# Patient Record
Sex: FEMALE | Race: WHITE | NOT HISPANIC OR LATINO | Employment: OTHER | URBAN - NONMETROPOLITAN AREA
[De-identification: names, ages, dates, MRNs, and addresses within clinical notes are randomized per-mention and may not be internally consistent; named-entity substitution may affect disease eponyms.]

---

## 2017-01-03 ENCOUNTER — HOSPITAL ENCOUNTER (OUTPATIENT)
Dept: PHYSICAL THERAPY | Facility: HOSPITAL | Age: 67
Setting detail: THERAPIES SERIES
End: 2017-01-03
Attending: FAMILY MEDICINE
Payer: COMMERCIAL

## 2017-01-03 PROCEDURE — 97110 THERAPEUTIC EXERCISES: CPT | Mod: GP

## 2017-01-03 PROCEDURE — 40000718 ZZHC STATISTIC PT DEPARTMENT ORTHO VISIT

## 2017-01-05 ENCOUNTER — HOSPITAL ENCOUNTER (OUTPATIENT)
Dept: PHYSICAL THERAPY | Facility: HOSPITAL | Age: 67
Setting detail: THERAPIES SERIES
End: 2017-01-05
Attending: FAMILY MEDICINE
Payer: COMMERCIAL

## 2017-01-05 PROCEDURE — 97110 THERAPEUTIC EXERCISES: CPT | Mod: GP

## 2017-01-05 PROCEDURE — 40000718 ZZHC STATISTIC PT DEPARTMENT ORTHO VISIT

## 2017-01-10 ENCOUNTER — HOSPITAL ENCOUNTER (OUTPATIENT)
Dept: PHYSICAL THERAPY | Facility: HOSPITAL | Age: 67
Setting detail: THERAPIES SERIES
End: 2017-01-10
Attending: FAMILY MEDICINE
Payer: COMMERCIAL

## 2017-01-10 PROCEDURE — 40000718 ZZHC STATISTIC PT DEPARTMENT ORTHO VISIT

## 2017-01-10 PROCEDURE — 97110 THERAPEUTIC EXERCISES: CPT | Mod: GP

## 2017-01-12 ENCOUNTER — HOSPITAL ENCOUNTER (OUTPATIENT)
Dept: PHYSICAL THERAPY | Facility: HOSPITAL | Age: 67
Setting detail: THERAPIES SERIES
End: 2017-01-12
Attending: FAMILY MEDICINE
Payer: COMMERCIAL

## 2017-01-12 PROCEDURE — 40000718 ZZHC STATISTIC PT DEPARTMENT ORTHO VISIT

## 2017-01-12 PROCEDURE — 97110 THERAPEUTIC EXERCISES: CPT | Mod: GP

## 2017-01-17 ENCOUNTER — HOSPITAL ENCOUNTER (OUTPATIENT)
Dept: PHYSICAL THERAPY | Facility: HOSPITAL | Age: 67
Setting detail: THERAPIES SERIES
End: 2017-01-17
Attending: FAMILY MEDICINE
Payer: COMMERCIAL

## 2017-01-17 PROCEDURE — 97110 THERAPEUTIC EXERCISES: CPT | Mod: GP

## 2017-01-17 PROCEDURE — 40000718 ZZHC STATISTIC PT DEPARTMENT ORTHO VISIT

## 2017-01-26 ENCOUNTER — HOSPITAL ENCOUNTER (OUTPATIENT)
Dept: PHYSICAL THERAPY | Facility: HOSPITAL | Age: 67
Setting detail: THERAPIES SERIES
End: 2017-01-26
Attending: FAMILY MEDICINE
Payer: COMMERCIAL

## 2017-01-26 PROCEDURE — 97110 THERAPEUTIC EXERCISES: CPT | Mod: GP

## 2017-01-26 PROCEDURE — 40000718 ZZHC STATISTIC PT DEPARTMENT ORTHO VISIT

## 2017-01-31 ENCOUNTER — HOSPITAL ENCOUNTER (OUTPATIENT)
Dept: PHYSICAL THERAPY | Facility: HOSPITAL | Age: 67
Setting detail: THERAPIES SERIES
End: 2017-01-31
Attending: FAMILY MEDICINE
Payer: COMMERCIAL

## 2017-01-31 PROCEDURE — 40000718 ZZHC STATISTIC PT DEPARTMENT ORTHO VISIT

## 2017-01-31 PROCEDURE — 97110 THERAPEUTIC EXERCISES: CPT | Mod: GP

## 2017-02-02 ENCOUNTER — HOSPITAL ENCOUNTER (OUTPATIENT)
Dept: PHYSICAL THERAPY | Facility: HOSPITAL | Age: 67
Setting detail: THERAPIES SERIES
End: 2017-02-02
Attending: FAMILY MEDICINE
Payer: COMMERCIAL

## 2017-02-02 PROCEDURE — 97110 THERAPEUTIC EXERCISES: CPT | Mod: GP

## 2017-02-02 PROCEDURE — 40000718 ZZHC STATISTIC PT DEPARTMENT ORTHO VISIT

## 2017-02-28 ENCOUNTER — HOSPITAL ENCOUNTER (OUTPATIENT)
Dept: PHYSICAL THERAPY | Facility: HOSPITAL | Age: 67
Setting detail: THERAPIES SERIES
End: 2017-02-28
Attending: FAMILY MEDICINE
Payer: COMMERCIAL

## 2017-02-28 PROCEDURE — 97110 THERAPEUTIC EXERCISES: CPT | Mod: GP

## 2017-02-28 PROCEDURE — 40000718 ZZHC STATISTIC PT DEPARTMENT ORTHO VISIT

## 2017-03-02 ENCOUNTER — HOSPITAL ENCOUNTER (OUTPATIENT)
Dept: PHYSICAL THERAPY | Facility: HOSPITAL | Age: 67
Setting detail: THERAPIES SERIES
End: 2017-03-02
Attending: FAMILY MEDICINE
Payer: COMMERCIAL

## 2017-03-02 PROCEDURE — 40000718 ZZHC STATISTIC PT DEPARTMENT ORTHO VISIT

## 2017-03-02 PROCEDURE — 97110 THERAPEUTIC EXERCISES: CPT | Mod: GP

## 2017-03-07 ENCOUNTER — HOSPITAL ENCOUNTER (OUTPATIENT)
Dept: PHYSICAL THERAPY | Facility: HOSPITAL | Age: 67
Setting detail: THERAPIES SERIES
End: 2017-03-07
Attending: FAMILY MEDICINE
Payer: COMMERCIAL

## 2017-03-07 PROCEDURE — 40000718 ZZHC STATISTIC PT DEPARTMENT ORTHO VISIT

## 2017-03-07 PROCEDURE — 97110 THERAPEUTIC EXERCISES: CPT | Mod: GP

## 2017-03-09 ENCOUNTER — HOSPITAL ENCOUNTER (OUTPATIENT)
Dept: PHYSICAL THERAPY | Facility: HOSPITAL | Age: 67
Setting detail: THERAPIES SERIES
End: 2017-03-09
Attending: FAMILY MEDICINE
Payer: COMMERCIAL

## 2017-03-09 PROCEDURE — 40000718 ZZHC STATISTIC PT DEPARTMENT ORTHO VISIT

## 2017-03-09 PROCEDURE — 97110 THERAPEUTIC EXERCISES: CPT | Mod: GP

## 2017-03-09 NOTE — PROGRESS NOTES
03/07/17 1000   Signing Clinician's Name / Credentials   Signing clinician's name / credentials Madai Beth, PAIGE   Session Number   Session Number 33   Subjective Report   Subjective Report Pt has been feeling really good overall. She is using the shoulder pretty much normally. She notices that it gets tired still when she overdoes it.    Objective Measures   Objective Measures Objective Measure 1   Objective Measure 1   Objective Measure Shoulder ROM   Details Full and equal. Shoulder flexion R 4/5, abduction 4/5, ER 4/5, IR 5/5   Therapeutic Procedure/exercise   Minutes 30    Skilled Intervention Ther ex   Patient Response Good   Treatment Detail Pulleys 3 minutes, UBE for 4 minutes. Standing shoulder abduction 1lb 20x2, scaption 2lb 15x2. Added TB exercises to HEP including pull downs and rows 15x2 each c green TB   Plan   Homework HEP   Plan for next session Pt scheduled to see her surgeon this week. Plan to d/c this week or next   Total Session Time   Total Session Time 30

## 2017-03-14 ENCOUNTER — HOSPITAL ENCOUNTER (OUTPATIENT)
Dept: PHYSICAL THERAPY | Facility: HOSPITAL | Age: 67
Setting detail: THERAPIES SERIES
End: 2017-03-14
Attending: FAMILY MEDICINE
Payer: COMMERCIAL

## 2017-03-14 PROCEDURE — 40000718 ZZHC STATISTIC PT DEPARTMENT ORTHO VISIT

## 2017-03-14 PROCEDURE — 97110 THERAPEUTIC EXERCISES: CPT | Mod: GP

## 2017-05-26 ENCOUNTER — HEALTH MAINTENANCE LETTER (OUTPATIENT)
Age: 67
End: 2017-05-26

## 2017-07-05 NOTE — PROGRESS NOTES
Outpatient Physical Therapy Discharge Note     Patient: Rj Rodríguez  : 1950    Beginning/End Dates of Reporting Period:  10/11/16 to 3/14/17    Referring Provider: David Whitehead PA-C    Therapy Diagnosis: R shoulder arthroscopy, debridement, sad, dce, RTC rupraspinatur , infraspinatus, and open supec biceps tenodesis     Client Self Report: Pt reports the doctor was happy with how her shld was doing.     Goals:  Goal Identifier STG 1   Goal Description Pt will demonstrate knowledge/understanding of HEP and report daily compliance   Target Date 10/25/16   Date Met      Progress:     Goal Identifier LTG 1   Goal Description Pt will demonstrate full ROM R shoulder to return to PLOF   Target Date 01/10/17   Date Met      Progress:     Goal Identifier LTG 2   Goal Description Pt will demonstrate normal 5/5 strengthe R shoulder musculature   Target Date 01/10/17   Date Met      Progress:     Goal Identifier     Goal Description     Target Date     Date Met      Progress:     Goal Identifier     Goal Description     Target Date     Date Met      Progress:     Goal Identifier     Goal Description     Target Date     Date Met      Progress:     Goal Identifier     Goal Description     Target Date     Date Met      Progress:     Goal Identifier     Goal Description     Target Date     Date Met      Progress:       Plan:  Discharge from therapy.    Discharge:    Reason for Discharge: Patient chooses to discontinue therapy.    Equipment Issued: NA    Discharge Plan: Patient to continue home program.

## 2018-08-02 ENCOUNTER — TRANSFERRED RECORDS (OUTPATIENT)
Dept: HEALTH INFORMATION MANAGEMENT | Facility: HOSPITAL | Age: 68
End: 2018-08-02

## 2018-08-02 LAB
CREAT SERPL-MCNC: 0.7 MG/DL (ref 0.4–1)
GFR SERPL CREATININE-BSD FRML MDRD: >60 ML/MIN/1.73M2
GLUCOSE SERPL-MCNC: 101 MG/DL (ref 70–99)
POTASSIUM SERPL-SCNC: 4 MMOL/L (ref 3.4–5.1)
TSH SERPL-ACNC: 1.73 UIU/ML (ref 0.4–3.99)

## 2019-05-09 ENCOUNTER — HOSPITAL ENCOUNTER (EMERGENCY)
Facility: HOSPITAL | Age: 69
Discharge: HOME | End: 2019-05-09
Attending: FAMILY MEDICINE | Admitting: FAMILY MEDICINE
Payer: MEDICARE

## 2019-05-09 ENCOUNTER — APPOINTMENT (OUTPATIENT)
Dept: CT IMAGING | Facility: HOSPITAL | Age: 69
End: 2019-05-09
Attending: FAMILY MEDICINE
Payer: MEDICARE

## 2019-05-09 VITALS
SYSTOLIC BLOOD PRESSURE: 152 MMHG | DIASTOLIC BLOOD PRESSURE: 90 MMHG | BODY MASS INDEX: 40.81 KG/M2 | RESPIRATION RATE: 16 BRPM | WEIGHT: 216 LBS | TEMPERATURE: 97.6 F | OXYGEN SATURATION: 95 %

## 2019-05-09 DIAGNOSIS — S06.0X1A CONCUSSION WITH LOSS OF CONSCIOUSNESS OF 30 MINUTES OR LESS, INITIAL ENCOUNTER: ICD-10-CM

## 2019-05-09 DIAGNOSIS — R55 VASOVAGAL SYNCOPE: ICD-10-CM

## 2019-05-09 LAB
ALBUMIN UR-MCNC: NEGATIVE MG/DL
ANION GAP SERPL CALCULATED.3IONS-SCNC: 5 MMOL/L (ref 3–14)
APPEARANCE UR: CLEAR
BASOPHILS # BLD AUTO: 0.1 10E9/L (ref 0–0.2)
BASOPHILS NFR BLD AUTO: 0.5 %
BILIRUB UR QL STRIP: NEGATIVE
BUN SERPL-MCNC: 10 MG/DL (ref 7–30)
CALCIUM SERPL-MCNC: 8.7 MG/DL (ref 8.5–10.1)
CHLORIDE SERPL-SCNC: 106 MMOL/L (ref 94–109)
CO2 SERPL-SCNC: 28 MMOL/L (ref 20–32)
COLOR UR AUTO: ABNORMAL
CREAT SERPL-MCNC: 0.7 MG/DL (ref 0.52–1.04)
DIFFERENTIAL METHOD BLD: ABNORMAL
EOSINOPHIL # BLD AUTO: 0.1 10E9/L (ref 0–0.7)
EOSINOPHIL NFR BLD AUTO: 0.4 %
ERYTHROCYTE [DISTWIDTH] IN BLOOD BY AUTOMATED COUNT: 13.1 % (ref 10–15)
GFR SERPL CREATININE-BSD FRML MDRD: >90 ML/MIN/{1.73_M2}
GLUCOSE SERPL-MCNC: 165 MG/DL (ref 70–99)
GLUCOSE UR STRIP-MCNC: 70 MG/DL
HCT VFR BLD AUTO: 41.2 % (ref 35–47)
HGB BLD-MCNC: 13.7 G/DL (ref 11.7–15.7)
HGB UR QL STRIP: NEGATIVE
IMM GRANULOCYTES # BLD: 0.1 10E9/L (ref 0–0.4)
IMM GRANULOCYTES NFR BLD: 0.7 %
KETONES UR STRIP-MCNC: NEGATIVE MG/DL
LEUKOCYTE ESTERASE UR QL STRIP: NEGATIVE
LYMPHOCYTES # BLD AUTO: 0.8 10E9/L (ref 0.8–5.3)
LYMPHOCYTES NFR BLD AUTO: 6.6 %
MCH RBC QN AUTO: 29.3 PG (ref 26.5–33)
MCHC RBC AUTO-ENTMCNC: 33.3 G/DL (ref 31.5–36.5)
MCV RBC AUTO: 88 FL (ref 78–100)
MONOCYTES # BLD AUTO: 0.7 10E9/L (ref 0–1.3)
MONOCYTES NFR BLD AUTO: 6.2 %
NEUTROPHILS # BLD AUTO: 10 10E9/L (ref 1.6–8.3)
NEUTROPHILS NFR BLD AUTO: 85.6 %
NITRATE UR QL: NEGATIVE
NRBC # BLD AUTO: 0 10*3/UL
NRBC BLD AUTO-RTO: 0 /100
PH UR STRIP: 6 PH (ref 4.7–8)
PLATELET # BLD AUTO: 278 10E9/L (ref 150–450)
POTASSIUM SERPL-SCNC: 3.6 MMOL/L (ref 3.4–5.3)
RBC # BLD AUTO: 4.67 10E12/L (ref 3.8–5.2)
SODIUM SERPL-SCNC: 139 MMOL/L (ref 133–144)
SOURCE: ABNORMAL
SP GR UR STRIP: 1.01 (ref 1–1.03)
UROBILINOGEN UR STRIP-MCNC: NORMAL MG/DL (ref 0–2)
WBC # BLD AUTO: 11.7 10E9/L (ref 4–11)

## 2019-05-09 PROCEDURE — 85025 COMPLETE CBC W/AUTO DIFF WBC: CPT | Performed by: FAMILY MEDICINE

## 2019-05-09 PROCEDURE — 99284 EMERGENCY DEPT VISIT MOD MDM: CPT | Mod: Z6 | Performed by: FAMILY MEDICINE

## 2019-05-09 PROCEDURE — 81003 URINALYSIS AUTO W/O SCOPE: CPT | Performed by: FAMILY MEDICINE

## 2019-05-09 PROCEDURE — A9270 NON-COVERED ITEM OR SERVICE: HCPCS | Mod: GY | Performed by: FAMILY MEDICINE

## 2019-05-09 PROCEDURE — 80048 BASIC METABOLIC PNL TOTAL CA: CPT | Performed by: FAMILY MEDICINE

## 2019-05-09 PROCEDURE — 36415 COLL VENOUS BLD VENIPUNCTURE: CPT | Performed by: FAMILY MEDICINE

## 2019-05-09 PROCEDURE — 99284 EMERGENCY DEPT VISIT MOD MDM: CPT | Mod: 25

## 2019-05-09 PROCEDURE — 25000132 ZZH RX MED GY IP 250 OP 250 PS 637: Mod: GY | Performed by: FAMILY MEDICINE

## 2019-05-09 PROCEDURE — 70450 CT HEAD/BRAIN W/O DYE: CPT | Mod: TC

## 2019-05-09 RX ORDER — LORAZEPAM 0.5 MG/1
0.5 TABLET ORAL ONCE
Status: COMPLETED | OUTPATIENT
Start: 2019-05-09 | End: 2019-05-09

## 2019-05-09 RX ORDER — AMLODIPINE BESYLATE 2.5 MG/1
2.5 TABLET ORAL DAILY
COMMUNITY
End: 2019-11-11

## 2019-05-09 RX ADMIN — LORAZEPAM 0.5 MG: 0.5 TABLET ORAL at 12:06

## 2019-05-09 NOTE — ED NOTES
Arrived via [facility], transferred self to cot, call light in reach.  Requested that door and window shades be left open.  Patient is anxious, encouraged to take deep breaths, re-assurance provided.  Patient stated was going into kitchen right elbow. hit light switch, extremely painful, and the next thing she knew she was on her back on the kitchen floor. Doesn't remember getting from kitchen door to the floor.  Tried to get up, everything was spinning, nausea, vomited after  assisted her to bathroom.  Doesn't think hit head.  Alert and oriented, speech clear. Denies pain at this time.  Nausea.  EMS gave Klonopin 1 mg at 0810.

## 2019-05-09 NOTE — ED PROVIDER NOTES
History     Chief Complaint   Patient presents with     Fall     HPI  Rj Rodríguez is a 68 year old female who presents the emergency room with extreme anxiety, quivering and compulsively rubbing a small stuffed animal.  She was given Klonopin 2 mg at the scene by her  just prior to transport, apparently she has extreme claustrophobia.  She also has anxiety that is freeform and on arrival here she was worried about having found herself on the floor after hitting her elbow on the door.  She said that that the pain from the elbow was so severe she could not tolerate it in the next thing she knew she was laying on her back on the floor.  She denies any injury excepting the fact that she has pain in the back of her head, apparently did have a loss of consciousness and she has a memory gap.  Prior to coming to the emergency room she had tried to get up, everything was spinning and she had nausea, vomited after her  assisted her to going to the bathroom.  They are concerned that she must have a concussion, she is continuing to be very anxious although apparently she come down significantly with the Klonopin.  She states that Xanax gives her hives, however she tolerates Ativan and Klonopin.    Allergies:  Allergies   Allergen Reactions     Fruit [Peach] Anaphylaxis and Hives     fresh peaches and any fruit that has a pit.  Kiwi's and apples also.  Can eat any fruit cooked.     Nuts Anaphylaxis and Hives     All Raw Nuts, can eat nuts when roasted.     Ace Inhibitors      Upper respiratory infection     Alkylamines Hives     Antihistamines     Alprazolam Hives     Xanax     Erythromycin      BASE; pt. Not sure if this is a true allergy.     Guaifenesin      Guaifed     Hydrochlorothiazide      Olmesartan Medoxomil Cough     Benicar     Phenylephrine Hcl      Guaifed     Pseudoephedrine Hcl      Guaifed     Seasonal Allergies      hayfever     Tramadol Hcl      Ultram, insomnia, nightmares, headache      Zoloft [Sertraline]      paranoia     Latex Other (See Comments), Swelling, Difficulty breathing and Rash     Throat Closes         Problem List:    Patient Active Problem List    Diagnosis Date Noted     Acute gastroenteritis 01/30/2014     Priority: Medium     Vertigo 03/18/2013     Priority: Medium     Tinnitus 03/18/2013     Priority: Medium     Chronic rhinitis 03/18/2013     Priority: Medium     Myalgia 03/18/2013     Priority: Medium        Past Medical History:    Past Medical History:   Diagnosis Date     Arthritis      Chronic back pain      Hypertension      Irregular heart beat      Sleep apnea        Past Surgical History:    Past Surgical History:   Procedure Laterality Date     APPENDECTOMY       BACK SURGERY       CHOLECYSTECTOMY       COLONOSCOPY  2012    Repeat in 10 years     GYN SURGERY       HYSTERECTOMY      Ovaries     RELEASE CARPAL TUNNEL      LT     RELEASE CARPAL TUNNEL      RT x2     SURGICAL RADIOLOGY PROCEDURE N/A 2/12/2016    Procedure: SURGICAL RADIOLOGY PROCEDURE;  Surgeon: Provider, Generic Perianesthesia Nursing;  Location: [facility]     SURGICAL RADIOLOGY PROCEDURE N/A 8/15/2016    Procedure: SURGICAL RADIOLOGY PROCEDURE;  Surgeon: Provider, Generic Perianesthesia Nursing;  Location: [facility]       Family History:    Family History   Problem Relation Age of Onset     Colon Cancer Maternal Aunt      Colon Cancer Maternal Uncle      Diabetes Father         Type 2     Hypertension Father        Social History:  Marital Status:   [2]  Social History     Tobacco Use     Smoking status: Never Smoker     Smokeless tobacco: Never Used   Substance Use Topics     Alcohol use: Yes     Comment: occ glass of wine     Drug use: No        Medications:      Acetaminophen (TYLENOL PO)   amLODIPine (NORVASC) 2.5 MG tablet   atenolol (TENORMIN) 100 MG tablet   cetirizine (ZYRTEC) 10 MG tablet   clonazePAM (KLONOPIN) 0.5 MG tablet   EPINEPHrine (EPIPEN) 0.3 MG/0.3ML injection   FLUoxetine (PROZAC) 20  "MG capsule   Furosemide (LASIX) 20 MG tablet   Multiple Vitamins-Minerals (MULTIVITAMIN GUMMIES ADULT PO)   potassium chloride (K-DUR) 10 MEQ tablet   ALBUTEROL IN         Review of Systems   Constitutional: Positive for activity change. Negative for diaphoresis, fatigue and fever.   HENT:        Mild tenderness in the occipital area at midline.   Respiratory: Negative for shortness of breath.    Cardiovascular: Negative for chest pain and palpitations.   Gastrointestinal: Positive for nausea and vomiting. Negative for abdominal pain, constipation and diarrhea.   Genitourinary: Negative.    Musculoskeletal: Negative for arthralgias.   Neurological: Negative for headaches.   Psychiatric/Behavioral: Positive for behavioral problems. The patient is nervous/anxious.         So anxious she is not able to sit still, has difficulty answering questions or identifying what is going on.       Physical Exam   BP: (!) 185/123  Heart Rate: 76  Temp: 98.1  F (36.7  C)  Resp: 24  Height: (5' 1 1/4\")  Weight: 98 kg (216 lb)  SpO2: 98 %      Physical Exam   Constitutional: She is oriented to person, place, and time. She appears well-developed and well-nourished. She appears distressed.   Mentally distressed, physically not so much.   HENT:   Head: Normocephalic. Head is without raccoon's eyes and without Abdi's sign.   Right Ear: No hemotympanum.   Left Ear: No hemotympanum.   Nose: No nasal septal hematoma.   Mouth/Throat: Oropharynx is clear and moist.   Eyes: Pupils are equal, round, and reactive to light. EOM are normal.   Neck: Normal range of motion. Neck supple.   Cardiovascular: Normal rate, regular rhythm, normal heart sounds and intact distal pulses.   No murmur heard.  Pulmonary/Chest: Effort normal and breath sounds normal. No respiratory distress.   Abdominal: Soft. Bowel sounds are normal. She exhibits no distension. There is no tenderness.   Musculoskeletal: Normal range of motion. She exhibits no edema or " tenderness.   Neurological: She is alert and oriented to person, place, and time.   Skin: Skin is warm and dry. Capillary refill takes less than 2 seconds. There is pallor.   Psychiatric: Her mood appears anxious. Her affect is labile and inappropriate. Her speech is rapid and/or pressured. Cognition and memory are normal. She expresses impulsivity and inappropriate judgment.   Nursing note and vitals reviewed.      ED Course        Procedures    Results for orders placed or performed during the hospital encounter of 05/09/19 (from the past 24 hour(s))   CBC with platelets differential   Result Value Ref Range    WBC 11.7 (H) 4.0 - 11.0 10e9/L    RBC Count 4.67 3.8 - 5.2 10e12/L    Hemoglobin 13.7 11.7 - 15.7 g/dL    Hematocrit 41.2 35.0 - 47.0 %    MCV 88 78 - 100 fl    MCH 29.3 26.5 - 33.0 pg    MCHC 33.3 31.5 - 36.5 g/dL    RDW 13.1 10.0 - 15.0 %    Platelet Count 278 150 - 450 10e9/L    Diff Method Automated Method     % Neutrophils 85.6 %    % Lymphocytes 6.6 %    % Monocytes 6.2 %    % Eosinophils 0.4 %    % Basophils 0.5 %    % Immature Granulocytes 0.7 %    Nucleated RBCs 0 0 /100    Absolute Neutrophil 10.0 (H) 1.6 - 8.3 10e9/L    Absolute Lymphocytes 0.8 0.8 - 5.3 10e9/L    Absolute Monocytes 0.7 0.0 - 1.3 10e9/L    Absolute Eosinophils 0.1 0.0 - 0.7 10e9/L    Absolute Basophils 0.1 0.0 - 0.2 10e9/L    Abs Immature Granulocytes 0.1 0 - 0.4 10e9/L    Absolute Nucleated RBC 0.0    Basic metabolic panel   Result Value Ref Range    Sodium 139 133 - 144 mmol/L    Potassium 3.6 3.4 - 5.3 mmol/L    Chloride 106 94 - 109 mmol/L    Carbon Dioxide 28 20 - 32 mmol/L    Anion Gap 5 3 - 14 mmol/L    Glucose 165 (H) 70 - 99 mg/dL    Urea Nitrogen 10 7 - 30 mg/dL    Creatinine 0.70 0.52 - 1.04 mg/dL    GFR Estimate >90 >60 mL/min/[1.73_m2]    GFR Estimate If Black >90 >60 mL/min/[1.73_m2]    Calcium 8.7 8.5 - 10.1 mg/dL   CT Head w/o Contrast    Narrative    PROCEDURE: CT HEAD W/O CONTRAST     HISTORY: loss of  consciousness.    COMPARISON: 2016    TECHNIQUE:  Helical images of the head from the foramen magnum to the  vertex were obtained without contrast.    FINDINGS: The ventricles and sulci are normal in volume. No acute  intracranial hemorrhage, mass effect, midline shift, hydrocephalus or  basilar cystern effacement are present.    The grey-white matter interface is preserved.    The calvarium is intact. The mastoid air cells are clear.  The  visualized paranasal sinuses are clear.      Impression    IMPRESSION: Normal brain      NOHEMI WADDELL MD   UA reflex to Microscopic and Culture   Result Value Ref Range    Color Urine Light Yellow     Appearance Urine Clear     Glucose Urine 70 (A) NEG^Negative mg/dL    Bilirubin Urine Negative NEG^Negative    Ketones Urine Negative NEG^Negative mg/dL    Specific Gravity Urine 1.014 1.003 - 1.035    Blood Urine Negative NEG^Negative    pH Urine 6.0 4.7 - 8.0 pH    Protein Albumin Urine Negative NEG^Negative mg/dL    Urobilinogen mg/dL Normal 0.0 - 2.0 mg/dL    Nitrite Urine Negative NEG^Negative    Leukocyte Esterase Urine Negative NEG^Negative    Source Midstream Urine        Medications - No data to display    Assessments & Plan (with Medical Decision Making)   CT the head is normal.  Patient was fairly calm through the middle portion of the visit to the ER then began having increased anxiety again.  She is given Ativan 1 mg p.o. prior to going home.  This likely represents a concussion, this coupled with her usual anxiety is problematic.  Advised patient and her  to monitor for signs of worsening concussion, instructions given in written form.  Patient follow-up with Dr. Carrillo as needed next week.    I have reviewed the nursing notes.    I have reviewed the findings, diagnosis, plan and need for follow up with the patient.     Medication List      There are no discharge medications for this visit.         Final diagnoses:   Concussion with loss of consciousness of  30 minutes or less, initial encounter   Vasovagal syncope       5/9/2019   [facility]     Elana Caraballo MD  05/10/19 7581

## 2019-05-09 NOTE — ED AVS SNAPSHOT
[facility]  [address]  [place] [address]  Phone:  [phone]                                    Rj Rodríguez   MRN: [id number]    Department:  [facility]   Date of Visit:  5/9/2019           After Visit Summary Signature Page    I have received my discharge instructions, and my questions have been answered. I have discussed any challenges I see with this plan with the nurse or doctor.    ..........................................................................................................................................  Patient/Patient Representative Signature      ..........................................................................................................................................  Patient Representative Print Name and Relationship to Patient    ..................................................               ................................................  Date                                   Time    ..........................................................................................................................................  Reviewed by Signature/Title    ...................................................              ..............................................  Date                                               Time          22EPIC Rev 08/18

## 2019-05-10 ASSESSMENT — ENCOUNTER SYMPTOMS
FEVER: 0
PALPITATIONS: 0
CONSTIPATION: 0
ABDOMINAL PAIN: 0
ARTHRALGIAS: 0
HEADACHES: 0
SHORTNESS OF BREATH: 0
NAUSEA: 1
DIARRHEA: 0
VOMITING: 1
DIAPHORESIS: 0
FATIGUE: 0
NERVOUS/ANXIOUS: 1
ACTIVITY CHANGE: 1

## 2019-06-19 ENCOUNTER — TRANSFERRED RECORDS (OUTPATIENT)
Dept: HEALTH INFORMATION MANAGEMENT | Facility: CLINIC | Age: 69
End: 2019-06-19

## 2019-08-12 ENCOUNTER — APPOINTMENT (OUTPATIENT)
Dept: GENERAL RADIOLOGY | Facility: HOSPITAL | Age: 69
End: 2019-08-12
Attending: INTERNAL MEDICINE
Payer: MEDICARE

## 2019-08-12 ENCOUNTER — HOSPITAL ENCOUNTER (EMERGENCY)
Facility: HOSPITAL | Age: 69
Discharge: HOME | End: 2019-08-12
Attending: INTERNAL MEDICINE | Admitting: INTERNAL MEDICINE
Payer: MEDICARE

## 2019-08-12 VITALS
DIASTOLIC BLOOD PRESSURE: 87 MMHG | OXYGEN SATURATION: 97 % | TEMPERATURE: 99.8 F | RESPIRATION RATE: 16 BRPM | SYSTOLIC BLOOD PRESSURE: 166 MMHG

## 2019-08-12 DIAGNOSIS — J20.9 ACUTE BRONCHITIS, UNSPECIFIED ORGANISM: ICD-10-CM

## 2019-08-12 LAB
ALBUMIN SERPL-MCNC: 3.2 G/DL (ref 3.4–5)
ALP SERPL-CCNC: 98 U/L (ref 40–150)
ALT SERPL W P-5'-P-CCNC: 47 U/L (ref 0–50)
ANION GAP SERPL CALCULATED.3IONS-SCNC: 8 MMOL/L (ref 3–14)
AST SERPL W P-5'-P-CCNC: 39 U/L (ref 0–45)
BASOPHILS # BLD AUTO: 0.1 10E9/L (ref 0–0.2)
BASOPHILS NFR BLD AUTO: 0.7 %
BILIRUB SERPL-MCNC: 0.7 MG/DL (ref 0.2–1.3)
BUN SERPL-MCNC: 7 MG/DL (ref 7–30)
CALCIUM SERPL-MCNC: 8.7 MG/DL (ref 8.5–10.1)
CHLORIDE SERPL-SCNC: 109 MMOL/L (ref 94–109)
CO2 SERPL-SCNC: 24 MMOL/L (ref 20–32)
CREAT SERPL-MCNC: 0.65 MG/DL (ref 0.52–1.04)
CRP SERPL-MCNC: 53.9 MG/L (ref 0–8)
DIFFERENTIAL METHOD BLD: ABNORMAL
EOSINOPHIL # BLD AUTO: 0.3 10E9/L (ref 0–0.7)
EOSINOPHIL NFR BLD AUTO: 2.4 %
ERYTHROCYTE [DISTWIDTH] IN BLOOD BY AUTOMATED COUNT: 13.8 % (ref 10–15)
GFR SERPL CREATININE-BSD FRML MDRD: >90 ML/MIN/{1.73_M2}
GLUCOSE SERPL-MCNC: 126 MG/DL (ref 70–99)
HCT VFR BLD AUTO: 39.2 % (ref 35–47)
HGB BLD-MCNC: 13.3 G/DL (ref 11.7–15.7)
IMM GRANULOCYTES # BLD: 0.1 10E9/L (ref 0–0.4)
IMM GRANULOCYTES NFR BLD: 0.4 %
LACTATE BLD-SCNC: 1.5 MMOL/L (ref 0.7–2)
LYMPHOCYTES # BLD AUTO: 2.1 10E9/L (ref 0.8–5.3)
LYMPHOCYTES NFR BLD AUTO: 17.1 %
MCH RBC QN AUTO: 30 PG (ref 26.5–33)
MCHC RBC AUTO-ENTMCNC: 33.9 G/DL (ref 31.5–36.5)
MCV RBC AUTO: 89 FL (ref 78–100)
MONOCYTES # BLD AUTO: 1 10E9/L (ref 0–1.3)
MONOCYTES NFR BLD AUTO: 8.4 %
NEUTROPHILS # BLD AUTO: 8.6 10E9/L (ref 1.6–8.3)
NEUTROPHILS NFR BLD AUTO: 71 %
NRBC # BLD AUTO: 0 10*3/UL
NRBC BLD AUTO-RTO: 0 /100
PLATELET # BLD AUTO: 279 10E9/L (ref 150–450)
POTASSIUM SERPL-SCNC: 3.5 MMOL/L (ref 3.4–5.3)
PROT SERPL-MCNC: 7.5 G/DL (ref 6.8–8.8)
RBC # BLD AUTO: 4.43 10E12/L (ref 3.8–5.2)
SODIUM SERPL-SCNC: 141 MMOL/L (ref 133–144)
TROPONIN I SERPL-MCNC: <0.015 UG/L (ref 0–0.04)
WBC # BLD AUTO: 12.2 10E9/L (ref 4–11)

## 2019-08-12 PROCEDURE — 40000275 ZZH STATISTIC RCP TIME EA 10 MIN

## 2019-08-12 PROCEDURE — 83605 ASSAY OF LACTIC ACID: CPT | Performed by: INTERNAL MEDICINE

## 2019-08-12 PROCEDURE — 25000132 ZZH RX MED GY IP 250 OP 250 PS 637: Performed by: INTERNAL MEDICINE

## 2019-08-12 PROCEDURE — 99285 EMERGENCY DEPT VISIT HI MDM: CPT | Mod: 25

## 2019-08-12 PROCEDURE — 94640 AIRWAY INHALATION TREATMENT: CPT

## 2019-08-12 PROCEDURE — 71046 X-RAY EXAM CHEST 2 VIEWS: CPT | Mod: TC

## 2019-08-12 PROCEDURE — 99285 EMERGENCY DEPT VISIT HI MDM: CPT | Mod: Z6 | Performed by: INTERNAL MEDICINE

## 2019-08-12 PROCEDURE — 85025 COMPLETE CBC W/AUTO DIFF WBC: CPT | Performed by: INTERNAL MEDICINE

## 2019-08-12 PROCEDURE — 84484 ASSAY OF TROPONIN QUANT: CPT | Performed by: INTERNAL MEDICINE

## 2019-08-12 PROCEDURE — 93010 ELECTROCARDIOGRAM REPORT: CPT | Performed by: INTERNAL MEDICINE

## 2019-08-12 PROCEDURE — 93005 ELECTROCARDIOGRAM TRACING: CPT

## 2019-08-12 PROCEDURE — 86140 C-REACTIVE PROTEIN: CPT | Performed by: INTERNAL MEDICINE

## 2019-08-12 PROCEDURE — 36415 COLL VENOUS BLD VENIPUNCTURE: CPT | Performed by: INTERNAL MEDICINE

## 2019-08-12 PROCEDURE — 25000131 ZZH RX MED GY IP 250 OP 636 PS 637

## 2019-08-12 PROCEDURE — 80053 COMPREHEN METABOLIC PANEL: CPT | Performed by: INTERNAL MEDICINE

## 2019-08-12 PROCEDURE — 25000125 ZZHC RX 250: Performed by: INTERNAL MEDICINE

## 2019-08-12 RX ORDER — PREDNISONE 10 MG/1
20 TABLET ORAL ONCE
Status: DISCONTINUED | OUTPATIENT
Start: 2019-08-12 | End: 2019-08-12

## 2019-08-12 RX ORDER — PREDNISONE 20 MG/1
TABLET ORAL
Qty: 5 TABLET | Refills: 0 | Status: SHIPPED | OUTPATIENT
Start: 2019-08-13 | End: 2019-10-11

## 2019-08-12 RX ORDER — METHYLPREDNISOLONE SODIUM SUCCINATE 125 MG/2ML
125 INJECTION, POWDER, LYOPHILIZED, FOR SOLUTION INTRAMUSCULAR; INTRAVENOUS ONCE
Status: DISCONTINUED | OUTPATIENT
Start: 2019-08-12 | End: 2019-08-12

## 2019-08-12 RX ORDER — LEVOFLOXACIN 500 MG/1
500 TABLET, FILM COATED ORAL ONCE
Status: COMPLETED | OUTPATIENT
Start: 2019-08-12 | End: 2019-08-12

## 2019-08-12 RX ORDER — IPRATROPIUM BROMIDE AND ALBUTEROL SULFATE 2.5; .5 MG/3ML; MG/3ML
3 SOLUTION RESPIRATORY (INHALATION) ONCE
Status: COMPLETED | OUTPATIENT
Start: 2019-08-12 | End: 2019-08-12

## 2019-08-12 RX ORDER — DIPHENHYDRAMINE HCL 12.5MG/5ML
50 LIQUID (ML) ORAL ONCE
Status: COMPLETED | OUTPATIENT
Start: 2019-08-12 | End: 2019-08-12

## 2019-08-12 RX ORDER — BENZONATATE 100 MG/1
100 CAPSULE ORAL ONCE
Status: COMPLETED | OUTPATIENT
Start: 2019-08-12 | End: 2019-08-12

## 2019-08-12 RX ORDER — PREDNISONE 10 MG/1
40 TABLET ORAL ONCE
Status: COMPLETED | OUTPATIENT
Start: 2019-08-12 | End: 2019-08-12

## 2019-08-12 RX ORDER — LORAZEPAM 1 MG/1
1 TABLET ORAL ONCE
Status: COMPLETED | OUTPATIENT
Start: 2019-08-12 | End: 2019-08-12

## 2019-08-12 RX ORDER — LEVOFLOXACIN 500 MG/1
500 TABLET, FILM COATED ORAL DAILY
Qty: 5 TABLET | Refills: 0 | Status: SHIPPED | OUTPATIENT
Start: 2019-08-13 | End: 2019-10-11

## 2019-08-12 RX ORDER — PREDNISONE 20 MG/1
TABLET ORAL
Status: COMPLETED
Start: 2019-08-12 | End: 2019-08-12

## 2019-08-12 RX ADMIN — LORAZEPAM 1 MG: 1 TABLET ORAL at 03:24

## 2019-08-12 RX ADMIN — PREDNISONE 40 MG: 20 TABLET ORAL at 05:06

## 2019-08-12 RX ADMIN — BENZONATATE 100 MG: 100 CAPSULE ORAL at 03:24

## 2019-08-12 RX ADMIN — DIPHENHYDRAMINE HYDROCHLORIDE 50 MG: 25 SOLUTION ORAL at 04:59

## 2019-08-12 RX ADMIN — LEVOFLOXACIN 500 MG: 500 TABLET, FILM COATED ORAL at 05:04

## 2019-08-12 RX ADMIN — IPRATROPIUM BROMIDE AND ALBUTEROL SULFATE 3 ML: .5; 3 SOLUTION RESPIRATORY (INHALATION) at 02:52

## 2019-08-12 RX ADMIN — PREDNISONE 40 MG: 10 TABLET ORAL at 05:06

## 2019-08-12 NOTE — ED NOTES
Started having some allergy symptoms after being at her son's house last weekend. Red eyes with tearing, persistent cough, SOB with exertion.

## 2019-08-12 NOTE — ED AVS SNAPSHOT
[facility]  [address]  [place] [address]  Phone:  [phone]                                    Rj Rodríguez   MRN: [id number]    Department:  [facility]   Date of Visit:  8/12/2019           After Visit Summary Signature Page    I have received my discharge instructions, and my questions have been answered. I have discussed any challenges I see with this plan with the nurse or doctor.    ..........................................................................................................................................  Patient/Patient Representative Signature      ..........................................................................................................................................  Patient Representative Print Name and Relationship to Patient    ..................................................               ................................................  Date                                   Time    ..........................................................................................................................................  Reviewed by Signature/Title    ...................................................              ..............................................  Date                                               Time          22EPIC Rev 08/18

## 2019-08-17 ASSESSMENT — ENCOUNTER SYMPTOMS
COUGH: 1
ARTHRALGIAS: 0
NECK STIFFNESS: 0
ABDOMINAL DISTENTION: 0
ABDOMINAL PAIN: 0
FEVER: 1
WHEEZING: 0
MYALGIAS: 0
DIZZINESS: 0
LIGHT-HEADEDNESS: 0
NUMBNESS: 0
PALPITATIONS: 0
CHILLS: 1
NECK PAIN: 0
BACK PAIN: 0
VOICE CHANGE: 0
ANAL BLEEDING: 0
DIAPHORESIS: 0
FLANK PAIN: 0
HEMATURIA: 0
CHEST TIGHTNESS: 1
HEADACHES: 0
WOUND: 0
VOMITING: 0
SHORTNESS OF BREATH: 0
BLOOD IN STOOL: 0
DYSURIA: 0
COLOR CHANGE: 0
NAUSEA: 0
CONFUSION: 0

## 2019-08-17 NOTE — ED PROVIDER NOTES
History     Chief Complaint   Patient presents with     Shortness of Breath     Cough     Fever     102 At home at 2130 last evening     The history is provided by the patient.   Cough   Cough characteristics:  Productive  Sputum characteristics:  Brown  Onset quality:  Gradual  Duration:  2 days  Timing:  Constant  Progression:  Worsening  Chronicity:  New  Associated symptoms: chills and fever    Associated symptoms: no chest pain, no diaphoresis, no headaches, no myalgias, no rash, no shortness of breath and no wheezing      Rj Rodríguez is a 68 year old female who     Allergies:  Allergies   Allergen Reactions     Fruit [Peach] Anaphylaxis and Hives     fresh peaches and any fruit that has a pit.  Kiwi's and apples also.  Can eat any fruit cooked.     Nuts Anaphylaxis and Hives     All Raw Nuts, can eat nuts when roasted.     Ace Inhibitors      Upper respiratory infection     Alkylamines Hives     Antihistamines     Alprazolam Hives     Xanax     Erythromycin      BASE; pt. Not sure if this is a true allergy.     Guaifenesin      Guaifed     Hydrochlorothiazide      Olmesartan Medoxomil Cough     Benicar     Phenylephrine Hcl      Guaifed     Pseudoephedrine Hcl      Guaifed     Seasonal Allergies      hayfever     Tramadol Hcl      Ultram, insomnia, nightmares, headache     Zoloft [Sertraline]      paranoia     Latex Other (See Comments), Swelling, Difficulty breathing and Rash     Throat Closes         Problem List:    Patient Active Problem List    Diagnosis Date Noted     Acute gastroenteritis 01/30/2014     Priority: Medium     Vertigo 03/18/2013     Priority: Medium     Tinnitus 03/18/2013     Priority: Medium     Chronic rhinitis 03/18/2013     Priority: Medium     Myalgia 03/18/2013     Priority: Medium        Past Medical History:    Past Medical History:   Diagnosis Date     Arthritis      Chronic back pain      Hypertension      Irregular heart beat      Sleep apnea        Past Surgical History:     Past Surgical History:   Procedure Laterality Date     APPENDECTOMY       BACK SURGERY       CHOLECYSTECTOMY       COLONOSCOPY  2012    Repeat in 10 years     GYN SURGERY       HYSTERECTOMY      Ovaries     RELEASE CARPAL TUNNEL      LT     RELEASE CARPAL TUNNEL      RT x2     SURGICAL RADIOLOGY PROCEDURE N/A 2/12/2016    Procedure: SURGICAL RADIOLOGY PROCEDURE;  Surgeon: Provider, Generic Perianesthesia Nursing;  Location: [facility]     SURGICAL RADIOLOGY PROCEDURE N/A 8/15/2016    Procedure: SURGICAL RADIOLOGY PROCEDURE;  Surgeon: Provider, Generic Perianesthesia Nursing;  Location: [facility]       Family History:    Family History   Problem Relation Age of Onset     Colon Cancer Maternal Aunt      Colon Cancer Maternal Uncle      Diabetes Father         Type 2     Hypertension Father        Social History:  Marital Status:   [2]  Social History     Tobacco Use     Smoking status: Never Smoker     Smokeless tobacco: Never Used   Substance Use Topics     Alcohol use: Yes     Comment: occ glass of wine     Drug use: No        Medications:      Acetaminophen (TYLENOL PO)   ALBUTEROL IN   amLODIPine (NORVASC) 2.5 MG tablet   atenolol (TENORMIN) 100 MG tablet   cetirizine (ZYRTEC) 10 MG tablet   EPINEPHrine (EPIPEN) 0.3 MG/0.3ML injection   Furosemide (LASIX) 20 MG tablet   levofloxacin (LEVAQUIN) 500 MG tablet   Multiple Vitamins-Minerals (MULTIVITAMIN GUMMIES ADULT PO)   potassium chloride (K-DUR) 10 MEQ tablet   predniSONE (DELTASONE) 20 MG tablet   Pseudoephedrine-DM-GG (ROBITUSSIN CF PO)   atenolol (TENORMIN) 100 MG tablet   clonazePAM (KLONOPIN) 0.5 MG tablet   Furosemide (LASIX) 20 MG tablet         Review of Systems   Constitutional: Positive for chills and fever. Negative for diaphoresis.   HENT: Negative for voice change.    Eyes: Negative for visual disturbance.   Respiratory: Positive for cough and chest tightness. Negative for shortness of breath and wheezing.    Cardiovascular: Negative for chest pain,  palpitations and leg swelling.   Gastrointestinal: Negative for abdominal distention, abdominal pain, anal bleeding, blood in stool, nausea and vomiting.   Genitourinary: Negative for decreased urine volume, dysuria, flank pain and hematuria.   Musculoskeletal: Negative for arthralgias, back pain, gait problem, myalgias, neck pain and neck stiffness.   Skin: Negative for color change, pallor, rash and wound.   Neurological: Negative for dizziness, syncope, light-headedness, numbness and headaches.   Psychiatric/Behavioral: Negative for confusion and suicidal ideas.       Physical Exam   BP: 151/85  Heart Rate: 74  Temp: 99.8  F (37.7  C)  Resp: 22  SpO2: 98 %      Physical Exam   Constitutional: She is oriented to person, place, and time. She appears well-developed and well-nourished.   HENT:   Head: Normocephalic and atraumatic.   Mouth/Throat: No oropharyngeal exudate.   Eyes: Pupils are equal, round, and reactive to light. Conjunctivae are normal.   Neck: Normal range of motion. Neck supple. No JVD present. No tracheal deviation present. No thyromegaly present.   Cardiovascular: Normal rate, regular rhythm, normal heart sounds and intact distal pulses. Exam reveals no gallop and no friction rub.   No murmur heard.  Pulmonary/Chest: Effort normal. No stridor. No respiratory distress. She has wheezes. She has no rales. She exhibits no tenderness.   Abdominal: Soft. Bowel sounds are normal. She exhibits no distension and no mass. There is no tenderness. There is no rebound and no guarding.   Musculoskeletal: Normal range of motion. She exhibits no edema or tenderness.   Lymphadenopathy:     She has no cervical adenopathy.   Neurological: She is alert and oriented to person, place, and time.   Skin: Skin is warm and dry. No rash noted. No erythema. No pallor.   Psychiatric: Her behavior is normal.   Nursing note and vitals reviewed.      ED Course        Procedures                   No results found for this or any  previous visit (from the past 24 hour(s)).    Medications   ipratropium - albuterol 0.5 mg/2.5 mg/3 mL (DUONEB) neb solution 3 mL (3 mLs Nebulization Given 8/12/19 4742)   benzonatate (TESSALON) capsule 100 mg (100 mg Oral Given 8/12/19 6084)   LORazepam (ATIVAN) tablet 1 mg (1 mg Oral Given 8/12/19 9934)   diphenhydrAMINE (BENADRYL) solution 50 mg (50 mg Oral Given 8/12/19 4339)   predniSONE (DELTASONE) tablet 40 mg (40 mg Oral Given 8/12/19 7376)   levofloxacin (LEVAQUIN) tablet 500 mg (500 mg Oral Given 8/12/19 2124)       Assessments & Plan (with Medical Decision Making)   Fever chills,cough + wheezing  EKG: NSR  CXR clear  Acute bronchitis vs early pneumonia  Pt allergic to zithro, levaquin + prednisone started  Follow-up with PCP  I have reviewed the nursing notes.    I have reviewed the findings, diagnosis, plan and need for follow up with the patient.      Discharge Medication List as of 8/12/2019  5:23 AM      START taking these medications    Details   levofloxacin (LEVAQUIN) 500 MG tablet Take 1 tablet (500 mg) by mouth daily for 5 days, Disp-5 tablet, R-0, Local Print      predniSONE (DELTASONE) 20 MG tablet 1 tab daily for 3 days, then 1/2 tab daily for 4 days, Disp-5 tablet, R-0, Local Print             Final diagnoses:   Acute bronchitis, unspecified organism       8/12/2019   [facility]     Danny Huerta MD  08/17/19 3157

## 2019-10-03 ENCOUNTER — TRANSFERRED RECORDS (OUTPATIENT)
Dept: HEALTH INFORMATION MANAGEMENT | Facility: CLINIC | Age: 69
End: 2019-10-03

## 2019-10-04 ENCOUNTER — TELEPHONE (OUTPATIENT)
Dept: FAMILY MEDICINE | Facility: OTHER | Age: 69
End: 2019-10-04

## 2019-10-04 NOTE — TELEPHONE ENCOUNTER
Patient calls today, set to establish care with Sheyla Sanches on 10/29/2019.  Long history with Dr. Carrillo, he is leaving area 10/14.    Patient has been battling high blood pressure and seasonal allergies with congestion and respiratory distress all summer.  Patient has a history of Edema to lower ankles.    Down to [place] for allergy testing and patient blood pressure was self reported as 150/87.  Patient also noted increased swelling of ankles.  Patient reports that she contacted Dr. Carrillo over my health and he decreased her Norvasc to 5 mg.  Patient also on Atenolol 100 mg and Lasix 20 mg.  On a Zyrtec 10 mg daily.  Patient was advised by Dr. Carrillo to get into a new provider ASAP as patient needs to stay on top of this.   Patient denies chest pain, reports SOB which she manages with inhaler.  Given Care advise for elevated blood pressure and scheduled with Sheyla Sanches 10/11/2019.  Patient to seek care in ER if she develops increased elevation of b/p, chest pain, difficulty breathing ( not related to allergies) increased swelling to extremities, sacral, face.  Uncontrolled headaches, numbness, confusion or other stroke symptoms.

## 2019-10-07 PROBLEM — E66.01 OBESITY, MORBID, BMI 40.0-49.9 (H): Status: ACTIVE | Noted: 2017-03-01

## 2019-10-07 NOTE — PROGRESS NOTES
Subjective     Rj Rodríguez is a 68 year old female who presents to clinic today for the following health issues:    HPI   NEW PATIENT:  At this time, past medical history, current medications, allergies and drug sensitivities, immunizations, habits and life style, family history, and social history are reviewed and updated. Due for a mammogram. Willing to get. Due for the Shingles vaccine. Would like to first check insurance coverage. Due for hep C screening. Declines. Due for a Dexa-scan. Patient declines.     Hypertension Follow-up      Do you check your blood pressure regularly outside of the clinic? Yes     Are you following a low salt diet? Yes    Are your blood pressures ever more than 140 on the top number (systolic) OR more   than 90 on the bottom number (diastolic), for example 140/90? Yes   -She currently is taking 5 mg of amlodipine, 100 mg of atenolol, and 20 mg of furosemide with potassium. She has tried higher doses of amlodipine, but this caused her legs to swell. She also needs to come off the atenolol as she plans to get allergy shots in the future.   -She does not smoke.  -She denies chest pain, shortness of breath, dizziness, or syncope. No lower leg edema with lower doses of amlodipine.     Anxiety and Depression Follow-Up    How are you doing with your depression since your last visit? Improved     How are you doing with your anxiety since your last visit?  Improved, except in confined or tight areas     Are you having other symptoms that might be associated with depression or anxiety? No    Have you had a significant life event? No     Do you have any concerns with your use of alcohol or other drugs? No     She has been on fluoxetine in the past, but she stopped. She feels it may have been affecting her sleep. Does not feel she needs this at this time. Also taking Klonopin 0.25 mg-she typically gets 20 pills at once and she notes that this lasts for months.      No thoughts of suicide.      Meeting with a counselor-Babs at [facility] and feels this is working well.     Allergic Rhinitis: recently had allergy testing, she notes that she was allergic to almost everything including animals and dust, she notes that it causes a lot of shortness of breath and a cough, would like to proceed with allergy injections, but have them done at [facility]. No fevers. Some chronic nasal congestion and sinus pressure.     Social History     Tobacco Use     Smoking status: Never Smoker     Smokeless tobacco: Never Used   Substance Use Topics     Alcohol use: Yes     Comment: occ glass of wine     Drug use: No     PHQ 10/11/2019   PHQ-9 Total Score 2   Q9: Thoughts of better off dead/self-harm past 2 weeks Not at all     ZEFERINO-7 SCORE 10/11/2019   Total Score 1           Suicide Assessment Five-step Evaluation and Treatment (SAFE-T)      Patient Active Problem List   Diagnosis     Vertigo     Tinnitus     Chronic rhinitis     Bilateral arm weakness     Cervicalgia     Essential hypertension     Generalized anxiety disorder     Lumbago     Migraine with aura and without status migrainosus, not intractable     Mild episode of recurrent major depressive disorder (H)     Obesity, morbid, BMI 40.0-49.9 (H)     Presbyopia     Primary open-angle glaucoma     Right arm pain     S/P cervical spinal fusion     S/P rotator cuff repair     Past Surgical History:   Procedure Laterality Date     APPENDECTOMY       BACK SURGERY       CHOLECYSTECTOMY       COLONOSCOPY  2012    Repeat in 10 years     GYN SURGERY       HYSTERECTOMY      Ovaries     RELEASE CARPAL TUNNEL      LT     RELEASE CARPAL TUNNEL      RT x2     SURGICAL RADIOLOGY PROCEDURE N/A 2/12/2016    Procedure: SURGICAL RADIOLOGY PROCEDURE;  Surgeon: Provider, Generic Perianesthesia Nursing;  Location: [facility]     SURGICAL RADIOLOGY PROCEDURE N/A 8/15/2016    Procedure: SURGICAL RADIOLOGY PROCEDURE;  Surgeon: Provider, Generic Perianesthesia Nursing;   Location: [facility] OR       Social History     Tobacco Use     Smoking status: Never Smoker     Smokeless tobacco: Never Used   Substance Use Topics     Alcohol use: Yes     Comment: occ glass of wine     Family History   Problem Relation Age of Onset     Colon Cancer Maternal Aunt      Colon Cancer Maternal Uncle      Diabetes Father         Type 2     Hypertension Father      Alcoholism Father      Alcoholism Mother          Current Outpatient Medications   Medication Sig Dispense Refill     ALBUTEROL IN        amLODIPine (NORVASC) 2.5 MG tablet Take 2.5 mg by mouth daily       atenolol (TENORMIN) 100 MG tablet Take 1 tablet (100 mg) by mouth daily Take 1 tablet (100mg) by oral route every day 30 tablet      atenolol (TENORMIN) 50 MG tablet Titrating off, take 75 mg times one week and then decrease to 50 mg times one week. 45 tablet 0     cetirizine (ZYRTEC) 10 MG tablet Take 10 mg by mouth daily as needed for allergies       clonazePAM (KLONOPIN) 0.5 MG tablet Take 1 tablet (0.5 mg) by mouth 2 times daily as needed for anxiety (Patient taking differently: Take 0.5 mg by mouth 2 times daily as needed for anxiety I usually take 0.25 mg as needed, use rarely.) 6 tablet 0     EPINEPHrine (EPIPEN) 0.3 MG/0.3ML injection Inject 0.3 mg into the muscle once as needed for anaphylaxis       Furosemide (LASIX) 20 MG tablet Take 1 tablet (20 mg) by mouth daily Take 1 tablet (20mg) by oral route every day. 30 tablet      losartan (COZAAR) 25 MG tablet Take 25 mg times one week and then increase to 50 mg until follow up. 30 tablet 0     potassium chloride (K-DUR) 10 MEQ tablet Take 1 tablet (10 mEq) by mouth daily Take 1 tablet (10meq) by oral route every day with food. 90 tablet      Allergies   Allergen Reactions     Fruit [Peach] Anaphylaxis and Hives     fresh peaches and any fruit that has a pit.  Kiwi's and apples also.  Can eat any fruit cooked.     Nuts Anaphylaxis and Hives     All Raw Nuts, can eat nuts when roasted.      "Ace Inhibitors      Upper respiratory infection     Alkylamines Hives     Antihistamines     Alprazolam Hives     Xanax     Diphenhydramine Hives and Other (See Comments)     Keeps her awake       Erythromycin      BASE; pt. Not sure if this is a true allergy.     Guaifed      Pt unsure of reaction     Guaifenesin      Guaifed     Hydrochlorothiazide      No Clinical Screening - See Comments      Allergic:  Fresh peaches and any fruit that has a pit.  Kiwi's and apples also.  All raw nuts.  Can eat nuts when roasted or any fruit cooked.  Some seasonal allergies :  Hayfever     Olmesartan Medoxomil Cough     Benicar     Phenylephrine Hcl      Guaifed     Pseudoephedrine Hcl      Guaifed     Seasonal Allergies      hayfever     Tramadol      Sleep disturbance. Can not sleep, and when able to fall asleep will have terrible nightmares     Tramadol Hcl      Ultram, insomnia, nightmares, headache     Venlafaxine Other (See Comments)     Heartburn, reflux     Zoloft [Sertraline]      paranoia     Latex Other (See Comments), Swelling, Difficulty breathing and Rash     Throat Closes         Reviewed and updated as needed this visit by Provider  Med Hx  Surg Hx  Fam Hx         Review of Systems   As noted in the HPI.       Objective    BP (!) 144/82 (BP Location: Left arm, Patient Position: Left side, Cuff Size: Adult Large)   Pulse 64   Temp 98.3  F (36.8  C) (Tympanic)   Ht 1.575 m (5' 2\")   Wt 97.3 kg (214 lb 6.4 oz)   SpO2 96%   BMI 39.21 kg/m    Body mass index is 39.21 kg/m .  Physical Exam   Exam:  Constitutional: alert, no distress and over weight  Head: Normocephalic. No masses, lesions, tenderness or abnormalities  Neck: Neck supple. No adenopathy. Thyroid symmetric, normal size,, Carotids without bruits.  ENT: ENT exam normal, no neck nodes or sinus tenderness  Cardiovascular: negative, PMI normal. No lifts, heaves, or thrills. RRR. No murmurs, clicks gallops or rub  Respiratory: negative, Good " diaphragmatic excursion. Lungs clear  Psychiatric: mentation appears normal and affect normal/bright  LOWER LEGS-no edema    Diagnostic Test Results:  none         Assessment & Plan   (Z76.89) Encounter to establish care  (primary encounter diagnosis)  Plan: Patient is in need of a new provider. she has been explained the role of a CNP and the fact that I do not follow patients in the hospital. she was told that should he get admitted, he would then be followed by a hospitalist. she verbalizes understanding and would like to establish a relationship now. Will update her pneumococcal vaccine.     (I10) Essential hypertension  Plan: BP high today and she also needs to wean off her atenolol. Will decrease her atenolol by 25 mg per week and begin losartan 25 mg times one week and then increase to 50 mg daily. Will see her back on 10/29 for reassessment and BMP check. She will also closely monitor her BP at home and let us know if she has any highs or lows. A low sodium diet was also encouraged. F/u 10/29/19.     (F41.1) Generalized anxiety disorder  (F33.0) Mild episode of recurrent major depressive disorder (H)  Comment: stable  Plan: Continue Klonopin rarely and following with Babs at [facility].     (J30.81) Allergic rhinitis due to animals  (J30.89) Allergic rhinitis due to dust mite  Comment: would like to proceed with allergy injections  Plan: OTOLARYNGOLOGY REFERRAL        Will refer to allergy here and start weaning her atenolol.     (Z12.31) Encounter for screening mammogram for breast cancer  Plan: MA Screen Bilateral w/Carlitos        Will notify patient of the results when available and intervene accordingly.     (Z13.220) Screening for lipoid disorders  Plan: Lipid Profile        Will notify patient of the results when available and intervene accordingly.            Sheyla Sanches NP  [facility] [place]

## 2019-10-11 ENCOUNTER — OFFICE VISIT (OUTPATIENT)
Dept: FAMILY MEDICINE | Facility: OTHER | Age: 69
End: 2019-10-11
Attending: NURSE PRACTITIONER
Payer: COMMERCIAL

## 2019-10-11 VITALS
OXYGEN SATURATION: 96 % | HEIGHT: 62 IN | HEART RATE: 64 BPM | WEIGHT: 214.4 LBS | BODY MASS INDEX: 39.45 KG/M2 | SYSTOLIC BLOOD PRESSURE: 144 MMHG | TEMPERATURE: 98.3 F | DIASTOLIC BLOOD PRESSURE: 82 MMHG

## 2019-10-11 DIAGNOSIS — I10 ESSENTIAL HYPERTENSION: ICD-10-CM

## 2019-10-11 DIAGNOSIS — F41.1 GENERALIZED ANXIETY DISORDER: ICD-10-CM

## 2019-10-11 DIAGNOSIS — F33.0 MILD EPISODE OF RECURRENT MAJOR DEPRESSIVE DISORDER (H): ICD-10-CM

## 2019-10-11 DIAGNOSIS — J30.81 ALLERGIC RHINITIS DUE TO ANIMALS: ICD-10-CM

## 2019-10-11 DIAGNOSIS — Z12.31 ENCOUNTER FOR SCREENING MAMMOGRAM FOR BREAST CANCER: ICD-10-CM

## 2019-10-11 DIAGNOSIS — Z23 NEED FOR PNEUMOCOCCAL VACCINATION: ICD-10-CM

## 2019-10-11 DIAGNOSIS — Z13.220 SCREENING FOR LIPOID DISORDERS: ICD-10-CM

## 2019-10-11 DIAGNOSIS — Z76.89 ENCOUNTER TO ESTABLISH CARE: Primary | ICD-10-CM

## 2019-10-11 DIAGNOSIS — J30.89 ALLERGIC RHINITIS DUE TO DUST MITE: ICD-10-CM

## 2019-10-11 PROCEDURE — 99214 OFFICE O/P EST MOD 30 MIN: CPT | Performed by: NURSE PRACTITIONER

## 2019-10-11 PROCEDURE — 90732 PPSV23 VACC 2 YRS+ SUBQ/IM: CPT

## 2019-10-11 PROCEDURE — 90732 PPSV23 VACC 2 YRS+ SUBQ/IM: CPT | Performed by: NURSE PRACTITIONER

## 2019-10-11 PROCEDURE — G0009 ADMIN PNEUMOCOCCAL VACCINE: HCPCS

## 2019-10-11 PROCEDURE — G0463 HOSPITAL OUTPT CLINIC VISIT: HCPCS | Mod: 25

## 2019-10-11 RX ORDER — LOSARTAN POTASSIUM 25 MG/1
TABLET ORAL
Qty: 30 TABLET | Refills: 0 | Status: SHIPPED | OUTPATIENT
Start: 2019-10-11 | End: 2019-11-11

## 2019-10-11 RX ORDER — ATENOLOL 50 MG/1
TABLET ORAL
Qty: 45 TABLET | Refills: 0 | Status: SHIPPED | OUTPATIENT
Start: 2019-10-11 | End: 2019-11-11

## 2019-10-11 ASSESSMENT — ANXIETY QUESTIONNAIRES
6. BECOMING EASILY ANNOYED OR IRRITABLE: NOT AT ALL
IF YOU CHECKED OFF ANY PROBLEMS ON THIS QUESTIONNAIRE, HOW DIFFICULT HAVE THESE PROBLEMS MADE IT FOR YOU TO DO YOUR WORK, TAKE CARE OF THINGS AT HOME, OR GET ALONG WITH OTHER PEOPLE: SOMEWHAT DIFFICULT
1. FEELING NERVOUS, ANXIOUS, OR ON EDGE: SEVERAL DAYS
5. BEING SO RESTLESS THAT IT IS HARD TO SIT STILL: NOT AT ALL
2. NOT BEING ABLE TO STOP OR CONTROL WORRYING: NOT AT ALL
3. WORRYING TOO MUCH ABOUT DIFFERENT THINGS: NOT AT ALL
7. FEELING AFRAID AS IF SOMETHING AWFUL MIGHT HAPPEN: NOT AT ALL
GAD7 TOTAL SCORE: 1

## 2019-10-11 ASSESSMENT — PATIENT HEALTH QUESTIONNAIRE - PHQ9
SUM OF ALL RESPONSES TO PHQ QUESTIONS 1-9: 2
5. POOR APPETITE OR OVEREATING: NOT AT ALL

## 2019-10-11 ASSESSMENT — MIFFLIN-ST. JEOR: SCORE: 1465.76

## 2019-10-11 ASSESSMENT — PAIN SCALES - GENERAL: PAINLEVEL: SEVERE PAIN (6)

## 2019-10-11 NOTE — NURSING NOTE
"Chief Complaint   Patient presents with     Hypertension       Initial BP (!) 144/82 (BP Location: Left arm, Patient Position: Left side, Cuff Size: Adult Large)   Pulse 64   Temp 98.3  F (36.8  C) (Tympanic)   Ht 1.575 m (5' 2\")   Wt 97.3 kg (214 lb 6.4 oz)   SpO2 96%   BMI 39.21 kg/m   Estimated body mass index is 39.21 kg/m  as calculated from the following:    Height as of this encounter: 1.575 m (5' 2\").    Weight as of this encounter: 97.3 kg (214 lb 6.4 oz).  Medication Reconciliation: complete  Janet Woods LPN  "

## 2019-10-11 NOTE — LETTER
My Depression Action Plan  Name: Rj Rodríguez   Date of Birth 1950  Date: 10/7/2019    My doctor: Valentín Carrillo   My clinic: [facility] [place]  [address]  [place] [address]  [phone]          GREEN    ZONE   Good Control    What it looks like:     Things are going generally well. You have normal up s and down s. You may even feel depressed from time to time, but bad moods usually last less than a day.   What you need to do:  1. Continue to care for yourself (see self care plan)  2. Check your depression survival kit and update it as needed  3. Follow your physician s recommendations including any medication.  4. Do not stop taking medication unless you consult with your physician first.           YELLOW         ZONE Getting Worse    What it looks like:     Depression is starting to interfere with your life.     It may be hard to get out of bed; you may be starting to isolate yourself from others.    Symptoms of depression are starting to last most all day and this has happened for several days.     You may have suicidal thoughts but they are not constant.   What you need to do:     1. Call your care team, your response to treatment will improve if you keep your care team informed of your progress. Yellow periods are signs an adjustment may need to be made.     2. Continue your self-care, even if you have to fake it!    3. Talk to someone in your support network    4. Open up your depression survival kit           RED    ZONE Medical Alert - Get Help    What it looks like:     Depression is seriously interfering with your life.     You may experience these or other symptoms: You can t get out of bed most days, can t work or engage in other necessary activities, you have trouble taking care of basic hygiene, or basic responsibilities, thoughts of suicide or death that will not go away, self-injurious behavior.     What you need to do:  1. Call your care team and  request a same-day appointment. If they are not available (weekends or after hours) call your local crisis line, emergency room or 911.            Depression Self Care Plan / Survival Kit    Self-Care for Depression  Here s the deal. Your body and mind are really not as separate as most people think.  What you do and think affects how you feel and how you feel influences what you do and think. This means if you do things that people who feel good do, it will help you feel better.  Sometimes this is all it takes.  There is also a place for medication and therapy depending on how severe your depression is, so be sure to consult with your medical provider and/ or Behavioral Health Consultant if your symptoms are worsening or not improving.     In order to better manage my stress, I will:    Exercise  Get some form of exercise, every day. This will help reduce pain and release endorphins, the  feel good  chemicals in your brain. This is almost as good as taking antidepressants!  This is not the same as joining a gym and then never going! (they count on that by the way ) It can be as simple as just going for a walk or doing some gardening, anything that will get you moving.      Hygiene   Maintain good hygiene (Get out of bed in the morning, Make your bed, Brush your teeth, Take a shower, and Get dressed like you were going to work, even if you are unemployed).  If your clothes don't fit try to get ones that do.    Diet  I will strive to eat foods that are good for me, drink plenty of water, and avoid excessive sugar, caffeine, alcohol, and other mood-altering substances.  Some foods that are helpful in depression are: complex carbohydrates, B vitamins, flaxseed, fish or fish oil, fresh fruits and vegetables.    Psychotherapy  I agree to participate in Individual Therapy (if recommended).    Medication  If prescribed medications, I agree to take them.  Missing doses can result in serious side effects.  I understand that  drinking alcohol, or other illicit drug use, may cause potential side effects.  I will not stop my medication abruptly without first discussing it with my provider.    Staying Connected With Others  I will stay in touch with my friends, family members, and my primary care provider/team.    Use your imagination  Be creative.  We all have a creative side; it doesn t matter if it s oil painting, sand castles, or mud pies! This will also kick up the endorphins.    Witness Beauty  (AKA stop and smell the roses) Take a look outside, even in mid-winter. Notice colors, textures. Watch the squirrels and birds.     Service to others  Be of service to others.  There is always someone else in need.  By helping others we can  get out of ourselves  and remember the really important things.  This also provides opportunities for practicing all the other parts of the program.    Humor  Laugh and be silly!  Adjust your TV habits for less news and crime-drama and more comedy.    Control your stress  Try breathing deep, massage therapy, biofeedback, and meditation. Find time to relax each day.     My support system    Clinic Contact:  Phone number:    Contact 1:  Phone number:    Contact 2:  Phone number:    [facility]/:  Phone number:    Therapist:  Phone number:    Local crisis center:    Phone number:    Other community support:  Phone number:

## 2019-10-12 ASSESSMENT — ANXIETY QUESTIONNAIRES: GAD7 TOTAL SCORE: 1

## 2019-10-25 NOTE — PROGRESS NOTES
"Subjective     Rj Rodríguez is a 68 year old female who presents to clinic today for the following health issues:    HPI   Hypertension Follow-up    Patient was last seen on 10/11/19. At this time, she was taking atenolol, but was told that she needed to wean off of it as she will be getting allergy injections. Losartan was started and she has been decreasing her atenolol by 25 mg per week.       Do you check your blood pressure regularly outside of the clinic? Yes states machine has been \" acting up, \" unsure actual readings    Are you following a low salt diet? Yes    Are your blood pressures ever more than 140 on the top number (systolic) OR more   than 90 on the bottom number (diastolic), for example 140/90? Yes      How many servings of fruits and vegetables do you eat daily?  0-1    On average, how many sweetened beverages do you drink each day (soda, juice, sweet tea, etc)?   1    How many days per week do you miss taking your medication? 0    -She does not smoke.  -She denies chest pain, palpitations, dizziness, or syncope. Some shortness of breath with allergies, but this is why she needs allergy injections. No lower leg edema with lower doses of amlodipine. She currently takes 5 mg as 10 mg caused lower leg edema. She has taken hydrochlorothiazide in the past and developed a rash. She also takes furosemide with potassium for her blood pressure.   -She notes that she also is having a bad day today. Working with her counselor regarding her PTSD and this has been difficult for her. She also had a panic attack in lab just prior to her appointment.     Patient Active Problem List   Diagnosis     Vertigo     Tinnitus     Chronic rhinitis     Bilateral arm weakness     Cervicalgia     Essential hypertension     Generalized anxiety disorder     Lumbago     Migraine with aura and without status migrainosus, not intractable     Mild episode of recurrent major depressive disorder (H)     Obesity, morbid, BMI " 40.0-49.9 (H)     Presbyopia     Primary open-angle glaucoma     Right arm pain     S/P cervical spinal fusion     S/P rotator cuff repair     Past Surgical History:   Procedure Laterality Date     APPENDECTOMY       BACK SURGERY       CHOLECYSTECTOMY       COLONOSCOPY  2012    Repeat in 10 years     GYN SURGERY       HYSTERECTOMY      Ovaries     RELEASE CARPAL TUNNEL      LT     RELEASE CARPAL TUNNEL      RT x2     SURGICAL RADIOLOGY PROCEDURE N/A 2/12/2016    Procedure: SURGICAL RADIOLOGY PROCEDURE;  Surgeon: Provider, Generic Perianesthesia Nursing;  Location: [facility]     SURGICAL RADIOLOGY PROCEDURE N/A 8/15/2016    Procedure: SURGICAL RADIOLOGY PROCEDURE;  Surgeon: Provider, Generic Perianesthesia Nursing;  Location: [facility]       Social History     Tobacco Use     Smoking status: Never Smoker     Smokeless tobacco: Never Used   Substance Use Topics     Alcohol use: Yes     Alcohol/week: 1.0 standard drinks     Types: 1 Glasses of wine per week     Frequency: Monthly or less     Drinks per session: 1 or 2     Binge frequency: Never     Comment: occ glass of wine     Family History   Problem Relation Age of Onset     Colon Cancer Maternal Aunt      Colon Cancer Maternal Uncle      Diabetes Father         Type 2     Hypertension Father      Alcoholism Father      Alcoholism Mother          Current Outpatient Medications   Medication Sig Dispense Refill     ALBUTEROL IN        amLODIPine (NORVASC) 2.5 MG tablet Take 2.5 mg by mouth daily       atenolol (TENORMIN) 50 MG tablet Titrating off, take 75 mg times one week and then decrease to 50 mg times one week. 45 tablet 0     cetirizine (ZYRTEC) 10 MG tablet Take 10 mg by mouth daily as needed for allergies       clonazePAM (KLONOPIN) 0.5 MG tablet Take 1 tablet (0.5 mg) by mouth 2 times daily as needed for anxiety (Patient taking differently: Take 0.5 mg by mouth 2 times daily as needed for anxiety I usually take 0.25 mg as needed, use rarely.) 6 tablet 0      EPINEPHrine (EPIPEN) 0.3 MG/0.3ML injection Inject 0.3 mg into the muscle once as needed for anaphylaxis       Furosemide (LASIX) 20 MG tablet Take 1 tablet (20 mg) by mouth daily Take 1 tablet (20mg) by oral route every day. 30 tablet      losartan (COZAAR) 25 MG tablet Take 25 mg times one week and then increase to 50 mg until follow up. 30 tablet 0     losartan (COZAAR) 50 MG tablet Take 2 tablets (100 mg) by mouth daily Take 1 and 1/2 pills times one week and then increase to 2 pills daily until you see me. 60 tablet 0     potassium chloride (K-DUR) 10 MEQ tablet Take 1 tablet (10 mEq) by mouth daily Take 1 tablet (10meq) by oral route every day with food. 90 tablet      Allergies   Allergen Reactions     Fruit [Peach] Anaphylaxis and Hives     fresh peaches and any fruit that has a pit.  Kiwi's and apples also.  Can eat any fruit cooked.     Nuts Anaphylaxis and Hives     All Raw Nuts, can eat nuts when roasted.     Ace Inhibitors      Upper respiratory infection     Alkylamines Hives     Antihistamines     Alprazolam Hives     Xanax     Diphenhydramine Hives and Other (See Comments)     Keeps her awake       Erythromycin      BASE; pt. Not sure if this is a true allergy.     Guaifed      Pt unsure of reaction     Guaifenesin      Guaifed     Hydrochlorothiazide      No Clinical Screening - See Comments      Allergic:  Fresh peaches and any fruit that has a pit.  Kiwi's and apples also.  All raw nuts.  Can eat nuts when roasted or any fruit cooked.  Some seasonal allergies :  Hayfever     Olmesartan Medoxomil Cough     Benicar     Phenylephrine Hcl      Guaifed     Pseudoephedrine Hcl      Guaifed     Seasonal Allergies      hayfever     Tramadol      Sleep disturbance. Can not sleep, and when able to fall asleep will have terrible nightmares     Tramadol Hcl      Ultram, insomnia, nightmares, headache     Venlafaxine Other (See Comments)     Heartburn, reflux     Zoloft [Sertraline]      paranoia     Latex  "Other (See Comments), Swelling, Difficulty breathing and Rash     Throat Closes         Reviewed and updated as needed this visit by Provider         Review of Systems   As noted in the HPI.       Objective    BP (!) 158/88 (BP Location: Left arm, Patient Position: Chair, Cuff Size: Adult Large)   Pulse 71   Temp 97.7  F (36.5  C) (Tympanic)   Ht 1.575 m (5' 2\")   Wt 96.2 kg (212 lb)   SpO2 96%   BMI 38.78 kg/m    Body mass index is 38.78 kg/m .  Physical Exam   GENERAL: alert, and obese  NECK: no carotid bruits  RESP: lungs clear to auscultation - no rales, rhonchi or wheezes  CV: regular rate and rhythm, normal S1 S2, no S3 or S4, no murmur, click or rub, no peripheral edema and peripheral pulses strong  PSYCH: anxious    Diagnostic Test Results:  Labs reviewed in Epic  Results for orders placed or performed in visit on 10/29/19 (from the past 24 hour(s))   Lipid Profile   Result Value Ref Range    Cholesterol 148 <200 mg/dL    Triglycerides 174 (H) <150 mg/dL    HDL Cholesterol 50 >49 mg/dL    LDL Cholesterol Calculated 63 <100 mg/dL    Non HDL Cholesterol 98 <130 mg/dL   Basic metabolic panel   Result Value Ref Range    Sodium 139 133 - 144 mmol/L    Potassium 3.6 3.4 - 5.3 mmol/L    Chloride 109 94 - 109 mmol/L    Carbon Dioxide 25 20 - 32 mmol/L    Anion Gap 5 3 - 14 mmol/L    Glucose 126 (H) 70 - 99 mg/dL    Urea Nitrogen 11 7 - 30 mg/dL    Creatinine 0.73 0.52 - 1.04 mg/dL    GFR Estimate 85 >60 mL/min/[1.73_m2]    GFR Estimate If Black >90 >60 mL/min/[1.73_m2]    Calcium 8.8 8.5 - 10.1 mg/dL           Assessment & Plan   (I10) Essential hypertension  (primary encounter diagnosis)  (J30.81) Allergic rhinitis due to animals  (J30.89) Allergic rhinitis due to dust mite  Comment: BP high today, but we are currently weaning her off of her betablocker for potential allergy injections and titrating her losartan up, she currently is taking 50 mg of atenolol and 50 mg of losartan  Plan: Over the next week, " "will decrease her atenolol to 25 mg daily and increase her losartan to 75 mg daily. After one week, will stop her atenolol and increase her losartan to 100 mg daily. Will then see her back in 2 weeks. Will repeat BMP at that time. If BP high, will consider additional medication.     (R73.9) Hyperglycemia  Comment: glucose 126 today  Plan: Hemoglobin A1c        Will order A1C with next lab draw. Will notify patient of the results when available and intervene accordingly.     (Z91.89) [place] cardiac risk 11% in next 10 years  Plan: Based on today's lipid panel, risk is 11 percent. Her BP, however, is high today and we are working on better BP control. Will reassess risk once we get better control. If greater than 7.5 percent, may then consider statin.          BMI:   Estimated body mass index is 39.21 kg/m  as calculated from the following:    Height as of 10/11/19: 1.575 m (5' 2\").    Weight as of 10/11/19: 97.3 kg (214 lb 6.4 oz).   Weight management plan: Discussed healthy diet and exercise guidelines    Sheyla Sanches NP  [facility] - [place]      "

## 2019-10-29 ENCOUNTER — OFFICE VISIT (OUTPATIENT)
Dept: FAMILY MEDICINE | Facility: OTHER | Age: 69
End: 2019-10-29
Attending: NURSE PRACTITIONER
Payer: MEDICARE

## 2019-10-29 VITALS
TEMPERATURE: 97.7 F | OXYGEN SATURATION: 96 % | DIASTOLIC BLOOD PRESSURE: 88 MMHG | SYSTOLIC BLOOD PRESSURE: 158 MMHG | BODY MASS INDEX: 39.01 KG/M2 | HEART RATE: 71 BPM | WEIGHT: 212 LBS | HEIGHT: 62 IN

## 2019-10-29 DIAGNOSIS — J30.89 ALLERGIC RHINITIS DUE TO DUST MITE: ICD-10-CM

## 2019-10-29 DIAGNOSIS — R73.9 HYPERGLYCEMIA: ICD-10-CM

## 2019-10-29 DIAGNOSIS — J30.81 ALLERGIC RHINITIS DUE TO ANIMALS: ICD-10-CM

## 2019-10-29 DIAGNOSIS — Z13.220 SCREENING FOR LIPOID DISORDERS: ICD-10-CM

## 2019-10-29 DIAGNOSIS — Z91.89 FRAMINGHAM CARDIAC RISK <10% IN NEXT 10 YEARS: ICD-10-CM

## 2019-10-29 DIAGNOSIS — I10 ESSENTIAL HYPERTENSION: Primary | ICD-10-CM

## 2019-10-29 DIAGNOSIS — I10 ESSENTIAL HYPERTENSION: ICD-10-CM

## 2019-10-29 LAB
ANION GAP SERPL CALCULATED.3IONS-SCNC: 5 MMOL/L (ref 3–14)
BUN SERPL-MCNC: 11 MG/DL (ref 7–30)
CALCIUM SERPL-MCNC: 8.8 MG/DL (ref 8.5–10.1)
CHLORIDE SERPL-SCNC: 109 MMOL/L (ref 94–109)
CHOLEST SERPL-MCNC: 148 MG/DL
CO2 SERPL-SCNC: 25 MMOL/L (ref 20–32)
CREAT SERPL-MCNC: 0.73 MG/DL (ref 0.52–1.04)
GFR SERPL CREATININE-BSD FRML MDRD: 85 ML/MIN/{1.73_M2}
GLUCOSE SERPL-MCNC: 126 MG/DL (ref 70–99)
HDLC SERPL-MCNC: 50 MG/DL
LDLC SERPL CALC-MCNC: 63 MG/DL
NONHDLC SERPL-MCNC: 98 MG/DL
POTASSIUM SERPL-SCNC: 3.6 MMOL/L (ref 3.4–5.3)
SODIUM SERPL-SCNC: 139 MMOL/L (ref 133–144)
TRIGL SERPL-MCNC: 174 MG/DL

## 2019-10-29 PROCEDURE — 36415 COLL VENOUS BLD VENIPUNCTURE: CPT | Mod: ZL | Performed by: NURSE PRACTITIONER

## 2019-10-29 PROCEDURE — G0463 HOSPITAL OUTPT CLINIC VISIT: HCPCS

## 2019-10-29 PROCEDURE — 80048 BASIC METABOLIC PNL TOTAL CA: CPT | Mod: ZL | Performed by: NURSE PRACTITIONER

## 2019-10-29 PROCEDURE — 99213 OFFICE O/P EST LOW 20 MIN: CPT | Performed by: NURSE PRACTITIONER

## 2019-10-29 PROCEDURE — 80061 LIPID PANEL: CPT | Mod: ZL | Performed by: NURSE PRACTITIONER

## 2019-10-29 RX ORDER — LOSARTAN POTASSIUM 50 MG/1
100 TABLET ORAL DAILY
Qty: 60 TABLET | Refills: 0 | Status: SHIPPED | OUTPATIENT
Start: 2019-10-29 | End: 2019-11-11

## 2019-10-29 SDOH — HEALTH STABILITY: MENTAL HEALTH: HOW OFTEN DO YOU HAVE 6 OR MORE DRINKS ON ONE OCCASION?: NEVER

## 2019-10-29 SDOH — HEALTH STABILITY: MENTAL HEALTH: HOW MANY STANDARD DRINKS CONTAINING ALCOHOL DO YOU HAVE ON A TYPICAL DAY?: 1 OR 2

## 2019-10-29 SDOH — HEALTH STABILITY: MENTAL HEALTH: HOW OFTEN DO YOU HAVE A DRINK CONTAINING ALCOHOL?: MONTHLY OR LESS

## 2019-10-29 ASSESSMENT — PAIN SCALES - GENERAL: PAINLEVEL: NO PAIN (0)

## 2019-10-29 ASSESSMENT — MIFFLIN-ST. JEOR: SCORE: 1454.88

## 2019-10-29 NOTE — NURSING NOTE
"Chief Complaint   Patient presents with     Establish Care       Initial BP (!) 158/88 (BP Location: Left arm, Patient Position: Chair, Cuff Size: Adult Large)   Pulse 71   Temp 97.7  F (36.5  C) (Tympanic)   Ht 1.575 m (5' 2\")   Wt 96.2 kg (212 lb)   SpO2 96%   BMI 38.78 kg/m   Estimated body mass index is 38.78 kg/m  as calculated from the following:    Height as of this encounter: 1.575 m (5' 2\").    Weight as of this encounter: 96.2 kg (212 lb).  Medication Reconciliation: complete  Janet Woods LPN  "

## 2019-11-05 NOTE — PROGRESS NOTES
Subjective     Rj Rodríguez is a 68 year old female who presents to clinic today for the following health issues:    HPI   Hypertension Follow-up    She was last seen on 10/29/19. At this time, her BP was elevated at 158/88. Her losartan was increased to 75 mg daily and she follows up today. Do note, we are also weaning her off of her atenolol as she needs allergy injections.     Do you check your blood pressure regularly outside of the clinic? Yes , typically around 130/80    Are you following a low salt diet? Yes    Are your blood pressures ever more than 140 on the top number (systolic) OR more   than 90 on the bottom number (diastolic), for example 140/90? No   -She does not smoke.  -She denies chest pain, palpitations, dizziness, or syncope. Some shortness of breath with allergies, but this is why she needs allergy injections. No lower leg edema with lower doses of amlodipine. She currently takes 5 mg as 10 mg caused lower leg edema. She has taken hydrochlorothiazide in the past and developed a rash. She also takes furosemide with potassium for her blood pressure.       How many servings of fruits and vegetables do you eat daily?  2-3    On average, how many sweetened beverages do you drink each day (soda, juice, sweet tea, etc)?   3    How many days per week do you miss taking your medication? 0    Patient Active Problem List   Diagnosis     Vertigo     Tinnitus     Chronic rhinitis     Bilateral arm weakness     Cervicalgia     Essential hypertension     Generalized anxiety disorder     Lumbago     Migraine with aura and without status migrainosus, not intractable     Mild episode of recurrent major depressive disorder (H)     Obesity, morbid, BMI 40.0-49.9 (H)     Presbyopia     Primary open-angle glaucoma     Right arm pain     S/P cervical spinal fusion     S/P rotator cuff repair     [place] cardiac risk 11% in next 10 years     Past Surgical History:   Procedure Laterality Date     APPENDECTOMY        BACK SURGERY       CHOLECYSTECTOMY       COLONOSCOPY  2012    Repeat in 10 years     GYN SURGERY       HYSTERECTOMY      Ovaries     RELEASE CARPAL TUNNEL      LT     RELEASE CARPAL TUNNEL      RT x2     SURGICAL RADIOLOGY PROCEDURE N/A 2/12/2016    Procedure: SURGICAL RADIOLOGY PROCEDURE;  Surgeon: Provider, Generic Perianesthesia Nursing;  Location: [facility]     SURGICAL RADIOLOGY PROCEDURE N/A 8/15/2016    Procedure: SURGICAL RADIOLOGY PROCEDURE;  Surgeon: Provider, Generic Perianesthesia Nursing;  Location: [facility]       Social History     Tobacco Use     Smoking status: Never Smoker     Smokeless tobacco: Never Used   Substance Use Topics     Alcohol use: Yes     Alcohol/week: 1.0 standard drinks     Types: 1 Glasses of wine per week     Frequency: Monthly or less     Drinks per session: 1 or 2     Binge frequency: Never     Comment: occ glass of wine     Family History   Problem Relation Age of Onset     Colon Cancer Maternal Aunt      Colon Cancer Maternal Uncle      Diabetes Father         Type 2     Hypertension Father      Alcoholism Father      Alcoholism Mother          Current Outpatient Medications   Medication Sig Dispense Refill     ALBUTEROL IN        amLODIPine (NORVASC) 5 MG tablet Take 5 mg by mouth daily  3     cetirizine (ZYRTEC) 10 MG tablet Take 10 mg by mouth daily as needed for allergies       clonazePAM (KLONOPIN) 0.5 MG tablet Take 1 tablet (0.5 mg) by mouth 2 times daily as needed for anxiety (Patient taking differently: Take 0.5 mg by mouth 2 times daily as needed for anxiety I usually take 0.25 mg as needed, use rarely.) 6 tablet 0     EPINEPHrine (EPIPEN) 0.3 MG/0.3ML injection Inject 0.3 mg into the muscle once as needed for anaphylaxis       Furosemide (LASIX) 20 MG tablet Take 1 tablet (20 mg) by mouth daily Take 1 tablet (20mg) by oral route every day. 30 tablet      losartan (COZAAR) 100 MG tablet Take 1 tablet (100 mg) by mouth daily 90 tablet 3     losartan (COZAAR) 50 MG tablet  Take 2 tablets (100 mg) by mouth daily Take 1 and 1/2 pills times one week and then increase to 2 pills daily until you see me. (Patient taking differently: Take 100 mg by mouth daily ) 60 tablet 0     metFORMIN (GLUCOPHAGE-XR) 500 MG 24 hr tablet Take 1 tablet (500 mg) by mouth daily (with dinner) 30 tablet 3     potassium chloride (K-DUR) 10 MEQ tablet Take 1 tablet (10 mEq) by mouth daily Take 1 tablet (10meq) by oral route every day with food. 90 tablet      Allergies   Allergen Reactions     Fruit [Peach] Anaphylaxis and Hives     fresh peaches and any fruit that has a pit.  Kiwi's and apples also.  Can eat any fruit cooked.     Nuts Anaphylaxis and Hives     All Raw Nuts, can eat nuts when roasted.     Ace Inhibitors      Upper respiratory infection     Alkylamines Hives     Antihistamines     Alprazolam Hives     Xanax     Diphenhydramine Hives and Other (See Comments)     Keeps her awake       Erythromycin      BASE; pt. Not sure if this is a true allergy.     Guaifed      Pt unsure of reaction     Guaifenesin      Guaifed     Hydrochlorothiazide      No Clinical Screening - See Comments      Allergic:  Fresh peaches and any fruit that has a pit.  Kiwi's and apples also.  All raw nuts.  Can eat nuts when roasted or any fruit cooked.  Some seasonal allergies :  Hayfever     Olmesartan Medoxomil Cough     Benicar     Phenylephrine Hcl      Guaifed     Pseudoephedrine Hcl      Guaifed     Seasonal Allergies      hayfever     Tramadol      Sleep disturbance. Can not sleep, and when able to fall asleep will have terrible nightmares     Tramadol Hcl      Ultram, insomnia, nightmares, headache     Venlafaxine Other (See Comments)     Heartburn, reflux     Zoloft [Sertraline]      paranoia     Latex Other (See Comments), Swelling, Difficulty breathing and Rash     Throat Closes         Reviewed and updated as needed this visit by Provider  Tobacco         Review of Systems   As noted in the HPI.       Objective   "  /82 (BP Location: Left arm, Patient Position: Chair, Cuff Size: Adult Large)   Pulse 76   Temp 97.6  F (36.4  C) (Tympanic)   Ht 1.575 m (5' 2\")   Wt 95.3 kg (210 lb)   SpO2 97%   BMI 38.41 kg/m    Body mass index is 38.41 kg/m .  Physical Exam   GENERAL: alert, no distress and obese  NECK: no adenopathy, no asymmetry, masses, or scars and thyroid normal to palpation  RESP: lungs clear to auscultation - no rales, rhonchi or wheezes  CV: regular rate and rhythm, no murmur  PSYCH: mentation appears normal, affect normal/bright    Diagnostic Test Results:  Labs reviewed in Epic  Results for orders placed or performed in visit on 11/11/19   Basic metabolic panel     Status: Abnormal   Result Value Ref Range    Sodium 138 133 - 144 mmol/L    Potassium 3.8 3.4 - 5.3 mmol/L    Chloride 108 94 - 109 mmol/L    Carbon Dioxide 25 20 - 32 mmol/L    Anion Gap 5 3 - 14 mmol/L    Glucose 128 (H) 70 - 99 mg/dL    Urea Nitrogen 12 7 - 30 mg/dL    Creatinine 0.70 0.52 - 1.04 mg/dL    GFR Estimate 90 >60 mL/min/[1.73_m2]    GFR Estimate If Black >90 >60 mL/min/[1.73_m2]    Calcium 9.1 8.5 - 10.1 mg/dL   Hemoglobin A1c     Status: Abnormal   Result Value Ref Range    Hemoglobin A1C 6.5 (H) 0 - 5.6 %   Lipid Profile     Status: Abnormal   Result Value Ref Range    Cholesterol 137 <200 mg/dL    Triglycerides 144 <150 mg/dL    HDL Cholesterol 47 (L) >49 mg/dL    LDL Cholesterol Calculated 61 <100 mg/dL    Non HDL Cholesterol 90 <130 mg/dL   Estimated Average Glucose     Status: None   Result Value Ref Range    Estimated Average Glucose 140 mg/dL             Assessment & Plan   (I10) Essential hypertension  (primary encounter diagnosis)  Plan: Well controlled. Continue current medications. Encouraged daily exercise and a low sodium diet. Recommended checking BP's 2x/wk, call the clinic if consistantly s>140 or d>90. Follow up in 6 months.     (E11.9) Newly diagnosed diabetes (H)  Comment: A1C 6.5  Plan: Will refer to the DM " "Center and begin 500 mg of Metformin ER. She has developed nausea and an upset stomach from this in the past so will just begin low dose and order the ER. BP at goal. She is not on a statin, but will readdress at f/u in 4 weeks. I was hesitant to begin a statin and Metformin at the same time incase she were to have side effects. Will also begin asa at 4 week follow up. F/u 4 weeks, sooner with new or worsening symptoms.          BMI:   Estimated body mass index is 38.78 kg/m  as calculated from the following:    Height as of 10/29/19: 1.575 m (5' 2\").    Weight as of 10/29/19: 96.2 kg (212 lb).   Weight management plan: Discussed healthy diet and exercise guidelines      Sheyla Sanches NP  [facility] - [place]    "

## 2019-11-11 ENCOUNTER — OFFICE VISIT (OUTPATIENT)
Dept: FAMILY MEDICINE | Facility: OTHER | Age: 69
End: 2019-11-11
Attending: NURSE PRACTITIONER
Payer: MEDICARE

## 2019-11-11 VITALS
HEIGHT: 62 IN | OXYGEN SATURATION: 97 % | TEMPERATURE: 97.6 F | HEART RATE: 76 BPM | BODY MASS INDEX: 38.64 KG/M2 | SYSTOLIC BLOOD PRESSURE: 128 MMHG | DIASTOLIC BLOOD PRESSURE: 82 MMHG | WEIGHT: 210 LBS

## 2019-11-11 DIAGNOSIS — R73.9 HYPERGLYCEMIA: ICD-10-CM

## 2019-11-11 DIAGNOSIS — I10 ESSENTIAL HYPERTENSION: ICD-10-CM

## 2019-11-11 DIAGNOSIS — E11.9 NEWLY DIAGNOSED DIABETES (H): Primary | ICD-10-CM

## 2019-11-11 DIAGNOSIS — E11.9 NEWLY DIAGNOSED DIABETES (H): ICD-10-CM

## 2019-11-11 DIAGNOSIS — I10 ESSENTIAL HYPERTENSION: Primary | ICD-10-CM

## 2019-11-11 LAB
ANION GAP SERPL CALCULATED.3IONS-SCNC: 5 MMOL/L (ref 3–14)
BUN SERPL-MCNC: 12 MG/DL (ref 7–30)
CALCIUM SERPL-MCNC: 9.1 MG/DL (ref 8.5–10.1)
CHLORIDE SERPL-SCNC: 108 MMOL/L (ref 94–109)
CHOLEST SERPL-MCNC: 137 MG/DL
CO2 SERPL-SCNC: 25 MMOL/L (ref 20–32)
CREAT SERPL-MCNC: 0.7 MG/DL (ref 0.52–1.04)
EST. AVERAGE GLUCOSE BLD GHB EST-MCNC: 140 MG/DL
GFR SERPL CREATININE-BSD FRML MDRD: 90 ML/MIN/{1.73_M2}
GLUCOSE SERPL-MCNC: 128 MG/DL (ref 70–99)
HBA1C MFR BLD: 6.5 % (ref 0–5.6)
HDLC SERPL-MCNC: 47 MG/DL
LDLC SERPL CALC-MCNC: 61 MG/DL
NONHDLC SERPL-MCNC: 90 MG/DL
POTASSIUM SERPL-SCNC: 3.8 MMOL/L (ref 3.4–5.3)
SODIUM SERPL-SCNC: 138 MMOL/L (ref 133–144)
TRIGL SERPL-MCNC: 144 MG/DL

## 2019-11-11 PROCEDURE — 36415 COLL VENOUS BLD VENIPUNCTURE: CPT | Mod: ZL | Performed by: NURSE PRACTITIONER

## 2019-11-11 PROCEDURE — 99213 OFFICE O/P EST LOW 20 MIN: CPT | Performed by: NURSE PRACTITIONER

## 2019-11-11 PROCEDURE — 83036 HEMOGLOBIN GLYCOSYLATED A1C: CPT | Mod: ZL | Performed by: NURSE PRACTITIONER

## 2019-11-11 PROCEDURE — 80048 BASIC METABOLIC PNL TOTAL CA: CPT | Mod: ZL | Performed by: NURSE PRACTITIONER

## 2019-11-11 PROCEDURE — 40000788 ZZHCL STATISTIC ESTIMATED AVERAGE GLUCOSE: Mod: ZL | Performed by: NURSE PRACTITIONER

## 2019-11-11 PROCEDURE — 80061 LIPID PANEL: CPT | Mod: ZL | Performed by: NURSE PRACTITIONER

## 2019-11-11 PROCEDURE — G0463 HOSPITAL OUTPT CLINIC VISIT: HCPCS

## 2019-11-11 RX ORDER — LOSARTAN POTASSIUM 100 MG/1
100 TABLET ORAL DAILY
Qty: 90 TABLET | Refills: 3 | Status: SHIPPED | OUTPATIENT
Start: 2019-11-11 | End: 2020-02-19 | Stop reason: DRUGHIGH

## 2019-11-11 RX ORDER — AMLODIPINE BESYLATE 5 MG/1
5 TABLET ORAL DAILY
Refills: 3 | COMMUNITY
Start: 2019-10-29 | End: 2020-03-04

## 2019-11-11 RX ORDER — METFORMIN HCL 500 MG
500 TABLET, EXTENDED RELEASE 24 HR ORAL
Qty: 30 TABLET | Refills: 3 | Status: SHIPPED | OUTPATIENT
Start: 2019-11-11 | End: 2020-03-23

## 2019-11-11 ASSESSMENT — MIFFLIN-ST. JEOR: SCORE: 1445.8

## 2019-11-11 ASSESSMENT — PAIN SCALES - GENERAL: PAINLEVEL: NO PAIN (0)

## 2019-11-11 NOTE — NURSING NOTE
"Chief Complaint   Patient presents with     Hypertension     Lipids       Initial /76 (BP Location: Left arm, Patient Position: Chair, Cuff Size: Adult Large)   Pulse 90   Temp 97.6  F (36.4  C) (Tympanic)   Ht 1.575 m (5' 2\")   Wt 95.3 kg (210 lb)   SpO2 97%   BMI 38.41 kg/m   Estimated body mass index is 38.41 kg/m  as calculated from the following:    Height as of this encounter: 1.575 m (5' 2\").    Weight as of this encounter: 95.3 kg (210 lb).  Medication Reconciliation: complete  Janet Woods LPN  "

## 2019-11-22 ENCOUNTER — OFFICE VISIT (OUTPATIENT)
Dept: OTOLARYNGOLOGY | Facility: OTHER | Age: 69
End: 2019-11-22
Attending: NURSE PRACTITIONER
Payer: COMMERCIAL

## 2019-11-22 VITALS
HEIGHT: 62 IN | DIASTOLIC BLOOD PRESSURE: 76 MMHG | WEIGHT: 210 LBS | TEMPERATURE: 98 F | HEART RATE: 80 BPM | SYSTOLIC BLOOD PRESSURE: 122 MMHG | BODY MASS INDEX: 38.64 KG/M2 | OXYGEN SATURATION: 98 %

## 2019-11-22 DIAGNOSIS — G47.33 OSA (OBSTRUCTIVE SLEEP APNEA): ICD-10-CM

## 2019-11-22 DIAGNOSIS — K13.79 HYPERTROPHIC SOFT PALATE: ICD-10-CM

## 2019-11-22 DIAGNOSIS — J34.3 NASAL TURBINATE HYPERTROPHY: ICD-10-CM

## 2019-11-22 DIAGNOSIS — Z78.9 INTOLERANCE OF CONTINUOUS POSITIVE AIRWAY PRESSURE (CPAP) VENTILATION: ICD-10-CM

## 2019-11-22 DIAGNOSIS — F40.240 CLAUSTROPHOBIA: ICD-10-CM

## 2019-11-22 DIAGNOSIS — J30.89 PERENNIAL ALLERGIC RHINITIS: Primary | ICD-10-CM

## 2019-11-22 PROCEDURE — 31231 NASAL ENDOSCOPY DX: CPT | Performed by: OTOLARYNGOLOGY

## 2019-11-22 PROCEDURE — G0463 HOSPITAL OUTPT CLINIC VISIT: HCPCS

## 2019-11-22 PROCEDURE — 99204 OFFICE O/P NEW MOD 45 MIN: CPT | Mod: 25 | Performed by: OTOLARYNGOLOGY

## 2019-11-22 RX ORDER — EPINEPHRINE 0.3 MG/.3ML
0.3 INJECTION SUBCUTANEOUS
Qty: 2 EACH | Refills: 12 | Status: SHIPPED | OUTPATIENT
Start: 2019-11-22 | End: 2021-04-05

## 2019-11-22 RX ORDER — CETIRIZINE HYDROCHLORIDE 10 MG/1
10 TABLET ORAL DAILY PRN
Qty: 60 TABLET | Refills: 12 | Status: ON HOLD | OUTPATIENT
Start: 2019-11-22 | End: 2024-01-18

## 2019-11-22 RX ORDER — TRIAMCINOLONE ACETONIDE 55 UG/1
2 SPRAY, METERED NASAL DAILY
Qty: 2 BOTTLE | Refills: 12 | Status: ON HOLD | OUTPATIENT
Start: 2019-11-22 | End: 2024-01-18 | Stop reason: DRUGHIGH

## 2019-11-22 ASSESSMENT — MIFFLIN-ST. JEOR: SCORE: 1440.8

## 2019-11-22 ASSESSMENT — PAIN SCALES - GENERAL: PAINLEVEL: NO PAIN (0)

## 2019-11-22 NOTE — PATIENT INSTRUCTIONS
Thank you for allowing Dr. Maradiaga and our ENT team to participate in your care.  If your medications are too expensive, please give the nurse a call.  We can possibly change this medication.  If you have a scheduling or an appointment question please contact our Health Unit Coordinator at their direct line [phone].   ALL nursing questions or concerns can be directed to your ENT nurse at: [phone] - [place]    Complete Allergy Skin Testing  Use Nasacort as prescribed  Use Nasal Saline Twice Daily  Take Claritin as prescribed daily  If allergies worsen, can add Zyrtec at night   your Epi Pen  Start Allergy Shots once testing is completed  Follow up with Hilary Lord after Allergy Test    Consider the Turbinate Reduction if congestion worsens      Indications for allergy testing include:   1) Confirm suspicion of allergic rhinitis due to inhalant allergies  2) Identify the offending allergen to determine specific mode of treatment  3) In the case of chronic rhinosinusitis: when symptoms are not controlled by avoidance and pharmacotherapy  4) In the Asthma patient when exacerbations may be due to perennial allergen exposure  5) Suspect food allergy  6) Otitis Media, chronic rhinitis, atopic dermatitis, Meniere disease, headache, pharyngitis or eye symptoms    Modified quantitative testing (MQT) will be performed.  Signed consent was obtained, and the risks of immunotherapy were discussed, including the potential for anaphylaxis.    If immunotherapy (IT) is recommended, there is continued risk of anaphylaxis.   Anaphylaxis can cause death. The patient will need to be monitored for 30 minutes post injection.  They must present their epinephrine pen prior to injection.  Subcutaneous as well as sublingual immunotherapy (SLIT) were discussed as potential treatment options.  The patient was told SLIT is not approved by the FDA and is cash pay.  The general time frame of immunotherapy was discussed (generally  3-5 years, sometimes longer), and the basic immunology behind IT was discussed.

## 2019-11-22 NOTE — LETTER
2019         RE: Rj Rodríguez  [address]  [address]  [facility] [address]        Dear Colleague,    Thank you for referring your patient, Rj Rodríguez, to the [facility]. Please see a copy of my visit note below.    Otolaryngology Consultation    Patient: Rj Rodríguez  : 1950    Patient presents with:  Consult: Allergic Rhinitis; Referred by Sheyla Sanches      HPI:  Rj Rodríguez is a 69 year old female seen today for allergic rhinitis.  She noted a severe flare in underlying chronic post nasal drainage last spring with bronchitis, treated with multiple z-packs and albuterol    She is still having occasional chest tightness and cough with exposure to animal dander, uses albuterol rarely prn.    Symptoms include chronic post nasal drainage, occasional cough, congestion, rhinorrhea, sneezing, ocular pruritis    she is currently taking Zyrtec 10 mg daily year round.  Formerly only taking in the summer  Using netti pot and nasocort      Never smoker  No diagnosis of asthma    No pets  She is an extreme  so her house is allergen free      He has seen Dr. Talyor in the past and panallergic    She has had tongue swelling and a sensation of throat swelling with nut or fresh fruit intake.  She has learned to avoid these foods.  She has an epi pen, has never had to use it for an allergic reaction.    Was formerly on a beta blocker so didn't start immunotherapy which was recommended by Dr. Taylor    Rj also has a history of obstructive sleep apnea and has been intolerant of CPAP due to claustrophobia.  She has worked with several masks including a nasal CPAP but she was able to wear for a while but over the past 6 months she has not been able to tolerate this.  She is working on losing weight but has slowly gained weight over the past several years.    She describes a history of a TIA after her shoulder surgery    CT head 2019 negative  Shows  clear maxillary ethmoid and sphenoid sinuses or frontal sinuses are clear turbinates are edematous visualized portion of the septum is grossly midline mastoids are clear ossicles symmetric and intact    She prefers a door is open on exam      Current Outpatient Rx   Medication Sig Dispense Refill     ALBUTEROL IN        amLODIPine (NORVASC) 5 MG tablet Take 5 mg by mouth daily  3     cetirizine (ZYRTEC) 10 MG tablet Take 10 mg by mouth daily as needed for allergies       clonazePAM (KLONOPIN) 0.5 MG tablet Take 1 tablet (0.5 mg) by mouth 2 times daily as needed for anxiety (Patient taking differently: Take 0.5 mg by mouth 2 times daily as needed for anxiety I usually take 0.25 mg as needed, use rarely.) 6 tablet 0     Furosemide (LASIX) 20 MG tablet Take 1 tablet (20 mg) by mouth daily Take 1 tablet (20mg) by oral route every day. 30 tablet      losartan (COZAAR) 100 MG tablet Take 1 tablet (100 mg) by mouth daily 90 tablet 3     metFORMIN (GLUCOPHAGE-XR) 500 MG 24 hr tablet Take 1 tablet (500 mg) by mouth daily (with dinner) 30 tablet 3     potassium chloride (K-DUR) 10 MEQ tablet Take 1 tablet (10 mEq) by mouth daily Take 1 tablet (10meq) by oral route every day with food. 90 tablet      EPINEPHrine (EPIPEN) 0.3 MG/0.3ML injection Inject 0.3 mg into the muscle once as needed for anaphylaxis         Allergies: Fruit [peach]; Nuts; Ace inhibitors; Alkylamines; Alprazolam; Diphenhydramine; Erythromycin; Guaifed; Guaifenesin; Hydrochlorothiazide; No clinical screening - see comments; Olmesartan medoxomil; Phenylephrine hcl; Pseudoephedrine hcl; Seasonal allergies; Tramadol; Tramadol hcl; Venlafaxine; Zoloft [sertraline]; and Latex     Past Medical History:   Diagnosis Date     Arthritis      Chronic back pain      Hypertension      Irregular heart beat      Sleep apnea        Past Surgical History:   Procedure Laterality Date     APPENDECTOMY       BACK SURGERY       CHOLECYSTECTOMY       COLONOSCOPY  2012    Repeat  "in 10 years     GYN SURGERY       HYSTERECTOMY      Ovaries     RELEASE CARPAL TUNNEL      LT     RELEASE CARPAL TUNNEL      RT x2     SURGICAL RADIOLOGY PROCEDURE N/A 2/12/2016    Procedure: SURGICAL RADIOLOGY PROCEDURE;  Surgeon: Provider, Generic Perianesthesia Nursing;  Location: [facility]     SURGICAL RADIOLOGY PROCEDURE N/A 8/15/2016    Procedure: SURGICAL RADIOLOGY PROCEDURE;  Surgeon: Provider, Generic Perianesthesia Nursing;  Location: [facility]       ENT family history reviewed    Social History     Tobacco Use     Smoking status: Never Smoker     Smokeless tobacco: Never Used   Substance Use Topics     Alcohol use: Yes     Alcohol/week: 1.0 standard drinks     Types: 1 Glasses of wine per week     Frequency: Monthly or less     Drinks per session: 1 or 2     Binge frequency: Never     Comment: occ glass of wine     Drug use: No       Review of Systems  ROS: 10 point ROS neg other than the symptoms noted above in the HPI and eye floaters eye irritation posttraumatic stress disorder    Physical Exam  /76   Pulse 80   Temp 98  F (36.7  C) (Tympanic)   Ht 1.575 m (5' 2\")   Wt 95.3 kg (210 lb)   SpO2 98%   BMI 38.41 kg/m     General - The patient is well nourished and well developed, and appears to have good nutritional status.  Alert and oriented to person and place, answers questions and cooperates with examination appropriately.  obese  Head and Face - Normocephalic and atraumatic, with no gross asymmetry noted.  The facial nerve is intact, with strong symmetric movements.  Voice and Breathing - The patient was breathing comfortably without the use of accessory muscles. There was no wheezing, stridor, or stertor.  The patients voice was clear and strong, and had appropriate pitch and quality.  No cyn peripheral digital clubbing or cyanosis   Ears -The external auditory canals are patent, the tympanic membranes are intact without effusion, retraction or mass.  Bony landmarks are intact.  Eyes - " Extraocular movements intact, and the pupils were reactive to light.  Sclera were not icteric or injected, conjunctiva were pink and moist.  Mouth - Examination of the oral cavity showed pink, healthy oral mucosa. No lesions or ulcerations noted.  The tongue was mobile and midline, and the dentition were in good condition.    Throat - The walls of the oropharynx were smooth, pink, moist, symmetric, and had no lesions or ulcerations.  The tonsillar pillars and soft palate were symmetric.  The uvula was midline on elevation.  Lund Tongue Position IV, low palate  Neck - No palpable enlarged fixed cervical lymph nodes.  No neck cysts or unusual tenderness to palpation.   No palpable fixed thyroid nodules or concerning goiter.  The trachea is grossly midline.   Nose - External contour is symmetric, no gross deflection or scars.  Nasal mucosa is pink and moist with no abnormal mucus.  The septum and turbinates were evaluated:  inferior turbinate hypertrophy bialterally.  No polyps, masses, or purulence noted on examination.      To evaluate the nose and sinuses, I performed rigid nasal endoscopy. The LPN had previously sprayed both nares with lidocaine and neosynephrine.    I began with the LEFT side using a 0 degree rigid nasal endoscope, and then similarly examined the RIGHT side    Findings:  Inferior turbinates:  Enlarged, mildly boggy  Middle turbinate and middle meatus:  No purulence, no polyposis  Mucosa is healthy throughout,  no polyps nor polypoid degeneration  Nasopharynx clear, ET patent, no edema  The patient tolerated the procedure well      imaging  CT head 5/9/2019 negative  Shows clear maxillary ethmoid and sphenoid sinuses or frontal sinuses are clear turbinates are edematous visualized portion of the septum is grossly midline mastoids are clear ossicles symmetric and intact      Impression and Plan- Rj Rodríguez is a 69 year old female with:    ICD-10-CM    1. Perennial allergic rhinitis J30.89  EPINEPHrine (EPIPEN/ADRENACLICK/OR ANY BX GENERIC EQUIV) 0.3 MG/0.3ML injection 2-pack     cetirizine (ZYRTEC) 10 MG tablet     triamcinolone (NASACORT) 55 MCG/ACT nasal aerosol   2. Nasal turbinate hypertrophy J34.3    3. REBECCA (obstructive sleep apnea) G47.33    4. Intolerance of continuous positive airway pressure (CPAP) ventilation Z78.9    5. 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 Claustrophobia F40.240            Complete Allergy Skin Testing  Use Nasacort as prescribed  Use Nasal Saline Twice Daily  Take Claritin as prescribed daily  If allergies worsen, can add Zyrtec at night   your Epi Pen  Start Allergy Shots once testing is completed  Follow up with Hilary Lord after Allergy Test  Consider office versus surgical turbinate reduction to help her tolerate her nasal CPAP      Release records Dr. Taylor    Consider the Turbinate Reduction if congestion worsens    Risks of untreated sleep apnea are well documented, including but not limited to, the inability to reach restorative sleep leading to daytime somnolence, fatigue, irritability and poor work performance, risk of motor vehicle accidents, depression, memory issues, waking headaches, impotence, and increased nocturia.  More serious risks include concerns with medication/narcotic use and surgery related to the use of anesthesia, coronary artery disease, heart failure, heart attack, stroke and sudden death.    Achieving a healthy weight, adhering to a healthy diet, and exercise are very important in the treatment of sleep apnea and were discussed and encouraged.    Clinical evidence shows CPAP to be the treatment of choice for obstructive sleep apnea. However, if CPAP is not tolerated after reasonable attempts at treatment, surgical intervention may be necessary.         Indications for allergy testing include:   1) Confirm suspicion of allergic rhinitis due to inhalant allergies  2) Identify the offending allergen to determine specific mode of  treatment  3) In the case of chronic rhinosinusitis: when symptoms are not controlled by avoidance and pharmacotherapy  4) In the Asthma patient when exacerbations may be due to perennial allergen exposure  5) Suspect food allergy  6) Otitis Media, chronic rhinitis, atopic dermatitis, Meniere disease, headache, pharyngitis or eye symptoms    Modified quantitative testing (MQT) will be performed.  Signed consent was obtained, and the risks of immunotherapy were discussed, including the potential for anaphylaxis.    If immunotherapy (IT) is recommended, there is continued risk of anaphylaxis.   Anaphylaxis can cause death. The patient will need to be monitored for 30 minutes post injection.  They must present their epinephrine pen prior to injection.  Subcutaneous as well as sublingual immunotherapy (SLIT) were discussed as potential treatment options.  The patient was told SLIT is not approved by the FDA and is cash pay.  The general time frame of immunotherapy was discussed (generally 3-5 years, sometimes longer), and the basic immunology behind IT was discussed.        Caitlyn Maradiaga D.O.  Otolaryngology/Head and Neck Surgery  Allergy      Again, thank you for allowing me to participate in the care of your patient.        Sincerely,        Caitlyn Maradiaga MD

## 2019-11-22 NOTE — NURSING NOTE
"Chief Complaint   Patient presents with     Consult     Allergic Rhinitis; Referred by Sheyla Sanches       Initial /76   Pulse 80   Temp 98  F (36.7  C) (Tympanic)   Ht 1.575 m (5' 2\")   Wt 95.3 kg (210 lb)   SpO2 98%   BMI 38.41 kg/m   Estimated body mass index is 38.41 kg/m  as calculated from the following:    Height as of this encounter: 1.575 m (5' 2\").    Weight as of this encounter: 95.3 kg (210 lb).  Medication Reconciliation: complete  Susie Hernandez LPN  "

## 2019-11-22 NOTE — PROGRESS NOTES
Otolaryngology Consultation    Patient: Rj Rodríguez  : 1950    Patient presents with:  Consult: Allergic Rhinitis; Referred by Sheyla Sanches      HPI:  Rj Rodríguez is a 69 year old female seen today for allergic rhinitis.  She noted a severe flare in underlying chronic post nasal drainage last spring with bronchitis, treated with multiple z-packs and albuterol    She is still having occasional chest tightness and cough with exposure to animal dander, uses albuterol rarely prn.    Symptoms include chronic post nasal drainage, occasional cough, congestion, rhinorrhea, sneezing, ocular pruritis    she is currently taking Zyrtec 10 mg daily year round.  Formerly only taking in the summer  Using netti pot and nasocort      Never smoker  No diagnosis of asthma    No pets  She is an extreme  so her house is allergen free      He has seen Dr. Taylor in the past and panallergic    She has had tongue swelling and a sensation of throat swelling with nut or fresh fruit intake.  She has learned to avoid these foods.  She has an epi pen, has never had to use it for an allergic reaction.    Was formerly on a beta blocker so didn't start immunotherapy which was recommended by Dr. Taylor    Rj also has a history of obstructive sleep apnea and has been intolerant of CPAP due to claustrophobia.  She has worked with several masks including a nasal CPAP but she was able to wear for a while but over the past 6 months she has not been able to tolerate this.  She is working on losing weight but has slowly gained weight over the past several years.    She describes a history of a TIA after her shoulder surgery    CT head 2019 negative  Shows clear maxillary ethmoid and sphenoid sinuses or frontal sinuses are clear turbinates are edematous visualized portion of the septum is grossly midline mastoids are clear ossicles symmetric and intact    She prefers a door is open on exam      Current Outpatient  Rx   Medication Sig Dispense Refill     ALBUTEROL IN        amLODIPine (NORVASC) 5 MG tablet Take 5 mg by mouth daily  3     cetirizine (ZYRTEC) 10 MG tablet Take 10 mg by mouth daily as needed for allergies       clonazePAM (KLONOPIN) 0.5 MG tablet Take 1 tablet (0.5 mg) by mouth 2 times daily as needed for anxiety (Patient taking differently: Take 0.5 mg by mouth 2 times daily as needed for anxiety I usually take 0.25 mg as needed, use rarely.) 6 tablet 0     Furosemide (LASIX) 20 MG tablet Take 1 tablet (20 mg) by mouth daily Take 1 tablet (20mg) by oral route every day. 30 tablet      losartan (COZAAR) 100 MG tablet Take 1 tablet (100 mg) by mouth daily 90 tablet 3     metFORMIN (GLUCOPHAGE-XR) 500 MG 24 hr tablet Take 1 tablet (500 mg) by mouth daily (with dinner) 30 tablet 3     potassium chloride (K-DUR) 10 MEQ tablet Take 1 tablet (10 mEq) by mouth daily Take 1 tablet (10meq) by oral route every day with food. 90 tablet      EPINEPHrine (EPIPEN) 0.3 MG/0.3ML injection Inject 0.3 mg into the muscle once as needed for anaphylaxis         Allergies: Fruit [peach]; Nuts; Ace inhibitors; Alkylamines; Alprazolam; Diphenhydramine; Erythromycin; Guaifed; Guaifenesin; Hydrochlorothiazide; No clinical screening - see comments; Olmesartan medoxomil; Phenylephrine hcl; Pseudoephedrine hcl; Seasonal allergies; Tramadol; Tramadol hcl; Venlafaxine; Zoloft [sertraline]; and Latex     Past Medical History:   Diagnosis Date     Arthritis      Chronic back pain      Hypertension      Irregular heart beat      Sleep apnea        Past Surgical History:   Procedure Laterality Date     APPENDECTOMY       BACK SURGERY       CHOLECYSTECTOMY       COLONOSCOPY  2012    Repeat in 10 years     GYN SURGERY       HYSTERECTOMY      Ovaries     RELEASE CARPAL TUNNEL      LT     RELEASE CARPAL TUNNEL      RT x2     SURGICAL RADIOLOGY PROCEDURE N/A 2/12/2016    Procedure: SURGICAL RADIOLOGY PROCEDURE;  Surgeon: Provider, Generic  "Perianesthesia Nursing;  Location: [facility]     SURGICAL RADIOLOGY PROCEDURE N/A 8/15/2016    Procedure: SURGICAL RADIOLOGY PROCEDURE;  Surgeon: Provider, Generic Perianesthesia Nursing;  Location: [facility]       ENT family history reviewed    Social History     Tobacco Use     Smoking status: Never Smoker     Smokeless tobacco: Never Used   Substance Use Topics     Alcohol use: Yes     Alcohol/week: 1.0 standard drinks     Types: 1 Glasses of wine per week     Frequency: Monthly or less     Drinks per session: 1 or 2     Binge frequency: Never     Comment: occ glass of wine     Drug use: No       Review of Systems  ROS: 10 point ROS neg other than the symptoms noted above in the HPI and eye floaters eye irritation posttraumatic stress disorder    Physical Exam  /76   Pulse 80   Temp 98  F (36.7  C) (Tympanic)   Ht 1.575 m (5' 2\")   Wt 95.3 kg (210 lb)   SpO2 98%   BMI 38.41 kg/m    General - The patient is well nourished and well developed, and appears to have good nutritional status.  Alert and oriented to person and place, answers questions and cooperates with examination appropriately.  obese  Head and Face - Normocephalic and atraumatic, with no gross asymmetry noted.  The facial nerve is intact, with strong symmetric movements.  Voice and Breathing - The patient was breathing comfortably without the use of accessory muscles. There was no wheezing, stridor, or stertor.  The patients voice was clear and strong, and had appropriate pitch and quality.  No cyn peripheral digital clubbing or cyanosis   Ears -The external auditory canals are patent, the tympanic membranes are intact without effusion, retraction or mass.  Bony landmarks are intact.  Eyes - Extraocular movements intact, and the pupils were reactive to light.  Sclera were not icteric or injected, conjunctiva were pink and moist.  Mouth - Examination of the oral cavity showed pink, healthy oral mucosa. No lesions or ulcerations noted.  The tongue " was mobile and midline, and the dentition were in good condition.    Throat - The walls of the oropharynx were smooth, pink, moist, symmetric, and had no lesions or ulcerations.  The tonsillar pillars and soft palate were symmetric.  The uvula was midline on elevation.  Lund Tongue Position IV, low palate  Neck - No palpable enlarged fixed cervical lymph nodes.  No neck cysts or unusual tenderness to palpation.   No palpable fixed thyroid nodules or concerning goiter.  The trachea is grossly midline.   Nose - External contour is symmetric, no gross deflection or scars.  Nasal mucosa is pink and moist with no abnormal mucus.  The septum and turbinates were evaluated:  inferior turbinate hypertrophy bialterally.  No polyps, masses, or purulence noted on examination.      To evaluate the nose and sinuses, I performed rigid nasal endoscopy. The LPN had previously sprayed both nares with lidocaine and neosynephrine.    I began with the LEFT side using a 0 degree rigid nasal endoscope, and then similarly examined the RIGHT side    Findings:  Inferior turbinates:  Enlarged, mildly boggy  Middle turbinate and middle meatus:  No purulence, no polyposis  Mucosa is healthy throughout,  no polyps nor polypoid degeneration  Nasopharynx clear, ET patent, no edema  The patient tolerated the procedure well      imaging  CT head 5/9/2019 negative  Shows clear maxillary ethmoid and sphenoid sinuses or frontal sinuses are clear turbinates are edematous visualized portion of the septum is grossly midline mastoids are clear ossicles symmetric and intact      Impression and Plan- Rj Rodríguez is a 69 year old female with:    ICD-10-CM    1. Perennial allergic rhinitis J30.89 EPINEPHrine (EPIPEN/ADRENACLICK/OR ANY BX GENERIC EQUIV) 0.3 MG/0.3ML injection 2-pack     cetirizine (ZYRTEC) 10 MG tablet     triamcinolone (NASACORT) 55 MCG/ACT nasal aerosol   2. Nasal turbinate hypertrophy J34.3    3. REBECCA (obstructive sleep apnea) G47.33     4. Intolerance of continuous positive airway pressure (CPAP) ventilation Z78.9    5. 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 Claustrophobia F40.240            Complete Allergy Skin Testing  Use Nasacort as prescribed  Use Nasal Saline Twice Daily  Take Claritin as prescribed daily  If allergies worsen, can add Zyrtec at night   your Epi Pen  Start Allergy Shots once testing is completed  Follow up with Hilary Lord after Allergy Test  Consider office versus surgical turbinate reduction to help her tolerate her nasal CPAP      Release records Dr. Taylor    Consider the Turbinate Reduction if congestion worsens    Risks of untreated sleep apnea are well documented, including but not limited to, the inability to reach restorative sleep leading to daytime somnolence, fatigue, irritability and poor work performance, risk of motor vehicle accidents, depression, memory issues, waking headaches, impotence, and increased nocturia.  More serious risks include concerns with medication/narcotic use and surgery related to the use of anesthesia, coronary artery disease, heart failure, heart attack, stroke and sudden death.    Achieving a healthy weight, adhering to a healthy diet, and exercise are very important in the treatment of sleep apnea and were discussed and encouraged.    Clinical evidence shows CPAP to be the treatment of choice for obstructive sleep apnea. However, if CPAP is not tolerated after reasonable attempts at treatment, surgical intervention may be necessary.         Indications for allergy testing include:   1) Confirm suspicion of allergic rhinitis due to inhalant allergies  2) Identify the offending allergen to determine specific mode of treatment  3) In the case of chronic rhinosinusitis: when symptoms are not controlled by avoidance and pharmacotherapy  4) In the Asthma patient when exacerbations may be due to perennial allergen exposure  5) Suspect food allergy  6) Otitis Media, chronic rhinitis,  atopic dermatitis, Meniere disease, headache, pharyngitis or eye symptoms    Modified quantitative testing (MQT) will be performed.  Signed consent was obtained, and the risks of immunotherapy were discussed, including the potential for anaphylaxis.    If immunotherapy (IT) is recommended, there is continued risk of anaphylaxis.   Anaphylaxis can cause death. The patient will need to be monitored for 30 minutes post injection.  They must present their epinephrine pen prior to injection.  Subcutaneous as well as sublingual immunotherapy (SLIT) were discussed as potential treatment options.  The patient was told SLIT is not approved by the FDA and is cash pay.  The general time frame of immunotherapy was discussed (generally 3-5 years, sometimes longer), and the basic immunology behind IT was discussed.        Caitlyn Maradiaga D.O.  Otolaryngology/Head and Neck Surgery  Allergy

## 2019-11-27 ENCOUNTER — TELEPHONE (OUTPATIENT)
Dept: OTOLARYNGOLOGY | Facility: OTHER | Age: 69
End: 2019-11-27

## 2019-11-27 NOTE — TELEPHONE ENCOUNTER
I tried to reach Rj to review instructions for MQT allergy skin testing, and arrange a date for the appointment.  NA/LM to call.  Luh Drake RN

## 2019-12-03 NOTE — TELEPHONE ENCOUNTER
I spoke with the patient today regarding allergy skin testing.    All general instructions are reviewed with the patient.      The patient is made aware of all medications that need to be held prior to testing, and will stop medications as directed per instructions.  The patient verbalized understanding and agree with plan.      Should the patient be given any additional medications prior to the day of testing, they are told to let the provider know that they are going to be allergy tested, so medications that need to be help prior to MQT are not prescribed.      An appointment will be arranged by the ENT [facility] for testing date and time.     This message is forwarded to the [facility] to arrange for an appointment. Written instructions are mailed to the patient as well,  by the ENT [facility].  Luh Drake RN

## 2019-12-06 PROBLEM — E11.9 DIABETES MELLITUS, TYPE 2 (H): Status: ACTIVE | Noted: 2019-12-06

## 2019-12-09 ENCOUNTER — TELEPHONE (OUTPATIENT)
Dept: FAMILY MEDICINE | Facility: OTHER | Age: 69
End: 2019-12-09

## 2019-12-09 NOTE — TELEPHONE ENCOUNTER
7:56 AM    Reason for Call: Phone Call    Description: Pt called and states that she would like to see if she could get a call back regarding some questions for the nurse did not state anything else    Was an appointment offered for this call? No  If yes : Appointment type              Date    Preferred method for responding to this message: Telephone Call  What is your phone number ?[phone]    If we cannot reach you directly, may we leave a detailed response at the number you provided? Yes    Can this message wait until your PCP/provider returns, if available today? Not applicable, pcp is in     [facility]

## 2019-12-11 DIAGNOSIS — F41.1 GENERALIZED ANXIETY DISORDER: Primary | ICD-10-CM

## 2019-12-11 RX ORDER — CLONAZEPAM 0.5 MG/1
0.5 TABLET ORAL 2 TIMES DAILY PRN
Qty: 6 TABLET | Refills: 0 | Status: SHIPPED | OUTPATIENT
Start: 2019-12-11 | End: 2020-01-14

## 2019-12-11 NOTE — TELEPHONE ENCOUNTER
Klonopin   Last Office Visit: 11/11/2019  Last Refill Date:6/17/2015  # 6          Refills  0      Thank you!

## 2019-12-16 ENCOUNTER — HOSPITAL ENCOUNTER (OUTPATIENT)
Dept: EDUCATION SERVICES | Facility: HOSPITAL | Age: 69
Discharge: HOME | End: 2019-12-16
Attending: NURSE PRACTITIONER | Admitting: NURSE PRACTITIONER
Payer: MEDICARE

## 2019-12-16 DIAGNOSIS — E11.9 TYPE 2 DIABETES MELLITUS WITHOUT COMPLICATION, WITHOUT LONG-TERM CURRENT USE OF INSULIN (H): Primary | ICD-10-CM

## 2019-12-16 PROCEDURE — G0108 DIAB MANAGE TRN  PER INDIV: HCPCS

## 2019-12-16 RX ORDER — LANCETS
EACH MISCELLANEOUS
Qty: 102 EACH | Refills: 11 | Status: SHIPPED | OUTPATIENT
Start: 2019-12-16

## 2019-12-16 ASSESSMENT — MIFFLIN-ST. JEOR: SCORE: 1381.84

## 2019-12-16 ASSESSMENT — PAIN SCALES - GENERAL: PAINLEVEL: NO PAIN (0)

## 2019-12-16 NOTE — PATIENT INSTRUCTIONS
Keep food logs    Test BG:  On odd days: AM before coffee of food  On even days: 2 hours after evening meal  When you feel BG might be low (when eating would make you feel better)    Return to Diabetes Ed in 2 weeks    Call with questions/concerns: [phone]

## 2019-12-16 NOTE — LETTER
12/16/2019        RE: Rj Rodríguez  [address]  [address]  [facility] [address]        Diabetes Self-Management Education & Support    Diabetes Education Self Management & Training    SUBJECTIVE/OBJECTIVE:  Presents for: Initial Assessment for new diagnosis  Accompanied by: Self  Diabetes education in the past 24mo: No  Focus of Visit: Monitoring, Healthy Eating  Diabetes type: Type 2  Disease course: Stable  Diabetes management related comments/concerns: Has anxety  Transportation concerns: No  Other concerns:: Cognitive impairment(anxiety)  Cultural Influences/Ethnic Background:  American      Diabetes Symptoms & Complications  Blurred vision: No  Fatigue: No  Neuropathy: No  Foot ulcerations: No  Polydipsia: No  Polyphagia: No  Polyuria: No  Visual change: No  Weakness: No  Weight loss: No  Slow healing wounds: No  Recent Infection(s): No  Symptom course: Stable  Weight trend: Decreasing steadily  Autonomic neuropathy: No  CVA: No  Heart disease: No  Nephropathy: No  Peripheral neuropathy: No  Peripheral Vascular Disease: No  Retinopathy: No    Patient Problem List and Family Medical History reviewed for relevant medical history, current medical status, and diabetes risk factors.    Vitals:  /74   Pulse 90   Resp 16   Ht 1.524 m (5')   Wt 92.5 kg (204 lb)   SpO2 98%   BMI 39.84 kg/m     Estimated body mass index is 39.84 kg/m  as calculated from the following:    Height as of this encounter: 1.524 m (5').    Weight as of this encounter: 92.5 kg (204 lb).   Last 3 BP:   BP Readings from Last 3 Encounters:   12/16/19 134/74   11/22/19 122/76   11/11/19 128/82       History   Smoking Status     Never Smoker   Smokeless Tobacco     Never Used       Labs:  Lab Results   Component Value Date    A1C 6.5 11/11/2019     Lab Results   Component Value Date     11/11/2019     Lab Results   Component Value Date    LDL 61 11/11/2019     HDL Cholesterol   Date Value Ref Range Status   11/11/2019  47 (L) >49 mg/dL Final   ]  GFR Estimate   Date Value Ref Range Status   11/11/2019 90 >60 mL/min/[1.73_m2] Final     Comment:     Non  GFR Calc  Starting 12/18/2018, serum creatinine based estimated GFR (eGFR) will be   calculated using the Chronic Kidney Disease Epidemiology Collaboration   (CKD-EPI) equation.       GFR Estimate If Black   Date Value Ref Range Status   11/11/2019 >90 >60 mL/min/[1.73_m2] Final     Comment:      GFR Calc  Starting 12/18/2018, serum creatinine based estimated GFR (eGFR) will be   calculated using the Chronic Kidney Disease Epidemiology Collaboration   (CKD-EPI) equation.       Lab Results   Component Value Date    CR 0.70 11/11/2019     No results found for: MICROALBUMIN    Healthy Eating  Healthy Eating Assessed Today: Yes  Cultural/Synagogue diet restrictions?: No  Patient on a regular basis: Eats 3 meals a day  Meal planning: Smaller portions  Meals include: Breakfast, Lunch, Dinner  Breakfast: coffee with creamer, 2 eggs, toast, cranberry juiice  Lunch: baked chicken breast, jello, rice or cottage cheese with crackers and pineapple  Dinner: tuna stuffed tomato and hardboiled egg or steak, sweet potatoes, salad, milk or turkey burger, spring rolls, milk and jello  Snacks: soft serve cone, orange juice, v-8 juice  Beverages: Coffee, Water, Milk, Juice  Has patient met with a dietitian in the past?: No    Being Active  Being Active Assessed Today: Yes  Exercise:: Currently not exercising(Getting treadmill for [place])  Barrier to exercise: Access    Monitoring  Monitoring Assessed Today: No(not testing yet)        Taking Medications  Diabetes Medication(s)     Biguanides       metFORMIN (GLUCOPHAGE-XR) 500 MG 24 hr tablet    Take 1 tablet (500 mg) by mouth daily (with dinner)          Current Treatments: Oral Agent (monotherapy)  Problems taking diabetes medications regularly?: No  Diabetes medication side effects?: No  Treatment Compliance: All of  the time    Problem Solving  Problem Solving Assessed Today: Yes  Hypoglycemia Frequency: Never  Patient carries a carbohydrate source: No  Medical alert: No  Severe weather/disaster plan for diabetes management?: No  DKA prevention plan?: No              Reducing Risks  Reducing Risks Assessed Today: No  Has dilated eye exam at least once a year?: Yes    Healthy Coping  Healthy Coping Assessed Today: Yes  Emotional response to diabetes: Ready to learn, Fear/Anxiety, Concern for health and well-being  Informal Support system:: Family  Stage of change: PREPARATION (Decided to change - considering how)  Difficulty affording diabetes management supplies?: No  Support resources: Websites  Patient Activation Measure Survey Score:  JASSI Score (Last Two) 10/11/2019   JASSI Raw Score 31   Activation Score 59.3   JASSI Level 3       ASSESSMENT:  Rj was very anxious at the beginning of our appointment. She requested to be in a room with a window and requested that the door be open.    She is anger about diabetes diagnosis as she already has enough going on.        Patient's most recent   Lab Results   Component Value Date    A1C 6.5 11/11/2019    is meeting goal of <7.0    INTERVENTION:   Diabetes knowledge and skills assessment:     Patient is knowledgeable in diabetes management concepts related to: Healthy Eating and Being Active    Patient needs further education on the following diabetes management concepts: Healthy Eating, Being Active, Monitoring and Taking Medication    Based on learning assessment above, most appropriate setting for further diabetes education would be: Individual setting.    Education provided today on:  AADE Self-Care Behaviors:  Diabetes Pathophysiology  Healthy Eating: carbohydrate counting, consistency in amount, composition, and timing of food intake, weight reduction, portion control, plate planning method and label reading  Being Active: relationship to blood glucose, describe appropriate  activity program and precautions to take  Monitoring: purpose, proper technique, log and interpret results, individual blood glucose targets, frequency of monitoring and use of glucose control solution  Taking Medication: action of prescribed medication, side effects of prescribed medications and when to take medications    Opportunities for ongoing education and support in diabetes-self management were discussed.    Pt verbalized understanding of concepts discussed and recommendations provided today.       Education Materials Provided:  Carbohydrate Counting and Accu-Chek Guide meter kit, Basics Type 2 book, My Food Plan, food logs    PLAN:  See Patient Instructions for co-developed, patient-stated behavior change goals.  Keep food logs    Test BG:  On odd days: AM before coffee of food  On even days: 2 hours after evening meal  When you feel BG might be low (when eating would make you feel better)    Return to Diabetes Ed in 2 weeks    Call with questions/concerns: [phone]  AVS printed and provided to patient today. See Follow-Up section for recommended follow-up.      Time Spent: 75 minutes  Encounter Type: Individual    Any diabetes medication dose changes were made via the CDE Protocol and Collaborative Practice Agreement with the patient's primary care provider. A copy of this encounter was shared with the provider.          Sincerely,        Karishma Garcia RN

## 2019-12-16 NOTE — PROGRESS NOTES
Diabetes Self-Management Education & Support    Diabetes Education Self Management & Training    SUBJECTIVE/OBJECTIVE:  Presents for: Initial Assessment for new diagnosis  Accompanied by: Self  Diabetes education in the past 24mo: No  Focus of Visit: Monitoring, Healthy Eating  Diabetes type: Type 2  Disease course: Stable  Diabetes management related comments/concerns: Has anxety  Transportation concerns: No  Other concerns:: Cognitive impairment(anxiety)  Cultural Influences/Ethnic Background:  American      Diabetes Symptoms & Complications  Blurred vision: No  Fatigue: No  Neuropathy: No  Foot ulcerations: No  Polydipsia: No  Polyphagia: No  Polyuria: No  Visual change: No  Weakness: No  Weight loss: No  Slow healing wounds: No  Recent Infection(s): No  Symptom course: Stable  Weight trend: Decreasing steadily  Autonomic neuropathy: No  CVA: No  Heart disease: No  Nephropathy: No  Peripheral neuropathy: No  Peripheral Vascular Disease: No  Retinopathy: No    Patient Problem List and Family Medical History reviewed for relevant medical history, current medical status, and diabetes risk factors.    Vitals:  /74   Pulse 90   Resp 16   Ht 1.524 m (5')   Wt 92.5 kg (204 lb)   SpO2 98%   BMI 39.84 kg/m    Estimated body mass index is 39.84 kg/m  as calculated from the following:    Height as of this encounter: 1.524 m (5').    Weight as of this encounter: 92.5 kg (204 lb).   Last 3 BP:   BP Readings from Last 3 Encounters:   12/16/19 134/74   11/22/19 122/76   11/11/19 128/82       History   Smoking Status     Never Smoker   Smokeless Tobacco     Never Used       Labs:  Lab Results   Component Value Date    A1C 6.5 11/11/2019     Lab Results   Component Value Date     11/11/2019     Lab Results   Component Value Date    LDL 61 11/11/2019     HDL Cholesterol   Date Value Ref Range Status   11/11/2019 47 (L) >49 mg/dL Final   ]  GFR Estimate   Date Value Ref Range Status   11/11/2019 90 >60  mL/min/[1.73_m2] Final     Comment:     Non  GFR Calc  Starting 12/18/2018, serum creatinine based estimated GFR (eGFR) will be   calculated using the Chronic Kidney Disease Epidemiology Collaboration   (CKD-EPI) equation.       GFR Estimate If Black   Date Value Ref Range Status   11/11/2019 >90 >60 mL/min/[1.73_m2] Final     Comment:      GFR Calc  Starting 12/18/2018, serum creatinine based estimated GFR (eGFR) will be   calculated using the Chronic Kidney Disease Epidemiology Collaboration   (CKD-EPI) equation.       Lab Results   Component Value Date    CR 0.70 11/11/2019     No results found for: MICROALBUMIN    Healthy Eating  Healthy Eating Assessed Today: Yes  Cultural/Confucianist diet restrictions?: No  Patient on a regular basis: Eats 3 meals a day  Meal planning: Smaller portions  Meals include: Breakfast, Lunch, Dinner  Breakfast: coffee with creamer, 2 eggs, toast, cranberry juiice  Lunch: baked chicken breast, jello, rice or cottage cheese with crackers and pineapple  Dinner: tuna stuffed tomato and hardboiled egg or steak, sweet potatoes, salad, milk or turkey burger, spring rolls, milk and jello  Snacks: soft serve cone, orange juice, v-8 juice  Beverages: Coffee, Water, Milk, Juice  Has patient met with a dietitian in the past?: No    Being Active  Being Active Assessed Today: Yes  Exercise:: Currently not exercising(Getting treadmill for [place])  Barrier to exercise: Access    Monitoring  Monitoring Assessed Today: No(not testing yet)        Taking Medications  Diabetes Medication(s)     Biguanides       metFORMIN (GLUCOPHAGE-XR) 500 MG 24 hr tablet    Take 1 tablet (500 mg) by mouth daily (with dinner)          Current Treatments: Oral Agent (monotherapy)  Problems taking diabetes medications regularly?: No  Diabetes medication side effects?: No  Treatment Compliance: All of the time    Problem Solving  Problem Solving Assessed Today: Yes  Hypoglycemia Frequency:  Never  Patient carries a carbohydrate source: No  Medical alert: No  Severe weather/disaster plan for diabetes management?: No  DKA prevention plan?: No              Reducing Risks  Reducing Risks Assessed Today: No  Has dilated eye exam at least once a year?: Yes    Healthy Coping  Healthy Coping Assessed Today: Yes  Emotional response to diabetes: Ready to learn, Fear/Anxiety, Concern for health and well-being  Informal Support system:: Family  Stage of change: PREPARATION (Decided to change - considering how)  Difficulty affording diabetes management supplies?: No  Support resources: Websites  Patient Activation Measure Survey Score:  JASSI Score (Last Two) 10/11/2019   JASSI Raw Score 31   Activation Score 59.3   JASSI Level 3       ASSESSMENT:  Rj was very anxious at the beginning of our appointment. She requested to be in a room with a window and requested that the door be open.    She is anger about diabetes diagnosis as she already has enough going on.        Patient's most recent   Lab Results   Component Value Date    A1C 6.5 11/11/2019    is meeting goal of <7.0    INTERVENTION:   Diabetes knowledge and skills assessment:     Patient is knowledgeable in diabetes management concepts related to: Healthy Eating and Being Active    Patient needs further education on the following diabetes management concepts: Healthy Eating, Being Active, Monitoring and Taking Medication    Based on learning assessment above, most appropriate setting for further diabetes education would be: Individual setting.    Education provided today on:  AADE Self-Care Behaviors:  Diabetes Pathophysiology  Healthy Eating: carbohydrate counting, consistency in amount, composition, and timing of food intake, weight reduction, portion control, plate planning method and label reading  Being Active: relationship to blood glucose, describe appropriate activity program and precautions to take  Monitoring: purpose, proper technique, log and  interpret results, individual blood glucose targets, frequency of monitoring and use of glucose control solution  Taking Medication: action of prescribed medication, side effects of prescribed medications and when to take medications    Opportunities for ongoing education and support in diabetes-self management were discussed.    Pt verbalized understanding of concepts discussed and recommendations provided today.       Education Materials Provided:  Carbohydrate Counting and Accu-Chek Guide meter kit, Basics Type 2 book, My Food Plan, food logs    PLAN:  See Patient Instructions for co-developed, patient-stated behavior change goals.  Keep food logs    Test BG:  On odd days: AM before coffee of food  On even days: 2 hours after evening meal  When you feel BG might be low (when eating would make you feel better)    Return to Diabetes Ed in 2 weeks    Call with questions/concerns: [phone]  AVS printed and provided to patient today. See Follow-Up section for recommended follow-up.      Time Spent: 75 minutes  Encounter Type: Individual    Any diabetes medication dose changes were made via the CDE Protocol and Collaborative Practice Agreement with the patient's primary care provider. A copy of this encounter was shared with the provider.

## 2019-12-17 VITALS
BODY MASS INDEX: 40.05 KG/M2 | HEART RATE: 90 BPM | RESPIRATION RATE: 16 BRPM | WEIGHT: 204 LBS | DIASTOLIC BLOOD PRESSURE: 74 MMHG | OXYGEN SATURATION: 98 % | HEIGHT: 60 IN | SYSTOLIC BLOOD PRESSURE: 134 MMHG

## 2019-12-20 ENCOUNTER — OFFICE VISIT (OUTPATIENT)
Dept: OTOLARYNGOLOGY | Facility: OTHER | Age: 69
End: 2019-12-20
Attending: NURSE PRACTITIONER
Payer: MEDICARE

## 2019-12-20 ENCOUNTER — OFFICE VISIT (OUTPATIENT)
Dept: ALLERGY | Facility: OTHER | Age: 69
End: 2019-12-20
Attending: NURSE PRACTITIONER
Payer: COMMERCIAL

## 2019-12-20 VITALS
TEMPERATURE: 97.7 F | BODY MASS INDEX: 40.05 KG/M2 | OXYGEN SATURATION: 97 % | WEIGHT: 204 LBS | SYSTOLIC BLOOD PRESSURE: 139 MMHG | DIASTOLIC BLOOD PRESSURE: 78 MMHG | HEIGHT: 60 IN | HEART RATE: 85 BPM

## 2019-12-20 DIAGNOSIS — J30.89 PERENNIAL ALLERGIC RHINITIS: Primary | ICD-10-CM

## 2019-12-20 DIAGNOSIS — G47.33 OSA (OBSTRUCTIVE SLEEP APNEA): ICD-10-CM

## 2019-12-20 DIAGNOSIS — J34.3 NASAL TURBINATE HYPERTROPHY: ICD-10-CM

## 2019-12-20 PROCEDURE — G0463 HOSPITAL OUTPT CLINIC VISIT: HCPCS | Mod: 25

## 2019-12-20 PROCEDURE — 95004 PERQ TESTS W/ALRGNC XTRCS: CPT

## 2019-12-20 PROCEDURE — 95004 PERQ TESTS W/ALRGNC XTRCS: CPT | Mod: TC

## 2019-12-20 PROCEDURE — 99213 OFFICE O/P EST LOW 20 MIN: CPT | Mod: 25 | Performed by: NURSE PRACTITIONER

## 2019-12-20 PROCEDURE — 95024 IQ TESTS W/ALLERGENIC XTRCS: CPT

## 2019-12-20 PROCEDURE — 95024 IQ TESTS W/ALLERGENIC XTRCS: CPT | Mod: TC

## 2019-12-20 ASSESSMENT — MIFFLIN-ST. JEOR: SCORE: 1381.84

## 2019-12-20 ASSESSMENT — PAIN SCALES - GENERAL: PAINLEVEL: NO PAIN (0)

## 2019-12-20 NOTE — LETTER
12/20/2019         RE: Rj Rodríguez  [address]  [address]  [facility] [address]        Dear Colleague,    Thank you for referring your patient, Rj Rodríguez, to the [facility]. Please see a copy of my visit note below.    Otolaryngology Note         Chief Complaint:     Patient presents with:  Allergies: MQT Testing           History of Present Illness:     Rj Rodríguez is a 69 year old female seen today for follow up post MQT.  She did well, feels a little itchy, otherwise no complaints.      MQT 12/20/19:  Dilution 6: Ragweed, grass, birch, oak, cat, dust  Dilution 5: Pigweed, thistle, maple, Frank, pine, cottonwood, walnut  Dilution 2: elm, alternaria, aspergillus, Epicoccum, fusarium    She reports nasal drainage and congestion continue to be somewhat bothersome.  She has not been wearing her CPAP.  Was able to tolerate it for about 1.5 years, but then found herself taking it off at night, and not tolerating the nasal pillows.  Will try again after the holidays.  Did discuss that Dr Maradiaga diagnosed her with NTH and discussed possible turbinate reduction.  She will consider.           Medications:     Current Outpatient Rx   Medication Sig Dispense Refill     ALBUTEROL IN        amLODIPine (NORVASC) 5 MG tablet Take 5 mg by mouth daily  3     blood glucose (ACCU-CHEK GUIDE) test strip Use to test blood sugar 1 time daily or as directed. 50 each 11     blood glucose monitoring (ACCU-CHEK FASTCLIX) lancets Use to test blood sugar 1 time daily or as directed. 102 each 11     cetirizine (ZYRTEC) 10 MG tablet Take 1 tablet (10 mg) by mouth daily as needed for allergies 60 tablet 12     clonazePAM (KLONOPIN) 0.5 MG tablet Take 1 tablet (0.5 mg) by mouth 2 times daily as needed for anxiety 6 tablet 0     Furosemide (LASIX) 20 MG tablet Take 1 tablet (20 mg) by mouth daily Take 1 tablet (20mg) by oral route every day. 30 tablet      losartan (COZAAR) 100 MG tablet Take 1  tablet (100 mg) by mouth daily 90 tablet 3     metFORMIN (GLUCOPHAGE-XR) 500 MG 24 hr tablet Take 1 tablet (500 mg) by mouth daily (with dinner) 30 tablet 3     potassium chloride (K-DUR) 10 MEQ tablet Take 1 tablet (10 mEq) by mouth daily Take 1 tablet (10meq) by oral route every day with food. 90 tablet      triamcinolone (NASACORT) 55 MCG/ACT nasal aerosol Spray 2 sprays into both nostrils daily 2 Bottle 12     EPINEPHrine (EPIPEN/ADRENACLICK/OR ANY BX GENERIC EQUIV) 0.3 MG/0.3ML injection 2-pack Inject 0.3 mLs (0.3 mg) into the muscle once as needed for anaphylaxis 2 each 12            Allergies:     Allergies: Fruit [peach]; Nuts; Ace inhibitors; Alkylamines; Alprazolam; Diphenhydramine; Erythromycin; Guaifed; Guaifenesin; Hydrochlorothiazide; No clinical screening - see comments; Olmesartan medoxomil; Phenylephrine hcl; Pseudoephedrine hcl; Seasonal allergies; Tramadol; Tramadol hcl; Venlafaxine; Zoloft [sertraline]; and Latex          Past Medical History:     Past Medical History:   Diagnosis Date     Arthritis      Chronic back pain      Hypertension      Irregular heart beat      Sleep apnea             Past Surgical History:     Past Surgical History:   Procedure Laterality Date     APPENDECTOMY       BACK SURGERY       CHOLECYSTECTOMY       COLONOSCOPY  2012    Repeat in 10 years     GYN SURGERY       HYSTERECTOMY      Ovaries     RELEASE CARPAL TUNNEL      LT     RELEASE CARPAL TUNNEL      RT x2     SURGICAL RADIOLOGY PROCEDURE N/A 2/12/2016    Procedure: SURGICAL RADIOLOGY PROCEDURE;  Surgeon: Provider, Generic Perianesthesia Nursing;  Location: [facility]     SURGICAL RADIOLOGY PROCEDURE N/A 8/15/2016    Procedure: SURGICAL RADIOLOGY PROCEDURE;  Surgeon: Provider, Generic Perianesthesia Nursing;  Location: [facility]       ENT family history reviewed         Social History:     Social History     Tobacco Use     Smoking status: Never Smoker     Smokeless tobacco: Never Used   Substance Use Topics     Alcohol  use: Yes     Alcohol/week: 1.0 standard drinks     Types: 1 Glasses of wine per week     Frequency: Monthly or less     Drinks per session: 1 or 2     Binge frequency: Never     Comment: occ glass of wine     Drug use: No            Review of Systems:     ROS: See HPI         Physical Exam:     /78 (BP Location: Right arm, Patient Position: Chair, Cuff Size: Adult Large)   Pulse 85   Temp 97.7  F (36.5  C) (Oral)   Ht 1.524 m (5')   Wt 92.5 kg (204 lb)   SpO2 97%   BMI 39.84 kg/m        General - The patient is well nourished and well developed, and appears to have good nutritional status.  Alert and oriented to person and place, answers questions and cooperates with examination appropriately.   Head and Face - Normocephalic and atraumatic, with no gross asymmetry noted.  The facial nerve is intact, with strong symmetric movements.  Voice and Breathing - The patient was breathing comfortably without the use of accessory muscles. There was no wheezing, stridor. The patients voice was clear and strong, and had appropriate pitch and quality.  Ears - External ear normal. Canals are patent. Right tympanic membrane is intact without effusion, retraction or mass. Left tympanic membrane is intact without effusion, retraction or mass.  Eyes - Extraocular movements intact, sclera were not icteric or injected, conjunctiva were pink and moist.  Mouth - Examination of the oral cavity showed pink, healthy oral mucosa. Dentition in good condition. No lesions or ulcerations noted. The tongue was mobile and midline.  FTP 4  Throat - The walls of the oropharynx were smooth, pink, moist, symmetric, and had no lesions or ulcerations.  The tonsillar pillars and soft palate were symmetric. The uvula was midline on elevation.    Neck -   Full range of motion on passive movement.  Palpation of the occipital, submental, submandibular, internal jugular chain, and supraclavicular nodes did not demonstrate any abnormal lymph nodes  or masses.  Palpation of the thyroid was soft and smooth, with no nodules or goiter appreciated.  The trachea was mobile and midline.  Nose - External contour is symmetric, no gross deflection or scars.  Nasal mucosa is pink and moist with no abnormal mucus.  The septum and turbinates were evaluated with nasal speculum.  No polyps, masses, or purulence noted on examination.         Assessment and Plan:       ICD-10-CM    1. Perennial allergic rhinitis J30.89 ORDER FOR ALLERGEN IMMUNOTHERAPY   2. REBECCA (obstructive sleep apnea) G47.33    3. Nasal turbinate hypertrophy J34.3        Epi pens are at pharmacy waiting for her, she will pick them up after Jan 1, when insurance will cover better.     Will start Allergy shots after January 1st   Epi Pen prior to starting shots  Restart Zyrte today  Encouraged CPAP use, discussed relation to BP and DM control    Risks of untreated sleep apnea are well documented, including but not limited to, the inability to reach restorative sleep leading to daytime somnolence, fatigue, irritability and poor work performance, risk of motor vehicle accidents, depression, memory issues, waking headaches, impotence, and increased nocturia.  More serious risks include concerns with medication/narcotic use and surgery related to the use of anesthesia, coronary artery disease, heart failure, heart attack, stroke and sudden death.    Achieving a healthy weight, adhering to a healthy diet, and exercise are very important in the treatment of sleep apnea and were discussed and encouraged.    Clinical evidence shows CPAP to be the treatment of choice for obstructive sleep apnea. However, if CPAP is not tolerated after reasonable attempts at treatment, surgical intervention may be necessary.     Follow up in 3 months or sooner if needed    Felisha MAZA  [facility]  4:16 PM  December 20, 2019      Again, thank you for allowing me to participate in the care of your  patient.        Sincerely,        Felisha Tyler, NP

## 2019-12-20 NOTE — NURSING NOTE
Chief Complaint   Patient presents with     Allergies     MQT Testing       Initial /78 (BP Location: Right arm, Patient Position: Chair, Cuff Size: Adult Large)   Pulse 85   Temp 97.7  F (36.5  C) (Oral)   Ht 1.524 m (5')   Wt 92.5 kg (204 lb)   SpO2 97%   BMI 39.84 kg/m   Estimated body mass index is 39.84 kg/m  as calculated from the following:    Height as of this encounter: 1.524 m (5').    Weight as of this encounter: 92.5 kg (204 lb).  Medication Reconciliation: complete  Halina Nogueira RN    This patient presents today for allergy skin testing.      Symptoms have included chronic PND, congestion, cough, runny nose, sneezing, itchy eyes, and hives.  Patient reports that when she is in a home with pets present, she starts to cough and her chest feels tight.  Patient has had anaphylaxis to fresh fruits and nuts.  She also notes a lot of sensitivities to foods and medications.  She does not have a history of sinus infections.  She also denies skin issues such as eczema.  Symptoms are worsening over the years, but the fall is especially bad.     This patient lives in a single family home, with a basement.  This patient does report occasional water leaks in the basement.  It is in one area, and they will be doing work outside to ensure drainage goes away from the home.  They have done work in the basement to clean out any possible mold issues.  There is carpet in the living room and bedrooms of the home.  Home has forced air, natural gas heat and does have portable air conditioning units.        This patient has no pets.  She has had issues with pets in other people's homes so she does not have any pets of her own.    This patient has had allergy testing in the past.  She was tested in [place] by Dr. Taylor.  She was positive to just about everything in the panel.  She did not start immunotherapy at that time because she was on a beta blocker.    This patient's medications have been reviewed  prior to testing and all appropriate medications have been stopped.    Consent is signed by patient and signature is verified.     MQT/ID test is performed per protocol.  The patient tolerated testing well.  Despite having a great deal of anxiety, patient was able to relax and make it through the testing process.  Benadryl gel was applied to testing sites on patient's skin, and patient received Claritin 10 mg, both per standing order.  All findings are recorded on the paper flow sheet. Results are reviewed with this patient.  They are given written information regarding allergy.       The patient will follow-up with Felisha Tyler NP, for treatment plan.      Halina Nogueira RN

## 2019-12-20 NOTE — PROGRESS NOTES
Otolaryngology Note         Chief Complaint:     Patient presents with:  Allergies: MQT Testing           History of Present Illness:     Rj Rodríguez is a 69 year old female seen today for follow up post MQT.  She did well, feels a little itchy, otherwise no complaints.      MQT 12/20/19:  Dilution 6: Ragweed, grass, birch, oak, cat, dust  Dilution 5: Pigweed, thistle, maple, Frank, pine, cottonwood, walnut  Dilution 2: elm, alternaria, aspergillus, Epicoccum, fusarium    She reports nasal drainage and congestion continue to be somewhat bothersome.  She has not been wearing her CPAP.  Was able to tolerate it for about 1.5 years, but then found herself taking it off at night, and not tolerating the nasal pillows.  Will try again after the holidays.  Did discuss that Dr Maradiaga diagnosed her with NTH and discussed possible turbinate reduction.  She will consider.           Medications:     Current Outpatient Rx   Medication Sig Dispense Refill     ALBUTEROL IN        amLODIPine (NORVASC) 5 MG tablet Take 5 mg by mouth daily  3     blood glucose (ACCU-CHEK GUIDE) test strip Use to test blood sugar 1 time daily or as directed. 50 each 11     blood glucose monitoring (ACCU-CHEK FASTCLIX) lancets Use to test blood sugar 1 time daily or as directed. 102 each 11     cetirizine (ZYRTEC) 10 MG tablet Take 1 tablet (10 mg) by mouth daily as needed for allergies 60 tablet 12     clonazePAM (KLONOPIN) 0.5 MG tablet Take 1 tablet (0.5 mg) by mouth 2 times daily as needed for anxiety 6 tablet 0     Furosemide (LASIX) 20 MG tablet Take 1 tablet (20 mg) by mouth daily Take 1 tablet (20mg) by oral route every day. 30 tablet      losartan (COZAAR) 100 MG tablet Take 1 tablet (100 mg) by mouth daily 90 tablet 3     metFORMIN (GLUCOPHAGE-XR) 500 MG 24 hr tablet Take 1 tablet (500 mg) by mouth daily (with dinner) 30 tablet 3     potassium chloride (K-DUR) 10 MEQ tablet Take 1 tablet (10 mEq) by mouth daily Take 1 tablet (10meq) by  oral route every day with food. 90 tablet      triamcinolone (NASACORT) 55 MCG/ACT nasal aerosol Spray 2 sprays into both nostrils daily 2 Bottle 12     EPINEPHrine (EPIPEN/ADRENACLICK/OR ANY BX GENERIC EQUIV) 0.3 MG/0.3ML injection 2-pack Inject 0.3 mLs (0.3 mg) into the muscle once as needed for anaphylaxis 2 each 12            Allergies:     Allergies: Fruit [peach]; Nuts; Ace inhibitors; Alkylamines; Alprazolam; Diphenhydramine; Erythromycin; Guaifed; Guaifenesin; Hydrochlorothiazide; No clinical screening - see comments; Olmesartan medoxomil; Phenylephrine hcl; Pseudoephedrine hcl; Seasonal allergies; Tramadol; Tramadol hcl; Venlafaxine; Zoloft [sertraline]; and Latex          Past Medical History:     Past Medical History:   Diagnosis Date     Arthritis      Chronic back pain      Hypertension      Irregular heart beat      Sleep apnea             Past Surgical History:     Past Surgical History:   Procedure Laterality Date     APPENDECTOMY       BACK SURGERY       CHOLECYSTECTOMY       COLONOSCOPY  2012    Repeat in 10 years     GYN SURGERY       HYSTERECTOMY      Ovaries     RELEASE CARPAL TUNNEL      LT     RELEASE CARPAL TUNNEL      RT x2     SURGICAL RADIOLOGY PROCEDURE N/A 2/12/2016    Procedure: SURGICAL RADIOLOGY PROCEDURE;  Surgeon: Provider, Generic Perianesthesia Nursing;  Location: [facility]     SURGICAL RADIOLOGY PROCEDURE N/A 8/15/2016    Procedure: SURGICAL RADIOLOGY PROCEDURE;  Surgeon: Provider, Generic Perianesthesia Nursing;  Location: [facility]       ENT family history reviewed         Social History:     Social History     Tobacco Use     Smoking status: Never Smoker     Smokeless tobacco: Never Used   Substance Use Topics     Alcohol use: Yes     Alcohol/week: 1.0 standard drinks     Types: 1 Glasses of wine per week     Frequency: Monthly or less     Drinks per session: 1 or 2     Binge frequency: Never     Comment: occ glass of wine     Drug use: No            Review of Systems:     ROS: See  HPI         Physical Exam:     /78 (BP Location: Right arm, Patient Position: Chair, Cuff Size: Adult Large)   Pulse 85   Temp 97.7  F (36.5  C) (Oral)   Ht 1.524 m (5')   Wt 92.5 kg (204 lb)   SpO2 97%   BMI 39.84 kg/m       General - The patient is well nourished and well developed, and appears to have good nutritional status.  Alert and oriented to person and place, answers questions and cooperates with examination appropriately.   Head and Face - Normocephalic and atraumatic, with no gross asymmetry noted.  The facial nerve is intact, with strong symmetric movements.  Voice and Breathing - The patient was breathing comfortably without the use of accessory muscles. There was no wheezing, stridor. The patients voice was clear and strong, and had appropriate pitch and quality.  Ears - External ear normal. Canals are patent. Right tympanic membrane is intact without effusion, retraction or mass. Left tympanic membrane is intact without effusion, retraction or mass.  Eyes - Extraocular movements intact, sclera were not icteric or injected, conjunctiva were pink and moist.  Mouth - Examination of the oral cavity showed pink, healthy oral mucosa. Dentition in good condition. No lesions or ulcerations noted. The tongue was mobile and midline.  FTP 4  Throat - The walls of the oropharynx were smooth, pink, moist, symmetric, and had no lesions or ulcerations.  The tonsillar pillars and soft palate were symmetric. The uvula was midline on elevation.    Neck -   Full range of motion on passive movement.  Palpation of the occipital, submental, submandibular, internal jugular chain, and supraclavicular nodes did not demonstrate any abnormal lymph nodes or masses.  Palpation of the thyroid was soft and smooth, with no nodules or goiter appreciated.  The trachea was mobile and midline.  Nose - External contour is symmetric, no gross deflection or scars.  Nasal mucosa is pink and moist with no abnormal mucus.  The  septum and turbinates were evaluated with nasal speculum.  No polyps, masses, or purulence noted on examination.         Assessment and Plan:       ICD-10-CM    1. Perennial allergic rhinitis J30.89 ORDER FOR ALLERGEN IMMUNOTHERAPY   2. REBECCA (obstructive sleep apnea) G47.33    3. Nasal turbinate hypertrophy J34.3        Epi pens are at pharmacy waiting for her, she will pick them up after Jan 1, when insurance will cover better.     Will start Allergy shots after January 1st   Epi Pen prior to starting shots  Restart Zyrte today  Encouraged CPAP use, discussed relation to BP and DM control    Risks of untreated sleep apnea are well documented, including but not limited to, the inability to reach restorative sleep leading to daytime somnolence, fatigue, irritability and poor work performance, risk of motor vehicle accidents, depression, memory issues, waking headaches, impotence, and increased nocturia.  More serious risks include concerns with medication/narcotic use and surgery related to the use of anesthesia, coronary artery disease, heart failure, heart attack, stroke and sudden death.    Achieving a healthy weight, adhering to a healthy diet, and exercise are very important in the treatment of sleep apnea and were discussed and encouraged.    Clinical evidence shows CPAP to be the treatment of choice for obstructive sleep apnea. However, if CPAP is not tolerated after reasonable attempts at treatment, surgical intervention may be necessary.     Follow up in 3 months or sooner if needed    Felisha MAZA  [facility]  4:16 PM  December 20, 2019

## 2019-12-20 NOTE — PROGRESS NOTES
Prior to testing, verified patient's identity using patient's name and date of birth.    Rj was seen for allergy skin testing. Patient was seen by this nurse in conjunction with ENT provider. All encounter details are documented in ENT Provider's appointment from this same date. Please see referenced encounter for this visits documentation.     Halina Nogueira RN

## 2020-01-07 ENCOUNTER — HOSPITAL ENCOUNTER (OUTPATIENT)
Dept: EDUCATION SERVICES | Facility: HOSPITAL | Age: 70
Discharge: HOME | End: 2020-01-07
Attending: NURSE PRACTITIONER | Admitting: NURSE PRACTITIONER
Payer: MEDICARE

## 2020-01-07 VITALS
SYSTOLIC BLOOD PRESSURE: 148 MMHG | OXYGEN SATURATION: 98 % | DIASTOLIC BLOOD PRESSURE: 78 MMHG | HEART RATE: 98 BPM | HEIGHT: 60 IN | BODY MASS INDEX: 38.97 KG/M2 | WEIGHT: 198.5 LBS | RESPIRATION RATE: 16 BRPM

## 2020-01-07 DIAGNOSIS — E11.9 TYPE 2 DIABETES MELLITUS WITHOUT COMPLICATION, WITHOUT LONG-TERM CURRENT USE OF INSULIN (H): Primary | ICD-10-CM

## 2020-01-07 PROCEDURE — G0108 DIAB MANAGE TRN  PER INDIV: HCPCS

## 2020-01-07 ASSESSMENT — MIFFLIN-ST. JEOR: SCORE: 1356.89

## 2020-01-07 ASSESSMENT — PAIN SCALES - GENERAL: PAINLEVEL: NO PAIN (0)

## 2020-01-08 ENCOUNTER — ALLIED HEALTH/NURSE VISIT (OUTPATIENT)
Dept: ALLERGY | Facility: OTHER | Age: 70
End: 2020-01-08
Attending: NURSE PRACTITIONER
Payer: MEDICARE

## 2020-01-08 ENCOUNTER — TRANSFERRED RECORDS (OUTPATIENT)
Dept: HEALTH INFORMATION MANAGEMENT | Facility: CLINIC | Age: 70
End: 2020-01-08

## 2020-01-08 DIAGNOSIS — J30.89 PERENNIAL ALLERGIC RHINITIS: Primary | ICD-10-CM

## 2020-01-08 LAB — RETINOPATHY: NEGATIVE

## 2020-01-08 PROCEDURE — 95165 ANTIGEN THERAPY SERVICES: CPT

## 2020-01-08 PROCEDURE — 95165 ANTIGEN THERAPY SERVICES: CPT | Performed by: NURSE PRACTITIONER

## 2020-01-08 NOTE — PROGRESS NOTES
Allergy serum is mixed today at [facility] schedule 1 of 4, into two (5 ml) multi dose vial/vials.    Allergens included were:    Ragweed  0.2 ml of dilution # 4  Pigweed  0.2 ml of dilution # 2  Mugwort 0.2 ml of dilution # 0  Kochia  0.2 ml of dilution # 0  Russian Thistle 0.2 ml of dilution # 2  Jimenez Grass 0.2 ml of dilution # 4  Birch mix 0.2 ml of dilution # 4  Maple Mix 0.2 of dilution # 3  Elm Mix 0.2 ml of dilution # 2  Oak Mix 0.2 ml of dilution # 4  Frank Mix 0.2 ml of dilution # 4  Pine Mix 0.2 ml of dilution # 2  Eastern [place] 0.2 ml of dilution # 3  Black [place]le 0.2 ml of dilution # 3  Aspen 0.2 ml of dilution # 0  Red [place] 0.2 ml of dilution # 0    Alternaria 0.2 ml of dilution # 2  Aspergillus 0.2 ml of dilution # 2  Hormodendrum 0.2 ml of dilution # 3  Helminthosporium 0.2 ml of dilution # 3  Penicillium 0.2 ml of dilution # 2  Epicoccum 0.2 ml of dilution # 2  Fusarium 0.2 ml of dilution # 2  Mucor 0.2 ml of dilution # 0  Grain Smut 0.2 ml of dilution # 0  Grass Smut 0.2 ml of dilution # 0  Cat 0.2 ml of dilution # 4  Dog 0.2 ml of dilution # 2  Feather Mix 0.2 ml of dilution # 0  Dust Mite Mix 0.2 ml of dilution # 4  Horse 0.2 ml of dilution # 0    Halina Nogueira RN

## 2020-01-13 ENCOUNTER — OFFICE VISIT (OUTPATIENT)
Dept: ALLERGY | Facility: OTHER | Age: 70
End: 2020-01-13
Attending: NURSE PRACTITIONER
Payer: MEDICARE

## 2020-01-13 ENCOUNTER — TELEPHONE (OUTPATIENT)
Dept: EDUCATION SERVICES | Facility: HOSPITAL | Age: 70
End: 2020-01-13

## 2020-01-13 DIAGNOSIS — J30.89 PERENNIAL ALLERGIC RHINITIS: Primary | ICD-10-CM

## 2020-01-13 PROCEDURE — 95117 IMMUNOTHERAPY INJECTIONS: CPT

## 2020-01-13 NOTE — TELEPHONE ENCOUNTER
Pt calls she had her allergy appt and was told to call Karishma after to schedule next Follow-up appt.

## 2020-01-13 NOTE — TELEPHONE ENCOUNTER
Please would you call Rj and ask her if she would like to schedule an appointment with me on 2/5/20 at 3 pm for Session 3) after her allergy shot. Thanks!

## 2020-01-13 NOTE — PROGRESS NOTES
Prior to education and SVT, verified patient's identity using patient's name and date of birth.    Patient presents to allergy clinic for education and training on subcutaneous immunotherapy.  Patient educated on epi pen and an indications for use.  Patient verbalizes understanding.  New patient information sheet given and discussed anaphylaxis, local reactions and answered all questions to patients satisfaction.     Safety vial test was done in the left arm, for new Silver vial # 1.   Administered  0.01ml (ID) for SVT.  SVT = 10 mm.   Passed.    Safety vial test was done in the right arm, for new Silver vial # 2.   Administered  0.01ml (ID) for SVT.  SVT = 7 mm.   Passed.    Allergy injection/s given and charted on paper allergy flow sheet.  Patient experienced no reaction.    Halina Nogueira RN

## 2020-01-22 ENCOUNTER — ALLIED HEALTH/NURSE VISIT (OUTPATIENT)
Dept: ALLERGY | Facility: OTHER | Age: 70
End: 2020-01-22
Attending: NURSE PRACTITIONER
Payer: MEDICARE

## 2020-01-22 DIAGNOSIS — J30.9 ALLERGIC RHINITIS: Primary | ICD-10-CM

## 2020-01-22 PROCEDURE — 95117 IMMUNOTHERAPY INJECTIONS: CPT

## 2020-01-22 NOTE — PROGRESS NOTES
Verified pt by name and date of birth. Allergy injections given. Pt waiting for 30 minutes. No reaction after 30 minutes.  See paper chart.      EDDA WINTER LPN

## 2020-01-29 ENCOUNTER — ALLIED HEALTH/NURSE VISIT (OUTPATIENT)
Dept: ALLERGY | Facility: OTHER | Age: 70
End: 2020-01-29
Attending: NURSE PRACTITIONER
Payer: MEDICARE

## 2020-01-29 DIAGNOSIS — J30.9 ALLERGIC RHINITIS: Primary | ICD-10-CM

## 2020-01-29 PROCEDURE — 95117 IMMUNOTHERAPY INJECTIONS: CPT

## 2020-01-29 NOTE — PROGRESS NOTES
Prior to injection, verified patient identity by using patient's name and date of birth.  Allergy injection given and charted on paper allergy flow sheet.     Patient experienced no reaction.    Vira Tuttle LPN

## 2020-01-30 NOTE — PATIENT INSTRUCTIONS
Continue current plan    I will send you some sample meal plans    Return 2/5/20 at 3 pm for Session 3

## 2020-01-30 NOTE — PROGRESS NOTES
Diabetes Self-Management Education & Support    Diabetes Education Self Management & Training    SUBJECTIVE/OBJECTIVE:  Presents for: Follow-up  Accompanied by: Self  Diabetes education in the past 24mo: Yes  Focus of Visit: Monitoring, Healthy Eating  Diabetes type: Type 2  Disease course: Stable  Diabetes management related comments/concerns: Has anxety  Transportation concerns: No  Other concerns:: Cognitive impairment(anxiety)  Cultural Influences/Ethnic Background:  American      Diabetes Symptoms & Complications  Blurred vision: No  Fatigue: No  Neuropathy: No  Foot ulcerations: No  Polydipsia: No  Polyphagia: No  Polyuria: No  Visual change: No  Weakness: No  Weight loss: No  Slow healing wounds: No  Recent Infection(s): No  Symptom course: Stable  Weight trend: Decreasing steadily  Autonomic neuropathy: No  CVA: No  Heart disease: No  Nephropathy: No  Peripheral neuropathy: No  Peripheral Vascular Disease: No  Retinopathy: No    Patient Problem List and Family Medical History reviewed for relevant medical history, current medical status, and diabetes risk factors.    Vitals:  /78   Pulse 98   Resp 16   Ht 1.524 m (5')   Wt 90 kg (198 lb 8 oz)   SpO2 98%   BMI 38.77 kg/m    Estimated body mass index is 38.77 kg/m  as calculated from the following:    Height as of this encounter: 1.524 m (5').    Weight as of this encounter: 90 kg (198 lb 8 oz).   Last 3 BP:   BP Readings from Last 3 Encounters:   01/07/20 148/78   12/20/19 139/78   12/16/19 134/74       History   Smoking Status     Never Smoker   Smokeless Tobacco     Never Used       Labs:  Lab Results   Component Value Date    A1C 6.5 11/11/2019     Lab Results   Component Value Date     11/11/2019     Lab Results   Component Value Date    LDL 61 11/11/2019     HDL Cholesterol   Date Value Ref Range Status   11/11/2019 47 (L) >49 mg/dL Final   ]  GFR Estimate   Date Value Ref Range Status   11/11/2019 90 >60 mL/min/[1.73_m2] Final      Comment:     Non  GFR Calc  Starting 2018, serum creatinine based estimated GFR (eGFR) will be   calculated using the Chronic Kidney Disease Epidemiology Collaboration   (CKD-EPI) equation.       GFR Estimate If Black   Date Value Ref Range Status   2019 >90 >60 mL/min/[1.73_m2] Final     Comment:      GFR Calc  Starting 2018, serum creatinine based estimated GFR (eGFR) will be   calculated using the Chronic Kidney Disease Epidemiology Collaboration   (CKD-EPI) equation.       Lab Results   Component Value Date    CR 0.70 2019     No results found for: MICROALBUMIN    Healthy Eating  Healthy Eating Assessed Today: Yes  Cultural/Yazidism diet restrictions?: No  Patient on a regular basis: Eats 3 meals a day, Counts carbohydrates  Meal planning: Smaller portions, Carbohydrate counting  Meals include: Breakfast, Lunch, Dinner  Breakfast: coffee with creamer, 2 eggs, toast, cranberry juiice  Lunch: baked chicken breast, jello, rice or cottage cheese with crackers and pineapple  Dinner: tuna stuffed tomato and hardboiled egg or steak, sweet potatoes, salad, milk or turkey burger, spring rolls, milk and jello  Snacks: soft serve cone, orange juice, v-8 juice  Beverages: Coffee, Water, Milk, Juice  Has patient met with a dietitian in the past?: No    Being Active  Being Active Assessed Today: Yes  Exercise:: Currently not exercising(Getting treadmill for [place])  Barrier to exercise: Access    Monitoring  Monitoring Assessed Today: Yes  Did patient bring glucose meter to appointment? : Yes  Home Glucose (Sugar) Monitorin-2 times per day  Blood glucose trend: Fluctuating minimally  Low Glucose Range (mg/dL):   High Glucose Range (mg/dL): 140-180        Taking Medications  Diabetes Medication(s)     Biguanides       metFORMIN (GLUCOPHAGE-XR) 500 MG 24 hr tablet    Take 1 tablet (500 mg) by mouth daily (with dinner)          Current Treatments: Oral Agent  (monotherapy)  Problems taking diabetes medications regularly?: No  Diabetes medication side effects?: No  Treatment Compliance: All of the time    Problem Solving  Problem Solving Assessed Today: Yes  Hypoglycemia Frequency: Never  Patient carries a carbohydrate source: No  Medical alert: No  Severe weather/disaster plan for diabetes management?: No  DKA prevention plan?: No              Reducing Risks  Reducing Risks Assessed Today: No  Has dilated eye exam at least once a year?: Yes    Healthy Coping  Healthy Coping Assessed Today: Yes  Emotional response to diabetes: Ready to learn, Fear/Anxiety, Concern for health and well-being  Informal Support system:: Family  Stage of change: ACTION (Actively working towards change)  Difficulty affording diabetes management supplies?: No  Support resources: Websites  Patient Activation Measure Survey Score:  JASSI Score (Last Two) 10/11/2019   JASSI Raw Score 31   Activation Score 59.3   JASSI Level 3       ASSESSMENT:  Readings range as follows: fastin-105 and post-supper:     Has been working on portions and food choices. Did well over the holidays. Still planning to get a treadmill.    Very anxious at the beginning of our appointment but dose calm down with our conversation. Requests a room with a window and for door to remain open during visit.        Patient's most recent   Lab Results   Component Value Date    A1C 6.5 2019    is meeting goal of <7.0    INTERVENTION:   Diabetes knowledge and skills assessment:     Patient is knowledgeable in diabetes management concepts related to: Healthy Eating, Being Active, Monitoring and Taking Medication    Patient needs further education on the following diabetes management concepts: Healthy Eating and Problem Solving    Based on learning assessment above, most appropriate setting for further diabetes education would be: Individual setting.    Education provided today on:  AADE Self-Care Behaviors:  Diabetes  Pathophysiology  Healthy Eating: carbohydrate counting, consistency in amount, composition, and timing of food intake and portion control  Problem Solving: high blood glucose - causes, signs/symptoms, treatment and prevention, low blood glucose - causes, signs/symptoms, treatment and prevention, carrying a carbohydrate source at all times, when to call health care provider, medical identification and sick day arrangements    Opportunities for ongoing education and support in diabetes-self management were discussed.    Pt verbalized understanding of concepts discussed and recommendations provided today.       Education Materials Provided:  No new materials provided today    PLAN:  See Patient Instructions for co-developed, patient-stated behavior change goals.  Continue current plan    I will send you some sample meal plans    Return 2/5/20 at 3 pm for Session 3  AVS printed and provided to patient today. See Follow-Up section for recommended follow-up.      Time Spent: 30 minutes  Encounter Type: Individual    Any diabetes medication dose changes were made via the CDE Protocol and Collaborative Practice Agreement with the patient's primary care provider. A copy of this encounter was shared with the provider.

## 2020-02-05 ENCOUNTER — HOSPITAL ENCOUNTER (OUTPATIENT)
Dept: EDUCATION SERVICES | Facility: HOSPITAL | Age: 70
Discharge: HOME | End: 2020-02-05
Attending: NURSE PRACTITIONER | Admitting: NURSE PRACTITIONER
Payer: MEDICARE

## 2020-02-05 ENCOUNTER — ALLIED HEALTH/NURSE VISIT (OUTPATIENT)
Dept: ALLERGY | Facility: OTHER | Age: 70
End: 2020-02-05
Attending: NURSE PRACTITIONER
Payer: MEDICARE

## 2020-02-05 VITALS
HEART RATE: 98 BPM | BODY MASS INDEX: 38.77 KG/M2 | SYSTOLIC BLOOD PRESSURE: 112 MMHG | OXYGEN SATURATION: 98 % | DIASTOLIC BLOOD PRESSURE: 82 MMHG | HEIGHT: 60 IN | RESPIRATION RATE: 20 BRPM

## 2020-02-05 DIAGNOSIS — E11.9 TYPE 2 DIABETES MELLITUS WITHOUT COMPLICATION, WITHOUT LONG-TERM CURRENT USE OF INSULIN (H): Primary | ICD-10-CM

## 2020-02-05 DIAGNOSIS — J30.9 ALLERGIC RHINITIS: Primary | ICD-10-CM

## 2020-02-05 PROCEDURE — 95117 IMMUNOTHERAPY INJECTIONS: CPT

## 2020-02-05 PROCEDURE — G0108 DIAB MANAGE TRN  PER INDIV: HCPCS

## 2020-02-05 ASSESSMENT — PAIN SCALES - GENERAL: PAINLEVEL: NO PAIN (0)

## 2020-02-12 ENCOUNTER — ALLIED HEALTH/NURSE VISIT (OUTPATIENT)
Dept: ALLERGY | Facility: OTHER | Age: 70
End: 2020-02-12
Attending: NURSE PRACTITIONER
Payer: MEDICARE

## 2020-02-12 DIAGNOSIS — J30.9 ALLERGIC RHINITIS: Primary | ICD-10-CM

## 2020-02-12 PROCEDURE — 95117 IMMUNOTHERAPY INJECTIONS: CPT

## 2020-02-12 NOTE — PROGRESS NOTES
Allergy injection/s given and charted on paper allergy flow sheet.  Patient experienced no reaction.    Rosio Bledsoe LPN

## 2020-02-16 NOTE — PROGRESS NOTES
Diabetes Self-Management Education & Support    Presents for: Follow-up(Session 3)    SUBJECTIVE/OBJECTIVE:  Presents for: Follow-up(Session 3)  Accompanied by: Self  Diabetes education in the past 24mo: Yes  Focus of Visit: Monitoring, Healthy Eating  Diabetes type: Type 2  Date of diagnosis: 11/11/19  Disease course: Stable  Diabetes management related comments/concerns: Has anxety  Transportation concerns: No  Difficulty affording diabetes medication?: No  Difficulty affording diabetes testing supplies?: No  Other concerns:: Cognitive impairment(anxiety)  Cultural Influences/Ethnic Background:  American      Diabetes Symptoms & Complications:  Fatigue: No  Neuropathy: No  Polydipsia: No  Polyphagia: No  Polyuria: No  Visual change: No  Slow healing wounds: No  Symptom course: Stable  Weight trend: Decreasing(Down 12# since 11/2019)  Complications assessed today?: Yes  Autonomic neuropathy: No  CVA: No  Heart disease: No  Nephropathy: No  Peripheral neuropathy: No  Foot ulcerations: No  Peripheral Vascular Disease: No  Retinopathy: No    Patient Problem List and Family Medical History reviewed for relevant medical history, current medical status, and diabetes risk factors.    Vitals:  /82   Pulse 98   Resp 20   Ht 1.524 m (5')   SpO2 98%   BMI 38.77 kg/m    Estimated body mass index is 38.77 kg/m  as calculated from the following:    Height as of this encounter: 1.524 m (5').    Weight as of 1/7/20: 90 kg (198 lb 8 oz).   Last 3 BP:   BP Readings from Last 3 Encounters:   02/05/20 112/82   01/07/20 148/78   12/20/19 139/78       History   Smoking Status     Never Smoker   Smokeless Tobacco     Never Used       Labs:  Lab Results   Component Value Date    A1C 6.5 11/11/2019     Lab Results   Component Value Date     11/11/2019     Lab Results   Component Value Date    LDL 61 11/11/2019     HDL Cholesterol   Date Value Ref Range Status   11/11/2019 47 (L) >49 mg/dL Final   ]  GFR Estimate   Date  Value Ref Range Status   11/11/2019 90 >60 mL/min/[1.73_m2] Final     Comment:     Non  GFR Calc  Starting 12/18/2018, serum creatinine based estimated GFR (eGFR) will be   calculated using the Chronic Kidney Disease Epidemiology Collaboration   (CKD-EPI) equation.       GFR Estimate If Black   Date Value Ref Range Status   11/11/2019 >90 >60 mL/min/[1.73_m2] Final     Comment:      GFR Calc  Starting 12/18/2018, serum creatinine based estimated GFR (eGFR) will be   calculated using the Chronic Kidney Disease Epidemiology Collaboration   (CKD-EPI) equation.       Lab Results   Component Value Date    CR 0.70 11/11/2019     No results found for: MICROALBUMIN    Healthy Eating:  Healthy Eating Assessed Today: Yes  Cultural/Scientologist diet restrictions?: No  Meal planning/habits: Carb counting  How many times a week on average do you eat food made away from home (restaurant/take-out)?: 1  Meals include: Breakfast, Lunch, Dinner  Breakfast: coffee with creamer, 2 eggs, toast, cranberry juiice  Lunch: baked chicken breast, jello, rice or cottage cheese with crackers and pineapple  Dinner: tuna stuffed tomato and hardboiled egg or steak, sweet potatoes, salad, milk or turkey burger, spring rolls, milk and jello  Beverages: Coffee, Water, Milk, Juice  Has patient met with a dietitian in the past?: No    Being Active:  Being Active Assessed Today: Yes  Exercise:: Currently not exercising(Looking at going to gym)  Barrier to exercise: Access    Monitoring:  Monitoring Assessed Today: Yes  Did patient bring glucose meter to appointment? : Yes  Blood Glucose Meter: Accu-chek  Times checking blood sugar at home (number): 1  Times checking blood sugar at home (per): Day  Blood glucose trend: Decreasing        Taking Medications:  Diabetes Medication(s)     Biguanides       metFORMIN (GLUCOPHAGE-XR) 500 MG 24 hr tablet    Take 1 tablet (500 mg) by mouth daily (with dinner)          Taking Medication  Assessed Today: Yes  Current Treatments: Diet and Metformin  Problems taking diabetes medications regularly?: No  Diabetes medication side effects?: No    Problem Solving:  Problem Solving Assessed Today: Yes  Is the patient at risk for hypoglycemia?: No  Hypoglycemia Frequency: Never  Patient carries a carbohydrate source: No  Medical ID: No  Is the patient at risk for DKA?: No  Does patient have DKA prevention plan?: No  Does patient have severe weather/disaster plan for diabetes management?: No              Reducing Risks:  Reducing Risks Assessed Today: No  Has dilated eye exam at least once a year?: Yes  Feet checked by healthcare provider in the last year?: No    Healthy Coping:  Healthy Coping Assessed Today: Yes  Emotional response to diabetes: Ready to learn, Fear/Anxiety, Concern for health and well-being  Informal Support system:: Family  Stage of change: ACTION (Actively working towards change)  Support resources: Websites  Patient Activation Measure Survey Score:  JASSI Score (Last Two) 10/11/2019   JASSI Raw Score 31   Activation Score 59.3   JASSI Level 3       Diabetes knowledge and skills assessment:   Patient is knowledgeable in diabetes management concepts related to: Healthy Eating, Being Active, Monitoring and Taking Medication    Patient needs further education on the following diabetes management concepts: Reducing Risks    Based on learning assessment above, most appropriate setting for further diabetes education would be: Individual setting.      INTERVENTIONS:    Education provided today on:  AADE Self-Care Behaviors:  Healthy Eating: consistency in amount, composition, and timing of food intake  Monitoring: log and interpret results, individual blood glucose targets and frequency of monitoring  Taking Medication: action of prescribed medication, side effects of prescribed medications and when to take medications  Reducing Risks: major complications of diabetes, prevention, early diagnostic  measures and treatment of complications, A1C - goals, relating to blood glucose levels, how often to check, lipids levels and goals, blood pressure and goals and heart health    Opportunities for ongoing education and support in diabetes-self management were discussed.    Pt verbalized understanding of concepts discussed and recommendations provided today.       Education Materials Provided:  No new materials provided today      ASSESSMENT:  Readings range as follows: fastin-101, post-breakfast: 126 and post-supper: 115-150    Patient's most recent   Lab Results   Component Value Date    A1C 6.5 2019    is meeting goal of <7.0    PLAN  See Patient Instructions for co-developed, patient-stated behavior change goals.  Continue current plan    Return to Diabetes Ed:  3/11/2020 at 2:30 pm  AVS printed and provided to patient today. See Follow-Up section for recommended follow-up.      Time Spent: 45 minutes  Encounter Type: Individual    Any diabetes medication dose changes were made via the CDE Protocol and Collaborative Practice Agreement with the patient's primary care provider. A copy of this encounter was shared with the provider.

## 2020-02-17 NOTE — PROGRESS NOTES
Subjective     Rj Rodríguez is a 69 year old female who presents to clinic today for the following health issues:    HPI   Diabetes Follow-up    How often are you checking your blood sugar? One time daily, average glucose around 117  What time of day are you checking your blood sugars (select all that apply)?  Before and after meals  Have you had any blood sugars above 200?  No  Have you had any blood sugars below 70?  No    What symptoms do you notice when your blood sugar is low?  None    What concerns do you have today about your diabetes? None     Do you have any of these symptoms? (Select all that apply)  No numbness or tingling in feet.  No redness, sores or blisters on feet.  No complaints of excessive thirst.  No reports of blurry vision.  No significant changes to weight.    Have you had a diabetic eye exam in the last 12 months? Yes-  Location: 1/2/20      New Diagnosis-A1C was 6.5 on 11/11/19. [facility] and started on Metformin. She had developed nausea from this in the past so a low dose was started. Today she notes that this is going well. No diarrhea or nausea with her Metformin.     She denies chest pain or shortness of breath, but notes that she has had some dizziness and palpitations over the past month. No syncope. No slurred speech. No weakness.     She is working on weight loss. Weight down 23 pounds since 10/2019.    Hyperlipidemia Follow-Up      Are you regularly taking any medication or supplement to lower your cholesterol?   No    Are you having muscle aches or other side effects that you think could be caused by your cholesterol lowering medication?  No     She was recently diagnosed with DM. Started Metformin. Did not want to begin two new medications at once.    Did discuss starting a statin today. Cardiac risk was calculated and it was 11 percent. Patient, however, declined. Will think about it and reassess when I see her in 2 weeks.     As noted above,  she denies chest pain or shortness of breath. No calf pain. Some dizziness over the past month.     BP Readings from Last 2 Encounters:   02/19/20 105/78   02/05/20 112/82     Hemoglobin A1C (%)   Date Value   02/19/2020 5.9 (H)   11/11/2019 6.5 (H)     LDL Cholesterol Calculated (mg/dL)   Date Value   11/11/2019 61   10/29/2019 63               Hypertension Follow-up    She was last seen on 11/11/19 for her blood pressure. At this time it was controlled with 75 mg of losartan.       Do you check your blood pressure regularly outside of the clinic? Yes, running around 101/69    Are you following a low salt diet? Yes    Are your blood pressures ever more than 140 on the top number (systolic) OR more   than 90 on the bottom number (diastolic), for example 140/90? No   -She does not smoke.  -She denies chest pain, shortness of breath, or syncope. No lower leg edema with lower doses of amlodipine. She currently takes 5 mg as 10 mg caused lower leg edema. She has taken hydrochlorothiazide in the past and developed a rash. She also takes furosemide with potassium for her blood pressure.   -Some palpitations over the past month. Feels like her heart is fluttering. No chest pain or shortness of breath. She does not drink a lot of caffeine. Occur with activity as well as rest. She does have a lot of anxiety, but does not feel this is related to her anxiety.       How many servings of fruits and vegetables do you eat daily?  4 or more    On average, how many sweetened beverages do you drink each day (Examples: soda, juice, sweet tea, etc.  Do NOT count diet or artificially sweetened beverages)?   0    How many days per week do you exercise enough to make your heart beat faster? 4    How many minutes a day do you exercise enough to make your heart beat faster? 30 - 60    How many days per week do you miss taking your medication? 0    Patient Active Problem List   Diagnosis     Vertigo     Tinnitus     Chronic rhinitis      Bilateral arm weakness     Cervicalgia     Essential hypertension     Generalized anxiety disorder     Lumbago     Migraine with aura and without status migrainosus, not intractable     Mild episode of recurrent major depressive disorder (H)     Obesity, morbid, BMI 40.0-49.9 (H)     Presbyopia     Primary open-angle glaucoma     Right arm pain     S/P cervical spinal fusion     S/P rotator cuff repair     [place] cardiac risk 11% in next 10 years     Diabetes mellitus, type 2 (H)     Past Surgical History:   Procedure Laterality Date     APPENDECTOMY       BACK SURGERY       CHOLECYSTECTOMY       COLONOSCOPY  2012    Repeat in 10 years     GYN SURGERY       HYSTERECTOMY      Ovaries     RELEASE CARPAL TUNNEL      LT     RELEASE CARPAL TUNNEL      RT x2     SURGICAL RADIOLOGY PROCEDURE N/A 2/12/2016    Procedure: SURGICAL RADIOLOGY PROCEDURE;  Surgeon: Provider, Generic Perianesthesia Nursing;  Location: [facility]     SURGICAL RADIOLOGY PROCEDURE N/A 8/15/2016    Procedure: SURGICAL RADIOLOGY PROCEDURE;  Surgeon: Provider, Generic Perianesthesia Nursing;  Location: [facility]       Social History     Tobacco Use     Smoking status: Never Smoker     Smokeless tobacco: Never Used   Substance Use Topics     Alcohol use: Yes     Alcohol/week: 1.0 standard drinks     Types: 1 Glasses of wine per week     Frequency: Monthly or less     Drinks per session: 1 or 2     Binge frequency: Never     Comment: occ glass of wine     Family History   Problem Relation Age of Onset     Colon Cancer Maternal Aunt      Colon Cancer Maternal Uncle      Diabetes Father         Type 2     Hypertension Father      Alcoholism Father      Alcoholism Mother          Current Outpatient Medications   Medication Sig Dispense Refill     ALBUTEROL IN        amLODIPine (NORVASC) 5 MG tablet Take 5 mg by mouth daily  3     blood glucose (ACCU-CHEK GUIDE) test strip Use to test blood sugar 1 time daily or as directed. 50 each 11     blood glucose monitoring  (ACCU-CHEK FASTCLIX) lancets Use to test blood sugar 1 time daily or as directed. 102 each 11     cetirizine (ZYRTEC) 10 MG tablet Take 1 tablet (10 mg) by mouth daily as needed for allergies 60 tablet 12     clonazePAM (KLONOPIN) 0.5 MG tablet TAKE 1 TABLET BY MOUTH TWICE DAILY AS NEEDED FOR ANXIETY 30 tablet 0     EPINEPHrine (EPIPEN/ADRENACLICK/OR ANY BX GENERIC EQUIV) 0.3 MG/0.3ML injection 2-pack Inject 0.3 mLs (0.3 mg) into the muscle once as needed for anaphylaxis 2 each 12     Furosemide (LASIX) 20 MG tablet Take 1 tablet (20 mg) by mouth daily Take 1 tablet (20mg) by oral route every day. 30 tablet      losartan 50 MG PO tablet Take 1 tablet (50 mg) by mouth daily 90 tablet 1     metFORMIN (GLUCOPHAGE-XR) 500 MG 24 hr tablet Take 1 tablet (500 mg) by mouth daily (with dinner) 30 tablet 3     ORDER FOR ALLERGEN IMMUNOTHERAPY Start allergy shots (SCIT).  Follow standard build-up protocols. 5 mL PRN     potassium chloride (K-DUR) 10 MEQ tablet Take 1 tablet (10 mEq) by mouth daily Take 1 tablet (10meq) by oral route every day with food. 90 tablet      triamcinolone (NASACORT) 55 MCG/ACT nasal aerosol Spray 2 sprays into both nostrils daily 2 Bottle 12     Allergies   Allergen Reactions     Fruit [Peach] Anaphylaxis and Hives     fresh peaches and any fruit that has a pit.  Kiwi's and apples also.  Can eat any fruit cooked.     Nuts Anaphylaxis and Hives     All Raw Nuts, can eat nuts when roasted.     Ace Inhibitors      Upper respiratory infection     Alkylamines Hives     Antihistamines     Alprazolam Hives     Xanax     Diphenhydramine Hives and Other (See Comments)     Keeps her awake       Erythromycin      BASE; pt. Not sure if this is a true allergy.     Guaifed      Pt unsure of reaction     Guaifenesin      Guaifed     Hydrochlorothiazide      No Clinical Screening - See Comments      Allergic:  Fresh peaches and any fruit that has a pit.  Kiwi's and apples also.  All raw nuts.  Can eat nuts when  "roasted or any fruit cooked.  Some seasonal allergies :  Hayfever     Olmesartan Medoxomil Cough     Benicar     Phenylephrine Hcl      Guaifed     Pseudoephedrine Hcl      Guaifed     Seasonal Allergies      hayfever     Tramadol      Sleep disturbance. Can not sleep, and when able to fall asleep will have terrible nightmares     Tramadol Hcl      Ultram, insomnia, nightmares, headache     Venlafaxine Other (See Comments)     Heartburn, reflux     Zoloft [Sertraline]      paranoia     Latex Other (See Comments), Swelling, Difficulty breathing and Rash     Throat Closes         Reviewed and updated as needed this visit by Provider         Review of Systems   As noted in the HPI.       Objective    /78   Pulse 84   Temp 97.6  F (36.4  C)   Ht 1.549 m (5' 1\")   Wt 86.7 kg (191 lb 3.2 oz)   SpO2 99%   BMI 36.13 kg/m    Body mass index is 36.13 kg/m .  Physical Exam   GENERAL: alert, obese and very anxious, unable to close the exam room door due to anxiety  EYES: Eyes grossly normal to inspection, PERRL and conjunctivae and sclerae normal  HENT: ear canals and TM's normal, nose and mouth without ulcers or lesions  NECK: no adenopathy and no carotid bruits  RESP: lungs clear to auscultation - no rales, rhonchi or wheezes  CV: regular rate and rhythm, normal S1 S2, no S3 or S4, no murmur, click or rub, no peripheral edema and peripheral pulses strong  NEURO: Normal strength and tone, sensory exam grossly normal, mentation intact, oriented times 3, cranial nerves 2-12 intact, DTR's normal and symmetric 2+ and gait normal including heel/toe/tandem walking  PSYCH: mentation appears normal, very anxious, difficult time being in lab or EKG-need to draw her in the exam room, also needed to keep the exam room door open  Diabetic foot exam: normal DP and PT pulses, no trophic changes or ulcerative lesions and normal sensory exam    Diagnostic Test Results:  Labs reviewed in Epic  Results for orders placed or performed " in visit on 02/19/20 (from the past 24 hour(s))   Hemoglobin A1c   Result Value Ref Range    Hemoglobin A1C 5.9 (H) 0 - 5.6 %   Basic metabolic panel   Result Value Ref Range    Sodium 140 133 - 144 mmol/L    Potassium 3.5 3.4 - 5.3 mmol/L    Chloride 105 94 - 109 mmol/L    Carbon Dioxide 27 20 - 32 mmol/L    Anion Gap 8 3 - 14 mmol/L    Glucose 109 (H) 70 - 99 mg/dL    Urea Nitrogen 11 7 - 30 mg/dL    Creatinine 0.70 0.52 - 1.04 mg/dL    GFR Estimate 90 >60 mL/min/[1.73_m2]    GFR Estimate If Black >90 >60 mL/min/[1.73_m2]    Calcium 8.9 8.5 - 10.1 mg/dL   TSH with free T4 reflex   Result Value Ref Range    TSH 1.94 0.40 - 4.00 mU/L   Estimated Average Glucose   Result Value Ref Range    Estimated Average Glucose 123 mg/dL   Albumin Random Urine Quantitative with Creat Ratio   Result Value Ref Range    Creatinine Urine 47 mg/dL    Albumin Urine mg/L <5 mg/L    Albumin Urine mg/g Cr Unable to calculate due to low value 0 - 25 mg/g Cr       EKG: NSR, VR 80    Assessment & Plan   (E11.9) Type 2 diabetes mellitus without complication, without long-term current use of insulin (H)  (primary encounter diagnosis)  Plan: Very well controlled. A1C 5.9. Will continue 500 mg of Metformin once daily. She will continue to work on weight loss and may be able to stop. BP at goal. Eye exam UTD. Declines statin. Follow up 3 months.     (I10) Essential hypertension  (R00.2) Palpitations  Plan: BP is actually low at 105/78. Most likely has come down with her 23 pound weight loss. I think this is making her dizzy. While she noted that her heart was racing and she was having palpitations while in the room, I did listen to her and she was in a regular beat with a HR of around 80. EKG showed NSR. BMP and TSH normal. CBC pending. Will notify patient of the results when available and intervene accordingly. Will cut her losartan in half and recheck her BP and symptoms in 2 weeks. She will take 50 mg of losartan daily. Will also place her on  a Holter Monitor for 7 days. Will notify patient of the results when available and intervene accordingly. She will return sooner with any new or worsening symptoms.     (E78.5) Hyperlipidemia, unspecified hyperlipidemia type  (Z91.89) [place] cardiac risk 11% in next 10 years  Plan: Cardiac risk is high at 11 percent with her DM. She declines to start a statin. Will reassess at her 2 week follow up.              Sheyla Sanches NP  [facility] [place] [facility]

## 2020-02-19 ENCOUNTER — OFFICE VISIT (OUTPATIENT)
Dept: FAMILY MEDICINE | Facility: OTHER | Age: 70
End: 2020-02-19
Attending: NURSE PRACTITIONER
Payer: MEDICARE

## 2020-02-19 ENCOUNTER — ALLIED HEALTH/NURSE VISIT (OUTPATIENT)
Dept: ALLERGY | Facility: OTHER | Age: 70
End: 2020-02-19
Attending: NURSE PRACTITIONER
Payer: MEDICARE

## 2020-02-19 VITALS
HEIGHT: 61 IN | OXYGEN SATURATION: 99 % | HEART RATE: 84 BPM | DIASTOLIC BLOOD PRESSURE: 78 MMHG | SYSTOLIC BLOOD PRESSURE: 105 MMHG | BODY MASS INDEX: 36.1 KG/M2 | TEMPERATURE: 97.6 F | WEIGHT: 191.2 LBS

## 2020-02-19 DIAGNOSIS — E78.5 HYPERLIPIDEMIA, UNSPECIFIED HYPERLIPIDEMIA TYPE: ICD-10-CM

## 2020-02-19 DIAGNOSIS — R00.2 PALPITATIONS: ICD-10-CM

## 2020-02-19 DIAGNOSIS — J30.9 ALLERGIC RHINITIS: Primary | ICD-10-CM

## 2020-02-19 DIAGNOSIS — Z91.89 FRAMINGHAM CARDIAC RISK <20% IN NEXT 10 YEARS: ICD-10-CM

## 2020-02-19 DIAGNOSIS — I10 ESSENTIAL HYPERTENSION: ICD-10-CM

## 2020-02-19 DIAGNOSIS — E11.9 TYPE 2 DIABETES MELLITUS WITHOUT COMPLICATION, WITHOUT LONG-TERM CURRENT USE OF INSULIN (H): Primary | ICD-10-CM

## 2020-02-19 LAB
ANION GAP SERPL CALCULATED.3IONS-SCNC: 8 MMOL/L (ref 3–14)
BASOPHILS # BLD AUTO: 0.1 10E9/L (ref 0–0.2)
BASOPHILS NFR BLD AUTO: 0.9 %
BUN SERPL-MCNC: 11 MG/DL (ref 7–30)
CALCIUM SERPL-MCNC: 8.9 MG/DL (ref 8.5–10.1)
CHLORIDE SERPL-SCNC: 105 MMOL/L (ref 94–109)
CO2 SERPL-SCNC: 27 MMOL/L (ref 20–32)
CREAT SERPL-MCNC: 0.7 MG/DL (ref 0.52–1.04)
CREAT UR-MCNC: 47 MG/DL
DIFFERENTIAL METHOD BLD: NORMAL
EOSINOPHIL # BLD AUTO: 0.3 10E9/L (ref 0–0.7)
EOSINOPHIL NFR BLD AUTO: 3.3 %
ERYTHROCYTE [DISTWIDTH] IN BLOOD BY AUTOMATED COUNT: 13.2 % (ref 10–15)
EST. AVERAGE GLUCOSE BLD GHB EST-MCNC: 123 MG/DL
GFR SERPL CREATININE-BSD FRML MDRD: 90 ML/MIN/{1.73_M2}
GLUCOSE SERPL-MCNC: 109 MG/DL (ref 70–99)
HBA1C MFR BLD: 5.9 % (ref 0–5.6)
HCT VFR BLD AUTO: 43.9 % (ref 35–47)
HGB BLD-MCNC: 14.2 G/DL (ref 11.7–15.7)
IMM GRANULOCYTES # BLD: 0.1 10E9/L (ref 0–0.4)
IMM GRANULOCYTES NFR BLD: 0.5 %
LYMPHOCYTES # BLD AUTO: 1.8 10E9/L (ref 0.8–5.3)
LYMPHOCYTES NFR BLD AUTO: 19.8 %
MCH RBC QN AUTO: 29.1 PG (ref 26.5–33)
MCHC RBC AUTO-ENTMCNC: 32.3 G/DL (ref 31.5–36.5)
MCV RBC AUTO: 90 FL (ref 78–100)
MICROALBUMIN UR-MCNC: <5 MG/L
MICROALBUMIN/CREAT UR: NORMAL MG/G CR (ref 0–25)
MONOCYTES # BLD AUTO: 0.9 10E9/L (ref 0–1.3)
MONOCYTES NFR BLD AUTO: 9.6 %
NEUTROPHILS # BLD AUTO: 6.1 10E9/L (ref 1.6–8.3)
NEUTROPHILS NFR BLD AUTO: 65.9 %
NRBC # BLD AUTO: 0 10*3/UL
NRBC BLD AUTO-RTO: 0 /100
PLATELET # BLD AUTO: 367 10E9/L (ref 150–450)
POTASSIUM SERPL-SCNC: 3.5 MMOL/L (ref 3.4–5.3)
RBC # BLD AUTO: 4.88 10E12/L (ref 3.8–5.2)
SODIUM SERPL-SCNC: 140 MMOL/L (ref 133–144)
TSH SERPL DL<=0.005 MIU/L-ACNC: 1.94 MU/L (ref 0.4–4)
WBC # BLD AUTO: 9.3 10E9/L (ref 4–11)

## 2020-02-19 PROCEDURE — 82043 UR ALBUMIN QUANTITATIVE: CPT | Mod: ZL | Performed by: NURSE PRACTITIONER

## 2020-02-19 PROCEDURE — 40000788 ZZHCL STATISTIC ESTIMATED AVERAGE GLUCOSE: Mod: ZL | Performed by: NURSE PRACTITIONER

## 2020-02-19 PROCEDURE — 99214 OFFICE O/P EST MOD 30 MIN: CPT | Mod: 25 | Performed by: NURSE PRACTITIONER

## 2020-02-19 PROCEDURE — 36415 COLL VENOUS BLD VENIPUNCTURE: CPT | Mod: ZL | Performed by: NURSE PRACTITIONER

## 2020-02-19 PROCEDURE — 95117 IMMUNOTHERAPY INJECTIONS: CPT

## 2020-02-19 PROCEDURE — G0463 HOSPITAL OUTPT CLINIC VISIT: HCPCS | Mod: 25

## 2020-02-19 PROCEDURE — 93010 ELECTROCARDIOGRAM REPORT: CPT | Performed by: INTERNAL MEDICINE

## 2020-02-19 PROCEDURE — 93005 ELECTROCARDIOGRAM TRACING: CPT

## 2020-02-19 PROCEDURE — 83036 HEMOGLOBIN GLYCOSYLATED A1C: CPT | Mod: ZL | Performed by: NURSE PRACTITIONER

## 2020-02-19 PROCEDURE — 85025 COMPLETE CBC W/AUTO DIFF WBC: CPT | Mod: ZL | Performed by: NURSE PRACTITIONER

## 2020-02-19 PROCEDURE — 84443 ASSAY THYROID STIM HORMONE: CPT | Mod: ZL | Performed by: NURSE PRACTITIONER

## 2020-02-19 PROCEDURE — G0463 HOSPITAL OUTPT CLINIC VISIT: HCPCS

## 2020-02-19 PROCEDURE — 80048 BASIC METABOLIC PNL TOTAL CA: CPT | Mod: ZL | Performed by: NURSE PRACTITIONER

## 2020-02-19 RX ORDER — LOSARTAN POTASSIUM 50 MG/1
50 TABLET ORAL DAILY
Qty: 90 TABLET | Refills: 1 | Status: SHIPPED | OUTPATIENT
Start: 2020-02-19 | End: 2020-03-04

## 2020-02-19 ASSESSMENT — PAIN SCALES - GENERAL: PAINLEVEL: NO PAIN (0)

## 2020-02-19 ASSESSMENT — MIFFLIN-ST. JEOR: SCORE: 1339.66

## 2020-02-19 NOTE — PROGRESS NOTES
Verified pt by name and date of birth. Allergy injections given. Pt  staying for observation. Held 30 minutes with no reaction.      EDDA WINTER LPN

## 2020-02-20 ENCOUNTER — HOSPITAL ENCOUNTER (OUTPATIENT)
Dept: CARDIOLOGY | Facility: HOSPITAL | Age: 70
Discharge: HOME | End: 2020-02-20
Attending: NURSE PRACTITIONER | Admitting: NURSE PRACTITIONER
Payer: MEDICARE

## 2020-02-20 DIAGNOSIS — R00.2 PALPITATIONS: ICD-10-CM

## 2020-02-20 PROCEDURE — 0296T ZIO PATCH HOLTER ADULT PEDIATRIC GREATER THAN 48 HRS: CPT | Mod: TC

## 2020-02-20 PROCEDURE — 0298T ZIO PATCH HOLTER ADULT PEDIATRIC GREATER THAN 48 HRS: CPT | Performed by: INTERNAL MEDICINE

## 2020-02-26 ENCOUNTER — ALLIED HEALTH/NURSE VISIT (OUTPATIENT)
Dept: ALLERGY | Facility: OTHER | Age: 70
End: 2020-02-26
Attending: NURSE PRACTITIONER
Payer: MEDICARE

## 2020-02-26 DIAGNOSIS — J30.9 ALLERGIC RHINITIS: Primary | ICD-10-CM

## 2020-02-26 PROCEDURE — 95117 IMMUNOTHERAPY INJECTIONS: CPT

## 2020-02-26 NOTE — PROGRESS NOTES
Verified pt by name and date of birth. Allergy injection given. Pt waited for 30 minutes for observation. No reaction.      EDDA WINTER LPN

## 2020-03-03 NOTE — PROGRESS NOTES
Subjective     Rj Rodríguez is a 69 year old female who presents to clinic today for the following health issues:    HPI   Hypertension Follow-up    Patient was last seen on 2/19/20. At this time, her blood pressure was low at 105/78. She was having some episodes of dizziness along with palpitations. Lab work was unremarkable. EKG showed NSR. A Holter monitor was ordered and her losartan was cut from 100 mg daily to 50 mg daily. She follows up today noting that she feels much better. No longer feels dizzy. Not having palpitations. Returned her holter monitor a few days ago. Results pending.       Do you check your blood pressure regularly outside of the clinic? Yes     Are you following a low salt diet? Yes    Are your blood pressures ever more than 140 on the top number (systolic) OR more   than 90 on the bottom number (diastolic), for example 140/90? No   --She does not smoke.  -She denies chest pain, shortness of breath, or syncope. No lower leg edema. She has taken amlodipine in the past and it did cause lower leg edema. She has also taken hydrochlorothiazide in the past and developed a rash. She also takes furosemide with potassium for her blood pressure.       How many servings of fruits and vegetables do you eat daily?  4 or more    On average, how many sweetened beverages do you drink each day (Examples: soda, juice, sweet tea, etc.  Do NOT count diet or artificially sweetened beverages)?   0    How many days per week do you exercise enough to make your heart beat faster? 3 or less    How many minutes a day do you exercise enough to make your heart beat faster? 30 - 60    How many days per week do you miss taking your medication? 0      Patient Active Problem List   Diagnosis     Vertigo     Tinnitus     Chronic rhinitis     Bilateral arm weakness     Cervicalgia     Essential hypertension     Generalized anxiety disorder     Lumbago     Migraine with aura and without status migrainosus, not intractable      Mild episode of recurrent major depressive disorder (H)     Obesity, morbid, BMI 40.0-49.9 (H)     Presbyopia     Primary open-angle glaucoma     Right arm pain     S/P cervical spinal fusion     S/P rotator cuff repair     Warner cardiac risk 11% in next 10 years     Diabetes mellitus, type 2 (H)     Past Surgical History:   Procedure Laterality Date     APPENDECTOMY       BACK SURGERY       CHOLECYSTECTOMY       COLONOSCOPY  2012    Repeat in 10 years     GYN SURGERY       HYSTERECTOMY      Ovaries     RELEASE CARPAL TUNNEL      LT     RELEASE CARPAL TUNNEL      RT x2     SURGICAL RADIOLOGY PROCEDURE N/A 2/12/2016    Procedure: SURGICAL RADIOLOGY PROCEDURE;  Surgeon: Provider, Generic Perianesthesia Nursing;  Location: [facility]     SURGICAL RADIOLOGY PROCEDURE N/A 8/15/2016    Procedure: SURGICAL RADIOLOGY PROCEDURE;  Surgeon: Provider, Generic Perianesthesia Nursing;  Location: [facility]       Social History     Tobacco Use     Smoking status: Never Smoker     Smokeless tobacco: Never Used   Substance Use Topics     Alcohol use: Yes     Alcohol/week: 1.0 standard drinks     Types: 1 Glasses of wine per week     Frequency: Monthly or less     Drinks per session: 1 or 2     Binge frequency: Never     Comment: occ glass of wine     Family History   Problem Relation Age of Onset     Colon Cancer Maternal Aunt      Colon Cancer Maternal Uncle      Diabetes Father         Type 2     Hypertension Father      Alcoholism Father      Alcoholism Mother          Current Outpatient Medications   Medication Sig Dispense Refill     ALBUTEROL IN        blood glucose (ACCU-CHEK GUIDE) test strip Use to test blood sugar 1 time daily or as directed. 50 each 11     blood glucose monitoring (ACCU-CHEK FASTCLIX) lancets Use to test blood sugar 1 time daily or as directed. 102 each 11     cetirizine (ZYRTEC) 10 MG tablet Take 1 tablet (10 mg) by mouth daily as needed for allergies 60 tablet 12     clonazePAM (KLONOPIN) 0.5 MG tablet  TAKE 1 TABLET BY MOUTH TWICE DAILY AS NEEDED FOR ANXIETY 30 tablet 0     EPINEPHrine (EPIPEN/ADRENACLICK/OR ANY BX GENERIC EQUIV) 0.3 MG/0.3ML injection 2-pack Inject 0.3 mLs (0.3 mg) into the muscle once as needed for anaphylaxis 2 each 12     Furosemide (LASIX) 20 MG tablet Take 1 tablet (20 mg) by mouth daily Take 1 tablet (20mg) by oral route every day. 30 tablet      losartan (COZAAR) 25 MG tablet Take 1 tablet (25 mg) by mouth daily 90 tablet 1     metFORMIN (GLUCOPHAGE-XR) 500 MG 24 hr tablet Take 1 tablet (500 mg) by mouth daily (with dinner) 30 tablet 3     ORDER FOR ALLERGEN IMMUNOTHERAPY Start allergy shots (SCIT).  Follow standard build-up protocols. 5 mL PRN     potassium chloride (K-DUR) 10 MEQ tablet Take 1 tablet (10 mEq) by mouth daily Take 1 tablet (10meq) by oral route every day with food. 90 tablet      triamcinolone (NASACORT) 55 MCG/ACT nasal aerosol Spray 2 sprays into both nostrils daily 2 Bottle 12     Allergies   Allergen Reactions     Fruit [Peach] Anaphylaxis and Hives     fresh peaches and any fruit that has a pit.  Kiwi's and apples also.  Can eat any fruit cooked.     Nuts Anaphylaxis and Hives     All Raw Nuts, can eat nuts when roasted.     Ace Inhibitors      Upper respiratory infection     Alkylamines Hives     Antihistamines     Alprazolam Hives     Xanax     Diphenhydramine Hives and Other (See Comments)     Keeps her awake       Erythromycin      BASE; pt. Not sure if this is a true allergy.     Guaifed      Pt unsure of reaction     Guaifenesin      Guaifed     Hydrochlorothiazide      No Clinical Screening - See Comments      Allergic:  Fresh peaches and any fruit that has a pit.  Kiwi's and apples also.  All raw nuts.  Can eat nuts when roasted or any fruit cooked.  Some seasonal allergies :  Hayfever     Olmesartan Medoxomil Cough     Benicar     Phenylephrine Hcl      Guaifed     Pseudoephedrine Hcl      Guaifed     Seasonal Allergies      hayfever     Tramadol      Sleep  "disturbance. Can not sleep, and when able to fall asleep will have terrible nightmares     Tramadol Hcl      Ultram, insomnia, nightmares, headache     Venlafaxine Other (See Comments)     Heartburn, reflux     Zoloft [Sertraline]      paranoia     Latex Other (See Comments), Swelling, Difficulty breathing and Rash     Throat Closes         Reviewed and updated as needed this visit by Provider         Review of Systems   As noted in the HPI.       Objective    /72 (BP Location: Left arm, Patient Position: Sitting, Cuff Size: Adult Regular)   Pulse 81   Temp 98  F (36.7  C) (Tympanic)   Ht 1.549 m (5' 1\")   Wt 85.3 kg (188 lb)   SpO2 97%   BMI 35.52 kg/m    Body mass index is 35.52 kg/m .  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 Did again cut her losartan in half. She will take 25 mg instead of 50 mg. She will then return in 2 weeks for a nurse only BP check. Will notify patient of the results when available and intervene accordingly. If still low, will stop her losartan. Palpitations and dizziness resolved by lowering her losartan. Did not repeat labs today as she has a severe needle phobia and almost had a panic attack at our last appointment. Holter monitor pending. Will notify patient of the results when available and intervene accordingly. I suspect it will be normal as she no longer has palpitations with the lower doses of losartan. Follow up new or worsening symptoms.              Sheyla Sanches NP  [facility] - [place]    "

## 2020-03-04 ENCOUNTER — OFFICE VISIT (OUTPATIENT)
Dept: FAMILY MEDICINE | Facility: OTHER | Age: 70
End: 2020-03-04
Attending: NURSE PRACTITIONER
Payer: MEDICARE

## 2020-03-04 ENCOUNTER — ALLIED HEALTH/NURSE VISIT (OUTPATIENT)
Dept: ALLERGY | Facility: OTHER | Age: 70
End: 2020-03-04
Attending: NURSE PRACTITIONER
Payer: MEDICARE

## 2020-03-04 VITALS
OXYGEN SATURATION: 97 % | TEMPERATURE: 98 F | HEIGHT: 61 IN | DIASTOLIC BLOOD PRESSURE: 72 MMHG | HEART RATE: 81 BPM | BODY MASS INDEX: 35.5 KG/M2 | WEIGHT: 188 LBS | SYSTOLIC BLOOD PRESSURE: 104 MMHG

## 2020-03-04 DIAGNOSIS — J30.9 ALLERGIC RHINITIS: Primary | ICD-10-CM

## 2020-03-04 DIAGNOSIS — I10 ESSENTIAL HYPERTENSION: Primary | ICD-10-CM

## 2020-03-04 DIAGNOSIS — F40.231 SEVERE NEEDLE PHOBIA: ICD-10-CM

## 2020-03-04 PROCEDURE — G0463 HOSPITAL OUTPT CLINIC VISIT: HCPCS | Mod: 25

## 2020-03-04 PROCEDURE — 99213 OFFICE O/P EST LOW 20 MIN: CPT | Performed by: NURSE PRACTITIONER

## 2020-03-04 PROCEDURE — 95117 IMMUNOTHERAPY INJECTIONS: CPT

## 2020-03-04 PROCEDURE — G0463 HOSPITAL OUTPT CLINIC VISIT: HCPCS

## 2020-03-04 RX ORDER — LOSARTAN POTASSIUM 25 MG/1
25 TABLET ORAL DAILY
Qty: 90 TABLET | Refills: 1 | Status: SHIPPED | OUTPATIENT
Start: 2020-03-04 | End: 2020-08-31

## 2020-03-04 ASSESSMENT — MIFFLIN-ST. JEOR: SCORE: 1325.14

## 2020-03-04 ASSESSMENT — PAIN SCALES - GENERAL: PAINLEVEL: MODERATE PAIN (4)

## 2020-03-04 NOTE — NURSING NOTE
"Chief Complaint   Patient presents with     Hypertension       Initial /72 (BP Location: Left arm, Patient Position: Sitting, Cuff Size: Adult Regular)   Pulse 81   Temp 98  F (36.7  C) (Tympanic)   Ht 1.549 m (5' 1\")   Wt 85.3 kg (188 lb)   SpO2 97%   BMI 35.52 kg/m   Estimated body mass index is 35.52 kg/m  as calculated from the following:    Height as of this encounter: 1.549 m (5' 1\").    Weight as of this encounter: 85.3 kg (188 lb).  Medication Reconciliation: complete  Laverne Feng LPN  "

## 2020-03-11 ENCOUNTER — HOSPITAL ENCOUNTER (OUTPATIENT)
Dept: EDUCATION SERVICES | Facility: HOSPITAL | Age: 70
End: 2020-03-11
Attending: NURSE PRACTITIONER
Payer: MEDICARE

## 2020-03-11 ENCOUNTER — ALLIED HEALTH/NURSE VISIT (OUTPATIENT)
Dept: ALLERGY | Facility: OTHER | Age: 70
End: 2020-03-11
Attending: NURSE PRACTITIONER
Payer: MEDICARE

## 2020-03-11 DIAGNOSIS — E11.9 TYPE 2 DIABETES MELLITUS WITHOUT COMPLICATION, WITHOUT LONG-TERM CURRENT USE OF INSULIN (H): Primary | ICD-10-CM

## 2020-03-11 DIAGNOSIS — J30.9 ALLERGIC RHINITIS: Primary | ICD-10-CM

## 2020-03-11 PROCEDURE — 95117 IMMUNOTHERAPY INJECTIONS: CPT

## 2020-03-11 PROCEDURE — G0108 DIAB MANAGE TRN  PER INDIV: HCPCS

## 2020-03-11 ASSESSMENT — PAIN SCALES - GENERAL: PAINLEVEL: NO PAIN (0)

## 2020-03-11 ASSESSMENT — MIFFLIN-ST. JEOR: SCORE: 1340.56

## 2020-03-11 NOTE — PROGRESS NOTES
"Diabetes Self-Management Education & Support    Presents for: Follow-up(Session 3)    SUBJECTIVE/OBJECTIVE:  Presents for: Follow-up(Session 3)  Accompanied by: Self  Diabetes education in the past 24mo: Yes  Focus of Visit: Monitoring, Healthy Eating  Diabetes type: Type 2  Date of diagnosis: 11/11/19  Disease course: Stable  Diabetes management related comments/concerns: Has anxety  Transportation concerns: No  Difficulty affording diabetes medication?: No  Difficulty affording diabetes testing supplies?: No  Other concerns:: Cognitive impairment(anxiety)  Cultural Influences/Ethnic Background:  American      Diabetes Symptoms & Complications:  Fatigue: No  Neuropathy: No  Polydipsia: No  Polyphagia: No  Polyuria: No  Visual change: No  Slow healing wounds: No  Symptom course: Stable  Weight trend: Decreasing(Down 12# since 11/2019)  Complications assessed today?: Yes  Autonomic neuropathy: No  CVA: No  Heart disease: No  Nephropathy: No  Peripheral neuropathy: No  Foot ulcerations: No  Peripheral Vascular Disease: No  Retinopathy: No    Patient Problem List and Family Medical History reviewed for relevant medical history, current medical status, and diabetes risk factors.    Vitals:  /90   Pulse 97   Resp 16   Ht 1.549 m (5' 1\")   Wt 86.8 kg (191 lb 6.4 oz)   SpO2 97%   BMI 36.16 kg/m    Estimated body mass index is 36.16 kg/m  as calculated from the following:    Height as of this encounter: 1.549 m (5' 1\").    Weight as of this encounter: 86.8 kg (191 lb 6.4 oz).   Last 3 BP:   BP Readings from Last 3 Encounters:   03/11/20 150/90   03/04/20 104/72   02/19/20 105/78       History   Smoking Status     Never Smoker   Smokeless Tobacco     Never Used       Labs:  Lab Results   Component Value Date    A1C 5.9 02/19/2020     Lab Results   Component Value Date     02/19/2020     Lab Results   Component Value Date    LDL 61 11/11/2019     HDL Cholesterol   Date Value Ref Range Status   11/11/2019 47 " (L) >49 mg/dL Final   ]  GFR Estimate   Date Value Ref Range Status   02/19/2020 90 >60 mL/min/[1.73_m2] Final     Comment:     Non  GFR Calc  Starting 12/18/2018, serum creatinine based estimated GFR (eGFR) will be   calculated using the Chronic Kidney Disease Epidemiology Collaboration   (CKD-EPI) equation.       GFR Estimate If Black   Date Value Ref Range Status   02/19/2020 >90 >60 mL/min/[1.73_m2] Final     Comment:      GFR Calc  Starting 12/18/2018, serum creatinine based estimated GFR (eGFR) will be   calculated using the Chronic Kidney Disease Epidemiology Collaboration   (CKD-EPI) equation.       Lab Results   Component Value Date    CR 0.70 02/19/2020     No results found for: MICROALBUMIN    Healthy Eating:  Healthy Eating Assessed Today: Yes  Cultural/Yarsani diet restrictions?: No  Meal planning/habits: Carb counting  How many times a week on average do you eat food made away from home (restaurant/take-out)?: 1  Meals include: Breakfast, Lunch, Dinner  Breakfast: coffee with creamer, 2 eggs, toast, cranberry juiice  Lunch: baked chicken breast, jello, rice or cottage cheese with crackers and pineapple  Dinner: tuna stuffed tomato and hardboiled egg or steak, sweet potatoes, salad, milk or turkey burger, spring rolls, milk and jello  Beverages: Coffee, Water, Milk, Juice  Has patient met with a dietitian in the past?: No    Being Active:  Being Active Assessed Today: Yes  Exercise:: Currently not exercising(Looking at going to gym)  Barrier to exercise: Access    Monitoring:  Monitoring Assessed Today: Yes  Did patient bring glucose meter to appointment? : Yes  Blood Glucose Meter: Accu-chek  Times checking blood sugar at home (number): 1  Times checking blood sugar at home (per): Day  Blood glucose trend: Decreasing        Taking Medications:  Diabetes Medication(s)     Biguanides       metFORMIN (GLUCOPHAGE-XR) 500 MG 24 hr tablet    Take 1 tablet (500 mg) by mouth  daily (with dinner)          Taking Medication Assessed Today: Yes  Current Treatments: Diet  Problems taking diabetes medications regularly?: No  Diabetes medication side effects?: No    Problem Solving:  Problem Solving Assessed Today: Yes  Is the patient at risk for hypoglycemia?: No  Hypoglycemia Frequency: Never  Patient carries a carbohydrate source: No  Medical ID: No  Is the patient at risk for DKA?: No  Does patient have DKA prevention plan?: No  Does patient have severe weather/disaster plan for diabetes management?: No              Reducing Risks:  Reducing Risks Assessed Today: No  Has dilated eye exam at least once a year?: Yes  Feet checked by healthcare provider in the last year?: No    Healthy Coping:  Healthy Coping Assessed Today: Yes  Emotional response to diabetes: Ready to learn, Fear/Anxiety, Concern for health and well-being  Informal Support system:: Family  Stage of change: ACTION (Actively working towards change)  Support resources: Websites  Patient Activation Measure Survey Score:  JASSI Score (Last Two) 10/11/2019   JASSI Raw Score 31   Activation Score 59.3   JASSI Level 3       Diabetes knowledge and skills assessment:   Patient is knowledgeable in diabetes management concepts related to: Healthy Eating, Being Active, Monitoring, Taking Medication,     Patient needs further education on the following diabetes management concepts: Reducing Risks and Healthy Coping    Based on learning assessment above, most appropriate setting for further diabetes education would be: Individual setting.      INTERVENTIONS:    Education provided today on:  AADE Self-Care Behaviors:  Reducing Risks: major complications of diabetes, prevention, early diagnostic measures and treatment of complications, foot care, appropriate dental care, annual eye exam, aspirin therapy, A1C - goals, relating to blood glucose levels, how often to check, lipids levels and goals and blood pressure and goals  Healthy Coping:  recognize feelings about diagnosis, benefits of making appropriate lifestyle changes, utilize support systems, methods for coping with stress and when to seek professional counseling    Opportunities for ongoing education and support in diabetes-self management were discussed.    Pt verbalized understanding of concepts discussed and recommendations provided today.       Education Materials Provided:  Goals for Your Diabetes Care      ASSESSMENT:  Readings range as follows: fastin-104 and post-supper:     Rj is so very happy with weight loss, decreased BG readings and decreased A1C.    BP is elevated today. Discussed this with Sheyla Sanches NP   Goals Addressed as of 3/11/2020                 3/11/20      Healthy Eating (pt-stated)   On track    Added 20 by Karishma Garcia RN     My Goal: I will follow my food plan    What I need to meet my goal: support from family    I plan to meet my goal by this date: next visit              Patient's most recent   Lab Results   Component Value Date    A1C 5.9 2020    is meeting goal of <7.0    PLAN  See Patient Instructions for co-developed, patient-stated behavior change goals.  Continue current plan    Follow up with nurse only BP check    Return to Diabetes Ed annually and prn    AVS printed and provided to patient today. See Follow-Up section for recommended follow-up.      Time Spent: 45 minutes  Encounter Type: Individual    Any diabetes medication dose changes were made via the CDE Protocol and Collaborative Practice Agreement with the patient's primary care provider. A copy of this encounter was shared with the provider.

## 2020-03-11 NOTE — PROGRESS NOTES
Verified pt by name and date of birth. Allergy injection given. Pt signed out AMA without staying for observation. See paper chart.      EDDA WINTER LPN

## 2020-03-15 ENCOUNTER — HEALTH MAINTENANCE LETTER (OUTPATIENT)
Age: 70
End: 2020-03-15

## 2020-03-18 ENCOUNTER — ALLIED HEALTH/NURSE VISIT (OUTPATIENT)
Dept: ALLERGY | Facility: OTHER | Age: 70
End: 2020-03-18
Attending: NURSE PRACTITIONER
Payer: MEDICARE

## 2020-03-18 DIAGNOSIS — J30.9 ALLERGIC RHINITIS: Primary | ICD-10-CM

## 2020-03-18 PROCEDURE — 95117 IMMUNOTHERAPY INJECTIONS: CPT

## 2020-03-20 VITALS
HEIGHT: 61 IN | WEIGHT: 191.4 LBS | BODY MASS INDEX: 36.14 KG/M2 | SYSTOLIC BLOOD PRESSURE: 150 MMHG | HEART RATE: 97 BPM | OXYGEN SATURATION: 97 % | DIASTOLIC BLOOD PRESSURE: 90 MMHG | RESPIRATION RATE: 16 BRPM

## 2020-03-20 NOTE — PATIENT INSTRUCTIONS
Continue current plan    Follow up with nurse only BP check    Return to Diabetes Ed annually and prn

## 2020-03-22 DIAGNOSIS — E11.9 TYPE 2 DIABETES MELLITUS WITHOUT COMPLICATION, WITHOUT LONG-TERM CURRENT USE OF INSULIN (H): Primary | ICD-10-CM

## 2020-03-23 RX ORDER — METFORMIN HCL 500 MG
TABLET, EXTENDED RELEASE 24 HR ORAL
Qty: 30 TABLET | Refills: 2 | Status: SHIPPED | OUTPATIENT
Start: 2020-03-23 | End: 2020-06-08

## 2020-03-23 NOTE — TELEPHONE ENCOUNTER
Metformin  Last office visit: 3/11/20  Last refill: 11/11/19 #30, 3 R  BP Readings from Last 3 Encounters:   03/11/20 150/90   03/04/20 104/72   02/19/20 105/78     Thank you.

## 2020-06-08 DIAGNOSIS — E11.9 TYPE 2 DIABETES MELLITUS WITHOUT COMPLICATION, WITHOUT LONG-TERM CURRENT USE OF INSULIN (H): ICD-10-CM

## 2020-06-08 DIAGNOSIS — E87.6 HYPOKALEMIA: ICD-10-CM

## 2020-06-08 RX ORDER — POTASSIUM CHLORIDE 750 MG/1
10 TABLET, EXTENDED RELEASE ORAL DAILY
Qty: 90 TABLET | Refills: 1 | Status: SHIPPED | OUTPATIENT
Start: 2020-06-08 | End: 2020-10-28

## 2020-06-08 RX ORDER — METFORMIN HCL 500 MG
TABLET, EXTENDED RELEASE 24 HR ORAL
Qty: 30 TABLET | Refills: 2 | Status: SHIPPED | OUTPATIENT
Start: 2020-06-08 | End: 2021-03-10

## 2020-06-08 NOTE — TELEPHONE ENCOUNTER
Metformin 500mg     Last Written Prescription Date: 3/23/2020   Last Fill Quantity: 30 tablets,   # refills: 2  Last Office Visit: 03/04/2020  Future Office visit:       Routing refill request to provider for review/approval because:  Drug not on the [facility], [facility] or [facility] refill protocol or controlled substance

## 2020-06-08 NOTE — TELEPHONE ENCOUNTER
Potassium 10meq       Last Written Prescription Date:  1/31/2014  Last Fill Quantity: 90,   # refills:   Last Office Visit: 3/4/2020  Future Office visit:       Routing refill request to provider for review/approval because:  Medication is reported/historical

## 2020-06-10 ENCOUNTER — TELEPHONE (OUTPATIENT)
Dept: OTOLARYNGOLOGY | Facility: OTHER | Age: 70
End: 2020-06-10

## 2020-06-10 ENCOUNTER — ALLIED HEALTH/NURSE VISIT (OUTPATIENT)
Dept: ALLERGY | Facility: OTHER | Age: 70
End: 2020-06-10
Attending: NURSE PRACTITIONER
Payer: MEDICARE

## 2020-06-10 DIAGNOSIS — J30.89 PERENNIAL ALLERGIC RHINITIS: Primary | ICD-10-CM

## 2020-06-10 PROCEDURE — 95165 ANTIGEN THERAPY SERVICES: CPT

## 2020-06-10 PROCEDURE — 95165 ANTIGEN THERAPY SERVICES: CPT | Performed by: NURSE PRACTITIONER

## 2020-06-10 NOTE — TELEPHONE ENCOUNTER
6/10 LM to schedule SVT in shot room M/W/F (45 min) and follow up w/ ENT (Fri phone visit with STEFANI christian)

## 2020-06-10 NOTE — PROGRESS NOTES
Allergy serum is mixed today at schedule silver 1 of 2,  into  2  (5 ml)  multi dose vial/vials.    Allergens included were:    Ragweed  0.2 ml of dilution # 4  Pigweed  0.2 ml of dilution # 2  Mugwort 0.2 ml of dilution # 0  Kochia  0.2 ml of dilution # 0  Russian Thistle 0.2 ml of dilution # 2  Jimenez Grass 0.2 ml of dilution # 4  Birch mix 0.2 ml of dilution # 4  Maple Mix 0.2 of dilution # 3  Elm Mix 0.2 ml of dilution # 2  Oak Mix 0.2 ml of dilution # 4  Frank Mix 0.2 ml of dilution # 4  Pine Mix 0.2 ml of dilution # 2  E[place] Owingeh 0.2 ml of dilution # 3  Black Gibson 0.2 ml of dilution # 3  Aspen 0.2 ml of dilution # 0  Red Meagher 0.2 ml of dilution # 0    Alternaria 0.2 ml of dilution # 2  Aspergillus 0.2 ml of dilution # 2  Hormodendrum 0.2 ml of dilution # 3  Helminthosporium 0.2 ml of dilution # 3  Penicillium 0.2 ml of dilution # 2  Epicoccum 0.2 ml of dilution # 2  Fusarium 0.2 ml of dilution # 2  Mucor 0.2 ml of dilution # 0  Grain Smut 0.2 ml of dilution # 0  Grass Smut 0.2 ml of dilution # 0  Cat 0.2 ml of dilution # 4  Dog 0.2 ml of dilution # 2  Feather Mix 0.2 ml of dilution # 0  Dust Mite Mix 0.2 ml of dilution # 4  Horse 0.2 ml of dilution # 0    Luh Drake, RN, RN

## 2020-06-15 ENCOUNTER — ALLIED HEALTH/NURSE VISIT (OUTPATIENT)
Dept: ALLERGY | Facility: OTHER | Age: 70
End: 2020-06-15
Attending: NURSE PRACTITIONER
Payer: MEDICARE

## 2020-06-15 DIAGNOSIS — J30.9 ALLERGIC RHINITIS: Primary | ICD-10-CM

## 2020-06-15 PROCEDURE — 95117 IMMUNOTHERAPY INJECTIONS: CPT

## 2020-06-16 ENCOUNTER — OFFICE VISIT (OUTPATIENT)
Dept: CHIROPRACTIC MEDICINE | Facility: OTHER | Age: 70
End: 2020-06-16
Attending: CHIROPRACTOR
Payer: COMMERCIAL

## 2020-06-16 DIAGNOSIS — M54.50 ACUTE BILATERAL LOW BACK PAIN WITHOUT SCIATICA: ICD-10-CM

## 2020-06-16 DIAGNOSIS — M99.03 SEGMENTAL AND SOMATIC DYSFUNCTION OF LUMBAR REGION: Primary | ICD-10-CM

## 2020-06-16 DIAGNOSIS — M99.01 SEGMENTAL AND SOMATIC DYSFUNCTION OF CERVICAL REGION: ICD-10-CM

## 2020-06-16 DIAGNOSIS — M99.02 SEGMENTAL AND SOMATIC DYSFUNCTION OF THORACIC REGION: ICD-10-CM

## 2020-06-16 PROCEDURE — 98941 CHIROPRACT MANJ 3-4 REGIONS: CPT | Mod: AT | Performed by: CHIROPRACTOR

## 2020-06-16 NOTE — PROGRESS NOTES
Subjective Finding:    Chief compalint: Patient presents with:  Back Pain: right sided neck and back pain  Neck Pain  , Pain Scale: 7/10, Intensity: sharp, Duration: 2 weeks, Radiating: bilateral buttock.    Date of injury:     Activities that the pain restricts:   Home/household/hobbies/social activities: yes.  Work duties: no.  Sleep: no.  Makes symptoms better: rest.  Makes symptoms worse: activity and lumbar flexion.  Have you seen anyone else for the symptoms? Yes: MD.  Work related: no.  Automobile related injury: no.    Objective and Assessment:    Posture Analysis:   High shoulder: .  Head tilt: .  High iliac crest: .  Head carriage: neutral.  Thoracic Kyphosis: forward.  Lumbar Lordosis: forward.    Lumbar Range of Motion: extension decreased.  Cervical Range of Motion: .  Thoracic Range of Motion: .  Extremity Range of Motion: .    Palpation:   Quad lumb: bilateral, referred pain: no    Segmental dysfunction pre-treatment and treatment area: T5, L3, L4 and L5.  C56    Assessment post-treatment:  Cervical: .  Thoracic: ROM increased.  Lumbar: ROM increased and pain and tenderness decreased.    Comments: .      Complicating Factors: .    Plan / Procedure:    Treatment plan: 2 times per week for 2 weeks.  Instructed patient: stretch as instructed at visit.  Short term goals: increase ROM.  Long term goals: restore normal function.  Prognosis: very good.

## 2020-06-18 NOTE — PROGRESS NOTES
"  Rj Rodríguez is a 69 year old female who is being evaluated via a billable telephone visit.      The patient has been notified of following:     \"This telephone visit will be conducted via a call between you and your physician/provider. We have found that certain health care needs can be provided without the need for a physical exam.  This service lets us provide the care you need with a short phone conversation.  If a prescription is necessary we can send it directly to your pharmacy.  If lab work is needed we can place an order for that and you can then stop by our lab to have the test done at a later time.    Telephone visits are billed at different rates depending on your insurance coverage. During this emergency period, for some insurers they may be billed the same as an in-person visit.  Please reach out to your insurance provider with any questions.    If during the course of the call the physician/provider feels a telephone visit is not appropriate, you will not be charged for this service.\"    Patient has given verbal consent for Telephone visit?  Yes    What phone number would you like to be contacted at? [phone]    How would you like to obtain your AVS? MyChart       Patient started 01/13/2020  Current vial - silver  Last injection 0.35 ml on 06/15/2020  Standard build-up protocol weekly    Rj Rodríguez presents for a 6 month follow up for SCIT   Has been tolerating injections well  Has not noticed much improvement   Has been using zyrtec daily and flonase for symptomatic relief and has noticed relief since starting medication.      Epi pen is up to date    Does see relief with kassandra med sinus rinses.    No cyn sinusitis  No recent illnesses or sinus infections    Allergies   Allergen Reactions     Fruit [Peach] Anaphylaxis and Hives     fresh peaches and any fruit that has a pit.  Kiwi's and apples also.  Can eat any fruit cooked.     Nuts Anaphylaxis and Hives     All Raw Nuts, can eat " nuts when roasted.     Ace Inhibitors      Upper respiratory infection     Alkylamines Hives     Antihistamines     Alprazolam Hives     Xanax     Diphenhydramine Hives and Other (See Comments)     Keeps her awake       Erythromycin      BASE; pt. Not sure if this is a true allergy.     Guaifed      Pt unsure of reaction     Guaifenesin      Guaifed     Hydrochlorothiazide      No Clinical Screening - See Comments      Allergic:  Fresh peaches and any fruit that has a pit.  Kiwi's and apples also.  All raw nuts.  Can eat nuts when roasted or any fruit cooked.  Some seasonal allergies :  Hayfever     Olmesartan Medoxomil Cough     Benicar     Phenylephrine Hcl      Guaifed     Pseudoephedrine Hcl      Guaifed     Seasonal Allergies      hayfever     Tramadol      Sleep disturbance. Can not sleep, and when able to fall asleep will have terrible nightmares     Tramadol Hcl      Ultram, insomnia, nightmares, headache     Venlafaxine Other (See Comments)     Heartburn, reflux     Zoloft [Sertraline]      paranoia     Latex Other (See Comments), Swelling, Difficulty breathing and Rash     Throat Closes       Current Outpatient Medications   Medication     ALBUTEROL IN     blood glucose (ACCU-CHEK GUIDE) test strip     blood glucose monitoring (ACCU-CHEK FASTCLIX) lancets     cetirizine (ZYRTEC) 10 MG tablet     clonazePAM (KLONOPIN) 0.5 MG tablet     EPINEPHrine (EPIPEN/ADRENACLICK/OR ANY BX GENERIC EQUIV) 0.3 MG/0.3ML injection 2-pack     Furosemide (LASIX) 20 MG tablet     losartan (COZAAR) 25 MG tablet     metFORMIN (GLUCOPHAGE-XR) 500 MG 24 hr tablet     ORDER FOR ALLERGEN IMMUNOTHERAPY     potassium chloride ER (K-TAB/KLOR-CON) 10 MEQ CR tablet     triamcinolone (NASACORT) 55 MCG/ACT nasal aerosol     No current facility-administered medications for this visit.      Past Medical History:   Diagnosis Date     Arthritis      Chronic back pain      Hypertension      Irregular heart beat      Sleep apnea      Past  Surgical History:   Procedure Laterality Date     APPENDECTOMY       BACK SURGERY       CHOLECYSTECTOMY       COLONOSCOPY  2012    Repeat in 10 years     GYN SURGERY       HYSTERECTOMY      Ovaries     RELEASE CARPAL TUNNEL      LT     RELEASE CARPAL TUNNEL      RT x2     SURGICAL RADIOLOGY PROCEDURE N/A 2/12/2016    Procedure: SURGICAL RADIOLOGY PROCEDURE;  Surgeon: Provider, [facility] Perianest[facility] Nursing;  Location: [facility]     SURGICAL RADIOLOGY PROCEDURE N/A 8/15/2016    Procedure: SURGICAL RADIOLOGY PROCEDURE;  Surgeon: Provider, Generic Perianesthesia Nursing;  Location: [facility]       GENERAL: alert, cooperates with telephone exam, and no apparent distress  Voice is strong in pitch and quality.  She is talking in full sentences.  There is no     ASSESSMENT:      ICD-10-CM    1. Perennial allergic rhinitis with seasonal variation  J30.89     J30.2      Continue weekly injections to maintenance dosing, then weekly for 1 year therafter  Continue Zyrtec 10 mg daily, may double dose as needed for increased symptoms  Continue flonase 2 sprays daily    Follow up in 12 months for routine recheck, sooner if symptoms worsen or concerns develop      Felisha MAZA  8:49 AM  June 19, 2020    Phone call duration: 19 minutes

## 2020-06-19 ENCOUNTER — OFFICE VISIT (OUTPATIENT)
Dept: CHIROPRACTIC MEDICINE | Facility: OTHER | Age: 70
End: 2020-06-19
Attending: CHIROPRACTOR
Payer: COMMERCIAL

## 2020-06-19 ENCOUNTER — VIRTUAL VISIT (OUTPATIENT)
Dept: OTOLARYNGOLOGY | Facility: OTHER | Age: 70
End: 2020-06-19
Attending: NURSE PRACTITIONER
Payer: COMMERCIAL

## 2020-06-19 DIAGNOSIS — M99.01 SEGMENTAL AND SOMATIC DYSFUNCTION OF CERVICAL REGION: ICD-10-CM

## 2020-06-19 DIAGNOSIS — J30.2 PERENNIAL ALLERGIC RHINITIS WITH SEASONAL VARIATION: Primary | ICD-10-CM

## 2020-06-19 DIAGNOSIS — J30.89 PERENNIAL ALLERGIC RHINITIS WITH SEASONAL VARIATION: Primary | ICD-10-CM

## 2020-06-19 DIAGNOSIS — M99.03 SEGMENTAL AND SOMATIC DYSFUNCTION OF LUMBAR REGION: ICD-10-CM

## 2020-06-19 DIAGNOSIS — M99.02 SEGMENTAL AND SOMATIC DYSFUNCTION OF THORACIC REGION: Primary | ICD-10-CM

## 2020-06-19 DIAGNOSIS — M54.2 CERVICALGIA: ICD-10-CM

## 2020-06-19 PROCEDURE — 98941 CHIROPRACT MANJ 3-4 REGIONS: CPT | Mod: AT | Performed by: CHIROPRACTOR

## 2020-06-19 PROCEDURE — 99213 OFFICE O/P EST LOW 20 MIN: CPT | Mod: TEL | Performed by: NURSE PRACTITIONER

## 2020-06-19 NOTE — LETTER
"    6/19/2020         RE: Rj Rodríguez  [address]  [place] [address]        Dear Colleague,    Thank you for referring your patient, Rj Rodríguez, to the [facility]. Please see a copy of my visit note below.      Rj Rodríguez is a 69 year old female who is being evaluated via a billable telephone visit.      The patient has been notified of following:     \"This telephone visit will be conducted via a call between you and your physician/provider. We have found that certain health care needs can be provided without the need for a physical exam.  This service lets us provide the care you need with a short phone conversation.  If a prescription is necessary we can send it directly to your pharmacy.  If lab work is needed we can place an order for that and you can then stop by our lab to have the test done at a later time.    Telephone visits are billed at different rates depending on your insurance coverage. During this emergency period, for some insurers they may be billed the same as an in-person visit.  Please reach out to your insurance provider with any questions.    If during the course of the call the physician/provider feels a telephone visit is not appropriate, you will not be charged for this service.\"    Patient has given verbal consent for Telephone visit?  Yes    What phone number would you like to be contacted at? [phone]    How would you like to obtain your AVS? MyChart       Patient started 01/13/2020  Current vial - silver  Last injection 0.35 ml on 06/15/2020  Standard build-up protocol weekly    Rj Rodríguez presents for a 6 month follow up for SCIT   Has been tolerating injections well  Has not noticed much improvement   Has been using zyrtec daily and flonase for symptomatic relief and has noticed relief since starting medication.      Epi pen is up to date    Does see relief with kassandra med sinus rinses.    No cyn sinusitis  No recent illnesses or " sinus infections    Allergies   Allergen Reactions     Fruit [Peach] Anaphylaxis and Hives     fresh peaches and any fruit that has a pit.  Kiwi's and apples also.  Can eat any fruit cooked.     Nuts Anaphylaxis and Hives     All Raw Nuts, can eat nuts when roasted.     Ace Inhibitors      Upper respiratory infection     Alkylamines Hives     Antihistamines     Alprazolam Hives     Xanax     Diphenhydramine Hives and Other (See Comments)     Keeps her awake       Erythromycin      BASE; pt. Not sure if this is a true allergy.     Guaifed      Pt unsure of reaction     Guaifenesin      Guaifed     Hydrochlorothiazide      No Clinical Screening - See Comments      Allergic:  Fresh peaches and any fruit that has a pit.  Kiwi's and apples also.  All raw nuts.  Can eat nuts when roasted or any fruit cooked.  Some seasonal allergies :  Hayfever     Olmesartan Medoxomil Cough     Benicar     Phenylephrine Hcl      Guaifed     Pseudoephedrine Hcl      Guaifed     Seasonal Allergies      hayfever     Tramadol      Sleep disturbance. Can not sleep, and when able to fall asleep will have terrible nightmares     Tramadol Hcl      Ultram, insomnia, nightmares, headache     Venlafaxine Other (See Comments)     Heartburn, reflux     Zoloft [Sertraline]      paranoia     Latex Other (See Comments), Swelling, Difficulty breathing and Rash     Throat Closes       Current Outpatient Medications   Medication     ALBUTEROL IN     blood glucose (ACCU-CHEK GUIDE) test strip     blood glucose monitoring (ACCU-CHEK FASTCLIX) lancets     cetirizine (ZYRTEC) 10 MG tablet     clonazePAM (KLONOPIN) 0.5 MG tablet     EPINEPHrine (EPIPEN/ADRENACLICK/OR ANY BX GENERIC EQUIV) 0.3 MG/0.3ML injection 2-pack     Furosemide (LASIX) 20 MG tablet     losartan (COZAAR) 25 MG tablet     metFORMIN (GLUCOPHAGE-XR) 500 MG 24 hr tablet     ORDER FOR ALLERGEN IMMUNOTHERAPY     potassium chloride ER (K-TAB/KLOR-CON) 10 MEQ CR tablet     triamcinolone (NASACORT)  55 MCG/ACT nasal aerosol     No current facility-administered medications for this visit.      Past Medical History:   Diagnosis Date     Arthritis      Chronic back pain      Hypertension      Irregular heart beat      Sleep apnea      Past Surgical History:   Procedure Laterality Date     APPENDECTOMY       BACK SURGERY       CHOLECYSTECTOMY       COLONOSCOPY  2012    Repeat in 10 years     GYN SURGERY       HYSTERECTOMY      Ovaries     RELEASE CARPAL TUNNEL      LT     RELEASE CARPAL TUNNEL      RT x2     SURGICAL RADIOLOGY PROCEDURE N/A 2/12/2016    Procedure: SURGICAL RADIOLOGY PROCEDURE;  Surgeon: Provider, Generic Perianesthesia Nursing;  Location: [facility]     SURGICAL RADIOLOGY PROCEDURE N/A 8/15/2016    Procedure: SURGICAL RADIOLOGY PROCEDURE;  Surgeon: Provider, Generic Perianesthesia Nursing;  Location: [facility]       GENERAL: alert, cooperates with telephone exam, and no apparent distress  Voice is strong in pitch and quality.  She is talking in full sentences.  There is no     ASSESSMENT:      ICD-10-CM    1. Perennial allergic rhinitis with seasonal variation  J30.89     J30.2      Continue weekly injections to maintenance dosing, then weekly for 1 year therafter  Continue Zyrtec 10 mg daily, may double dose as needed for increased symptoms  Continue flonase 2 sprays daily    Follow up in 12 months for routine recheck, sooner if symptoms worsen or concerns develop      Felisha Tyler NP-C  8:49 AM  June 19, 2020    Phone call duration: 19 minutes        Again, thank you for allowing me to participate in the care of your patient.        Sincerely,        Felisha Tyler NP

## 2020-06-19 NOTE — PATIENT INSTRUCTIONS
Continue weekly injections to maintenance dosing, then weekly for 1 year therafter  Continue Zyrtec 10 mg daily, may double dose as needed for increased symptoms  Continue flonase 2 sprays daily    Follow up in 12 months for routine recheck, sooner if symptoms worsen or concerns develop    Thank you for allowing Felisha MAZA and our ENT team to participate in your care.  If your medications are too expensive, please call my nurse at the number listed below.  We can possibly change this medication.    If you have a scheduling or an appointment question please contact our Health Unit Coordinator at their direct line [phone].   ALL nursing questions or concerns can be directed to your ENT nurses at: [phone] or [phone].

## 2020-06-22 NOTE — PROGRESS NOTES
Subjective Finding:    Chief compalint: Patient presents with:  Back Pain  , Pain Scale: 7/10, Intensity: sharp, Duration: 2 weeks, Radiating: bilateral buttock.    Date of injury:     Activities that the pain restricts:   Home/household/hobbies/social activities: yes.  Work duties: no.  Sleep: no.  Makes symptoms better: rest.  Makes symptoms worse: activity and lumbar flexion.  Have you seen anyone else for the symptoms? Yes: MD.  Work related: no.  Automobile related injury: no.    Objective and Assessment:    Posture Analysis:   High shoulder: .  Head tilt: .  High iliac crest: .  Head carriage: neutral.  Thoracic Kyphosis: forward.  Lumbar Lordosis: forward.    Lumbar Range of Motion: extension decreased.  Cervical Range of Motion: .  Thoracic Range of Motion: .  Extremity Range of Motion: .    Palpation:   Quad lumb: bilateral, referred pain: no    Segmental dysfunction pre-treatment and treatment area: T5, L3, L4 and L5.  C56    Assessment post-treatment:  Cervical: .  Thoracic: ROM increased.  Lumbar: ROM increased and pain and tenderness decreased.    Comments: .      Complicating Factors: .    Plan / Procedure:    Treatment plan: 2 times per week for 2 weeks.  Instructed patient: stretch as instructed at visit.  Short term goals: increase ROM.  Long term goals: restore normal function.  Prognosis: very good.

## 2020-06-24 ENCOUNTER — OFFICE VISIT (OUTPATIENT)
Dept: CHIROPRACTIC MEDICINE | Facility: OTHER | Age: 70
End: 2020-06-24
Attending: CHIROPRACTOR
Payer: MEDICARE

## 2020-06-24 ENCOUNTER — ALLIED HEALTH/NURSE VISIT (OUTPATIENT)
Dept: ALLERGY | Facility: OTHER | Age: 70
End: 2020-06-24
Attending: NURSE PRACTITIONER
Payer: COMMERCIAL

## 2020-06-24 ENCOUNTER — TELEPHONE (OUTPATIENT)
Dept: FAMILY MEDICINE | Facility: OTHER | Age: 70
End: 2020-06-24

## 2020-06-24 DIAGNOSIS — M99.01 SEGMENTAL AND SOMATIC DYSFUNCTION OF CERVICAL REGION: Primary | ICD-10-CM

## 2020-06-24 DIAGNOSIS — M99.03 SEGMENTAL AND SOMATIC DYSFUNCTION OF LUMBAR REGION: ICD-10-CM

## 2020-06-24 DIAGNOSIS — M54.2 CERVICALGIA: ICD-10-CM

## 2020-06-24 DIAGNOSIS — J30.9 ALLERGIC RHINITIS: Primary | ICD-10-CM

## 2020-06-24 DIAGNOSIS — M99.02 SEGMENTAL AND SOMATIC DYSFUNCTION OF THORACIC REGION: ICD-10-CM

## 2020-06-24 PROCEDURE — 95117 IMMUNOTHERAPY INJECTIONS: CPT

## 2020-06-24 PROCEDURE — 98941 CHIROPRACT MANJ 3-4 REGIONS: CPT | Mod: AT | Performed by: CHIROPRACTOR

## 2020-06-24 NOTE — TELEPHONE ENCOUNTER
Left message for patient to return call . She is due for her mammogram please help her get scheduled.

## 2020-06-24 NOTE — PROGRESS NOTES
Prior to injection, verified patient identity by using patient's name and date of birth.  Allergy injection given and charted on paper allergy flow sheet.     Patient experienced unknown reaction.  Patient signed out AMA.    Vira Tuttle LPN

## 2020-06-25 NOTE — PROGRESS NOTES
Subjective Finding:    Chief compalint: Patient presents with:  Neck Pain  Back Pain  , Pain Scale: 7/10, Intensity: sharp, Duration: 2 weeks, Radiating: bilateral buttock.    Date of injury:     Activities that the pain restricts:   Home/household/hobbies/social activities: yes.  Work duties: no.  Sleep: no.  Makes symptoms better: rest.  Makes symptoms worse: activity and lumbar flexion.  Have you seen anyone else for the symptoms? Yes: MD.  Work related: no.  Automobile related injury: no.    Objective and Assessment:    Posture Analysis:   High shoulder: .  Head tilt: .  High iliac crest: .  Head carriage: neutral.  Thoracic Kyphosis: forward.  Lumbar Lordosis: forward.    Lumbar Range of Motion: extension decreased.  Cervical Range of Motion: .  Thoracic Range of Motion: .  Extremity Range of Motion: .    Palpation:   Quad lumb: bilateral, referred pain: no    Segmental dysfunction pre-treatment and treatment area: T5, L3, L4 and L5.  C56    Assessment post-treatment:  Cervical: .  Thoracic: ROM increased.  Lumbar: ROM increased and pain and tenderness decreased.    Comments: .      Complicating Factors: .    Plan / Procedure:    Treatment plan: 2 times per week for 2 weeks.  Instructed patient: stretch as instructed at visit.  Short term goals: increase ROM.  Long term goals: restore normal function.  Prognosis: very good.

## 2020-06-30 ENCOUNTER — OFFICE VISIT (OUTPATIENT)
Dept: CHIROPRACTIC MEDICINE | Facility: OTHER | Age: 70
End: 2020-06-30
Attending: CHIROPRACTOR
Payer: COMMERCIAL

## 2020-06-30 DIAGNOSIS — M99.02 SEGMENTAL AND SOMATIC DYSFUNCTION OF THORACIC REGION: ICD-10-CM

## 2020-06-30 DIAGNOSIS — M99.03 SEGMENTAL AND SOMATIC DYSFUNCTION OF LUMBAR REGION: ICD-10-CM

## 2020-06-30 DIAGNOSIS — M54.2 CERVICALGIA: ICD-10-CM

## 2020-06-30 DIAGNOSIS — M99.01 SEGMENTAL AND SOMATIC DYSFUNCTION OF CERVICAL REGION: Primary | ICD-10-CM

## 2020-06-30 PROCEDURE — 98941 CHIROPRACT MANJ 3-4 REGIONS: CPT | Mod: AT | Performed by: CHIROPRACTOR

## 2020-07-01 ENCOUNTER — ALLIED HEALTH/NURSE VISIT (OUTPATIENT)
Dept: ALLERGY | Facility: OTHER | Age: 70
End: 2020-07-01
Attending: NURSE PRACTITIONER
Payer: MEDICARE

## 2020-07-01 DIAGNOSIS — J30.9 ALLERGIC RHINITIS: Primary | ICD-10-CM

## 2020-07-01 PROCEDURE — 95117 IMMUNOTHERAPY INJECTIONS: CPT

## 2020-07-07 ENCOUNTER — OFFICE VISIT (OUTPATIENT)
Dept: CHIROPRACTIC MEDICINE | Facility: OTHER | Age: 70
End: 2020-07-07
Attending: CHIROPRACTOR
Payer: COMMERCIAL

## 2020-07-07 DIAGNOSIS — M99.01 SEGMENTAL AND SOMATIC DYSFUNCTION OF CERVICAL REGION: Primary | ICD-10-CM

## 2020-07-07 DIAGNOSIS — M99.02 SEGMENTAL AND SOMATIC DYSFUNCTION OF THORACIC REGION: ICD-10-CM

## 2020-07-07 DIAGNOSIS — M54.2 CERVICALGIA: ICD-10-CM

## 2020-07-07 DIAGNOSIS — M99.03 SEGMENTAL AND SOMATIC DYSFUNCTION OF LUMBAR REGION: ICD-10-CM

## 2020-07-07 PROCEDURE — 98941 CHIROPRACT MANJ 3-4 REGIONS: CPT | Mod: AT | Performed by: CHIROPRACTOR

## 2020-07-07 NOTE — PROGRESS NOTES
Subjective Finding:    Chief compalint: Patient presents with:  Back Pain  Neck Pain  , Pain Scale: 7/10, Intensity: sharp, Duration: 2 weeks, Radiating: bilateral buttock.    Date of injury:     Activities that the pain restricts:   Home/household/hobbies/social activities: yes.  Work duties: no.  Sleep: no.  Makes symptoms better: rest.  Makes symptoms worse: activity and lumbar flexion.  Have you seen anyone else for the symptoms? Yes: MD.  Work related: no.  Automobile related injury: no.    Objective and Assessment:    Posture Analysis:   High shoulder: .  Head tilt: .  High iliac crest: .  Head carriage: neutral.  Thoracic Kyphosis: forward.  Lumbar Lordosis: forward.    Lumbar Range of Motion: extension decreased.  Cervical Range of Motion: .  Thoracic Range of Motion: .  Extremity Range of Motion: .    Palpation:   Quad lumb: bilateral, referred pain: no    Segmental dysfunction pre-treatment and treatment area: T5, L3, L4 and L5.  C56    Assessment post-treatment:  Cervical: .  Thoracic: ROM increased.  Lumbar: ROM increased and pain and tenderness decreased.    Comments: .      Complicating Factors: .    Plan / Procedure:    Treatment plan: 2 times per week for 2 weeks.  Instructed patient: stretch as instructed at visit.  Short term goals: increase ROM.  Long term goals: restore normal function.  Prognosis: very good.

## 2020-07-14 ENCOUNTER — OFFICE VISIT (OUTPATIENT)
Dept: CHIROPRACTIC MEDICINE | Facility: OTHER | Age: 70
End: 2020-07-14
Attending: CHIROPRACTOR
Payer: COMMERCIAL

## 2020-07-14 DIAGNOSIS — M99.01 SEGMENTAL AND SOMATIC DYSFUNCTION OF CERVICAL REGION: ICD-10-CM

## 2020-07-14 DIAGNOSIS — M99.03 SEGMENTAL AND SOMATIC DYSFUNCTION OF LUMBAR REGION: Primary | ICD-10-CM

## 2020-07-14 DIAGNOSIS — M99.02 SEGMENTAL AND SOMATIC DYSFUNCTION OF THORACIC REGION: ICD-10-CM

## 2020-07-14 DIAGNOSIS — M54.50 ACUTE RIGHT-SIDED LOW BACK PAIN WITHOUT SCIATICA: ICD-10-CM

## 2020-07-14 PROCEDURE — 98941 CHIROPRACT MANJ 3-4 REGIONS: CPT | Mod: AT | Performed by: CHIROPRACTOR

## 2020-07-14 NOTE — PROGRESS NOTES
Subjective Finding:    Chief compalint: Patient presents with:  Back Pain: right sided  Neck Pain  , Pain Scale: 7/10, Intensity: sharp, Duration: 2 weeks, Radiating: bilateral buttock.    Date of injury:     Activities that the pain restricts:   Home/household/hobbies/social activities: yes.  Work duties: no.  Sleep: no.  Makes symptoms better: rest.  Makes symptoms worse: activity and lumbar flexion.  Have you seen anyone else for the symptoms? Yes: MD.  Work related: no.  Automobile related injury: no.    Objective and Assessment:    Posture Analysis:   High shoulder: .  Head tilt: .  High iliac crest: .  Head carriage: neutral.  Thoracic Kyphosis: forward.  Lumbar Lordosis: forward.    Lumbar Range of Motion: extension decreased.  Cervical Range of Motion: .  Thoracic Range of Motion: .  Extremity Range of Motion: .    Palpation:   Quad lumb: bilateral, referred pain: no    Segmental dysfunction pre-treatment and treatment area: T5, L3, L4 and L5.  C56    Assessment post-treatment:  Cervical: .  Thoracic: ROM increased.  Lumbar: ROM increased and pain and tenderness decreased.    Comments: .      Complicating Factors: .    Plan / Procedure:    Treatment plan: 2 times per week for 2 weeks.  Instructed patient: stretch as instructed at visit.  Short term goals: increase ROM.  Long term goals: restore normal function.  Prognosis: very good.

## 2020-07-15 ENCOUNTER — ALLIED HEALTH/NURSE VISIT (OUTPATIENT)
Dept: ALLERGY | Facility: OTHER | Age: 70
End: 2020-07-15
Attending: NURSE PRACTITIONER
Payer: MEDICARE

## 2020-07-15 DIAGNOSIS — J30.9 ALLERGIC RHINITIS: Primary | ICD-10-CM

## 2020-07-15 PROCEDURE — 95117 IMMUNOTHERAPY INJECTIONS: CPT

## 2020-07-21 ENCOUNTER — OFFICE VISIT (OUTPATIENT)
Dept: CHIROPRACTIC MEDICINE | Facility: OTHER | Age: 70
End: 2020-07-21
Attending: CHIROPRACTOR
Payer: COMMERCIAL

## 2020-07-21 DIAGNOSIS — M99.03 SEGMENTAL AND SOMATIC DYSFUNCTION OF LUMBAR REGION: ICD-10-CM

## 2020-07-21 DIAGNOSIS — M99.02 SEGMENTAL AND SOMATIC DYSFUNCTION OF THORACIC REGION: ICD-10-CM

## 2020-07-21 DIAGNOSIS — M99.01 SEGMENTAL AND SOMATIC DYSFUNCTION OF CERVICAL REGION: Primary | ICD-10-CM

## 2020-07-21 DIAGNOSIS — M54.2 CERVICALGIA: ICD-10-CM

## 2020-07-21 PROCEDURE — 98941 CHIROPRACT MANJ 3-4 REGIONS: CPT | Mod: AT | Performed by: CHIROPRACTOR

## 2020-07-27 ENCOUNTER — ALLIED HEALTH/NURSE VISIT (OUTPATIENT)
Dept: ALLERGY | Facility: OTHER | Age: 70
End: 2020-07-27
Attending: NURSE PRACTITIONER
Payer: MEDICARE

## 2020-07-27 DIAGNOSIS — J30.9 ALLERGIC RHINITIS: Primary | ICD-10-CM

## 2020-07-27 PROCEDURE — 95117 IMMUNOTHERAPY INJECTIONS: CPT

## 2020-07-27 NOTE — PROGRESS NOTES
Verified pt by name and date of birth. Allergy injection given. Pt signed out AMA without staying for observation.      EDDA WINTER LPN

## 2020-07-29 ENCOUNTER — ALLIED HEALTH/NURSE VISIT (OUTPATIENT)
Dept: ALLERGY | Facility: OTHER | Age: 70
End: 2020-07-29
Attending: NURSE PRACTITIONER
Payer: MEDICARE

## 2020-07-29 ENCOUNTER — TELEPHONE (OUTPATIENT)
Dept: OTOLARYNGOLOGY | Facility: OTHER | Age: 70
End: 2020-07-29

## 2020-07-29 DIAGNOSIS — J30.89 PERENNIAL ALLERGIC RHINITIS: Primary | ICD-10-CM

## 2020-07-29 PROCEDURE — 95165 ANTIGEN THERAPY SERVICES: CPT | Performed by: NURSE PRACTITIONER

## 2020-07-29 PROCEDURE — 95165 ANTIGEN THERAPY SERVICES: CPT

## 2020-07-29 NOTE — TELEPHONE ENCOUNTER
"I spoke with Rj.  She states she had a reaction after her last SCIT injection on 7/27/2020.  It was her last Silver injection at 0.5ml.  She states her left arm got red, swollen and hard about the size of her \"fist\".  It was itchy and hot feeling.  It kept her up most of the night, but was gone within 24 hours.   She used ice and Benadryl cooling gel.    She is on an antihistamine every day.    She had no respiratory, GI or other problems.     I will forward this message to Felisha Tyler CNP to review and advise.      I did discuss with Rj that she has her epi pen and knows when and how to use it, should the situation arise again, and/or worsen.  She verbalized understanding and agrees.  Luh Drake RN     "

## 2020-07-29 NOTE — PROGRESS NOTES
Allergy serum is mixed today at schedule gold 2 of 4,  into  2  (5 ml)  multi dose vial/vials.    Allergens included were:    Ragweed  0.2 ml of dilution # 3  Pigweed  0.2 ml of dilution # 1  Mugwort 0.2 ml of dilution # 0  Kochia  0.2 ml of dilution # 0  Russian Thistle 0.2 ml of dilution # 1  Jimenez Grass 0.2 ml of dilution # 3  Birch mix 0.2 ml of dilution # 3  Maple Mix 0.2 of dilution # 2  Elm Mix 0.2 ml of dilution # 1  Oak Mix 0.2 ml of dilution # 3  Frank Mix 0.2 ml of dilution # 3  Pine Mix 0.2 ml of dilution # 1  [place] 0.2 ml of dilution # 2  Black [place] 0.2 ml of dilution # 2  Aspen 0.2 ml of dilution # 0  Red [place] 0.2 ml of dilution # 0    Alternaria 0.2 ml of dilution # 1  Aspergillus 0.2 ml of dilution # 1  Hormodendrum 0.2 ml of dilution # 2  Helminthosporium 0.2 ml of dilution # 2  Penicillium 0.2 ml of dilution # 1  Epicoccum 0.2 ml of dilution # 1  Fusarium 0.2 ml of dilution # 1  Mucor 0.2 ml of dilution # 0  Grain Smut 0.2 ml of dilution # 0  Grass Smut 0.2 ml of dilution # 0  Cat 0.2 ml of dilution # 3  Dog 0.2 ml of dilution # 1  Feather Mix 0.2 ml of dilution # 0  Dust Mite Mix 0.2 ml of dilution # 3  Horse 0.2 ml of dilution # 0    Luh Drake, RN, RN

## 2020-07-30 NOTE — TELEPHONE ENCOUNTER
So noted.  Patient is notified.  SVT is changed to allergy injection on the schedule and the allergy shadow chart has been flagged.  Luh Drake RN

## 2020-07-30 NOTE — TELEPHONE ENCOUNTER
Felisha Tyler NP Stevens, Paula A, RN    Caller: Unspecified (Yesterday, 12:19 PM)               Repeat silver vial, can start at 0.3 ml.  Have her take double allergy meds on day previous and day of allergy shot.     Thanks Promise

## 2020-08-03 ENCOUNTER — ALLIED HEALTH/NURSE VISIT (OUTPATIENT)
Dept: ALLERGY | Facility: OTHER | Age: 70
End: 2020-08-03
Attending: NURSE PRACTITIONER
Payer: MEDICARE

## 2020-08-03 DIAGNOSIS — J30.9 ALLERGIC RHINITIS: Primary | ICD-10-CM

## 2020-08-03 PROCEDURE — 95117 IMMUNOTHERAPY INJECTIONS: CPT

## 2020-08-03 NOTE — PROGRESS NOTES
Allergy injection/s given and charted on paper allergy flow sheet.  Patient experienced no reaction.    Gladis Gilbert LPN

## 2020-08-10 ENCOUNTER — ALLIED HEALTH/NURSE VISIT (OUTPATIENT)
Dept: ALLERGY | Facility: OTHER | Age: 70
End: 2020-08-10
Attending: NURSE PRACTITIONER
Payer: MEDICARE

## 2020-08-10 DIAGNOSIS — J30.9 ALLERGIC RHINITIS: Primary | ICD-10-CM

## 2020-08-10 PROCEDURE — 95117 IMMUNOTHERAPY INJECTIONS: CPT

## 2020-08-19 ENCOUNTER — ALLIED HEALTH/NURSE VISIT (OUTPATIENT)
Dept: ALLERGY | Facility: OTHER | Age: 70
End: 2020-08-19
Attending: PHYSICIAN ASSISTANT
Payer: MEDICARE

## 2020-08-19 DIAGNOSIS — J30.9 ALLERGIC RHINITIS: Primary | ICD-10-CM

## 2020-08-19 PROCEDURE — 95117 IMMUNOTHERAPY INJECTIONS: CPT

## 2020-08-19 NOTE — PROGRESS NOTES
Allergy injection/s given and charted on paper allergy flow sheet.  Patient left AMA and did not remain for observation. Consent form signed    Emma Bush LPN

## 2020-08-21 NOTE — TELEPHONE ENCOUNTER
Spoke with patient and advised she will not have a SVT on Monday, but rather will be restarting the Silver vial at 0.3 mL, per Felisha Tyler NP.  Patient has been taking her AH twice daily and reports that is working very well.    Halina Nogueira RN

## 2020-08-24 ENCOUNTER — TELEPHONE (OUTPATIENT)
Dept: FAMILY MEDICINE | Facility: OTHER | Age: 70
End: 2020-08-24

## 2020-08-24 NOTE — TELEPHONE ENCOUNTER
Returned call. Patient was scheduled 2 days too early for injection. Rescheduled from today to 8/27/2020.

## 2020-08-27 ENCOUNTER — ALLIED HEALTH/NURSE VISIT (OUTPATIENT)
Dept: ALLERGY | Facility: OTHER | Age: 70
End: 2020-08-27
Attending: NURSE PRACTITIONER
Payer: MEDICARE

## 2020-08-27 DIAGNOSIS — J30.9 ALLERGIC RHINITIS: Primary | ICD-10-CM

## 2020-08-27 PROCEDURE — 95117 IMMUNOTHERAPY INJECTIONS: CPT

## 2020-08-27 NOTE — NURSING NOTE
Allergy injection/s given and charted on paper allergy flow sheet.  Patient experienced unknown reaction.  Patient left without waiting 30 minutes

## 2020-08-31 DIAGNOSIS — I10 ESSENTIAL HYPERTENSION: ICD-10-CM

## 2020-08-31 RX ORDER — LOSARTAN POTASSIUM 25 MG/1
TABLET ORAL
Qty: 90 TABLET | Refills: 0 | Status: SHIPPED | OUTPATIENT
Start: 2020-08-31 | End: 2020-11-24

## 2020-08-31 NOTE — TELEPHONE ENCOUNTER
losartan      Last Written Prescription Date:  3/4/20  Last Fill Quantity: 90,   # refills: 1  Last Office Visit: 3/4/20  Future Office visit:       Routing refill request to provider for review/approval because:  Drug not on the [facility], [facility] or [facility] refill protocol or controlled substance

## 2020-09-09 ENCOUNTER — ALLIED HEALTH/NURSE VISIT (OUTPATIENT)
Dept: ALLERGY | Facility: OTHER | Age: 70
End: 2020-09-09
Attending: NURSE PRACTITIONER
Payer: MEDICARE

## 2020-09-09 DIAGNOSIS — J30.9 ALLERGIC RHINITIS: Primary | ICD-10-CM

## 2020-09-09 PROCEDURE — 95117 IMMUNOTHERAPY INJECTIONS: CPT

## 2020-09-09 NOTE — PROGRESS NOTES
Allergy injection/s given and charted on paper allergy flow sheet.  Patient experienced unknown reaction.  Pt signed out AMA and did not wait the recommended 30 minutes.  NAHEED PAN LPN

## 2020-09-16 ENCOUNTER — ALLIED HEALTH/NURSE VISIT (OUTPATIENT)
Dept: ALLERGY | Facility: OTHER | Age: 70
End: 2020-09-16
Attending: NURSE PRACTITIONER
Payer: MEDICARE

## 2020-09-16 DIAGNOSIS — J30.9 ALLERGIC RHINITIS: Primary | ICD-10-CM

## 2020-09-16 PROCEDURE — 95117 IMMUNOTHERAPY INJECTIONS: CPT

## 2020-09-23 ENCOUNTER — TRANSFERRED RECORDS (OUTPATIENT)
Dept: HEALTH INFORMATION MANAGEMENT | Facility: HOSPITAL | Age: 70
End: 2020-09-23

## 2020-09-23 ENCOUNTER — ALLIED HEALTH/NURSE VISIT (OUTPATIENT)
Dept: ALLERGY | Facility: OTHER | Age: 70
End: 2020-09-23
Attending: NURSE PRACTITIONER
Payer: MEDICARE

## 2020-09-23 DIAGNOSIS — J30.89 PERENNIAL ALLERGIC RHINITIS: Primary | ICD-10-CM

## 2020-09-23 DIAGNOSIS — J30.9 ALLERGIC RHINITIS: Primary | ICD-10-CM

## 2020-09-23 PROCEDURE — 95165 ANTIGEN THERAPY SERVICES: CPT

## 2020-09-23 PROCEDURE — 95165 ANTIGEN THERAPY SERVICES: CPT | Performed by: NURSE PRACTITIONER

## 2020-09-23 PROCEDURE — 95117 IMMUNOTHERAPY INJECTIONS: CPT

## 2020-09-23 NOTE — PROGRESS NOTES
Allergy serum is mixed today at Silver schedule of 1 of 4,  into two (5 ml)  multi dose vial/vials.    Allergens included were:    Ragweed  0.2 ml of dilution # 4  Pigweed  0.2 ml of dilution # 2  Mugwort 0.2 ml of dilution # 0  Kochia  0.2 ml of dilution # 0  Russian Thistle 0.2 ml of dilution # 2  Jimenez Grass 0.2 ml of dilution # 4  Birch mix 0.2 ml of dilution # 4  Maple Mix 0.2 of dilution # 3  Elm Mix 0.2 ml of dilution # 2  Oak Mix 0.2 ml of dilution # 4  Frank Mix 0.2 ml of dilution # 4  Pine Mix 0.2 ml of dilution # 2  Eastern [place] 0.2 ml of dilution # 3  Black [place] 0.2 ml of dilution # 3  Aspen 0.2 ml of dilution # 0  Red [place] 0.2 ml of dilution # 0    Alternaria 0.2 ml of dilution # 2  Aspergillus 0.2 ml of dilution # 2  Hormodendrum 0.2 ml of dilution # 3  Helminthosporium 0.2 ml of dilution # 3  Penicillium 0.2 ml of dilution # 2  Epicoccum 0.2 ml of dilution # 2  Fusarium 0.2 ml of dilution # 2  Mucor 0.2 ml of dilution # 0  Grain Smut 0.2 ml of dilution # 0  Grass Smut 0.2 ml of dilution # 0  Cat 0.2 ml of dilution # 4  Dog 0.2 ml of dilution # 2  Feather Mix 0.2 ml of dilution # 0  Dust Mite Mix 0.2 ml of dilution # 4  Horse 0.2 ml of dilution # 0    Halina Nogueira RN

## 2020-09-23 NOTE — PROGRESS NOTES
Prior to injection patient identity is verified using patient's name and date of birth.    Safety vial test was done in the leftarm, for new silver vial # 1.   Administered  0.01ml (ID) for SVT.  SVT = 11 mm.   passed.    Safety vial test was done in the right arm, for new silver vial # 2.   Administered  0.01ml (ID) for SVT.  SVT  =  9mm.    passed.    2 Allergy injections were given and charted on the paper flow sheet.      Per orders of Felisha Tyler, allergy injections are given by Britney Winter LPN    The patient was instructed to remain in clinic for 30 minutes afterwards, and to report any adverse reaction to me immediately.  Pt signed out AMA without staying for observation.      BRITNEY WINTER LPN

## 2020-09-30 ENCOUNTER — ALLIED HEALTH/NURSE VISIT (OUTPATIENT)
Dept: ALLERGY | Facility: OTHER | Age: 70
End: 2020-09-30
Attending: NURSE PRACTITIONER
Payer: MEDICARE

## 2020-09-30 ENCOUNTER — TELEPHONE (OUTPATIENT)
Dept: OTOLARYNGOLOGY | Facility: OTHER | Age: 70
End: 2020-09-30

## 2020-09-30 DIAGNOSIS — J30.9 ALLERGIC RHINITIS: Primary | ICD-10-CM

## 2020-09-30 PROCEDURE — 95117 IMMUNOTHERAPY INJECTIONS: CPT

## 2020-09-30 NOTE — TELEPHONE ENCOUNTER
I spoke with Felisha Tyler CNP about Rj.  Rj has now completed the second build up of her silver vial.      Felisha states Rj may advance to Gold vial standard build up protocol, if she passes her gold vial SVT and has remained without any reactions through her 2nd silver build up.      She is to continue using BID antihistamines day before and day of her allergy injections.    This note is forwarded to Felisha for review.  Luh Drake RN

## 2020-10-07 ENCOUNTER — ALLIED HEALTH/NURSE VISIT (OUTPATIENT)
Dept: ALLERGY | Facility: OTHER | Age: 70
End: 2020-10-07
Attending: NURSE PRACTITIONER
Payer: MEDICARE

## 2020-10-07 DIAGNOSIS — J30.9 ALLERGIC RHINITIS: Primary | ICD-10-CM

## 2020-10-07 PROCEDURE — 95117 IMMUNOTHERAPY INJECTIONS: CPT

## 2020-10-07 NOTE — PROGRESS NOTES
Prior to injection patient identity is verified using patient's name and date of birth.    Safety vial test was done in the left arm, for new gold vial # 1.   Administered  0.01ml (ID) for SVT.  SVT = 11 mm.   passed.    Safety vial test was done in the rightarm, for new gold vial # 2.   Administered  0.01ml (ID) for SVT.  SVT  =  9mm.    passed.    2 Allergy injections were given and charted on the paper flow sheet.      Per orders of Felisha Tyler, allergy injections are given by Britney Winter LPN    The patient was instructed to remain in clinic for 30 minutes afterwards, and to report any adverse reaction to me immediately.  Pt signed out AMA without staying for observation.      BRITNEY WINTER LPN

## 2020-10-14 ENCOUNTER — ALLIED HEALTH/NURSE VISIT (OUTPATIENT)
Dept: ALLERGY | Facility: OTHER | Age: 70
End: 2020-10-14
Attending: NURSE PRACTITIONER
Payer: MEDICARE

## 2020-10-14 DIAGNOSIS — J30.9 ALLERGIC RHINITIS: Primary | ICD-10-CM

## 2020-10-14 PROCEDURE — 95117 IMMUNOTHERAPY INJECTIONS: CPT

## 2020-10-14 NOTE — PROGRESS NOTES
Allergy injection/s given and charted on paper allergy flow sheet.  Patient signed out AMA.    Rosio Bledsoe LPN       Care was transferred to RN, report was given, questions answered.  The patient's vital signs are stable.  She was transferred to ICU intubated/monitored/oxygenated/sedated.

## 2020-10-21 ENCOUNTER — ALLIED HEALTH/NURSE VISIT (OUTPATIENT)
Dept: ALLERGY | Facility: OTHER | Age: 70
End: 2020-10-21
Attending: NURSE PRACTITIONER
Payer: MEDICARE

## 2020-10-21 DIAGNOSIS — J30.9 ALLERGIC RHINITIS: Primary | ICD-10-CM

## 2020-10-21 PROCEDURE — 95117 IMMUNOTHERAPY INJECTIONS: CPT

## 2020-10-21 NOTE — PROGRESS NOTES
Allergy injection/s given and charted on paper allergy flow sheet.  Patient signed out AMA.    Rosio Bledsoe LPN

## 2020-10-28 DIAGNOSIS — E87.6 HYPOKALEMIA: ICD-10-CM

## 2020-10-28 RX ORDER — POTASSIUM CHLORIDE 750 MG/1
TABLET, EXTENDED RELEASE ORAL
Qty: 90 TABLET | Refills: 0 | Status: SHIPPED | OUTPATIENT
Start: 2020-10-28 | End: 2021-03-04

## 2020-10-28 NOTE — TELEPHONE ENCOUNTER
potassium      Last Written Prescription Date:  6/8/20  Last Fill Quantity: 90,   # refills: 1  Last Office Visit: 3/4/20  Future Office visit:

## 2020-10-30 ENCOUNTER — ALLIED HEALTH/NURSE VISIT (OUTPATIENT)
Dept: ALLERGY | Facility: OTHER | Age: 70
End: 2020-10-30
Attending: NURSE PRACTITIONER
Payer: MEDICARE

## 2020-10-30 DIAGNOSIS — J30.89 PERENNIAL ALLERGIC RHINITIS: Primary | ICD-10-CM

## 2020-10-30 PROCEDURE — 95117 IMMUNOTHERAPY INJECTIONS: CPT

## 2020-10-30 NOTE — PROGRESS NOTES
Prior to injection, verified patient's identity using patient's name and date of birth.    Allergy injection/s given and charted on paper allergy flow sheet.  Patient signed out AMA and did not stay for observation.    Halina Nogueira RN

## 2020-11-11 ENCOUNTER — ALLIED HEALTH/NURSE VISIT (OUTPATIENT)
Dept: ALLERGY | Facility: OTHER | Age: 70
End: 2020-11-11
Attending: NURSE PRACTITIONER
Payer: MEDICARE

## 2020-11-11 DIAGNOSIS — J30.89 PERENNIAL ALLERGIC RHINITIS: Primary | ICD-10-CM

## 2020-11-11 PROCEDURE — 95117 IMMUNOTHERAPY INJECTIONS: CPT

## 2020-11-18 ENCOUNTER — ALLIED HEALTH/NURSE VISIT (OUTPATIENT)
Dept: ALLERGY | Facility: OTHER | Age: 70
End: 2020-11-18
Attending: NURSE PRACTITIONER
Payer: MEDICARE

## 2020-11-18 DIAGNOSIS — J30.9 ALLERGIC RHINITIS: Primary | ICD-10-CM

## 2020-11-18 PROCEDURE — 95117 IMMUNOTHERAPY INJECTIONS: CPT

## 2020-11-18 NOTE — PROGRESS NOTES
Prior to injection, the patient's identity was verified using the patient's name and date of birth.    Per orders of Felisha Tyler CNP, allergy injections are given.    2 allergy injections are given and charted in the allergy flow sheet.      The patient was instructed to remain in the clinic for 30 minutes after injections, to report any adverse reactions.      The patient signed out AMA and did not remain for observation.  Luh Drake RN

## 2020-11-24 DIAGNOSIS — I10 ESSENTIAL HYPERTENSION: ICD-10-CM

## 2020-11-24 RX ORDER — LOSARTAN POTASSIUM 25 MG/1
TABLET ORAL
Qty: 90 TABLET | Refills: 0 | Status: SHIPPED | OUTPATIENT
Start: 2020-11-24 | End: 2021-03-04

## 2020-11-24 NOTE — TELEPHONE ENCOUNTER
Losartan 25 mg      Last Written Prescription Date:  08/31/2020  Last Fill Quantity: 90,   # refills: 0  Last Office Visit: 03/04/2020  Future Office visit:

## 2020-11-25 ENCOUNTER — ALLIED HEALTH/NURSE VISIT (OUTPATIENT)
Dept: ALLERGY | Facility: OTHER | Age: 70
End: 2020-11-25
Attending: NURSE PRACTITIONER
Payer: MEDICARE

## 2020-11-25 DIAGNOSIS — J30.9 ALLERGIC RHINITIS: Primary | ICD-10-CM

## 2020-11-25 PROCEDURE — 95117 IMMUNOTHERAPY INJECTIONS: CPT

## 2020-12-02 ENCOUNTER — TELEPHONE (OUTPATIENT)
Dept: ALLERGY | Facility: OTHER | Age: 70
End: 2020-12-02

## 2020-12-02 ENCOUNTER — ALLIED HEALTH/NURSE VISIT (OUTPATIENT)
Dept: ALLERGY | Facility: OTHER | Age: 70
End: 2020-12-02
Attending: NURSE PRACTITIONER
Payer: MEDICARE

## 2020-12-02 DIAGNOSIS — J30.89 PERENNIAL ALLERGIC RHINITIS: Primary | ICD-10-CM

## 2020-12-02 PROCEDURE — 95117 IMMUNOTHERAPY INJECTIONS: CPT

## 2020-12-02 NOTE — PROGRESS NOTES
Prior to injection, verified patient's identity using patient's name and date of birth.    Allergy injection/s given and charted on paper allergy flow sheet.  Patient held 30 minutes and reported that her ears felt warm.  This has happened a couple of times and goes away generally by the time she gets home.  This does not happen any other time.  No other symptoms noted.  Patient takes her antihistamine daily.  Patient advised this writer will check with an ENT provider regarding her symptom and get back to her if there are any concerns or recommendations.      Halina Nogueira RN

## 2020-12-02 NOTE — TELEPHONE ENCOUNTER
Patient was in today for an allergy shot and reported that on occasion after an allergy shot, she notices her ears get warm.  She has no other symptoms.  Patient stayed for 30 minutes after her injection today, and her ears were warm.  They are not red or hot to the touch.  Patient is wondering if this could be related to her allergy shots since it is the only time she notices this symptom.  Patient is currently building on the Gold vial.  Today's dose was 0.4 mL.  Patient takes Zyrtec daily.  Patient is aware a message will be routed to ENT provider for review and any recommendations.      Halina Nogueira RN

## 2020-12-08 NOTE — TELEPHONE ENCOUNTER
I spoke with Rj again this morning.  She is using her AH BID the day before her shot.  She has noticed her ears being somewhat better since then.  Of note she has had no respiratory, GI or other skin reactions.    She is told to take them BID 2 days before the injection and the day of the injection.  She is also told to rinse her nose every day with saline Reagan Med.  She will do so.      She also believes she has several food allergies and will discuss RAST testing with Felisha Tyler,.    She is told these can affect the enviromental allergen reactions, and foods that are positive on the RAST should be avoided.     She is told should she have any increased symptoms, or concerning symptoms she should proceed to EMS.  She has epi pens, and knows how and when to use them.     She verbalized understanding, and agrees.      This is forwarded to Felisha Tyler to review.  Luh Drake RN

## 2020-12-14 ENCOUNTER — ALLIED HEALTH/NURSE VISIT (OUTPATIENT)
Dept: ALLERGY | Facility: OTHER | Age: 70
End: 2020-12-14
Attending: NURSE PRACTITIONER
Payer: MEDICARE

## 2020-12-14 DIAGNOSIS — J30.9 ALLERGIC RHINITIS: Primary | ICD-10-CM

## 2020-12-14 PROCEDURE — 95117 IMMUNOTHERAPY INJECTIONS: CPT

## 2020-12-30 ENCOUNTER — ALLIED HEALTH/NURSE VISIT (OUTPATIENT)
Dept: ALLERGY | Facility: OTHER | Age: 70
End: 2020-12-30
Attending: NURSE PRACTITIONER
Payer: MEDICARE

## 2020-12-30 DIAGNOSIS — J30.89 PERENNIAL ALLERGIC RHINITIS: Primary | ICD-10-CM

## 2020-12-30 PROCEDURE — 95117 IMMUNOTHERAPY INJECTIONS: CPT

## 2021-01-06 ENCOUNTER — TELEPHONE (OUTPATIENT)
Dept: ALLERGY | Facility: OTHER | Age: 71
End: 2021-01-06

## 2021-01-06 ENCOUNTER — ALLIED HEALTH/NURSE VISIT (OUTPATIENT)
Dept: ALLERGY | Facility: OTHER | Age: 71
End: 2021-01-06
Attending: NURSE PRACTITIONER
Payer: MEDICARE

## 2021-01-06 DIAGNOSIS — J30.89 PERENNIAL ALLERGIC RHINITIS: Primary | ICD-10-CM

## 2021-01-06 PROCEDURE — 95165 ANTIGEN THERAPY SERVICES: CPT

## 2021-01-06 PROCEDURE — 95165 ANTIGEN THERAPY SERVICES: CPT | Performed by: NURSE PRACTITIONER

## 2021-01-06 PROCEDURE — 95117 IMMUNOTHERAPY INJECTIONS: CPT

## 2021-01-06 NOTE — PROGRESS NOTES
Allergy serum is mixed today at Blue schedule 3 of 4, into two (5 ml) multi dose vial/vials.    Allergens included were:    Ragweed  0.2 ml of dilution # 2  Pigweed  0.2 ml of dilution # 1  Mugwort 0.2 ml of dilution # 0  Kochia  0.2 ml of dilution # 0  Russian Thistle 0.2 ml of dilution # 1  Jimenez Grass 0.2 ml of dilution # 2  Birch mix 0.2 ml of dilution # 2  Maple Mix 0.2 of dilution # 1  Elm Mix 0.2 ml of dilution # 1  Oak Mix 0.2 ml of dilution # 2  Frank Mix 0.2 ml of dilution # 2  Pine Mix 0.2 ml of dilution # 1  E[place] 0.2 ml of dilution # 1  Black [place] 0.2 ml of dilution # 1  Aspen 0.2 ml of dilution # 0  Red [place] 0.2 ml of dilution # 0    Alternaria 0.2 ml of dilution # 1  Aspergillus 0.2 ml of dilution # 1  Hormodendrum 0.2 ml of dilution # 1  Helminthosporium 0.2 ml of dilution # 1  Penicillium 0.2 ml of dilution # 1  Epicoccum 0.2 ml of dilution # 1  Fusarium 0.2 ml of dilution # 1  Mucor 0.2 ml of dilution # 0  Grain Smut 0.2 ml of dilution # 0  Grass Smut 0.2 ml of dilution # 0  Cat 0.2 ml of dilution # 2  Dog 0.2 ml of dilution # 1  Feather Mix 0.2 ml of dilution # 0  Dust Mite Mix 0.2 ml of dilution # 2  Horse 0.2 ml of dilution # 0    Halina Nogueira RN

## 2021-01-10 ENCOUNTER — HEALTH MAINTENANCE LETTER (OUTPATIENT)
Age: 71
End: 2021-01-10

## 2021-01-13 ENCOUNTER — TRANSFERRED RECORDS (OUTPATIENT)
Dept: HEALTH INFORMATION MANAGEMENT | Facility: HOSPITAL | Age: 71
End: 2021-01-13

## 2021-01-13 ENCOUNTER — ALLIED HEALTH/NURSE VISIT (OUTPATIENT)
Dept: ALLERGY | Facility: OTHER | Age: 71
End: 2021-01-13
Attending: NURSE PRACTITIONER
Payer: MEDICARE

## 2021-01-13 DIAGNOSIS — J30.89 PERENNIAL ALLERGIC RHINITIS: Primary | ICD-10-CM

## 2021-01-13 LAB — RETINOPATHY: NEGATIVE

## 2021-01-13 PROCEDURE — 95117 IMMUNOTHERAPY INJECTIONS: CPT

## 2021-01-13 NOTE — PROGRESS NOTES
Prior to safety vial test, verified patient's identity using patient's name and date of birth.    Safety vial test was done in the left arm, for new Blue vial # 1.   Administered  0.01ml (ID) for SVT.  SVT = 7 mm.   Passed.    Safety vial test was done in the right arm, for new Blue vial # 2.   Administered  0.01ml (ID) for SVT.  SVT = 8 mm.   Passed.    Allergy injection/s given and charted on paper allergy flow sheet.  Patient signed out AMA and did not stay for observation.    Halina Nogueira RN

## 2021-01-20 ENCOUNTER — ALLIED HEALTH/NURSE VISIT (OUTPATIENT)
Dept: ALLERGY | Facility: OTHER | Age: 71
End: 2021-01-20
Attending: NURSE PRACTITIONER
Payer: MEDICARE

## 2021-01-20 DIAGNOSIS — J30.89 PERENNIAL ALLERGIC RHINITIS: Primary | ICD-10-CM

## 2021-01-20 PROCEDURE — 95117 IMMUNOTHERAPY INJECTIONS: CPT

## 2021-01-20 NOTE — PROGRESS NOTES
Prior to injection verified pt identity using pt name and date of birth.    Allergy injection/s given and charted on paper allergy flow sheet.  Patient left AMA, signing form.    Morgan Buchanan RN

## 2021-01-27 ENCOUNTER — ALLIED HEALTH/NURSE VISIT (OUTPATIENT)
Dept: ALLERGY | Facility: OTHER | Age: 71
End: 2021-01-27
Attending: NURSE PRACTITIONER
Payer: MEDICARE

## 2021-01-27 DIAGNOSIS — J30.89 PERENNIAL ALLERGIC RHINITIS: Primary | ICD-10-CM

## 2021-01-27 PROCEDURE — 95117 IMMUNOTHERAPY INJECTIONS: CPT

## 2021-02-03 ENCOUNTER — ALLIED HEALTH/NURSE VISIT (OUTPATIENT)
Dept: ALLERGY | Facility: OTHER | Age: 71
End: 2021-02-03
Attending: NURSE PRACTITIONER
Payer: MEDICARE

## 2021-02-03 DIAGNOSIS — J30.89 PERENNIAL ALLERGIC RHINITIS: Primary | ICD-10-CM

## 2021-02-03 PROCEDURE — 95117 IMMUNOTHERAPY INJECTIONS: CPT

## 2021-02-10 ENCOUNTER — ALLIED HEALTH/NURSE VISIT (OUTPATIENT)
Dept: ALLERGY | Facility: OTHER | Age: 71
End: 2021-02-10
Attending: NURSE PRACTITIONER
Payer: MEDICARE

## 2021-02-10 DIAGNOSIS — J30.89 PERENNIAL ALLERGIC RHINITIS: Primary | ICD-10-CM

## 2021-02-10 PROCEDURE — 95117 IMMUNOTHERAPY INJECTIONS: CPT

## 2021-02-10 NOTE — PROGRESS NOTES
Prior to injection verified pt identity using pt name and date of birth.    Allergy injection/s given and charted on paper allergy flow sheet.  Patient left AMA, signing form, not staying for the observation period.    Morgan Buchanan RN on 2/10/2021 at 1:04 PM

## 2021-02-17 ENCOUNTER — ALLIED HEALTH/NURSE VISIT (OUTPATIENT)
Dept: ALLERGY | Facility: OTHER | Age: 71
End: 2021-02-17
Attending: NURSE PRACTITIONER
Payer: MEDICARE

## 2021-02-17 DIAGNOSIS — J30.89 PERENNIAL ALLERGIC RHINITIS: Primary | ICD-10-CM

## 2021-02-17 PROCEDURE — 95117 IMMUNOTHERAPY INJECTIONS: CPT

## 2021-02-17 NOTE — PROGRESS NOTES
Prior to injection verified pt identity using pt name and date of birth.    Allergy injection/s given and charted on paper allergy flow sheet.  Patient left AMA, signing form, not staying for the observation period.    Morgan Buchanan RN on 2/17/2021 at 1:07 PM

## 2021-02-24 ENCOUNTER — ALLIED HEALTH/NURSE VISIT (OUTPATIENT)
Dept: ALLERGY | Facility: OTHER | Age: 71
End: 2021-02-24
Attending: NURSE PRACTITIONER
Payer: MEDICARE

## 2021-02-24 DIAGNOSIS — J30.89 PERENNIAL ALLERGIC RHINITIS: Primary | ICD-10-CM

## 2021-02-24 PROCEDURE — 95117 IMMUNOTHERAPY INJECTIONS: CPT

## 2021-03-03 ENCOUNTER — ALLIED HEALTH/NURSE VISIT (OUTPATIENT)
Dept: ALLERGY | Facility: OTHER | Age: 71
End: 2021-03-03
Attending: NURSE PRACTITIONER
Payer: MEDICARE

## 2021-03-03 DIAGNOSIS — J30.89 PERENNIAL ALLERGIC RHINITIS: Primary | ICD-10-CM

## 2021-03-03 PROCEDURE — 95117 IMMUNOTHERAPY INJECTIONS: CPT

## 2021-03-05 ENCOUNTER — TELEPHONE (OUTPATIENT)
Dept: FAMILY MEDICINE | Facility: OTHER | Age: 71
End: 2021-03-05

## 2021-03-05 NOTE — TELEPHONE ENCOUNTER
Left message to return call to clinic to schedule next available in person follow up with Sheyla Sanches for med review.

## 2021-03-08 NOTE — PROGRESS NOTES
"    Assessment & Plan     Essential hypertension  BP elevated today, possibly due to increased anxiety as patient had a counseling session this morning. Continue with current medications. Return to clinic in one week for nurse visit for BP check. If BP still elevated will increase Losartan to 50mg daily.   - Comprehensive metabolic panel (BMP + Alb, Alk Phos, ALT, AST, Total. Bili, TP)    Type 2 diabetes mellitus without complication, without long-term current use of insulin (H)  Well controlled, A1C today 5.8. Continue following with diabetes center. Declines to begin asa. Not on statin and declines. Working on better BP control. Follow up in 6 months.   - Hemoglobin A1c  - Albumin Random Urine Quantitative with Creat Ratio    Severe needle phobia  Labs drawn in room, will minimize lab draws.     Hyperlipidemia, unspecified hyperlipidemia type  Controlled. Should be on statin d/t DM, but she declines. Recheck 6 months.      - Lipid Profile (Chol, Trig, HDL, LDL calc)    Generalized anxiety disorder  Mild episode of recurrent major depressive disorder (H)  Patient is anxious today but states it is due to issues that she addressed in her counseling session this morning with Echo at [facility]. Patient currently does counseling one day per week. No thoughts of suicide. Continue care with [facility]. Follow-up in 6 months.         BMI:   Estimated body mass index is 37.98 kg/m  as calculated from the following:    Height as of this encounter: 1.549 m (5' 1\").    Weight as of this encounter: 91.2 kg (201 lb).   Weight management plan: Discussed healthy diet and exercise guidelines    Davonte Phelps DNP Student     I was present with the nurse practitioner student who participated in the service and in the documentation of the note. I have verified the history and personally performed the physical exam and medical decision making. I agree with the assessment and plan of care as documented in the " note.     Sheyla Sanches NP  [facility] - BAILEY De La Rosa is a 70 year old who presents for the following health issues    HPI       Diabetes Follow-up    How often are you checking your blood sugar? A few times a week  What time of day are you checking your blood sugars (select all that apply)?  Before meals  Have you had any blood sugars above 200?  No  Have you had any blood sugars below 70?  No    What symptoms do you notice when your blood sugar is low?  None    What concerns do you have today about your diabetes? None     Do you have any of these symptoms? (Select all that apply)  No numbness or tingling in feet.  No redness, sores or blisters on feet.  No complaints of excessive thirst.  No reports of blurry vision.  No significant changes to weight.    Have you had a diabetic eye exam in the last 12 months? Yes- Date of last eye exam: 01/2021 ,  Location: Dr. Butler      A1C was 5.9 on 2/2020.     Not currently taking aspirin, declines    Not currently on any medications for diabetes and is following with Karishma in the diabetes center    She denies chest pain, shortness of breath, dizziness, syncope, or palpitations.       BP Readings from Last 2 Encounters:   03/11/20 150/90   03/04/20 104/72     Hemoglobin A1C (%)   Date Value   02/19/2020 5.9 (H)   11/11/2019 6.5 (H)     LDL Cholesterol Calculated (mg/dL)   Date Value   11/11/2019 61   10/29/2019 63       Hypertension Follow-up      Do you check your blood pressure regularly outside of the clinic? Yes, sometimes at home    Are you following a low salt diet? Yes    Are your blood pressures ever more than 140 on the top number (systolic) OR more   than 90 on the bottom number (diastolic), for example 140/90? No, checked it last week and it was 132/78  -Taking losartan 25 mg without side effects. She has taken hydrochlorothiazide in the past and developed a rash. She also takes furosemide with potassium for her blood pressure.  As  noted above, she denies chest pain, shortness of breath, dizziness, syncope, or palpitations.     Anxiety Follow-Up    How are you doing with your anxiety since your last visit? Improved has not needed the Klonopin, meets with Echo every week at [facility]     Are you having other symptoms that might be associated with anxiety? Yes:  still feeling anxious     Have you had a significant life event? No     Are you feeling depressed? No    Do you have any concerns with your use of alcohol or other drugs? No     Follows with Echo at [facility]    Taking Klonopin as needed, last time she took it was a month ago    Due for a Dexa-scan. Wants to wait at this time until her anxiety is more controlled.     Social History     Tobacco Use     Smoking status: Never Smoker     Smokeless tobacco: Never Used   Substance Use Topics     Alcohol use: Yes     Alcohol/week: 1.0 standard drinks     Types: 1 Glasses of wine per week     Frequency: Monthly or less     Drinks per session: 1 or 2     Binge frequency: Never     Comment: occ glass of wine     Drug use: No     ZEFERINO-7 SCORE 10/11/2019   Total Score 1     PHQ 10/11/2019   PHQ-9 Total Score 2   Q9: Thoughts of better off dead/self-harm past 2 weeks Not at all     Last PHQ-9 3/10/2021   1.  Little interest or pleasure in doing things 0   2.  Feeling down, depressed, or hopeless 0   3.  Trouble falling or staying asleep, or sleeping too much 0   4.  Feeling tired or having little energy 0   5.  Poor appetite or overeating 0   6.  Feeling bad about yourself 0   7.  Trouble concentrating 0   8.  Moving slowly or restless 0   Q9: Thoughts of better off dead/self-harm past 2 weeks 0   PHQ-9 Total Score 0   Difficulty at work, home, or with people Not difficult at all     ZEFERINO-7  3/10/2021   1. Feeling nervous, anxious, or on edge 3   2. Not being able to stop or control worrying 3   3. Worrying too much about different things 3   4. Trouble relaxing 0   5. Being  "so restless that it is hard to sit still 0   6. Becoming easily annoyed or irritable 2   7. Feeling afraid, as if something awful might happen 0   ZEFERINO-7 Total Score 11   If you checked any problems, how difficult have they made it for you to do your work, take care of things at home, or get along with other people? Not difficult at all           Review of Systems   CONSTITUTIONAL: NEGATIVE for fever, chills, change in weight  EYES: NEGATIVE for vision changes or irritation  ENT/MOUTH: NEGATIVE for ear, mouth and throat problems  RESP: NEGATIVE for significant cough or SOB  CV: NEGATIVE for chest pain, palpitations or peripheral edema  GI: NEGATIVE for nausea, abdominal pain, heartburn, or change in bowel habits  MUSCULOSKELETAL: NEGATIVE for significant arthralgias or myalgia  NEURO: NEGATIVE for weakness, dizziness or paresthesias  ENDOCRINE: NEGATIVE for temperature intolerance, skin/hair changes  PSYCHIATRIC: NEGATIVE for changes in mood or affect      Objective    BP (!) 152/84 (BP Location: Left arm, Patient Position: Chair, Cuff Size: Adult Large)   Pulse 100   Temp 98.5  F (36.9  C) (Tympanic)   Ht 1.549 m (5' 1\")   Wt 91.2 kg (201 lb)   SpO2 98%   BMI 37.98 kg/m      Physical Exam   GENERAL: healthy, alert and no distress  NECK: no bruits  RESP: lungs clear to auscultation - no rales, rhonchi or wheezes  CV: regular rate and rhythm, normal S1 S2, no S3 or S4, no murmur, click or rub, no peripheral edema and peripheral pulses strong  MS: no gross musculoskeletal defects noted, no edema  PSYCH: mentation appears normal, affect normal/bright and anxious  Diabetic foot exam: normal DP and PT pulses, no trophic changes or ulcerative lesions and normal sensory exam    Results for orders placed or performed in visit on 03/10/21   Hemoglobin A1c     Status: Abnormal   Result Value Ref Range    Hemoglobin A1C 5.8 (H) 0 - 5.6 %   Lipid Profile (Chol, Trig, HDL, LDL calc)     Status: None   Result Value Ref Range "    Cholesterol 162 <200 mg/dL    Triglycerides 110 <150 mg/dL    HDL Cholesterol 53 >49 mg/dL    LDL Cholesterol Calculated 87 <100 mg/dL    Non HDL Cholesterol 109 <130 mg/dL   Comprehensive metabolic panel (BMP + Alb, Alk Phos, ALT, AST, Total. Bili, TP)     Status: Abnormal   Result Value Ref Range    Sodium 139 133 - 144 mmol/L    Potassium 3.6 3.4 - 5.3 mmol/L    Chloride 107 94 - 109 mmol/L    Carbon Dioxide 25 20 - 32 mmol/L    Anion Gap 7 3 - 14 mmol/L    Glucose 102 (H) 70 - 99 mg/dL    Urea Nitrogen 14 7 - 30 mg/dL    Creatinine 0.72 0.52 - 1.04 mg/dL    GFR Estimate 86 >60 mL/min/[1.73_m2]    GFR Estimate If Black >90 >60 mL/min/[1.73_m2]    Calcium 9.2 8.5 - 10.1 mg/dL    Bilirubin Total 0.7 0.2 - 1.3 mg/dL    Albumin 3.4 3.4 - 5.0 g/dL    Protein Total 7.6 6.8 - 8.8 g/dL    Alkaline Phosphatase 90 40 - 150 U/L    ALT 28 0 - 50 U/L    AST 16 0 - 45 U/L   Estimated Average Glucose     Status: None   Result Value Ref Range    Estimated Average Glucose 120 mg/dL

## 2021-03-10 ENCOUNTER — ALLIED HEALTH/NURSE VISIT (OUTPATIENT)
Dept: ALLERGY | Facility: OTHER | Age: 71
End: 2021-03-10
Attending: NURSE PRACTITIONER
Payer: COMMERCIAL

## 2021-03-10 ENCOUNTER — OFFICE VISIT (OUTPATIENT)
Dept: FAMILY MEDICINE | Facility: OTHER | Age: 71
End: 2021-03-10
Attending: NURSE PRACTITIONER
Payer: MEDICARE

## 2021-03-10 VITALS
OXYGEN SATURATION: 98 % | HEIGHT: 61 IN | HEART RATE: 100 BPM | DIASTOLIC BLOOD PRESSURE: 84 MMHG | BODY MASS INDEX: 37.95 KG/M2 | WEIGHT: 201 LBS | SYSTOLIC BLOOD PRESSURE: 152 MMHG | TEMPERATURE: 98.5 F

## 2021-03-10 DIAGNOSIS — F41.1 GENERALIZED ANXIETY DISORDER: ICD-10-CM

## 2021-03-10 DIAGNOSIS — J30.89 PERENNIAL ALLERGIC RHINITIS: Primary | ICD-10-CM

## 2021-03-10 DIAGNOSIS — I10 ESSENTIAL HYPERTENSION: Primary | ICD-10-CM

## 2021-03-10 DIAGNOSIS — F33.0 MILD EPISODE OF RECURRENT MAJOR DEPRESSIVE DISORDER (H): ICD-10-CM

## 2021-03-10 DIAGNOSIS — F40.231 SEVERE NEEDLE PHOBIA: ICD-10-CM

## 2021-03-10 DIAGNOSIS — E78.5 HYPERLIPIDEMIA, UNSPECIFIED HYPERLIPIDEMIA TYPE: ICD-10-CM

## 2021-03-10 DIAGNOSIS — E11.9 TYPE 2 DIABETES MELLITUS WITHOUT COMPLICATION, WITHOUT LONG-TERM CURRENT USE OF INSULIN (H): ICD-10-CM

## 2021-03-10 PROBLEM — E66.01 OBESITY, MORBID, BMI 40.0-49.9 (H): Status: RESOLVED | Noted: 2017-03-01 | Resolved: 2021-03-10

## 2021-03-10 LAB
ALBUMIN SERPL-MCNC: 3.4 G/DL (ref 3.4–5)
ALP SERPL-CCNC: 90 U/L (ref 40–150)
ALT SERPL W P-5'-P-CCNC: 28 U/L (ref 0–50)
ANION GAP SERPL CALCULATED.3IONS-SCNC: 7 MMOL/L (ref 3–14)
AST SERPL W P-5'-P-CCNC: 16 U/L (ref 0–45)
BILIRUB SERPL-MCNC: 0.7 MG/DL (ref 0.2–1.3)
BUN SERPL-MCNC: 14 MG/DL (ref 7–30)
CALCIUM SERPL-MCNC: 9.2 MG/DL (ref 8.5–10.1)
CHLORIDE SERPL-SCNC: 107 MMOL/L (ref 94–109)
CHOLEST SERPL-MCNC: 162 MG/DL
CO2 SERPL-SCNC: 25 MMOL/L (ref 20–32)
CREAT SERPL-MCNC: 0.72 MG/DL (ref 0.52–1.04)
EST. AVERAGE GLUCOSE BLD GHB EST-MCNC: 120 MG/DL
GFR SERPL CREATININE-BSD FRML MDRD: 86 ML/MIN/{1.73_M2}
GLUCOSE SERPL-MCNC: 102 MG/DL (ref 70–99)
HBA1C MFR BLD: 5.8 % (ref 0–5.6)
HDLC SERPL-MCNC: 53 MG/DL
LDLC SERPL CALC-MCNC: 87 MG/DL
NONHDLC SERPL-MCNC: 109 MG/DL
POTASSIUM SERPL-SCNC: 3.6 MMOL/L (ref 3.4–5.3)
PROT SERPL-MCNC: 7.6 G/DL (ref 6.8–8.8)
SODIUM SERPL-SCNC: 139 MMOL/L (ref 133–144)
TRIGL SERPL-MCNC: 110 MG/DL

## 2021-03-10 PROCEDURE — 83036 HEMOGLOBIN GLYCOSYLATED A1C: CPT | Mod: ZL | Performed by: NURSE PRACTITIONER

## 2021-03-10 PROCEDURE — 80061 LIPID PANEL: CPT | Mod: ZL | Performed by: NURSE PRACTITIONER

## 2021-03-10 PROCEDURE — 999N001182 HC STATISTIC ESTIMATED AVERAGE GLUCOSE: Mod: ZL | Performed by: NURSE PRACTITIONER

## 2021-03-10 PROCEDURE — 36415 COLL VENOUS BLD VENIPUNCTURE: CPT | Mod: ZL | Performed by: NURSE PRACTITIONER

## 2021-03-10 PROCEDURE — 80053 COMPREHEN METABOLIC PANEL: CPT | Mod: ZL | Performed by: NURSE PRACTITIONER

## 2021-03-10 PROCEDURE — 95117 IMMUNOTHERAPY INJECTIONS: CPT

## 2021-03-10 PROCEDURE — 99214 OFFICE O/P EST MOD 30 MIN: CPT | Performed by: NURSE PRACTITIONER

## 2021-03-10 PROCEDURE — G0463 HOSPITAL OUTPT CLINIC VISIT: HCPCS | Mod: 25

## 2021-03-10 ASSESSMENT — ANXIETY QUESTIONNAIRES
5. BEING SO RESTLESS THAT IT IS HARD TO SIT STILL: NOT AT ALL
6. BECOMING EASILY ANNOYED OR IRRITABLE: MORE THAN HALF THE DAYS
3. WORRYING TOO MUCH ABOUT DIFFERENT THINGS: NEARLY EVERY DAY
4. TROUBLE RELAXING: NOT AT ALL
IF YOU CHECKED OFF ANY PROBLEMS ON THIS QUESTIONNAIRE, HOW DIFFICULT HAVE THESE PROBLEMS MADE IT FOR YOU TO DO YOUR WORK, TAKE CARE OF THINGS AT HOME, OR GET ALONG WITH OTHER PEOPLE: NOT DIFFICULT AT ALL
GAD7 TOTAL SCORE: 11
2. NOT BEING ABLE TO STOP OR CONTROL WORRYING: NEARLY EVERY DAY
7. FEELING AFRAID AS IF SOMETHING AWFUL MIGHT HAPPEN: NOT AT ALL
1. FEELING NERVOUS, ANXIOUS, OR ON EDGE: NEARLY EVERY DAY

## 2021-03-10 ASSESSMENT — PAIN SCALES - GENERAL: PAINLEVEL: NO PAIN (0)

## 2021-03-10 ASSESSMENT — PATIENT HEALTH QUESTIONNAIRE - PHQ9: SUM OF ALL RESPONSES TO PHQ QUESTIONS 1-9: 0

## 2021-03-10 ASSESSMENT — MIFFLIN-ST. JEOR: SCORE: 1379.11

## 2021-03-10 NOTE — NURSING NOTE
"Chief Complaint   Patient presents with     Diabetes       Initial BP (!) 160/88 (BP Location: Left arm, Patient Position: Chair, Cuff Size: Adult Large)   Pulse 100   Temp 98.5  F (36.9  C) (Tympanic)   Ht 1.549 m (5' 1\")   Wt 91.2 kg (201 lb)   SpO2 98%   BMI 37.98 kg/m   Estimated body mass index is 37.98 kg/m  as calculated from the following:    Height as of this encounter: 1.549 m (5' 1\").    Weight as of this encounter: 91.2 kg (201 lb).  Medication Reconciliation: complete  Janet Woods LPN  "

## 2021-03-11 ASSESSMENT — ANXIETY QUESTIONNAIRES: GAD7 TOTAL SCORE: 11

## 2021-03-16 ENCOUNTER — TELEPHONE (OUTPATIENT)
Dept: FAMILY MEDICINE | Facility: OTHER | Age: 71
End: 2021-03-16

## 2021-03-16 NOTE — TELEPHONE ENCOUNTER
Patient called Allergy regarding the COVID vaccine. We cancelled her allergy shot for this week, with the hope she will be able to get in for her first COVID vaccine next week.  This will allow for the 2 weeks that are needed in between an allergy shot and the vaccine.  Patient will call next week to schedule her COVID vaccine when the schedule opens.  Adjustments will be made to her allergy injections once patient receives the vaccine and her 2nd vaccine is scheduled.  Patient verbalized understanding.      Halina Nogueira RN

## 2021-03-16 NOTE — TELEPHONE ENCOUNTER
9:07 AM    Reason for Call: Phone Call    Description: pt would like to talk to nurse, pt is very upset about scheduling her covid vaccine. I explained to her we can't schedule ahead of time. We don't have a schedule open for the vaccine until the week we receive them.  She needs to cancel her allergy shot 2 wks before the covid vaccine. She stated she can't get it at the pharmacy because of a latex allergy.    Was an appointment offered for this call? No  If yes : Appointment type              Date    Preferred method for responding to this message: Telephone Call  What is your phone number ?    If we cannot reach you directly, may we leave a detailed response at the number you provided? Yes    Can this message wait until your PCP/provider returns, if available today? No    Ne Veliz

## 2021-03-23 NOTE — TELEPHONE ENCOUNTER
Patient is scheduled for her first COVID vaccine this Friday and her 2nd shot 3 weeks later.  Upcoming allergy shot appointments are cancelled.  Patient is scheduled for allergy shots starting 2 weeks after her 2nd shot.    Halina Nogueira RN

## 2021-04-02 DIAGNOSIS — J30.89 PERENNIAL ALLERGIC RHINITIS: ICD-10-CM

## 2021-04-05 DIAGNOSIS — E87.6 HYPOKALEMIA: ICD-10-CM

## 2021-04-05 DIAGNOSIS — I10 ESSENTIAL HYPERTENSION: ICD-10-CM

## 2021-04-05 RX ORDER — POTASSIUM CHLORIDE 750 MG/1
TABLET, EXTENDED RELEASE ORAL
Qty: 30 TABLET | Refills: 0 | Status: SHIPPED | OUTPATIENT
Start: 2021-04-05 | End: 2021-05-05

## 2021-04-05 RX ORDER — FUROSEMIDE 20 MG
TABLET ORAL
Qty: 30 TABLET | Refills: 0 | Status: SHIPPED | OUTPATIENT
Start: 2021-04-05 | End: 2021-05-05

## 2021-04-05 RX ORDER — EPINEPHRINE 0.3 MG/.3ML
INJECTION SUBCUTANEOUS
Qty: 2 EACH | Refills: 0 | Status: SHIPPED | OUTPATIENT
Start: 2021-04-05 | End: 2023-01-11

## 2021-04-05 NOTE — TELEPHONE ENCOUNTER
Potassium chloride ER 10 MEQ      Last Written Prescription Date:  3-4-2021  Last Fill Quantity: 30,   # refills: 0    Lasix 20 mg      Last Written Prescription Date:  3-4-2021  Last Fill Quantity: 30,   # refills: 0  Last Office Visit: 3-

## 2021-04-05 NOTE — TELEPHONE ENCOUNTER
Epinephrine       Last Written Prescription Date:  11/22/19  Last Fill Quantity: 2,   # refills: 12  Last Office Visit: 12/20/19  Future Office visit:       Routing refill request to provider for review/approval because:

## 2021-05-05 DIAGNOSIS — I10 ESSENTIAL HYPERTENSION: ICD-10-CM

## 2021-05-05 DIAGNOSIS — E87.6 HYPOKALEMIA: ICD-10-CM

## 2021-05-05 RX ORDER — POTASSIUM CHLORIDE 750 MG/1
TABLET, EXTENDED RELEASE ORAL
Qty: 30 TABLET | Refills: 0 | Status: SHIPPED | OUTPATIENT
Start: 2021-05-05 | End: 2021-06-02

## 2021-05-05 RX ORDER — LOSARTAN POTASSIUM 25 MG/1
TABLET ORAL
Qty: 30 TABLET | Refills: 0 | Status: SHIPPED | OUTPATIENT
Start: 2021-05-05 | End: 2021-06-28

## 2021-05-05 RX ORDER — FUROSEMIDE 20 MG
TABLET ORAL
Qty: 30 TABLET | Refills: 0 | Status: SHIPPED | OUTPATIENT
Start: 2021-05-05 | End: 2021-07-07

## 2021-05-05 NOTE — TELEPHONE ENCOUNTER
Lasix      Last Written Prescription Date:  4/5/21  Last Fill Quantity: 30,   # refills: 0  Last Office Visit: 3/10/21    Potassium      Last Written Prescription Date:  4/5/21  Last Fill Quantity: 30,   # refills: 0  Last Office Visit: 3/10/21  Future Office visit:

## 2021-05-05 NOTE — TELEPHONE ENCOUNTER
Losartan 25 mg      Last Written Prescription Date:  3-4-2021  Last Fill Quantity: 30,   # refills: 0  Last Office Visit: 3-

## 2021-05-08 ENCOUNTER — HEALTH MAINTENANCE LETTER (OUTPATIENT)
Age: 71
End: 2021-05-08

## 2021-05-19 ENCOUNTER — ALLIED HEALTH/NURSE VISIT (OUTPATIENT)
Dept: ALLERGY | Facility: OTHER | Age: 71
End: 2021-05-19
Attending: NURSE PRACTITIONER
Payer: MEDICARE

## 2021-05-19 DIAGNOSIS — J30.89 PERENNIAL ALLERGIC RHINITIS: Primary | ICD-10-CM

## 2021-05-19 PROCEDURE — 95117 IMMUNOTHERAPY INJECTIONS: CPT

## 2021-05-19 NOTE — PROGRESS NOTES
Prior to injection verified pt identity using pt name and date of birth.    Allergy injection/s given and charted on paper allergy flow sheet.  Patient left AMA, signing form, not staying for the observation period.    Morgan Buchanan RN on 5/19/2021 at 11:16 AM

## 2021-05-20 DIAGNOSIS — I10 BENIGN ESSENTIAL HYPERTENSION: Primary | ICD-10-CM

## 2021-05-20 RX ORDER — LOSARTAN POTASSIUM 50 MG/1
50 TABLET ORAL DAILY
Qty: 90 TABLET | Refills: 2 | Status: SHIPPED | OUTPATIENT
Start: 2021-05-20 | End: 2021-07-07

## 2021-05-26 ENCOUNTER — ALLIED HEALTH/NURSE VISIT (OUTPATIENT)
Dept: ALLERGY | Facility: OTHER | Age: 71
End: 2021-05-26
Attending: NURSE PRACTITIONER
Payer: MEDICARE

## 2021-05-26 DIAGNOSIS — J30.89 PERENNIAL ALLERGIC RHINITIS: Primary | ICD-10-CM

## 2021-05-26 PROCEDURE — 95117 IMMUNOTHERAPY INJECTIONS: CPT

## 2021-05-27 ENCOUNTER — TELEPHONE (OUTPATIENT)
Dept: FAMILY MEDICINE | Facility: OTHER | Age: 71
End: 2021-05-27

## 2021-05-27 NOTE — TELEPHONE ENCOUNTER
Spoke with patient . She has been informed that she will need to come in for a nurse only blood pressure check prior to any additional refills of her Losartan

## 2021-06-02 ENCOUNTER — ALLIED HEALTH/NURSE VISIT (OUTPATIENT)
Dept: ALLERGY | Facility: OTHER | Age: 71
End: 2021-06-02
Attending: NURSE PRACTITIONER
Payer: MEDICARE

## 2021-06-02 DIAGNOSIS — J30.89 PERENNIAL ALLERGIC RHINITIS: Primary | ICD-10-CM

## 2021-06-02 DIAGNOSIS — E87.6 HYPOKALEMIA: ICD-10-CM

## 2021-06-02 PROCEDURE — 95117 IMMUNOTHERAPY INJECTIONS: CPT

## 2021-06-02 RX ORDER — POTASSIUM CHLORIDE 750 MG/1
TABLET, EXTENDED RELEASE ORAL
Qty: 30 TABLET | Refills: 0 | Status: SHIPPED | OUTPATIENT
Start: 2021-06-02 | End: 2021-07-07

## 2021-06-02 NOTE — PROGRESS NOTES
Prior to injection verified pt identity using pt name and date of birth.    Allergy injection/s given and charted on paper allergy flow sheet.  Patient left AMA, signing form, not staying for the observation period.    Morgan Buchanan RN on 6/2/2021 at 11:37 AM

## 2021-06-02 NOTE — TELEPHONE ENCOUNTER
Potassium Chloride ER  Last Written Prescription Date:  05/05/21  Last Fill Quantity: 30,   # refills: 0  Last Office Visit: 03/10/21  Future Office visit:    Next 5 appointments (look out 90 days)    Jun 09, 2021 10:30 AM  (Arrive by 10:15 AM)  Return Visit with Felisha Tyler NP  [facility] ([facility] [place] ) [address]  [place] [address]  [phone]           Routing refill request to provider for review/approval because:  Medication is reported/historical

## 2021-06-09 ENCOUNTER — ALLIED HEALTH/NURSE VISIT (OUTPATIENT)
Dept: ALLERGY | Facility: OTHER | Age: 71
End: 2021-06-09
Attending: NURSE PRACTITIONER
Payer: MEDICARE

## 2021-06-09 ENCOUNTER — OFFICE VISIT (OUTPATIENT)
Dept: OTOLARYNGOLOGY | Facility: OTHER | Age: 71
End: 2021-06-09
Attending: NURSE PRACTITIONER
Payer: MEDICARE

## 2021-06-09 VITALS
OXYGEN SATURATION: 98 % | TEMPERATURE: 97.8 F | DIASTOLIC BLOOD PRESSURE: 76 MMHG | WEIGHT: 200 LBS | SYSTOLIC BLOOD PRESSURE: 144 MMHG | BODY MASS INDEX: 37.76 KG/M2 | HEART RATE: 74 BPM | HEIGHT: 61 IN

## 2021-06-09 DIAGNOSIS — Z86.69 HISTORY OF OBSTRUCTIVE SLEEP APNEA: ICD-10-CM

## 2021-06-09 DIAGNOSIS — J30.89 PERENNIAL ALLERGIC RHINITIS: Primary | ICD-10-CM

## 2021-06-09 DIAGNOSIS — K13.79 HYPERTROPHIC SOFT PALATE: ICD-10-CM

## 2021-06-09 DIAGNOSIS — E66.01 MORBID OBESITY (H): ICD-10-CM

## 2021-06-09 DIAGNOSIS — J45.20 MILD INTERMITTENT REACTIVE AIRWAY DISEASE WITHOUT COMPLICATION: ICD-10-CM

## 2021-06-09 DIAGNOSIS — R09.82 POST-NASAL DRAINAGE: Primary | ICD-10-CM

## 2021-06-09 PROCEDURE — 31575 DIAGNOSTIC LARYNGOSCOPY: CPT | Performed by: NURSE PRACTITIONER

## 2021-06-09 PROCEDURE — 95117 IMMUNOTHERAPY INJECTIONS: CPT

## 2021-06-09 PROCEDURE — 99213 OFFICE O/P EST LOW 20 MIN: CPT | Mod: 25 | Performed by: NURSE PRACTITIONER

## 2021-06-09 PROCEDURE — G0463 HOSPITAL OUTPT CLINIC VISIT: HCPCS | Mod: 25

## 2021-06-09 RX ORDER — MONTELUKAST SODIUM 10 MG/1
10 TABLET ORAL AT BEDTIME
Qty: 90 TABLET | Refills: 1 | Status: SHIPPED | OUTPATIENT
Start: 2021-06-09 | End: 2021-10-08

## 2021-06-09 RX ORDER — AZELASTINE 1 MG/ML
2 SPRAY, METERED NASAL 2 TIMES DAILY
Qty: 30 ML | Refills: 1 | Status: SHIPPED | OUTPATIENT
Start: 2021-06-09 | End: 2021-10-08

## 2021-06-09 RX ORDER — BUDESONIDE 0.5 MG/2ML
INHALANT ORAL
Qty: 60 ML | Refills: 3 | Status: SHIPPED | OUTPATIENT
Start: 2021-06-09 | End: 2021-10-08

## 2021-06-09 ASSESSMENT — MIFFLIN-ST. JEOR: SCORE: 1374.57

## 2021-06-09 ASSESSMENT — PAIN SCALES - GENERAL: PAINLEVEL: NO PAIN (0)

## 2021-06-09 NOTE — LETTER
6/9/2021         RE: Rj Rodríguez  [address]  [address]  [facility] [address]        Dear Colleague,    Thank you for referring your patient, Rj Rodrgíuez, to the [facility]. Please see a copy of my visit note below.    Otolaryngology Note         Chief Complaint:     Patient presents with:  Allergies: Follow up SCIT           History of Present Illness:     Rj Rodríguez is a 70 year old female seen today for a follow up for SCIT.    She continues with post nasal drainage and coughing.  She is coughing up phlegm.  She is using her inhaler with good improvement.  She does notice globus sensation in throat without dysphagia.  No c/o ear pain, sore throat or odynophagia.      She has some reactive airway symptoms with wheezing that improves with albuterol inhaler.  She has never tried Singulair in the past.       She has been having some oral allergy syndrome over the past years.  She avoids foods that exacerbate symptoms.  She has been educated on cross reactivity to pollens/foods.    Has been tolerating injections well  She is currently using Zyrtec 10 mg daily  Epi pen is up to date    Does some see relief with kassandra med sinus rinses.  she is using rinses 2-3 times per week.    No cyn sinusitis  No recent illnesses or sinus infections    She has a history of REBECCA, not currently treated with CPAP, could not tolerate in the past.          Medications:     Current Outpatient Rx   Medication Sig Dispense Refill     ALBUTEROL IN        azelastine (ASTELIN) 0.1 % nasal spray Spray 2 sprays into both nostrils 2 times daily 30 mL 1     blood glucose (ACCU-CHEK GUIDE) test strip Use to test blood sugar 1 time daily or as directed. 50 each 11     blood glucose monitoring (ACCU-CHEK FASTCLIX) lancets Use to test blood sugar 1 time daily or as directed. 102 each 11     budesonide (PULMICORT) 0.5 MG/2ML neb solution Mix 240 ml kassandra med sinus rinse, add 0.5 mg budesonide vial, rinse 1/2  bottle in each nostril twice daily 60 mL 3     cetirizine (ZYRTEC) 10 MG tablet Take 1 tablet (10 mg) by mouth daily as needed for allergies 60 tablet 12     clonazePAM (KLONOPIN) 0.5 MG tablet TAKE 1 TABLET BY MOUTH TWICE DAILY AS NEEDED FOR ANXIETY 30 tablet 0     EPINEPHrine (ANY BX GENERIC EQUIV) 0.3 MG/0.3ML injection 2-pack INJECT CONTENTS OF 1 PEN AS NEEDED FOR ALLERGIC REACTION 2 each 0     furosemide (LASIX) 20 MG tablet Take 1 tablet by mouth once daily 30 tablet 0     losartan (COZAAR) 25 MG tablet Take 1 tablet by mouth once daily 30 tablet 0     losartan (COZAAR) 50 MG tablet Take 1 tablet (50 mg) by mouth daily 90 tablet 2     montelukast (SINGULAIR) 10 MG tablet Take 1 tablet (10 mg) by mouth At Bedtime 90 tablet 1     ORDER FOR ALLERGEN IMMUNOTHERAPY Continue with allergy shots.  Follow standard buildup protocols. 5 mL PRN     potassium chloride ER (KLOR-CON M) 10 MEQ CR tablet TAKE 1  BY MOUTH ONCE DAILY WITH FOOD 30 tablet 0     STATIN NOT PRESCRIBED (INTENTIONAL) Please choose reason not prescribed from choices below.       triamcinolone (NASACORT) 55 MCG/ACT nasal aerosol Spray 2 sprays into both nostrils daily 2 Bottle 12     albuterol (PROAIR HFA/PROVENTIL HFA/VENTOLIN HFA) 108 (90 Base) MCG/ACT inhaler Inhale 1-2 puffs into the lungs every 4 hours as needed for shortness of breath / dyspnea or wheezing 18 g 3            Allergies:     Allergies: Fruit [peach], Nuts, Ace inhibitors, Alkylamines, Alprazolam, Diphenhydramine, Erythromycin, Guaifed, Guaifenesin, Hydrochlorothiazide, No clinical screening - see comments, Olmesartan medoxomil, Phenylephrine hcl, Pseudoephedrine hcl, Seasonal allergies, Tramadol, Tramadol hcl, Venlafaxine, Zoloft [sertraline], and Latex          Past Medical History:     Past Medical History:   Diagnosis Date     Arthritis      Chronic back pain      Hypertension      Irregular heart beat      Sleep apnea             Past Surgical History:     Past Surgical History:  "  Procedure Laterality Date     APPENDECTOMY       BACK SURGERY       CHOLECYSTECTOMY       COLONOSCOPY  2012    Repeat in 10 years     GYN SURGERY       HYSTERECTOMY      Ovaries     RELEASE CARPAL TUNNEL      LT     RELEASE CARPAL TUNNEL      RT x2     SURGICAL RADIOLOGY PROCEDURE N/A 2/12/2016    Procedure: SURGICAL RADIOLOGY PROCEDURE;  Surgeon: Provider, Generic Perianesthesia Nursing;  Location: [facility]     SURGICAL RADIOLOGY PROCEDURE N/A 8/15/2016    Procedure: SURGICAL RADIOLOGY PROCEDURE;  Surgeon: Provider, Generic Perianesthesia Nursing;  Location: [facility]       ENT family history reviewed         Social History:     Social History     Tobacco Use     Smoking status: Never Smoker     Smokeless tobacco: Never Used   Substance Use Topics     Alcohol use: Yes     Alcohol/week: 1.0 standard drinks     Types: 1 Glasses of wine per week     Frequency: Monthly or less     Drinks per session: 1 or 2     Binge frequency: Never     Comment: occ glass of wine     Drug use: No            Review of Systems:     ROS: See HPI         Physical Exam:     BP (!) 144/76 (Cuff Size: Adult Large)   Pulse 74   Temp 97.8  F (36.6  C) (Tympanic)   Ht 1.549 m (5' 1\")   Wt 90.7 kg (200 lb)   SpO2 98%   BMI 37.79 kg/m    General - The patient is well nourished and well developed, and appears to have good nutritional status.  Alert and oriented to person and place, answers questions and cooperates with examination appropriately.   Head and Face - Normocephalic and atraumatic, with no gross asymmetry noted.  The facial nerve is intact, with strong symmetric movements.  Voice and Breathing - The patient was breathing comfortably without the use of accessory muscles. There was no wheezing, stridor. The patients voice was clear and strong, and had appropriate pitch and quality.  Ears - External ear normal. Canals are patent. Right tympanic membrane is intact without effusion, retraction or mass. Left tympanic membrane is intact " without effusion, retraction or mass.  Eyes - Extraocular movements intact, and the pupils were reactive to light. Sclera were not icteric or injected, conjunctiva were pink and moist.  Mouth - Examination of the oral cavity showed pink, healthy oral mucosa. Dentition in good condition. No lesions or ulcerations noted. The tongue was mobile and midline.  FPT IV  Throat - The walls of the oropharynx were smooth, pink, moist, symmetric, and had no lesions or ulcerations.  The tonsillar pillars and soft palate were symmetric. The uvula was midline on elevation.    Neck - Normal midline excursion of the laryngotracheal complex during swallowing.  Full range of motion on passive movement.  Palpation of the occipital, submental, submandibular, internal jugular chain, and supraclavicular nodes did not demonstrate any abnormal lymph nodes or masses.  Palpation of the thyroid was soft and smooth, with no nodules or goiter appreciated.  The trachea was mobile and midline.  Nose - External contour is symmetric, no gross deflection or scars.  Nasal mucosa is pink and moist with no abnormal mucus.  The septum and turbinates were evaluated with nasal speculum, no polyps, masses, or purulence noted on examination of anterior nasal cavity.    Attempts at mirror laryngoscopy were not possible due to gag reflex.  Therefore I proceeded with a fiberoptic examination after informed consent.  First I sprayed both sides of the nose with a mixture of lidocaine and neosynephrine.  I then passed the scope through the nasal cavity.     There is clear drainage noted in the nasal cavity and nasopaharynx    The nasopharynx was mucosally covered and symmetric.  The eustachian tube openings were unobstructed.  Going further down I had a clear view of the base of tongue which had normal appearing lingual tonsillar tissue.  The base of tongue was free of lesions, and the vallecula was open.  The epiglottis was smooth and mucosally covered.  The  supraglottic larynx was then clearly visualized.  The vocal cords moved smoothly and symmetrically and were pearly white.  The pyriform sinuses were open and without cyn mass or pooling of secretions upon valsalva, and the limited view of the postcricoid region did not show any lesions.  The patient tolerated the procedure well.           Assessment and Plan:       ICD-10-CM    1. Post-nasal drainage  R09.82 montelukast (SINGULAIR) 10 MG tablet     budesonide (PULMICORT) 0.5 MG/2ML neb solution     azelastine (ASTELIN) 0.1 % nasal spray   2. Morbid obesity (H)  E66.01    3. Mild intermittent reactive airway disease without complication  J45.20    4. Hypertrophic soft palate  K13.79    5. History of obstructive sleep apnea  Z86.69        Start:  azelastine (ASTELIN) 0.1 % nasal spray  budesonide (PULMICORT) 0.5 MG/2ML neb solution  montelukast (SINGULAIR) 10 MG tablet      Budesonide nasal saline irrigation per instructions:  -Obtain Reagan Med Sinus rinse over the counter.    -Use warm distilled water and 2 packets of the salt solution that comes with the bottle, dissolve in bottle up to the 240 mL natanael.  -Add 1 vial of budesonide.  -Irrigate each side of your nose leaning over the sink, using 1/3 to 1/2 the volume of the bottle in each nostril every irrigation.  Irrigate 2 times daily.  -If additional rinses are needed/recommended, you may use the plan Reagan Med Sinus irrigation without the use of added budesonide.     Follow up in with Felisha Tyler in 4 weeks     Felisha MAZA  [facility] ENT        Again, thank you for allowing me to participate in the care of your patient.        Sincerely,        Felisha Tyler NP

## 2021-06-09 NOTE — PATIENT INSTRUCTIONS
Thank you for allowing Felisha Tyler and our ENT team to participate in your care.  If your medications are too expensive, please give the nurse a call.  We can possibly change this medication.  If you have a scheduling or an appointment question please contact our Health Unit Coordinator at their direct line [phone].   ALL nursing questions or concerns can be directed to your ENT nurse at: [phone] - Anju     Start:  azelastine (ASTELIN) 0.1 % nasal spray  budesonide (PULMICORT) 0.5 MG/2ML neb solution  montelukast (SINGULAIR) 10 MG tablet      Budesonide nasal saline irrigation per instructions:  -Obtain Reagan Med Sinus rinse over the counter.    -Use warm distilled water and 2 packets of the salt solution that comes with the bottle, dissolve in bottle up to the 240 mL natanael.  -Add 1 vial of budesonide.  -Irrigate each side of your nose leaning over the sink, using 1/3 to 1/2 the volume of the bottle in each nostril every irrigation.  Irrigate 2 times daily.  -If additional rinses are needed/recommended, you may use the plan Reagan Med Sinus irrigation without the use of added budesonide.     Follow up in with Felisha Tyler in 4 weeks

## 2021-06-09 NOTE — PROGRESS NOTES
Otolaryngology Note         Chief Complaint:     Patient presents with:  Allergies: Follow up SCIT           History of Present Illness:     Rj Rodríguez is a 70 year old female seen today for a follow up for SCIT.    She continues with post nasal drainage and coughing.  She is coughing up phlegm.  She is using her inhaler with good improvement.  She does notice globus sensation in throat without dysphagia.  No c/o ear pain, sore throat or odynophagia.      She has some reactive airway symptoms with wheezing that improves with albuterol inhaler.  She has never tried Singulair in the past.       She has been having some oral allergy syndrome over the past years.  She avoids foods that exacerbate symptoms.  She has been educated on cross reactivity to pollens/foods.    Has been tolerating injections well  She is currently using Zyrtec 10 mg daily  Epi pen is up to date    Does some see relief with kassandra med sinus rinses.  she is using rinses 2-3 times per week.    No cyn sinusitis  No recent illnesses or sinus infections    She has a history of REBECCA, not currently treated with CPAP, could not tolerate in the past.          Medications:     Current Outpatient Rx   Medication Sig Dispense Refill     ALBUTEROL IN        azelastine (ASTELIN) 0.1 % nasal spray Spray 2 sprays into both nostrils 2 times daily 30 mL 1     blood glucose (ACCU-CHEK GUIDE) test strip Use to test blood sugar 1 time daily or as directed. 50 each 11     blood glucose monitoring (ACCU-CHEK FASTCLIX) lancets Use to test blood sugar 1 time daily or as directed. 102 each 11     budesonide (PULMICORT) 0.5 MG/2ML neb solution Mix 240 ml kassandra med sinus rinse, add 0.5 mg budesonide vial, rinse 1/2 bottle in each nostril twice daily 60 mL 3     cetirizine (ZYRTEC) 10 MG tablet Take 1 tablet (10 mg) by mouth daily as needed for allergies 60 tablet 12     clonazePAM (KLONOPIN) 0.5 MG tablet TAKE 1 TABLET BY MOUTH TWICE DAILY AS NEEDED FOR ANXIETY 30 tablet  0     EPINEPHrine (ANY BX GENERIC EQUIV) 0.3 MG/0.3ML injection 2-pack INJECT CONTENTS OF 1 PEN AS NEEDED FOR ALLERGIC REACTION 2 each 0     furosemide (LASIX) 20 MG tablet Take 1 tablet by mouth once daily 30 tablet 0     losartan (COZAAR) 25 MG tablet Take 1 tablet by mouth once daily 30 tablet 0     losartan (COZAAR) 50 MG tablet Take 1 tablet (50 mg) by mouth daily 90 tablet 2     montelukast (SINGULAIR) 10 MG tablet Take 1 tablet (10 mg) by mouth At Bedtime 90 tablet 1     ORDER FOR ALLERGEN IMMUNOTHERAPY Continue with allergy shots.  Follow standard buildup protocols. 5 mL PRN     potassium chloride ER (KLOR-CON M) 10 MEQ CR tablet TAKE 1  BY MOUTH ONCE DAILY WITH FOOD 30 tablet 0     STATIN NOT PRESCRIBED (INTENTIONAL) Please choose reason not prescribed from choices below.       triamcinolone (NASACORT) 55 MCG/ACT nasal aerosol Spray 2 sprays into both nostrils daily 2 Bottle 12     albuterol (PROAIR HFA/PROVENTIL HFA/VENTOLIN HFA) 108 (90 Base) MCG/ACT inhaler Inhale 1-2 puffs into the lungs every 4 hours as needed for shortness of breath / dyspnea or wheezing 18 g 3            Allergies:     Allergies: Fruit [peach], Nuts, Ace inhibitors, Alkylamines, Alprazolam, Diphenhydramine, Erythromycin, Guaifed, Guaifenesin, Hydrochlorothiazide, No clinical screening - see comments, Olmesartan medoxomil, Phenylephrine hcl, Pseudoephedrine hcl, Seasonal allergies, Tramadol, Tramadol hcl, Venlafaxine, Zoloft [sertraline], and Latex          Past Medical History:     Past Medical History:   Diagnosis Date     Arthritis      Chronic back pain      Hypertension      Irregular heart beat      Sleep apnea             Past Surgical History:     Past Surgical History:   Procedure Laterality Date     APPENDECTOMY       BACK SURGERY       CHOLECYSTECTOMY       COLONOSCOPY  2012    Repeat in 10 years     GYN SURGERY       HYSTERECTOMY      Ovaries     RELEASE CARPAL TUNNEL      LT     RELEASE CARPAL TUNNEL      RT x2      "SURGICAL RADIOLOGY PROCEDURE N/A 2/12/2016    Procedure: SURGICAL RADIOLOGY PROCEDURE;  Surgeon: Provider, Generic Perianesthesia Nursing;  Location: [facility]     SURGICAL RADIOLOGY PROCEDURE N/A 8/15/2016    Procedure: SURGICAL RADIOLOGY PROCEDURE;  Surgeon: Provider, Generic Perianesthesia Nursing;  Location: [facility]       ENT family history reviewed         Social History:     Social History     Tobacco Use     Smoking status: Never Smoker     Smokeless tobacco: Never Used   Substance Use Topics     Alcohol use: Yes     Alcohol/week: 1.0 standard drinks     Types: 1 Glasses of wine per week     Frequency: Monthly or less     Drinks per session: 1 or 2     Binge frequency: Never     Comment: occ glass of wine     Drug use: No            Review of Systems:     ROS: See HPI         Physical Exam:     BP (!) 144/76 (Cuff Size: Adult Large)   Pulse 74   Temp 97.8  F (36.6  C) (Tympanic)   Ht 1.549 m (5' 1\")   Wt 90.7 kg (200 lb)   SpO2 98%   BMI 37.79 kg/m    General - The patient is well nourished and well developed, and appears to have good nutritional status.  Alert and oriented to person and place, answers questions and cooperates with examination appropriately.   Head and Face - Normocephalic and atraumatic, with no gross asymmetry noted.  The facial nerve is intact, with strong symmetric movements.  Voice and Breathing - The patient was breathing comfortably without the use of accessory muscles. There was no wheezing, stridor. The patients voice was clear and strong, and had appropriate pitch and quality.  Ears - External ear normal. Canals are patent. Right tympanic membrane is intact without effusion, retraction or mass. Left tympanic membrane is intact without effusion, retraction or mass.  Eyes - Extraocular movements intact, and the pupils were reactive to light. Sclera were not icteric or injected, conjunctiva were pink and moist.  Mouth - Examination of the oral cavity showed pink, healthy oral mucosa. " Dentition in good condition. No lesions or ulcerations noted. The tongue was mobile and midline.  FPT IV  Throat - The walls of the oropharynx were smooth, pink, moist, symmetric, and had no lesions or ulcerations.  The tonsillar pillars and soft palate were symmetric. The uvula was midline on elevation.    Neck - Normal midline excursion of the laryngotracheal complex during swallowing.  Full range of motion on passive movement.  Palpation of the occipital, submental, submandibular, internal jugular chain, and supraclavicular nodes did not demonstrate any abnormal lymph nodes or masses.  Palpation of the thyroid was soft and smooth, with no nodules or goiter appreciated.  The trachea was mobile and midline.  Nose - External contour is symmetric, no gross deflection or scars.  Nasal mucosa is pink and moist with no abnormal mucus.  The septum and turbinates were evaluated with nasal speculum, no polyps, masses, or purulence noted on examination of anterior nasal cavity.    Attempts at mirror laryngoscopy were not possible due to gag reflex.  Therefore I proceeded with a fiberoptic examination after informed consent.  First I sprayed both sides of the nose with a mixture of lidocaine and neosynephrine.  I then passed the scope through the nasal cavity.     There is clear drainage noted in the nasal cavity and nasopaharynx    The nasopharynx was mucosally covered and symmetric.  The eustachian tube openings were unobstructed.  Going further down I had a clear view of the base of tongue which had normal appearing lingual tonsillar tissue.  The base of tongue was free of lesions, and the vallecula was open.  The epiglottis was smooth and mucosally covered.  The supraglottic larynx was then clearly visualized.  The vocal cords moved smoothly and symmetrically and were pearly white.  The pyriform sinuses were open and without cyn mass or pooling of secretions upon valsalva, and the limited view of the postcricoid region  did not show any lesions.  The patient tolerated the procedure well.           Assessment and Plan:       ICD-10-CM    1. Post-nasal drainage  R09.82 montelukast (SINGULAIR) 10 MG tablet     budesonide (PULMICORT) 0.5 MG/2ML neb solution     azelastine (ASTELIN) 0.1 % nasal spray   2. Morbid obesity (H)  E66.01    3. Mild intermittent reactive airway disease without complication  J45.20    4. Hypertrophic soft palate  K13.79    5. History of obstructive sleep apnea  Z86.69        Start:  azelastine (ASTELIN) 0.1 % nasal spray  budesonide (PULMICORT) 0.5 MG/2ML neb solution  montelukast (SINGULAIR) 10 MG tablet      Budesonide nasal saline irrigation per instructions:  -Obtain Reagan Med Sinus rinse over the counter.    -Use warm distilled water and 2 packets of the salt solution that comes with the bottle, dissolve in bottle up to the 240 mL natanael.  -Add 1 vial of budesonide.  -Irrigate each side of your nose leaning over the sink, using 1/3 to 1/2 the volume of the bottle in each nostril every irrigation.  Irrigate 2 times daily.  -If additional rinses are needed/recommended, you may use the plan Reagan Med Sinus irrigation without the use of added budesonide.     Follow up in with Felisha Tyler in 4 weeks     Felisha Tyler NP-C  [facility] ENT

## 2021-06-09 NOTE — NURSING NOTE
"Chief Complaint   Patient presents with     Allergies     Follow up SCIT       Initial BP (!) 144/76 (Cuff Size: Adult Large)   Pulse 74   Temp 97.8  F (36.6  C) (Tympanic)   Ht 1.549 m (5' 1\")   Wt 90.7 kg (200 lb)   SpO2 98%   BMI 37.79 kg/m   Estimated body mass index is 37.79 kg/m  as calculated from the following:    Height as of this encounter: 1.549 m (5' 1\").    Weight as of this encounter: 90.7 kg (200 lb).  Medication Reconciliation: complete  Emma Bush LPN    "

## 2021-06-10 ENCOUNTER — TELEPHONE (OUTPATIENT)
Dept: FAMILY MEDICINE | Facility: OTHER | Age: 71
End: 2021-06-10

## 2021-06-10 DIAGNOSIS — J30.2 SEASONAL ALLERGIC RHINITIS, UNSPECIFIED TRIGGER: Primary | ICD-10-CM

## 2021-06-10 RX ORDER — ALBUTEROL SULFATE 90 UG/1
1-2 AEROSOL, METERED RESPIRATORY (INHALATION) EVERY 4 HOURS PRN
Qty: 18 G | Refills: 3 | Status: SHIPPED | OUTPATIENT
Start: 2021-06-10 | End: 2021-08-04

## 2021-06-10 NOTE — TELEPHONE ENCOUNTER
To: Nurse/provider covering for: Sheyla Sanches  Patient would like a return phone call to discuss her inhaler and blood pressure.  Please call her back @ [phone].  She would like a return phone call today 06/10/2021.  Thank karo

## 2021-06-10 NOTE — TELEPHONE ENCOUNTER
Spoke with patient . She was seen in the allergy department yesterday her b/p was checked and was at 144/76 . She has also been set up for a nurse only B/P check on June 16 th at 0900 .

## 2021-06-16 ENCOUNTER — ALLIED HEALTH/NURSE VISIT (OUTPATIENT)
Dept: FAMILY MEDICINE | Facility: OTHER | Age: 71
End: 2021-06-16
Attending: NURSE PRACTITIONER
Payer: MEDICARE

## 2021-06-16 ENCOUNTER — ALLIED HEALTH/NURSE VISIT (OUTPATIENT)
Dept: ALLERGY | Facility: OTHER | Age: 71
End: 2021-06-16
Attending: NURSE PRACTITIONER
Payer: MEDICARE

## 2021-06-16 ENCOUNTER — TRANSFERRED RECORDS (OUTPATIENT)
Dept: HEALTH INFORMATION MANAGEMENT | Facility: HOSPITAL | Age: 71
End: 2021-06-16

## 2021-06-16 VITALS — HEART RATE: 80 BPM | SYSTOLIC BLOOD PRESSURE: 174 MMHG | DIASTOLIC BLOOD PRESSURE: 70 MMHG

## 2021-06-16 DIAGNOSIS — J30.89 PERENNIAL ALLERGIC RHINITIS: Primary | ICD-10-CM

## 2021-06-16 DIAGNOSIS — I10 BENIGN ESSENTIAL HYPERTENSION: Primary | ICD-10-CM

## 2021-06-16 LAB — RETINOPATHY: NEGATIVE

## 2021-06-16 PROCEDURE — 95117 IMMUNOTHERAPY INJECTIONS: CPT

## 2021-06-16 PROCEDURE — 99207 PR NO CHARGE NURSE ONLY: CPT

## 2021-06-16 NOTE — PROGRESS NOTES
Patient in clinic for outpatient BP reading.   The patient was in a sitting position with feet on the floor - Yes  Has the patient smoked in the last 15 minutes - No  Has the patient exercised within the last 15 minutes - No  Patient was instructed to not cross legs or talk during the procedure - Yes  Patients arm was palm upward, slightly flexed and with the whole arm supported at heart level Yes  Cuff size was measured and is appropriate size for patient using established guideline - Yes  ?  The BP reading today was 174/70   and pulse was 80 .

## 2021-06-23 ENCOUNTER — ALLIED HEALTH/NURSE VISIT (OUTPATIENT)
Dept: ALLERGY | Facility: OTHER | Age: 71
End: 2021-06-23
Attending: NURSE PRACTITIONER
Payer: MEDICARE

## 2021-06-23 DIAGNOSIS — J30.89 PERENNIAL ALLERGIC RHINITIS: Primary | ICD-10-CM

## 2021-06-23 PROCEDURE — 95165 ANTIGEN THERAPY SERVICES: CPT

## 2021-06-23 PROCEDURE — 95165 ANTIGEN THERAPY SERVICES: CPT | Performed by: NURSE PRACTITIONER

## 2021-06-23 PROCEDURE — 95117 IMMUNOTHERAPY INJECTIONS: CPT

## 2021-06-23 NOTE — PROGRESS NOTES
Allergy serum is mixed today at [facility] schedule 4 of 4, into two (5 ml) multi dose vial/vials.    Allergens included were:    Ragweed  0.2 ml of dilution # 1  Pigweed  0.2 ml of dilution # 1  Mugwort 0.2 ml of dilution # 0  Kochia  0.2 ml of dilution # 0  Russian Thistle 0.2 ml of dilution # 1  Jimenze Grass 0.2 ml of dilution # 1  Birch mix 0.2 ml of dilution # 1  Maple Mix 0.2 of dilution # 1  Elm Mix 0.2 ml of dilution # 1  Oak Mix 0.2 ml of dilution # 1  Frank Mix 0.2 ml of dilution # 1  Pine Mix 0.2 ml of dilution # 1  Eastern [place] 0.2 ml of dilution # 1  Black [place] 0.2 ml of dilution # 1  Aspen 0.2 ml of dilution # 0  Red [place] 0.2 ml of dilution # 0    Alternaria 0.2 ml of dilution # 1  Aspergillus 0.2 ml of dilution # 1  Hormodendrum 0.2 ml of dilution # 1  Helminthosporium 0.2 ml of dilution # 1  Penicillium 0.2 ml of dilution # 1  Epicoccum 0.2 ml of dilution # 1  Fusarium 0.2 ml of dilution # 1  Mucor 0.2 ml of dilution # 0  Grain Smut 0.2 ml of dilution # 0  Grass Smut 0.2 ml of dilution # 0  Cat 0.2 ml of dilution # 1  Dog 0.2 ml of dilution # 1  Feather Mix 0.2 ml of dilution # 0  Dust Mite Mix 0.2 ml of dilution # 1  Horse 0.2 ml of dilution # 0    Halina Nogueira RN

## 2021-06-23 NOTE — PROGRESS NOTES
Prior to safety vial test, verified patient's identity using patient's name and date of birth.    Safety vial test was done in the left arm, for new Green vial # 1.   Administered  0.01ml (ID) for SVT.  SVT = 9 mm.   Passed.    Safety vial test was done in the right arm, for new Green vial # 2.   Administered  0.01ml (ID) for SVT.  SVT = 8 mm.   Passed.    Allergy injection/s given and charted on paper allergy flow sheet.  Patient signed out AMA and did not stay for observation.    Halina Nogueira RN

## 2021-06-25 NOTE — PROGRESS NOTES
Assessment & Plan     Essential hypertension  BP higher than normal at 162/80. Home readings have also been high. Currently taking losartan 50 mg. Will increase to 75 mg today and recheck BP in 2 weeks with BMP. She will also limit her salt intake.     - Basic metabolic panel  - Magnesium  - losartan (COZAAR) 25 MG tablet; Take 1 tablet (25 mg) by mouth daily    Hypokalemia  Potassium normal today at 3.8. Will continue current dose of lasix with her potassium supplement, but will recheck labs in 2 weeks as we are increasing her losartan. I do not think we have to stop her potassium supplement as she has normal kidney function and she is only on a low dose. Recheck 2 weeks.     - Basic metabolic panel    Seasonal allergic rhinitis, unspecified trigger  Chronic rhinitis  Following with ENT. Feels her allergies are improving with her injections. Is having more shortness of breath. I am concerned there is an asthma component and encouraged PFTs, but patient declined. Her anxiety is severe today and she declined all additional testing including a CXR.     Generalized anxiety disorder  Mild episode of recurrent major depressive disorder (H)  Depression stable. Anxiety slightly worse. No thoughts of suicide. Will continue to see therapist and mental health provider.     Shortness of breath  Irregular heart rate  Frequent PVCs  Patient with increasing shortness of breath over the past 2-3 months. HR also sounded irregular on exam. She contributes this to her weight gain and anxiety. Initially declined all blood work and an EKG due to anxiety, but we were finally able to convince her to complete this. EKG had to be done in the exam room with the door open per pt request. Labs also had to be drawn in the exam room. EKG did show flipped t-waves in several leads with frequent PVCs. Encouraged to complete stress test and repeat Holter, but patient declined. Also refused CXR although I asked her several times. Did not draw  d-dimer as patient noted that she would not do a CTA if it was positive. Did agree to complete PFTs after I asked her several times. Wants to first meet with Dr. Mills as she knows him as her  sees him before doing a stress test/Holter. She is aware of the risks of not completing a stress test/holter, but she still wants to wait. She was told that she might have a blockage that could cause death, but she still wanted to wait to see Dr. Mills before additional testing was run. Will see her back in 2 weeks to recheck her BP. Will also assess shortness of breath at this time.     - CARDIOLOGY EVAL ADULT REFERRAL  [id number]}       Return in about 2 weeks (around 7/12/2021).    Sheyla Sanches NP  [facility] - [place] De La Rosa is a 70 year old who presents for the following health issues    HPI     Hypertension Follow-up      Do you check your blood pressure regularly outside of the clinic? Yes, running around 160/80     Are you following a low salt diet? Yes    Are your blood pressures ever more than 140 on the top number (systolic) OR more   than 90 on the bottom number (diastolic), for example 140/90? Yes   -Taking losartan 50 mg and lasix 20 mg. Also on potassium supplements-taking 10 mEq daily.   -Unable to take metoprolol as she currently is getting allergy injections.   -Allergy to hydrochlorothiazide; gets rash  -Admits to gaining weight due to stress.   -Denies chest pain, dizziness, syncope, or palpitations. Some shortness of breath. Feels this has worsened over the past 2 months. Unsure if her allergies are acting up.    -Admits that she is very stressed;  needs angiogram. Also needs cataract surgery.     Due for a dexa-scan. Declines.     Due for a mammogram. Declines.    Asthma Follow-Up    Was ACT completed today?    Yes    ACT Total Scores 6/28/2021   ACT TOTAL SCORE (Goal Greater than or Equal to 20) 16   In the past 12 months, how many times did you visit the  "emergency room for your asthma without being admitted to the hospital? 0   In the past 12 months, how many times were you hospitalized overnight because of your asthma? 0       How many days per week do you miss taking your asthma controller medication?  0    Please describe any recent triggers for your asthma: upper respiratory infections, dust mites, humidity and occupational exposure    Have you had any Emergency Room Visits, Urgent Care Visits, or Hospital Admissions since your last office visit?  No    No recent PFTs. No known athma, but she does have severe allergies. Feels they are currently really bad. Getting allergy injections. Does feel they have helped.Taking Zyrtec BID with Singulair. Also doing Pulmicort. Does feel her breathing has worsened. Using her albuterol once daily.     Anxiety:   She feels her anxiety has worsened.  needs an angiogram. Needs cataract surgery. Working with a therapist, feels this is going very well. Rarely takes her Klonopin. No thoughts of suicide.       How many servings of fruits and vegetables do you eat daily?  4 or more    On average, how many sweetened beverages do you drink each day (Examples: soda, juice, sweet tea, etc.  Do NOT count diet or artificially sweetened beverages)?   1    How many days per week do you exercise enough to make your heart beat faster? 7    How many minutes a day do you exercise enough to make your heart beat faster? 60 or more    How many days per week do you miss taking your medication? 0      Review of Systems   As noted in the HPI.       Objective    BP (!) 162/80 (BP Location: Left arm, Patient Position: Sitting, Cuff Size: Adult Large)   Pulse 56   Temp 98.7  F (37.1  C) (Tympanic)   Ht 1.549 m (5' 1\")   Wt 88 kg (194 lb)   SpO2 93%   BMI 36.66 kg/m    Body mass index is 36.66 kg/m .  Physical Exam   GENERAL: alert, obese and very anxious; almost had panic attack  EYES: Eyes grossly normal to inspection, PERRL and " conjunctivae and sclerae normal  HENT: ear canals and TM's normal, nose and mouth without ulcers or lesions  NECK: no adenopathy, no asymmetry, masses, or scars and thyroid normal to palpation  RESP: lungs clear to auscultation - no rales, rhonchi or wheezes  CV: irregularly rhythm, no murmur, peripheral pulses strong and no peripheral edema  ABDOMEN: soft, nontender, no masses and bowel sounds normal  NEURO: Normal strength and tone, mentation intact and speech normal  PSYCH: mentation appears normal and anxious    Results for orders placed or performed in visit on 06/28/21 (from the past 24 hour(s))   Basic metabolic panel   Result Value Ref Range    Sodium 138 133 - 144 mmol/L    Potassium 3.8 3.4 - 5.3 mmol/L    Chloride 109 94 - 109 mmol/L    Carbon Dioxide 22 20 - 32 mmol/L    Anion Gap 7 3 - 14 mmol/L    Glucose 100 (H) 70 - 99 mg/dL    Urea Nitrogen 10 7 - 30 mg/dL    Creatinine 0.67 0.52 - 1.04 mg/dL    GFR Estimate 90 >60 mL/min/[1.73_m2]    GFR Estimate If Black >90 >60 mL/min/[1.73_m2]    Calcium 8.9 8.5 - 10.1 mg/dL   Magnesium   Result Value Ref Range    Magnesium 2.2 1.6 - 2.3 mg/dL   BNP-N terminal pro   Result Value Ref Range    N-Terminal Pro Bnp 123 0 - 125 pg/mL   CBC with platelets and differential   Result Value Ref Range    WBC 8.3 4.0 - 11.0 10e9/L    RBC Count 4.86 3.8 - 5.2 10e12/L    Hemoglobin 14.4 11.7 - 15.7 g/dL    Hematocrit 43.4 35.0 - 47.0 %    MCV 89 78 - 100 fl    MCH 29.6 26.5 - 33.0 pg    MCHC 33.2 31.5 - 36.5 g/dL    RDW 13.3 10.0 - 15.0 %    Platelet Count 279 150 - 450 10e9/L    Diff Method Automated Method     % Neutrophils 63.6 %    % Lymphocytes 22.5 %    % Monocytes 9.7 %    % Eosinophils 2.8 %    % Basophils 1.0 %    % Immature Granulocytes 0.4 %    Nucleated RBCs 0 0 /100    Absolute Neutrophil 5.3 1.6 - 8.3 10e9/L    Absolute Lymphocytes 1.9 0.8 - 5.3 10e9/L    Absolute Monocytes 0.8 0.0 - 1.3 10e9/L    Absolute Eosinophils 0.2 0.0 - 0.7 10e9/L    Absolute Basophils  0.1 0.0 - 0.2 10e9/L    Abs Immature Granulocytes 0.0 0 - 0.4 10e9/L    Absolute Nucleated RBC 0.0          EKG: Sinus rhythm with frequent PVCs, VR 87, flipped t waves in several leads-this is changed from previous EKG in 2/2020

## 2021-06-28 ENCOUNTER — OFFICE VISIT (OUTPATIENT)
Dept: FAMILY MEDICINE | Facility: OTHER | Age: 71
End: 2021-06-28
Attending: NURSE PRACTITIONER
Payer: MEDICARE

## 2021-06-28 VITALS
WEIGHT: 194 LBS | HEIGHT: 61 IN | TEMPERATURE: 98.7 F | OXYGEN SATURATION: 93 % | BODY MASS INDEX: 36.63 KG/M2 | DIASTOLIC BLOOD PRESSURE: 80 MMHG | HEART RATE: 56 BPM | SYSTOLIC BLOOD PRESSURE: 162 MMHG

## 2021-06-28 DIAGNOSIS — I10 ESSENTIAL HYPERTENSION: Primary | ICD-10-CM

## 2021-06-28 DIAGNOSIS — J31.0 CHRONIC RHINITIS: ICD-10-CM

## 2021-06-28 DIAGNOSIS — I49.3 FREQUENT PVCS: ICD-10-CM

## 2021-06-28 DIAGNOSIS — R06.02 SHORTNESS OF BREATH: ICD-10-CM

## 2021-06-28 DIAGNOSIS — I49.9 IRREGULAR HEART RATE: ICD-10-CM

## 2021-06-28 DIAGNOSIS — E87.6 HYPOKALEMIA: ICD-10-CM

## 2021-06-28 DIAGNOSIS — J30.2 SEASONAL ALLERGIC RHINITIS, UNSPECIFIED TRIGGER: ICD-10-CM

## 2021-06-28 DIAGNOSIS — F41.1 GENERALIZED ANXIETY DISORDER: ICD-10-CM

## 2021-06-28 DIAGNOSIS — F33.0 MILD EPISODE OF RECURRENT MAJOR DEPRESSIVE DISORDER (H): ICD-10-CM

## 2021-06-28 DIAGNOSIS — F40.231 SEVERE NEEDLE PHOBIA: ICD-10-CM

## 2021-06-28 LAB
ANION GAP SERPL CALCULATED.3IONS-SCNC: 7 MMOL/L (ref 3–14)
BASOPHILS # BLD AUTO: 0.1 10E9/L (ref 0–0.2)
BASOPHILS NFR BLD AUTO: 1 %
BUN SERPL-MCNC: 10 MG/DL (ref 7–30)
CALCIUM SERPL-MCNC: 8.9 MG/DL (ref 8.5–10.1)
CHLORIDE SERPL-SCNC: 109 MMOL/L (ref 94–109)
CO2 SERPL-SCNC: 22 MMOL/L (ref 20–32)
CREAT SERPL-MCNC: 0.67 MG/DL (ref 0.52–1.04)
DIFFERENTIAL METHOD BLD: NORMAL
EOSINOPHIL # BLD AUTO: 0.2 10E9/L (ref 0–0.7)
EOSINOPHIL NFR BLD AUTO: 2.8 %
ERYTHROCYTE [DISTWIDTH] IN BLOOD BY AUTOMATED COUNT: 13.3 % (ref 10–15)
GFR SERPL CREATININE-BSD FRML MDRD: 90 ML/MIN/{1.73_M2}
GLUCOSE SERPL-MCNC: 100 MG/DL (ref 70–99)
HCT VFR BLD AUTO: 43.4 % (ref 35–47)
HGB BLD-MCNC: 14.4 G/DL (ref 11.7–15.7)
IMM GRANULOCYTES # BLD: 0 10E9/L (ref 0–0.4)
IMM GRANULOCYTES NFR BLD: 0.4 %
LYMPHOCYTES # BLD AUTO: 1.9 10E9/L (ref 0.8–5.3)
LYMPHOCYTES NFR BLD AUTO: 22.5 %
MAGNESIUM SERPL-MCNC: 2.2 MG/DL (ref 1.6–2.3)
MCH RBC QN AUTO: 29.6 PG (ref 26.5–33)
MCHC RBC AUTO-ENTMCNC: 33.2 G/DL (ref 31.5–36.5)
MCV RBC AUTO: 89 FL (ref 78–100)
MONOCYTES # BLD AUTO: 0.8 10E9/L (ref 0–1.3)
MONOCYTES NFR BLD AUTO: 9.7 %
NEUTROPHILS # BLD AUTO: 5.3 10E9/L (ref 1.6–8.3)
NEUTROPHILS NFR BLD AUTO: 63.6 %
NRBC # BLD AUTO: 0 10*3/UL
NRBC BLD AUTO-RTO: 0 /100
NT-PROBNP SERPL-MCNC: 123 PG/ML (ref 0–125)
PLATELET # BLD AUTO: 279 10E9/L (ref 150–450)
POTASSIUM SERPL-SCNC: 3.8 MMOL/L (ref 3.4–5.3)
RBC # BLD AUTO: 4.86 10E12/L (ref 3.8–5.2)
SODIUM SERPL-SCNC: 138 MMOL/L (ref 133–144)
WBC # BLD AUTO: 8.3 10E9/L (ref 4–11)

## 2021-06-28 PROCEDURE — 80048 BASIC METABOLIC PNL TOTAL CA: CPT | Mod: ZL | Performed by: NURSE PRACTITIONER

## 2021-06-28 PROCEDURE — 85025 COMPLETE CBC W/AUTO DIFF WBC: CPT | Mod: ZL | Performed by: NURSE PRACTITIONER

## 2021-06-28 PROCEDURE — PFT13: Performed by: NURSE PRACTITIONER

## 2021-06-28 PROCEDURE — G0463 HOSPITAL OUTPT CLINIC VISIT: HCPCS | Mod: 25

## 2021-06-28 PROCEDURE — 83735 ASSAY OF MAGNESIUM: CPT | Mod: ZL | Performed by: NURSE PRACTITIONER

## 2021-06-28 PROCEDURE — 99214 OFFICE O/P EST MOD 30 MIN: CPT | Performed by: NURSE PRACTITIONER

## 2021-06-28 PROCEDURE — 83880 ASSAY OF NATRIURETIC PEPTIDE: CPT | Mod: ZL | Performed by: NURSE PRACTITIONER

## 2021-06-28 PROCEDURE — 93010 ELECTROCARDIOGRAM REPORT: CPT | Mod: 77 | Performed by: INTERNAL MEDICINE

## 2021-06-28 PROCEDURE — 93005 ELECTROCARDIOGRAM TRACING: CPT

## 2021-06-28 PROCEDURE — 36415 COLL VENOUS BLD VENIPUNCTURE: CPT | Mod: ZL | Performed by: NURSE PRACTITIONER

## 2021-06-28 RX ORDER — LOSARTAN POTASSIUM 25 MG/1
25 TABLET ORAL DAILY
Qty: 30 TABLET | Refills: 0 | Status: SHIPPED | OUTPATIENT
Start: 2021-06-28 | End: 2021-07-07

## 2021-06-28 ASSESSMENT — ASTHMA QUESTIONNAIRES
QUESTION_3 LAST FOUR WEEKS HOW OFTEN DID YOUR ASTHMA SYMPTOMS (WHEEZING, COUGHING, SHORTNESS OF BREATH, CHEST TIGHTNESS OR PAIN) WAKE YOU UP AT NIGHT OR EARLIER THAN USUAL IN THE MORNING: NOT AT ALL
QUESTION_5 LAST FOUR WEEKS HOW WOULD YOU RATE YOUR ASTHMA CONTROL: POORLY CONTROLLED
ACUTE_EXACERBATION_TODAY: NO
QUESTION_1 LAST FOUR WEEKS HOW MUCH OF THE TIME DID YOUR ASTHMA KEEP YOU FROM GETTING AS MUCH DONE AT WORK, SCHOOL OR AT HOME: NONE OF THE TIME
ACT_TOTALSCORE: 16
QUESTION_2 LAST FOUR WEEKS HOW OFTEN HAVE YOU HAD SHORTNESS OF BREATH: ONCE A DAY
QUESTION_4 LAST FOUR WEEKS HOW OFTEN HAVE YOU USED YOUR RESCUE INHALER OR NEBULIZER MEDICATION (SUCH AS ALBUTEROL): ONE OR TWO TIMES PER DAY

## 2021-06-28 ASSESSMENT — MIFFLIN-ST. JEOR: SCORE: 1347.36

## 2021-06-28 ASSESSMENT — PAIN SCALES - GENERAL: PAINLEVEL: MODERATE PAIN (4)

## 2021-06-28 NOTE — NURSING NOTE
"Chief Complaint   Patient presents with     Hypertension       Initial BP (!) 162/80 (BP Location: Left arm, Patient Position: Sitting, Cuff Size: Adult Large)   Pulse 56   Temp 98.7  F (37.1  C) (Tympanic)   Ht 1.549 m (5' 1\")   Wt 88 kg (194 lb)   SpO2 93%   BMI 36.66 kg/m   Estimated body mass index is 36.66 kg/m  as calculated from the following:    Height as of this encounter: 1.549 m (5' 1\").    Weight as of this encounter: 88 kg (194 lb).  Medication Reconciliation: complete  Lizeth Ruiz LPN  "

## 2021-06-28 NOTE — LETTER
My Asthma Action Plan    Name: Rj Rodríguez   YOB: 1950  Date: 6/28/2021   My doctor: Sheyla Sanches NP   My clinic: [facility] - [place]        My Rescue Medicine:   Albuterol inhaler (Proair/Ventolin/Proventil HFA)  2-4 puffs EVERY 4 HOURS as needed. Use a spacer if recommended by your provider.   My Asthma Severity:   Intermittent / Exercise Induced  Know your asthma triggers: upper respiratory infections and occupational exposure             GREEN ZONE   Good Control    I feel good    No cough or wheeze    Can work, sleep and play without asthma symptoms       Take your asthma control medicine every day.     1. If exercise triggers your asthma, take your rescue medication    15 minutes before exercise or sports, and    During exercise if you have asthma symptoms  2. Spacer to use with inhaler: If you have a spacer, make sure to use it with your inhaler             YELLOW ZONE Getting Worse  I have ANY of these:    I do not feel good    Cough or wheeze    Chest feels tight    Wake up at night   1. Keep taking your Green Zone medications  2. Start taking your rescue medicine:    every 20 minutes for up to 1 hour. Then every 4 hours for 24-48 hours.  3. If you stay in the Yellow Zone for more than 12-24 hours, contact your doctor.  4. If you do not return to the Green Zone in 12-24 hours or you get worse, start taking your oral steroid medicine if prescribed by your provider.           RED ZONE Medical Alert - Get Help  I have ANY of these:    I feel awful    Medicine is not helping    Breathing getting harder    Trouble walking or talking    Nose opens wide to breathe       1. Take your rescue medicine NOW  2. If your provider has prescribed an oral steroid medicine, start taking it NOW  3. Call your doctor NOW  4. If you are still in the Red Zone after 20 minutes and you have not reached your doctor:    Take your rescue medicine again and    Call 911 or go to the emergency room  right away    See your regular doctor within 2 weeks of an Emergency Room or Urgent Care visit for follow-up treatment.          Annual Reminders:  Meet with Asthma Educator,  Flu Shot in the Fall, consider Pneumonia Vaccination for patients with asthma (aged 19 and older).    Pharmacy:    [id number] - [place], [place] - [address]   [id number] - [place], [place] - [address] AT [facility] [address]    Electronically signed by Sheyla Sanches NP   Date: 06/28/21                    Asthma Triggers  How To Control Things That Make Your Asthma Worse    Triggers are things that make your asthma worse.  Look at the list below to help you find your triggers and   what you can do about them. You can help prevent asthma flare-ups by staying away from your triggers.      Trigger                                                          What you can do   Cigarette Smoke  Tobacco smoke can make asthma worse. Do not allow smoking in your home, car or around you.  Be sure no one smokes at a child s day care or school.  If you smoke, ask your health care provider for ways to help you quit.  Ask family members to quit too.  Ask your health care provider for a referral to Quit Plan to help you quit smoking, or call 2-115-278-PLAN.     Colds, Flu, Bronchitis  These are common triggers of asthma. Wash your hands often.  Don t touch your eyes, nose or mouth.  Get a flu shot every year.     Dust Mites  These are tiny bugs that live in cloth or carpet. They are too small to see. Wash sheets and blankets in hot water every week.   Encase pillows and mattress in dust mite proof covers.  Avoid having carpet if you can. If you have carpet, vacuum weekly.   Use a dust mask and HEPA vacuum.   Pollen and Outdoor Mold  Some people are allergic to trees, grass, or weed pollen, or molds. Try to keep your windows closed.  Limit time out doors when pollen count is high.   Ask you health care provider about taking medicine  during allergy season.     Animal Dander  Some people are allergic to skin flakes, urine or saliva from pets with fur or feathers. Keep pets with fur or feathers out of your home.    If you can t keep the pet outdoors, then keep the pet out of your bedroom.  Keep the bedroom door closed.  Keep pets off cloth furniture and away from stuffed toys.     Mice, Rats, and Cockroaches  Some people are allergic to the waste from these pests.   Cover food and garbage.  Clean up spills and food crumbs.  Store grease in the refrigerator.   Keep food out of the bedroom.   Indoor Mold  This can be a trigger if your home has high moisture. Fix leaking faucets, pipes, or other sources of water.   Clean moldy surfaces.  Dehumidify basement if it is damp and smelly.   Smoke, Strong Odors, and Sprays  These can reduce air quality. Stay away from strong odors and sprays, such as perfume, powder, hair spray, paints, smoke incense, paint, cleaning products, candles and new carpet.   Exercise or Sports  Some people with asthma have this trigger. Be active!  Ask your doctor about taking medicine before sports or exercise to prevent symptoms.    Warm up for 5-10 minutes before and after sports or exercise.     Other Triggers of Asthma  Cold air:  Cover your nose and mouth with a scarf.  Sometimes laughing or crying can be a trigger.  Some medicines and food can trigger asthma.

## 2021-06-29 ASSESSMENT — ASTHMA QUESTIONNAIRES: ACT_TOTALSCORE: 16

## 2021-06-30 ENCOUNTER — ALLIED HEALTH/NURSE VISIT (OUTPATIENT)
Dept: ALLERGY | Facility: OTHER | Age: 71
End: 2021-06-30
Attending: NURSE PRACTITIONER
Payer: MEDICARE

## 2021-06-30 DIAGNOSIS — J30.89 PERENNIAL ALLERGIC RHINITIS: Primary | ICD-10-CM

## 2021-06-30 PROCEDURE — 95117 IMMUNOTHERAPY INJECTIONS: CPT

## 2021-07-06 NOTE — PROGRESS NOTES
Assessment & Plan     Essential hypertension  BP ok in the clinic at 120/76, but she notes that it has been high at home. She had taken a Klonopin just prior to this appointment and feels this is the only reason it is normal. This morning it was 150/80 downstairs in ENT. Will therefore increase the losartan to 100 mg from 75 mg and see her back in 2 weeks. Will check a BMP at this time.     - losartan (COZAAR) 100 MG tablet; Take 1 tablet (100 mg) by mouth daily  - furosemide (LASIX) 20 MG tablet; Take 1 tablet (20 mg) by mouth daily  - Basic metabolic panel    Irregular heart rate  Frequent PVCs  Shortness of breath  Patient with increasing shortness of breath over the past 2-3 months. Also having a chest tightness. HR also sounded irregular on exam at the previous visit. EKG done at previous visit and did show did show flipped t-waves in several leads with frequent PVCs. Encouraged to complete stress test and repeat Holter, but patient declined. Also refused CXR although I asked her several times. Did not draw d-dimer as patient noted that she would not do a CTA if it was positive.    I did talk to her again today and stressed the importance of completing a stress test, Holter, and CXR, but she declined. Wants to first meet with Dr. Mills as she knows him as her  sees him before doing a stress test/Holter. She is aware of the risks of not completing a stress test/holter including death, but she still wants to wait. She was told that she might have a blockage that could cause death, but she still wanted to wait to see Dr. Mills before additional testing was run. Agrees to go to the ER with any new or worsening symptoms.      Generalized anxiety disorder  Anxiety severe. Ok to take Klonopin as needed to order to complete necessary tests.     - clonazePAM (KLONOPIN) 0.5 MG tablet; TAKE 1 TABLET BY MOUTH TWICE DAILY AS NEEDED FOR ANXIETY        Return in about 2 weeks (around 7/21/2021).    Sheyla Sanches,  "NP  [facility] - [place]    Joni De La Rosa is a 70 year old who presents for the following health issues    HPI     Hypertension Follow-up    Last seen on 6/28/21. BP was 162/80. Losartan was increased to 75 mg from 50 mg. She also continues to take lasix 20 mg daily. On potassium supplements.       Do you check your blood pressure regularly outside of the clinic? Yes, running around 150/70    Are you following a low salt diet? Yes    Are your blood pressures ever more than 140 on the top number (systolic) OR more   than 90 on the bottom number (diastolic), for example 140/90? Yes on occasion     Patient also noted increasing shortness of breath. HR was irregular. EKG was done and showed frequent PVCs with flipped t-waves. This was different compared to previous EKGs. Denied chest pain. Declined to complete CXR, Holter, or stress. Wanted to meet with Dr. Mills first. Referral placed. She did agreed to PFTs.     Today she tells me that she continues to have shortness of breath. Worse with the hot humid weather. She does not feel the albuterol is helping. Combivent was ordered by allergy, but she is unsure if she can afford this. Will look into it. She continues to deny chest pain, but does complain of chest pressure occasionally. No fevers. No cough or cold like symptoms. No abdominal pain or bloating. No nausea or vomiting.     She admits that she is very stressed and her anxiety is high.  having an angiogram tomorrow. Does occasionally take Klonopin and notes that it helps, but she also notes that she feels guilty when taking this. She does work with a therapist, Stacy, who really helps her.       Review of Systems   As noted in the HPI.       Objective    /76 (BP Location: Left arm, Patient Position: Chair, Cuff Size: Adult Large)   Pulse 81   Ht 1.549 m (5' 1\")   Wt 93 kg (205 lb)   SpO2 97%   BMI 38.73 kg/m    Body mass index is 38.73 kg/m .  Physical Exam   GENERAL: alert, " obese and very anxious; almost had panic attack  EYES: Eyes grossly normal to inspection, PERRL and conjunctivae and sclerae normal  HENT: ear canals and TM's normal, nose and mouth without ulcers or lesions  NECK: no adenopathy, no asymmetry, masses, or scars and thyroid normal to palpation  RESP: lungs clear to auscultation - no rales, rhonchi or wheezes  CV: irregularly rhythm, no murmur, peripheral pulses strong and no peripheral edema  ABDOMEN: soft, nontender, no masses and bowel sounds normal  NEURO: Normal strength and tone, mentation intact and speech normal  PSYCH: mentation appears normal and anxious    Results for orders placed or performed in visit on 07/07/21   Basic metabolic panel     Status: Abnormal   Result Value Ref Range    Sodium 140 133 - 144 mmol/L    Potassium 4.0 3.4 - 5.3 mmol/L    Chloride 110 (H) 94 - 109 mmol/L    Carbon Dioxide 25 20 - 32 mmol/L    Anion Gap 5 3 - 14 mmol/L    Glucose 93 70 - 99 mg/dL    Urea Nitrogen 10 7 - 30 mg/dL    Creatinine 0.83 0.52 - 1.04 mg/dL    GFR Estimate 72 >60 mL/min/[1.73_m2]    GFR Estimate If Black 84 >60 mL/min/[1.73_m2]    Calcium 8.8 8.5 - 10.1 mg/dL

## 2021-07-07 ENCOUNTER — OFFICE VISIT (OUTPATIENT)
Dept: OTOLARYNGOLOGY | Facility: OTHER | Age: 71
End: 2021-07-07
Attending: NURSE PRACTITIONER
Payer: MEDICARE

## 2021-07-07 ENCOUNTER — OFFICE VISIT (OUTPATIENT)
Dept: FAMILY MEDICINE | Facility: OTHER | Age: 71
End: 2021-07-07
Attending: NURSE PRACTITIONER
Payer: MEDICARE

## 2021-07-07 ENCOUNTER — ALLIED HEALTH/NURSE VISIT (OUTPATIENT)
Dept: ALLERGY | Facility: OTHER | Age: 71
End: 2021-07-07
Attending: NURSE PRACTITIONER
Payer: MEDICARE

## 2021-07-07 VITALS
HEIGHT: 61 IN | DIASTOLIC BLOOD PRESSURE: 88 MMHG | TEMPERATURE: 98.7 F | WEIGHT: 196 LBS | SYSTOLIC BLOOD PRESSURE: 154 MMHG | OXYGEN SATURATION: 97 % | HEART RATE: 60 BPM | BODY MASS INDEX: 37 KG/M2

## 2021-07-07 VITALS
WEIGHT: 205 LBS | HEIGHT: 61 IN | DIASTOLIC BLOOD PRESSURE: 76 MMHG | SYSTOLIC BLOOD PRESSURE: 120 MMHG | HEART RATE: 81 BPM | OXYGEN SATURATION: 97 % | BODY MASS INDEX: 38.71 KG/M2

## 2021-07-07 DIAGNOSIS — E87.6 HYPOKALEMIA: ICD-10-CM

## 2021-07-07 DIAGNOSIS — I10 ESSENTIAL HYPERTENSION: Primary | ICD-10-CM

## 2021-07-07 DIAGNOSIS — R07.89 SENSATION OF CHEST TIGHTNESS: ICD-10-CM

## 2021-07-07 DIAGNOSIS — F41.1 GENERALIZED ANXIETY DISORDER: ICD-10-CM

## 2021-07-07 DIAGNOSIS — J30.89 PERENNIAL ALLERGIC RHINITIS: Primary | ICD-10-CM

## 2021-07-07 DIAGNOSIS — I49.3 FREQUENT PVCS: ICD-10-CM

## 2021-07-07 DIAGNOSIS — J45.20 MILD INTERMITTENT REACTIVE AIRWAY DISEASE WITHOUT COMPLICATION: ICD-10-CM

## 2021-07-07 DIAGNOSIS — I49.9 IRREGULAR HEART RATE: ICD-10-CM

## 2021-07-07 DIAGNOSIS — R06.02 SHORTNESS OF BREATH: ICD-10-CM

## 2021-07-07 DIAGNOSIS — R09.82 POST-NASAL DRAINAGE: ICD-10-CM

## 2021-07-07 LAB
ANION GAP SERPL CALCULATED.3IONS-SCNC: 5 MMOL/L (ref 3–14)
BUN SERPL-MCNC: 10 MG/DL (ref 7–30)
CALCIUM SERPL-MCNC: 8.8 MG/DL (ref 8.5–10.1)
CHLORIDE SERPL-SCNC: 110 MMOL/L (ref 94–109)
CO2 SERPL-SCNC: 25 MMOL/L (ref 20–32)
CREAT SERPL-MCNC: 0.83 MG/DL (ref 0.52–1.04)
GFR SERPL CREATININE-BSD FRML MDRD: 72 ML/MIN/{1.73_M2}
GLUCOSE SERPL-MCNC: 93 MG/DL (ref 70–99)
POTASSIUM SERPL-SCNC: 4 MMOL/L (ref 3.4–5.3)
SODIUM SERPL-SCNC: 140 MMOL/L (ref 133–144)

## 2021-07-07 PROCEDURE — 95117 IMMUNOTHERAPY INJECTIONS: CPT

## 2021-07-07 PROCEDURE — 99213 OFFICE O/P EST LOW 20 MIN: CPT | Performed by: NURSE PRACTITIONER

## 2021-07-07 PROCEDURE — G0463 HOSPITAL OUTPT CLINIC VISIT: HCPCS | Mod: 25

## 2021-07-07 PROCEDURE — 80048 BASIC METABOLIC PNL TOTAL CA: CPT | Mod: ZL | Performed by: NURSE PRACTITIONER

## 2021-07-07 PROCEDURE — 36415 COLL VENOUS BLD VENIPUNCTURE: CPT | Mod: ZL | Performed by: NURSE PRACTITIONER

## 2021-07-07 PROCEDURE — G0463 HOSPITAL OUTPT CLINIC VISIT: HCPCS

## 2021-07-07 PROCEDURE — 99214 OFFICE O/P EST MOD 30 MIN: CPT | Performed by: NURSE PRACTITIONER

## 2021-07-07 RX ORDER — FUROSEMIDE 20 MG
20 TABLET ORAL DAILY
Qty: 90 TABLET | Refills: 0 | Status: SHIPPED | OUTPATIENT
Start: 2021-07-07 | End: 2021-11-03

## 2021-07-07 RX ORDER — LOSARTAN POTASSIUM 100 MG/1
100 TABLET ORAL DAILY
Qty: 90 TABLET | Refills: 0 | Status: SHIPPED | OUTPATIENT
Start: 2021-07-07 | End: 2021-10-04

## 2021-07-07 RX ORDER — POTASSIUM CHLORIDE 750 MG/1
TABLET, EXTENDED RELEASE ORAL
Qty: 90 TABLET | Refills: 1 | Status: SHIPPED | OUTPATIENT
Start: 2021-07-07 | End: 2021-09-20 | Stop reason: ALTCHOICE

## 2021-07-07 RX ORDER — CLONAZEPAM 0.5 MG/1
TABLET ORAL
Qty: 30 TABLET | Refills: 0 | Status: SHIPPED | OUTPATIENT
Start: 2021-07-07 | End: 2022-01-10

## 2021-07-07 ASSESSMENT — MIFFLIN-ST. JEOR
SCORE: 1356.43
SCORE: 1397.25

## 2021-07-07 ASSESSMENT — PAIN SCALES - GENERAL
PAINLEVEL: NO PAIN (0)
PAINLEVEL: MODERATE PAIN (4)

## 2021-07-07 NOTE — PROGRESS NOTES
Otolaryngology Note         Chief Complaint:     Patient presents with:  Allergies           History of Present Illness:     Rj Rodríguez is a 70 year old female seen today for post nasal drainage.  She reports she continues with chest tightness that comes and goes.  She saw Sheyla Sanches NP for this, referral was placed for Dr Mills and stress test.  Rj states that she wants to put off this visit for now, states she has too much going on with her family at this time.  We discussed at length that it is important for her to take care of herself, and that chest tightness can be indicative of heart disease.  She states that she is aware of this, but has severe anxiety and PTSD from childhood trauma, has a difficult time undergoing procedures.  She is currently working with therapist and notes that she has made a lot of progress.      She reports that she has some shortness of breath with exertion, at times she has some wheezing.      She has been doing well with the budesonide rinses, feels that the post nasal drip and allergy symptoms are improved with current therapy.  She is tolerating the Singulair well.      Could not tolerate CPAP in the past.      She has PFT scheduled for early August.  She has a follow up with Meseret on 7/12.  I highly encouraged her to schedule the stress test and an appointment with Dr Mills.           Medications:     Current Outpatient Rx   Medication Sig Dispense Refill     albuterol (PROAIR HFA/PROVENTIL HFA/VENTOLIN HFA) 108 (90 Base) MCG/ACT inhaler Inhale 1-2 puffs into the lungs every 4 hours as needed for shortness of breath / dyspnea or wheezing (Patient not taking: Reported on 7/7/2021) 18 g 3     ALBUTEROL IN        azelastine (ASTELIN) 0.1 % nasal spray Spray 2 sprays into both nostrils 2 times daily 30 mL 1     blood glucose (ACCU-CHEK GUIDE) test strip Use to test blood sugar 1 time daily or as directed. 50 each 11     blood glucose monitoring (ACCU-CHEK FASTCLIX) lancets  Use to test blood sugar 1 time daily or as directed. 102 each 11     budesonide (PULMICORT) 0.5 MG/2ML neb solution Mix 240 ml kassandra med sinus rinse, add 0.5 mg budesonide vial, rinse 1/2 bottle in each nostril twice daily 60 mL 3     cetirizine (ZYRTEC) 10 MG tablet Take 1 tablet (10 mg) by mouth daily as needed for allergies 60 tablet 12     EPINEPHrine (ANY BX GENERIC EQUIV) 0.3 MG/0.3ML injection 2-pack INJECT CONTENTS OF 1 PEN AS NEEDED FOR ALLERGIC REACTION (Patient not taking: Reported on 7/7/2021) 2 each 0     ipratropium-albuterol (COMBIVENT RESPIMAT)  MCG/ACT inhaler Inhale 1 puff into the lungs 4 times daily as needed for wheezing or other (chest tightness) (Patient not taking: Reported on 7/7/2021) 4 g 3     montelukast (SINGULAIR) 10 MG tablet Take 1 tablet (10 mg) by mouth At Bedtime 90 tablet 1     ORDER FOR ALLERGEN IMMUNOTHERAPY Continue with allergy shots.  Follow standard buildup protocols. 5 mL PRN     STATIN NOT PRESCRIBED (INTENTIONAL) Please choose reason not prescribed from choices below. (Patient not taking: Reported on 7/7/2021)       triamcinolone (NASACORT) 55 MCG/ACT nasal aerosol Spray 2 sprays into both nostrils daily 2 Bottle 12     clonazePAM (KLONOPIN) 0.5 MG tablet TAKE 1 TABLET BY MOUTH TWICE DAILY AS NEEDED FOR ANXIETY 30 tablet 0     furosemide (LASIX) 20 MG tablet Take 1 tablet (20 mg) by mouth daily 90 tablet 0     losartan (COZAAR) 100 MG tablet Take 1 tablet (100 mg) by mouth daily 90 tablet 0     potassium chloride ER (KLOR-CON M) 10 MEQ CR tablet TAKE 1  BY MOUTH ONCE DAILY WITH FOOD 90 tablet 1            Allergies:     Allergies: Fruit [peach], Nuts, Ace inhibitors, Alkylamines, Alprazolam, Diphenhydramine, Erythromycin, Guaifed, Guaifenesin, Hydrochlorothiazide, No clinical screening - see comments, Olmesartan medoxomil, Phenylephrine hcl, Pseudoephedrine hcl, Seasonal allergies, Tramadol, Tramadol hcl, Venlafaxine, Zoloft [sertraline], and Latex          Past  "Medical History:     Past Medical History:   Diagnosis Date     Arthritis      Chronic back pain      Hypertension      Irregular heart beat      Sleep apnea             Past Surgical History:     Past Surgical History:   Procedure Laterality Date     APPENDECTOMY       BACK SURGERY       CHOLECYSTECTOMY       COLONOSCOPY  2012    Repeat in 10 years     GYN SURGERY       HYSTERECTOMY      Ovaries     RELEASE CARPAL TUNNEL      LT     RELEASE CARPAL TUNNEL      RT x2     SURGICAL RADIOLOGY PROCEDURE N/A 2/12/2016    Procedure: SURGICAL RADIOLOGY PROCEDURE;  Surgeon: Provider, Generic Perianesthesia Nursing;  Location: [facility]     SURGICAL RADIOLOGY PROCEDURE N/A 8/15/2016    Procedure: SURGICAL RADIOLOGY PROCEDURE;  Surgeon: Provider, Generic Perianesthesia Nursing;  Location: [facility]       ENT family history reviewed         Social History:     Social History     Tobacco Use     Smoking status: Never Smoker     Smokeless tobacco: Never Used   Substance Use Topics     Alcohol use: Yes     Alcohol/week: 1.0 standard drinks     Types: 1 Glasses of wine per week     Frequency: Monthly or less     Drinks per session: 1 or 2     Binge frequency: Never     Comment: occ glass of wine     Drug use: No            Review of Systems:     ROS: See HPI         Physical Exam:     BP (!) 154/88 (Cuff Size: Adult Large)   Pulse 60   Temp 98.7  F (37.1  C) (Tympanic)   Ht 1.549 m (5' 1\")   Wt 88.9 kg (196 lb)   SpO2 97%   BMI 37.03 kg/m      General - The patient is well nourished and well developed, and appears to have good nutritional status.  Alert and oriented to person and place, answers questions and cooperates with examination appropriately.   Head and Face - Normocephalic and atraumatic, with no gross asymmetry noted.  The facial nerve is intact, with strong symmetric movements.  Voice and Breathing - The patient was breathing comfortably without the use of accessory muscles. There was no wheezing, stridor. The patients " voice was clear and strong, and had appropriate pitch and quality.  Ears - External ear normal. Canals are patent. Right tympanic membrane is intact without effusion, retraction or mass. Left tympanic membrane is intact without effusion, retraction or mass.  Eyes - Extraocular movements intact, sclera were not icteric or injected.  Mouth - Examination of the oral cavity showed pink, healthy oral mucosa. Dentition in good condition. No lesions or ulcerations noted. The tongue was mobile and midline.   Throat - The walls of the oropharynx were smooth, pink, moist, symmetric, and had no lesions or ulcerations.  The tonsillar pillars and soft palate were symmetric. The uvula was midline on elevation.    Neck - Normal midline excursion of the laryngotracheal complex during swallowing.  Full range of motion on passive movement.  Palpation of the occipital, submental, submandibular, internal jugular chain, and supraclavicular nodes did not demonstrate any abnormal lymph nodes or masses.  Palpation of the thyroid was soft and smooth, with no nodules or goiter appreciated.  The trachea was mobile and midline.  Nose - External contour is symmetric, no gross deflection or scars.  Nasal mucosa is pink and moist with no abnormal mucus.  The septum and turbinates were evaluated with nasal speculum, no polyps, masses, or purulence noted on limited examination of anterior nasal cavity.  Lungs - CTA  Heart S1, S2, with occasional irregular heart beat.           Assessment and Plan:       ICD-10-CM    1. Perennial allergic rhinitis  J30.89    2. Sensation of chest tightness  R07.89 ipratropium-albuterol (COMBIVENT RESPIMAT)  MCG/ACT inhaler   3. Post-nasal drainage  R09.82    4. Mild intermittent reactive airway disease without complication  J45.20      Continue current therapy with budesonide rinses  Start combivent inhaler  Schedule appointment with Dr Mills for chest tightness on exertion, irregular heart beat  Keep  scheduled appointment with Sheyla for follow up   Continue allergy injections  Keep scheduled appointment for PFT    Felisha MAZA  [facility]NT  6:06 PM  July 7, 2021

## 2021-07-07 NOTE — NURSING NOTE
"Chief Complaint   Patient presents with     Hypertension       Initial /76 (BP Location: Left arm, Patient Position: Chair, Cuff Size: Adult Large)   Pulse 81   Ht 1.549 m (5' 1\")   Wt 93 kg (205 lb)   SpO2 97%   BMI 38.73 kg/m   Estimated body mass index is 38.73 kg/m  as calculated from the following:    Height as of this encounter: 1.549 m (5' 1\").    Weight as of this encounter: 93 kg (205 lb).  Medication Reconciliation: complete  Janet Woods LPN  "

## 2021-07-07 NOTE — LETTER
7/7/2021         RE: Rj Rodríguez  [address]  [address]  [place] [address]        Dear Colleague,    Thank you for referring your patient, Rj Rodríguez, to the [facility]. Please see a copy of my visit note below.    Otolaryngology Note         Chief Complaint:     Patient presents with:  Allergies           History of Present Illness:     Rj Rodríguez is a 70 year old female seen today for post nasal drainage.  She reports she continues with chest tightness that comes and goes.  She saw Sheyla Sanches NP for this, referral was placed for Dr Mills and stress test.  Rj states that she wants to put off this visit for now, states she has too much going on with her family at this time.  We discussed at length that it is important for her to take care of herself, and that chest tightness can be indicative of heart disease.  She states that she is aware of this, but has severe anxiety and PTSD from childhood trauma, has a difficult time undergoing procedures.  She is currently working with therapist and notes that she has made a lot of progress.      She reports that she has some shortness of breath with exertion, at times she has some wheezing.      She has been doing well with the budesonide rinses, feels that the post nasal drip and allergy symptoms are improved with current therapy.  She is tolerating the Singulair well.      Could not tolerate CPAP in the past.      She has PFT scheduled for early August.  She has a follow up with Meseret on 7/12.  I highly encouraged her to schedule the stress test and an appointment with Dr Mills.           Medications:     Current Outpatient Rx   Medication Sig Dispense Refill     albuterol (PROAIR HFA/PROVENTIL HFA/VENTOLIN HFA) 108 (90 Base) MCG/ACT inhaler Inhale 1-2 puffs into the lungs every 4 hours as needed for shortness of breath / dyspnea or wheezing (Patient not taking: Reported on 7/7/2021) 18 g 3     ALBUTEROL IN         azelastine (ASTELIN) 0.1 % nasal spray Spray 2 sprays into both nostrils 2 times daily 30 mL 1     blood glucose (ACCU-CHEK GUIDE) test strip Use to test blood sugar 1 time daily or as directed. 50 each 11     blood glucose monitoring (ACCU-CHEK FASTCLIX) lancets Use to test blood sugar 1 time daily or as directed. 102 each 11     budesonide (PULMICORT) 0.5 MG/2ML neb solution Mix 240 ml kassandra med sinus rinse, add 0.5 mg budesonide vial, rinse 1/2 bottle in each nostril twice daily 60 mL 3     cetirizine (ZYRTEC) 10 MG tablet Take 1 tablet (10 mg) by mouth daily as needed for allergies 60 tablet 12     EPINEPHrine (ANY BX GENERIC EQUIV) 0.3 MG/0.3ML injection 2-pack INJECT CONTENTS OF 1 PEN AS NEEDED FOR ALLERGIC REACTION (Patient not taking: Reported on 7/7/2021) 2 each 0     ipratropium-albuterol (COMBIVENT RESPIMAT)  MCG/ACT inhaler Inhale 1 puff into the lungs 4 times daily as needed for wheezing or other (chest tightness) (Patient not taking: Reported on 7/7/2021) 4 g 3     montelukast (SINGULAIR) 10 MG tablet Take 1 tablet (10 mg) by mouth At Bedtime 90 tablet 1     ORDER FOR ALLERGEN IMMUNOTHERAPY Continue with allergy shots.  Follow standard buildup protocols. 5 mL PRN     STATIN NOT PRESCRIBED (INTENTIONAL) Please choose reason not prescribed from choices below. (Patient not taking: Reported on 7/7/2021)       triamcinolone (NASACORT) 55 MCG/ACT nasal aerosol Spray 2 sprays into both nostrils daily 2 Bottle 12     clonazePAM (KLONOPIN) 0.5 MG tablet TAKE 1 TABLET BY MOUTH TWICE DAILY AS NEEDED FOR ANXIETY 30 tablet 0     furosemide (LASIX) 20 MG tablet Take 1 tablet (20 mg) by mouth daily 90 tablet 0     losartan (COZAAR) 100 MG tablet Take 1 tablet (100 mg) by mouth daily 90 tablet 0     potassium chloride ER (KLOR-CON M) 10 MEQ CR tablet TAKE 1  BY MOUTH ONCE DAILY WITH FOOD 90 tablet 1            Allergies:     Allergies: Fruit [peach], Nuts, Ace inhibitors, Alkylamines, Alprazolam, Diphenhydramine,  "Erythromycin, Guaifed, Guaifenesin, Hydrochlorothiazide, No clinical screening - see comments, Olmesartan medoxomil, Phenylephrine hcl, Pseudoephedrine hcl, Seasonal allergies, Tramadol, Tramadol hcl, Venlafaxine, Zoloft [sertraline], and Latex          Past Medical History:     Past Medical History:   Diagnosis Date     Arthritis      Chronic back pain      Hypertension      Irregular heart beat      Sleep apnea             Past Surgical History:     Past Surgical History:   Procedure Laterality Date     APPENDECTOMY       BACK SURGERY       CHOLECYSTECTOMY       COLONOSCOPY  2012    Repeat in 10 years     GYN SURGERY       HYSTERECTOMY      Ovaries     RELEASE CARPAL TUNNEL      LT     RELEASE CARPAL TUNNEL      RT x2     SURGICAL RADIOLOGY PROCEDURE N/A 2/12/2016    Procedure: SURGICAL RADIOLOGY PROCEDURE;  Surgeon: Provider, Generic Perianesthesia Nursing;  Location: [facility]     SURGICAL RADIOLOGY PROCEDURE N/A 8/15/2016    Procedure: SURGICAL RADIOLOGY PROCEDURE;  Surgeon: Provider, Generic Perianesthesia Nursing;  Location: [facility]       ENT family history reviewed         Social History:     Social History     Tobacco Use     Smoking status: Never Smoker     Smokeless tobacco: Never Used   Substance Use Topics     Alcohol use: Yes     Alcohol/week: 1.0 standard drinks     Types: 1 Glasses of wine per week     Frequency: Monthly or less     Drinks per session: 1 or 2     Binge frequency: Never     Comment: occ glass of wine     Drug use: No            Review of Systems:     ROS: See HPI         Physical Exam:     BP (!) 154/88 (Cuff Size: Adult Large)   Pulse 60   Temp 98.7  F (37.1  C) (Tympanic)   Ht 1.549 m (5' 1\")   Wt 88.9 kg (196 lb)   SpO2 97%   BMI 37.03 kg/m      General - The patient is well nourished and well developed, and appears to have good nutritional status.  Alert and oriented to person and place, answers questions and cooperates with examination appropriately.   Head and Face - " Normocephalic and atraumatic, with no gross asymmetry noted.  The facial nerve is intact, with strong symmetric movements.  Voice and Breathing - The patient was breathing comfortably without the use of accessory muscles. There was no wheezing, stridor. The patients voice was clear and strong, and had appropriate pitch and quality.  Ears - External ear normal. Canals are patent. Right tympanic membrane is intact without effusion, retraction or mass. Left tympanic membrane is intact without effusion, retraction or mass.  Eyes - Extraocular movements intact, sclera were not icteric or injected.  Mouth - Examination of the oral cavity showed pink, healthy oral mucosa. Dentition in good condition. No lesions or ulcerations noted. The tongue was mobile and midline.   Throat - The walls of the oropharynx were smooth, pink, moist, symmetric, and had no lesions or ulcerations.  The tonsillar pillars and soft palate were symmetric. The uvula was midline on elevation.    Neck - Normal midline excursion of the laryngotracheal complex during swallowing.  Full range of motion on passive movement.  Palpation of the occipital, submental, submandibular, internal jugular chain, and supraclavicular nodes did not demonstrate any abnormal lymph nodes or masses.  Palpation of the thyroid was soft and smooth, with no nodules or goiter appreciated.  The trachea was mobile and midline.  Nose - External contour is symmetric, no gross deflection or scars.  Nasal mucosa is pink and moist with no abnormal mucus.  The septum and turbinates were evaluated with nasal speculum, no polyps, masses, or purulence noted on limited examination of anterior nasal cavity.  Lungs - CTA  Heart S1, S2, with occasional irregular heart beat.           Assessment and Plan:       ICD-10-CM    1. Perennial allergic rhinitis  J30.89    2. Sensation of chest tightness  R07.89 ipratropium-albuterol (COMBIVENT RESPIMAT)  MCG/ACT inhaler   3. Post-nasal drainage   R09.82    4. Mild intermittent reactive airway disease without complication  J45.20      Continue current therapy with budesonide rinses  Start combivent inhaler  Schedule appointment with Dr Mills for chest tightness on exertion, irregular heart beat  Keep scheduled appointment with Sheyla for follow up   Continue allergy injections  Keep scheduled appointment for PFT    Felisha MAZA  [facility] ENT  6:06 PM  July 7, 2021        Again, thank you for allowing me to participate in the care of your patient.        Sincerely,        Felisha Tyler NP

## 2021-07-07 NOTE — NURSING NOTE
"Chief Complaint   Patient presents with     Allergies       Initial BP (!) 154/88 (Cuff Size: Adult Large)   Pulse 60   Temp 98.7  F (37.1  C) (Tympanic)   Ht 1.549 m (5' 1\")   Wt 88.9 kg (196 lb)   SpO2 97%   BMI 37.03 kg/m   Estimated body mass index is 37.03 kg/m  as calculated from the following:    Height as of this encounter: 1.549 m (5' 1\").    Weight as of this encounter: 88.9 kg (196 lb).  Medication Reconciliation: complete  Emma Bush LPN    "

## 2021-07-13 NOTE — PROGRESS NOTES
Assessment & Plan     Essential hypertension  Pt currently takes 100 mg losartan and 20 mg lasix (10 mEq of potassium) every day for HTN. At home, BP consistently runs 150s/90s. In clinic today, 150/92. Discussed importance of BP control. Unable to take metoprolol as she currently is getting allergy injections. Allergy to hydrochlorothiazide; gets rash.   --Will start norvasc 5 mg every day. F/u in 2 weeks to check BP and BMP.  (CMP just checked in ER on 7/18 and WNL, no need to recheck today).  - amLODIPine (NORVASC) 5 MG tablet; Take 1 tablet (5 mg) by mouth daily    Shortness of breath  Was in ER 7/18/21 for acute dyspnea. R/u acute cardiac event. D dimer and other labs WNL. States is much improved since being seen in ER. Takes combivent PRN; states she hasn't had to take much (maybe 1xweek). Pt states her shortness of breath has been better with not going outside, avoiding heat/humidity, and wearing masks outdoors (due to smoke in the air).     Office visit on 7/7/21: Patient with increasing shortness of breath over the past 2-3 months. Also having a chest tightness. HR also sounded irregular on exam at the previous visit. EKG done at previous visit and did show did show flipped t-waves in several leads with frequent PVCs. Encouraged to complete stress test and repeat Holter, but patient declined.     Today, stressed the importance of completing a stress test and Holter, but she declined. Pt expressed hesitancy to see Dr. Mills and did not think it was necessary. Highly encouraged pt to keep scheduled appointment with him and provided education on need to see cardiology. Pt declines wanting to wear Holter at this time. Agrees to go to the ER with any new or worsening symptoms.    - albuterol (PROAIR HFA/PROVENTIL HFA/VENTOLIN HFA) 108 (90 Base) MCG/ACT inhaler; Inhale 2 puffs into the lungs every 6 hours    She does have PFTs scheduled and will complete.    Generalized anxiety disorder  Pt currently managing ZEFERINO  with klonopin 0.5 mg (pt reports taking 0.25 mg PRN). States she only takes the klonopin 1-2 times per week. Encouraged pt to take if she is feeling anxious. Pt sees counselor every week as well and states this has been very helpful. Feels her anxiety is fairly well managed at this time. Pt expresses stress with her health, her 's health, and her family situation. Encouraged pt to set boundaries, take time for herself, and utilize stress-relieving techniques as taught by her therapist. Coping mechanisms and support systems discussed. Pt will report to clinic with any worsening symptoms.     Return in about 2 weeks (around 8/4/2021).     Sadia Zarate NP student from [employer].        I was present with the nurse practitioner student who participated in the service and in the documentation of the note. I have verified the history and personally performed the physical exam and medical decision making. I agree with the assessment and plan of care as documented in the note.       Sheyla Sanches NP  [facility] - BAILEY De La Rosa is a 70 year old who presents for the following health issues     HPI     Hypertension Follow-up      Do you check your blood pressure regularly outside of the clinic? Yes; 150s/90s when she checks at home    Are you following a low salt diet? Yes    Are your blood pressures ever more than 140 on the top number (systolic) OR more   than 90 on the bottom number (diastolic), for example 140/90? Yes   Denies chest pain, shortness of breath, dizziness, lightheadedness, syncope nausea/vomiting.   Currently takes 20 mg lasix every day (potassium 10 mEq every day), 100 mg losartan every day.  Losartan was increased from 75 mg to 100 mg on 7/7/21.   -Unable to take metoprolol as she currently is getting allergy injections.   -Allergy to hydrochlorothiazide; gets rash    Weight about the same.    Due for a dexa-scan. Declines at this time.    Due for a  "mammogram. Declines at this time.       How many servings of fruits and vegetables do you eat daily?  4 or more    On average, how many sweetened beverages do you drink each day (Examples: soda, juice, sweet tea, etc.  Do NOT count diet or artificially sweetened beverages)?   1    How many days per week do you exercise enough to make your heart beat faster? 7    How many minutes a day do you exercise enough to make your heart beat faster? 60 or more    How many days per week do you miss taking your medication? 0    ER f/u    Pt seen in ER on 7/18 for acute dyspnea. EKG and troponin negative.    Denies shortness of breath. States it has much improved since her ER visit this weekend.      Hasn't been using singular or albuterol (ER doc suspected shortness of breath may be due to albuterol and singular)    Has only used combivent a couple times. (After insurance, $500 out of pocket).    Will see cardiology (Dr. Mills in August). Wishes to wait until she sees him before doing a stress test. PFTs scheduled.    Anxiety follow-up    On 0.5 mg clonopin (pt reports taking 0.25 mg because \"I'm a light weight.\"  Takes maybe a clonopin twice a week.  Sees Stacy, counselor, at [facility]. States Stacy is on vacation this week but will see her next week.  Pt reports increased stress with current health as well as her daughter, Chela (\"I don't know how to help her\"), and her .           Review of Systems   Constitutional, HEENT, cardiovascular, pulmonary, gi and gu systems are negative, except as otherwise noted.      Objective    BP (!) 150/92 (BP Location: Left arm, Patient Position: Chair, Cuff Size: Adult Large)   Pulse 76   Temp 98.8  F (37.1  C) (Tympanic)   Resp 16   Wt 91.6 kg (202 lb)   SpO2 98%   BMI 38.17 kg/m    Body mass index is 38.17 kg/m .     Physical Exam   GENERAL: healthy, alert and no distress  EYES: Eyes grossly normal to inspection, PERRL and conjunctivae and sclerae normal  HENT: ear " canals and TM's normal, nose and mouth without ulcers or lesions  NECK: no adenopathy, no asymmetry, masses, or scars   RESP: lungs clear to auscultation - no rales, rhonchi or wheezes  CV: regular rhythm; occasional extra beat noted (same as previous visits), normal S1 S2, no S3 or S4, no murmur, click or rub, no peripheral edema and peripheral pulses strong  MS: no gross musculoskeletal defects noted, no edema  PSYCH: mentation appears normal, affect normal/bright

## 2021-07-18 ENCOUNTER — APPOINTMENT (OUTPATIENT)
Dept: GENERAL RADIOLOGY | Facility: HOSPITAL | Age: 71
End: 2021-07-18
Attending: EMERGENCY MEDICINE
Payer: MEDICARE

## 2021-07-18 ENCOUNTER — HOSPITAL ENCOUNTER (EMERGENCY)
Facility: HOSPITAL | Age: 71
Discharge: HOME | End: 2021-07-18
Attending: EMERGENCY MEDICINE | Admitting: EMERGENCY MEDICINE
Payer: MEDICARE

## 2021-07-18 VITALS
DIASTOLIC BLOOD PRESSURE: 96 MMHG | OXYGEN SATURATION: 96 % | SYSTOLIC BLOOD PRESSURE: 149 MMHG | TEMPERATURE: 98 F | RESPIRATION RATE: 16 BRPM | HEART RATE: 70 BPM

## 2021-07-18 DIAGNOSIS — R06.00 ACUTE DYSPNEA: ICD-10-CM

## 2021-07-18 LAB
ALBUMIN SERPL-MCNC: 3.6 G/DL (ref 3.4–5)
ALP SERPL-CCNC: 100 U/L (ref 40–150)
ALT SERPL W P-5'-P-CCNC: 33 U/L (ref 0–50)
ANION GAP SERPL CALCULATED.3IONS-SCNC: 7 MMOL/L (ref 3–14)
AST SERPL W P-5'-P-CCNC: 17 U/L (ref 0–45)
BASOPHILS # BLD AUTO: 0.1 10E3/UL (ref 0–0.2)
BASOPHILS NFR BLD AUTO: 1 %
BILIRUB SERPL-MCNC: 0.6 MG/DL (ref 0.2–1.3)
BUN SERPL-MCNC: 13 MG/DL (ref 7–30)
CALCIUM SERPL-MCNC: 9 MG/DL (ref 8.5–10.1)
CHLORIDE BLD-SCNC: 109 MMOL/L (ref 94–109)
CO2 SERPL-SCNC: 23 MMOL/L (ref 20–32)
CREAT SERPL-MCNC: 0.77 MG/DL (ref 0.52–1.04)
D DIMER PPP FEU-MCNC: 0.36 UG/ML FEU (ref 0–0.5)
EOSINOPHIL # BLD AUTO: 0.4 10E3/UL (ref 0–0.7)
EOSINOPHIL NFR BLD AUTO: 4 %
ERYTHROCYTE [DISTWIDTH] IN BLOOD BY AUTOMATED COUNT: 13.2 % (ref 10–15)
GFR SERPL CREATININE-BSD FRML MDRD: 80 ML/MIN/1.73M2
GLUCOSE BLD-MCNC: 139 MG/DL (ref 70–99)
HCT VFR BLD AUTO: 43.1 % (ref 35–47)
HGB BLD-MCNC: 14.7 G/DL (ref 11.7–15.7)
IMM GRANULOCYTES # BLD: 0.1 10E3/UL
IMM GRANULOCYTES NFR BLD: 1 %
INR PPP: 0.94 (ref 0.85–1.15)
LYMPHOCYTES # BLD AUTO: 1.6 10E3/UL (ref 0.8–5.3)
LYMPHOCYTES NFR BLD AUTO: 18 %
MCH RBC QN AUTO: 30.4 PG (ref 26.5–33)
MCHC RBC AUTO-ENTMCNC: 34.1 G/DL (ref 31.5–36.5)
MCV RBC AUTO: 89 FL (ref 78–100)
MONOCYTES # BLD AUTO: 0.9 10E3/UL (ref 0–1.3)
MONOCYTES NFR BLD AUTO: 10 %
NEUTROPHILS # BLD AUTO: 5.9 10E3/UL (ref 1.6–8.3)
NEUTROPHILS NFR BLD AUTO: 66 %
NRBC # BLD AUTO: 0 10E3/UL
NRBC BLD AUTO-RTO: 0 /100
PLATELET # BLD AUTO: 271 10E3/UL (ref 150–450)
POTASSIUM BLD-SCNC: 3.6 MMOL/L (ref 3.4–5.3)
PROT SERPL-MCNC: 7.8 G/DL (ref 6.8–8.8)
RBC # BLD AUTO: 4.83 10E6/UL (ref 3.8–5.2)
SODIUM SERPL-SCNC: 139 MMOL/L (ref 133–144)
TROPONIN I SERPL-MCNC: <0.015 UG/L (ref 0–0.04)
TROPONIN I SERPL-MCNC: <0.015 UG/L (ref 0–0.04)
WBC # BLD AUTO: 8.9 10E3/UL (ref 4–11)

## 2021-07-18 PROCEDURE — 85610 PROTHROMBIN TIME: CPT | Performed by: EMERGENCY MEDICINE

## 2021-07-18 PROCEDURE — 85379 FIBRIN DEGRADATION QUANT: CPT | Performed by: EMERGENCY MEDICINE

## 2021-07-18 PROCEDURE — 71045 X-RAY EXAM CHEST 1 VIEW: CPT

## 2021-07-18 PROCEDURE — 36415 COLL VENOUS BLD VENIPUNCTURE: CPT | Performed by: EMERGENCY MEDICINE

## 2021-07-18 PROCEDURE — 85025 COMPLETE CBC W/AUTO DIFF WBC: CPT | Performed by: EMERGENCY MEDICINE

## 2021-07-18 PROCEDURE — 93005 ELECTROCARDIOGRAM TRACING: CPT

## 2021-07-18 PROCEDURE — 84484 ASSAY OF TROPONIN QUANT: CPT | Mod: 91 | Performed by: EMERGENCY MEDICINE

## 2021-07-18 PROCEDURE — 99285 EMERGENCY DEPT VISIT HI MDM: CPT | Mod: 25

## 2021-07-18 PROCEDURE — 93010 ELECTROCARDIOGRAM REPORT: CPT | Performed by: INTERNAL MEDICINE

## 2021-07-18 PROCEDURE — 99285 EMERGENCY DEPT VISIT HI MDM: CPT | Performed by: EMERGENCY MEDICINE

## 2021-07-18 PROCEDURE — 80053 COMPREHEN METABOLIC PANEL: CPT | Performed by: EMERGENCY MEDICINE

## 2021-07-18 ASSESSMENT — ENCOUNTER SYMPTOMS
CONSTITUTIONAL NEGATIVE: 1
CARDIOVASCULAR NEGATIVE: 1
NEUROLOGICAL NEGATIVE: 1
SHORTNESS OF BREATH: 1
MUSCULOSKELETAL NEGATIVE: 1
ENDOCRINE NEGATIVE: 1
EYES NEGATIVE: 1
GASTROINTESTINAL NEGATIVE: 1

## 2021-07-18 NOTE — ED NOTES
Pt comes into ED today via EMS due to 5/10 chest pressure. States that it has been going on for the last couple days, but states it is worse today. States she is SOB and has recently been dx with asthma. States she has been a little lightheaded today as well but has no issues walking around. Pt states she also have lots of anxiety and took a 0.5 klonopin just being leaving with EMS. States she had a weird EKG earlier this month and is supposed to see a cardiologist in Aug, but hasn't had any other heart hx. States that she is also newly dx with asthma and has been changing her meds around often. EKG done. IV and monitors placed. MD at bedside.

## 2021-07-18 NOTE — ED PROVIDER NOTES
"  History     Chief Complaint   Patient presents with     Chest Pain     Anxiety     HPI  Rj Rodríguez is a 70 year old female who is transported to the emergency department by EMS complaining of shortness of breath.  She states on Wednesday she developed a sore throat and nasal congestion.  She has been using sinus rinses.  She feels as though she has gotten progressively more short of breath over the past few days.  She states about an hour and a half ago she became quite short of breath.  She relates that she recently had an EKG which she was told was abnormal and she has a follow-up appointment with cardiology but not until August.  She states she feels as though she is \"not moving enough air\".  She had been on Singulair previously but that was stopped.  She has used albuterol and Combivent inhalers.  She states those really do not seem to be helping much.  She states her chest feels \"tight\".  She denies pain or pressure in her chest.  She denies feeling dizzy or lightheaded.  She denies any abdominal discomfort.  She has had no nausea or vomiting and she said no change in bowel or bladder habits.  She does have a history of anxiety.  She states prior to ambulance transport she did take a Klonopin.  Allergies:  Allergies   Allergen Reactions     Fruit [Peach] Anaphylaxis and Hives     fresh peaches and any fruit that has a pit.  Kiwi's and apples also.  Can eat any fruit cooked.     Nuts Anaphylaxis and Hives     All Raw Nuts, can eat nuts when roasted.     Ace Inhibitors      Upper respiratory infection     Alkylamines Hives     Antihistamines     Alprazolam Hives     Xanax     Diphenhydramine Hives and Other (See Comments)     Keeps her awake       Erythromycin      BASE; pt. Not sure if this is a true allergy.     Guaifed      Pt unsure of reaction     Guaifenesin      Guaifed     Hydrochlorothiazide      No Clinical Screening - See Comments      Allergic:  Fresh peaches and any fruit that has a pit.  " Kiwi's and apples also.  All raw nuts.  Can eat nuts when roasted or any fruit cooked.  Some seasonal allergies :  Hayfever     Olmesartan Medoxomil Cough     Benicar     Phenylephrine Hcl      Guaifed     Pseudoephedrine Hcl      Guaifed     Seasonal Allergies      hayfever     Tramadol      Sleep disturbance. 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     Priority: Medium     Diabetes mellitus, type 2 (H) 12/06/2019     Priority: Medium     [place] cardiac risk 11% in next 10 years 10/29/2019     Priority: Medium     S/P rotator cuff repair 10/26/2016     Priority: Medium     Mild episode of recurrent major depressive disorder (H) 10/22/2014     Priority: Medium     Migraine with aura and without status migrainosus, not intractable 04/21/2014     Priority: Medium     S/P cervical spinal fusion 03/21/2014     Priority: Medium     Cervicalgia 12/10/2013     Priority: Medium     Vertigo 03/18/2013     Priority: Medium     Tinnitus 03/18/2013     Priority: Medium     Chronic rhinitis 03/18/2013     Priority: Medium     Lumbago 07/10/2012     Priority: Medium     Presbyopia 01/25/2011     Priority: Medium     Primary open-angle glaucoma 01/25/2011     Priority: Medium     Overview:   IMO Update 10/11       Generalized anxiety disorder 12/06/2006     Priority: Medium     Essential hypertension 06/02/2004     Priority: Medium     Overview:   IMO Update          Past Medical History:    Past Medical History:   Diagnosis Date     Arthritis      Chronic back pain      Hypertension       Irregular heart beat      Sleep apnea        Past Surgical History:    Past Surgical History:   Procedure Laterality Date     APPENDECTOMY       BACK SURGERY       CHOLECYSTECTOMY       COLONOSCOPY  2012    Repeat in 10 years     GYN SURGERY       HYSTERECTOMY      Ovaries     RELEASE CARPAL TUNNEL      LT     RELEASE CARPAL TUNNEL      RT x2     SURGICAL RADIOLOGY PROCEDURE N/A 2/12/2016    Procedure: SURGICAL RADIOLOGY PROCEDURE;  Surgeon: Provider, Generic Perianesthesia Nursing;  Location: [facility]     SURGICAL RADIOLOGY PROCEDURE N/A 8/15/2016    Procedure: SURGICAL RADIOLOGY PROCEDURE;  Surgeon: Provider, Generic Perianesthesia Nursing;  Location: [facility]       Family History:    Family History   Problem Relation Age of Onset     Colon Cancer Maternal Aunt      Colon Cancer Maternal Uncle      Diabetes Father         Type 2     Hypertension Father      Alcoholism Father      Alcoholism Mother        Social History:  Marital Status:   [2]  Social History     Tobacco Use     Smoking status: Never Smoker     Smokeless tobacco: Never Used   Substance Use Topics     Alcohol use: Yes     Alcohol/week: 1.0 standard drinks     Types: 1 Glasses of wine per week     Comment: occ glass of wine     Drug use: No        Medications:    ALBUTEROL IN  blood glucose (ACCU-CHEK GUIDE) test strip  budesonide (PULMICORT) 0.5 MG/2ML neb solution  clonazePAM (KLONOPIN) 0.5 MG tablet  EPINEPHrine (ANY BX GENERIC EQUIV) 0.3 MG/0.3ML injection 2-pack  furosemide (LASIX) 20 MG tablet  ipratropium-albuterol (COMBIVENT RESPIMAT)  MCG/ACT inhaler  losartan (COZAAR) 100 MG tablet  montelukast (SINGULAIR) 10 MG tablet  ORDER FOR ALLERGEN IMMUNOTHERAPY  potassium chloride ER (KLOR-CON M) 10 MEQ CR tablet  STATIN NOT PRESCRIBED (INTENTIONAL)  albuterol (PROAIR HFA/PROVENTIL HFA/VENTOLIN HFA) 108 (90 Base) MCG/ACT inhaler  azelastine (ASTELIN) 0.1 % nasal spray  blood glucose monitoring (ACCU-CHEK FASTCLIX) lancets  cetirizine  (ZYRTEC) 10 MG tablet  triamcinolone (NASACORT) 55 MCG/ACT nasal aerosol          Review of Systems   Constitutional: Negative.    HENT: Negative.    Eyes: Negative.    Respiratory: Positive for shortness of breath.    Cardiovascular: Negative.    Gastrointestinal: Negative.    Endocrine: Negative.    Genitourinary: Negative.    Musculoskeletal: Negative.    Skin: Negative.    Neurological: Negative.      Please see history of chief complaint.  All other appropriate systems reviewed and found unremarkable.  Physical Exam   BP: (!) 206/91  Pulse: 85  Temp: 98.9  F (37.2  C)  Resp: (!) 28  SpO2: 97 %      Physical Exam this is a 70-year-old female who is awake alert oriented person place and time.  She is very pleasant and cooperative.  She is mildly dyspneic at rest.  She is able to speak in complete sentences without difficulty.  She appears mildly anxious.  HEENT normocephalic extraocular muscles intact pupils equally round and reactive to light and oropharynx is clear.  Neck is supple is full range of motion without pain no evidence of nuchal irritation.  There is no JVD.  Lungs are clear bilaterally.  Heart maintains a regular rate and rhythm.  S1 and S2 sounds are appreciated.  The abdomen is soft no mass no organomegaly rebound no involuntary guarding.  Extremities have full range of motion and 5/5 strength no edema is noted.  Neurologic exam no focal cranial nerve deficit is appreciated.  Dermatologic exam there are no diffuse skin rashes or lesions noted.  ED Course        The patient remained very stable throughout her stay in the department and began to feel subjectively much improved.  She was very much relieved with the results of her EKG and laboratory studies.  We had a very long and pleasant discussion about these findings.  I will advised the patient to continue her current medication regimen I will also advise her to follow-up with her primary care provider soon as possible this coming week.  I will  also advise her to return immediately for reassessment if her symptoms do not continue to improve or if they seem to be getting worse         {EKG done and interpreted by myself.  It shows a normal sinus rhythm.  The ventricular rate is 85 bpm.  KY and QT intervals are within normal limits.  There is no ST segment elevation or depression.  There is no inappropriate T wave inversion.  There does not appear to be evidence acute ischemic change by my interpretation.             Results for orders placed or performed during the hospital encounter of 07/18/21 (from the past 24 hour(s))   INR   Result Value Ref Range    INR 0.94 0.85 - 1.15   Comprehensive metabolic panel   Result Value Ref Range    Sodium 139 133 - 144 mmol/L    Potassium 3.6 3.4 - 5.3 mmol/L    Chloride 109 94 - 109 mmol/L    Carbon Dioxide (CO2) 23 20 - 32 mmol/L    Anion Gap 7 3 - 14 mmol/L    Urea Nitrogen 13 7 - 30 mg/dL    Creatinine 0.77 0.52 - 1.04 mg/dL    Calcium 9.0 8.5 - 10.1 mg/dL    Glucose 139 (H) 70 - 99 mg/dL    Alkaline Phosphatase 100 40 - 150 U/L    AST 17 0 - 45 U/L    ALT 33 0 - 50 U/L    Protein Total 7.8 6.8 - 8.8 g/dL    Albumin 3.6 3.4 - 5.0 g/dL    Bilirubin Total 0.6 0.2 - 1.3 mg/dL    GFR Estimate 80 >60 mL/min/1.73m2   Troponin I   Result Value Ref Range    Troponin I <0.015 0.000 - 0.045 ug/L   D dimer quantitative   Result Value Ref Range    D-Dimer Quantitative 0.36 0.00 - 0.50 ug/mL FEU    Narrative    This D-dimer assay is intended for use in conjunction with a clinical pretest probability assessment model to exclude pulmonary embolism (PE) and deep venous thrombosis (DVT) in outpatients suspected of PE or DVT. The cut-off value is 0.50 ug/mL FEU.   CBC with platelets differential    Narrative    The following orders were created for panel order CBC with platelets differential.  Procedure                               Abnormality         Status                     ---------                               -----------          ------                     CBC with platelets and d...[[id number]]  Abnormal            Final result                 Please view results for these tests on the individual orders.   CBC with platelets and differential   Result Value Ref Range    WBC Count 8.9 4.0 - 11.0 10e3/uL    RBC Count 4.83 3.80 - 5.20 10e6/uL    Hemoglobin 14.7 11.7 - 15.7 g/dL    Hematocrit 43.1 35.0 - 47.0 %    MCV 89 78 - 100 fL    MCH 30.4 26.5 - 33.0 pg    MCHC 34.1 31.5 - 36.5 g/dL    RDW 13.2 10.0 - 15.0 %    Platelet Count 271 150 - 450 10e3/uL    % Neutrophils 66 %    % Lymphocytes 18 %    % Monocytes 10 %    % Eosinophils 4 %    % Basophils 1 %    % Immature Granulocytes 1 %    NRBCs per 100 WBC 0 <1 /100    Absolute Neutrophils 5.9 1.6 - 8.3 10e3/uL    Absolute Lymphocytes 1.6 0.8 - 5.3 10e3/uL    Absolute Monocytes 0.9 0.0 - 1.3 10e3/uL    Absolute Eosinophils 0.4 0.0 - 0.7 10e3/uL    Absolute Basophils 0.1 0.0 - 0.2 10e3/uL    Absolute Immature Granulocytes 0.1 (H) <=0.0 10e3/uL    Absolute NRBCs 0.0 10e3/uL   XR Chest Port 1 View    Narrative    PROCEDURE:  XR CHEST PORT 1 VIEW    HISTORY:  dyspnea.     COMPARISON:  2019    FINDINGS:   The cardiac silhouette is normal in size. The pulmonary vasculature is  normal.  Is some linear atelectasis or scarring at the left lung base  No pleural effusion or pneumothorax.      Impression    IMPRESSION:  Linear atelectasis or scarring at the left lung base      NOHEMI WADDELL MD         SYSTEM ID:  [id number]   Troponin I   Result Value Ref Range    Troponin I <0.015 0.000 - 0.045 ug/L       Medications - No data to display    Assessments & Plan (with Medical Decision Making)     I have reviewed the nursing notes.    I have reviewed the findings, diagnosis, plan and need for follow up with the patient.  The plan is to discharge the patient with appropriate discharge and follow-up instructions.    New Prescriptions    No medications on file       Final diagnoses:   Acute dyspnea        7/18/2021   [facility]     Rinku Powell, DO  07/18/21 1616       Rinku Powell, DO  07/18/21 1636

## 2021-07-21 ENCOUNTER — ALLIED HEALTH/NURSE VISIT (OUTPATIENT)
Dept: ALLERGY | Facility: OTHER | Age: 71
End: 2021-07-21
Attending: NURSE PRACTITIONER
Payer: MEDICARE

## 2021-07-21 ENCOUNTER — OFFICE VISIT (OUTPATIENT)
Dept: FAMILY MEDICINE | Facility: OTHER | Age: 71
End: 2021-07-21
Attending: NURSE PRACTITIONER
Payer: COMMERCIAL

## 2021-07-21 VITALS
OXYGEN SATURATION: 98 % | SYSTOLIC BLOOD PRESSURE: 150 MMHG | BODY MASS INDEX: 38.17 KG/M2 | RESPIRATION RATE: 16 BRPM | TEMPERATURE: 98.8 F | HEART RATE: 76 BPM | WEIGHT: 202 LBS | DIASTOLIC BLOOD PRESSURE: 92 MMHG

## 2021-07-21 DIAGNOSIS — J30.89 PERENNIAL ALLERGIC RHINITIS: Primary | ICD-10-CM

## 2021-07-21 DIAGNOSIS — I10 ESSENTIAL HYPERTENSION: Primary | ICD-10-CM

## 2021-07-21 DIAGNOSIS — F41.1 GENERALIZED ANXIETY DISORDER: ICD-10-CM

## 2021-07-21 DIAGNOSIS — R06.02 SHORTNESS OF BREATH: ICD-10-CM

## 2021-07-21 PROCEDURE — G0463 HOSPITAL OUTPT CLINIC VISIT: HCPCS | Mod: 25

## 2021-07-21 PROCEDURE — G0463 HOSPITAL OUTPT CLINIC VISIT: HCPCS

## 2021-07-21 PROCEDURE — 95117 IMMUNOTHERAPY INJECTIONS: CPT

## 2021-07-21 PROCEDURE — 99214 OFFICE O/P EST MOD 30 MIN: CPT | Performed by: NURSE PRACTITIONER

## 2021-07-21 RX ORDER — AMLODIPINE BESYLATE 5 MG/1
5 TABLET ORAL DAILY
Qty: 30 TABLET | Refills: 0 | Status: SHIPPED | OUTPATIENT
Start: 2021-07-21 | End: 2021-08-04

## 2021-07-21 RX ORDER — ALBUTEROL SULFATE 90 UG/1
2 AEROSOL, METERED RESPIRATORY (INHALATION) EVERY 6 HOURS
Qty: 18 G | Refills: 0 | Status: ON HOLD | OUTPATIENT
Start: 2021-07-21 | End: 2024-01-18 | Stop reason: DRUGHIGH

## 2021-07-21 ASSESSMENT — PAIN SCALES - GENERAL: PAINLEVEL: NO PAIN (0)

## 2021-07-21 NOTE — NURSING NOTE
"Chief Complaint   Patient presents with     Hypertension       Initial BP (!) 150/92 (BP Location: Left arm, Patient Position: Chair, Cuff Size: Adult Large)   Pulse 76   Temp 98.8  F (37.1  C) (Tympanic)   Resp 16   Wt 91.6 kg (202 lb)   SpO2 98%   BMI 38.17 kg/m   Estimated body mass index is 38.17 kg/m  as calculated from the following:    Height as of 7/7/21: 1.549 m (5' 1\").    Weight as of this encounter: 91.6 kg (202 lb).  Medication Reconciliation: complete  Lisa Rodriguez LPN    "

## 2021-07-21 NOTE — PROGRESS NOTES
Prior to injection verified pt identity using pt name and date of birth.    Allergy injection/s given and charted on paper allergy flow sheet.  Patient left AMA, signing form, not staying for the observation period.    Morgan Buchanan RN on 7/21/2021 at 3:49 PM

## 2021-07-28 ENCOUNTER — ALLIED HEALTH/NURSE VISIT (OUTPATIENT)
Dept: ALLERGY | Facility: OTHER | Age: 71
End: 2021-07-28
Attending: NURSE PRACTITIONER
Payer: MEDICARE

## 2021-07-28 DIAGNOSIS — J30.89 PERENNIAL ALLERGIC RHINITIS: Primary | ICD-10-CM

## 2021-07-28 PROCEDURE — 95117 IMMUNOTHERAPY INJECTIONS: CPT

## 2021-07-28 NOTE — PROGRESS NOTES
Prior to injection verified pt identity using pt name and date of birth.    Allergy injection/s given and charted on paper allergy flow sheet.  Patient left AMA, signing form, not staying for the observation period.    Morgan Buchanan RN on 7/28/2021 at 11:31 AM
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
## 2021-08-04 ENCOUNTER — OFFICE VISIT (OUTPATIENT)
Dept: FAMILY MEDICINE | Facility: OTHER | Age: 71
End: 2021-08-04
Attending: NURSE PRACTITIONER
Payer: MEDICARE

## 2021-08-04 ENCOUNTER — ALLIED HEALTH/NURSE VISIT (OUTPATIENT)
Dept: ALLERGY | Facility: OTHER | Age: 71
End: 2021-08-04
Attending: NURSE PRACTITIONER
Payer: COMMERCIAL

## 2021-08-04 VITALS
OXYGEN SATURATION: 98 % | WEIGHT: 202.4 LBS | SYSTOLIC BLOOD PRESSURE: 138 MMHG | HEART RATE: 77 BPM | HEIGHT: 61 IN | TEMPERATURE: 98.1 F | BODY MASS INDEX: 38.21 KG/M2 | DIASTOLIC BLOOD PRESSURE: 72 MMHG

## 2021-08-04 DIAGNOSIS — I49.3 FREQUENT PVCS: ICD-10-CM

## 2021-08-04 DIAGNOSIS — J30.89 PERENNIAL ALLERGIC RHINITIS: Primary | ICD-10-CM

## 2021-08-04 DIAGNOSIS — R07.89 CHEST TIGHTNESS: ICD-10-CM

## 2021-08-04 DIAGNOSIS — R06.09 DYSPNEA ON EXERTION: ICD-10-CM

## 2021-08-04 DIAGNOSIS — I10 ESSENTIAL HYPERTENSION: Primary | ICD-10-CM

## 2021-08-04 PROCEDURE — 95117 IMMUNOTHERAPY INJECTIONS: CPT

## 2021-08-04 PROCEDURE — G0463 HOSPITAL OUTPT CLINIC VISIT: HCPCS | Mod: 25

## 2021-08-04 PROCEDURE — 99215 OFFICE O/P EST HI 40 MIN: CPT | Performed by: NURSE PRACTITIONER

## 2021-08-04 RX ORDER — AMLODIPINE BESYLATE 10 MG/1
10 TABLET ORAL DAILY
Qty: 90 TABLET | Refills: 1 | Status: SHIPPED | OUTPATIENT
Start: 2021-08-04 | End: 2021-09-20 | Stop reason: ALTCHOICE

## 2021-08-04 ASSESSMENT — MIFFLIN-ST. JEOR: SCORE: 1385.46

## 2021-08-04 ASSESSMENT — PAIN SCALES - GENERAL: PAINLEVEL: NO PAIN (0)

## 2021-08-04 NOTE — PROGRESS NOTES
Prior to injection verified pt identity using pt name and date of birth.    Allergy injection/s given and charted on paper allergy flow sheet.  Patient left AMA, signing form, not staying for the observation period.    Morgan Buchanan RN on 8/4/2021 at 11:21 AM

## 2021-08-04 NOTE — NURSING NOTE
"Chief Complaint   Patient presents with     Hypertension       Initial /72 (BP Location: Left arm, Patient Position: Chair, Cuff Size: Adult Large)   Pulse 77   Temp 98.1  F (36.7  C) (Tympanic)   Ht 1.549 m (5' 1\")   Wt 91.8 kg (202 lb 6.4 oz)   SpO2 98%   BMI 38.24 kg/m   Estimated body mass index is 38.24 kg/m  as calculated from the following:    Height as of this encounter: 1.549 m (5' 1\").    Weight as of this encounter: 91.8 kg (202 lb 6.4 oz).  Medication Reconciliation: complete  Janet Woods LPN  "

## 2021-08-05 DIAGNOSIS — R06.02 SOB (SHORTNESS OF BREATH): Primary | ICD-10-CM

## 2021-08-05 DIAGNOSIS — I49.9 IRREGULAR HEART RATE: ICD-10-CM

## 2021-08-09 ENCOUNTER — APPOINTMENT (OUTPATIENT)
Dept: GENERAL RADIOLOGY | Facility: OTHER | Age: 71
End: 2021-08-09
Attending: INTERNAL MEDICINE
Payer: MEDICARE

## 2021-08-09 ENCOUNTER — OFFICE VISIT (OUTPATIENT)
Dept: CARDIOLOGY | Facility: OTHER | Age: 71
End: 2021-08-09
Attending: NURSE PRACTITIONER
Payer: COMMERCIAL

## 2021-08-09 VITALS
OXYGEN SATURATION: 97 % | DIASTOLIC BLOOD PRESSURE: 76 MMHG | WEIGHT: 201 LBS | BODY MASS INDEX: 37.95 KG/M2 | SYSTOLIC BLOOD PRESSURE: 142 MMHG | HEIGHT: 61 IN | HEART RATE: 82 BPM

## 2021-08-09 DIAGNOSIS — R06.02 SHORTNESS OF BREATH: Primary | ICD-10-CM

## 2021-08-09 DIAGNOSIS — E66.01 MORBID OBESITY (H): ICD-10-CM

## 2021-08-09 DIAGNOSIS — I49.3 FREQUENT PVCS: ICD-10-CM

## 2021-08-09 DIAGNOSIS — R07.9 CHEST PAIN, UNSPECIFIED TYPE: ICD-10-CM

## 2021-08-09 DIAGNOSIS — F41.1 GENERALIZED ANXIETY DISORDER: ICD-10-CM

## 2021-08-09 DIAGNOSIS — I10 ESSENTIAL HYPERTENSION: ICD-10-CM

## 2021-08-09 DIAGNOSIS — I49.9 IRREGULAR HEART RATE: ICD-10-CM

## 2021-08-09 PROCEDURE — G0463 HOSPITAL OUTPT CLINIC VISIT: HCPCS

## 2021-08-09 PROCEDURE — 99215 OFFICE O/P EST HI 40 MIN: CPT | Performed by: INTERNAL MEDICINE

## 2021-08-09 ASSESSMENT — PAIN SCALES - GENERAL: PAINLEVEL: SEVERE PAIN (6)

## 2021-08-09 ASSESSMENT — MIFFLIN-ST. JEOR: SCORE: 1379.11

## 2021-08-09 NOTE — NURSING NOTE
"Chief Complaint   Patient presents with     Consult       Initial BP (!) 174/82   Pulse 82   Ht 1.549 m (5' 1\")   Wt 91.2 kg (201 lb)   SpO2 97%   BMI 37.98 kg/m   Estimated body mass index is 37.98 kg/m  as calculated from the following:    Height as of this encounter: 1.549 m (5' 1\").    Weight as of this encounter: 91.2 kg (201 lb).  Medication Reconciliation: complete  Nicole Alfred LPN    "
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
## 2021-08-09 NOTE — PATIENT INSTRUCTIONS
Thank you for allowing Dr. Mills and our  team to participate in your care. Please call our office at [phone] with scheduling questions or if you need to cancel or change your appointment. With any other questions or concerns you may call [facility] cardiology nurse at [phone].       If you experience chest pain, chest pressure, chest tightness, shortness of breath, fainting, lightheadedness, nausea, vomiting, or other concerning symptoms, please report to the Emergency Department or call 911. These symptoms may be emergent, and best treated in the Emergency Department.    Follow up in 2-3 months       You will be scheduled for an appointment to have a Zio Patch placed on the skin.  This monitor will be worn for 14 days.  This is a device that will monitor your heart rate, and rhythm. The hospital scheduling department will contact you to schedule this appointment, and educate you on the use of this patch.

## 2021-08-11 ENCOUNTER — ALLIED HEALTH/NURSE VISIT (OUTPATIENT)
Dept: ALLERGY | Facility: OTHER | Age: 71
End: 2021-08-11
Attending: NURSE PRACTITIONER
Payer: MEDICARE

## 2021-08-11 ENCOUNTER — HOSPITAL ENCOUNTER (OUTPATIENT)
Dept: CARDIOLOGY | Facility: HOSPITAL | Age: 71
End: 2021-08-11
Attending: INTERNAL MEDICINE
Payer: MEDICARE

## 2021-08-11 DIAGNOSIS — J30.89 PERENNIAL ALLERGIC RHINITIS: Primary | ICD-10-CM

## 2021-08-11 DIAGNOSIS — I49.9 IRREGULAR HEART RATE: ICD-10-CM

## 2021-08-11 DIAGNOSIS — I49.3 FREQUENT PVCS: ICD-10-CM

## 2021-08-11 DIAGNOSIS — R06.02 SHORTNESS OF BREATH: ICD-10-CM

## 2021-08-11 PROCEDURE — 95117 IMMUNOTHERAPY INJECTIONS: CPT

## 2021-08-11 PROCEDURE — 93248 EXT ECG>7D<15D REV&INTERPJ: CPT | Performed by: INTERNAL MEDICINE

## 2021-08-11 PROCEDURE — 93246 EXT ECG>7D<15D RECORDING: CPT

## 2021-08-18 ENCOUNTER — ALLIED HEALTH/NURSE VISIT (OUTPATIENT)
Dept: ALLERGY | Facility: OTHER | Age: 71
End: 2021-08-18
Attending: NURSE PRACTITIONER
Payer: MEDICARE

## 2021-08-18 DIAGNOSIS — J30.89 PERENNIAL ALLERGIC RHINITIS: Primary | ICD-10-CM

## 2021-08-18 PROCEDURE — 95117 IMMUNOTHERAPY INJECTIONS: CPT

## 2021-08-18 NOTE — PROGRESS NOTES
Prior to injection verified pt identity using pt name and date of birth.    Allergy injection/s given and charted on paper allergy flow sheet.  Patient left AMA, signing form, not staying for the observation period.    Morgan Buchanan RN on 8/18/2021 at 11:12 AM

## 2021-08-25 ENCOUNTER — ALLIED HEALTH/NURSE VISIT (OUTPATIENT)
Dept: ALLERGY | Facility: OTHER | Age: 71
End: 2021-08-25
Attending: NURSE PRACTITIONER
Payer: MEDICARE

## 2021-08-25 DIAGNOSIS — J30.89 PERENNIAL ALLERGIC RHINITIS: Primary | ICD-10-CM

## 2021-08-25 PROCEDURE — 95117 IMMUNOTHERAPY INJECTIONS: CPT

## 2021-09-01 ENCOUNTER — ALLIED HEALTH/NURSE VISIT (OUTPATIENT)
Dept: ALLERGY | Facility: OTHER | Age: 71
End: 2021-09-01
Attending: NURSE PRACTITIONER
Payer: MEDICARE

## 2021-09-01 DIAGNOSIS — J30.89 PERENNIAL ALLERGIC RHINITIS: Primary | ICD-10-CM

## 2021-09-01 PROCEDURE — 95117 IMMUNOTHERAPY INJECTIONS: CPT

## 2021-09-01 NOTE — PROGRESS NOTES
Prior to injection verified pt identity using pt name and date of birth.    Allergy injection/s given and charted on paper allergy flow sheet.  Patient left AMA, signing form, not staying for the observation period.    Morgan Buchanan RN on 9/1/2021 at 11:19 AM
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
## 2021-09-02 ENCOUNTER — OFFICE VISIT (OUTPATIENT)
Dept: FAMILY MEDICINE | Facility: OTHER | Age: 71
End: 2021-09-02
Attending: NURSE PRACTITIONER
Payer: COMMERCIAL

## 2021-09-02 VITALS
HEART RATE: 86 BPM | OXYGEN SATURATION: 98 % | WEIGHT: 202 LBS | DIASTOLIC BLOOD PRESSURE: 76 MMHG | TEMPERATURE: 98 F | SYSTOLIC BLOOD PRESSURE: 138 MMHG | BODY MASS INDEX: 38.14 KG/M2 | HEIGHT: 61 IN

## 2021-09-02 DIAGNOSIS — R07.89 CHEST TIGHTNESS: ICD-10-CM

## 2021-09-02 DIAGNOSIS — I49.3 FREQUENT PVCS: ICD-10-CM

## 2021-09-02 DIAGNOSIS — R06.09 DYSPNEA ON EXERTION: ICD-10-CM

## 2021-09-02 DIAGNOSIS — I10 ESSENTIAL HYPERTENSION: Primary | ICD-10-CM

## 2021-09-02 PROCEDURE — 99214 OFFICE O/P EST MOD 30 MIN: CPT | Performed by: NURSE PRACTITIONER

## 2021-09-02 PROCEDURE — G0463 HOSPITAL OUTPT CLINIC VISIT: HCPCS

## 2021-09-02 PROCEDURE — G0463 HOSPITAL OUTPT CLINIC VISIT: HCPCS | Mod: 25

## 2021-09-02 ASSESSMENT — PATIENT HEALTH QUESTIONNAIRE - PHQ9: SUM OF ALL RESPONSES TO PHQ QUESTIONS 1-9: 4

## 2021-09-02 ASSESSMENT — ANXIETY QUESTIONNAIRES
2. NOT BEING ABLE TO STOP OR CONTROL WORRYING: SEVERAL DAYS
1. FEELING NERVOUS, ANXIOUS, OR ON EDGE: SEVERAL DAYS
6. BECOMING EASILY ANNOYED OR IRRITABLE: SEVERAL DAYS
3. WORRYING TOO MUCH ABOUT DIFFERENT THINGS: SEVERAL DAYS
5. BEING SO RESTLESS THAT IT IS HARD TO SIT STILL: SEVERAL DAYS
IF YOU CHECKED OFF ANY PROBLEMS ON THIS QUESTIONNAIRE, HOW DIFFICULT HAVE THESE PROBLEMS MADE IT FOR YOU TO DO YOUR WORK, TAKE CARE OF THINGS AT HOME, OR GET ALONG WITH OTHER PEOPLE: SOMEWHAT DIFFICULT
4. TROUBLE RELAXING: SEVERAL DAYS
GAD7 TOTAL SCORE: 6
7. FEELING AFRAID AS IF SOMETHING AWFUL MIGHT HAPPEN: NOT AT ALL

## 2021-09-02 ASSESSMENT — MIFFLIN-ST. JEOR: SCORE: 1383.65

## 2021-09-02 ASSESSMENT — PAIN SCALES - GENERAL: PAINLEVEL: NO PAIN (0)

## 2021-09-02 NOTE — NURSING NOTE
"Chief Complaint   Patient presents with     Hypertension       Initial There were no vitals taken for this visit. Estimated body mass index is 37.98 kg/m  as calculated from the following:    Height as of 8/9/21: 1.549 m (5' 1\").    Weight as of 8/9/21: 91.2 kg (201 lb).  Medication Reconciliation: complete  Janet Woods LPN  "

## 2021-09-03 ASSESSMENT — ANXIETY QUESTIONNAIRES: GAD7 TOTAL SCORE: 6

## 2021-09-08 ENCOUNTER — ALLIED HEALTH/NURSE VISIT (OUTPATIENT)
Dept: ALLERGY | Facility: OTHER | Age: 71
End: 2021-09-08
Attending: NURSE PRACTITIONER
Payer: MEDICARE

## 2021-09-08 DIAGNOSIS — J30.89 PERENNIAL ALLERGIC RHINITIS: Primary | ICD-10-CM

## 2021-09-08 PROCEDURE — 95117 IMMUNOTHERAPY INJECTIONS: CPT

## 2021-09-08 NOTE — PROGRESS NOTES
Prior to injection verified pt identity using pt name and date of birth.    Allergy injection/s given and charted on paper allergy flow sheet.  Patient left AMA, signing form, not staying for the observation period.    Morgan Buchanan RN on 9/8/2021 at 11:10 AM

## 2021-09-15 ENCOUNTER — ALLIED HEALTH/NURSE VISIT (OUTPATIENT)
Dept: ALLERGY | Facility: OTHER | Age: 71
End: 2021-09-15
Attending: NURSE PRACTITIONER
Payer: MEDICARE

## 2021-09-15 DIAGNOSIS — J30.89 PERENNIAL ALLERGIC RHINITIS: Primary | ICD-10-CM

## 2021-09-15 PROCEDURE — 95117 IMMUNOTHERAPY INJECTIONS: CPT

## 2021-09-20 ENCOUNTER — OFFICE VISIT (OUTPATIENT)
Dept: CARDIOLOGY | Facility: OTHER | Age: 71
End: 2021-09-20
Attending: INTERNAL MEDICINE
Payer: COMMERCIAL

## 2021-09-20 VITALS
WEIGHT: 199 LBS | OXYGEN SATURATION: 98 % | BODY MASS INDEX: 37.57 KG/M2 | HEIGHT: 61 IN | DIASTOLIC BLOOD PRESSURE: 86 MMHG | HEART RATE: 48 BPM | SYSTOLIC BLOOD PRESSURE: 158 MMHG

## 2021-09-20 DIAGNOSIS — E11.9 TYPE 2 DIABETES MELLITUS WITHOUT COMPLICATION, WITHOUT LONG-TERM CURRENT USE OF INSULIN (H): ICD-10-CM

## 2021-09-20 DIAGNOSIS — I49.9 IRREGULAR HEART RATE: ICD-10-CM

## 2021-09-20 DIAGNOSIS — E87.6 HYPOKALEMIA: ICD-10-CM

## 2021-09-20 DIAGNOSIS — I49.3 FREQUENT PVCS: ICD-10-CM

## 2021-09-20 DIAGNOSIS — R07.9 CHEST PAIN, UNSPECIFIED TYPE: ICD-10-CM

## 2021-09-20 DIAGNOSIS — R06.02 SHORTNESS OF BREATH: ICD-10-CM

## 2021-09-20 DIAGNOSIS — F41.1 GENERALIZED ANXIETY DISORDER: ICD-10-CM

## 2021-09-20 DIAGNOSIS — E66.01 MORBID OBESITY (H): ICD-10-CM

## 2021-09-20 DIAGNOSIS — I10 ESSENTIAL HYPERTENSION: Primary | ICD-10-CM

## 2021-09-20 PROCEDURE — 99214 OFFICE O/P EST MOD 30 MIN: CPT | Performed by: INTERNAL MEDICINE

## 2021-09-20 RX ORDER — SPIRONOLACTONE 25 MG/1
25 TABLET ORAL DAILY
Qty: 90 TABLET | Refills: 3 | Status: SHIPPED | OUTPATIENT
Start: 2021-09-20 | End: 2021-10-11 | Stop reason: DRUGHIGH

## 2021-09-20 ASSESSMENT — MIFFLIN-ST. JEOR: SCORE: 1370.04

## 2021-09-20 ASSESSMENT — ANXIETY QUESTIONNAIRES
6. BECOMING EASILY ANNOYED OR IRRITABLE: SEVERAL DAYS
3. WORRYING TOO MUCH ABOUT DIFFERENT THINGS: SEVERAL DAYS
2. NOT BEING ABLE TO STOP OR CONTROL WORRYING: SEVERAL DAYS
4. TROUBLE RELAXING: SEVERAL DAYS
GAD7 TOTAL SCORE: 6
5. BEING SO RESTLESS THAT IT IS HARD TO SIT STILL: SEVERAL DAYS
1. FEELING NERVOUS, ANXIOUS, OR ON EDGE: SEVERAL DAYS
7. FEELING AFRAID AS IF SOMETHING AWFUL MIGHT HAPPEN: NOT AT ALL

## 2021-09-20 ASSESSMENT — PAIN SCALES - GENERAL: PAINLEVEL: SEVERE PAIN (7)

## 2021-09-20 ASSESSMENT — PATIENT HEALTH QUESTIONNAIRE - PHQ9: SUM OF ALL RESPONSES TO PHQ QUESTIONS 1-9: 2

## 2021-09-20 NOTE — NURSING NOTE
"Chief Complaint   Patient presents with     RECHECK       Initial Pulse (!) 48   Ht 1.549 m (5' 1\")   Wt 90.3 kg (199 lb)   SpO2 98%   BMI 37.60 kg/m   Estimated body mass index is 37.6 kg/m  as calculated from the following:    Height as of this encounter: 1.549 m (5' 1\").    Weight as of this encounter: 90.3 kg (199 lb).  Medication Reconciliation: complete  Radha Butcher    "

## 2021-09-20 NOTE — PATIENT INSTRUCTIONS
Thank you for allowing Dr. Mills and our  team to participate in your care. Please call our office at [phone] with scheduling questions or if you need to cancel or change your appointment. With any other questions or concerns you may call [facility] cardiology nurse at [phone].       If you experience chest pain, chest pressure, chest tightness, shortness of breath, fainting, lightheadedness, nausea, vomiting, or other concerning symptoms, please report to the Emergency Department or call 911. These symptoms may be emergent, and best treated in the Emergency Department.    Follow up in 3 months     Stop amlodipine and potassium.    Start Spirolactone.
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
## 2021-09-21 ASSESSMENT — ANXIETY QUESTIONNAIRES: GAD7 TOTAL SCORE: 6

## 2021-09-22 ENCOUNTER — ALLIED HEALTH/NURSE VISIT (OUTPATIENT)
Dept: ALLERGY | Facility: OTHER | Age: 71
End: 2021-09-22
Attending: NURSE PRACTITIONER
Payer: MEDICARE

## 2021-09-22 DIAGNOSIS — J30.89 PERENNIAL ALLERGIC RHINITIS: Primary | ICD-10-CM

## 2021-09-22 PROCEDURE — 95117 IMMUNOTHERAPY INJECTIONS: CPT

## 2021-09-27 ENCOUNTER — OFFICE VISIT (OUTPATIENT)
Dept: CHIROPRACTIC MEDICINE | Facility: OTHER | Age: 71
End: 2021-09-27
Attending: CHIROPRACTOR
Payer: COMMERCIAL

## 2021-09-27 DIAGNOSIS — M99.03 SEGMENTAL AND SOMATIC DYSFUNCTION OF LUMBAR REGION: Primary | ICD-10-CM

## 2021-09-27 DIAGNOSIS — M99.02 SEGMENTAL AND SOMATIC DYSFUNCTION OF THORACIC REGION: ICD-10-CM

## 2021-09-27 DIAGNOSIS — M54.41 ACUTE RIGHT-SIDED LOW BACK PAIN WITH RIGHT-SIDED SCIATICA: ICD-10-CM

## 2021-09-27 PROCEDURE — 98941 CHIROPRACT MANJ 3-4 REGIONS: CPT | Mod: AT | Performed by: CHIROPRACTOR

## 2021-09-27 NOTE — PROGRESS NOTES
Subjective Finding:    Chief compalint: Patient presents with:  Back Pain: right sided  , Pain Scale: 7/10, Intensity: sharp, Duration: 2 weeks, Radiating: bilateral buttock.    Date of injury:     Activities that the pain restricts:   Home/household/hobbies/social activities: yes.  Work duties: no.  Sleep: no.  Makes symptoms better: rest.  Makes symptoms worse: activity and lumbar flexion.  Have you seen anyone else for the symptoms? Yes: MD.  Work related: no.  Automobile related injury: no.    Objective and Assessment:    Posture Analysis:   High shoulder: .  Head tilt: .  High iliac crest: .  Head carriage: neutral.  Thoracic Kyphosis: forward.  Lumbar Lordosis: forward.    Lumbar Range of Motion: extension decreased.  Cervical Range of Motion: .  Thoracic Range of Motion: .  Extremity Range of Motion: .    Palpation:   Quad lumb: bilateral, referred pain: no    Segmental dysfunction pre-treatment and treatment area: T5, L3, L4 and L5.  C56    Assessment post-treatment:  Cervical: .  Thoracic: ROM increased.  Lumbar: ROM increased and pain and tenderness decreased.    Comments: .      Complicating Factors: .    Plan / Procedure:    Treatment plan: 2 times per week for 2 weeks.  Instructed patient: stretch as instructed at visit.  Short term goals: increase ROM.  Long term goals: restore normal function.  Prognosis: very good.

## 2021-09-29 ENCOUNTER — ALLIED HEALTH/NURSE VISIT (OUTPATIENT)
Dept: ALLERGY | Facility: OTHER | Age: 71
End: 2021-09-29
Attending: NURSE PRACTITIONER
Payer: MEDICARE

## 2021-09-29 DIAGNOSIS — J30.89 PERENNIAL ALLERGIC RHINITIS: Primary | ICD-10-CM

## 2021-09-29 PROCEDURE — 95117 IMMUNOTHERAPY INJECTIONS: CPT

## 2021-10-02 DIAGNOSIS — I10 ESSENTIAL HYPERTENSION: ICD-10-CM

## 2021-10-04 RX ORDER — LOSARTAN POTASSIUM 100 MG/1
TABLET ORAL
Qty: 90 TABLET | Refills: 0 | Status: SHIPPED | OUTPATIENT
Start: 2021-10-04 | End: 2021-12-27

## 2021-10-04 NOTE — TELEPHONE ENCOUNTER
losartan      Last Written Prescription Date:  7/7/21  Last Fill Quantity: 90,   # refills: 0  Last Office Visit: 9/2/21  Future Office visit:    Next 5 appointments (look out 90 days)    Oct 08, 2021  2:50 PM  (Arrive by 2:35 PM)  SHORT with Sheyla Sanches NP  [facility] [place] ([facility] [place] ) [address]  [place] [address]  [phone]   Dec 20, 2021 10:00 AM  (Arrive by 9:45 AM)  Return Visit with Franck Mills DO  [facility] [place] ([facility] [place] ) [address]  [place] [address]  [phone]

## 2021-10-05 NOTE — PROGRESS NOTES
Assessment & Plan     Essential hypertension  Chest tightness  Dyspnea on exertion  Frequent PVCs  BP high at 162/82. Taking spironolactone daily. Dr. Mills stopped the Norvasc due to lower leg swelling. Will check her potassium today. Once back, will message Dr. Mills with BMP results and BP. Will see if he is ok increasing the spironolactone.     Type 2 diabetes mellitus without complication, without long-term current use of insulin (H)  A1C pending. Will notify patient of the results when available and intervene accordingly. Diet and exercise encouraged. Declines to start asa. Declines a statin. Eye exam UTD. F/up 3 months.     Acute right-sided low back pain with right-sided sciatica  No red flags noted. No fevers. No bowel or bladder incontinence. No saddle anesthesia. Rest, ice, and NSAIDs recommended. Declined to see PT as the chiropractor is going well. Also declines a muscle relaxant. Will let me know if she changes her mind. Will return with any new or worsening symptoms.         No follow-ups on file.    Sheyla Sanches NP  [facility] is a 70 year old who presents for the following health issues     HPI     Hypertension Follow-up      Do you check your blood pressure regularly outside of the clinic? Yes, running 130/70-80 at home    Are you following a low salt diet? Yes    Are your blood pressures ever more than 140 on the top number (systolic) OR more   than 90 on the bottom number (diastolic), for example 140/90? Yes   -Following with cardiology, Last seen on 9/20/21; amlodpine was stopped due to lower leg swelling, Aldactone was started  -Feels very stressed, doing EMDR with counselor.   -Daughter with bipolar, on the run, she is using meth, unsure where she is leaving.  -Brother in law with Parkinson's Disease.   -Brother with cancer.      Was having some chest tightness and shortness of breath at her previous visits. HR also sounded irregular on  exam at the previous visit. EKG done and did show did show flipped t-waves in several leads with frequent PVCs. Encouraged to complete stress test, echo, and repeat Holter, but patient declined. She met with Dr. Ozuna on 8/4/21 who also recommended the testing, but she again declined.       Today she notes that she continues to have intermittent chest tightness, but it seems to be triggered by her anxiety. Overall she feels better. Shortness of breath has resolved.  No palpitations. No dizziness. No syncope.     She does have diabetes. A1C was 5.8 in 3/2021. Working on weight loss and trying to increase her exercise. She has been checking her glucoses and notes that they have been well controlled. She does not take any medications for her DM. Eye exam UTD.     She also notes that she has been working on her home and was going up and down ladders. Having increased right lower back pain. No specific injury. Radiates into right leg to calf. No fevers or chills. No numbness or tingling. No saddle anesthesia. No bowel or bladder incontinence. Position changes increase her pain. Following with Dr. Ugarte and feels this is helping. Plans to continue.       Review of Systems   As noted in the HPI.       Objective    BP (!) 162/82 (BP Location: Left arm, Patient Position: Chair, Cuff Size: Adult Large)   Pulse 70   Temp 98  F (36.7  C) (Tympanic)   Resp 18   Wt 88.9 kg (196 lb)   SpO2 97%   BMI 37.03 kg/m    Body mass index is 37.03 kg/m .  Physical Exam   GENERAL: alert, no distress and obese  NECK: no adenopathy  RESP: lungs clear to auscultation - no rales, rhonchi or wheezes  CV: regular rate and rhythm, no murmur, click or rub, no peripheral edema  ABDOMEN: soft, nontender, no masses and bowel sounds normal  MS: no gross musculoskeletal defects noted, no edema  NEURO: Normal strength and tone, mentation intact and speech normal  PSYCH: mentation appears normal, affect normal/bright  Musculoskeletal: Lumbar  spine without gross deformity, rash, erythema, or ecchymosis. No point tenderness. Some Paraspinous muscle tenderness on the right. Mild pain with extension. Mild pain with flexion. No pain with lateral flexion. No palpable spasm. Negative straight leg raises bilaterally. Patellar reflexes 2+, LE strength 5/5 bilaterally. Sensation intact to light touch. Posterior tib pulses intact.         A1C, BMP, and microalbumin pending

## 2021-10-06 ENCOUNTER — ALLIED HEALTH/NURSE VISIT (OUTPATIENT)
Dept: ALLERGY | Facility: OTHER | Age: 71
End: 2021-10-06
Attending: NURSE PRACTITIONER
Payer: MEDICARE

## 2021-10-06 DIAGNOSIS — J30.89 PERENNIAL ALLERGIC RHINITIS: Primary | ICD-10-CM

## 2021-10-06 PROCEDURE — 95165 ANTIGEN THERAPY SERVICES: CPT

## 2021-10-06 PROCEDURE — 95117 IMMUNOTHERAPY INJECTIONS: CPT

## 2021-10-06 PROCEDURE — 95165 ANTIGEN THERAPY SERVICES: CPT | Performed by: NURSE PRACTITIONER

## 2021-10-06 NOTE — PROGRESS NOTES
Prior to injection patient identity verified using name and date of birth.    SVT done on right arm, measuring 9 mm.  Passed  SVT done on left arm, measuring 9 mm.  Passed     Documented on paper flowsheet.    Allergy injection/s given and charted on paper allergy flow sheet.  Patient left AMA, signing form, not staying for the observation period.    Morgan Buchanan RN on 10/6/2021 at 11:26 AM

## 2021-10-06 NOTE — PROGRESS NOTES
Allergy serum is mixed today at maintenance schedule, red vial, into two (5 ml) multi dose vial/vials.    Allergens included were:    Ragweed  0.2 ml of dilution # 1  Pigweed  0.2 ml of dilution # 1  Mugwort 0.2 ml of dilution # 0  Kochia  0.2 ml of dilution # 0  Russian Thistle 0.2 ml of dilution # 1  Jimenez Grass 0.2 ml of dilution # 1  Birch mix 0.2 ml of dilution # 1  Maple Mix 0.2 of dilution # 1  Elm Mix 0.2 ml of dilution # 1  Oak Mix 0.2 ml of dilution # 1  Frank Mix 0.2 ml of dilution # 1  Pine Mix 0.2 ml of dilution # 1  [place] 0.2 ml of dilution # 1  Black Cedar 0.2 ml of dilution # 1  Aspen 0.2 ml of dilution # 0  Red Mellette 0.2 ml of dilution # 0    Alternaria 0.2 ml of dilution # 1  Aspergillus 0.2 ml of dilution # 1  Hormodendrum 0.2 ml of dilution # 1  Helminthosporium 0.2 ml of dilution # 1  Penicillium 0.2 ml of dilution # 1  Epicoccum 0.2 ml of dilution # 1  Fusarium 0.2 ml of dilution # 1  Mucor 0.2 ml of dilution # 0  Grain Smut 0.2 ml of dilution # 0  Grass Smut 0.2 ml of dilution # 0  Cat 0.2 ml of dilution # 1  Dog 0.2 ml of dilution # 1  Feather Mix 0.2 ml of dilution # 0  Dust Mite Mix 0.2 ml of dilution # 1  Horse 0.2 ml of dilution # 0    Halina Nogueira RN

## 2021-10-08 ENCOUNTER — OFFICE VISIT (OUTPATIENT)
Dept: FAMILY MEDICINE | Facility: OTHER | Age: 71
End: 2021-10-08
Attending: NURSE PRACTITIONER
Payer: COMMERCIAL

## 2021-10-08 VITALS
TEMPERATURE: 98 F | SYSTOLIC BLOOD PRESSURE: 162 MMHG | BODY MASS INDEX: 37.03 KG/M2 | DIASTOLIC BLOOD PRESSURE: 82 MMHG | OXYGEN SATURATION: 97 % | WEIGHT: 196 LBS | RESPIRATION RATE: 18 BRPM | HEART RATE: 70 BPM

## 2021-10-08 DIAGNOSIS — I10 ESSENTIAL HYPERTENSION: Primary | ICD-10-CM

## 2021-10-08 DIAGNOSIS — I49.3 FREQUENT PVCS: ICD-10-CM

## 2021-10-08 DIAGNOSIS — M54.41 ACUTE RIGHT-SIDED LOW BACK PAIN WITH RIGHT-SIDED SCIATICA: ICD-10-CM

## 2021-10-08 DIAGNOSIS — E11.9 TYPE 2 DIABETES MELLITUS WITHOUT COMPLICATION, WITHOUT LONG-TERM CURRENT USE OF INSULIN (H): ICD-10-CM

## 2021-10-08 DIAGNOSIS — R07.89 CHEST TIGHTNESS: ICD-10-CM

## 2021-10-08 DIAGNOSIS — R06.09 DYSPNEA ON EXERTION: ICD-10-CM

## 2021-10-08 LAB
ANION GAP SERPL CALCULATED.3IONS-SCNC: 3 MMOL/L (ref 3–14)
BUN SERPL-MCNC: 16 MG/DL (ref 7–30)
CALCIUM SERPL-MCNC: 9.1 MG/DL (ref 8.5–10.1)
CHLORIDE BLD-SCNC: 109 MMOL/L (ref 94–109)
CO2 SERPL-SCNC: 27 MMOL/L (ref 20–32)
CREAT SERPL-MCNC: 0.8 MG/DL (ref 0.52–1.04)
CREAT UR-MCNC: 41 MG/DL
EST. AVERAGE GLUCOSE BLD GHB EST-MCNC: 120 MG/DL
GFR SERPL CREATININE-BSD FRML MDRD: 76 ML/MIN/1.73M2
GLUCOSE BLD-MCNC: 92 MG/DL (ref 70–99)
HBA1C MFR BLD: 5.8 % (ref 0–5.6)
MICROALBUMIN UR-MCNC: <5 MG/L
MICROALBUMIN/CREAT UR: NORMAL MG/G{CREAT}
POTASSIUM BLD-SCNC: 3.5 MMOL/L (ref 3.4–5.3)
SODIUM SERPL-SCNC: 139 MMOL/L (ref 133–144)

## 2021-10-08 PROCEDURE — 80048 BASIC METABOLIC PNL TOTAL CA: CPT | Mod: ZL | Performed by: NURSE PRACTITIONER

## 2021-10-08 PROCEDURE — 36415 COLL VENOUS BLD VENIPUNCTURE: CPT | Mod: ZL | Performed by: NURSE PRACTITIONER

## 2021-10-08 PROCEDURE — G0463 HOSPITAL OUTPT CLINIC VISIT: HCPCS | Mod: 25

## 2021-10-08 PROCEDURE — 82043 UR ALBUMIN QUANTITATIVE: CPT | Mod: ZL | Performed by: NURSE PRACTITIONER

## 2021-10-08 PROCEDURE — G0463 HOSPITAL OUTPT CLINIC VISIT: HCPCS

## 2021-10-08 PROCEDURE — 99214 OFFICE O/P EST MOD 30 MIN: CPT | Performed by: NURSE PRACTITIONER

## 2021-10-08 PROCEDURE — 83036 HEMOGLOBIN GLYCOSYLATED A1C: CPT | Mod: ZL | Performed by: NURSE PRACTITIONER

## 2021-10-08 ASSESSMENT — PAIN SCALES - GENERAL: PAINLEVEL: MODERATE PAIN (5)

## 2021-10-08 NOTE — NURSING NOTE
"Chief Complaint   Patient presents with     Hypertension       Initial BP (!) 180/86 (BP Location: Left arm, Patient Position: Chair, Cuff Size: Adult Large)   Pulse 70   Temp 98  F (36.7  C) (Tympanic)   Resp 18   Wt 88.9 kg (196 lb)   SpO2 97%   BMI 37.03 kg/m   Estimated body mass index is 37.03 kg/m  as calculated from the following:    Height as of 9/20/21: 1.549 m (5' 1\").    Weight as of this encounter: 88.9 kg (196 lb).  Medication Reconciliation: complete  Maritza Briggs LPN  "

## 2021-10-11 ENCOUNTER — TELEPHONE (OUTPATIENT)
Dept: FAMILY MEDICINE | Facility: OTHER | Age: 71
End: 2021-10-11

## 2021-10-11 DIAGNOSIS — I10 ESSENTIAL HYPERTENSION: Primary | ICD-10-CM

## 2021-10-11 RX ORDER — SPIRONOLACTONE 50 MG/1
50 TABLET, FILM COATED ORAL DAILY
Qty: 30 TABLET | Refills: 1 | Status: SHIPPED | OUTPATIENT
Start: 2021-10-11 | End: 2021-12-13

## 2021-10-11 NOTE — TELEPHONE ENCOUNTER
----- Message from Sheyla Sanches NP sent at 10/11/2021  7:33 AM CDT -----  Can you call her and let her know that I talked with Dr. Mills. Ok to increase Aldactone to 50 mg from 25 and then have her come in for nurse only BP check and BMP in 2 weeks. Can you pend BMP? Sheyla  ----- Message -----  From: Franck Mills DO  Sent: 10/10/2021   9:41 PM CDT  To: Sheyla Sanches NP    Sounds good!  Dr. Mills  ----- Message -----  From: Sheyla Sanches NP  Sent: 10/10/2021  10:59 AM CDT  To: Franck Mills DO    Thanks, I have a lab only BMP scheduled in 2 weeks. Sheyla  ----- Message -----  From: Franck Mills DO  Sent: 10/9/2021   9:19 AM CDT  To: Sheyla Sanches NP    Yes,  That sould be ok. You may also want to check her kidney function and K in the future.     Dr. Mills  ----- Message -----  From: Sheyla Sanches NP  Sent: 10/8/2021   4:30 PM CDT  To: DO Chidi Stacy Dr..     I saw Rj today. BP high at 162/84. HR 70. Potassium 3.5. Can I increase her Aldactone to 50 mg from 25 mg. She does not see you for several weeks.     Sheyla

## 2021-10-11 NOTE — TELEPHONE ENCOUNTER
Spoke with patient . Informed her of medication adjustment in her Aldactone . Nurse Only B/P check has been made for 10- .

## 2021-10-13 ENCOUNTER — ALLIED HEALTH/NURSE VISIT (OUTPATIENT)
Dept: ALLERGY | Facility: OTHER | Age: 71
End: 2021-10-13
Attending: NURSE PRACTITIONER
Payer: MEDICARE

## 2021-10-13 DIAGNOSIS — J30.89 PERENNIAL ALLERGIC RHINITIS: Primary | ICD-10-CM

## 2021-10-13 PROCEDURE — 95117 IMMUNOTHERAPY INJECTIONS: CPT

## 2021-10-20 ENCOUNTER — ALLIED HEALTH/NURSE VISIT (OUTPATIENT)
Dept: ALLERGY | Facility: OTHER | Age: 71
End: 2021-10-20
Attending: NURSE PRACTITIONER
Payer: MEDICARE

## 2021-10-20 DIAGNOSIS — J30.89 PERENNIAL ALLERGIC RHINITIS: Primary | ICD-10-CM

## 2021-10-20 PROCEDURE — 95117 IMMUNOTHERAPY INJECTIONS: CPT

## 2021-10-23 ENCOUNTER — HEALTH MAINTENANCE LETTER (OUTPATIENT)
Age: 71
End: 2021-10-23

## 2021-10-25 ENCOUNTER — LAB (OUTPATIENT)
Dept: LAB | Facility: OTHER | Age: 71
End: 2021-10-25
Attending: NURSE PRACTITIONER
Payer: MEDICARE

## 2021-10-25 ENCOUNTER — ALLIED HEALTH/NURSE VISIT (OUTPATIENT)
Dept: FAMILY MEDICINE | Facility: OTHER | Age: 71
End: 2021-10-25
Attending: NURSE PRACTITIONER
Payer: MEDICARE

## 2021-10-25 VITALS — SYSTOLIC BLOOD PRESSURE: 122 MMHG | HEART RATE: 90 BPM | DIASTOLIC BLOOD PRESSURE: 82 MMHG

## 2021-10-25 DIAGNOSIS — I10 ESSENTIAL HYPERTENSION: Primary | ICD-10-CM

## 2021-10-25 DIAGNOSIS — I10 ESSENTIAL HYPERTENSION: ICD-10-CM

## 2021-10-25 LAB
ANION GAP SERPL CALCULATED.3IONS-SCNC: 3 MMOL/L (ref 3–14)
BUN SERPL-MCNC: 22 MG/DL (ref 7–30)
CALCIUM SERPL-MCNC: 9.2 MG/DL (ref 8.5–10.1)
CHLORIDE BLD-SCNC: 109 MMOL/L (ref 94–109)
CO2 SERPL-SCNC: 25 MMOL/L (ref 20–32)
CREAT SERPL-MCNC: 0.81 MG/DL (ref 0.52–1.04)
GFR SERPL CREATININE-BSD FRML MDRD: 75 ML/MIN/1.73M2
GLUCOSE BLD-MCNC: 92 MG/DL (ref 70–99)
POTASSIUM BLD-SCNC: 4 MMOL/L (ref 3.4–5.3)
SODIUM SERPL-SCNC: 137 MMOL/L (ref 133–144)

## 2021-10-25 PROCEDURE — 99207 PR NO CHARGE NURSE ONLY: CPT

## 2021-10-25 PROCEDURE — 80048 BASIC METABOLIC PNL TOTAL CA: CPT | Mod: ZL

## 2021-10-25 PROCEDURE — 36415 COLL VENOUS BLD VENIPUNCTURE: CPT | Mod: ZL

## 2021-10-25 NOTE — PROGRESS NOTES
Patient in clinic for outpatient BP reading.   The patient was in a sitting position with feet on the floor - Yes  Has the patient smoked in the last 15 minutes - No  Has the patient exercised within the last 15 minutes - No  Patient was instructed to not cross legs or talk during the procedure - Yes  Patients arm was palm upward, slightly flexed and with the whole arm supported at heart level Yes  Cuff size was measured and is appropriate size for patient using established guideline - Yes  ?  The BP reading today was 142 / 78  and pulse was 100 irregular . The plan for the patient was recheck in 15 minutes       15 minutes later     Patient in clinic for outpatient BP reading.   The patient was in a sitting position with feet on the floor - Yes  Has the patient smoked in the last 15 minutes -no   Has the patient exercised within the last 15 minutes - No  The patient rested in the room for 15  minutes before the BP was taken  Patient was instructed to not cross legs or talk during the procedure - Yes  Patients arm was palm upward, slightly flexed and with the whole arm supported at heart level Yes  Cuff size was measured and is appropriate size for patient using established guideline - Yes  ?  The BP reading today was 122/ 82  and pulse was 90 irregular .

## 2021-10-26 ENCOUNTER — TELEPHONE (OUTPATIENT)
Dept: FAMILY MEDICINE | Facility: OTHER | Age: 71
End: 2021-10-26

## 2021-10-26 NOTE — TELEPHONE ENCOUNTER
Informed patient that B/P and BMP look good . Also informed to continue current dose of Aldactone .

## 2021-10-27 ENCOUNTER — ALLIED HEALTH/NURSE VISIT (OUTPATIENT)
Dept: ALLERGY | Facility: OTHER | Age: 71
End: 2021-10-27
Attending: NURSE PRACTITIONER
Payer: MEDICARE

## 2021-10-27 DIAGNOSIS — J30.89 PERENNIAL ALLERGIC RHINITIS: Primary | ICD-10-CM

## 2021-10-27 PROCEDURE — 95117 IMMUNOTHERAPY INJECTIONS: CPT

## 2021-10-27 NOTE — PROGRESS NOTES
Prior to injection verified pt identity using pt name and date of birth.    Allergy injection/s given and charted on paper allergy flow sheet.  Patient left AMA, signing form, not staying for the observation period.    Morgan Buchanan RN on 10/27/2021 at 11:19 AM

## 2021-11-03 ENCOUNTER — ALLIED HEALTH/NURSE VISIT (OUTPATIENT)
Dept: ALLERGY | Facility: OTHER | Age: 71
End: 2021-11-03
Attending: NURSE PRACTITIONER
Payer: MEDICARE

## 2021-11-03 DIAGNOSIS — I10 ESSENTIAL HYPERTENSION: ICD-10-CM

## 2021-11-03 DIAGNOSIS — J30.89 PERENNIAL ALLERGIC RHINITIS: Primary | ICD-10-CM

## 2021-11-03 PROCEDURE — 95117 IMMUNOTHERAPY INJECTIONS: CPT

## 2021-11-03 RX ORDER — FUROSEMIDE 20 MG
TABLET ORAL
Qty: 90 TABLET | Refills: 0 | Status: SHIPPED | OUTPATIENT
Start: 2021-11-03 | End: 2021-12-20

## 2021-11-03 NOTE — TELEPHONE ENCOUNTER
LASIX      Last Written Prescription Date:  7-7-2021  Last Fill Quantity: 90,   # refills: 0  Last Office Visit: 10-8-2021  Future Office visit:    Next 5 appointments (look out 90 days)    Dec 20, 2021 10:00 AM  (Arrive by 9:45 AM)  Return Visit with Franck Mills DO  [facility] [place] ([facility] [place] ) [address]  [place] [address]  [phone]   Rodríguez 10, 2022  9:00 AM  (Arrive by 8:45 AM)  Office Visit with Sheyla Sanches NP  [facility] [place] ([facility] [place] ) [address]  [place] [address]  [phone]           Routing refill request to provider for review/approval because:

## 2021-11-03 NOTE — PROGRESS NOTES
Prior to injection verified pt identity using pt name and date of birth.    Allergy injection/s given and charted on paper allergy flow sheet.  Patient left AMA, signing form, not staying for the observation period.    Morgan Buchanan RN on 11/3/2021 at 11:25 AM

## 2021-11-10 ENCOUNTER — ALLIED HEALTH/NURSE VISIT (OUTPATIENT)
Dept: ALLERGY | Facility: OTHER | Age: 71
End: 2021-11-10
Attending: NURSE PRACTITIONER
Payer: MEDICARE

## 2021-11-10 DIAGNOSIS — J30.89 PERENNIAL ALLERGIC RHINITIS: Primary | ICD-10-CM

## 2021-11-10 PROCEDURE — 95117 IMMUNOTHERAPY INJECTIONS: CPT

## 2021-11-10 NOTE — PROGRESS NOTES
Prior to injection verified pt identity using pt name and date of birth.    Allergy injection/s given and charted on paper allergy flow sheet.  Patient left AMA, signing form, not staying for the observation period.    Morgan Buchanan RN on 11/10/2021 at 10:30 AM

## 2021-11-17 ENCOUNTER — ALLIED HEALTH/NURSE VISIT (OUTPATIENT)
Dept: ALLERGY | Facility: OTHER | Age: 71
End: 2021-11-17
Attending: NURSE PRACTITIONER
Payer: MEDICARE

## 2021-11-17 DIAGNOSIS — J30.89 PERENNIAL ALLERGIC RHINITIS: Primary | ICD-10-CM

## 2021-11-17 PROCEDURE — 95117 IMMUNOTHERAPY INJECTIONS: CPT

## 2021-11-17 NOTE — PROGRESS NOTES
Prior to injection verified pt identity using pt name and date of birth.    Allergy injection/s given and charted on paper allergy flow sheet.  Patient left AMA, signing form, not staying for the observation period.    Morgan Buchanan RN on 11/17/2021 at 11:21 AM

## 2021-11-24 ENCOUNTER — ALLIED HEALTH/NURSE VISIT (OUTPATIENT)
Dept: ALLERGY | Facility: OTHER | Age: 71
End: 2021-11-24
Attending: NURSE PRACTITIONER
Payer: MEDICARE

## 2021-11-24 DIAGNOSIS — J30.89 PERENNIAL ALLERGIC RHINITIS: Primary | ICD-10-CM

## 2021-11-24 PROCEDURE — 95117 IMMUNOTHERAPY INJECTIONS: CPT

## 2021-11-24 NOTE — PROGRESS NOTES
Prior to injection verified pt identity using pt name and date of birth.    Allergy injection/s given and charted on paper allergy flow sheet.  Patient left AMA, signing form, not staying for the observation period.    Morgan Buchanan RN on 11/24/2021 at 11:12 AM

## 2021-12-08 ENCOUNTER — ALLIED HEALTH/NURSE VISIT (OUTPATIENT)
Dept: ALLERGY | Facility: OTHER | Age: 71
End: 2021-12-08
Attending: NURSE PRACTITIONER
Payer: MEDICARE

## 2021-12-08 DIAGNOSIS — J30.89 PERENNIAL ALLERGIC RHINITIS: Primary | ICD-10-CM

## 2021-12-08 PROCEDURE — 95117 IMMUNOTHERAPY INJECTIONS: CPT

## 2021-12-08 NOTE — PROGRESS NOTES
Prior to injection verified pt identity using pt name and date of birth.    Allergy injection/s given and charted on paper allergy flow sheet.  Patient left AMA, signing form, not staying for the observation period.    Morgan Buchanan RN on 12/8/2021 at 11:18 AM

## 2021-12-15 ENCOUNTER — TRANSFERRED RECORDS (OUTPATIENT)
Dept: ALLERGY | Facility: OTHER | Age: 71
End: 2021-12-15

## 2021-12-15 ENCOUNTER — ALLIED HEALTH/NURSE VISIT (OUTPATIENT)
Dept: ALLERGY | Facility: OTHER | Age: 71
End: 2021-12-15
Attending: NURSE PRACTITIONER
Payer: MEDICARE

## 2021-12-15 DIAGNOSIS — J30.89 PERENNIAL ALLERGIC RHINITIS: Primary | ICD-10-CM

## 2021-12-15 PROCEDURE — 95117 IMMUNOTHERAPY INJECTIONS: CPT

## 2021-12-15 PROCEDURE — 95165 ANTIGEN THERAPY SERVICES: CPT

## 2021-12-15 PROCEDURE — 95165 ANTIGEN THERAPY SERVICES: CPT | Performed by: NURSE PRACTITIONER

## 2021-12-15 NOTE — PROGRESS NOTES
Allergy serum is mixed today at  [facility],  into  2  (5 ml)  multi dose vial/vials.    Allergens included were:    Ragweed  0.2 ml of dilution # 1  Pigweed  0.2 ml of dilution # 1  Mugwort 0.2 ml of dilution # 0  Kochia  0.2 ml of dilution # 0  Russian Thistle 0.2 ml of dilution # 1  Jimenez Grass 0.2 ml of dilution # 1  Birch mix 0.2 ml of dilution # 1  Maple Mix 0.2 of dilution # 1  Elm Mix 0.2 ml of dilution # 1  Oak Mix 0.2 ml of dilution # 1  Frank Mix 0.2 ml of dilution # 1  Pine Mix 0.2 ml of dilution # 1  Eastern Bernie 0.2 ml of dilution # 1  Black [place] 0.2 ml of dilution # 1  Aspen 0.2 ml of dilution # 0  Red [place] 0.2 ml of dilution # 0    Alternaria 0.2 ml of dilution # 1  Aspergillus 0.2 ml of dilution # 1  Hormodendrum 0.2 ml of dilution # 1  Helminthosporium 0.2 ml of dilution # 1  Penicillium 0.2 ml of dilution # 1  Epicoccum 0.2 ml of dilution # 1  Fusarium 0.2 ml of dilution # 1  Mucor 0.2 ml of dilution # 0  Grain Smut 0.2 ml of dilution # 0  Grass Smut 0.2 ml of dilution # 0  Cat 0.2 ml of dilution # 1  Dog 0.2 ml of dilution # 1  Feather Mix 0.2 ml of dilution # 0  Dust Mite Mix 0.2 ml of dilution # 1  Horse 0.2 ml of dilution # 0    Morgan Buchanan RN on 12/15/2021 at 11:36 AM

## 2021-12-15 NOTE — PROGRESS NOTES
Prior to injection patient identity verified using name and date of birth.    SVT done on right arm, measuring 7 mm. Passed.  SVT done on left arm, measuring 7 mm. Passed    Documented on paper flowsheet.    Prior to injection verified pt identity using pt name and date of birth.    Allergy injection/s given and charted on paper allergy flow sheet.  Patient left AMA, signing form, not staying for the observation period.    Morgan Buchanan RN on 12/15/2021 at 11:35 AM

## 2021-12-20 ENCOUNTER — OFFICE VISIT (OUTPATIENT)
Dept: CARDIOLOGY | Facility: OTHER | Age: 71
End: 2021-12-20
Attending: INTERNAL MEDICINE
Payer: COMMERCIAL

## 2021-12-20 VITALS
HEART RATE: 105 BPM | SYSTOLIC BLOOD PRESSURE: 169 MMHG | RESPIRATION RATE: 24 BRPM | OXYGEN SATURATION: 97 % | WEIGHT: 199 LBS | DIASTOLIC BLOOD PRESSURE: 75 MMHG | HEIGHT: 61 IN | BODY MASS INDEX: 37.57 KG/M2

## 2021-12-20 DIAGNOSIS — R06.02 SHORTNESS OF BREATH: ICD-10-CM

## 2021-12-20 DIAGNOSIS — E66.01 MORBID OBESITY (H): ICD-10-CM

## 2021-12-20 DIAGNOSIS — I49.3 FREQUENT PVCS: ICD-10-CM

## 2021-12-20 DIAGNOSIS — I10 ESSENTIAL HYPERTENSION: Primary | ICD-10-CM

## 2021-12-20 DIAGNOSIS — E87.6 HYPOKALEMIA: ICD-10-CM

## 2021-12-20 DIAGNOSIS — I49.9 IRREGULAR HEART RATE: ICD-10-CM

## 2021-12-20 DIAGNOSIS — R07.9 CHEST PAIN, UNSPECIFIED TYPE: ICD-10-CM

## 2021-12-20 DIAGNOSIS — F33.0 MILD EPISODE OF RECURRENT MAJOR DEPRESSIVE DISORDER (H): ICD-10-CM

## 2021-12-20 DIAGNOSIS — E11.9 TYPE 2 DIABETES MELLITUS WITHOUT COMPLICATION, WITHOUT LONG-TERM CURRENT USE OF INSULIN (H): ICD-10-CM

## 2021-12-20 DIAGNOSIS — F41.1 GENERALIZED ANXIETY DISORDER: ICD-10-CM

## 2021-12-20 PROCEDURE — 99214 OFFICE O/P EST MOD 30 MIN: CPT | Performed by: INTERNAL MEDICINE

## 2021-12-20 PROCEDURE — G0463 HOSPITAL OUTPT CLINIC VISIT: HCPCS

## 2021-12-20 RX ORDER — PROPRANOLOL HYDROCHLORIDE 40 MG/1
40 TABLET ORAL 2 TIMES DAILY
Qty: 180 TABLET | Refills: 3 | Status: SHIPPED | OUTPATIENT
Start: 2021-12-20 | End: 2021-12-20 | Stop reason: ALTCHOICE

## 2021-12-20 RX ORDER — DILTIAZEM HYDROCHLORIDE 120 MG/1
120 CAPSULE, EXTENDED RELEASE ORAL DAILY
Qty: 90 CAPSULE | Refills: 3 | Status: SHIPPED | OUTPATIENT
Start: 2021-12-20 | End: 2022-04-05 | Stop reason: DRUGHIGH

## 2021-12-20 ASSESSMENT — PAIN SCALES - GENERAL: PAINLEVEL: MILD PAIN (2)

## 2021-12-20 ASSESSMENT — MIFFLIN-ST. JEOR: SCORE: 1365.04

## 2021-12-20 NOTE — PATIENT INSTRUCTIONS
Thank you for allowing Dr. Mills and our  team to participate in your care. Please call our office at [phone] with scheduling questions or if you need to cancel or change your appointment. With any other questions or concerns you may call [facility] cardiology nurse at [phone].       If you experience chest pain, chest pressure, chest tightness, shortness of breath, fainting, lightheadedness, nausea, vomiting, or other concerning symptoms, please report to the Emergency Department or call 911. These symptoms may be emergent, and best treated in the Emergency Department.    Follow up in 1 year     Stop Lasix     Start diltiazem 120 mg daily. No Propranolol.
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
## 2021-12-23 ENCOUNTER — HOSPITAL ENCOUNTER (EMERGENCY)
Facility: HOSPITAL | Age: 71
Discharge: HOME | End: 2021-12-23
Attending: PHYSICIAN ASSISTANT | Admitting: PHYSICIAN ASSISTANT
Payer: MEDICARE

## 2021-12-23 ENCOUNTER — APPOINTMENT (OUTPATIENT)
Dept: GENERAL RADIOLOGY | Facility: HOSPITAL | Age: 71
End: 2021-12-23
Attending: PHYSICIAN ASSISTANT
Payer: MEDICARE

## 2021-12-23 ENCOUNTER — NURSE TRIAGE (OUTPATIENT)
Dept: FAMILY MEDICINE | Facility: OTHER | Age: 71
End: 2021-12-23
Payer: COMMERCIAL

## 2021-12-23 VITALS
RESPIRATION RATE: 16 BRPM | TEMPERATURE: 99.1 F | HEIGHT: 61 IN | OXYGEN SATURATION: 95 % | DIASTOLIC BLOOD PRESSURE: 77 MMHG | SYSTOLIC BLOOD PRESSURE: 127 MMHG | HEART RATE: 80 BPM | BODY MASS INDEX: 37.6 KG/M2

## 2021-12-23 DIAGNOSIS — J10.1 INFLUENZA A: ICD-10-CM

## 2021-12-23 LAB
FLUAV RNA SPEC QL NAA+PROBE: POSITIVE
FLUBV RNA RESP QL NAA+PROBE: NEGATIVE
RSV RNA SPEC NAA+PROBE: NEGATIVE
SARS-COV-2 RNA RESP QL NAA+PROBE: NEGATIVE

## 2021-12-23 PROCEDURE — 87637 SARSCOV2&INF A&B&RSV AMP PRB: CPT | Performed by: PHYSICIAN ASSISTANT

## 2021-12-23 PROCEDURE — 250N000013 HC RX MED GY IP 250 OP 250 PS 637: Performed by: PHYSICIAN ASSISTANT

## 2021-12-23 PROCEDURE — 99284 EMERGENCY DEPT VISIT MOD MDM: CPT | Performed by: PHYSICIAN ASSISTANT

## 2021-12-23 PROCEDURE — C9803 HOPD COVID-19 SPEC COLLECT: HCPCS

## 2021-12-23 PROCEDURE — 99285 EMERGENCY DEPT VISIT HI MDM: CPT | Mod: 25

## 2021-12-23 PROCEDURE — 71045 X-RAY EXAM CHEST 1 VIEW: CPT

## 2021-12-23 PROCEDURE — 93010 ELECTROCARDIOGRAM REPORT: CPT | Performed by: INTERNAL MEDICINE

## 2021-12-23 PROCEDURE — 93005 ELECTROCARDIOGRAM TRACING: CPT

## 2021-12-23 RX ORDER — CLONAZEPAM 0.25 MG/1
1 TABLET, ORALLY DISINTEGRATING ORAL ONCE
Status: COMPLETED | OUTPATIENT
Start: 2021-12-23 | End: 2021-12-23

## 2021-12-23 RX ADMIN — CLONAZEPAM 1 MG: 0.25 TABLET, ORALLY DISINTEGRATING ORAL at 15:10

## 2021-12-23 ASSESSMENT — ENCOUNTER SYMPTOMS
BRUISES/BLEEDS EASILY: 0
COUGH: 1
SHORTNESS OF BREATH: 0
DIZZINESS: 0
APPETITE CHANGE: 1
CHILLS: 1
ACTIVITY CHANGE: 1
FEVER: 1
FATIGUE: 1
WEAKNESS: 1
ABDOMINAL PAIN: 0

## 2021-12-23 NOTE — ED NOTES
Order to discharge pt home.  Discharge vitals obtained.  AVS reviewed and pt verbalized understanding.  Pt discharged via wheelchair, accompanied by staff.  Pt to parking lot where spouse was waiting.

## 2021-12-23 NOTE — ED TRIAGE NOTES
"Pt states she has been feeling SOB with cough and fever since Tuesday. States her heart has been \"jumping around\" since last moderna vaccine \"they don't know why but it isn't afib\".  "

## 2021-12-23 NOTE — TELEPHONE ENCOUNTER
"Cough, body aches and diarrhea with fever.     Patient has been fully vaccinated for covid 19. Patient has not received booster.     Patient has been advised to go to ED per protocol due to shortness of breath and rest, increases upon exertion and wheezing.     Patient verbalized understanding. States she is at the curbside testing now, will go to the ED now.       Reason for Disposition    MODERATE difficulty breathing (e.g., speaks in phrases, SOB even at rest, pulse 100-120)    Additional Information    Negative: SEVERE difficulty breathing (e.g., struggling for each breath, speaks in single words)    Negative: Difficult to awaken or acting confused (e.g., disoriented, slurred speech)    Negative: Bluish (or gray) lips or face now    Negative: Shock suspected (e.g., cold/pale/clammy skin, too weak to stand, low BP, rapid pulse)    Negative: Sounds like a life-threatening emergency to the triager    Negative: [1] COVID-19 exposure AND [2] no symptoms    Negative: COVID-19 vaccine reaction suspected (e.g., fever, headache, muscle aches) occurring 1 to 3 days after getting vaccine    Negative: COVID-19 vaccine, questions about    Negative: [1] Lives with someone known to have influenza (flu test positive) AND [2] flu-like symptoms (e.g., cough, runny nose, sore throat, SOB; with or without fever)    Negative: [1] Adult with possible COVID-19 symptoms AND [2] triager concerned about severity of symptoms or other causes    Negative: COVID-19 and breastfeeding, questions about    Negative: SEVERE or constant chest pain or pressure (Exception: mild central chest pain, present only when coughing)    Answer Assessment - Initial Assessment Questions  1. COVID-19 DIAGNOSIS: \"Who made your Coronavirus (COVID-19) diagnosis?\" \"Was it confirmed by a positive lab test?\" If not diagnosed by a HCP, ask \"Are there lots of cases (community spread) where you live?\" (See public health department website, if unsure)      No     2. " "COVID-19 EXPOSURE: \"Was there any known exposure to COVID before the symptoms began?\" CDC Definition of close contact: within 6 feet (2 meters) for a total of 15 minutes or more over a 24-hour period.       No     3. ONSET: \"When did the COVID-19 symptoms start?\"       12/21/21    4. WORST SYMPTOM: \"What is your worst symptom?\" (e.g., cough, fever, shortness of breath, muscle aches)      Cough     5. COUGH: \"Do you have a cough?\" If Yes, ask: \"How bad is the cough?\"        Yes    6. FEVER: \"Do you have a fever?\" If Yes, ask: \"What is your temperature, how was it measured, and when did it start?\"      Fever, temp is 101.2    7. RESPIRATORY STATUS: \"Describe your breathing?\" (e.g., shortness of breath, wheezing, unable to speak)       Wheezing when laying down at night      Shortness of breath at rest     8. BETTER-SAME-WORSE: \"Are you getting better, staying the same or getting worse compared to yesterday?\"  If getting worse, ask, \"In what way?\"      Better      9. HIGH RISK DISEASE: \"Do you have any chronic medical problems?\" (e.g., asthma, heart or lung disease, weak immune system, obesity, etc.)      Pre-diabetic      10. PREGNANCY: \"Is there any chance you are pregnant?\" \"When was your last menstrual period?\"        No     11. OTHER SYMPTOMS: \"Do you have any other symptoms?\"  (e.g., chills, fatigue, headache, loss of smell or taste, muscle pain, sore throat; new loss of smell or taste especially support the diagnosis of COVID-19)        Cough, body aches, diarrhea and fever    Protocols used: CORONAVIRUS (COVID-19) DIAGNOSED OR [id number] 8.25.2021      "

## 2021-12-23 NOTE — DISCHARGE INSTRUCTIONS
Rest and stay hydrated.     Isolate until you are 24 hours of fever free without medications.     You are past the point of Tamiflu.     Please return here for any other concerns.

## 2021-12-23 NOTE — ED PROVIDER NOTES
History     Chief Complaint   Patient presents with     Cough     Shortness of Breath     The history is provided by the patient.     Rj Rodríguez is a 71 year old female who presented to the emergency department for a 4-day history of fever and body aches as well as a cough.  Denies any abdominal pain.  Denies any chest discomfort.  Denies any lower urinary tract symptoms.    Allergies:  Allergies   Allergen Reactions     Fruit [Peach] Anaphylaxis and Hives     fresh peaches and any fruit that has a pit.  Kiwi's and apples also.  Can eat any fruit cooked.     Nuts Anaphylaxis and Hives     All Raw Nuts, can eat nuts when roasted.     Ace Inhibitors      Upper respiratory infection     Alkylamines Hives     Antihistamines     Alprazolam Hives     Xanax     Diphenhydramine Hives and Other (See Comments)     Keeps her awake       Erythromycin      BASE; pt. Not sure if this is a true allergy.     Guaifed      Pt unsure of reaction     Guaifenesin      Guaifed     Hydrochlorothiazide      No Clinical Screening - See Comments      Allergic:  Fresh peaches and any fruit that has a pit.  Kiwi's and apples also.  All raw nuts.  Can eat nuts when roasted or any fruit cooked.  Some seasonal allergies :  Hayfever     Olmesartan Medoxomil Cough     Benicar     Phenylephrine Hcl      Guaifed     Pseudoephedrine Hcl      Guaifed     Seasonal Allergies      hayfever     Tramadol      Sleep disturbance. Can not sleep, and when able to fall asleep will have terrible nightmares     Tramadol Hcl      Ultram, insomnia, nightmares, headache     Venlafaxine Other (See Comments)     Heartburn, reflux     Zoloft [Sertraline]      paranoia     Latex Other (See Comments), Swelling, Difficulty breathing and Rash     Throat Closes         Problem List:    Patient Active Problem List    Diagnosis Date Noted     Hypokalemia 09/20/2021     Priority: Medium     Shortness of breath 08/09/2021     Priority: Medium     Irregular heart rate  08/09/2021     Priority: Medium     Frequent PVCs 08/09/2021     Priority: Medium     Chest pain, unspecified type 08/09/2021     Priority: Medium     Morbid obesity (H) 06/09/2021     Priority: Medium     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     Priority: Medium     Diabetes mellitus, type 2 (H) 12/06/2019     Priority: Medium     [place] cardiac risk 11% in next 10 years 10/29/2019     Priority: Medium     S/P rotator cuff repair 10/26/2016     Priority: Medium     Mild episode of recurrent major depressive disorder (H) 10/22/2014     Priority: Medium     Migraine with aura and without status migrainosus, not intractable 04/21/2014     Priority: Medium     S/P cervical spinal fusion 03/21/2014     Priority: Medium     Cervicalgia 12/10/2013     Priority: Medium     Vertigo 03/18/2013     Priority: Medium     Tinnitus 03/18/2013     Priority: Medium     Chronic rhinitis 03/18/2013     Priority: Medium     Lumbago 07/10/2012     Priority: Medium     Presbyopia 01/25/2011     Priority: Medium     Primary open-angle glaucoma 01/25/2011     Priority: Medium     Overview:   IMO Update 10/11       Generalized anxiety disorder 12/06/2006     Priority: Medium     Essential hypertension 06/02/2004     Priority: Medium     Overview:   IMO Update          Past Medical History:    Past Medical History:   Diagnosis Date     Arthritis      Chronic back pain      Hypertension      Irregular heart beat      Sleep apnea        Past Surgical History:    Past Surgical History:   Procedure Laterality Date     APPENDECTOMY       BACK SURGERY       CHOLECYSTECTOMY       COLONOSCOPY  2012    Repeat in 10 years     GYN SURGERY       HYSTERECTOMY      Ovaries     RELEASE CARPAL TUNNEL      LT     RELEASE CARPAL TUNNEL      RT x2     SURGICAL RADIOLOGY PROCEDURE N/A 2/12/2016    Procedure: SURGICAL RADIOLOGY PROCEDURE;  Surgeon: Provider, Generic Perianesthesia Nursing;  Location: [facility]     SURGICAL RADIOLOGY PROCEDURE N/A 8/15/2016     Procedure: SURGICAL RADIOLOGY PROCEDURE;  Surgeon: Provider, Generic Perianesthesia Nursing;  Location: [facility]       Family History:    Family History   Problem Relation Age of Onset     Colon Cancer Maternal Aunt      Colon Cancer Maternal Uncle      Diabetes Father         Type 2     Hypertension Father      Alcoholism Father      Alcoholism Mother        Social History:  Marital Status:   [2]  Social History     Tobacco Use     Smoking status: Never Smoker     Smokeless tobacco: Never Used   Vaping Use     Vaping Use: Never used   Substance Use Topics     Alcohol use: Yes     Alcohol/week: 1.0 standard drink     Types: 1 Glasses of wine per week     Comment: occ glass of wine     Drug use: No        Medications:    albuterol (PROAIR HFA/PROVENTIL HFA/VENTOLIN HFA) 108 (90 Base) MCG/ACT inhaler  cetirizine (ZYRTEC) 10 MG tablet  clonazePAM (KLONOPIN) 0.5 MG tablet  diltiazem ER (TIAZAC) 120 MG 24 hr ER beaded capsule  EPINEPHrine (ANY BX GENERIC EQUIV) 0.3 MG/0.3ML injection 2-pack  ipratropium-albuterol (COMBIVENT RESPIMAT)  MCG/ACT inhaler  losartan (COZAAR) 100 MG tablet  spironolactone (ALDACTONE) 50 MG tablet  triamcinolone (NASACORT) 55 MCG/ACT nasal aerosol  blood glucose (ACCU-CHEK GUIDE) test strip  blood glucose monitoring (ACCU-CHEK FASTCLIX) lancets  ORDER FOR ALLERGEN IMMUNOTHERAPY  STATIN NOT PRESCRIBED (INTENTIONAL)          Review of Systems   Constitutional: Positive for activity change, appetite change, chills, fatigue and fever.   HENT: Positive for congestion and mouth sores.    Respiratory: Positive for cough. Negative for shortness of breath.    Cardiovascular: Negative for chest pain.   Gastrointestinal: Negative for abdominal pain.   Genitourinary: Negative.    Skin: Negative.    Allergic/Immunologic: Negative for immunocompromised state.   Neurological: Positive for weakness. Negative for dizziness.   Hematological: Does not bruise/bleed easily.       Physical Exam   BP: (!)  "192/90  Pulse: 101  Temp: 100.3  F (37.9  C)  Resp: 20  Height: 154.9 cm (5' 1\")  SpO2: 96 %      Physical Exam  Vitals and nursing note reviewed.   Constitutional:       General: She is not in acute distress.     Appearance: Normal appearance. She is not ill-appearing, toxic-appearing or diaphoretic.   Cardiovascular:      Rate and Rhythm: Normal rate.   Pulmonary:      Effort: Pulmonary effort is normal.      Breath sounds: Normal breath sounds.   Musculoskeletal:      Cervical back: Neck supple.      Right lower leg: No edema.      Left lower leg: No edema.   Skin:     General: Skin is warm and dry.      Capillary Refill: Capillary refill takes less than 2 seconds.   Neurological:      General: No focal deficit present.      Mental Status: She is alert and oriented to person, place, and time.   Psychiatric:         Mood and Affect: Mood normal.         ED Course          EKG shows normal sinus rhythm at a rate of 81. Normal MI interval. Normal QRS duration. Normal QTC. Normal axis. Normal P wave duration. There are no concerning ST segments. There are no concerning T waves. There is no evidence of ectopy, preexcitation, or ischemia.       Procedures              Critical Care time:  none               Results for orders placed or performed during the hospital encounter of 12/23/21 (from the past 24 hour(s))   Symptomatic; Yes Influenza A/B & SARS-CoV2 (COVID-19) Virus PCR Multiplex Nasopharyngeal    Specimen: Nasopharyngeal; Swab   Result Value Ref Range    Influenza A PCR Positive (A) Negative    Influenza B PCR Negative Negative    RSV PCR Negative Negative    SARS CoV2 PCR Negative Negative    Narrative    Testing was performed using the Xpert Xpress CoV2/Flu/RSV Assay on the Cepheid GeneXpert Instrument. This test should be ordered for the detection of SARS-CoV-2 and influenza viruses in individuals who meet clinical and/or epidemiological criteria. Test performance is unknown in asymptomatic patients. This " test is for in vitro diagnostic use under the FDA EUA for laboratories certified under CLIA to perform high or moderate complexity testing. This test has not been FDA cleared or approved. A negative result does not rule out the presence of PCR inhibitors in the specimen or target RNA in concentration below the limit of detection for the assay. If only one viral target is positive but coinfection with multiple targets is suspected, the sample should be re-tested with another FDA cleared, approved, or authorized test, if coinfection would change clinical management. This test was validated by the [facility]. These laboratories are certified under the Clinical  Laboratory Improvement Amendments of 1988 (CLIA-88) as qualified to perform high complexity laboratory testing.   XR Chest Port 1 View    Narrative    PROCEDURE: XR CHEST PORT 1 VIEW 12/23/2021 2:45 PM    HISTORY: shortness of breath    COMPARISONS: 7/18/2021.    TECHNIQUE: Single portable view.    FINDINGS: Heart and pulmonary vasculature are normal. No confluent  infiltrate or pleural effusion is seen.         Impression    IMPRESSION: No acute infiltrate.    GRADY LOPEZ MD         SYSTEM ID:  [id number]       Medications   clonazePAM (klonoPIN) ODT tab 1 mg (1 mg Oral Given 12/23/21 1510)       Assessments & Plan (with Medical Decision Making)   Influenza A positive.  Certainly would fit her subjective logic the findings.  She has passed Tamiflu treatment.  Otherwise looks well.  Vital signs are normal.  Oxygen saturation is normal on room air.  No evidence of focal consolidation.  Return here for any new or worsening symptoms.  Please see discharge instructions.    This document was prepared using a combination of typing and voice generated software.  While every attempt was made for accuracy, spelling and grammatical errors may exist.    I have reviewed the nursing notes.    I have reviewed the findings, diagnosis, plan and need for  follow up with the patient.       New Prescriptions    No medications on file       Final diagnoses:   Influenza A       12/23/2021   [facility]     Alma Boyd PA-C  12/23/21 1539       Alma Boyd PA-C  12/23/21 1614

## 2021-12-23 NOTE — ED NOTES
.DATE:  12/23/2021   TIME OF RECEIPT FROM LAB:  1526  LAB TEST:  Influenza A positive   LAB VALUE:  Influenza A positive   RESULTS GIVEN WITH READ-BACK TO (PROVIDER):  MONICA SINGLETON   TIME LAB VALUE REPORTED TO PROVIDER:   1527

## 2021-12-27 DIAGNOSIS — I10 ESSENTIAL HYPERTENSION: ICD-10-CM

## 2021-12-27 RX ORDER — LOSARTAN POTASSIUM 100 MG/1
TABLET ORAL
Qty: 90 TABLET | Refills: 0 | Status: SHIPPED | OUTPATIENT
Start: 2021-12-27 | End: 2022-03-24

## 2021-12-27 NOTE — TELEPHONE ENCOUNTER
Losartan      Last Written Prescription Date:  10/4/21  Last Fill Quantity: 90,   # refills: 0  Last Office Visit: 10/8/21  Future Office visit:    Next 5 appointments (look out 90 days)    Rodríguez 10, 2022  9:00 AM  (Arrive by 8:45 AM)  Office Visit with Sheyla Sanches NP  [facility] [place] ([facility] [place] ) [address]  [place] [address]  [phone]           Routing refill request to provider for review/approval because:

## 2022-01-05 ENCOUNTER — ALLIED HEALTH/NURSE VISIT (OUTPATIENT)
Dept: ALLERGY | Facility: OTHER | Age: 72
End: 2022-01-05
Attending: NURSE PRACTITIONER
Payer: MEDICARE

## 2022-01-05 DIAGNOSIS — J30.89 PERENNIAL ALLERGIC RHINITIS: Primary | ICD-10-CM

## 2022-01-05 PROCEDURE — 95117 IMMUNOTHERAPY INJECTIONS: CPT

## 2022-01-05 NOTE — PROGRESS NOTES
Prior to injection verified pt identity using pt name and date of birth.    Allergy injection/s given and charted on paper allergy flow sheet.  Patient left AMA, signing form, not staying for the observation period.    Morgan Buchanan RN on 1/5/2022 at 1:18 PM

## 2022-01-07 NOTE — PROGRESS NOTES
Assessment & Plan     Morbid obesity (H)  BMI 37. Diet and exercise encouraged.     Essential hypertension  Well controlled. Continue current medications. Encouraged daily exercise and a low sodium diet. Recommended checking BP's 2x/wk, call the clinic if consistantly s>140 or d>90. Follow up in 6 months.     Type 2 diabetes mellitus without complication, without long-term current use of insulin (H)  A1C 5.9. Controlled. Declines any Metformin. Declines statin. BP at goal. Reviewed pathogenesis of diabetes. Stressed importance of cutting out sugars/carbs and engaging in routine physical exercise for 30 minutes/5 days of work with the goal of gradual weight loss. See me for a recheck in 6 months.       Hyperlipidemia, unspecified hyperlipidemia type  The 10-year ASCVD risk score (Yamil MELENDEZ Jr., et al., 2013) is: 22.1%    Values used to calculate the score:      Age: 71 years      Sex: Female      Is Non- : No      Diabetic: Yes      Tobacco smoker: No      Systolic Blood Pressure: 132 mmHg      Is BP treated: Yes      HDL Cholesterol: 44 mg/dL      Total Cholesterol: 175 mg/dL     Risk high at 22 percent. Will have nursing talk to pt to see if she wants to start statin.     Mild episode of recurrent major depressive disorder (H)  Generalized anxiety disorder  Anxiety poorly controlled, but she denies thoughts of suicide. Declines to start daily preventative medication. She notes that she has tried many and had side effects. For now, will continue to use Klonopin rarely. Admits to rarely using this. Will also continue to see psychiatry.     Need for prophylactic vaccination and inoculation against influenza  - INFLUENZA, QUAD, HIGH DOSE, PF, 65YR + (FLUZONE HD)    Hypokalemia  Resolved without lasix.     Shortness of breath  Frequent PVCs  Irregular heart rate  Patient with SOLIS with palpitations. Follows with cardiology. Recommended stress test. Declines, but will think about it. Ziop patch was  recently done and no concerning rhythm was seen.     Review of external notes as documented elsewhere in note  Ordering of each unique test  Prescription drug management  50 minutes spent on the date of the encounter doing chart review, review of outside records, review of test results, interpretation of tests, patient visit and documentation     No follow-ups on file.    Sheyla Sanches NP  [facility] - [place] De La Rosa is a 71 year old who presents for the following health issues    HPI     Diabetes Follow-up    How often are you checking your blood sugar? A few times a week  What time of day are you checking your blood sugars (select all that apply)?  Before meals  Have you had any blood sugars above 200?  No  Have you had any blood sugars below 70?  No    What symptoms do you notice when your blood sugar is low?  None    What concerns do you have today about your diabetes? None     Do you have any of these symptoms? (Select all that apply)  No numbness or tingling in feet.  No redness, sores or blisters on feet.  No complaints of excessive thirst.  No reports of blurry vision.  No significant changes to weight.     A1C was 5.8 in 10/2021.     She no longer takes anything for her DM.     Working on weight loss. Joined [employer]. Clothes fitting better.     Hyperlipidemia Follow-Up      Are you regularly taking any medication or supplement to lower your cholesterol?   No, declines    Are you having muscle aches or other side effects that you think could be caused by your cholesterol lowering medication?  No    Hypertension Follow-up      Do you check your blood pressure regularly outside of the clinic? No     Are you following a low salt diet? Yes    Are your blood pressures ever more than 140 on the top number (systolic) OR more   than 90 on the bottom number (diastolic), for example 140/90? No   -Following with cardiology. Currently taking losartan 100 mg, diltiazem  mg, and  spironolocatone 50 mg. No side effects. She has an allergy to hydrochlorothiazide and lasix.     Was having some chest tightness and shortness of breath at her previous visits. HR also sounded irregular on exam at the previous visit. EKG done and did show did show flipped t-waves in several leads with frequent PVCs. Encouraged to complete stress test, echo, and repeat Holter. She did do the Holter Monitor, PVCs seen. Declined to complete echo and stress test.        Today she notes that she continues to have intermittent chest tightness, but it seems to be triggered by her anxiety. Also some occasional shortness of breath, worse with exerction.  No palpitations. No dizziness. No syncope. No nausea or vomiting. No lower leg swelling.       BP Readings from Last 2 Encounters:   01/10/22 132/64   12/23/21 127/77     Hemoglobin A1C POCT (%)   Date Value   03/10/2021 5.8 (H)   02/19/2020 5.9 (H)     Hemoglobin A1C (%)   Date Value   10/08/2021 5.8 (H)     LDL Cholesterol Calculated (mg/dL)   Date Value   03/10/2021 87   11/11/2019 61     Depression and Anxiety Follow-Up    How are you doing with your depression since your last visit? Improved -back to seeing tomi      How are you doing with your anxiety since your last visit?  Improved     Are you having other symptoms that might be associated with depression or anxiety? No    Have you had a significant life event? No     Do you have any concerns with your use of alcohol or other drugs? No     Follows with psychiatry. Seeing a counselor weekly-Stacy.     Takes klonopin as needed. Rarely takes it.     Sertraline caused paranoia.     Working on relaxation measures.    No thoughts of suicide.      Social History     Tobacco Use     Smoking status: Never Smoker     Smokeless tobacco: Never Used   Vaping Use     Vaping Use: Never used   Substance Use Topics     Alcohol use: Yes     Alcohol/week: 1.0 standard drink     Types: 1 Glasses of wine per week     Comment: occ glass  "of wine     Drug use: No     PHQ 3/10/2021 9/2/2021 9/20/2021   PHQ-9 Total Score 0 4 2   Q9: Thoughts of better off dead/self-harm past 2 weeks Not at all Not at all Not at all     ZEFERINO-7 SCORE 3/10/2021 9/2/2021 9/20/2021   Total Score 11 6 6     Last PHQ-9 9/20/2021   1.  Little interest or pleasure in doing things 0   2.  Feeling down, depressed, or hopeless 1   3.  Trouble falling or staying asleep, or sleeping too much 1   4.  Feeling tired or having little energy 0   5.  Poor appetite or overeating 0   6.  Feeling bad about yourself 0   7.  Trouble concentrating 0   8.  Moving slowly or restless 0   Q9: Thoughts of better off dead/self-harm past 2 weeks 0   PHQ-9 Total Score 2   Difficulty at work, home, or with people -     ZEFERINO-7  9/20/2021   1. Feeling nervous, anxious, or on edge 1   2. Not being able to stop or control worrying 1   3. Worrying too much about different things 1   4. Trouble relaxing 1   5. Being so restless that it is hard to sit still 1   6. Becoming easily annoyed or irritable 1   7. Feeling afraid, as if something awful might happen 0   ZEFERINO-7 Total Score 6   If you checked any problems, how difficult have they made it for you to do your work, take care of things at home, or get along with other people? -       Review of Systems   As noted in the HPI.       Objective    /64 (BP Location: Right arm, Patient Position: Chair, Cuff Size: Adult Large)   Pulse 60   Temp 97.8  F (36.6  C) (Tympanic)   Ht 1.549 m (5' 1\")   Wt 89.3 kg (196 lb 12.8 oz)   SpO2 98%   BMI 37.19 kg/m    Body mass index is 37.19 kg/m .  Physical Exam   GENERAL: obese, alert, and no distress  EYES: Eyes grossly normal to inspection, PERRL and conjunctivae and sclerae normal  HENT: ear canals and TM's normal, nose and mouth without ulcers or lesions  NECK: no adenopathy, no asymmetry, masses, or scars and thyroid normal to palpation  RESP: lungs clear to auscultation - no rales, rhonchi or wheezes  CV: " irregular, no murmur, no peripheral edema and peripheral pulses strong  ABDOMEN: soft, nontender, obese, no masses and bowel sounds normal  NEURO: Normal strength and tone, mentation intact and speech normal  PSYCH: mentation appears normal, affect normal/bright  Diabetic foot exam: normal DP and PT pulses, no trophic changes or ulcerative lesions and normal sensory exam              Labs pending

## 2022-01-08 PROBLEM — E87.6 HYPOKALEMIA: Status: RESOLVED | Noted: 2021-09-20 | Resolved: 2022-01-08

## 2022-01-08 PROBLEM — E66.01 MORBID OBESITY (H): Status: RESOLVED | Noted: 2021-06-09 | Resolved: 2022-01-08

## 2022-01-10 ENCOUNTER — OFFICE VISIT (OUTPATIENT)
Dept: FAMILY MEDICINE | Facility: OTHER | Age: 72
End: 2022-01-10
Attending: NURSE PRACTITIONER
Payer: COMMERCIAL

## 2022-01-10 VITALS
DIASTOLIC BLOOD PRESSURE: 64 MMHG | SYSTOLIC BLOOD PRESSURE: 132 MMHG | BODY MASS INDEX: 37.16 KG/M2 | HEART RATE: 60 BPM | WEIGHT: 196.8 LBS | TEMPERATURE: 97.8 F | HEIGHT: 61 IN | OXYGEN SATURATION: 98 %

## 2022-01-10 DIAGNOSIS — I49.9 IRREGULAR HEART RATE: ICD-10-CM

## 2022-01-10 DIAGNOSIS — E11.9 TYPE 2 DIABETES MELLITUS WITHOUT COMPLICATION, WITHOUT LONG-TERM CURRENT USE OF INSULIN (H): ICD-10-CM

## 2022-01-10 DIAGNOSIS — I10 ESSENTIAL HYPERTENSION: ICD-10-CM

## 2022-01-10 DIAGNOSIS — R06.02 SHORTNESS OF BREATH: ICD-10-CM

## 2022-01-10 DIAGNOSIS — E78.5 HYPERLIPIDEMIA, UNSPECIFIED HYPERLIPIDEMIA TYPE: ICD-10-CM

## 2022-01-10 DIAGNOSIS — F41.1 GENERALIZED ANXIETY DISORDER: ICD-10-CM

## 2022-01-10 DIAGNOSIS — Z23 NEED FOR PROPHYLACTIC VACCINATION AND INOCULATION AGAINST INFLUENZA: ICD-10-CM

## 2022-01-10 DIAGNOSIS — F33.0 MILD EPISODE OF RECURRENT MAJOR DEPRESSIVE DISORDER (H): ICD-10-CM

## 2022-01-10 DIAGNOSIS — E87.6 HYPOKALEMIA: ICD-10-CM

## 2022-01-10 DIAGNOSIS — E66.01 MORBID OBESITY (H): Primary | ICD-10-CM

## 2022-01-10 DIAGNOSIS — I49.3 FREQUENT PVCS: ICD-10-CM

## 2022-01-10 LAB
ANION GAP SERPL CALCULATED.3IONS-SCNC: 5 MMOL/L (ref 3–14)
BUN SERPL-MCNC: 13 MG/DL (ref 7–30)
CALCIUM SERPL-MCNC: 9.1 MG/DL (ref 8.5–10.1)
CHLORIDE BLD-SCNC: 109 MMOL/L (ref 94–109)
CHOLEST SERPL-MCNC: 175 MG/DL
CO2 SERPL-SCNC: 25 MMOL/L (ref 20–32)
CREAT SERPL-MCNC: 0.76 MG/DL (ref 0.52–1.04)
ERYTHROCYTE [DISTWIDTH] IN BLOOD BY AUTOMATED COUNT: 13 % (ref 10–15)
EST. AVERAGE GLUCOSE BLD GHB EST-MCNC: 123 MG/DL
FASTING STATUS PATIENT QL REPORTED: ABNORMAL
GFR SERPL CREATININE-BSD FRML MDRD: 84 ML/MIN/1.73M2
GLUCOSE BLD-MCNC: 132 MG/DL (ref 70–99)
HBA1C MFR BLD: 5.9 % (ref 0–5.6)
HCT VFR BLD AUTO: 42.7 % (ref 35–47)
HDLC SERPL-MCNC: 44 MG/DL
HGB BLD-MCNC: 14.3 G/DL (ref 11.7–15.7)
LDLC SERPL CALC-MCNC: 77 MG/DL
MAGNESIUM SERPL-MCNC: 2.2 MG/DL (ref 1.6–2.3)
MCH RBC QN AUTO: 30.3 PG (ref 26.5–33)
MCHC RBC AUTO-ENTMCNC: 33.5 G/DL (ref 31.5–36.5)
MCV RBC AUTO: 91 FL (ref 78–100)
NONHDLC SERPL-MCNC: 131 MG/DL
PLATELET # BLD AUTO: 280 10E3/UL (ref 150–450)
POTASSIUM BLD-SCNC: 3.7 MMOL/L (ref 3.4–5.3)
RBC # BLD AUTO: 4.72 10E6/UL (ref 3.8–5.2)
SODIUM SERPL-SCNC: 139 MMOL/L (ref 133–144)
TRIGL SERPL-MCNC: 268 MG/DL
TSH SERPL DL<=0.005 MIU/L-ACNC: 1.37 MU/L (ref 0.4–4)
WBC # BLD AUTO: 9.1 10E3/UL (ref 4–11)

## 2022-01-10 PROCEDURE — 80048 BASIC METABOLIC PNL TOTAL CA: CPT | Mod: ZL | Performed by: NURSE PRACTITIONER

## 2022-01-10 PROCEDURE — G0008 ADMIN INFLUENZA VIRUS VAC: HCPCS

## 2022-01-10 PROCEDURE — 85027 COMPLETE CBC AUTOMATED: CPT | Mod: ZL | Performed by: NURSE PRACTITIONER

## 2022-01-10 PROCEDURE — 80061 LIPID PANEL: CPT | Mod: ZL | Performed by: NURSE PRACTITIONER

## 2022-01-10 PROCEDURE — 84443 ASSAY THYROID STIM HORMONE: CPT | Mod: ZL | Performed by: NURSE PRACTITIONER

## 2022-01-10 PROCEDURE — 36415 COLL VENOUS BLD VENIPUNCTURE: CPT | Mod: ZL | Performed by: NURSE PRACTITIONER

## 2022-01-10 PROCEDURE — G0463 HOSPITAL OUTPT CLINIC VISIT: HCPCS | Mod: 25

## 2022-01-10 PROCEDURE — 83036 HEMOGLOBIN GLYCOSYLATED A1C: CPT | Mod: ZL | Performed by: NURSE PRACTITIONER

## 2022-01-10 PROCEDURE — 99215 OFFICE O/P EST HI 40 MIN: CPT | Performed by: NURSE PRACTITIONER

## 2022-01-10 PROCEDURE — 83735 ASSAY OF MAGNESIUM: CPT | Mod: ZL | Performed by: NURSE PRACTITIONER

## 2022-01-10 RX ORDER — CLONAZEPAM 0.5 MG/1
TABLET ORAL
Qty: 30 TABLET | Refills: 0 | Status: SHIPPED | OUTPATIENT
Start: 2022-01-10 | End: 2022-05-05

## 2022-01-10 ASSESSMENT — ASTHMA QUESTIONNAIRES
ACT_TOTALSCORE: 14
QUESTION_5 LAST FOUR WEEKS HOW WOULD YOU RATE YOUR ASTHMA CONTROL: SOMEWHAT CONTROLLED
EMERGENCY_ROOM_LAST_YEAR_TOTAL: ONE
QUESTION_3 LAST FOUR WEEKS HOW OFTEN DID YOUR ASTHMA SYMPTOMS (WHEEZING, COUGHING, SHORTNESS OF BREATH, CHEST TIGHTNESS OR PAIN) WAKE YOU UP AT NIGHT OR EARLIER THAN USUAL IN THE MORNING: TWO OR THREE NIGHTS A WEEK
QUESTION_4 LAST FOUR WEEKS HOW OFTEN HAVE YOU USED YOUR RESCUE INHALER OR NEBULIZER MEDICATION (SUCH AS ALBUTEROL): ONCE A WEEK OR LESS
QUESTION_1 LAST FOUR WEEKS HOW MUCH OF THE TIME DID YOUR ASTHMA KEEP YOU FROM GETTING AS MUCH DONE AT WORK, SCHOOL OR AT HOME: SOME OF THE TIME
QUESTION_2 LAST FOUR WEEKS HOW OFTEN HAVE YOU HAD SHORTNESS OF BREATH: ONCE A DAY

## 2022-01-10 ASSESSMENT — MIFFLIN-ST. JEOR: SCORE: 1355.06

## 2022-01-10 NOTE — NURSING NOTE
"Chief Complaint   Patient presents with     Diabetes     Lipids     Hypertension     Depression     Anxiety       Initial There were no vitals taken for this visit. Estimated body mass index is 37.6 kg/m  as calculated from the following:    Height as of 12/23/21: 1.549 m (5' 1\").    Weight as of 12/20/21: 90.3 kg (199 lb).  Medication Reconciliation: complete  Janet Woods LPN  "

## 2022-01-10 NOTE — LETTER
January 17, 2022      Rj Rodríguez  [address]     [facility] [id number]        Dear ,    We are writing to inform you of your test results.    Rj,  The changes we made to your labs did not appear to be causing any issues as your electrolytes and kidney function are normal.  Your A1c shows that you are prediabetic.  Your HDL which is the good cholesterol is low.  Your triglycerides which come from sugar/carbohydrates and diabetes are elevated.  Your glucose was elevated at 132.  Otherwise, you did not have any significant abnormalities on your labs.     Dr. Mills    Resulted Orders   Basic metabolic panel   Result Value Ref Range    Sodium 139 133 - 144 mmol/L    Potassium 3.7 3.4 - 5.3 mmol/L    Chloride 109 94 - 109 mmol/L    Carbon Dioxide (CO2) 25 20 - 32 mmol/L    Anion Gap 5 3 - 14 mmol/L    Urea Nitrogen 13 7 - 30 mg/dL    Creatinine 0.76 0.52 - 1.04 mg/dL    Calcium 9.1 8.5 - 10.1 mg/dL    Glucose 132 (H) 70 - 99 mg/dL    GFR Estimate 84 >60 mL/min/1.73m2      Comment:      Effective December 21, 2021 eGFRcr in adults is calculated using the 2021 CKD-EPI creatinine equation which includes age and gender (Migdalia et al., [place], DOI: 10.1056/TINBmk7586761)   CBC with platelets   Result Value Ref Range    WBC Count 9.1 4.0 - 11.0 10e3/uL    RBC Count 4.72 3.80 - 5.20 10e6/uL    Hemoglobin 14.3 11.7 - 15.7 g/dL    Hematocrit 42.7 35.0 - 47.0 %    MCV 91 78 - 100 fL    MCH 30.3 26.5 - 33.0 pg    MCHC 33.5 31.5 - 36.5 g/dL    RDW 13.0 10.0 - 15.0 %    Platelet Count 280 150 - 450 10e3/uL   TSH with free T4 reflex   Result Value Ref Range    TSH 1.37 0.40 - 4.00 mU/L   Magnesium   Result Value Ref Range    Magnesium 2.2 1.6 - 2.3 mg/dL       If you have any questions or concerns, please call the clinic at the number listed above.       Sincerely,      Franck Mills, DO

## 2022-01-11 ASSESSMENT — ASTHMA QUESTIONNAIRES: ACT_TOTALSCORE: 14

## 2022-01-12 ENCOUNTER — ALLIED HEALTH/NURSE VISIT (OUTPATIENT)
Dept: ALLERGY | Facility: OTHER | Age: 72
End: 2022-01-12
Attending: NURSE PRACTITIONER
Payer: MEDICARE

## 2022-01-12 DIAGNOSIS — J30.89 PERENNIAL ALLERGIC RHINITIS: Primary | ICD-10-CM

## 2022-01-12 PROCEDURE — 95117 IMMUNOTHERAPY INJECTIONS: CPT

## 2022-01-12 NOTE — PROGRESS NOTES
Prior to injection verified pt identity using pt name and date of birth.    Allergy injection/s given and charted on paper allergy flow sheet.  Patient left AMA, signing form, not staying for the observation period.    Morgan Buchanan RN on 1/12/2022 at 1:10 PM

## 2022-01-19 ENCOUNTER — ALLIED HEALTH/NURSE VISIT (OUTPATIENT)
Dept: ALLERGY | Facility: OTHER | Age: 72
End: 2022-01-19
Attending: NURSE PRACTITIONER
Payer: MEDICARE

## 2022-01-19 DIAGNOSIS — J30.89 PERENNIAL ALLERGIC RHINITIS: Primary | ICD-10-CM

## 2022-01-19 PROCEDURE — 95117 IMMUNOTHERAPY INJECTIONS: CPT

## 2022-01-19 NOTE — PROGRESS NOTES
Prior to injection verified pt identity using pt name and date of birth.    Allergy injection/s given and charted on paper allergy flow sheet.  Patient left AMA, signing form, not staying for the observation period.    Morgan Buchanan RN on 1/19/2022 at 11:42 AM

## 2022-01-26 ENCOUNTER — OFFICE VISIT (OUTPATIENT)
Dept: ALLERGY | Facility: OTHER | Age: 72
End: 2022-01-26
Attending: NURSE PRACTITIONER
Payer: MEDICARE

## 2022-01-26 DIAGNOSIS — J30.89 PERENNIAL ALLERGIC RHINITIS: Primary | ICD-10-CM

## 2022-01-26 PROCEDURE — 95117 IMMUNOTHERAPY INJECTIONS: CPT

## 2022-01-26 NOTE — PROGRESS NOTES
Prior to injection verified pt identity using pt name and date of birth.    Allergy injection/s given and charted on paper allergy flow sheet.  Patient left AMA, signing form, not staying for the observation period.    Morgan Buchanan RN on 1/26/2022 at 1:29 PM

## 2022-02-02 ENCOUNTER — ALLIED HEALTH/NURSE VISIT (OUTPATIENT)
Dept: ALLERGY | Facility: OTHER | Age: 72
End: 2022-02-02
Attending: NURSE PRACTITIONER
Payer: MEDICARE

## 2022-02-02 DIAGNOSIS — J30.89 PERENNIAL ALLERGIC RHINITIS: Primary | ICD-10-CM

## 2022-02-02 PROCEDURE — 95117 IMMUNOTHERAPY INJECTIONS: CPT

## 2022-02-02 NOTE — PROGRESS NOTES
Prior to injection verified pt identity using pt name and date of birth.    Allergy injection/s given and charted on paper allergy flow sheet.  Patient left AMA, signing form, not staying for the observation period.    Morgan Buchanan RN on 2/2/2022 at 11:22 AM

## 2022-02-08 ENCOUNTER — OFFICE VISIT (OUTPATIENT)
Dept: CARDIOLOGY | Facility: OTHER | Age: 72
End: 2022-02-08
Attending: NURSE PRACTITIONER
Payer: COMMERCIAL

## 2022-02-08 VITALS
SYSTOLIC BLOOD PRESSURE: 171 MMHG | HEIGHT: 61 IN | WEIGHT: 193.54 LBS | HEART RATE: 81 BPM | OXYGEN SATURATION: 95 % | DIASTOLIC BLOOD PRESSURE: 93 MMHG | BODY MASS INDEX: 36.54 KG/M2 | TEMPERATURE: 98.6 F

## 2022-02-08 DIAGNOSIS — R00.2 PALPITATIONS: ICD-10-CM

## 2022-02-08 DIAGNOSIS — I49.8 VENTRICULAR BIGEMINY: ICD-10-CM

## 2022-02-08 DIAGNOSIS — E78.2 MIXED HYPERLIPIDEMIA: ICD-10-CM

## 2022-02-08 DIAGNOSIS — R06.09 DOE (DYSPNEA ON EXERTION): ICD-10-CM

## 2022-02-08 DIAGNOSIS — E11.9 TYPE 2 DIABETES MELLITUS WITHOUT COMPLICATION, WITHOUT LONG-TERM CURRENT USE OF INSULIN (H): ICD-10-CM

## 2022-02-08 DIAGNOSIS — F41.8 OTHER SPECIFIED ANXIETY DISORDERS: ICD-10-CM

## 2022-02-08 DIAGNOSIS — G47.33 OSA (OBSTRUCTIVE SLEEP APNEA): ICD-10-CM

## 2022-02-08 DIAGNOSIS — I49.3 FREQUENT PVCS: Primary | ICD-10-CM

## 2022-02-08 DIAGNOSIS — I10 ESSENTIAL HYPERTENSION: ICD-10-CM

## 2022-02-08 PROCEDURE — 99417 PROLNG OP E/M EACH 15 MIN: CPT | Performed by: NURSE PRACTITIONER

## 2022-02-08 PROCEDURE — G0463 HOSPITAL OUTPT CLINIC VISIT: HCPCS

## 2022-02-08 PROCEDURE — 99215 OFFICE O/P EST HI 40 MIN: CPT | Performed by: NURSE PRACTITIONER

## 2022-02-08 ASSESSMENT — PAIN SCALES - GENERAL: PAINLEVEL: NO PAIN (0)

## 2022-02-08 ASSESSMENT — MIFFLIN-ST. JEOR: SCORE: 1340.27

## 2022-02-08 NOTE — PROGRESS NOTES
[facility]   CARDIOLOGY CONSULT     Rj Rodríguez   [address]  [address]   [facility] [address]      Sheyla Sanches     Chief Complaint   Patient presents with     New Patient        HPI:   Ms. Rodríguez is a 71 year old female who presents for cardiology evaluation with increased exertional dyspnea and recently identified ventricular ectopy seen as isolated PVCs, ventricular coupletes and ventricular trigeminy.  Patient has a past medical history significant for migraine headaches, hypertension, obesity, REBECCA, DM 2, depression, significant anxiety, glaucoma and chronic lumbago. She denies any family history known to her involving cardiovascular disease.    Patient reports noticing irregular heart rate with in the office in Feb 2021. Was identified to have ventricular ectopy on ECG. No palpitations with this at that time. She has described episodes of fluttering palpitations associated to anxiety. She has reported increased exertional dyspnea and chest tightness largely related to anxiety events. She does not report any exertional chest tightness and no radiation to the arm, jaw, neck or back. No lightheadedness or syncope. No increased edema.     She describes issues with allergies, possible asthma and on on immunotherapy. Avoided use of albuterol with fluttering palpitations. Has been off beta blocker since starting this. She was started on Diltiazem for HTN and increased ventricular ectopy burden. No tobacco use.     RESULTS:   Zio patch on 8/11/21:  Hrt rate ranged from 48 bpm, maximum heart rate of 158 bmp, averaging 71 bmp.  No significant bradycardia, pauses, Mobitz type II or 3rd degree heart block.  No atrial fibrillation on this study.  x1 triggered events and x1 diary entries.  These corresponded to VE and sinus rhythm.  x0 runs of VT.    x2 runs of SVT lasting up to 5 beats with a maximum heart rate of 158 bmp.  Rare, less than 1% of PAC's, atrial couplets, and atrial triplets.  Occasional  PVC's at 3.0%.  Frequent ventricular couplets of 5.3%.  + episodes of ventricular bigeminy lasting up to 17.4 sec's.  + episodes of ventricular trigeminy lasting up to 1 min and 4 sec's.     Zio patch on 2/20/2020:  The underlying rhythm was sinus.  Hrt rate ranged from 51 bpm, maximum heart rate of 120 bpm, averaging 73 bpm.  There was no significant bradycardia, pauses, atrial fibrillation, second degree Mobitz type II or third degree heart block.  x5 triggered events and x5 diary entries.  These corresponded to sinus rhythm and VE.  x0 runs of NSVT.    x0 runs of PSVT.  Rare, less than 1% of PAC's, atrial couplets, PVC's, and ventricular couplets.  x0 episodes of ventricular bigeminy.  There were episodes of ventricular trigeminy lasting up to 25.2 sec's.         PAST MEDICAL HISTORY:   Past Medical History:   Diagnosis Date     Arthritis      Chronic back pain      Hypertension      Irregular heart beat      Sleep apnea           FAMILY HISTORY:   Family History   Problem Relation Age of Onset     Colon Cancer Maternal Aunt      Colon Cancer Maternal Uncle      Diabetes Father         Type 2     Hypertension Father      Alcoholism Father      Alcoholism Mother           PAST SURGICAL HISTORY:   Past Surgical History:   Procedure Laterality Date     APPENDECTOMY       BACK SURGERY       CHOLECYSTECTOMY       COLONOSCOPY  2012    Repeat in 10 years     GYN SURGERY       HYSTERECTOMY      Ovaries     RELEASE CARPAL TUNNEL      LT     RELEASE CARPAL TUNNEL      RT x2     SURGICAL RADIOLOGY PROCEDURE N/A 2/12/2016    Procedure: SURGICAL RADIOLOGY PROCEDURE;  Surgeon: Provider, Generic Perianesthesia Nursing;  Location: [facility] OR     SURGICAL RADIOLOGY PROCEDURE N/A 8/15/2016    Procedure: SURGICAL RADIOLOGY PROCEDURE;  Surgeon: Provider, Generic Perianesthesia Nursing;  Location: [facility] OR          SOCIAL HISTORY:   Social History     Socioeconomic History     Marital status:      Spouse name: None     Number of  children: None     Years of education: None     Highest education level: None   Occupational History     None   Tobacco Use     Smoking status: Never Smoker     Smokeless tobacco: Never Used   Vaping Use     Vaping Use: Never used   Substance and Sexual Activity     Alcohol use: Yes     Alcohol/week: 1.0 standard drink     Types: 1 Glasses of wine per week     Comment: occ glass of wine     Drug use: No     Sexual activity: Yes   Other Topics Concern      Service Not Asked     Blood Transfusions Not Asked     Caffeine Concern Yes     Comment: Coffee - 2 cups daily     Occupational Exposure Not Asked     Hobby Hazards Not Asked     Sleep Concern Not Asked     Stress Concern Not Asked     Weight Concern Not Asked     Special Diet Not Asked     Back Care Not Asked     Exercise Not Asked     Bike Helmet Not Asked     Seat Belt Not Asked     Self-Exams Not Asked     Parent/sibling w/ CABG, MI or angioplasty before 65F 55M? Not Asked   Social History Narrative    10/11/2019: , lives in [place]      Social Determinants of Health     Financial Resource Strain: Not on file   Food Insecurity: Not on file   Transportation Needs: Not on file   Physical Activity: Not on file   Stress: Not on file   Social Connections: Not on file   Intimate Partner Violence: Not on file   Housing Stability: Not on file          CURRENT MEDICATIONS:   Prior to Admission medications    Medication Sig Start Date End Date Taking? Authorizing Provider   albuterol (PROAIR HFA/PROVENTIL HFA/VENTOLIN HFA) 108 (90 Base) MCG/ACT inhaler Inhale 2 puffs into the lungs every 6 hours 7/21/21  Yes Sheyla Sanches, NP   blood glucose (ACCU-CHEK GUIDE) test strip Use to test blood sugar 1 time daily or as directed. 12/16/19  Yes Sheyla Sanches, NP   blood glucose monitoring (ACCU-CHEK FASTCLIX) lancets Use to test blood sugar 1 time daily or as directed. 12/16/19  Yes Sheyla Sanches, NP   cetirizine (ZYRTEC) 10 MG tablet Take 1 tablet (10 mg)  by mouth daily as needed for allergies 11/22/19  Yes Caitlyn Maradiaga MD   clonazePAM (KLONOPIN) 0.5 MG tablet TAKE 1 TABLET BY MOUTH TWICE DAILY AS NEEDED FOR ANXIETY 1/10/22  Yes Sheyla Sanches NP   diltiazem ER (TIAZAC) 120 MG 24 hr ER beaded capsule Take 1 capsule (120 mg) by mouth daily 12/20/21  Yes Franck Mills, DO   EPINEPHrine (ANY BX GENERIC EQUIV) 0.3 MG/0.3ML injection 2-pack INJECT CONTENTS OF 1 PEN AS NEEDED FOR ALLERGIC REACTION 4/5/21  Yes Caitlyn Maradiaga MD   ipratropium-albuterol (COMBIVENT RESPIMAT)  MCG/ACT inhaler Inhale 1 puff into the lungs 4 times daily as needed for wheezing or other (chest tightness) 7/7/21  Yes Felisha Tyler NP   losartan (COZAAR) 100 MG tablet Take 1 tablet by mouth once daily 12/27/21  Yes Roberta Roque MD   spironolactone (ALDACTONE) 50 MG tablet Take 1 tablet by mouth once daily 12/13/21  Yes Sheyla Sanches NP   STATIN NOT PRESCRIBED (INTENTIONAL) Please choose reason not prescribed from choices below. 3/10/21  Yes Sheyla Sanches NP   triamcinolone (NASACORT) 55 MCG/ACT nasal aerosol Spray 2 sprays into both nostrils daily 11/22/19  Yes Caitlyn Maradiaga MD          ALLERGIES:   Allergies   Allergen Reactions     Fruit [Peach] Anaphylaxis and Hives     fresh peaches and any fruit that has a pit.  Kiwi's and apples also.  Can eat any fruit cooked.     Nuts Anaphylaxis and Hives     All Raw Nuts, can eat nuts when roasted.     Ace Inhibitors      Upper respiratory infection     Alkylamines Hives     Antihistamines     Alprazolam Hives     Xanax     Diphenhydramine Hives and Other (See Comments)     Keeps her awake       Erythromycin      BASE; pt. Not sure if this is a true allergy.     Guaifed      Pt unsure of reaction     Guaifenesin      Guaifed     Hydrochlorothiazide      No Clinical Screening - See Comments      Allergic:  Fresh peaches and any fruit that has a pit.  Kiwi's and apples also.  All raw nuts.  Can eat  "nuts when roasted or any fruit cooked.  Some seasonal allergies :  Hayfever     Olmesartan Medoxomil Cough     Benicar     Phenylephrine Hcl      Guaifed     Pseudoephedrine Hcl      Guaifed     Seasonal Allergies      hayfever     Tramadol      Sleep disturbance. Can not sleep, and when able to fall asleep will have terrible nightmares     Tramadol Hcl      Ultram, insomnia, nightmares, headache     Venlafaxine Other (See Comments)     Heartburn, reflux     Zoloft [Sertraline]      paranoia     Latex Other (See Comments), Swelling, Difficulty breathing and Rash     Throat Closes            ROS:   CONSTITUTIONAL: No reported fever or chills. No changes in weight.  ENT: No visual disturbance, ear ache, epistaxis or sore throat.   CARDIOVASCULAR: Occasional episodes of chest tightness for which she correlates to episodes of anxiety without radiation to the arm, jaw, neck or back.  Positive for palpitations described as fluttering in her chest.  No lower extremity edema.   RESPIRATORY: Positive for increased SOLIS. No cough, wheezing or hemoptysis.  No orthopnea or PND.  GI: No reported abdominal pain, melena or hematochezia.   : No reported hematuria or dysuria.   NEUROLOGICAL: No lightheadedness, dizziness, syncope, ataxia, paresthesias or weakness.   HEMATOLOGIC: No history of anemia. No bleeding or excessive bruising. No history of blood clots.   MUSCULOSKELETAL: Positive for chronic back pain, no myalgias.  ENDOCRINOLOGIC: No temperature intolerance. No hair or skin changes.  SKIN: No abnormal rashes or sores, no unusual itching.  PSYCHIATRIC: Positive for history of depression and significant anxiety for which she follows with a therapist.  Occasional use of Klonopin.  No changes reported in sleep.    PHYSICAL EXAM:   BP (!) 171/93 (BP Location: Right arm)   Pulse 81   Temp 98.6  F (37  C) (Tympanic)   Ht 1.549 m (5' 1\")   Wt 87.8 kg (193 lb 8.6 oz)   SpO2 95%   BMI 36.57 kg/m    GENERAL: The patient is a " well-developed, well-nourished, in no apparent distress.  HEENT: Head is normocephalic and atraumatic. Eyes are symmetrical with normal visual tracking. No icterus, no xanthelasmas. Nares appeared normal without nasal drainage. Mucous membranes are moist, no cyanosis.  NECK: Supple. No adenopathy, asymmetry or masses. No cervical bruits, JVP not visible.   CHEST/ LUNGS: Lungs clear to auscultation, no rales, rhonchi or wheezes, no use of accessory muscles, no retractions, respirations unlabored and normal respiratory rate.   CARDIO: Regular rate and rhythm normal with S1 and S2, no S3 or S4 and no murmur, click or rub.   ABD: Abdomen is nondistended.   EXTREMITIES: No LE edema present.   MUSCULOSKELETAL: No visible joint swelling.   NEUROLOGIC: Alert and oriented X3. Normal speech, gait and affect. No focal neurologic deficits.   SKIN: No jaundice. No rashes or visible skin lesions present. No ecchymosis.       EKG:    Sinus rhythm, rate 81 bpm, frequent PVCs, nonspecific ST/T wave changes.    LAB RESULTS:   Office Visit on 01/10/2022   Component Date Value Ref Range Status     Sodium 01/10/2022 139  133 - 144 mmol/L Final     Potassium 01/10/2022 3.7  3.4 - 5.3 mmol/L Final     Chloride 01/10/2022 109  94 - 109 mmol/L Final     Carbon Dioxide (CO2) 01/10/2022 25  20 - 32 mmol/L Final     Anion Gap 01/10/2022 5  3 - 14 mmol/L Final     Urea Nitrogen 01/10/2022 13  7 - 30 mg/dL Final     Creatinine 01/10/2022 0.76  0.52 - 1.04 mg/dL Final     Calcium 01/10/2022 9.1  8.5 - 10.1 mg/dL Final     Glucose 01/10/2022 132* 70 - 99 mg/dL Final     GFR Estimate 01/10/2022 84  >60 mL/min/1.73m2 Final     WBC Count 01/10/2022 9.1  4.0 - 11.0 10e3/uL Final     RBC Count 01/10/2022 4.72  3.80 - 5.20 10e6/uL Final     Hemoglobin 01/10/2022 14.3  11.7 - 15.7 g/dL Final     Hematocrit 01/10/2022 42.7  35.0 - 47.0 % Final     MCV 01/10/2022 91  78 - 100 fL Final     MCH 01/10/2022 30.3  26.5 - 33.0 pg Final     MCHC 01/10/2022 33.5   31.5 - 36.5 g/dL Final     RDW 01/10/2022 13.0  10.0 - 15.0 % Final     Platelet Count 01/10/2022 280  150 - 450 10e3/uL Final     TSH 01/10/2022 1.37  0.40 - 4.00 mU/L Final     Magnesium 01/10/2022 2.2  1.6 - 2.3 mg/dL Final     Estimated Average Glucose 01/10/2022 123  mg/dL Final     Hemoglobin A1C 01/10/2022 5.9* 0.0 - 5.6 % Final     Cholesterol 01/10/2022 175  <200 mg/dL Final     Triglycerides 01/10/2022 268* <150 mg/dL Final     Direct Measure HDL 01/10/2022 44* >=50 mg/dL Final     LDL Cholesterol Calculated 01/10/2022 77  <=100 mg/dL Final     Non HDL Cholesterol 01/10/2022 131* <130 mg/dL Final     Patient Fasting > 8hrs? 01/10/2022 Unknown   Final          ASSESSMENT:   Rj Rodríguez presents for cardiology evaluation with increased exertional dyspnea and recently identified more frequent ventricular ectopy seen as isolated PVCs, ventricular coupletes and ventricular trigeminy.  Patient has a past medical history significant for migraine headaches, hypertension, obesity, REBECCA, DM 2, depression, significant anxiety, glaucoma and chronic lumbago. She denies any family history known to her involving cardiovascular disease.    PLAN:  1. Frequent PVCs  Frequent ventricular ectopy seen on ECG, as isolated beats and pattern of ventricular bigeminy and ventricular trigeminy.    Recent ZIO with just over 3%% VE burden.   Along with increased ventricular ectopy, she has noted progressive exertional dyspnea.   Discussed factors that may contribute to increase VE. She reports very limited caffeine intake, no stimulant use, no use of pseudoephedrine.  Has been working on increasing her hydration throughout the day.  Labs reviewed without anemia, stable thyroid function and stable electrolytes.  She does have a history of REBECCA, did not tolerate a mask due to claustrophobia.  She is unsure whether she would want to proceed with a repeat sleep study which could be conducted at home.  She does understand that untreated  REBECCA can increase her ventricular ectopy burden.  We did also discuss CAD as a possible cause for increased ventricular ectopy.  Given this and her exertional dyspnea, strongly encouraged stress testing.  She is agreeable to NM Lexiscan stress test. Would like to discuss possible use of her klonopin for anxiety just prior to study and hopefully having a family member be with her. We will discuss with our nuclear radiology/ stress testing team.   She will remain on Diltiazem 120 mg daily, consider increasing for further VE suppression and BP control.     2. Ventricular bigeminy  See above.     - Echocardiogram Complete; Future  - NM Lexiscan stress test; Future    3. Essential hypertension  BP elevated today as patient is very anxious.   Reports home BP's 120-130's/80's  Continue to monitor.     - Echocardiogram Complete; Future  - NM Lexiscan stress test; Future    4. SOLIS (dyspnea on exertion)  - Echocardiogram Complete; Future  - NM Lexiscan stress test; Future    5. Palpitations  She will remain on Diltiazem 120 mg daily, consider increasing for further VE suppression and BP control.   - Echocardiogram Complete; Future  - NM Lexiscan stress test; Future    6. Type 2 diabetes mellitus without complication, without long-term current use of insulin (H)  Continued management by PCP.  Hemoglobin A1C POCT   Date Value Ref Range Status   03/10/2021 5.8 (H) 0 - 5.6 % Final     Comment:     Normal <5.7% Prediabetes 5.7-6.4%  Diabetes 6.5% or higher - adopted from ADA   consensus guidelines.     02/19/2020 5.9 (H) 0 - 5.6 % Final     Comment:     Normal <5.7% Prediabetes 5.7-6.4%  Diabetes 6.5% or higher - adopted from ADA   consensus guidelines.       Hemoglobin A1C   Date Value Ref Range Status   01/10/2022 5.9 (H) 0.0 - 5.6 % Final     Comment:     Normal <5.7%   Prediabetes 5.7-6.4%    Diabetes 6.5% or higher     Note: Adopted from ADA consensus guidelines.   10/08/2021 5.8 (H) 0.0 - 5.6 % Final     Comment:     Normal  <5.7%   Prediabetes 5.7-6.4%    Diabetes 6.5% or higher     Note: Adopted from ADA consensus guidelines.     Last Comprehensive Metabolic Panel:  Sodium   Date Value Ref Range Status   01/10/2022 139 133 - 144 mmol/L Final   07/07/2021 140 133 - 144 mmol/L Final     Potassium   Date Value Ref Range Status   01/10/2022 3.7 3.4 - 5.3 mmol/L Final   07/07/2021 4.0 3.4 - 5.3 mmol/L Final     Chloride   Date Value Ref Range Status   01/10/2022 109 94 - 109 mmol/L Final   07/07/2021 110 (H) 94 - 109 mmol/L Final     Carbon Dioxide   Date Value Ref Range Status   07/07/2021 25 20 - 32 mmol/L Final     Carbon Dioxide (CO2)   Date Value Ref Range Status   01/10/2022 25 20 - 32 mmol/L Final     Anion Gap   Date Value Ref Range Status   01/10/2022 5 3 - 14 mmol/L Final   07/07/2021 5 3 - 14 mmol/L Final     Glucose   Date Value Ref Range Status   01/10/2022 132 (H) 70 - 99 mg/dL Final   07/07/2021 93 70 - 99 mg/dL Final     Urea Nitrogen   Date Value Ref Range Status   01/10/2022 13 7 - 30 mg/dL Final   07/07/2021 10 7 - 30 mg/dL Final     Creatinine   Date Value Ref Range Status   01/10/2022 0.76 0.52 - 1.04 mg/dL Final   07/07/2021 0.83 0.52 - 1.04 mg/dL Final     GFR Estimate   Date Value Ref Range Status   01/10/2022 84 >60 mL/min/1.73m2 Final     Comment:     Effective December 21, 2021 eGFRcr in adults is calculated using the 2021 CKD-EPI creatinine equation which includes age and gender (Migdalai et al., NEJM, DOI: 10.1056/GELGsn1365505)   07/07/2021 72 >60 mL/min/[1.73_m2] Final     Comment:     Non  GFR Calc  Starting 12/18/2018, serum creatinine based estimated GFR (eGFR) will be   calculated using the Chronic Kidney Disease Epidemiology Collaboration   (CKD-EPI) equation.       Calcium   Date Value Ref Range Status   01/10/2022 9.1 8.5 - 10.1 mg/dL Final   07/07/2021 8.8 8.5 - 10.1 mg/dL Final       7. Other specified anxiety disorders  Severe anxiety disorder and PTSD. Working with therapist.     8.  REBECCA (obstructive sleep apnea)  History of REBECCA, on CPAP for nearly one year and quit due to claustrophobia with PTSD. Diagnosed >20 years ago.   Strongly encouraged repeat sleep study which could be conducted as a home study.  We did discuss that there are advances in CPAP masks and other REBECCA treatment options to be considered.   Patient would like to hold off on sleep study at this time.     Thank you for allowing me to participate in the care of your patient. Please do not hesitate to contact me if you have any questions.     Total time 103 minutes on date of encounter spent reviewing records, face-to-face time obtaining HPI, physical exam, extensive amount of time spent reviewing previous results and recommendations for continued plan of care.    Marie Rodrigues, APRN CNP [facility]

## 2022-02-08 NOTE — PATIENT INSTRUCTIONS
Thank you for allowing Marie Rodrigues and our team to participate in your care. Please call our office at [phone] with scheduling questions or if you need to cancel or change your appointment. With any other questions or concerns you may call Nicole cardiology nurse at [phone].       If you experience chest pain, chest pressure, chest tightness, shortness of breath, fainting, lightheadedness, nausea, vomiting, or other concerning symptoms, please report to the Emergency Department or call 911. These symptoms may be emergent, and best treated in the Emergency Department.      No medication changes.     You will have an echocardiogram performed.  This is an ultrasound of the heart, that evaluates heart function.  The hospital scheduling department will call to schedule you for this test.     You will be scheduled for a stress test to evaluate the vessels which supply blood to the heart to rule out the possibility of blockages. You will be called by the hospital scheduling department to schedule this appointment.    Prior to Stress test, no lotion to chest or abdomen. Nothing to eat or drink after midnight. 12 hours before no caffeine , no decaffeinated beverages, no chocolate , no smoking and no alchole. It is important you follow these instructions. They will not do test if you have had any of these. You will get a break to get something to eat and drink during the test.

## 2022-02-08 NOTE — NURSING NOTE
"Chief Complaint   Patient presents with     New Patient       Initial BP (!) 171/93 (BP Location: Right arm)   Pulse 81   Temp 98.6  F (37  C) (Tympanic)   Ht 1.549 m (5' 1\")   Wt 87.8 kg (193 lb 8.6 oz)   SpO2 95%   BMI 36.57 kg/m   Estimated body mass index is 36.57 kg/m  as calculated from the following:    Height as of this encounter: 1.549 m (5' 1\").    Weight as of this encounter: 87.8 kg (193 lb 8.6 oz).  Medication Reconciliation: complete  Nicole Alfred LPN    "

## 2022-02-09 ENCOUNTER — TELEPHONE (OUTPATIENT)
Dept: NUCLEAR MEDICINE | Facility: HOSPITAL | Age: 72
End: 2022-02-09
Payer: COMMERCIAL

## 2022-02-09 NOTE — TELEPHONE ENCOUNTER
Attempted to reach patient to discuss Nuclear Medicine Exam and prep.    I provided my callback number.

## 2022-02-10 ENCOUNTER — TELEPHONE (OUTPATIENT)
Dept: ALLERGY | Facility: OTHER | Age: 72
End: 2022-02-10

## 2022-02-10 ENCOUNTER — HOSPITAL ENCOUNTER (OUTPATIENT)
Dept: CARDIOLOGY | Facility: HOSPITAL | Age: 72
Setting detail: NUCLEAR MEDICINE
End: 2022-02-10
Attending: NURSE PRACTITIONER
Payer: MEDICARE

## 2022-02-10 ENCOUNTER — HOSPITAL ENCOUNTER (OUTPATIENT)
Dept: NUCLEAR MEDICINE | Facility: HOSPITAL | Age: 72
Setting detail: NUCLEAR MEDICINE
End: 2022-02-10
Attending: NURSE PRACTITIONER
Payer: MEDICARE

## 2022-02-10 DIAGNOSIS — I49.3 FREQUENT PVCS: ICD-10-CM

## 2022-02-10 DIAGNOSIS — I10 ESSENTIAL HYPERTENSION: ICD-10-CM

## 2022-02-10 DIAGNOSIS — R06.09 DOE (DYSPNEA ON EXERTION): ICD-10-CM

## 2022-02-10 DIAGNOSIS — E11.9 TYPE 2 DIABETES MELLITUS WITHOUT COMPLICATION, WITHOUT LONG-TERM CURRENT USE OF INSULIN (H): ICD-10-CM

## 2022-02-10 DIAGNOSIS — J30.89 PERENNIAL ALLERGIC RHINITIS: Primary | ICD-10-CM

## 2022-02-10 DIAGNOSIS — I49.8 VENTRICULAR BIGEMINY: ICD-10-CM

## 2022-02-10 DIAGNOSIS — R00.2 PALPITATIONS: ICD-10-CM

## 2022-02-10 PROCEDURE — A9500 TC99M SESTAMIBI: HCPCS | Performed by: RADIOLOGY

## 2022-02-10 PROCEDURE — 343N000001 HC RX 343: Performed by: RADIOLOGY

## 2022-02-10 PROCEDURE — 93017 CV STRESS TEST TRACING ONLY: CPT | Performed by: INTERNAL MEDICINE

## 2022-02-10 PROCEDURE — 78452 HT MUSCLE IMAGE SPECT MULT: CPT

## 2022-02-10 PROCEDURE — 250N000011 HC RX IP 250 OP 636: Performed by: INTERNAL MEDICINE

## 2022-02-10 PROCEDURE — 93018 CV STRESS TEST I&R ONLY: CPT | Performed by: INTERNAL MEDICINE

## 2022-02-10 PROCEDURE — 93016 CV STRESS TEST SUPVJ ONLY: CPT | Performed by: INTERNAL MEDICINE

## 2022-02-10 RX ORDER — AMINOPHYLLINE 25 MG/ML
INJECTION, SOLUTION INTRAVENOUS
Status: DISCONTINUED
Start: 2022-02-10 | End: 2022-02-10 | Stop reason: WASHOUT

## 2022-02-10 RX ORDER — REGADENOSON 0.08 MG/ML
0.4 INJECTION, SOLUTION INTRAVENOUS ONCE
Status: COMPLETED | OUTPATIENT
Start: 2022-02-10 | End: 2022-02-10

## 2022-02-10 RX ADMIN — Medication 8.7 MILLICURIE: at 07:17

## 2022-02-10 RX ADMIN — Medication 29.7 MILLICURIE: at 08:55

## 2022-02-10 RX ADMIN — REGADENOSON 0.4 MG: 0.08 INJECTION, SOLUTION INTRAVENOUS at 08:55

## 2022-02-10 NOTE — TELEPHONE ENCOUNTER
Patient needs an updated order for her allergy injections.  New order pended for review and signature.  Thank you!    Morgan Buchanan RN on 2/10/2022 at 11:38 AM

## 2022-02-11 LAB
CV BLOOD PRESSURE: 58 MMHG
CV STRESS MAX HR HE: 108
NUC STRESS EJECTION FRACTION: 68 %
RATE PRESSURE PRODUCT: NORMAL
STRESS ECHO BASELINE DIASTOLIC HE: 82
STRESS ECHO BASELINE HR: 86 BPM
STRESS ECHO BASELINE SYSTOLIC BP: 158
STRESS ECHO CALCULATED PERCENT HR: 72 %
STRESS ECHO LAST STRESS DIASTOLIC BP: 78
STRESS ECHO LAST STRESS SYSTOLIC BP: 152
STRESS ECHO TARGET HR: 151

## 2022-02-15 ENCOUNTER — HOSPITAL ENCOUNTER (OUTPATIENT)
Dept: CARDIOLOGY | Facility: HOSPITAL | Age: 72
Discharge: HOME | End: 2022-02-15
Attending: NURSE PRACTITIONER | Admitting: STUDENT IN AN ORGANIZED HEALTH CARE EDUCATION/TRAINING PROGRAM
Payer: MEDICARE

## 2022-02-15 DIAGNOSIS — R06.09 DOE (DYSPNEA ON EXERTION): ICD-10-CM

## 2022-02-15 DIAGNOSIS — I10 ESSENTIAL HYPERTENSION: ICD-10-CM

## 2022-02-15 DIAGNOSIS — I49.3 FREQUENT PVCS: ICD-10-CM

## 2022-02-15 DIAGNOSIS — R00.2 PALPITATIONS: ICD-10-CM

## 2022-02-15 DIAGNOSIS — I49.8 VENTRICULAR BIGEMINY: ICD-10-CM

## 2022-02-15 LAB
BI-PLANE LVEF ECHO: NORMAL
LVEF ECHO: NORMAL

## 2022-02-15 PROCEDURE — 93306 TTE W/DOPPLER COMPLETE: CPT

## 2022-02-16 ENCOUNTER — ALLIED HEALTH/NURSE VISIT (OUTPATIENT)
Dept: ALLERGY | Facility: OTHER | Age: 72
End: 2022-02-16
Attending: NURSE PRACTITIONER
Payer: COMMERCIAL

## 2022-02-16 ENCOUNTER — OFFICE VISIT (OUTPATIENT)
Dept: OTOLARYNGOLOGY | Facility: OTHER | Age: 72
End: 2022-02-16
Attending: NURSE PRACTITIONER
Payer: MEDICARE

## 2022-02-16 VITALS
HEIGHT: 61 IN | HEART RATE: 77 BPM | DIASTOLIC BLOOD PRESSURE: 76 MMHG | WEIGHT: 193.54 LBS | OXYGEN SATURATION: 95 % | TEMPERATURE: 97 F | SYSTOLIC BLOOD PRESSURE: 138 MMHG | BODY MASS INDEX: 36.54 KG/M2

## 2022-02-16 DIAGNOSIS — J30.89 PERENNIAL ALLERGIC RHINITIS: Primary | ICD-10-CM

## 2022-02-16 PROCEDURE — G0463 HOSPITAL OUTPT CLINIC VISIT: HCPCS | Mod: 25

## 2022-02-16 PROCEDURE — 99213 OFFICE O/P EST LOW 20 MIN: CPT | Performed by: NURSE PRACTITIONER

## 2022-02-16 PROCEDURE — 95117 IMMUNOTHERAPY INJECTIONS: CPT

## 2022-02-16 ASSESSMENT — PAIN SCALES - GENERAL: PAINLEVEL: NO PAIN (0)

## 2022-02-16 NOTE — PROGRESS NOTES
Prior to injection verified pt identity using pt name and date of birth.    Allergy injection/s given and charted on paper allergy flow sheet.  Patient left AMA, signing form, not staying for the observation period.    Morgan Buchanan RN on 2/16/2022 at 11:16 AM

## 2022-02-16 NOTE — LETTER
2/16/2022         RE: Rj Rodríguez  [address]  [address]  [facility] [address]        Dear Colleague,    Thank you for referring your patient, Rj Rodríguez, to the [facility]. Please see a copy of my visit note below.    Otolaryngology Note         Chief Complaint:     Patient presents with:  Allergies: SCIT follow up            History of Present Illness:     Rj Rodríguez is a 71 year old female seen today for follow up SCIT.    Rj has been doing great.  Her allergy symptoms are much improved.  She is currently taking zyrtec and nasacort for her allergy symptoms with good control.      No recent URI or sinusitis.     She has been working on her mental and physical health and reports she has been doing great.  She has an upcoming echocardiogram.          Medications:     Current Outpatient Rx   Medication Sig Dispense Refill     albuterol (PROAIR HFA/PROVENTIL HFA/VENTOLIN HFA) 108 (90 Base) MCG/ACT inhaler Inhale 2 puffs into the lungs every 6 hours 18 g 0     blood glucose (ACCU-CHEK GUIDE) test strip Use to test blood sugar 1 time daily or as directed. 50 each 11     blood glucose monitoring (ACCU-CHEK FASTCLIX) lancets Use to test blood sugar 1 time daily or as directed. 102 each 11     clonazePAM (KLONOPIN) 0.5 MG tablet TAKE 1 TABLET BY MOUTH TWICE DAILY AS NEEDED FOR ANXIETY 30 tablet 0     diltiazem ER (TIAZAC) 120 MG 24 hr ER beaded capsule Take 1 capsule (120 mg) by mouth daily 90 capsule 3     EPINEPHrine (ANY BX GENERIC EQUIV) 0.3 MG/0.3ML injection 2-pack INJECT CONTENTS OF 1 PEN AS NEEDED FOR ALLERGIC REACTION 2 each 0     ipratropium-albuterol (COMBIVENT RESPIMAT)  MCG/ACT inhaler Inhale 1 puff into the lungs 4 times daily as needed for wheezing or other (chest tightness) 4 g 3     losartan (COZAAR) 100 MG tablet Take 1 tablet by mouth once daily 90 tablet 0     ORDER FOR ALLERGEN IMMUNOTHERAPY Continue with 0.5ml weekly subcutaneous allergy  injections. Follow standard maintenance protocols. 10 mL PRN     spironolactone (ALDACTONE) 50 MG tablet Take 1 tablet by mouth once daily 30 tablet 3     STATIN NOT PRESCRIBED (INTENTIONAL) Please choose reason not prescribed from choices below.       triamcinolone (NASACORT) 55 MCG/ACT nasal aerosol Spray 2 sprays into both nostrils daily 2 Bottle 12     cetirizine (ZYRTEC) 10 MG tablet Take 1 tablet (10 mg) by mouth daily as needed for allergies (Patient not taking: Reported on 2/16/2022) 60 tablet 12            Allergies:     Allergies: Fruit [peach], Nuts, Ace inhibitors, Alkylamines, Alprazolam, Diphenhydramine, Erythromycin, Guaifed, Guaifenesin, Hydrochlorothiazide, No clinical screening - see comments, Olmesartan medoxomil, Phenylephrine hcl, Pseudoephedrine hcl, Seasonal allergies, Tramadol, Tramadol hcl, Venlafaxine, Zoloft [sertraline], and Latex          Past Medical History:     Past Medical History:   Diagnosis Date     Arthritis      Chronic back pain      Hypertension      Irregular heart beat      Sleep apnea             Past Surgical History:     Past Surgical History:   Procedure Laterality Date     APPENDECTOMY       BACK SURGERY       CHOLECYSTECTOMY       COLONOSCOPY  2012    Repeat in 10 years     GYN SURGERY       HYSTERECTOMY      Ovaries     RELEASE CARPAL TUNNEL      LT     RELEASE CARPAL TUNNEL      RT x2     SURGICAL RADIOLOGY PROCEDURE N/A 2/12/2016    Procedure: SURGICAL RADIOLOGY PROCEDURE;  Surgeon: Provider, Generic Perianesthesia Nursing;  Location: [facility]     SURGICAL RADIOLOGY PROCEDURE N/A 8/15/2016    Procedure: SURGICAL RADIOLOGY PROCEDURE;  Surgeon: Provider, Generic Perianesthesia Nursing;  Location: [facility]       ENT family history reviewed         Social History:     Social History     Tobacco Use     Smoking status: Never Smoker     Smokeless tobacco: Never Used   Vaping Use     Vaping Use: Never used   Substance Use Topics     Alcohol use: Yes     Alcohol/week: 1.0  "standard drink     Types: 1 Glasses of wine per week     Comment: occ glass of wine     Drug use: No            Review of Systems:     ROS: See HPI         Physical Exam:     /76 (Cuff Size: Adult Large)   Pulse 77   Temp 97  F (36.1  C) (Tympanic)   Ht 1.549 m (5' 1\")   Wt 87.8 kg (193 lb 8.6 oz)   SpO2 95%   BMI 36.57 kg/m      General - The patient is well nourished and well developed, and appears to have good nutritional status.  Alert and oriented to person and place, answers questions and cooperates with examination appropriately.   Head and Face - Normocephalic and atraumatic, with no gross asymmetry noted.  The facial nerve is intact, with strong symmetric movements.  Voice and Breathing - The patient was breathing comfortably without the use of accessory muscles. There was no wheezing, stridor. The patients voice was clear and strong, and had appropriate pitch and quality.  Ears - External ear normal. Canals are patent. Right tympanic membrane is intact without effusion, retraction or mass. Left tympanic membrane is intact without effusion, retraction or mass.  Eyes - Extraocular movements intact, sclera were not icteric or injected.  Mouth - Examination of the oral cavity showed pink, healthy oral mucosa. Dentition in good condition. No lesions or ulcerations noted. The tongue was mobile and midline.   Throat - The walls of the oropharynx were smooth, pink, moist, symmetric, and had no lesions or ulcerations.  The tonsillar pillars and soft palate were symmetric. The uvula was midline on elevation.    Neck - Normal midline excursion of the laryngotracheal complex during swallowing.  Full range of motion on passive movement.  Palpation of the occipital, submental, submandibular, internal jugular chain, and supraclavicular nodes did not demonstrate any abnormal lymph nodes or masses.  Palpation of the thyroid was soft and smooth, with no nodules or goiter appreciated.  The trachea was mobile and " midline.  Nose - External contour is symmetric, no gross deflection or scars.  Nasal mucosa is pink and moist with no abnormal mucus.  The septum and turbinates were evaluated with nasal speculum, no polyps, masses, or purulence noted on examination.         Assessment and Plan:       ICD-10-CM    1. Perennial allergic rhinitis  J30.89        Continue  weekly injections until mid September 2022     Continue nasal sprays as needed      Keep Epi-pen up to date     Follow-up in a year     Felisha MAZA  [facility] ENT        Again, thank you for allowing me to participate in the care of your patient.        Sincerely,        Felisha Tyler, NP

## 2022-02-16 NOTE — PATIENT INSTRUCTIONS
Thank you for allowing Felisha Tyler and our ENT team to participate in your care.  If your medications are too expensive, please give the nurse a call.  We can possibly change this medication.  If you have a scheduling or an appointment question please contact our Health Unit Coordinator at their direct line [phone].   ALL nursing questions or concerns can be directed to your ENT nurse at: [phone] - Kqx     Continue  weekly injections until mid September 2022     Continue nasal sprays as needed      Keep Epi-pen up to date     Follow-up in a year

## 2022-02-16 NOTE — PROGRESS NOTES
Otolaryngology Note         Chief Complaint:     Patient presents with:  Allergies: SCIT follow up            History of Present Illness:     Rj Rodríguez is a 71 year old female seen today for follow up SCIT.    Rj has been doing great.  Her allergy symptoms are much improved.  She is currently taking zyrtec and nasacort for her allergy symptoms with good control.      No recent URI or sinusitis.     She has been working on her mental and physical health and reports she has been doing great.  She has an upcoming echocardiogram.          Medications:     Current Outpatient Rx   Medication Sig Dispense Refill     albuterol (PROAIR HFA/PROVENTIL HFA/VENTOLIN HFA) 108 (90 Base) MCG/ACT inhaler Inhale 2 puffs into the lungs every 6 hours 18 g 0     blood glucose (ACCU-CHEK GUIDE) test strip Use to test blood sugar 1 time daily or as directed. 50 each 11     blood glucose monitoring (ACCU-CHEK FASTCLIX) lancets Use to test blood sugar 1 time daily or as directed. 102 each 11     clonazePAM (KLONOPIN) 0.5 MG tablet TAKE 1 TABLET BY MOUTH TWICE DAILY AS NEEDED FOR ANXIETY 30 tablet 0     diltiazem ER (TIAZAC) 120 MG 24 hr ER beaded capsule Take 1 capsule (120 mg) by mouth daily 90 capsule 3     EPINEPHrine (ANY BX GENERIC EQUIV) 0.3 MG/0.3ML injection 2-pack INJECT CONTENTS OF 1 PEN AS NEEDED FOR ALLERGIC REACTION 2 each 0     ipratropium-albuterol (COMBIVENT RESPIMAT)  MCG/ACT inhaler Inhale 1 puff into the lungs 4 times daily as needed for wheezing or other (chest tightness) 4 g 3     losartan (COZAAR) 100 MG tablet Take 1 tablet by mouth once daily 90 tablet 0     ORDER FOR ALLERGEN IMMUNOTHERAPY Continue with 0.5ml weekly subcutaneous allergy injections. Follow standard maintenance protocols. 10 mL PRN     spironolactone (ALDACTONE) 50 MG tablet Take 1 tablet by mouth once daily 30 tablet 3     STATIN NOT PRESCRIBED (INTENTIONAL) Please choose reason not prescribed from choices below.       triamcinolone  (NASACORT) 55 MCG/ACT nasal aerosol Spray 2 sprays into both nostrils daily 2 Bottle 12     cetirizine (ZYRTEC) 10 MG tablet Take 1 tablet (10 mg) by mouth daily as needed for allergies (Patient not taking: Reported on 2/16/2022) 60 tablet 12            Allergies:     Allergies: Fruit [peach], Nuts, Ace inhibitors, Alkylamines, Alprazolam, Diphenhydramine, Erythromycin, Guaifed, Guaifenesin, Hydrochlorothiazide, No clinical screening - see comments, Olmesartan medoxomil, Phenylephrine hcl, Pseudoephedrine hcl, Seasonal allergies, Tramadol, Tramadol hcl, Venlafaxine, Zoloft [sertraline], and Latex          Past Medical History:     Past Medical History:   Diagnosis Date     Arthritis      Chronic back pain      Hypertension      Irregular heart beat      Sleep apnea             Past Surgical History:     Past Surgical History:   Procedure Laterality Date     APPENDECTOMY       BACK SURGERY       CHOLECYSTECTOMY       COLONOSCOPY  2012    Repeat in 10 years     GYN SURGERY       HYSTERECTOMY      Ovaries     RELEASE CARPAL TUNNEL      LT     RELEASE CARPAL TUNNEL      RT x2     SURGICAL RADIOLOGY PROCEDURE N/A 2/12/2016    Procedure: SURGICAL RADIOLOGY PROCEDURE;  Surgeon: Provider, Generic Perianesthesia Nursing;  Location: [facility]     SURGICAL RADIOLOGY PROCEDURE N/A 8/15/2016    Procedure: SURGICAL RADIOLOGY PROCEDURE;  Surgeon: Provider, Generic Perianesthesia Nursing;  Location: [facility]       ENT family history reviewed         Social History:     Social History     Tobacco Use     Smoking status: Never Smoker     Smokeless tobacco: Never Used   Vaping Use     Vaping Use: Never used   Substance Use Topics     Alcohol use: Yes     Alcohol/week: 1.0 standard drink     Types: 1 Glasses of wine per week     Comment: occ glass of wine     Drug use: No            Review of Systems:     ROS: See HPI         Physical Exam:     /76 (Cuff Size: Adult Large)   Pulse 77   Temp 97  F (36.1  C) (Tympanic)   Ht 1.549 m (5'  "1\")   Wt 87.8 kg (193 lb 8.6 oz)   SpO2 95%   BMI 36.57 kg/m      General - The patient is well nourished and well developed, and appears to have good nutritional status.  Alert and oriented to person and place, answers questions and cooperates with examination appropriately.   Head and Face - Normocephalic and atraumatic, with no gross asymmetry noted.  The facial nerve is intact, with strong symmetric movements.  Voice and Breathing - The patient was breathing comfortably without the use of accessory muscles. There was no wheezing, stridor. The patients voice was clear and strong, and had appropriate pitch and quality.  Ears - External ear normal. Canals are patent. Right tympanic membrane is intact without effusion, retraction or mass. Left tympanic membrane is intact without effusion, retraction or mass.  Eyes - Extraocular movements intact, sclera were not icteric or injected.  Mouth - Examination of the oral cavity showed pink, healthy oral mucosa. Dentition in good condition. No lesions or ulcerations noted. The tongue was mobile and midline.   Throat - The walls of the oropharynx were smooth, pink, moist, symmetric, and had no lesions or ulcerations.  The tonsillar pillars and soft palate were symmetric. The uvula was midline on elevation.    Neck - Normal midline excursion of the laryngotracheal complex during swallowing.  Full range of motion on passive movement.  Palpation of the occipital, submental, submandibular, internal jugular chain, and supraclavicular nodes did not demonstrate any abnormal lymph nodes or masses.  Palpation of the thyroid was soft and smooth, with no nodules or goiter appreciated.  The trachea was mobile and midline.  Nose - External contour is symmetric, no gross deflection or scars.  Nasal mucosa is pink and moist with no abnormal mucus.  The septum and turbinates were evaluated with nasal speculum, no polyps, masses, or purulence noted on examination.         Assessment and " Plan:       ICD-10-CM    1. Perennial allergic rhinitis  J30.89        Continue  weekly injections until mid September 2022     Continue nasal sprays as needed      Keep Epi-pen up to date     Follow-up in a year     Felisha MAZA  [facility]NT

## 2022-02-16 NOTE — NURSING NOTE
"Chief Complaint   Patient presents with     Allergies     SKIT follow up        Initial /76 (Cuff Size: Adult Large)   Pulse 77   Temp 97  F (36.1  C) (Tympanic)   Ht 1.549 m (5' 1\")   Wt 87.8 kg (193 lb 8.6 oz)   SpO2 95%   BMI 36.57 kg/m   Estimated body mass index is 36.57 kg/m  as calculated from the following:    Height as of this encounter: 1.549 m (5' 1\").    Weight as of this encounter: 87.8 kg (193 lb 8.6 oz).  Medication Reconciliation: complete  Emma Bush LPN    "

## 2022-03-02 ENCOUNTER — ALLIED HEALTH/NURSE VISIT (OUTPATIENT)
Dept: ALLERGY | Facility: OTHER | Age: 72
End: 2022-03-02
Attending: NURSE PRACTITIONER
Payer: MEDICARE

## 2022-03-02 DIAGNOSIS — J30.89 PERENNIAL ALLERGIC RHINITIS: Primary | ICD-10-CM

## 2022-03-02 PROCEDURE — 95117 IMMUNOTHERAPY INJECTIONS: CPT

## 2022-03-02 NOTE — PROGRESS NOTES
Prior to injection verified pt identity using pt name and date of birth.    Allergy injection/s given and charted on paper allergy flow sheet.  Patient left AMA, signing form, not staying for the observation period.    Morgan Buchanan RN on 3/2/2022 at 11:23 AM

## 2022-03-09 ENCOUNTER — ALLIED HEALTH/NURSE VISIT (OUTPATIENT)
Dept: ALLERGY | Facility: OTHER | Age: 72
End: 2022-03-09
Attending: NURSE PRACTITIONER
Payer: MEDICARE

## 2022-03-09 DIAGNOSIS — J30.89 PERENNIAL ALLERGIC RHINITIS: Primary | ICD-10-CM

## 2022-03-09 PROCEDURE — 95117 IMMUNOTHERAPY INJECTIONS: CPT

## 2022-03-09 NOTE — PROGRESS NOTES
Prior to injection verified pt identity using pt name and date of birth.    Allergy injection/s given and charted on paper allergy flow sheet.  Patient left AMA, signing form, not staying for the observation period.    Morgan Buchanan RN on 3/9/2022 at 11:13 AM

## 2022-03-16 ENCOUNTER — ALLIED HEALTH/NURSE VISIT (OUTPATIENT)
Dept: ALLERGY | Facility: OTHER | Age: 72
End: 2022-03-16
Attending: NURSE PRACTITIONER
Payer: MEDICARE

## 2022-03-16 DIAGNOSIS — J30.89 PERENNIAL ALLERGIC RHINITIS: Primary | ICD-10-CM

## 2022-03-16 PROCEDURE — 95165 ANTIGEN THERAPY SERVICES: CPT

## 2022-03-16 PROCEDURE — 95165 ANTIGEN THERAPY SERVICES: CPT | Performed by: NURSE PRACTITIONER

## 2022-03-17 NOTE — PROGRESS NOTES
Allergy serum is mixed today at [facility],  into  2  (5 ml)  multi dose vial/vials.    Allergens included were:    Ragweed  0.2 ml of dilution # 1  Pigweed  0.2 ml of dilution # 1  Mugwort 0.2 ml of dilution # 0  Kochia  0.2 ml of dilution # 0  Russian Thistle 0.2 ml of dilution # 1  Jimenez Grass 0.2 ml of dilution # 1  Birch mix 0.2 ml of dilution # 1  Maple Mix 0.2 of dilution # 1  Elm Mix 0.2 ml of dilution # 1  Oak Mix 0.2 ml of dilution # 1  Frank Mix 0.2 ml of dilution # 1  Pine Mix 0.2 ml of dilution # 1  [place] 0.2 ml of dilution # 1  Black [place] 0.2 ml of dilution # 1  Aspen 0.2 ml of dilution # 0  Red Peach 0.2 ml of dilution # 0    Alternaria 0.2 ml of dilution # 1  Aspergillus 0.2 ml of dilution # 1  Hormodendrum 0.2 ml of dilution # 1  Helminthosporium 0.2 ml of dilution # 1  Penicillium 0.2 ml of dilution # 1  Epicoccum 0.2 ml of dilution # 1  Fusarium 0.2 ml of dilution # 1  Mucor 0.2 ml of dilution # 0  Grain Smut 0.2 ml of dilution # 0  Grass Smut 0.2 ml of dilution # 0  Cat 0.2 ml of dilution # 1  Dog 0.2 ml of dilution # 1  Feather Mix 0.2 ml of dilution # 0  Dust Mite Mix 0.2 ml of dilution # 1  Horse 0.2 ml of dilution # 0    Morgan Buchanan RN on 3/17/2022 at 11:40 AM

## 2022-03-23 ENCOUNTER — ALLIED HEALTH/NURSE VISIT (OUTPATIENT)
Dept: ALLERGY | Facility: OTHER | Age: 72
End: 2022-03-23
Attending: NURSE PRACTITIONER
Payer: MEDICARE

## 2022-03-23 DIAGNOSIS — J30.89 PERENNIAL ALLERGIC RHINITIS: Primary | ICD-10-CM

## 2022-03-23 PROCEDURE — 95117 IMMUNOTHERAPY INJECTIONS: CPT

## 2022-03-23 NOTE — PROGRESS NOTES
Prior to injection patient identity verified using name and date of birth.    SVT done on right arm, measuring 7 mm.  Passed  SVT done on left arm, measuring 9 mm.  Passed  Documented on paper flowsheet.     Allergy injection/s given and charted on paper allergy flow sheet.  Patient left AMA, signing form, not staying for the observation period.    Morgan Buchanan RN on 3/23/2022 at 11:21 AM

## 2022-03-24 DIAGNOSIS — I10 ESSENTIAL HYPERTENSION: ICD-10-CM

## 2022-03-24 RX ORDER — LOSARTAN POTASSIUM 100 MG/1
TABLET ORAL
Qty: 90 TABLET | Refills: 0 | Status: SHIPPED | OUTPATIENT
Start: 2022-03-24 | End: 2022-04-05

## 2022-03-24 NOTE — TELEPHONE ENCOUNTER
Losartan  Last Written Prescription Date: 12/27/21  Last Fill Quantity: 90 # of Refills: 0  Last Office Visit: 1/10/22

## 2022-03-30 ENCOUNTER — ALLIED HEALTH/NURSE VISIT (OUTPATIENT)
Dept: ALLERGY | Facility: OTHER | Age: 72
End: 2022-03-30
Attending: NURSE PRACTITIONER
Payer: MEDICARE

## 2022-03-30 DIAGNOSIS — J30.89 PERENNIAL ALLERGIC RHINITIS: Primary | ICD-10-CM

## 2022-03-30 PROCEDURE — 95117 IMMUNOTHERAPY INJECTIONS: CPT

## 2022-03-30 NOTE — PROGRESS NOTES
Prior to injection verified pt identity using pt name and date of birth.    Allergy injection/s given and charted on paper allergy flow sheet.  Patient left AMA, signing form, not staying for the observation period.    Morgan Buchanan RN on 3/30/2022 at 11:21 AM

## 2022-04-05 ENCOUNTER — VIRTUAL VISIT (OUTPATIENT)
Dept: CARDIOLOGY | Facility: OTHER | Age: 72
End: 2022-04-05
Attending: NURSE PRACTITIONER
Payer: COMMERCIAL

## 2022-04-05 VITALS — BODY MASS INDEX: 35.33 KG/M2 | WEIGHT: 187 LBS

## 2022-04-05 DIAGNOSIS — F41.1 GENERALIZED ANXIETY DISORDER: ICD-10-CM

## 2022-04-05 DIAGNOSIS — G47.33 OSA (OBSTRUCTIVE SLEEP APNEA): ICD-10-CM

## 2022-04-05 DIAGNOSIS — I49.3 FREQUENT PVCS: ICD-10-CM

## 2022-04-05 DIAGNOSIS — E11.9 TYPE 2 DIABETES MELLITUS WITHOUT COMPLICATION, WITHOUT LONG-TERM CURRENT USE OF INSULIN (H): ICD-10-CM

## 2022-04-05 DIAGNOSIS — R07.89 SENSATION OF CHEST TIGHTNESS: ICD-10-CM

## 2022-04-05 DIAGNOSIS — I10 ESSENTIAL HYPERTENSION: Primary | ICD-10-CM

## 2022-04-05 PROCEDURE — 99214 OFFICE O/P EST MOD 30 MIN: CPT | Mod: 95 | Performed by: NURSE PRACTITIONER

## 2022-04-05 RX ORDER — DILTIAZEM HYDROCHLORIDE 180 MG/1
180 CAPSULE, EXTENDED RELEASE ORAL DAILY
Qty: 90 CAPSULE | Refills: 3 | Status: SHIPPED | OUTPATIENT
Start: 2022-04-05 | End: 2022-05-31

## 2022-04-05 RX ORDER — LOSARTAN POTASSIUM 100 MG/1
50 TABLET ORAL DAILY
Qty: 90 TABLET | Refills: 0
Start: 2022-04-05 | End: 2022-04-13

## 2022-04-05 ASSESSMENT — PAIN SCALES - GENERAL: PAINLEVEL: NO PAIN (0)

## 2022-04-05 NOTE — PATIENT INSTRUCTIONS
Thank you for allowing Marie Rodrigues and our team to participate in your care. Please call our office at [phone] with scheduling questions or if you need to cancel or change your appointment. With any other questions or concerns you may call Nicole cardiology nurse at [phone].       If you experience chest pain, chest pressure, chest tightness, shortness of breath, fainting, lightheadedness, nausea, vomiting, or other concerning symptoms, please report to the Emergency Department or call 911. These symptoms may be emergent, and best treated in the Emergency Department.      1. Increase Diltiazem to 180 mg daily.     2, Reduce Losartan to 50 mg daily.     3. Follow-up with cardiology in 4-6 weeks. I have made this appointment for you on May 17, 2022.

## 2022-04-05 NOTE — PROGRESS NOTES
"Rj is a 71 year old who is being evaluated via a billable telephone visit.    What phone number would you like to be contacted at?   How would you like to obtain your AVS? Leticia     She would like to talk about some medication. And having some dizzyness at times.       Assessment & Plan     1. Essential hypertension  2. Frequent PVCs  3. Type 2 diabetes mellitus without complication, without long-term current use of insulin (H)  4. REBECCA (obstructive sleep apnea)  5. Generalized anxiety disorder    -Reviewed results of stress test without evidence of ischemia and stable TTE, possible borderline diastolic dysfunction.   - Unclear etiology of vertigo episodes, possible hypotension vs neurogenic with history of anxiety and panic.   -Reduce Losartan to 50 mg daily.   -Increase Diltiazem to 180 mg daily for ectopy suppression.  - Reviewed untreated sleep apnea may contribute to increased ectopy and may place patient at risk for developing other arrhythmias such as atrial fibrillation. She has not been on CPAP and reports sleep study years ago. She would like to hold off on further sleep study at this time however, may consider home sleep study in the future.   -Congratulated on effort towards weight loss and focus on mental and physical violet.      BMI:   Estimated body mass index is 35.33 kg/m  as calculated from the following:    Height as of 2/16/22: 1.549 m (5' 1\").    Weight as of this encounter: 84.8 kg (187 lb).         Follow-up with cardiology in 4-6 weeks, certainly sooner if needed.     Marie Rodrigues, APRN CNP [facility]  [facility]    Subjective   Rj is a 71 year old who presents for the following health issues : Cardiology follow-up to visit on 2/8/22, review recent testing.     HPI     Ms. Rodríguez is a 71 year old female who presents for cardiology follow-up with concerns for increased exertional dyspnea and recently identified ventricular ectopy seen as isolated PVCs, ventricular " coupletes and ventricular trigeminy.  Patient has a past medical history significant for migraine headaches, hypertension, obesity, REBECCA, DM 2, depression, significant anxiety, glaucoma and chronic lumbago. She denies any family history known to her involving cardiovascular disease.     Patient reports noticing irregular heart rate with in the office in Feb 2021. She was identified to have ventricular ectopy on ECG. No palpitations with this at that time. She had described episodes of fluttering palpitations associated to anxiety. She had reported increased exertional dyspnea and chest tightness largely related to anxiety events. She does not report any exertional chest tightness and no radiation to the arm, jaw, neck or back. No lightheadedness or syncope. No increased edema.      She described issues with allergies, possible asthma and on on immunotherapy. Avoided use of albuterol with fluttering palpitations. Has been off beta blocker since starting this. She was started on Diltiazem for HTN and increased ventricular ectopy burden. No tobacco use.     Following her last visit patient underwent NM Lexiscan stress test due to frequent PVC's and increased exertional dyspnea. Study was completed on 2/10/22 revealing equivocal apical infarct vs apical thinning, no evidence of reversible ischemia. Normal LV systolic heart function which increased appropriately with stress. ECG with occasional ventricular ectopy at baseline which did not increase during lexiscan portion of the study and no ischemia ECG changes.     Echocardiogram on 2/15/22 revealing normal biventricular function, LVEF 55-60%. Borderline LVH, unable to assess diastolic function. Diastolic doppler findings suggest LV filling pressures to be increased. No RWMA's. Both atrial noted to be normal with atrial septum intact. No hemodynamically significant valvular abnormalities. Ascending aorta mildly dilated at 3.7 cm. No pericardial effusion.     Today  patient reports exertional dyspnea unchanged, continues to work on weigh loss. She has had a few episodes of lightheadedness and feeling of near syncope. One episode during the night and two during the day with no identified triggers. Unable to assess BP from home. BS was on the low end (70's), symptoms did improve with cranberry juice on one occasion. It has also occurred when blood sugar was higher after eating. Therefore, inconsistent that symptoms would be related to hypoglycemia. No chest pain or pressure reported today. Occasional flip flop sensation in chest which has been correlated to PVC's.     RELEVANT TESTING:  Zio patch on 8/11/21:  Hrt rate ranged from 48 bpm, maximum heart rate of 158 bmp, averaging 71 bmp.  No significant bradycardia, pauses, Mobitz type II or 3rd degree heart block.  No atrial fibrillation on this study.  x1 triggered events and x1 diary entries.  These corresponded to VE and sinus rhythm.  x0 runs of VT.    x2 runs of SVT lasting up to 5 beats with a maximum heart rate of 158 bmp.  Rare, less than 1% of PAC's, atrial couplets, and atrial triplets.  Occasional PVC's at 3.0%.  Frequent ventricular couplets of 5.3%.  + episodes of ventricular bigeminy lasting up to 17.4 sec's.  + episodes of ventricular trigeminy lasting up to 1 min and 4 sec's.     Zio patch on 2/20/2020:  The underlying rhythm was sinus.  Hrt rate ranged from 51 bpm, maximum heart rate of 120 bpm, averaging 73 bpm.  There was no significant bradycardia, pauses, atrial fibrillation, second degree Mobitz type II or third degree heart block.  x5 triggered events and x5 diary entries.  These corresponded to sinus rhythm and VE.  x0 runs of NSVT.    x0 runs of PSVT.  Rare, less than 1% of PAC's, atrial couplets, PVC's, and ventricular couplets.  x0 episodes of ventricular bigeminy.  There were episodes of ventricular trigeminy lasting up to 25.2 sec's.       Review of Systems   Constitutional, HEENT, cardiovascular,  pulmonary, gi and gu systems are negative, except as otherwise noted.      Objective    Vitals - Patient Reported  Pain Score: No Pain (0)        Physical Exam   alert and no distress  PSYCH: Alert and oriented times 3; coherent speech, normal   rate and volume, able to articulate logical thoughts, able   to abstract reason, no tangential thoughts, no hallucinations   or delusions  Her affect is normal and pleasant  RESP: No cough, no audible wheezing, able to talk in full sentences  Remainder of exam unable to be completed due to telephone visits    Reviewed recent NM Lexiscan stress test and TTE      Phone call duration: 22 minutes

## 2022-04-05 NOTE — NURSING NOTE
"Chief Complaint   Patient presents with     RECHECK     4-6 week follow up       Initial Wt 84.8 kg (187 lb)   BMI 35.33 kg/m   Estimated body mass index is 35.33 kg/m  as calculated from the following:    Height as of 2/16/22: 1.549 m (5' 1\").    Weight as of this encounter: 84.8 kg (187 lb).  Medication Reconciliation: complete  Nicole Alfred LPN    "

## 2022-04-06 ENCOUNTER — ALLIED HEALTH/NURSE VISIT (OUTPATIENT)
Dept: CARDIOLOGY | Facility: OTHER | Age: 72
End: 2022-04-06
Attending: NURSE PRACTITIONER
Payer: MEDICARE

## 2022-04-06 ENCOUNTER — ALLIED HEALTH/NURSE VISIT (OUTPATIENT)
Dept: ALLERGY | Facility: OTHER | Age: 72
End: 2022-04-06
Attending: NURSE PRACTITIONER
Payer: MEDICARE

## 2022-04-06 DIAGNOSIS — J30.89 PERENNIAL ALLERGIC RHINITIS: Primary | ICD-10-CM

## 2022-04-06 DIAGNOSIS — Z53.9 ERRONEOUS ENCOUNTER--DISREGARD: Primary | ICD-10-CM

## 2022-04-06 PROCEDURE — 95117 IMMUNOTHERAPY INJECTIONS: CPT

## 2022-04-06 PROCEDURE — 10000001 PR ERRONEOUS ENCOUNTER--DISREGARD

## 2022-04-09 ENCOUNTER — HEALTH MAINTENANCE LETTER (OUTPATIENT)
Age: 72
End: 2022-04-09

## 2022-04-11 ENCOUNTER — HOSPITAL ENCOUNTER (EMERGENCY)
Facility: HOSPITAL | Age: 72
Discharge: HOME | End: 2022-04-12
Attending: STUDENT IN AN ORGANIZED HEALTH CARE EDUCATION/TRAINING PROGRAM | Admitting: STUDENT IN AN ORGANIZED HEALTH CARE EDUCATION/TRAINING PROGRAM
Payer: MEDICARE

## 2022-04-11 ENCOUNTER — APPOINTMENT (OUTPATIENT)
Dept: CT IMAGING | Facility: HOSPITAL | Age: 72
End: 2022-04-11
Attending: STUDENT IN AN ORGANIZED HEALTH CARE EDUCATION/TRAINING PROGRAM
Payer: MEDICARE

## 2022-04-11 DIAGNOSIS — M79.622 PAIN OF LEFT UPPER ARM: ICD-10-CM

## 2022-04-11 DIAGNOSIS — R07.9 CHEST PAIN, UNSPECIFIED TYPE: ICD-10-CM

## 2022-04-11 LAB
ANION GAP SERPL CALCULATED.3IONS-SCNC: 7 MMOL/L (ref 3–14)
BUN SERPL-MCNC: 18 MG/DL (ref 7–30)
CALCIUM SERPL-MCNC: 9.4 MG/DL (ref 8.5–10.1)
CHLORIDE BLD-SCNC: 105 MMOL/L (ref 94–109)
CO2 SERPL-SCNC: 25 MMOL/L (ref 20–32)
CREAT SERPL-MCNC: 1.04 MG/DL (ref 0.52–1.04)
ERYTHROCYTE [DISTWIDTH] IN BLOOD BY AUTOMATED COUNT: 13 % (ref 10–15)
GFR SERPL CREATININE-BSD FRML MDRD: 58 ML/MIN/1.73M2
GLUCOSE BLD-MCNC: 120 MG/DL (ref 70–99)
HCT VFR BLD AUTO: 44.2 % (ref 35–47)
HGB BLD-MCNC: 14.8 G/DL (ref 11.7–15.7)
HOLD SPECIMEN: NORMAL
MCH RBC QN AUTO: 30.3 PG (ref 26.5–33)
MCHC RBC AUTO-ENTMCNC: 33.5 G/DL (ref 31.5–36.5)
MCV RBC AUTO: 90 FL (ref 78–100)
PLATELET # BLD AUTO: 315 10E3/UL (ref 150–450)
POTASSIUM BLD-SCNC: 3.5 MMOL/L (ref 3.4–5.3)
RBC # BLD AUTO: 4.89 10E6/UL (ref 3.8–5.2)
SODIUM SERPL-SCNC: 137 MMOL/L (ref 133–144)
TROPONIN I SERPL HS-MCNC: 8 NG/L
WBC # BLD AUTO: 12.4 10E3/UL (ref 4–11)

## 2022-04-11 PROCEDURE — 99285 EMERGENCY DEPT VISIT HI MDM: CPT | Mod: 25

## 2022-04-11 PROCEDURE — 85027 COMPLETE CBC AUTOMATED: CPT | Performed by: STUDENT IN AN ORGANIZED HEALTH CARE EDUCATION/TRAINING PROGRAM

## 2022-04-11 PROCEDURE — 80048 BASIC METABOLIC PNL TOTAL CA: CPT | Performed by: STUDENT IN AN ORGANIZED HEALTH CARE EDUCATION/TRAINING PROGRAM

## 2022-04-11 PROCEDURE — 96374 THER/PROPH/DIAG INJ IV PUSH: CPT | Mod: XU

## 2022-04-11 PROCEDURE — 250N000011 HC RX IP 250 OP 636: Performed by: STUDENT IN AN ORGANIZED HEALTH CARE EDUCATION/TRAINING PROGRAM

## 2022-04-11 PROCEDURE — 84484 ASSAY OF TROPONIN QUANT: CPT | Performed by: STUDENT IN AN ORGANIZED HEALTH CARE EDUCATION/TRAINING PROGRAM

## 2022-04-11 PROCEDURE — 74174 CTA ABD&PLVS W/CONTRAST: CPT

## 2022-04-11 PROCEDURE — 96376 TX/PRO/DX INJ SAME DRUG ADON: CPT | Mod: XU

## 2022-04-11 PROCEDURE — 99285 EMERGENCY DEPT VISIT HI MDM: CPT | Performed by: STUDENT IN AN ORGANIZED HEALTH CARE EDUCATION/TRAINING PROGRAM

## 2022-04-11 PROCEDURE — 36415 COLL VENOUS BLD VENIPUNCTURE: CPT | Performed by: STUDENT IN AN ORGANIZED HEALTH CARE EDUCATION/TRAINING PROGRAM

## 2022-04-11 PROCEDURE — 93010 ELECTROCARDIOGRAM REPORT: CPT | Performed by: INTERNAL MEDICINE

## 2022-04-11 PROCEDURE — 93005 ELECTROCARDIOGRAM TRACING: CPT

## 2022-04-11 RX ORDER — DIAZEPAM 10 MG/2ML
5 INJECTION, SOLUTION INTRAMUSCULAR; INTRAVENOUS ONCE
Status: COMPLETED | OUTPATIENT
Start: 2022-04-11 | End: 2022-04-11

## 2022-04-11 RX ORDER — HYDROMORPHONE HYDROCHLORIDE 1 MG/ML
0.5 INJECTION, SOLUTION INTRAMUSCULAR; INTRAVENOUS; SUBCUTANEOUS EVERY 30 MIN PRN
Status: DISCONTINUED | OUTPATIENT
Start: 2022-04-11 | End: 2022-04-11

## 2022-04-11 RX ORDER — IOPAMIDOL 755 MG/ML
81 INJECTION, SOLUTION INTRAVASCULAR ONCE
Status: COMPLETED | OUTPATIENT
Start: 2022-04-11 | End: 2022-04-12

## 2022-04-11 RX ADMIN — DIAZEPAM 5 MG: 5 INJECTION, SOLUTION INTRAMUSCULAR; INTRAVENOUS at 21:02

## 2022-04-11 RX ADMIN — DIAZEPAM 5 MG: 5 INJECTION, SOLUTION INTRAMUSCULAR; INTRAVENOUS at 23:52

## 2022-04-11 RX ADMIN — DIAZEPAM 5 MG: 5 INJECTION, SOLUTION INTRAMUSCULAR; INTRAVENOUS at 20:05

## 2022-04-12 ENCOUNTER — TRANSFERRED RECORDS (OUTPATIENT)
Dept: HEALTH INFORMATION MANAGEMENT | Facility: CLINIC | Age: 72
End: 2022-04-12
Payer: COMMERCIAL

## 2022-04-12 ENCOUNTER — MEDICAL CORRESPONDENCE (OUTPATIENT)
Dept: HEALTH INFORMATION MANAGEMENT | Facility: HOSPITAL | Age: 72
End: 2022-04-12
Payer: COMMERCIAL

## 2022-04-12 VITALS
SYSTOLIC BLOOD PRESSURE: 155 MMHG | DIASTOLIC BLOOD PRESSURE: 90 MMHG | HEART RATE: 84 BPM | OXYGEN SATURATION: 95 % | RESPIRATION RATE: 20 BRPM

## 2022-04-12 PROCEDURE — 250N000011 HC RX IP 250 OP 636: Performed by: STUDENT IN AN ORGANIZED HEALTH CARE EDUCATION/TRAINING PROGRAM

## 2022-04-12 RX ADMIN — IOPAMIDOL 81 ML: 755 INJECTION, SOLUTION INTRAVENOUS at 00:06

## 2022-04-12 NOTE — ED PROVIDER NOTES
History     Chief Complaint   Patient presents with     Chest Pain     Anxiety     HPI  Rj Rodríguez is a 71 year old female who presents with left chest/shoulder pain.  She appeared in extremis in triage and was brought immediately back to her room.  She has trouble focusing because she appears to be so uncomfortable.  History is partially obtained from the patient's .  She has had left chest/arm pain since last night.  She states it is not the shoulder but actually the chest.  It does not seem musculoskeletal in nature.  She also endorses some numbness in her left fingers.  She does have a history of a cervical spinal fusion but denies any neck trauma.  She does have a history of chest pain.  As well as a history of migraines.    Her  is here and describes an extensive history of generalized anxiety disorder.  She also has diabetes mellitus type 2.    Allergies:  Allergies   Allergen Reactions     Fruit [Peach] Anaphylaxis and Hives     fresh peaches and any fruit that has a pit.  Kiwi's and apples also.  Can eat any fruit cooked.     Nuts Anaphylaxis and Hives     All Raw Nuts, can eat nuts when roasted.     Ace Inhibitors      Upper respiratory infection     Alkylamines Hives     Antihistamines     Alprazolam Hives     Xanax     Diphenhydramine Hives and Other (See Comments)     Keeps her awake       Erythromycin      BASE; pt. Not sure if this is a true allergy.     Guaifed      Pt unsure of reaction     Guaifenesin      Guaifed     Hydrochlorothiazide      No Clinical Screening - See Comments      Allergic:  Fresh peaches and any fruit that has a pit.  Kiwi's and apples also.  All raw nuts.  Can eat nuts when roasted or any fruit cooked.  Some seasonal allergies :  Hayfever     Olmesartan Medoxomil Cough     Benicar     Phenylephrine Hcl      Guaifed     Pseudoephedrine Hcl      Guaifed     Seasonal Allergies      hayfever     Tramadol      Sleep disturbance. Can not sleep, and when able to  fall asleep will have terrible nightmares     Tramadol Hcl      Ultram, insomnia, nightmares, headache     Venlafaxine Other (See Comments)     Heartburn, reflux     Zoloft [Sertraline]      paranoia     Latex Other (See Comments), Swelling, Difficulty breathing and Rash     Throat Closes         Problem List:    Patient Active Problem List    Diagnosis Date Noted     Morbid obesity (H) 01/10/2022     Priority: Medium     Shortness of breath 08/09/2021     Priority: Medium     Irregular heart rate 08/09/2021     Priority: Medium     Frequent PVCs 08/09/2021     Priority: Medium     Chest pain, unspecified type 08/09/2021     Priority: Medium     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     Priority: Medium     Diabetes mellitus, type 2 (H) 12/06/2019     Priority: Medium     Sterling cardiac risk 11% in next 10 years 10/29/2019     Priority: Medium     S/P rotator cuff repair 10/26/2016     Priority: Medium     Mild episode of recurrent major depressive disorder (H) 10/22/2014     Priority: Medium     Migraine with aura and without status migrainosus, not intractable 04/21/2014     Priority: Medium     S/P cervical spinal fusion 03/21/2014     Priority: Medium     Cervicalgia 12/10/2013     Priority: Medium     Vertigo 03/18/2013     Priority: Medium     Tinnitus 03/18/2013     Priority: Medium     Chronic rhinitis 03/18/2013     Priority: Medium     Lumbago 07/10/2012     Priority: Medium     Presbyopia 01/25/2011     Priority: Medium     Primary open-angle glaucoma 01/25/2011     Priority: Medium     Overview:   IMO Update 10/11       Generalized anxiety disorder 12/06/2006     Priority: Medium     Essential hypertension 06/02/2004     Priority: Medium     Overview:   IMO Update          Past Medical History:    Past Medical History:   Diagnosis Date     Arthritis      Chronic back pain      Hypertension      Irregular heart beat      Sleep apnea        Past Surgical History:    Past Surgical History:   Procedure  Laterality Date     APPENDECTOMY       BACK SURGERY       CHOLECYSTECTOMY       COLONOSCOPY  2012    Repeat in 10 years     GYN SURGERY       HYSTERECTOMY      Ovaries     RELEASE CARPAL TUNNEL      LT     RELEASE CARPAL TUNNEL      RT x2     SURGICAL RADIOLOGY PROCEDURE N/A 2/12/2016    Procedure: SURGICAL RADIOLOGY PROCEDURE;  Surgeon: Provider, Generic Perianesthesia Nursing;  Location: [facility]     SURGICAL RADIOLOGY PROCEDURE N/A 8/15/2016    Procedure: SURGICAL RADIOLOGY PROCEDURE;  Surgeon: Provider, Generic Perianesthesia Nursing;  Location: [facility]       Family History:    Family History   Problem Relation Age of Onset     Colon Cancer Maternal Aunt      Colon Cancer Maternal Uncle      Diabetes Father         Type 2     Hypertension Father      Alcoholism Father      Alcoholism Mother        Social History:  Marital Status:   [2]  Social History     Tobacco Use     Smoking status: Never Smoker     Smokeless tobacco: Never Used   Vaping Use     Vaping Use: Never used   Substance Use Topics     Alcohol use: Yes     Alcohol/week: 1.0 standard drink     Types: 1 Glasses of wine per week     Comment: occ glass of wine     Drug use: No        Medications:    albuterol (PROAIR HFA/PROVENTIL HFA/VENTOLIN HFA) 108 (90 Base) MCG/ACT inhaler  blood glucose (ACCU-CHEK GUIDE) test strip  blood glucose monitoring (ACCU-CHEK FASTCLIX) lancets  cetirizine (ZYRTEC) 10 MG tablet  clonazePAM (KLONOPIN) 0.5 MG tablet  diltiazem ER (TIAZAC) 180 MG 24 hr ER beaded capsule  EPINEPHrine (ANY BX GENERIC EQUIV) 0.3 MG/0.3ML injection 2-pack  ipratropium-albuterol (COMBIVENT RESPIMAT)  MCG/ACT inhaler  losartan (COZAAR) 100 MG tablet  ORDER FOR ALLERGEN IMMUNOTHERAPY  spironolactone (ALDACTONE) 50 MG tablet  STATIN NOT PRESCRIBED (INTENTIONAL)  triamcinolone (NASACORT) 55 MCG/ACT nasal aerosol      Review of Systems   All other systems reviewed and are negative.      Physical Exam   BP: (!) 195/107  Pulse: 97  Resp: (!)  47  SpO2: 97 %      Physical Exam  Vitals and nursing note reviewed.   Constitutional:       General: She is in acute distress.      Appearance: She is well-developed. She is toxic-appearing.   HENT:      Head: Normocephalic and atraumatic.   Eyes:      Extraocular Movements: Extraocular movements intact.      Pupils: Pupils are equal, round, and reactive to light.   Cardiovascular:      Rate and Rhythm: Normal rate and regular rhythm.      Pulses:           Carotid pulses are 1+ on the right side and 1+ on the left side.       Radial pulses are 1+ on the right side and 1+ on the left side.        Dorsalis pedis pulses are 1+ on the right side and 1+ on the left side.        Posterior tibial pulses are 1+ on the right side and 1+ on the left side.      Heart sounds: Normal heart sounds.   Pulmonary:      Effort: Pulmonary effort is normal.      Breath sounds: Normal breath sounds.   Chest:      Chest wall: No tenderness.   Abdominal:      Palpations: Abdomen is soft.   Musculoskeletal:         General: Normal range of motion.      Cervical back: Normal range of motion and neck supple.      Right lower leg: No edema.      Left lower leg: No edema.   Skin:     General: Skin is warm and dry.      Capillary Refill: Capillary refill takes less than 2 seconds.   Neurological:      Mental Status: She is alert.         ED Course              ED Course as of 04/12/22 0050   Tue Apr 12, 2022   0037 Patient states she feels all better and wants to go home. By my read, the CTa does not show an acute dissection. I duscssed risks and benefits of this with her as this is a very life threatening diagnosis and she still wishes to leave. Will allow to leave but will call with any abnormal findings.     Procedures              EKG Interpretation:      Interpreted by REY BENTLEY MD  Time reviewed: 2000  Symptoms at time of EKG: CP   Rhythm: sinus tachycardia  Rate: Tachycardia  Axis: Normal  Ectopy: premature junctional  contraction  Conduction: normal  ST Segments/ T Waves: No acute ischemic changes  Q Waves: none  Comparison to prior: Tachycardic, PACs not new    Clinical Impression: non-specific EKG        Critical Care time:  was 15 minutes for this patient excluding procedures.         Results for orders placed or performed during the hospital encounter of 04/11/22 (from the past 24 hour(s))   CBC with platelets   Result Value Ref [place]    WBC Count 12.4 (H) 4.0 - 11.0 10e3/uL    RBC Count 4.89 3.80 - 5.20 10e6/uL    Hemoglobin 14.8 11.7 - 15.7 g/dL    Hematocrit 44.2 35.0 - 47.0 %    MCV 90 78 - 100 fL    MCH 30.3 26.5 - 33.0 pg    MCHC 33.5 31.5 - 36.5 g/dL    RDW 13.0 10.0 - 15.0 %    Platelet Count 315 150 - 450 10e3/uL   Basic metabolic panel   Result Value Ref [place]    Sodium 137 133 - 144 mmol/L    Potassium 3.5 3.4 - 5.3 mmol/L    Chloride 105 94 - 109 mmol/L    Carbon Dioxide (CO2) 25 20 - 32 mmol/L    Anion Gap 7 3 - 14 mmol/L    Urea Nitrogen 18 7 - 30 mg/dL    Creatinine 1.04 0.52 - 1.04 mg/dL    Calcium 9.4 8.5 - 10.1 mg/dL    Glucose 120 (H) 70 - 99 mg/dL    GFR Estimate 58 (L) >60 mL/min/1.73m2   Troponin I   Result Value Ref [place]    Troponin I High Sensitivity 8 <54 ng/L   [place] Draw    Narrative    The following orders were created for panel order [place] Draw.  Procedure                               Abnormality         Status                     ---------                               -----------         ------                     Extra Red Top Tube[[id number]]                               Final result               Extra Red Top Tube[[id number]]                                                          Extra Red Top Tube[[id number]]                                                            Please view results for these tests on the individual orders.   Extra Red Top Tube   Result Value Ref [place]    Hold Specimen JIC    Extra Tube    Narrative    The following orders were created for panel order Extra  Tube.  Procedure                               Abnormality         Status                     ---------                               -----------         ------                     Extra Blue Top Tube[[id number]]                              Final result               Extra Heparinized Syringe[[id number]]                        Final result                 Please view results for these tests on the individual orders.   Extra Blue Top Tube   Result Value Ref [place]    Hold Specimen JIC    Extra Heparinized Syringe   Result Value Ref [place]    Hold Specimen JIC        Medications   diazepam (VALIUM) injection 5 mg (5 mg Intravenous Given 4/11/22 2005)   iopamidol (ISOVUE-370) solution 81 mL (81 mLs Intravenous Given 4/12/22 0006)   sodium chloride (PF) 0.9% PF flush 100 mL (50 mLs Intravenous Given 4/12/22 0006)   diazepam (VALIUM) injection 5 mg (5 mg Intravenous Given 4/11/22 2102)   diazepam (VALIUM) injection 5 mg (5 mg Intravenous Given 4/11/22 2352)       Assessments & Plan (with Medical Decision Making)     I have reviewed the nursing notes.    CP with left arm numbness/pain. Concern for dissection initially. However, as the patients stay progressed, it became more clinically consistent with anxiety. CT reassuring. Labs/EKG reassuring. Symptoms improved. No PE. Suspect initial tachycardia was related to a profound anxiety/panic. She has a normal/reassuring neurologic exam with normal strength and objective sensation in the LUE.     Findings were discussed with the patient. Additional verbal instructions were discussed with the patient as well. Instructed to follow up with a primary care provider within 3-4 days. Also discussed specific warning signs and instructed to return to the ED if there are any concerns. Patient voiced understanding of instructions, questions were answered and the patient was discharged home in stable condition.    I have reviewed the findings, diagnosis, plan and need for follow up with the  patient.     Critical Care Addendum    My initial assessment, based on my review of nursing observations, review of vital signs, focused history, physical exam, review of cardiac rhythm monitor and 12 lead ECG analysis, established that Rj Rodríguez has and concern for aortic dissection, which requires immediate intervention, and therefore she is critically ill.     After the initial assessment, the care team initiated multiple lab tests to provide stabilization care. Due to the critical nature of this patient, I reassessed nursing observations, vital signs, physical exam and review of cardiac rhythm monitor multiple times prior to her disposition.     Time also spent performing documentation, discussion with family to obtain medical information for decision making and reviewing test results.     Critical care time (excluding teaching time and procedures): 15 minutes.     Discharge Medication List as of 4/12/2022 12:45 AM          Final diagnoses:   Chest pain, unspecified type   Pain of left upper arm       4/11/2022   [facility]     Blayne Méndez MD  04/12/22 0050

## 2022-04-12 NOTE — ED TRIAGE NOTES
"Pt presets to ED lobby and RRT was called by . Pt was escorted back to Room 10 in ED. Pt was grabbing her chest saying \"I hurt, I hurt\". Pt was vague with her symptoms at first as she was very anxious and crying. Pt was later able to tell me she was having chest pain that radiates to her back. Pt also states she has a lot of anxiety and stress at home and that she has been seeing somebody for this every week.     Gerry Williamson, MSN, RN on 4/11/2022 at 8:42 PM    "

## 2022-04-12 NOTE — ED NOTES
Pt attempted to do CT but became to anxious. Pt declined CT at this time. MD updated.     Gerry Williamson, MSN, RN on 4/11/2022 at 8:48 PM

## 2022-04-12 NOTE — DISCHARGE INSTRUCTIONS
- Please return to the Emergency Room if you do not improve, feel worse, or have any new or concerning symptoms. We would especially want to see you back if you experience worsening chest pain or difficulty breathing  - Please follow up with a primary care physician in 3-4 days to discuss any further testing for your chest pain. Follow-up with your orthopedic surgeon later today as previously scheduled.    What to expect when you have contrast    During your exam, we will inject  contrast  into your vein or artery. (Contrast is a clear liquid with iodine in it. It shows up on X-rays.)    You may feel warm or hot. You may have a metal taste in your mouth and a slight upset stomach. You may also feel pressure near the kidneys and bladder. These effects will last about 1 to 3 minutes.    Please tell us if you have:   Sneezing    Itching   Hives    Swelling in the face   A hoarse voice   Breathing problems   Other new symptoms    Serious problems are rare.  They may include:   Irregular heartbeat    Seizures   Kidney failure             Tissue damage   Shock     Death    If you have any problems during the exam, we  will treat them right away.    When you get home    Call your hospital if you have any new symptoms in the next 2 days, like hives or swelling. (Phone numbers are at the bottom of this page.) Or call your family doctor.     If you have wheezing or trouble breathing, call 911.    Self-care  -Drink at least 4 extra glasses of water today.   This reduces the stress on your kidneys.  -Keep taking your regular medicines.    The contrast will pass out of your body in your  Urine(pee). This will happen in the next 24 hours. You  will not feel this. Your urine will not  change color.    If you have kidney problems or take metformin    Drink 4 to 8 large glasses of water for the next  2 days, if you are not on a fluid restriction.    ?If you take metformin (Glucophage or Glucovance) for diabetes, keep taking  it.      ?Your kidney function tests are abnormal.  If you take Metformin, do not take it for 48 hours. Please go to your clinic for a blood test within 3 days after your exam before the restarting this medicine.     (Note to provider:please give patient prescription for lab tests.)    ?Special instructions: **    I have read and understand the above information.    Patient Sign Here:______________________________________Date:________Time:______    Staff Sign Here:________________________________________Date:_______Time:______      Radiology Departments:     ?[facility]: [phone] ?[facility]: [phone]     ?[place]: [phone] ?[facility]:[phone]      ?[place]: [phone]  ?[facility]: [phone]  ?[place]:[phone]    ?[facility]:[phone]  ?[facility]:[phone]

## 2022-04-13 ENCOUNTER — OFFICE VISIT (OUTPATIENT)
Dept: FAMILY MEDICINE | Facility: OTHER | Age: 72
End: 2022-04-13
Attending: NURSE PRACTITIONER
Payer: COMMERCIAL

## 2022-04-13 VITALS
WEIGHT: 195 LBS | TEMPERATURE: 98.2 F | RESPIRATION RATE: 18 BRPM | DIASTOLIC BLOOD PRESSURE: 60 MMHG | HEART RATE: 70 BPM | BODY MASS INDEX: 36.84 KG/M2 | OXYGEN SATURATION: 98 % | SYSTOLIC BLOOD PRESSURE: 132 MMHG

## 2022-04-13 DIAGNOSIS — F41.1 GENERALIZED ANXIETY DISORDER: Primary | ICD-10-CM

## 2022-04-13 DIAGNOSIS — M54.2 CERVICALGIA: ICD-10-CM

## 2022-04-13 DIAGNOSIS — I10 ESSENTIAL HYPERTENSION: ICD-10-CM

## 2022-04-13 PROCEDURE — 99215 OFFICE O/P EST HI 40 MIN: CPT | Performed by: NURSE PRACTITIONER

## 2022-04-13 PROCEDURE — G0463 HOSPITAL OUTPT CLINIC VISIT: HCPCS

## 2022-04-13 RX ORDER — LOSARTAN POTASSIUM 50 MG/1
50 TABLET ORAL DAILY
COMMUNITY
End: 2022-04-13

## 2022-04-13 RX ORDER — DIAZEPAM 5 MG
TABLET ORAL
Status: ON HOLD | COMMUNITY
Start: 2022-04-12 | End: 2022-06-01

## 2022-04-13 RX ORDER — OXYCODONE HYDROCHLORIDE 5 MG/1
TABLET ORAL
COMMUNITY
Start: 2022-04-12 | End: 2022-04-14

## 2022-04-13 RX ORDER — LOSARTAN POTASSIUM 50 MG/1
50 TABLET ORAL DAILY
Qty: 90 TABLET | Refills: 3 | Status: SHIPPED | OUTPATIENT
Start: 2022-04-13 | End: 2023-04-14

## 2022-04-13 RX ORDER — PREDNISONE 10 MG/1
TABLET ORAL
COMMUNITY
Start: 2022-04-12 | End: 2022-05-31

## 2022-04-13 RX ORDER — SPIRONOLACTONE 50 MG/1
50 TABLET, FILM COATED ORAL DAILY
Qty: 90 TABLET | Refills: 3 | Status: SHIPPED | OUTPATIENT
Start: 2022-04-13 | End: 2023-04-14

## 2022-04-13 ASSESSMENT — PAIN SCALES - GENERAL: PAINLEVEL: SEVERE PAIN (7)

## 2022-04-13 NOTE — NURSING NOTE
"Chief Complaint   Patient presents with     ER F/U       Initial BP (!) 140/58 (BP Location: Right arm, Patient Position: Chair, Cuff Size: Adult Large)   Pulse 70   Temp 98.2  F (36.8  C) (Tympanic)   Resp 18   Wt 88.5 kg (195 lb)   SpO2 98%   BMI 36.84 kg/m   Estimated body mass index is 36.84 kg/m  as calculated from the following:    Height as of 2/16/22: 1.549 m (5' 1\").    Weight as of this encounter: 88.5 kg (195 lb).  Medication Reconciliation: complete  Lisa Rodriguez LPN    "

## 2022-04-13 NOTE — PROGRESS NOTES
"  Assessment & Plan     Generalized anxiety disorder  Sightly worse, but working with Stacy. Does not feel suicidal. Will continue f/up with psychiatry.     Essential hypertension  Fair control. Managed by cardiology. Has follow-up on 5/17/22.     - losartan (COZAAR) 50 MG tablet; Take 1 tablet (50 mg) by mouth daily  - spironolactone (ALDACTONE) 50 MG tablet; Take 1 tablet (50 mg) by mouth daily    Cervicalgia  Pain severe. Has already seen ortho who has ordered cervical CT. Awaiting an appointment from Dr. Bravo. Currently taking prednisone and PRN oxycodone. Managed by ortho. Does not need to see me again as already scheduled to see Dr. Bravo.       Review of external notes as documented elsewhere in note  Ordering of each unique test  Prescription drug management  40 minutes spent on the date of the encounter doing chart review, review of outside records, review of test results, interpretation of tests, patient visit and documentation        BMI:   Estimated body mass index is 36.84 kg/m  as calculated from the following:    Height as of 2/16/22: 1.549 m (5' 1\").    Weight as of this encounter: 88.5 kg (195 lb).   Weight management plan: Discussed healthy diet and exercise guidelines      Sheyla Sanches NP  [facility] - BAILEY De La Rosa is a 71 year old who presents for the following health issues     HPI     ED/UC Followup:    Facility:  [facility]  Date of visit: 4/11/22  Reason for visit: chest pain and back pain          Patient presented to the ER on 4/11/22 with chest pain. Note was reviewed. Troponin was negative. CTA without acute dissection. No PE seen. EKG without acute changes. Symptoms felt to be related to anxiety. BP was elevated at 155/90. Discharged home.   Current Status: Today she notes that her pain continues, but is located in her right shoulder and neck verses her chest. Pain radiates into left arm. She also has some tingling in left arm. Pain worsens when " she moves her right arm.     She had a fusion at C5-C6. Her current symptoms feel like the symptoms she had prior to her cervical fusion.     She saw Dr. Zeng yesterday and he did XRs of her left shoulder. These looked ok per patient. He did prescribe her prednisone with oxycodone. He did order a cervical CT and referred her to Dr. Bravo. Awaiting an appointment.     Continues to see Stayc, her therapist, weekly.  has a lot of health concerns This makes her very worried. Anxiety high.     No nausea or vomiting. No abdominal pain. No shortness of breath.      Due for a dexa-scan and mammogram. Declines.       Review of Systems   Constitutional, HEENT, cardiovascular, pulmonary, gi and gu systems are negative, except as otherwise noted.      Objective    /60 (BP Location: Right arm, Patient Position: Chair, Cuff Size: Adult Large)   Pulse 70   Temp 98.2  F (36.8  C) (Tympanic)   Resp 18   Wt 88.5 kg (195 lb)   SpO2 98%   BMI 36.84 kg/m    Body mass index is 36.84 kg/m .  Physical Exam   GENERAL: alert, holding right arm against her body, she states that her pain increases when she moves her right arm, and over weight  EYES: Eyes grossly normal to inspection, PERRL and conjunctivae and sclerae normal  HENT: ear canals and TM's normal, nose and mouth without ulcers or lesions  NECK: no adenopathy, no asymmetry, masses, or scars and thyroid normal to palpation  RESP: lungs clear to auscultation - no rales, rhonchi or wheezes  CV: regular rate and rhythm, no murmur, click or rub, no peripheral edema  ABDOMEN: soft, nontender, no hepatosplenomegaly, no masses and bowel sounds normal  NEURO: Normal strength and tone, mentation intact and speech normal  PSYCH: mentation appears normal, affect normal/bright  NECK and SHOULDERS-Skin intact. No erythema, edema, or ecchymosis. No pain directly over cervical spine, but she does have pain when the muscles to the right of her cervical spine are palpated. No  muscle spasm felt. Very limited ROM of right arm/shoulder. Will not move it due to the pain. Full ROM neck. No swelling or erythema in right arm. Some numbness in right arm. Right radial pulse 2+. Bilateral hand strength 5/5.

## 2022-04-14 ENCOUNTER — TELEPHONE (OUTPATIENT)
Dept: FAMILY MEDICINE | Facility: OTHER | Age: 72
End: 2022-04-14
Payer: COMMERCIAL

## 2022-04-14 DIAGNOSIS — M54.2 CERVICALGIA: Primary | ICD-10-CM

## 2022-04-14 RX ORDER — OXYCODONE HYDROCHLORIDE 5 MG/1
TABLET ORAL
Qty: 10 TABLET | Refills: 0 | Status: SHIPPED | OUTPATIENT
Start: 2022-04-14 | End: 2022-05-31

## 2022-04-14 NOTE — TELEPHONE ENCOUNTER
Spoke with patient and informed her that Sheyla will send in # 10 tabs of Oxycodone but will not refill long term as it is not recommended

## 2022-04-14 NOTE — TELEPHONE ENCOUNTER
"Spoke with patient . She sounded very \" panicky \" on the phone as she stated she is in a lot of pain in her neck and shoulders 10/10 . She has been using heat and ice . She was given Prednisone 10 mg and Oxycodone 5 mg # 20 tabs  by Dr. Zeng . She currently has # 11 tabs left and is worried going into the holiday weekend that she will run out and does not want to be in pain .  Patient requesting if there is anything Sheyla can do for her as Orthopedics directed her back to PCP for pain control .       "

## 2022-04-18 ENCOUNTER — HOSPITAL ENCOUNTER (OUTPATIENT)
Dept: INTERVENTIONAL RADIOLOGY/VASCULAR | Facility: HOSPITAL | Age: 72
Discharge: HOME | End: 2022-04-18
Attending: ORTHOPAEDIC SURGERY
Payer: MEDICARE

## 2022-04-18 ENCOUNTER — APPOINTMENT (OUTPATIENT)
Dept: GENERAL RADIOLOGY | Facility: HOSPITAL | Age: 72
End: 2022-04-18
Attending: NURSE PRACTITIONER
Payer: MEDICARE

## 2022-04-18 ENCOUNTER — HOSPITAL ENCOUNTER (OUTPATIENT)
Dept: CT IMAGING | Facility: HOSPITAL | Age: 72
Discharge: HOME | End: 2022-04-18
Attending: ORTHOPAEDIC SURGERY
Payer: MEDICARE

## 2022-04-18 ENCOUNTER — HOSPITAL ENCOUNTER (EMERGENCY)
Facility: HOSPITAL | Age: 72
Discharge: HOME | End: 2022-04-18
Attending: NURSE PRACTITIONER | Admitting: NURSE PRACTITIONER
Payer: MEDICARE

## 2022-04-18 ENCOUNTER — HOSPITAL ENCOUNTER (OUTPATIENT)
Facility: HOSPITAL | Age: 72
Discharge: STILL ADMITTED OR ADMINISTRATIVE | End: 2022-04-18
Attending: ORTHOPAEDIC SURGERY | Admitting: RADIOLOGY
Payer: MEDICARE

## 2022-04-18 VITALS
HEART RATE: 76 BPM | DIASTOLIC BLOOD PRESSURE: 99 MMHG | OXYGEN SATURATION: 96 % | RESPIRATION RATE: 16 BRPM | SYSTOLIC BLOOD PRESSURE: 178 MMHG | TEMPERATURE: 97.6 F

## 2022-04-18 VITALS
BODY MASS INDEX: 36.44 KG/M2 | WEIGHT: 193 LBS | SYSTOLIC BLOOD PRESSURE: 141 MMHG | HEART RATE: 58 BPM | TEMPERATURE: 98.6 F | RESPIRATION RATE: 16 BRPM | DIASTOLIC BLOOD PRESSURE: 102 MMHG | OXYGEN SATURATION: 96 % | HEIGHT: 61 IN

## 2022-04-18 VITALS
DIASTOLIC BLOOD PRESSURE: 91 MMHG | RESPIRATION RATE: 16 BRPM | OXYGEN SATURATION: 96 % | HEART RATE: 59 BPM | SYSTOLIC BLOOD PRESSURE: 153 MMHG

## 2022-04-18 DIAGNOSIS — M79.602 LEFT ARM PAIN: ICD-10-CM

## 2022-04-18 DIAGNOSIS — M54.10 RADICULOPATHY: ICD-10-CM

## 2022-04-18 DIAGNOSIS — R07.89 CHEST WALL PAIN: ICD-10-CM

## 2022-04-18 LAB
ALBUMIN SERPL-MCNC: 3.4 G/DL (ref 3.4–5)
ALP SERPL-CCNC: 77 U/L (ref 40–150)
ALT SERPL W P-5'-P-CCNC: 76 U/L (ref 0–50)
ANION GAP SERPL CALCULATED.3IONS-SCNC: 3 MMOL/L (ref 3–14)
AST SERPL W P-5'-P-CCNC: 21 U/L (ref 0–45)
BASOPHILS # BLD AUTO: 0.1 10E3/UL (ref 0–0.2)
BASOPHILS NFR BLD AUTO: 1 %
BILIRUB DIRECT SERPL-MCNC: 0.2 MG/DL (ref 0–0.2)
BILIRUB SERPL-MCNC: 1.2 MG/DL (ref 0.2–1.3)
BUN SERPL-MCNC: 17 MG/DL (ref 7–30)
CALCIUM SERPL-MCNC: 9.1 MG/DL (ref 8.5–10.1)
CHLORIDE BLD-SCNC: 107 MMOL/L (ref 94–109)
CO2 SERPL-SCNC: 29 MMOL/L (ref 20–32)
CREAT SERPL-MCNC: 0.72 MG/DL (ref 0.52–1.04)
D DIMER PPP FEU-MCNC: 0.57 UG/ML FEU (ref 0–0.5)
EOSINOPHIL # BLD AUTO: 0.1 10E3/UL (ref 0–0.7)
EOSINOPHIL NFR BLD AUTO: 1 %
ERYTHROCYTE [DISTWIDTH] IN BLOOD BY AUTOMATED COUNT: 13.8 % (ref 10–15)
GFR SERPL CREATININE-BSD FRML MDRD: 90 ML/MIN/1.73M2
GLUCOSE BLD-MCNC: 109 MG/DL (ref 70–99)
HCT VFR BLD AUTO: 44.3 % (ref 35–47)
HGB BLD-MCNC: 14.7 G/DL (ref 11.7–15.7)
IMM GRANULOCYTES # BLD: 0.1 10E3/UL
IMM GRANULOCYTES NFR BLD: 1 %
LIPASE SERPL-CCNC: 177 U/L (ref 73–393)
LYMPHOCYTES # BLD AUTO: 4 10E3/UL (ref 0.8–5.3)
LYMPHOCYTES NFR BLD AUTO: 29 %
MCH RBC QN AUTO: 30.3 PG (ref 26.5–33)
MCHC RBC AUTO-ENTMCNC: 33.2 G/DL (ref 31.5–36.5)
MCV RBC AUTO: 91 FL (ref 78–100)
MONOCYTES # BLD AUTO: 1.1 10E3/UL (ref 0–1.3)
MONOCYTES NFR BLD AUTO: 8 %
NEUTROPHILS # BLD AUTO: 8.1 10E3/UL (ref 1.6–8.3)
NEUTROPHILS NFR BLD AUTO: 60 %
NRBC # BLD AUTO: 0 10E3/UL
NRBC BLD AUTO-RTO: 0 /100
PLATELET # BLD AUTO: 273 10E3/UL (ref 150–450)
POTASSIUM BLD-SCNC: 3.5 MMOL/L (ref 3.4–5.3)
PROT SERPL-MCNC: 7.6 G/DL (ref 6.8–8.8)
RBC # BLD AUTO: 4.85 10E6/UL (ref 3.8–5.2)
SODIUM SERPL-SCNC: 139 MMOL/L (ref 133–144)
TROPONIN I SERPL HS-MCNC: 21 NG/L
TROPONIN I SERPL HS-MCNC: 24 NG/L
WBC # BLD AUTO: 13.5 10E3/UL (ref 4–11)

## 2022-04-18 PROCEDURE — 250N000009 HC RX 250: Performed by: NURSE PRACTITIONER

## 2022-04-18 PROCEDURE — 84484 ASSAY OF TROPONIN QUANT: CPT | Performed by: NURSE PRACTITIONER

## 2022-04-18 PROCEDURE — 250N000011 HC RX IP 250 OP 636: Performed by: NURSE PRACTITIONER

## 2022-04-18 PROCEDURE — 96376 TX/PRO/DX INJ SAME DRUG ADON: CPT

## 2022-04-18 PROCEDURE — 250N000013 HC RX MED GY IP 250 OP 250 PS 637: Performed by: NURSE PRACTITIONER

## 2022-04-18 PROCEDURE — 82248 BILIRUBIN DIRECT: CPT | Performed by: NURSE PRACTITIONER

## 2022-04-18 PROCEDURE — 250N000011 HC RX IP 250 OP 636: Performed by: RADIOLOGY

## 2022-04-18 PROCEDURE — 80053 COMPREHEN METABOLIC PANEL: CPT | Performed by: NURSE PRACTITIONER

## 2022-04-18 PROCEDURE — 83690 ASSAY OF LIPASE: CPT | Performed by: NURSE PRACTITIONER

## 2022-04-18 PROCEDURE — 85379 FIBRIN DEGRADATION QUANT: CPT | Performed by: NURSE PRACTITIONER

## 2022-04-18 PROCEDURE — 36415 COLL VENOUS BLD VENIPUNCTURE: CPT | Performed by: NURSE PRACTITIONER

## 2022-04-18 PROCEDURE — 72126 CT NECK SPINE W/DYE: CPT

## 2022-04-18 PROCEDURE — 85004 AUTOMATED DIFF WBC COUNT: CPT | Performed by: NURSE PRACTITIONER

## 2022-04-18 PROCEDURE — 71045 X-RAY EXAM CHEST 1 VIEW: CPT

## 2022-04-18 PROCEDURE — 96374 THER/PROPH/DIAG INJ IV PUSH: CPT | Mod: XU

## 2022-04-18 PROCEDURE — 99285 EMERGENCY DEPT VISIT HI MDM: CPT | Performed by: NURSE PRACTITIONER

## 2022-04-18 PROCEDURE — 93005 ELECTROCARDIOGRAM TRACING: CPT

## 2022-04-18 PROCEDURE — 250N000009 HC RX 250: Performed by: RADIOLOGY

## 2022-04-18 PROCEDURE — 99285 EMERGENCY DEPT VISIT HI MDM: CPT | Mod: 25

## 2022-04-18 PROCEDURE — 93010 ELECTROCARDIOGRAM REPORT: CPT | Performed by: INTERNAL MEDICINE

## 2022-04-18 PROCEDURE — 272N000142 XR MYELOGRAM CERVICAL SPINE

## 2022-04-18 RX ORDER — ASPIRIN 81 MG/1
324 TABLET, CHEWABLE ORAL ONCE
Status: COMPLETED | OUTPATIENT
Start: 2022-04-18 | End: 2022-04-18

## 2022-04-18 RX ORDER — IOPAMIDOL 612 MG/ML
15 INJECTION, SOLUTION INTRATHECAL ONCE
Status: DISCONTINUED | OUTPATIENT
Start: 2022-04-18 | End: 2022-04-18 | Stop reason: HOSPADM

## 2022-04-18 RX ORDER — LIDOCAINE HYDROCHLORIDE 10 MG/ML
20 INJECTION, SOLUTION EPIDURAL; INFILTRATION; INTRACAUDAL; PERINEURAL ONCE
Status: COMPLETED | OUTPATIENT
Start: 2022-04-18 | End: 2022-04-18

## 2022-04-18 RX ORDER — DEXTROSE MONOHYDRATE 25 G/50ML
25-50 INJECTION, SOLUTION INTRAVENOUS
Status: DISCONTINUED | OUTPATIENT
Start: 2022-04-18 | End: 2022-04-18 | Stop reason: HOSPADM

## 2022-04-18 RX ORDER — NICOTINE POLACRILEX 4 MG
15-30 LOZENGE BUCCAL
Status: CANCELLED | OUTPATIENT
Start: 2022-04-18

## 2022-04-18 RX ORDER — IOPAMIDOL 612 MG/ML
15 INJECTION, SOLUTION INTRATHECAL ONCE
Status: COMPLETED | OUTPATIENT
Start: 2022-04-18 | End: 2022-04-18

## 2022-04-18 RX ORDER — NICOTINE POLACRILEX 4 MG
15-30 LOZENGE BUCCAL
Status: DISCONTINUED | OUTPATIENT
Start: 2022-04-18 | End: 2022-04-18 | Stop reason: HOSPADM

## 2022-04-18 RX ORDER — ACETAMINOPHEN AND CODEINE PHOSPHATE 300; 30 MG/1; MG/1
TABLET ORAL
Qty: 15 TABLET | Refills: 0 | Status: SHIPPED | OUTPATIENT
Start: 2022-04-18 | End: 2022-05-20

## 2022-04-18 RX ORDER — LIDOCAINE 40 MG/G
CREAM TOPICAL
Status: DISCONTINUED | OUTPATIENT
Start: 2022-04-18 | End: 2022-04-18 | Stop reason: HOSPADM

## 2022-04-18 RX ORDER — MAGNESIUM CARB/ALUMINUM HYDROX 105-160MG
296 TABLET,CHEWABLE ORAL ONCE
Status: DISCONTINUED | OUTPATIENT
Start: 2022-04-18 | End: 2022-04-18

## 2022-04-18 RX ORDER — LIDOCAINE HYDROCHLORIDE 10 MG/ML
5 INJECTION, SOLUTION EPIDURAL; INFILTRATION; INTRACAUDAL; PERINEURAL ONCE
Status: DISCONTINUED | OUTPATIENT
Start: 2022-04-18 | End: 2022-04-18 | Stop reason: HOSPADM

## 2022-04-18 RX ORDER — FENTANYL CITRATE 50 UG/ML
50 INJECTION, SOLUTION INTRAMUSCULAR; INTRAVENOUS ONCE
Status: COMPLETED | OUTPATIENT
Start: 2022-04-18 | End: 2022-04-18

## 2022-04-18 RX ORDER — IOPAMIDOL 408 MG/ML
10 INJECTION, SOLUTION INTRATHECAL ONCE
Status: DISCONTINUED | OUTPATIENT
Start: 2022-04-18 | End: 2022-04-18 | Stop reason: HOSPADM

## 2022-04-18 RX ORDER — DEXTROSE MONOHYDRATE 25 G/50ML
25-50 INJECTION, SOLUTION INTRAVENOUS
Status: CANCELLED | OUTPATIENT
Start: 2022-04-18

## 2022-04-18 RX ADMIN — ASPIRIN 324 MG: 81 TABLET, CHEWABLE ORAL at 12:11

## 2022-04-18 RX ADMIN — FENTANYL CITRATE 50 MCG: 50 INJECTION INTRAMUSCULAR; INTRAVENOUS at 12:46

## 2022-04-18 RX ADMIN — FENTANYL CITRATE 50 MCG: 50 INJECTION INTRAMUSCULAR; INTRAVENOUS at 13:42

## 2022-04-18 RX ADMIN — IOPAMIDOL 9 ML: 612 INJECTION, SOLUTION INTRATHECAL at 08:58

## 2022-04-18 RX ADMIN — LIDOCAINE HYDROCHLORIDE 8 ML: 10 INJECTION, SOLUTION EPIDURAL; INFILTRATION; INTRACAUDAL; PERINEURAL at 08:59

## 2022-04-18 RX ADMIN — LIDOCAINE HYDROCHLORIDE 30 ML: 20 SOLUTION ORAL; TOPICAL at 12:34

## 2022-04-18 ASSESSMENT — ENCOUNTER SYMPTOMS
EYES NEGATIVE: 1
NEUROLOGICAL NEGATIVE: 1
RESPIRATORY NEGATIVE: 1
ENDOCRINE NEGATIVE: 1
GASTROINTESTINAL NEGATIVE: 1
CONSTITUTIONAL NEGATIVE: 1
BACK PAIN: 1
HEMATOLOGIC/LYMPHATIC NEGATIVE: 1
PSYCHIATRIC NEGATIVE: 1
ALLERGIC/IMMUNOLOGIC NEGATIVE: 1

## 2022-04-18 NOTE — PROGRESS NOTES
"Patient complaining, crying more of midsternal chest pain.  Denies that it was similar to her pain that she noted when she came in.  Rates it currently 8/10.  Describes as pressure and causes shortness of breath.  Vital signs remain unchanged.  Patient remains with upper half of body at 30 degree angle.  \"I think it is from that dye that he put in my back.\"    1125 Continues to note pain in middle of chest even with encouragement of slow breath through nose and out mouth and distracted conversation.  Rating it 8/10 currently.  Updated Dr Zimmer who wishes for patient to be transferred to ER.  ER telephoned and updated.      1135 Patient to ED via cart and nurse to nurse report given to SELINA Olivier.  Questions answered.  Per patient request, daughter in law Dimitri updated as well as  Roderick  "

## 2022-04-18 NOTE — ED NOTES
Care Transitions focused note:      Pt requesting we contact pt's daughter Dimitri to  pt's spouse who is waiting in waiting room.     Contacted pt's dtr.  She will come and get her father.    ALLI Monge

## 2022-04-18 NOTE — DISCHARGE INSTRUCTIONS
Thank you for choosing [facility] for your healthcare needs today.  For your chest wall pain, continue using Tylenol as needed for pain.  Follow-up with your primary care provider regarding the results of your myelogram that was done today.  Thank you

## 2022-04-18 NOTE — ED PROVIDER NOTES
"  History     Chief Complaint   Patient presents with     Chest Pain     HPI   History of presenting illness given by patient and nurse    Rj Rodríguez is a 71 year old female who arrives from the DI department after having a cervical myelogram this morning.  The placed contrast dye in her spine, placed her in a Trendelenburg position on her stomach, and performed the CT.  She was brought back to her room and has been resting when she suddenly developed pain in her left breast and chest wall region.  She denies that it is in her heart she only feels it over her left breast.  She has been here since 7:45 AM, has been n.p.o. since yesterday presents to the emergency room for evaluation of left chest wall and breast pain that she describes as, \"the worst heartburn ever. \" Her pain level currently is 8/10.  She has a chronic pain to her upper left chest region, shoulder, and back.  She was having the myelogram for this chronic pain.  She did not have nausea, vomiting, diaphoresis, shortness of breath, or pain that radiates to the neck, jaw, or between her shoulder blades.  She denies any cardiac history.  Patient is currently crying and is very anxious.  Patient does report history of anxiety.    Allergies:  Allergies   Allergen Reactions     Fruit [Peach] Anaphylaxis and Hives     fresh peaches and any fruit that has a pit.  Kiwi's and apples also.  Can eat any fruit cooked.     Nuts Anaphylaxis and Hives     All Raw Nuts, can eat nuts when roasted.     Ace Inhibitors      Upper respiratory infection     Alkylamines Hives     Antihistamines     Alprazolam Hives     Xanax     Diphenhydramine Hives and Other (See Comments)     Keeps her awake       Erythromycin      BASE; pt. Not sure if this is a true allergy.     Guaifed      Pt unsure of reaction     Guaifenesin      Guaifed     Hydrochlorothiazide      No Clinical Screening - See Comments      Allergic:  Fresh peaches and any fruit that has a pit.  Kiwi's and " apples also.  All raw nuts.  Can eat nuts when roasted or any fruit cooked.  Some seasonal allergies :  Hayfever     Olmesartan Medoxomil Cough     Benicar     Phenylephrine Hcl      Guaifed     Pseudoephedrine Hcl      Guaifed     Seasonal Allergies      hayfever     Tramadol      Sleep disturbance. Can not sleep, and when able to fall asleep will have terrible nightmares     Tramadol Hcl      Ultram, insomnia, nightmares, headache     Venlafaxine Other (See Comments)     Heartburn, reflux     Zoloft [Sertraline]      paranoia     Latex Other (See Comments), Swelling, Difficulty breathing and Rash     Throat Closes         Problem List:    Patient Active Problem List    Diagnosis Date Noted     Morbid obesity (H) 01/10/2022     Priority: Medium     Shortness of breath 08/09/2021     Priority: Medium     Irregular heart rate 08/09/2021     Priority: Medium     Frequent PVCs 08/09/2021     Priority: Medium     Chest pain, unspecified type 08/09/2021     Priority: Medium     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     Priority: Medium     Diabetes mellitus, type 2 (H) 12/06/2019     Priority: Medium     Connell cardiac risk 11% in next 10 years 10/29/2019     Priority: Medium     S/P rotator cuff repair 10/26/2016     Priority: Medium     Mild episode of recurrent major depressive disorder (H) 10/22/2014     Priority: Medium     Migraine with aura and without status migrainosus, not intractable 04/21/2014     Priority: Medium     S/P cervical spinal fusion 03/21/2014     Priority: Medium     Cervicalgia 12/10/2013     Priority: Medium     Vertigo 03/18/2013     Priority: Medium     Tinnitus 03/18/2013     Priority: Medium     Chronic rhinitis 03/18/2013     Priority: Medium     Lumbago 07/10/2012     Priority: Medium     Presbyopia 01/25/2011     Priority: Medium     Primary open-angle glaucoma 01/25/2011     Priority: Medium     Overview:   IMO Update 10/11       Generalized anxiety disorder 12/06/2006     Priority:  Medium     Essential hypertension 06/02/2004     Priority: Medium     Overview:   IMO Update          Past Medical History:    Past Medical History:   Diagnosis Date     Arthritis      Chronic back pain      Hypertension      Irregular heart beat      Sleep apnea        Past Surgical History:    Past Surgical History:   Procedure Laterality Date     APPENDECTOMY       BACK SURGERY       CHOLECYSTECTOMY       COLONOSCOPY  2012    Repeat in 10 years     GYN SURGERY       HYSTERECTOMY      Ovaries     RELEASE CARPAL TUNNEL      LT     RELEASE CARPAL TUNNEL      RT x2     SURGICAL RADIOLOGY PROCEDURE N/A 2/12/2016    Procedure: SURGICAL RADIOLOGY PROCEDURE;  Surgeon: Provider, Generic Perianesthesia Nursing;  Location: [facility]     SURGICAL RADIOLOGY PROCEDURE N/A 8/15/2016    Procedure: SURGICAL RADIOLOGY PROCEDURE;  Surgeon: Provider, Generic Perianesthesia Nursing;  Location: [facility]       Family History:    Family History   Problem Relation Age of Onset     Colon Cancer Maternal Aunt      Colon Cancer Maternal Uncle      Diabetes Father         Type 2     Hypertension Father      Alcoholism Father      Alcoholism Mother        Social History:  Marital Status:   [2]  Social History     Tobacco Use     Smoking status: Never Smoker     Smokeless tobacco: Never Used   Vaping Use     Vaping Use: Never used   Substance Use Topics     Alcohol use: Yes     Alcohol/week: 1.0 standard drink     Types: 1 Glasses of wine per week     Comment: occ glass of wine     Drug use: No        Medications:    acetaminophen-codeine (TYLENOL W/CODEINE #3) 300-30 MG per tablet  albuterol (PROAIR HFA/PROVENTIL HFA/VENTOLIN HFA) 108 (90 Base) MCG/ACT inhaler  blood glucose (ACCU-CHEK GUIDE) test strip  blood glucose monitoring (ACCU-CHEK FASTCLIX) lancets  cetirizine (ZYRTEC) 10 MG tablet  clonazePAM (KLONOPIN) 0.5 MG tablet  diazepam (VALIUM) 5 MG tablet  diltiazem ER (TIAZAC) 180 MG 24 hr ER beaded capsule  EPINEPHrine (ANY BX GENERIC  "EQUIV) 0.3 MG/0.3ML injection 2-pack  losartan (COZAAR) 50 MG tablet  oxyCODONE (ROXICODONE) 5 MG tablet  predniSONE (DELTASONE) 10 MG tablet  spironolactone (ALDACTONE) 50 MG tablet  ipratropium-albuterol (COMBIVENT RESPIMAT)  MCG/ACT inhaler  ORDER FOR ALLERGEN IMMUNOTHERAPY  STATIN NOT PRESCRIBED (INTENTIONAL)  tiZANidine (ZANAFLEX) 2 MG tablet  triamcinolone (NASACORT) 55 MCG/ACT nasal aerosol          Review of Systems   Constitutional: Negative.    HENT: Negative.    Eyes: Negative.    Respiratory: Negative.    Cardiovascular: Positive for chest pain.   Gastrointestinal: Negative.    Endocrine: Negative.    Genitourinary: Negative.    Musculoskeletal: Positive for back pain.   Skin: Negative.    Allergic/Immunologic: Negative.    Neurological: Negative.    Hematological: Negative.    Psychiatric/Behavioral: Negative.        Physical Exam   BP: 164/100  Pulse: 73  Temp: 99.5  F (37.5  C)  Resp: 22  Height: 154.9 cm (5' 1\")  Weight: 87.5 kg (193 lb)  SpO2: 98 %      Physical Exam  Vitals and nursing note reviewed.   Constitutional:       Appearance: She is well-developed and normal weight.   HENT:      Head: Normocephalic and atraumatic.   Eyes:      Extraocular Movements: Extraocular movements intact.      Pupils: Pupils are equal, round, and reactive to light.   Cardiovascular:      Rate and Rhythm: Normal rate and regular rhythm.      Heart sounds: Normal heart sounds.   Pulmonary:      Effort: Pulmonary effort is normal.      Breath sounds: Normal breath sounds.   Chest:      Chest wall: Tenderness present.      Comments: Tenderness over left breast with palpation and into the intercostal muscle at the nipple line  Abdominal:      General: Bowel sounds are normal.      Palpations: Abdomen is soft.   Musculoskeletal:         General: Normal range of motion.      Cervical back: Normal range of motion and neck supple.   Skin:     General: Skin is warm and dry.   Neurological:      General: No focal " deficit present.      Mental Status: She is alert and oriented to person, place, and time.   Psychiatric:         Mood and Affect: Mood is anxious.         Behavior: Behavior normal.         ED Course              ED Course as of 04/29/22 2314   Mon Apr 18, 2022   1203 MDM: Differential diagnosis includes was not limited to:   Acute coronary syndrome   Aortic dissection  Pulmonary embolism  Tension pneumothorax  Coronary artery dissection  Pneumothorax  Pancreatitis  GERD's pneumothorax  Anxiety  Pneumonia       1213 D-Dimer Quantitative(!): 0.57   1302 D-dimer quantitative elevated at 0.57 which is less than age-appropriate and I do not suspect this is due to a pulmonary embolism.   1303 First troponin normal.  At this stage I still do not feel this is acute coronary syndrome, aortic dissection, tension pneumothorax, or coronary artery dissection.  Second troponin pending   1304 CBC shows elevated WBC at 13.5.  I suspect this is due to stress and pain.  As patient's lungs are clear throughout and she has no shortness of breath or cough, I do not suspect this is a pneumonia.   1306 BMP and hepatic panel normal.   1526 Protein Total: 7.6     Procedures              EKG Interpretation:      Interpreted by NORMAN Anderson CNP  Time reviewed: 1152  Symptoms at time of EKG: Chest wall pain  Rhythm: sinus rhythm  Rate: normal  Axis: Right axis deviation  Ectopy: PACs  Conduction: normal  ST Segments/ T Waves: No ST-T wave changes  Q Waves: none  Comparison to prior: {ECG UNCHANGED    Clinical Impression: abnormal EKG                   No results found for this or any previous visit (from the past 24 hour(s)).    Medications   aspirin (ASA) chewable tablet 324 mg (324 mg Oral Given 4/18/22 1211)   lidocaine (viscous) (XYLOCAINE) 2 % 15 mL, alum & mag hydroxide-simethicone (MAALOX) 15 mL GI Cocktail (30 mLs Oral Given 4/18/22 1234)   fentaNYL (PF) (SUBLIMAZE) injection 50 mcg (50 mcg Intravenous Given 4/18/22 1246)    fentaNYL (PF) (SUBLIMAZE) injection 50 mcg (50 mcg Intravenous Given 4/18/22 1342)       Assessments & Plan (with Medical Decision Making)   Findings as above.  In room patient is very anxious, crying, frustrated with this neck pain that she has been having and continues to cry that she just wants the pain to go away.  On assessment heart sounds are normal with normal rate.  Lungs are clear throughout, patient does not have cough.  Normal bowel sounds in all quadrants without tenderness to palpation.  Negative CVA tenderness.      In ER patient was given aspirin 324 mg, GI cocktail and fentanyl 50 mcg with good relief of symptoms.  Patient continues to be very anxious and crying.  Patient was given second dose of fentanyl 50 mcg as her discomfort returned in her neck.    Results: First troponin normal, second troponin normal.  BMP normal.  Hepatic panel shows slightly elevated ALT at 76.  D-dimer slightly elevated at 0.57 but appropriate for age as patient is 71.  CBC completely normal.     Discharge plan: I reviewed lab, EKG, and imaging results with patient.  We discussed that none of her results would indicate her discomfort is related to heart.  We discussed discharge plan that she will follow-up with her primary care provider for results of her myelogram today.  She will continue with pain regimen as prescribed by her provider.  I discussed with patient if her symptoms return or she develops any new concerning symptoms to return to ER.      I have reviewed the nursing notes.    I have reviewed the findings, diagnosis, plan and need for follow up with the patient.      Discharge Medication List as of 4/18/2022  4:55 PM      START taking these medications    Details   acetaminophen-codeine (TYLENOL W/CODEINE #3) 300-30 MG per tablet Take 1-2 tablets by mouth every 4 hours as needed for severe pain, Disp-15 tablet, R-0, [facility]             Final diagnoses:   Chest wall pain       4/18/2022   [facility]     Jeanine Dowd APRN CNP  04/24/22 1013       Jeanine Dowd APRN CNP  04/29/22 3988

## 2022-04-18 NOTE — ED TRIAGE NOTES
Pt in via DI at 1140 with RN in stretcher, states pt NPO since yesterday HS. RN states pt had Cervical Mylogram, with history of L arm pain. States pt was in recovery andexperienced 9/10 L anterior chest pain radiating down to L arm. Denies LOC/ nausea. Pt + tearful/ anxious upon arrival to ER. Pt AOX3 at current. Equal  strengths. Bandage to lumbar area, no discharge noted, posterior assessed with DI RN. RN states was scheduled to be monitored until 1300, to keep HOB at 30 degrees.

## 2022-04-18 NOTE — DISCHARGE INSTRUCTIONS
DISCHARGE INSTRUCTIONS  Myelogram      IMPORTANT: As you prepare for discharge, the following information will help you return to your best level of health.               This Information Is About Your Follow Up Care  Call your doctor if you do not get better. Call sooner if you feel worse. You can reach your doctor by calling their clinic phone number.                                    This Information Is About Procedures    MYELOGRAM.  In this procedure, a dye was injected through a hollow needle into the space around your spinal cord. An X-ray was then taken to see if you had any spinal problems. This will help diagnose the cause of your back pain.    Do the following:  Drink 8-10 glasses of fluids for the next 2 days unless your doctor has limited your fluids.  Slowly return to your normal activity.  Remain in bed with your head elevated 30 degrees until the following morning to prevent headaches from occuring.  No strenuous activity, heavy lifting or bending for the next 24 hours after your myelogram.    The following day you may resume your regular work/activity schedule if you feel alright.  You need someone to drive you home and stay with you for 24 hours.  Contact your doctor for your test results in 1 to 2 days.    Call your doctor if you have:  a severe headache.  any trouble moving your legs or walking.  any new problems or concerns.  fever and chills  nausea or vomiting                        This Information Is About Your Illness and Diagnosis    GENERAL BACK PAIN.  The back is prone to injury. Back pain can be caused by strains and injuries. It can involve the muscles, ligaments or bones. Today's exam did not show any obvious sign of bone (spine) or nerve damage.    Follow these instructions:  Rest more than usual for the next few days.  Apply a heating pad to your back for no longer than 30 minutes three to four times a day.  Take pain medicine as prescribed by the doctor.  Once the pain has  lessened, resume your normal activities.  Do not lift heavy objects until the pain is gone.  Avoid any activity that causes pain.    Call your doctor if you:  have numbness, weakness or tingling in their fingers, arms or legs.  are not completely recovered in 2 weeks.  have any new or severe symptoms.

## 2022-04-18 NOTE — PROGRESS NOTES
Patient returned to [facility] via cart.  Alert and oriented x3.  Notes arm pain 5/10, headache pain 7/10.  Provided water and is drinking it without issue. Bandaid to low back intact and without drainage.  Vital signs as charted.

## 2022-04-18 NOTE — PROGRESS NOTES
"  Patient up to the bathroom.  Steady on feet.  Procedure site without drainage present.  Per patient, voided without issue.  Back to be at this time.  Patient had previously stated that , Roderick, had brought her here and was going to be staying here  Inquired whether she wanted her  to come sit with her,\"No, he isn't very good in these situations.  He will be talking to someone out there.\"                      "

## 2022-04-18 NOTE — PROGRESS NOTES
Procedure: cervical myelogram, NA    There were  no complications and patient has no symptoms..      Tolerated procedure well.    Radiologist:Dr Zimmer      Position:  prone for procedure, patient then placed with upper body at a 30 degree angle    Pain:  7/10 headache pain afterwards      Sedation:None. Local Anesthestic used  No sedation    Estimated Blood Loss: None     Condition: Stable    Comments: See dictated procedure note for full details     Sylvie Blair RN

## 2022-04-22 ENCOUNTER — TELEPHONE (OUTPATIENT)
Dept: FAMILY MEDICINE | Facility: OTHER | Age: 72
End: 2022-04-22
Payer: COMMERCIAL

## 2022-04-22 DIAGNOSIS — M54.2 CERVICALGIA: Primary | ICD-10-CM

## 2022-04-22 RX ORDER — TIZANIDINE 2 MG/1
2 TABLET ORAL 3 TIMES DAILY PRN
Qty: 30 TABLET | Refills: 0 | Status: SHIPPED | OUTPATIENT
Start: 2022-04-22 | End: 2022-04-29

## 2022-04-22 NOTE — TELEPHONE ENCOUNTER
Patient called stating that she is still having a lot of back pain and shoulder pain . She is having numbness in her left arm and hand . She is dropping stuff and not able to sleep at night . She is trying Ice and heat with no relief . She was recently seen in the ER for chest pain after her cervical myelogram .  Dr. Zeng did put her on Prednisone 10 mg taper .  She is inquiring if she would benefit from a muscle relaxer as she does not want to use the oxycodone . If anything can be prescribed she would uses [facility] .

## 2022-04-29 ENCOUNTER — TELEPHONE (OUTPATIENT)
Dept: FAMILY MEDICINE | Facility: OTHER | Age: 72
End: 2022-04-29
Payer: COMMERCIAL

## 2022-04-29 DIAGNOSIS — M54.2 CERVICALGIA: ICD-10-CM

## 2022-04-29 RX ORDER — TIZANIDINE 2 MG/1
2 TABLET ORAL 3 TIMES DAILY PRN
Qty: 60 TABLET | Refills: 0 | Status: SHIPPED | OUTPATIENT
Start: 2022-04-29 | End: 2022-06-24

## 2022-04-29 NOTE — TELEPHONE ENCOUNTER
Spoke with patient . She is really upset stating that [facility] keeps telling her that they do not have the results from the CT cervical spine that she had completed on 4- . Informed patient that I would contact [facility] to inquire . Patient would also like to speak to Sheyla as she is having a lot of pain and does not know what else to do .

## 2022-04-29 NOTE — TELEPHONE ENCOUNTER
I talked with patient and reassured her that her results are available. Requesting a muscle relaxant. Zanaflex refilled.

## 2022-04-30 ENCOUNTER — TRANSFERRED RECORDS (OUTPATIENT)
Dept: HEALTH INFORMATION MANAGEMENT | Facility: CLINIC | Age: 72
End: 2022-04-30

## 2022-05-03 ENCOUNTER — TELEPHONE (OUTPATIENT)
Dept: FAMILY MEDICINE | Facility: OTHER | Age: 72
End: 2022-05-03
Payer: COMMERCIAL

## 2022-05-03 NOTE — TELEPHONE ENCOUNTER
Patient calling to inform provider that she is seeing Dr. Zeng in [place] for an injection. Reports Dr. Zeng called her on Saturday. Patient thanks you for all your help last week. If you need to speak with patient, please call home phone. Thank you.

## 2022-05-05 DIAGNOSIS — F41.1 GENERALIZED ANXIETY DISORDER: ICD-10-CM

## 2022-05-05 RX ORDER — CLONAZEPAM 0.5 MG/1
TABLET ORAL
Qty: 30 TABLET | Refills: 0 | Status: SHIPPED | OUTPATIENT
Start: 2022-05-05 | End: 2022-07-15

## 2022-05-05 NOTE — TELEPHONE ENCOUNTER
clonazePAM (KLONOPIN) 0.5 MG tablet      Last Written Prescription Date:  1-10-22  Last Fill Quantity: 30,   # refills: 0  Last Office Visit: 4-13-22  Future Office visit:    Next 5 appointments (look out 90 days)    May 31, 2022 11:45 AM  (Arrive by 11:30 AM)  Return Visit with NORMAN Davison CNP  [facility] [address] [place] [address]  [phone]   Jul 11, 2022 10:00 AM  (Arrive by 9:45 AM)  Office Visit with Sheyla Sanches NP  [facility] ([facility] ) [address]  [place] [address]  [phone]           Routing refill request to provider for review/approval because:  Drug not on the G, P or [facility] refill protocol or controlled substance

## 2022-05-09 ENCOUNTER — MEDICAL CORRESPONDENCE (OUTPATIENT)
Dept: MRI IMAGING | Facility: HOSPITAL | Age: 72
End: 2022-05-09
Payer: COMMERCIAL

## 2022-05-10 ENCOUNTER — DOCUMENTATION ONLY (OUTPATIENT)
Dept: INTERVENTIONAL RADIOLOGY/VASCULAR | Facility: HOSPITAL | Age: 72
End: 2022-05-10
Payer: COMMERCIAL

## 2022-05-10 ENCOUNTER — TELEPHONE (OUTPATIENT)
Dept: FAMILY MEDICINE | Facility: OTHER | Age: 72
End: 2022-05-10
Payer: COMMERCIAL

## 2022-05-10 NOTE — PROGRESS NOTES
Patient called to schedule MRI with sedation.  Initially offered 05/25, but patient is unable to obtain covid screen on 05/21. Patient then offered 06/01, accepted.  Reviewed with her that she will have her COVID screening for the anesthesia portion of the test on 05/28 at 11:00 AM.  Also advised that she will need a preop physical with her provider that she will schedule.  Patient had no further questions.  Provided our telephone number [phone] if she does have any questions that come up.

## 2022-05-10 NOTE — TELEPHONE ENCOUNTER
11:27 AM    Reason for Call: OVERBOOK    Patient is having the following symptoms: Pre op MRI 06/01/2022 / [facility]      The patient is requesting an appointment for anytime before 06/01 with Sheyla Sanches. I offered to look for another provider - patient declined     Was an appointment offered for this call? No    Preferred method for responding to this message: Telephone Call  What is your phone number ?[phone]    If we cannot reach you directly, may we leave a detailed response at the number you provided? Yes    Can this message wait until your PCP/provider returns, if unavailable today? YES    Archana Sullivan

## 2022-05-18 NOTE — H&P (VIEW-ONLY)
[facility] [place]  [address]  [place] [address]  Phone: [phone]  Primary Provider: Danisha Dyer  Pre-op Performing Provider: DANISHA DYER      PREOPERATIVE EVALUATION:  Today's date: 5/20/2022    Rj Rodríguez is a 71 year old female who presents for a preoperative evaluation.    Surgical Information:  Surgery/Procedure: Cervical MRI  Surgery Location: [facility]  Surgeon: N/A; Mesha will complete the MRI  Surgery Date: 6/1/22  Time of Surgery: TBD  Where patient plans to recover: At home with family  Fax number for surgical facility: Note does not need to be faxed, will be available electronically in Epic.    Type of Anesthesia Anticipated: to be determined    Assessment & Plan     The proposed surgical procedure is considered LOW risk.    Preop general physical exam  Cervicalgia  Cervical nerve root impingement  No concerns noted. Cleared for surgery. EKG NSR. CMP and CBC unremarkable. A1C 6.1.     - Comprehensive metabolic panel (BMP + Alb, Alk Phos, ALT, AST, Total. Bili, TP)  - CBC with platelets and differential  - EKG 12-lead complete w/read - (Clinic Performed)    Type 2 diabetes mellitus without complication, without long-term current use of insulin (H)  A1C 6.1. Controlled. Cleared for surgery.     Hyperlipidemia, unspecified hyperlipidemia type  Declines statin. Stable in the past.     Mild episode of recurrent major depressive disorder (H)  ZEFERINO (generalized anxiety disorder)  Depression controlled. Having some anxiety. No thoughts of suicide. Will continue currently medications.     Essential hypertension  Frequent PVCs  BP controlled. Was having frequent palpitations, but these have improved. Continue current medications. Encouraged daily exercise and a low sodium diet. Recommended checking BP's 2x/wk, call the clinic if consistantly s>140 or d>90. Follow up in 4 months.         Risks and Recommendations:  The patient has the following additional risks and  recommendations for perioperative complications:  Pulmonary:    - Incentive spirometry post-op      Will hold all medications the morning of surgery except her diltiazem and inhalers.    RECOMMENDATION:  APPROVAL GIVEN to proceed with proposed procedure, without further diagnostic evaluation.    Rj Rodríguez with a functional capacity of greater than four MET's. There are no obvious contraindications to proceeding with surgery at this time. Recommend that the surgeon review risks and benefits of the procedure prior to proceeding.     Was recommended to avoid eating and drinking anything 12 hours prior to surgery unless his surgeon tells him otherwise.     [id number]    Subjective     HPI related to upcoming procedure: Patient with neck pain. Needs MRI. Due to anxiety, will complete MRI under sedation. CT of cervical spine     IMPRESSION: Asymmetric disc protrusion C6-C7 on the left moderately  compressing the left C7 nerve.     Postfusion changes C5-C6 without recurrent or residual disc herniation  or protrusion     NOHEMI WADDELL MD       Preop Questions 5/20/2022   1. Have you ever had a heart attack or stroke? No   2. Have you ever had surgery on your heart or blood vessels, such as a stent placement, a coronary artery bypass, or surgery on an artery in your head, neck, heart, or legs? No   3. Do you have chest pain with activity? No   4. Do you have a history of  heart failure? No   5. Do you currently have a cold, bronchitis or symptoms of other infection? No   6. Do you have a cough, shortness of breath, or wheezing? No   7. Do you or anyone in your family have previous history of blood clots? No   8. Do you or does anyone in your family have a serious bleeding problem such as prolonged bleeding following surgeries or cuts? No   9. Have you ever had problems with anemia or been told to take iron pills? No   10. Have you had any abnormal blood loss such as black, tarry or bloody stools, or abnormal vaginal  bleeding? No   11. Have you ever had a blood transfusion? No   12. Are you willing to have a blood transfusion if it is medically needed before, during, or after your surgery? Yes   13. Have you or any of your relatives ever had problems with anesthesia? YES -  She had trouble waking up in the past    14. Do you have sleep apnea, excessive snoring or daytime drowsiness? No   15. Do you have any artifical heart valves or other implanted medical devices like a pacemaker, defibrillator, or continuous glucose monitor? No   16. Do you have artificial joints? No   17. Are you allergic to latex? YES        Health Care Directive:  Patient does not have a Health Care Directive or Living Will: declines    Preoperative Review of :   reviewed - controlled substances reflected in medication list.      Status of Chronic Conditions:  DEPRESSION and ANXIETY - Patient has a long history of Depression and Anxiety of moderate severity requiring medication for control with recent symptoms being slightly worse. Current symptoms of depression include anxiety, irritablility. Uses Klonopin as needed. No thoughts of suicide.     DIABETES - Patient has a longstanding history of Diabetes Type II . Patient is being treated with diet, oral agents and exercise and denies significant side effects. Control has been good. Complicating factors include but are not limited to: hypertension. A1C was 5.9 on 1/10/22.     HYPERLIPIDEMIA - Patient has a long history of significant Hyperlipidemia requiring medication for treatment with recent fair control. Does not take a statin due to side effects.     HYPERTENSION/Frequent PVC- Patient has longstanding history of HTN and palpitations, currently denies any symptoms referable to elevated blood pressure. Specifically denies chest pain, dyspnea, orthopnea, PND or peripheral edema. Blood pressure readings have been in normal range. Current medication regimen is as listed below. Patient denies any side  effects of medication. Currently taking Aldactone 50 mg, diltiazem 180 mg, and losartan 50 mg. She does follow with cardiology. Stress test was done on 2/10/22 and was negative for reversible ischemia. No longer having palpitations.     Covid testing scheduled.     Mets greater than 4, able to walk around the block without stopping.       Review of Systems  CONSTITUTIONAL: NEGATIVE for fever, chills, change in weight  INTEGUMENTARY/SKIN: NEGATIVE for worrisome rashes, moles or lesions  EYES: NEGATIVE for vision changes or irritation  ENT/MOUTH: NEGATIVE for ear, mouth and throat problems  RESP: NEGATIVE for significant cough or SOB  CV: NEGATIVE for chest pain, palpitations or peripheral edema  GI: NEGATIVE for nausea, abdominal pain, heartburn, or change in bowel habits  : NEGATIVE for frequency, dysuria, or hematuria  MUSCULOSKELETAL: NEGATIVE for significant arthralgias or myalgia  NEURO: NEGATIVE for weakness, dizziness or paresthesias  ENDOCRINE: NEGATIVE for temperature intolerance, skin/hair changes  HEME: NEGATIVE for bleeding problems  PSYCHIATRIC: POSITIVE foragitation and anxiety    Patient Active Problem List    Diagnosis Date Noted     Morbid obesity (H) 01/10/2022     Priority: Medium     Shortness of breath 08/09/2021     Priority: Medium     Irregular heart rate 08/09/2021     Priority: Medium     Frequent PVCs 08/09/2021     Priority: Medium     Chest pain, unspecified type 08/09/2021     Priority: Medium     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     Priority: Medium     Diabetes mellitus, type 2 (H) 12/06/2019     Priority: Medium     [place] cardiac risk 11% in next 10 years 10/29/2019     Priority: Medium     S/P rotator cuff repair 10/26/2016     Priority: Medium     Mild episode of recurrent major depressive disorder (H) 10/22/2014     Priority: Medium     Migraine with aura and without status migrainosus, not intractable 04/21/2014     Priority: Medium     S/P cervical spinal fusion 03/21/2014      Priority: Medium     Cervicalgia 12/10/2013     Priority: Medium     Vertigo 03/18/2013     Priority: Medium     Tinnitus 03/18/2013     Priority: Medium     Chronic rhinitis 03/18/2013     Priority: Medium     Lumbago 07/10/2012     Priority: Medium     Presbyopia 01/25/2011     Priority: Medium     Primary open-angle glaucoma 01/25/2011     Priority: Medium     Overview:   IMO Update 10/11       ZEFERINO (generalized anxiety disorder) 12/06/2006     Priority: Medium     Essential hypertension 06/02/2004     Priority: Medium     Overview:   IMO Update        Past Medical History:   Diagnosis Date     Arthritis      Chronic back pain      Hypertension      Irregular heart beat      Sleep apnea      Past Surgical History:   Procedure Laterality Date     APPENDECTOMY       BACK SURGERY       CHOLECYSTECTOMY       COLONOSCOPY  2012    Repeat in 10 years     GYN SURGERY       HYSTERECTOMY      Ovaries     RELEASE CARPAL TUNNEL      LT     RELEASE CARPAL TUNNEL      RT x2     SURGICAL RADIOLOGY PROCEDURE N/A 2/12/2016    Procedure: SURGICAL RADIOLOGY PROCEDURE;  Surgeon: Provider, Generic Perianesthesia Nursing;  Location: [facility]     SURGICAL RADIOLOGY PROCEDURE N/A 8/15/2016    Procedure: SURGICAL RADIOLOGY PROCEDURE;  Surgeon: Provider, Generic Perianesthesia Nursing;  Location: [facility]     Current Outpatient Medications   Medication Sig Dispense Refill     albuterol (PROAIR HFA/PROVENTIL HFA/VENTOLIN HFA) 108 (90 Base) MCG/ACT inhaler Inhale 2 puffs into the lungs every 6 hours 18 g 0     blood glucose (ACCU-CHEK GUIDE) test strip Use to test blood sugar 1 time daily or as directed. 50 each 11     blood glucose monitoring (ACCU-CHEK FASTCLIX) lancets Use to test blood sugar 1 time daily or as directed. 102 each 11     cetirizine (ZYRTEC) 10 MG tablet Take 1 tablet (10 mg) by mouth daily as needed for allergies 60 tablet 12     clonazePAM (KLONOPIN) 0.5 MG tablet Take 1 tablet by mouth twice daily as needed for anxiety 30  tablet 0     diazepam (VALIUM) 5 MG tablet        diltiazem ER (TIAZAC) 180 MG 24 hr ER beaded capsule Take 1 capsule (180 mg) by mouth daily 90 capsule 3     EPINEPHrine (ANY BX GENERIC EQUIV) 0.3 MG/0.3ML injection 2-pack INJECT CONTENTS OF 1 PEN AS NEEDED FOR ALLERGIC REACTION 2 each 0     ipratropium-albuterol (COMBIVENT RESPIMAT)  MCG/ACT inhaler Inhale 1 puff into the lungs 4 times daily as needed for wheezing or other (chest tightness) 4 g 3     losartan (COZAAR) 50 MG tablet Take 1 tablet (50 mg) by mouth daily 90 tablet 3     ORDER FOR ALLERGEN IMMUNOTHERAPY Continue with 0.5ml weekly subcutaneous allergy injections. Follow standard maintenance protocols. 10 mL PRN     oxyCODONE (ROXICODONE) 5 MG tablet TAKE 1 TABLET BY MOUTH EVERY 8 HOURS AS NEEDED FOR PAIN . DO NOT EXCEED 6 PER 24 HOURS 10 tablet 0     predniSONE (DELTASONE) 10 MG tablet TAKE 4 TABLETS BY MOUTH ONCE DAILY FOR 3 DAYS THEN 3 ONCE DAILY FOR 3 DAYS THEN 2 ONCE DAILY FOR 3 DAYS THEN 1 ONCE DAILY FOR 3 DAYS       spironolactone (ALDACTONE) 50 MG tablet Take 1 tablet (50 mg) by mouth daily 90 tablet 3     STATIN NOT PRESCRIBED (INTENTIONAL) Please choose reason not prescribed from choices below.       tiZANidine (ZANAFLEX) 2 MG tablet Take 1 tablet (2 mg) by mouth 3 times daily as needed for muscle spasms 60 tablet 0     triamcinolone (NASACORT) 55 MCG/ACT nasal aerosol Spray 2 sprays into both nostrils daily 2 Bottle 12       Allergies   Allergen Reactions     Fruit [Peach] Anaphylaxis and Hives     fresh peaches and any fruit that has a pit.  Kiwi's and apples also.  Can eat any fruit cooked.     Nuts Anaphylaxis and Hives     All Raw Nuts, can eat nuts when roasted.     Ace Inhibitors      Upper respiratory infection     Alkylamines Hives     Antihistamines     Alprazolam Hives     Xanax     Diphenhydramine Hives and Other (See Comments)     Keeps her awake       Erythromycin      BASE; pt. Not sure if this is a true allergy.      "Guaifed      Pt unsure of reaction     Guaifenesin      Guaifed     Hydrochlorothiazide      No Clinical Screening - See Comments      Allergic:  Fresh peaches and any fruit that has a pit.  Kiwi's and apples also.  All raw nuts.  Can eat nuts when roasted or any fruit cooked.  Some seasonal allergies :  Hayfever     Olmesartan Medoxomil Cough     Benicar     Phenylephrine Hcl      Guaifed     Pseudoephedrine Hcl      Guaifed     Seasonal Allergies      hayfever     Tramadol      Sleep disturbance. Can not sleep, and when able to fall asleep will have terrible nightmares     Tramadol Hcl      Ultram, insomnia, nightmares, headache     Venlafaxine Other (See Comments)     Heartburn, reflux     Zoloft [Sertraline]      paranoia     Latex Other (See Comments), Swelling, Difficulty breathing and Rash     Throat Closes          Social History     Tobacco Use     Smoking status: Never Smoker     Smokeless tobacco: Never Used   Substance Use Topics     Alcohol use: Yes     Alcohol/week: 1.0 standard drink     Types: 1 Glasses of wine per week     Comment: occ glass of wine     Family History   Problem Relation Age of Onset     Colon Cancer Maternal Aunt      Colon Cancer Maternal Uncle      Diabetes Father         Type 2     Hypertension Father      Alcoholism Father      Alcoholism Mother      History   Drug Use No         Objective     /80 (BP Location: Left arm, Patient Position: Chair, Cuff Size: Adult Large)   Pulse 82   Ht 1.549 m (5' 1\")   Wt 88.5 kg (195 lb)   SpO2 97%   BMI 36.84 kg/m      Physical Exam    GENERAL APPEARANCE: overweight, alert and no distress     EYES: EOMI, PERRL     HENT: ear canals and TM's normal and nose and mouth without ulcers or lesions     NECK: no adenopathy, no asymmetry, masses, or scars and thyroid normal to palpation     RESP: lungs clear to auscultation - no rales, rhonchi or wheezes     CV: regular rates and rhythm, normal S1 S2, no S3 or S4 and no murmur, click or rub   "   ABDOMEN:  soft, nontender, no HSM or masses and bowel sounds normal     MS: extremities normal- no gross deformities noted, no evidence of inflammation in joints, FROM in all extremities.     SKIN: no suspicious lesions or rashes     NEURO: Normal strength and tone, sensory exam grossly normal, mentation intact and speech normal     PSYCH: mentation appears normal. and affect normal/bright     LYMPHATICS: No cervical adenopathy    Recent Labs   Lab Test 04/18/22  1208 04/11/22  2023 01/10/22  0947 10/25/21  1132 10/08/21  1555 07/18/21  1352 07/18/21  1351   HGB 14.7 14.8 14.3  --   --    < >  --     315 280  --   --    < >  --    INR  --   --   --   --   --   --  0.94    137  --    < > 139  --  139   POTASSIUM 3.5 3.5  --    < > 3.5  --  3.6   CR 0.72 1.04  --    < > 0.80  --  0.77   A1C  --   --  5.9*  --  5.8*  --   --     < > = values in this interval not displayed.        Diagnostics:  Recent Results (from the past 24 hour(s))   Comprehensive metabolic panel (BMP + Alb, Alk Phos, ALT, AST, Total. Bili, TP)    Collection Time: 05/20/22  1:32 PM   Result Value Ref Range    Sodium 137 133 - 144 mmol/L    Potassium 3.6 3.4 - 5.3 mmol/L    Chloride 107 94 - 109 mmol/L    Carbon Dioxide (CO2) 25 20 - 32 mmol/L    Anion Gap 5 3 - 14 mmol/L    Urea Nitrogen 11 7 - 30 mg/dL    Creatinine 0.75 0.52 - 1.04 mg/dL    Calcium 9.4 8.5 - 10.1 mg/dL    Glucose 101 (H) 70 - 99 mg/dL    Alkaline Phosphatase 71 40 - 150 U/L    AST 20 0 - 45 U/L    ALT 29 0 - 50 U/L    Protein Total 7.5 6.8 - 8.8 g/dL    Albumin 3.6 3.4 - 5.0 g/dL    Bilirubin Total 0.7 0.2 - 1.3 mg/dL    GFR Estimate 86 >60 mL/min/1.73m2   CBC with platelets and differential    Collection Time: 05/20/22  1:32 PM   Result Value Ref Range    WBC Count 10.1 4.0 - 11.0 10e3/uL    RBC Count 4.75 3.80 - 5.20 10e6/uL    Hemoglobin 14.4 11.7 - 15.7 g/dL    Hematocrit 43.3 35.0 - 47.0 %    MCV 91 78 - 100 fL    MCH 30.3 26.5 - 33.0 pg    MCHC 33.3 31.5 -  36.5 g/dL    RDW 13.2 10.0 - 15.0 %    Platelet Count 269 150 - 450 10e3/uL    % Neutrophils 65 %    % Lymphocytes 23 %    % Monocytes 8 %    % Eosinophils 2 %    % Basophils 1 %    % Immature Granulocytes 1 %    NRBCs per 100 WBC 0 <1 /100    Absolute Neutrophils 6.7 1.6 - 8.3 10e3/uL    Absolute Lymphocytes 2.4 0.8 - 5.3 10e3/uL    Absolute Monocytes 0.8 0.0 - 1.3 10e3/uL    Absolute Eosinophils 0.2 0.0 - 0.7 10e3/uL    Absolute Basophils 0.1 0.0 - 0.2 10e3/uL    Absolute Immature Granulocytes 0.1 <=0.4 10e3/uL    Absolute NRBCs 0.0 10e3/uL        EKG: Sinus rhythm with occasional PVC, normal axis, normal intervals, no acute ST/T changes c/w ischemia    Revised Cardiac Risk Index (RCRI):  The patient has the following serious cardiovascular risks for perioperative complications:   - No serious cardiac risks = 0 points     RCRI Interpretation: 0 points: Class I (very low risk - 0.4% complication rate)           Signed Electronically by: Sheyla Sanches NP  Copy of this evaluation report is provided to requesting physician.

## 2022-05-18 NOTE — PROGRESS NOTES
[facility] [place]  [address]  [place] [address]  Phone: [phone]  Primary Provider: Danisha Dyer  Pre-op Performing Provider: DANISHA DYER      PREOPERATIVE EVALUATION:  Today's date: 5/20/2022    Rj Rodríguez is a 71 year old female who presents for a preoperative evaluation.    Surgical Information:  Surgery/Procedure: Cervical MRI  Surgery Location: [facility]  Surgeon: N/A; Mesha will complete the MRI  Surgery Date: 6/1/22  Time of Surgery: TBD  Where patient plans to recover: At home with family  Fax number for surgical facility: Note does not need to be faxed, will be available electronically in Epic.    Type of Anesthesia Anticipated: to be determined    Assessment & Plan     The proposed surgical procedure is considered LOW risk.    Preop general physical exam  Cervicalgia  Cervical nerve root impingement  No concerns noted. Cleared for surgery. EKG NSR. CMP and CBC unremarkable. A1C 6.1.     - Comprehensive metabolic panel (BMP + Alb, Alk Phos, ALT, AST, Total. Bili, TP)  - CBC with platelets and differential  - EKG 12-lead complete w/read - (Clinic Performed)    Type 2 diabetes mellitus without complication, without long-term current use of insulin (H)  A1C 6.1. Controlled. Cleared for surgery.     Hyperlipidemia, unspecified hyperlipidemia type  Declines statin. Stable in the past.     Mild episode of recurrent major depressive disorder (H)  ZEFERINO (generalized anxiety disorder)  Depression controlled. Having some anxiety. No thoughts of suicide. Will continue currently medications.     Essential hypertension  Frequent PVCs  BP controlled. Was having frequent palpitations, but these have improved. Continue current medications. Encouraged daily exercise and a low sodium diet. Recommended checking BP's 2x/wk, call the clinic if consistantly s>140 or d>90. Follow up in 4 months.         Risks and Recommendations:  The patient has the following additional risks and  recommendations for perioperative complications:  Pulmonary:    - Incentive spirometry post-op      Will hold all medications the morning of surgery except her diltiazem and inhalers.    RECOMMENDATION:  APPROVAL GIVEN to proceed with proposed procedure, without further diagnostic evaluation.    Rj Rodríguez with a functional capacity of greater than four MET's. There are no obvious contraindications to proceeding with surgery at this time. Recommend that the surgeon review risks and benefits of the procedure prior to proceeding.     Was recommended to avoid eating and drinking anything 12 hours prior to surgery unless his surgeon tells him otherwise.     :[id number]    Subjective     HPI related to upcoming procedure: Patient with neck pain. Needs MRI. Due to anxiety, will complete MRI under sedation. CT of cervical spine     IMPRESSION: Asymmetric disc protrusion C6-C7 on the left moderately  compressing the left C7 nerve.     Postfusion changes C5-C6 without recurrent or residual disc herniation  or protrusion     NOHEMI WADDELL MD       Preop Questions 5/20/2022   1. Have you ever had a heart attack or stroke? No   2. Have you ever had surgery on your heart or blood vessels, such as a stent placement, a coronary artery bypass, or surgery on an artery in your head, neck, heart, or legs? No   3. Do you have chest pain with activity? No   4. Do you have a history of  heart failure? No   5. Do you currently have a cold, bronchitis or symptoms of other infection? No   6. Do you have a cough, shortness of breath, or wheezing? No   7. Do you or anyone in your family have previous history of blood clots? No   8. Do you or does anyone in your family have a serious bleeding problem such as prolonged bleeding following surgeries or cuts? No   9. Have you ever had problems with anemia or been told to take iron pills? No   10. Have you had any abnormal blood loss such as black, tarry or bloody stools, or abnormal vaginal  bleeding? No   11. Have you ever had a blood transfusion? No   12. Are you willing to have a blood transfusion if it is medically needed before, during, or after your surgery? Yes   13. Have you or any of your relatives ever had problems with anesthesia? YES -  She had trouble waking up in the past    14. Do you have sleep apnea, excessive snoring or daytime drowsiness? No   15. Do you have any artifical heart valves or other implanted medical devices like a pacemaker, defibrillator, or continuous glucose monitor? No   16. Do you have artificial joints? No   17. Are you allergic to latex? YES        Health Care Directive:  Patient does not have a Health Care Directive or Living Will: declines    Preoperative Review of :   reviewed - controlled substances reflected in medication list.      Status of Chronic Conditions:  DEPRESSION and ANXIETY - Patient has a long history of Depression and Anxiety of moderate severity requiring medication for control with recent symptoms being slightly worse. Current symptoms of depression include anxiety, irritablility. Uses Klonopin as needed. No thoughts of suicide.     DIABETES - Patient has a longstanding history of Diabetes Type II . Patient is being treated with diet, oral agents and exercise and denies significant side effects. Control has been good. Complicating factors include but are not limited to: hypertension. A1C was 5.9 on 1/10/22.     HYPERLIPIDEMIA - Patient has a long history of significant Hyperlipidemia requiring medication for treatment with recent fair control. Does not take a statin due to side effects.     HYPERTENSION/Frequent PVC- Patient has longstanding history of HTN and palpitations, currently denies any symptoms referable to elevated blood pressure. Specifically denies chest pain, dyspnea, orthopnea, PND or peripheral edema. Blood pressure readings have been in normal range. Current medication regimen is as listed below. Patient denies any side  effects of medication. Currently taking Aldactone 50 mg, diltiazem 180 mg, and losartan 50 mg. She does follow with cardiology. Stress test was done on 2/10/22 and was negative for reversible ischemia. No longer having palpitations.     Covid testing scheduled.     Mets greater than 4, able to walk around the block without stopping.       Review of Systems  CONSTITUTIONAL: NEGATIVE for fever, chills, change in weight  INTEGUMENTARY/SKIN: NEGATIVE for worrisome rashes, moles or lesions  EYES: NEGATIVE for vision changes or irritation  ENT/MOUTH: NEGATIVE for ear, mouth and throat problems  RESP: NEGATIVE for significant cough or SOB  CV: NEGATIVE for chest pain, palpitations or peripheral edema  GI: NEGATIVE for nausea, abdominal pain, heartburn, or change in bowel habits  : NEGATIVE for frequency, dysuria, or hematuria  MUSCULOSKELETAL: NEGATIVE for significant arthralgias or myalgia  NEURO: NEGATIVE for weakness, dizziness or paresthesias  ENDOCRINE: NEGATIVE for temperature intolerance, skin/hair changes  HEME: NEGATIVE for bleeding problems  PSYCHIATRIC: POSITIVE foragitation and anxiety    Patient Active Problem List    Diagnosis Date Noted     Morbid obesity (H) 01/10/2022     Priority: Medium     Shortness of breath 08/09/2021     Priority: Medium     Irregular heart rate 08/09/2021     Priority: Medium     Frequent PVCs 08/09/2021     Priority: Medium     Chest pain, unspecified type 08/09/2021     Priority: Medium     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     Priority: Medium     Diabetes mellitus, type 2 (H) 12/06/2019     Priority: Medium     [place] cardiac risk 11% in next 10 years 10/29/2019     Priority: Medium     S/P rotator cuff repair 10/26/2016     Priority: Medium     Mild episode of recurrent major depressive disorder (H) 10/22/2014     Priority: Medium     Migraine with aura and without status migrainosus, not intractable 04/21/2014     Priority: Medium     S/P cervical spinal fusion 03/21/2014      Priority: Medium     Cervicalgia 12/10/2013     Priority: Medium     Vertigo 03/18/2013     Priority: Medium     Tinnitus 03/18/2013     Priority: Medium     Chronic rhinitis 03/18/2013     Priority: Medium     Lumbago 07/10/2012     Priority: Medium     Presbyopia 01/25/2011     Priority: Medium     Primary open-angle glaucoma 01/25/2011     Priority: Medium     Overview:   IMO Update 10/11       ZEFERINO (generalized anxiety disorder) 12/06/2006     Priority: Medium     Essential hypertension 06/02/2004     Priority: Medium     Overview:   IMO Update        Past Medical History:   Diagnosis Date     Arthritis      Chronic back pain      Hypertension      Irregular heart beat      Sleep apnea      Past Surgical History:   Procedure Laterality Date     APPENDECTOMY       BACK SURGERY       CHOLECYSTECTOMY       COLONOSCOPY  2012    Repeat in 10 years     GYN SURGERY       HYSTERECTOMY      Ovaries     RELEASE CARPAL TUNNEL      LT     RELEASE CARPAL TUNNEL      RT x2     SURGICAL RADIOLOGY PROCEDURE N/A 2/12/2016    Procedure: SURGICAL RADIOLOGY PROCEDURE;  Surgeon: Provider, Generic Perianesthesia Nursing;  Location: [facility]     SURGICAL RADIOLOGY PROCEDURE N/A 8/15/2016    Procedure: SURGICAL RADIOLOGY PROCEDURE;  Surgeon: Provider, Generic Perianesthesia Nursing;  Location: [facility]     Current Outpatient Medications   Medication Sig Dispense Refill     albuterol (PROAIR HFA/PROVENTIL HFA/VENTOLIN HFA) 108 (90 Base) MCG/ACT inhaler Inhale 2 puffs into the lungs every 6 hours 18 g 0     blood glucose (ACCU-CHEK GUIDE) test strip Use to test blood sugar 1 time daily or as directed. 50 each 11     blood glucose monitoring (ACCU-CHEK FASTCLIX) lancets Use to test blood sugar 1 time daily or as directed. 102 each 11     cetirizine (ZYRTEC) 10 MG tablet Take 1 tablet (10 mg) by mouth daily as needed for allergies 60 tablet 12     clonazePAM (KLONOPIN) 0.5 MG tablet Take 1 tablet by mouth twice daily as needed for anxiety 30  tablet 0     diazepam (VALIUM) 5 MG tablet        diltiazem ER (TIAZAC) 180 MG 24 hr ER beaded capsule Take 1 capsule (180 mg) by mouth daily 90 capsule 3     EPINEPHrine (ANY BX GENERIC EQUIV) 0.3 MG/0.3ML injection 2-pack INJECT CONTENTS OF 1 PEN AS NEEDED FOR ALLERGIC REACTION 2 each 0     ipratropium-albuterol (COMBIVENT RESPIMAT)  MCG/ACT inhaler Inhale 1 puff into the lungs 4 times daily as needed for wheezing or other (chest tightness) 4 g 3     losartan (COZAAR) 50 MG tablet Take 1 tablet (50 mg) by mouth daily 90 tablet 3     ORDER FOR ALLERGEN IMMUNOTHERAPY Continue with 0.5ml weekly subcutaneous allergy injections. Follow standard maintenance protocols. 10 mL PRN     oxyCODONE (ROXICODONE) 5 MG tablet TAKE 1 TABLET BY MOUTH EVERY 8 HOURS AS NEEDED FOR PAIN . DO NOT EXCEED 6 PER 24 HOURS 10 tablet 0     predniSONE (DELTASONE) 10 MG tablet TAKE 4 TABLETS BY MOUTH ONCE DAILY FOR 3 DAYS THEN 3 ONCE DAILY FOR 3 DAYS THEN 2 ONCE DAILY FOR 3 DAYS THEN 1 ONCE DAILY FOR 3 DAYS       spironolactone (ALDACTONE) 50 MG tablet Take 1 tablet (50 mg) by mouth daily 90 tablet 3     STATIN NOT PRESCRIBED (INTENTIONAL) Please choose reason not prescribed from choices below.       tiZANidine (ZANAFLEX) 2 MG tablet Take 1 tablet (2 mg) by mouth 3 times daily as needed for muscle spasms 60 tablet 0     triamcinolone (NASACORT) 55 MCG/ACT nasal aerosol Spray 2 sprays into both nostrils daily 2 Bottle 12       Allergies   Allergen Reactions     Fruit [Peach] Anaphylaxis and Hives     fresh peaches and any fruit that has a pit.  Kiwi's and apples also.  Can eat any fruit cooked.     Nuts Anaphylaxis and Hives     All Raw Nuts, can eat nuts when roasted.     Ace Inhibitors      Upper respiratory infection     Alkylamines Hives     Antihistamines     Alprazolam Hives     Xanax     Diphenhydramine Hives and Other (See Comments)     Keeps her awake       Erythromycin      BASE; pt. Not sure if this is a true allergy.      "Guaifed      Pt unsure of reaction     Guaifenesin      Guaifed     Hydrochlorothiazide      No Clinical Screening - See Comments      Allergic:  Fresh peaches and any fruit that has a pit.  Kiwi's and apples also.  All raw nuts.  Can eat nuts when roasted or any fruit cooked.  Some seasonal allergies :  Hayfever     Olmesartan Medoxomil Cough     Benicar     Phenylephrine Hcl      Guaifed     Pseudoephedrine Hcl      Guaifed     Seasonal Allergies      hayfever     Tramadol      Sleep disturbance. Can not sleep, and when able to fall asleep will have terrible nightmares     Tramadol Hcl      Ultram, insomnia, nightmares, headache     Venlafaxine Other (See Comments)     Heartburn, reflux     Zoloft [Sertraline]      paranoia     Latex Other (See Comments), Swelling, Difficulty breathing and Rash     Throat Closes          Social History     Tobacco Use     Smoking status: Never Smoker     Smokeless tobacco: Never Used   Substance Use Topics     Alcohol use: Yes     Alcohol/week: 1.0 standard drink     Types: 1 Glasses of wine per week     Comment: occ glass of wine     Family History   Problem Relation Age of Onset     Colon Cancer Maternal Aunt      Colon Cancer Maternal Uncle      Diabetes Father         Type 2     Hypertension Father      Alcoholism Father      Alcoholism Mother      History   Drug Use No         Objective     /80 (BP Location: Left arm, Patient Position: Chair, Cuff Size: Adult Large)   Pulse 82   Ht 1.549 m (5' 1\")   Wt 88.5 kg (195 lb)   SpO2 97%   BMI 36.84 kg/m      Physical Exam    GENERAL APPEARANCE: overweight, alert and no distress     EYES: EOMI, PERRL     HENT: ear canals and TM's normal and nose and mouth without ulcers or lesions     NECK: no adenopathy, no asymmetry, masses, or scars and thyroid normal to palpation     RESP: lungs clear to auscultation - no rales, rhonchi or wheezes     CV: regular rates and rhythm, normal S1 S2, no S3 or S4 and no murmur, click or rub   "   ABDOMEN:  soft, nontender, no HSM or masses and bowel sounds normal     MS: extremities normal- no gross deformities noted, no evidence of inflammation in joints, FROM in all extremities.     SKIN: no suspicious lesions or rashes     NEURO: Normal strength and tone, sensory exam grossly normal, mentation intact and speech normal     PSYCH: mentation appears normal. and affect normal/bright     LYMPHATICS: No cervical adenopathy    Recent Labs   Lab Test 04/18/22  1208 04/11/22  2023 01/10/22  0947 10/25/21  1132 10/08/21  1555 07/18/21  1352 07/18/21  1351   HGB 14.7 14.8 14.3  --   --    < >  --     315 280  --   --    < >  --    INR  --   --   --   --   --   --  0.94    137  --    < > 139  --  139   POTASSIUM 3.5 3.5  --    < > 3.5  --  3.6   CR 0.72 1.04  --    < > 0.80  --  0.77   A1C  --   --  5.9*  --  5.8*  --   --     < > = values in this interval not displayed.        Diagnostics:  Recent Results (from the past 24 hour(s))   Comprehensive metabolic panel (BMP + Alb, Alk Phos, ALT, AST, Total. Bili, TP)    Collection Time: 05/20/22  1:32 PM   Result Value Ref Range    Sodium 137 133 - 144 mmol/L    Potassium 3.6 3.4 - 5.3 mmol/L    Chloride 107 94 - 109 mmol/L    Carbon Dioxide (CO2) 25 20 - 32 mmol/L    Anion Gap 5 3 - 14 mmol/L    Urea Nitrogen 11 7 - 30 mg/dL    Creatinine 0.75 0.52 - 1.04 mg/dL    Calcium 9.4 8.5 - 10.1 mg/dL    Glucose 101 (H) 70 - 99 mg/dL    Alkaline Phosphatase 71 40 - 150 U/L    AST 20 0 - 45 U/L    ALT 29 0 - 50 U/L    Protein Total 7.5 6.8 - 8.8 g/dL    Albumin 3.6 3.4 - 5.0 g/dL    Bilirubin Total 0.7 0.2 - 1.3 mg/dL    GFR Estimate 86 >60 mL/min/1.73m2   CBC with platelets and differential    Collection Time: 05/20/22  1:32 PM   Result Value Ref Range    WBC Count 10.1 4.0 - 11.0 10e3/uL    RBC Count 4.75 3.80 - 5.20 10e6/uL    Hemoglobin 14.4 11.7 - 15.7 g/dL    Hematocrit 43.3 35.0 - 47.0 %    MCV 91 78 - 100 fL    MCH 30.3 26.5 - 33.0 pg    MCHC 33.3 31.5 -  36.5 g/dL    RDW 13.2 10.0 - 15.0 %    Platelet Count 269 150 - 450 10e3/uL    % Neutrophils 65 %    % Lymphocytes 23 %    % Monocytes 8 %    % Eosinophils 2 %    % Basophils 1 %    % Immature Granulocytes 1 %    NRBCs per 100 WBC 0 <1 /100    Absolute Neutrophils 6.7 1.6 - 8.3 10e3/uL    Absolute Lymphocytes 2.4 0.8 - 5.3 10e3/uL    Absolute Monocytes 0.8 0.0 - 1.3 10e3/uL    Absolute Eosinophils 0.2 0.0 - 0.7 10e3/uL    Absolute Basophils 0.1 0.0 - 0.2 10e3/uL    Absolute Immature Granulocytes 0.1 <=0.4 10e3/uL    Absolute NRBCs 0.0 10e3/uL        EKG: Sinus rhythm with occasional PVC, normal axis, normal intervals, no acute ST/T changes c/w ischemia    Revised Cardiac Risk Index (RCRI):  The patient has the following serious cardiovascular risks for perioperative complications:   - No serious cardiac risks = 0 points     RCRI Interpretation: 0 points: Class I (very low risk - 0.4% complication rate)           Signed Electronically by: Sheyla Sanches NP  Copy of this evaluation report is provided to requesting physician.

## 2022-05-20 ENCOUNTER — OFFICE VISIT (OUTPATIENT)
Dept: FAMILY MEDICINE | Facility: OTHER | Age: 72
End: 2022-05-20
Attending: NURSE PRACTITIONER
Payer: COMMERCIAL

## 2022-05-20 VITALS
HEIGHT: 61 IN | DIASTOLIC BLOOD PRESSURE: 80 MMHG | BODY MASS INDEX: 36.82 KG/M2 | SYSTOLIC BLOOD PRESSURE: 136 MMHG | WEIGHT: 195 LBS | OXYGEN SATURATION: 97 % | HEART RATE: 82 BPM

## 2022-05-20 DIAGNOSIS — I10 ESSENTIAL HYPERTENSION: ICD-10-CM

## 2022-05-20 DIAGNOSIS — E78.5 HYPERLIPIDEMIA, UNSPECIFIED HYPERLIPIDEMIA TYPE: ICD-10-CM

## 2022-05-20 DIAGNOSIS — G54.2 CERVICAL NERVE ROOT IMPINGEMENT: ICD-10-CM

## 2022-05-20 DIAGNOSIS — E11.9 TYPE 2 DIABETES MELLITUS WITHOUT COMPLICATION, WITHOUT LONG-TERM CURRENT USE OF INSULIN (H): ICD-10-CM

## 2022-05-20 DIAGNOSIS — M54.2 CERVICALGIA: ICD-10-CM

## 2022-05-20 DIAGNOSIS — F41.1 GAD (GENERALIZED ANXIETY DISORDER): ICD-10-CM

## 2022-05-20 DIAGNOSIS — I49.3 FREQUENT PVCS: ICD-10-CM

## 2022-05-20 DIAGNOSIS — Z01.818 PREOP GENERAL PHYSICAL EXAM: Primary | ICD-10-CM

## 2022-05-20 DIAGNOSIS — F33.0 MILD EPISODE OF RECURRENT MAJOR DEPRESSIVE DISORDER (H): ICD-10-CM

## 2022-05-20 LAB
ALBUMIN SERPL-MCNC: 3.6 G/DL (ref 3.4–5)
ALP SERPL-CCNC: 71 U/L (ref 40–150)
ALT SERPL W P-5'-P-CCNC: 29 U/L (ref 0–50)
ANION GAP SERPL CALCULATED.3IONS-SCNC: 5 MMOL/L (ref 3–14)
AST SERPL W P-5'-P-CCNC: 20 U/L (ref 0–45)
BASOPHILS # BLD AUTO: 0.1 10E3/UL (ref 0–0.2)
BASOPHILS NFR BLD AUTO: 1 %
BILIRUB SERPL-MCNC: 0.7 MG/DL (ref 0.2–1.3)
BUN SERPL-MCNC: 11 MG/DL (ref 7–30)
CALCIUM SERPL-MCNC: 9.4 MG/DL (ref 8.5–10.1)
CHLORIDE BLD-SCNC: 107 MMOL/L (ref 94–109)
CO2 SERPL-SCNC: 25 MMOL/L (ref 20–32)
CREAT SERPL-MCNC: 0.75 MG/DL (ref 0.52–1.04)
EOSINOPHIL # BLD AUTO: 0.2 10E3/UL (ref 0–0.7)
EOSINOPHIL NFR BLD AUTO: 2 %
ERYTHROCYTE [DISTWIDTH] IN BLOOD BY AUTOMATED COUNT: 13.2 % (ref 10–15)
EST. AVERAGE GLUCOSE BLD GHB EST-MCNC: 128 MG/DL
GFR SERPL CREATININE-BSD FRML MDRD: 86 ML/MIN/1.73M2
GLUCOSE BLD-MCNC: 101 MG/DL (ref 70–99)
HBA1C MFR BLD: 6.1 % (ref 0–5.6)
HCT VFR BLD AUTO: 43.3 % (ref 35–47)
HGB BLD-MCNC: 14.4 G/DL (ref 11.7–15.7)
IMM GRANULOCYTES # BLD: 0.1 10E3/UL
IMM GRANULOCYTES NFR BLD: 1 %
LYMPHOCYTES # BLD AUTO: 2.4 10E3/UL (ref 0.8–5.3)
LYMPHOCYTES NFR BLD AUTO: 23 %
MCH RBC QN AUTO: 30.3 PG (ref 26.5–33)
MCHC RBC AUTO-ENTMCNC: 33.3 G/DL (ref 31.5–36.5)
MCV RBC AUTO: 91 FL (ref 78–100)
MONOCYTES # BLD AUTO: 0.8 10E3/UL (ref 0–1.3)
MONOCYTES NFR BLD AUTO: 8 %
NEUTROPHILS # BLD AUTO: 6.7 10E3/UL (ref 1.6–8.3)
NEUTROPHILS NFR BLD AUTO: 65 %
NRBC # BLD AUTO: 0 10E3/UL
NRBC BLD AUTO-RTO: 0 /100
PLATELET # BLD AUTO: 269 10E3/UL (ref 150–450)
POTASSIUM BLD-SCNC: 3.6 MMOL/L (ref 3.4–5.3)
PROT SERPL-MCNC: 7.5 G/DL (ref 6.8–8.8)
RBC # BLD AUTO: 4.75 10E6/UL (ref 3.8–5.2)
SODIUM SERPL-SCNC: 137 MMOL/L (ref 133–144)
WBC # BLD AUTO: 10.1 10E3/UL (ref 4–11)

## 2022-05-20 PROCEDURE — 93010 ELECTROCARDIOGRAM REPORT: CPT | Performed by: INTERNAL MEDICINE

## 2022-05-20 PROCEDURE — 83036 HEMOGLOBIN GLYCOSYLATED A1C: CPT | Mod: ZL | Performed by: NURSE PRACTITIONER

## 2022-05-20 PROCEDURE — 99214 OFFICE O/P EST MOD 30 MIN: CPT | Performed by: NURSE PRACTITIONER

## 2022-05-20 PROCEDURE — 82040 ASSAY OF SERUM ALBUMIN: CPT | Mod: ZL | Performed by: NURSE PRACTITIONER

## 2022-05-20 PROCEDURE — 93005 ELECTROCARDIOGRAM TRACING: CPT | Performed by: NURSE PRACTITIONER

## 2022-05-20 PROCEDURE — 85025 COMPLETE CBC W/AUTO DIFF WBC: CPT | Mod: ZL | Performed by: NURSE PRACTITIONER

## 2022-05-20 PROCEDURE — 80053 COMPREHEN METABOLIC PANEL: CPT | Mod: ZL | Performed by: NURSE PRACTITIONER

## 2022-05-20 PROCEDURE — 36415 COLL VENOUS BLD VENIPUNCTURE: CPT | Mod: ZL | Performed by: NURSE PRACTITIONER

## 2022-05-20 PROCEDURE — G0463 HOSPITAL OUTPT CLINIC VISIT: HCPCS | Mod: 25

## 2022-05-20 ASSESSMENT — PAIN SCALES - GENERAL: PAINLEVEL: NO PAIN (0)

## 2022-05-20 ASSESSMENT — PATIENT HEALTH QUESTIONNAIRE - PHQ9: SUM OF ALL RESPONSES TO PHQ QUESTIONS 1-9: 6

## 2022-05-23 ENCOUNTER — ANESTHESIA EVENT (OUTPATIENT)
Dept: SURGERY | Facility: HOSPITAL | Age: 72
End: 2022-05-23
Payer: MEDICARE

## 2022-05-23 ASSESSMENT — ENCOUNTER SYMPTOMS: DYSRHYTHMIAS: 1

## 2022-05-24 NOTE — ANESTHESIA PREPROCEDURE EVALUATION
Anesthesia Pre-Procedure Evaluation    Patient: Rj Rodríguez   MRN: [id number] : 1950        Procedure : Procedure(s):  MRI CERVICAL SPINE          Past Medical History:   Diagnosis Date     Arthritis      Chronic back pain      Hypertension      Irregular heart beat      Sleep apnea       Past Surgical History:   Procedure Laterality Date     APPENDECTOMY       BACK SURGERY       CHOLECYSTECTOMY       COLONOSCOPY  2012    Repeat in 10 years     GYN SURGERY       HYSTERECTOMY      Ovaries     RELEASE CARPAL TUNNEL      LT     RELEASE CARPAL TUNNEL      RT x2     SURGICAL RADIOLOGY PROCEDURE N/A 2016    Procedure: SURGICAL RADIOLOGY PROCEDURE;  Surgeon: Provider, Generic Perianesthesia Nursing;  Location: [facility]     SURGICAL RADIOLOGY PROCEDURE N/A 8/15/2016    Procedure: SURGICAL RADIOLOGY PROCEDURE;  Surgeon: Provider, Generic Perianesthesia Nursing;  Location: [facility]      Allergies   Allergen Reactions     Fruit [Peach] Anaphylaxis and Hives     fresh peaches and any fruit that has a pit.  Kiwi's and apples also.  Can eat any fruit cooked.     Nuts Anaphylaxis and Hives     All Raw Nuts, can eat nuts when roasted.     Ace Inhibitors      Upper respiratory infection     Alkylamines Hives     Antihistamines     Alprazolam Hives     Xanax     Diphenhydramine Hives and Other (See Comments)     Keeps her awake       Erythromycin      BASE; pt. Not sure if this is a true allergy.     Guaifed      Pt unsure of reaction     Guaifenesin      Guaifed     Hydrochlorothiazide      No Clinical Screening - See Comments      Allergic:  Fresh peaches and any fruit that has a pit.  Kiwi's and apples also.  All raw nuts.  Can eat nuts when roasted or any fruit cooked.  Some seasonal allergies :  Hayfever     Olmesartan Medoxomil Cough     Benicar     Phenylephrine Hcl      Guaifed     Pseudoephedrine Hcl      Guaifed     Seasonal Allergies      hayfever     Tramadol      Sleep disturbance. Can not sleep, and when  able to fall asleep will have terrible nightmares     Tramadol Hcl      Ultram, insomnia, nightmares, headache     Venlafaxine Other (See Comments)     Heartburn, reflux     Zoloft [Sertraline]      paranoia     Latex Other (See Comments), Swelling, Difficulty breathing and Rash     Throat Closes        Social History     Tobacco Use     Smoking status: Never Smoker     Smokeless tobacco: Never Used   Substance Use Topics     Alcohol use: Yes     Alcohol/week: 1.0 standard drink     Types: 1 Glasses of wine per week     Comment: occ glass of wine      Wt Readings from Last 1 Encounters:   05/20/22 88.5 kg (195 lb)        Anesthesia Evaluation   Pt has had prior anesthetic. Type: General and MAC.    History of anesthetic complications   trouble waking.    ROS/MED HX  ENT/Pulmonary:     (+) sleep apnea, allergic rhinitis, Intermittent, asthma Treatment: Inhaler prn,      Neurologic:     (+) peripheral neuropathy, - Brachial neuritis. migraines (with aura),     Cardiovascular:     (+) Dyslipidemia hypertension-range: H&P- 136/80/ ----SOLIS. dysrhythmias, PVCs, Irregular Heartbeat/Palpitations, Previous cardiac testing   Echo: Date: 2/15/2022 Results:  Interpretation Summary  Global and regional left ventricular function is normal with an EF of 55-60%.  The right ventricle is normal in function and size.  No significant valvular abnormality  IVC diameter <2.1 cm collapsing >50% with sniff suggests a normal RA pressure  of 3 mmHg.  No pericardial effusion is present.  There is no prior study for direct comparison.  Stress Test: Date: Results:    ECG Reviewed: Date: 5/20/22 Results:  Sinus rhythm with occasional PVC, normal axis, normal intervals, no acute ST/T changes c/w ischemia  Cath: Date: Results:      METS/Exercise Tolerance: >4 METS    Hematologic:  - neg hematologic  ROS     Musculoskeletal: Comment: S/p cervical spinal fusion  (+) arthritis,     GI/Hepatic:  - neg GI/hepatic ROS     Renal/Genitourinary:  - neg  Renal ROS     Endo:     (+) type II DM, Last HgA1c: 6.1, date: 5/20/22, Not using insulin, Obesity,     Psychiatric/Substance Use:     (+) psychiatric history anxiety and depression     Infectious Disease:  - neg infectious disease ROS     Malignancy:  - neg malignancy ROS     Other:  - neg other ROS    (+) , H/O Chronic Pain (back),        Physical Exam    Airway        Mallampati: III   TM distance: > 3 FB   Neck ROM: full   Mouth opening: < 3 cm    Respiratory Devices and Support         Dental  no notable dental history         Cardiovascular       Comment: PVC history   Rhythm and rate: irregular and normal     Pulmonary   pulmonary exam normal                OUTSIDE LABS:  CBC:   Lab Results   Component Value Date    WBC 10.1 05/20/2022    WBC 13.5 (H) 04/18/2022    HGB 14.4 05/20/2022    HGB 14.7 04/18/2022    HCT 43.3 05/20/2022    HCT 44.3 04/18/2022     05/20/2022     04/18/2022     BMP:   Lab Results   Component Value Date     05/20/2022     04/18/2022    POTASSIUM 3.6 05/20/2022    POTASSIUM 3.5 04/18/2022    CHLORIDE 107 05/20/2022    CHLORIDE 107 04/18/2022    CO2 25 05/20/2022    CO2 29 04/18/2022    BUN 11 05/20/2022    BUN 17 04/18/2022    CR 0.75 05/20/2022    CR 0.72 04/18/2022     (H) 05/20/2022     (H) 04/18/2022     COAGS:   Lab Results   Component Value Date    PTT 26 09/13/2016    INR 0.94 07/18/2021     POC: No results found for: BGM, HCG, HCGS  HEPATIC:   Lab Results   Component Value Date    ALBUMIN 3.6 05/20/2022    PROTTOTAL 7.5 05/20/2022    ALT 29 05/20/2022    AST 20 05/20/2022    ALKPHOS 71 05/20/2022    BILITOTAL 0.7 05/20/2022     OTHER:   Lab Results   Component Value Date    LACT 1.5 08/12/2019    A1C 6.1 (H) 05/20/2022    EZRA 9.4 05/20/2022    MAG 2.2 01/10/2022    LIPASE 177 04/18/2022    TSH 1.37 01/10/2022    CRP 53.9 (H) 08/12/2019    SED 14 09/19/2016       Anesthesia Plan    ASA Status:  3   NPO Status:  NPO Appropriate    Anesthesia  Type: MAC.     - Reason for MAC: straight local not clinically adequate   Induction: Propofol.   Maintenance: TIVA.        Consents    Anesthesia Plan(s) and associated risks, benefits, and realistic alternatives discussed. Questions answered and patient/representative(s) expressed understanding.     - Discussed: Risks, Benefits and Alternatives for BOTH SEDATION and the PROCEDURE were discussed     - Discussed with:  Patient      - Extended Intubation/Ventilatory Support Discussed: No.      - Patient is DNR/DNI Status: No    Use of blood products discussed: No .     Postoperative Care            Comments:    Other Comments: Risks and benefits of MAC anesthetic discussed including dental damage, aspiration, loss of airway, conversion to general anesthetic, CV complications, MI, stroke, death. Pt wishes to proceed. H&P 5/20 NORMAN Marte CRNA

## 2022-05-28 ENCOUNTER — OFFICE VISIT (OUTPATIENT)
Dept: FAMILY MEDICINE | Facility: OTHER | Age: 72
End: 2022-05-28
Attending: NURSE PRACTITIONER
Payer: MEDICARE

## 2022-05-28 DIAGNOSIS — Z01.818 PRE-OP EXAM: Primary | ICD-10-CM

## 2022-05-28 PROCEDURE — U0005 INFEC AGEN DETEC AMPLI PROBE: HCPCS | Mod: ZL

## 2022-05-28 NOTE — LETTER
May 31, 2022       Rj Rodríguez  [address]  [address]   [place] [address]        Dear ,    We are writing to inform you of your test results.    Your result is negative .     Resulted Orders   Asymptomatic COVID-19 Virus (Coronavirus) by PCR Nose   Result Value Ref Range    SARS CoV2 PCR Negative Negative      Comment:      NEGATIVE: SARS-CoV-2 (COVID-19) RNA not detected, presumed negative.    Narrative    Testing was performed using the T[employer] SARS-CoV-2 Assay on the  WSO2 Instrument System. Additional information about this  Emergency Use Authorization (EUA) assay can be found via the Lab  Guide. This test should be ordered for the detection of SARS-CoV-2 in  individuals who meet SARS-CoV-2 clinical and/or epidemiological  criteria. Test performance is unknown in asymptomatic patients. This  test is for in vitro diagnostic use under the FDA EUA for  laboratories certified under CLIA to perform high complexity testing.  This test has not been FDA cleared or approved. A negative result  does not rule out the presence of PCR inhibitors in the specimen or  target RNA in concentration below the limit of detection for the  assay. The possibility of a false negative should be considered if  the patient's recent exposure or clinical presentation suggests  COVID-19. This test was validated by the [facility]. This laboratory is certified under  the Clinical Laboratory Improvement Amendments of 1988 (CLIA-88) as  qualified to perform high complexity laboratory testing.       If you have any questions or concerns, please call the clinic at the number listed above.       Sincerely,      Sheyla Sanches NP

## 2022-05-29 LAB — SARS-COV-2 RNA RESP QL NAA+PROBE: NEGATIVE

## 2022-05-31 ENCOUNTER — OFFICE VISIT (OUTPATIENT)
Dept: CARDIOLOGY | Facility: OTHER | Age: 72
End: 2022-05-31
Attending: NURSE PRACTITIONER
Payer: COMMERCIAL

## 2022-05-31 VITALS
DIASTOLIC BLOOD PRESSURE: 84 MMHG | TEMPERATURE: 97.2 F | SYSTOLIC BLOOD PRESSURE: 144 MMHG | OXYGEN SATURATION: 98 % | BODY MASS INDEX: 36.82 KG/M2 | WEIGHT: 195 LBS | HEIGHT: 61 IN | HEART RATE: 78 BPM

## 2022-05-31 DIAGNOSIS — I49.3 FREQUENT PVCS: Primary | ICD-10-CM

## 2022-05-31 DIAGNOSIS — I10 ESSENTIAL HYPERTENSION: ICD-10-CM

## 2022-05-31 DIAGNOSIS — E11.9 TYPE 2 DIABETES MELLITUS WITHOUT COMPLICATION, WITHOUT LONG-TERM CURRENT USE OF INSULIN (H): ICD-10-CM

## 2022-05-31 DIAGNOSIS — G47.33 OSA (OBSTRUCTIVE SLEEP APNEA): ICD-10-CM

## 2022-05-31 DIAGNOSIS — F41.1 GENERALIZED ANXIETY DISORDER: ICD-10-CM

## 2022-05-31 PROCEDURE — 99214 OFFICE O/P EST MOD 30 MIN: CPT | Performed by: NURSE PRACTITIONER

## 2022-05-31 RX ORDER — DILTIAZEM HYDROCHLORIDE 240 MG/1
240 CAPSULE, EXTENDED RELEASE ORAL DAILY
Qty: 90 CAPSULE | Refills: 3 | Status: SHIPPED | OUTPATIENT
Start: 2022-05-31 | End: 2023-05-31

## 2022-05-31 ASSESSMENT — PAIN SCALES - GENERAL: PAINLEVEL: SEVERE PAIN (7)

## 2022-05-31 NOTE — NURSING NOTE
"Chief Complaint   Patient presents with     RECHECK     6 week follow up-last visit 4/5/22- feeling good and no new symptoms       Initial BP (!) 144/84 (BP Location: Left arm, Patient Position: Chair, Cuff Size: Adult Large)   Pulse 78   Temp 97.2  F (36.2  C) (Tympanic)   Ht 1.556 m (5' 1.25\")   Wt 88.5 kg (195 lb)   SpO2 98%   BMI 36.54 kg/m   Estimated body mass index is 36.54 kg/m  as calculated from the following:    Height as of this encounter: 1.556 m (5' 1.25\").    Weight as of this encounter: 88.5 kg (195 lb).  Medication Reconciliation: complete  CARSON LEMUS LPN    "

## 2022-05-31 NOTE — PROGRESS NOTES
[facility] HEART CARE   CARDIOLOGY PROGRESS NOTE    Rj Rodríguez   [address]  [address]   [facility] [address]      Sheyla Sanches     Chief Complaint   Patient presents with     RECHECK     6 week follow up-last visit 4/5/22- feeling good and no new symptoms        HPI:   Ms. Rodríguez is a 71 year old female who presents for cardiology follow-up to virtual visit on 4/5/22 with recent reports of increased exertional dyspnea and recently identified ventricular ectopy seen as isolated PVCs, ventricular coupletes and ventricular trigeminy.  Patient has a past medical history significant for migraine headaches, hypertension, obesity, REBECCA, DM 2, depression, significant anxiety, glaucoma and chronic lumbago. She denies any family history known to her involving cardiovascular disease.     Patient reported noticing irregular heart rate with in the office in Feb 2021. She was identified to have ventricular ectopy on ECG. No palpitations with this at that time. She had described episodes of fluttering palpitations associated to anxiety. She had reported increased exertional dyspnea and chest tightness largely related to anxiety events. She does not report any exertional chest tightness and no radiation to the arm, jaw, neck or back. No lightheadedness or syncope. No increased edema.      She described issues with allergies, possible asthma and on on immunotherapy. Avoided use of albuterol with fluttering palpitations. Has been off beta blocker since starting this. She was started on Diltiazem for HTN and increased ventricular ectopy burden. No tobacco use.     NM Lexiscan stress test due to frequent PVC's and increased exertional dyspnea. Study was completed on 2/10/22 revealing equivocal apical infarct vs apical thinning, no evidence of reversible ischemia. Normal LV systolic heart function which increased appropriately with stress. ECG with occasional ventricular ectopy at baseline which did not increase during  lexiscan portion of the study and no ischemia ECG changes.     Echocardiogram on 2/15/22 revealing normal biventricular function, LVEF 55-60%. Borderline LVH, unable to assess diastolic function. Diastolic doppler findings suggest LV filling pressures to be increased. No RWMA's. Both atrial noted to be normal with atrial septum intact. No hemodynamically significant valvular abnormalities. Ascending aorta mildly dilated at 3.7 cm. No pericardial effusion.     INTERVAL HISTORY:  Today, patient reports mild improvement in dyspnea. She has continued to work on weigh loss. No recurrence of pre-syncope or syncope. No chest pain or pressure reported today. Occasional flip flop sensation in chest which has been correlated to PVC's.      PAST MEDICAL HISTORY:   Past Medical History:   Diagnosis Date     Arthritis      Chronic back pain      Hypertension      Irregular heart beat      Sleep apnea           FAMILY HISTORY:   Family History   Problem Relation Age of Onset     Colon Cancer Maternal Aunt      Colon Cancer Maternal Uncle      Diabetes Father         Type 2     Hypertension Father      Alcoholism Father      Alcoholism Mother           PAST SURGICAL HISTORY:   Past Surgical History:   Procedure Laterality Date     ANESTHESIA OUT OF OR MRI N/A 6/1/2022    Procedure: MRI CERVICAL SPINE;  Surgeon: GENERIC ANESTHESIA PROVIDER;  Location: [facility] OR     APPENDECTOMY       BACK SURGERY       CHOLECYSTECTOMY       COLONOSCOPY  2012    Repeat in 10 years     GYN SURGERY       HYSTERECTOMY      Ovaries     RELEASE CARPAL TUNNEL      LT     RELEASE CARPAL TUNNEL      RT x2     SURGICAL RADIOLOGY PROCEDURE N/A 2/12/2016    Procedure: SURGICAL RADIOLOGY PROCEDURE;  Surgeon: Provider, Generic Perianesthesia Nursing;  Location: [facility] OR     SURGICAL RADIOLOGY PROCEDURE N/A 8/15/2016    Procedure: SURGICAL RADIOLOGY PROCEDURE;  Surgeon: Provider, Generic Perianesthesia Nursing;  Location: [facility] OR          SOCIAL HISTORY:   Social History      Socioeconomic History     Marital status:      Spouse name: None     Number of children: None     Years of education: None     Highest education level: None   Tobacco Use     Smoking status: Never Smoker     Smokeless tobacco: Never Used   Vaping Use     Vaping Use: Never used   Substance and Sexual Activity     Alcohol use: Yes     Alcohol/week: 1.0 standard drink     Types: 1 Glasses of wine per week     Comment: occ glass of wine     Drug use: No     Sexual activity: Not Currently   Other Topics Concern     Caffeine Concern Yes     Comment: Coffee - 2 cups daily   Social History Narrative    10/11/2019: , lives in [place]           CURRENT MEDICATIONS:   Prior to Admission medications    Medication Sig Start Date End Date Taking? Authorizing Provider   albuterol (PROAIR HFA/PROVENTIL HFA/VENTOLIN HFA) 108 (90 Base) MCG/ACT inhaler Inhale 2 puffs into the lungs every 6 hours 7/21/21  Yes Sheyla Sanches NP   blood glucose (ACCU-CHEK GUIDE) test strip Use to test blood sugar 1 time daily or as directed. 12/16/19  Yes Sheyla Sanches NP   blood glucose monitoring (ACCU-CHEK FASTCLIX) lancets Use to test blood sugar 1 time daily or as directed. 12/16/19  Yes Sheyla Sanches NP   cetirizine (ZYRTEC) 10 MG tablet Take 1 tablet (10 mg) by mouth daily as needed for allergies 11/22/19  Yes Caitlyn Maradiaga MD   clonazePAM (KLONOPIN) 0.5 MG tablet Take 1 tablet by mouth twice daily as needed for anxiety 5/5/22  Yes Sheyla Sanches NP   diltiazem ER (TIAZAC) 240 MG 24 hr ER beaded capsule Take 1 capsule (240 mg) by mouth daily 5/31/22  Yes Marie Rodrigues APRN CNP   EPINEPHrine (ANY BX GENERIC EQUIV) 0.3 MG/0.3ML injection 2-pack INJECT CONTENTS OF 1 PEN AS NEEDED FOR ALLERGIC REACTION 4/5/21  Yes Caitlyn Maradiaga MD   ipratropium-albuterol (COMBIVENT RESPIMAT)  MCG/ACT inhaler Inhale 1 puff into the lungs 4 times daily as needed for wheezing or other (chest tightness) 7/7/21  Yes  Felisha Tyler NP   losartan (COZAAR) 50 MG tablet Take 1 tablet (50 mg) by mouth daily 4/13/22  Yes Sheyla Sanches NP   ORDER FOR ALLERGEN IMMUNOTHERAPY Continue with 0.5ml weekly subcutaneous allergy injections. Follow standard maintenance protocols. 2/10/22 2/10/23 Yes Felisha Tyler NP   spironolactone (ALDACTONE) 50 MG tablet Take 1 tablet (50 mg) by mouth daily 4/13/22  Yes Sheyla Sanches NP   tiZANidine (ZANAFLEX) 2 MG tablet Take 1 tablet (2 mg) by mouth 3 times daily as needed for muscle spasms 4/29/22  Yes Sheyla Sanches NP   triamcinolone (NASACORT) 55 MCG/ACT nasal aerosol Spray 2 sprays into both nostrils daily 11/22/19  Yes Caitlyn Maradiaga MD          ALLERGIES:   Allergies   Allergen Reactions     Fruit [Peach] Anaphylaxis and Hives     fresh peaches and any fruit that has a pit.  Kiwi's and apples also.  Can eat any fruit cooked.     Nuts Anaphylaxis and Hives     All Raw Nuts, can eat nuts when roasted.     Ace Inhibitors      Upper respiratory infection     Alkylamines Hives     Antihistamines     Alprazolam Hives     Xanax     Diphenhydramine Hives and Other (See Comments)     Keeps her awake       Erythromycin      BASE; pt. Not sure if this is a true allergy.     Guaifed      Pt unsure of reaction     Guaifenesin      Guaifed     Hydrochlorothiazide      No Clinical Screening - See Comments      Allergic:  Fresh peaches and any fruit that has a pit.  Kiwi's and apples also.  All raw nuts.  Can eat nuts when roasted or any fruit cooked.  Some seasonal allergies :  Hayfever     Olmesartan Medoxomil Cough     Benicar     Phenylephrine Hcl      Guaifed     Pseudoephedrine Hcl      Guaifed     Seasonal Allergies      hayfever     Tramadol      Sleep disturbance. Can not sleep, and when able to fall asleep will have terrible nightmares     Tramadol Hcl      Ultram, insomnia, nightmares, headache     Venlafaxine Other (See Comments)     Heartburn, reflux     Zoloft  "[Sertraline]      paranoia     Latex Other (See Comments), Swelling, Difficulty breathing and Rash     Throat Closes            ROS:   CONSTITUTIONAL: No reported fever or chills. No changes in weight.  ENT: No visual disturbance, ear ache, epistaxis or sore throat.   CARDIOVASCULAR: No chest pain, chest pressure or chest discomfort. Occasional palpitations, see HPI. No lower extremity edema.   RESPIRATORY: Positive for dyspnea upon exertion. No cough, wheezing or hemoptysis.   GI: No reported abdominal pain.  : No reported hematuria or dysuria. No hesitancy or incontinence.   NEUROLOGICAL: No syncope, ataxia, paresthesias or weakness.   HEMATOLOGIC: No history of anemia. No bleeding or excessive bruising. No history of blood clots.   MUSCULOSKELETAL: No new joint pain or swelling, no muscle pain.  ENDOCRINOLOGIC: No temperature intolerance. No hair or skin changes.  SKIN: No abnormal rashes or sores, no unusual itching.      PHYSICAL EXAM:   BP (!) 144/84 (BP Location: Left arm, Patient Position: Chair, Cuff Size: Adult Large)   Pulse 78   Temp 97.2  F (36.2  C) (Tympanic)   Ht 1.556 m (5' 1.25\")   Wt 88.5 kg (195 lb)   SpO2 98%   BMI 36.54 kg/m    GENERAL: The patient is a well-developed, well-nourished, in no apparent distress.  HEENT: Head is normocephalic and atraumatic. Eyes are symmetrical with normal visual tracking. No icterus, no xanthelasmas. Nares appeared normal without nasal drainage. Mucous membranes are moist, no cyanosis.  NECK: Supple,  no cervical bruits, JVP not visible.   CHEST/ LUNGS: Lungs clear to auscultation, no rales, rhonchi or wheezes, no use of accessory muscles, no retractions, respirations unlabored and normal respiratory rate.   CARDIO: Regular rate and rhythm normal with S1 and S2, no S3 or S4 and no murmur, click or rub.   ABD: Abdomen is nondistended.   EXTREMITIES: No LE edema present.  MUSCULOSKELETAL: No visible joint swelling.   NEUROLOGIC: Alert and oriented X3. " Normal speech, gait and affect. No focal neurologic deficits.   SKIN: No jaundice. No rashes or visible skin lesions present. No ecchymosis.       LAB RESULTS:   Office Visit on 05/28/2022   Component Date Value Ref Range Status     SARS CoV2 PCR 05/28/2022 Negative  Negative Final   Office Visit on 05/20/2022   Component Date Value Ref Range Status     Estimated Average Glucose 05/20/2022 128  mg/dL Final     Hemoglobin A1C 05/20/2022 6.1 (A) 0.0 - 5.6 % Final     Sodium 05/20/2022 137  133 - 144 mmol/L Final     Potassium 05/20/2022 3.6  3.4 - 5.3 mmol/L Final     Chloride 05/20/2022 107  94 - 109 mmol/L Final     Carbon Dioxide (CO2) 05/20/2022 25  20 - 32 mmol/L Final     Anion Gap 05/20/2022 5  3 - 14 mmol/L Final     Urea Nitrogen 05/20/2022 11  7 - 30 mg/dL Final     Creatinine 05/20/2022 0.75  0.52 - 1.04 mg/dL Final     Calcium 05/20/2022 9.4  8.5 - 10.1 mg/dL Final     Glucose 05/20/2022 101 (A) 70 - 99 mg/dL Final     Alkaline Phosphatase 05/20/2022 71  40 - 150 U/L Final     AST 05/20/2022 20  0 - 45 U/L Final     ALT 05/20/2022 29  0 - 50 U/L Final     Protein Total 05/20/2022 7.5  6.8 - 8.8 g/dL Final     Albumin 05/20/2022 3.6  3.4 - 5.0 g/dL Final     Bilirubin Total 05/20/2022 0.7  0.2 - 1.3 mg/dL Final     GFR Estimate 05/20/2022 86  >60 mL/min/1.73m2 Final     WBC Count 05/20/2022 10.1  4.0 - 11.0 10e3/uL Final     RBC Count 05/20/2022 4.75  3.80 - 5.20 10e6/uL Final     Hemoglobin 05/20/2022 14.4  11.7 - 15.7 g/dL Final     Hematocrit 05/20/2022 43.3  35.0 - 47.0 % Final     MCV 05/20/2022 91  78 - 100 fL Final     MCH 05/20/2022 30.3  26.5 - 33.0 pg Final     MCHC 05/20/2022 33.3  31.5 - 36.5 g/dL Final     RDW 05/20/2022 13.2  10.0 - 15.0 % Final     Platelet Count 05/20/2022 269  150 - 450 10e3/uL Final     % Neutrophils 05/20/2022 65  % Final     % Lymphocytes 05/20/2022 23  % Final     % Monocytes 05/20/2022 8  % Final     % Eosinophils 05/20/2022 2  % Final     % Basophils 05/20/2022 1  %  Final     % Immature Granulocytes 05/20/2022 1  % Final     NRBCs per 100 WBC 05/20/2022 0  <1 /100 Final     Absolute Neutrophils 05/20/2022 6.7  1.6 - 8.3 10e3/uL Final     Absolute Lymphocytes 05/20/2022 2.4  0.8 - 5.3 10e3/uL Final     Absolute Monocytes 05/20/2022 0.8  0.0 - 1.3 10e3/uL Final     Absolute Eosinophils 05/20/2022 0.2  0.0 - 0.7 10e3/uL Final     Absolute Basophils 05/20/2022 0.1  0.0 - 0.2 10e3/uL Final     Absolute Immature Granulocytes 05/20/2022 0.1  <=0.4 10e3/uL Final     Absolute NRBCs 05/20/2022 0.0  10e3/uL Final          ASSESSMENT:   Rj Rodríguez presents for cardiology follow-up to virtual visit on 4/5/22 with recent reports of increased exertional dyspnea and recently identified ventricular ectopy seen as isolated PVCs, ventricular coupletes and ventricular trigeminy.  Patient has a past medical history significant for migraine headaches, hypertension, obesity, REBECCA, DM 2, depression, significant anxiety, glaucoma and chronic lumbago. She denies any family history known to her involving cardiovascular disease.  Today, patient reports mild improvement in dyspnea. She has continued to work on weigh loss. No recurrence of pre-syncope or syncope. No chest pain or pressure reported today. Occasional flip flop sensation in chest which has been correlated to PVC's.    1. Frequent PVCs  2. Essential hypertension  3. Type 2 diabetes mellitus without complication, without long-term current use of insulin (H)  4. REBECCA (obstructive sleep apnea)  5. Generalized anxiety disorder    PLAN:   -Reviewed results of stress test without evidence of ischemia and stable TTE, possible borderline diastolic dysfunction.   - Previously reviewed unclear etiology of vertigo episodes, possible hypotension vs neurogenic with history of anxiety and panic. These episodes have improved.   - Continue on Losartan to 50 mg daily.   -Increase Diltiazem to 240 mg daily for ectopy suppression and HTN.  She will also remain  on spironolactone 50 mg daily.  -Previously reviewed untreated sleep apnea may contribute to increased ectopy and may place patient at risk for developing other arrhythmias such as atrial fibrillation. She has not been on CPAP and reports sleep study years ago. She would like to hold off on further sleep study at this time however, may consider home sleep study in the future.   -Congratulated on effort towards weight loss and focus on mental and physical violet.       Thank you for allowing me to participate in the care of your patient. Please do not hesitate to contact me if you have any questions.     Marie Rodrigues, APRN CNP [facility]

## 2022-05-31 NOTE — PATIENT INSTRUCTIONS
Thank you for allowing Marie Rodrigues and our team to participate in your care. Please call our office at [phone] with scheduling questions or if you need to cancel or change your appointment. With any other questions or concerns you may call Nicole cardiology nurse at [phone].       If you experience chest pain, chest pressure, chest tightness, shortness of breath, fainting, lightheadedness, nausea, vomiting, or other concerning symptoms, please report to the Emergency Department or call 911. These symptoms may be emergent, and best treated in the Emergency Department.       Increase diltiazem to 240 mg daily.     Follow up in 6 months.

## 2022-06-01 ENCOUNTER — HOSPITAL ENCOUNTER (OUTPATIENT)
Dept: MRI IMAGING | Facility: HOSPITAL | Age: 72
Discharge: HOME | End: 2022-06-01
Attending: ORTHOPAEDIC SURGERY | Admitting: RADIOLOGY
Payer: MEDICARE

## 2022-06-01 ENCOUNTER — HOSPITAL ENCOUNTER (OUTPATIENT)
Facility: HOSPITAL | Age: 72
Discharge: HOME | End: 2022-06-01
Attending: RADIOLOGY | Admitting: RADIOLOGY
Payer: MEDICARE

## 2022-06-01 ENCOUNTER — ANESTHESIA (OUTPATIENT)
Dept: SURGERY | Facility: HOSPITAL | Age: 72
End: 2022-06-01
Payer: MEDICARE

## 2022-06-01 VITALS
DIASTOLIC BLOOD PRESSURE: 73 MMHG | RESPIRATION RATE: 18 BRPM | HEIGHT: 62 IN | SYSTOLIC BLOOD PRESSURE: 119 MMHG | WEIGHT: 194 LBS | TEMPERATURE: 97.7 F | BODY MASS INDEX: 35.7 KG/M2 | HEART RATE: 58 BPM | OXYGEN SATURATION: 96 %

## 2022-06-01 DIAGNOSIS — M54.12 CERVICAL RADICULOPATHY: ICD-10-CM

## 2022-06-01 PROCEDURE — 999N000141 HC STATISTIC PRE-PROCEDURE NURSING ASSESSMENT

## 2022-06-01 PROCEDURE — 250N000009 HC RX 250: Performed by: NURSE ANESTHETIST, CERTIFIED REGISTERED

## 2022-06-01 PROCEDURE — 710N000012 HC RECOVERY PHASE 2, PER MINUTE

## 2022-06-01 PROCEDURE — 72141 MRI NECK SPINE W/O DYE: CPT

## 2022-06-01 PROCEDURE — 250N000011 HC RX IP 250 OP 636: Performed by: NURSE ANESTHETIST, CERTIFIED REGISTERED

## 2022-06-01 PROCEDURE — 370N000017 HC ANESTHESIA TECHNICAL FEE, PER MIN

## 2022-06-01 PROCEDURE — 258N000003 HC RX IP 258 OP 636: Performed by: NURSE ANESTHETIST, CERTIFIED REGISTERED

## 2022-06-01 PROCEDURE — 72156 MRI NECK SPINE W/O & W/DYE: CPT | Performed by: NURSE ANESTHETIST, CERTIFIED REGISTERED

## 2022-06-01 RX ORDER — ONDANSETRON 2 MG/ML
4 INJECTION INTRAMUSCULAR; INTRAVENOUS EVERY 30 MIN PRN
Status: DISCONTINUED | OUTPATIENT
Start: 2022-06-01 | End: 2022-06-01 | Stop reason: HOSPADM

## 2022-06-01 RX ORDER — FENTANYL CITRATE 50 UG/ML
INJECTION, SOLUTION INTRAMUSCULAR; INTRAVENOUS PRN
Status: DISCONTINUED | OUTPATIENT
Start: 2022-06-01 | End: 2022-06-01

## 2022-06-01 RX ORDER — LIDOCAINE HYDROCHLORIDE 20 MG/ML
INJECTION, SOLUTION INFILTRATION; PERINEURAL PRN
Status: DISCONTINUED | OUTPATIENT
Start: 2022-06-01 | End: 2022-06-01

## 2022-06-01 RX ORDER — LIDOCAINE 40 MG/G
CREAM TOPICAL
Status: DISCONTINUED | OUTPATIENT
Start: 2022-06-01 | End: 2022-06-01 | Stop reason: HOSPADM

## 2022-06-01 RX ORDER — SODIUM CHLORIDE, SODIUM LACTATE, POTASSIUM CHLORIDE, CALCIUM CHLORIDE 600; 310; 30; 20 MG/100ML; MG/100ML; MG/100ML; MG/100ML
INJECTION, SOLUTION INTRAVENOUS CONTINUOUS
Status: DISCONTINUED | OUTPATIENT
Start: 2022-06-01 | End: 2022-06-01 | Stop reason: HOSPADM

## 2022-06-01 RX ORDER — LABETALOL 20 MG/4 ML (5 MG/ML) INTRAVENOUS SYRINGE
10
Status: DISCONTINUED | OUTPATIENT
Start: 2022-06-01 | End: 2022-06-01 | Stop reason: HOSPADM

## 2022-06-01 RX ORDER — ONDANSETRON 4 MG/1
4 TABLET, ORALLY DISINTEGRATING ORAL EVERY 30 MIN PRN
Status: DISCONTINUED | OUTPATIENT
Start: 2022-06-01 | End: 2022-06-01 | Stop reason: HOSPADM

## 2022-06-01 RX ORDER — DEXTROSE MONOHYDRATE 25 G/50ML
25-50 INJECTION, SOLUTION INTRAVENOUS
Status: DISCONTINUED | OUTPATIENT
Start: 2022-06-01 | End: 2022-06-01 | Stop reason: HOSPADM

## 2022-06-01 RX ORDER — NICOTINE POLACRILEX 4 MG
15-30 LOZENGE BUCCAL
Status: DISCONTINUED | OUTPATIENT
Start: 2022-06-01 | End: 2022-06-01 | Stop reason: HOSPADM

## 2022-06-01 RX ORDER — HYDRALAZINE HYDROCHLORIDE 20 MG/ML
2.5-5 INJECTION INTRAMUSCULAR; INTRAVENOUS EVERY 10 MIN PRN
Status: DISCONTINUED | OUTPATIENT
Start: 2022-06-01 | End: 2022-06-01 | Stop reason: HOSPADM

## 2022-06-01 RX ORDER — PROPOFOL 10 MG/ML
INJECTION, EMULSION INTRAVENOUS CONTINUOUS PRN
Status: DISCONTINUED | OUTPATIENT
Start: 2022-06-01 | End: 2022-06-01

## 2022-06-01 RX ADMIN — LIDOCAINE HYDROCHLORIDE 40 MG: 20 INJECTION, SOLUTION INFILTRATION; PERINEURAL at 08:28

## 2022-06-01 RX ADMIN — SODIUM CHLORIDE, POTASSIUM CHLORIDE, SODIUM LACTATE AND CALCIUM CHLORIDE: 600; 310; 30; 20 INJECTION, SOLUTION INTRAVENOUS at 08:17

## 2022-06-01 RX ADMIN — MIDAZOLAM 0.5 MG: 1 INJECTION INTRAMUSCULAR; INTRAVENOUS at 08:45

## 2022-06-01 RX ADMIN — MIDAZOLAM 2 MG: 1 INJECTION INTRAMUSCULAR; INTRAVENOUS at 08:23

## 2022-06-01 RX ADMIN — PROPOFOL 75 MCG/KG/MIN: 10 INJECTION, EMULSION INTRAVENOUS at 08:30

## 2022-06-01 RX ADMIN — MIDAZOLAM 0.5 MG: 1 INJECTION INTRAMUSCULAR; INTRAVENOUS at 08:50

## 2022-06-01 RX ADMIN — MIDAZOLAM 1 MG: 1 INJECTION INTRAMUSCULAR; INTRAVENOUS at 08:36

## 2022-06-01 RX ADMIN — FENTANYL CITRATE 25 MCG: 50 INJECTION, SOLUTION INTRAMUSCULAR; INTRAVENOUS at 08:50

## 2022-06-01 NOTE — INTERVAL H&P NOTE
"I have reviewed the surgical (or preoperative) H&P that is linked to this encounter, and examined the patient. There are no significant changes    Clinical Conditions Present on Arrival:  Clinically Significant Risk Factors Present on Admission                   # Obesity: Estimated body mass index is 35.77 kg/m  as calculated from the following:    Height as of this encounter: 1.568 m (5' 1.75\").    Weight as of this encounter: 88 kg (194 lb).       "

## 2022-06-01 NOTE — ANESTHESIA POSTPROCEDURE EVALUATION
Patient: Rj Rodríguez    Procedure: Procedure(s):  MRI CERVICAL SPINE       Anesthesia Type:  MAC    Note:  Disposition: Outpatient   Postop Pain Control: Uneventful            Sign Out: Well controlled pain   PONV: No   Neuro/Psych: Uneventful            Sign Out: Acceptable/Baseline neuro status   Airway/Respiratory: Uneventful            Sign Out: Acceptable/Baseline resp. status   CV/Hemodynamics: Uneventful            Sign Out: Acceptable CV status; No obvious hypovolemia; No obvious fluid overload   Other NRE: NONE   DID A NON-ROUTINE EVENT OCCUR? No           Last vitals:  Vitals Value Taken Time   BP     Temp     Pulse     Resp     SpO2 97 % 06/01/22 0955   Vitals shown include unvalidated device data.    Electronically Signed By: NORMAN Regan CRNA  June 1, 2022  10:08 AM

## 2022-06-01 NOTE — OR NURSING
Patient and responsible adult given discharge instructions with no questions regarding instructions. Virgilio score 20/20. Pain level 0/10.  Discharged from unit via wheel chair. Patient discharged to home with .

## 2022-06-01 NOTE — ANESTHESIA CARE TRANSFER NOTE
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    Anesthesia Type:   MAC     Note:    Oropharynx: oropharynx clear of all foreign objects and spontaneously breathing  Level of Consciousness: awake  Oxygen Supplementation: room air    Independent Airway: airway patency satisfactory and stable  Dentition: dentition unchanged  Vital Signs Stable: post-procedure vital signs reviewed and stable  Report to RN Given: handoff report given  Patient transferred to: Phase II    Handoff Report: Identifed the Patient, Identified the Reponsible Provider, Reviewed the pertinent medical history, Discussed the surgical course, Reviewed Intra-OP anesthesia mangement and issues during anesthesia, Set expectations for post-procedure period and Allowed opportunity for questions and acknowledgement of understanding      Vitals:  Vitals Value Taken Time   /66 06/01/22 0920   Temp 97.7  F (36.5  C) 06/01/22 0920   Pulse 57 06/01/22 0920   Resp 18 06/01/22 0920   SpO2 95 % 06/01/22 0922   Vitals shown include unvalidated device data.    Electronically Signed By: NORMAN MCCORMICK CRNA  June 1, 2022  9:24 AM

## 2022-06-04 ENCOUNTER — HEALTH MAINTENANCE LETTER (OUTPATIENT)
Age: 72
End: 2022-06-04

## 2022-06-08 ENCOUNTER — ALLIED HEALTH/NURSE VISIT (OUTPATIENT)
Dept: ALLERGY | Facility: OTHER | Age: 72
End: 2022-06-08
Attending: NURSE PRACTITIONER
Payer: MEDICARE

## 2022-06-08 DIAGNOSIS — J30.89 PERENNIAL ALLERGIC RHINITIS: Primary | ICD-10-CM

## 2022-06-08 PROCEDURE — 95117 IMMUNOTHERAPY INJECTIONS: CPT

## 2022-06-08 NOTE — PROGRESS NOTES
Prior to injection verified pt identity using pt name and date of birth.    Allergy injection/s given and charted on paper allergy flow sheet.  Patient left AMA, signing form, not staying for the observation period.    Morgan Devries RN on 6/8/2022 at 11:23 AM

## 2022-06-22 ENCOUNTER — ALLIED HEALTH/NURSE VISIT (OUTPATIENT)
Dept: ALLERGY | Facility: OTHER | Age: 72
End: 2022-06-22
Attending: NURSE PRACTITIONER
Payer: MEDICARE

## 2022-06-22 DIAGNOSIS — J30.89 PERENNIAL ALLERGIC RHINITIS: Primary | ICD-10-CM

## 2022-06-22 PROCEDURE — 95117 IMMUNOTHERAPY INJECTIONS: CPT

## 2022-06-22 NOTE — PROGRESS NOTES
Prior to injection verified pt identity using pt name and date of birth.    Allergy injection/s given and charted on paper allergy flow sheet.  Patient left AMA, signing form, not staying for the observation period.    Morgan Devries RN on 6/22/2022 at 11:36 AM

## 2022-06-23 ENCOUNTER — TELEPHONE (OUTPATIENT)
Dept: FAMILY MEDICINE | Facility: OTHER | Age: 72
End: 2022-06-23

## 2022-06-23 DIAGNOSIS — G54.2 CERVICAL NERVE ROOT IMPINGEMENT: Primary | ICD-10-CM

## 2022-06-23 DIAGNOSIS — M54.2 CERVICALGIA: ICD-10-CM

## 2022-06-23 NOTE — TELEPHONE ENCOUNTER
Please call patient back.  She is crying stating that the appointment to see the surgeon was messed up and now it is next week in [place].  They told her there isn't anything they can do she has to wait.  She is having so much pain in her back and would like to speak with either Janet or Myron.      Called patient back after talking with nurse:    Patient notified that provider is out of office and advised to UC if needed immediate relieve from the back pain.  Patient refuses to go near UC and would like a return call from provider tomorrow.

## 2022-06-24 RX ORDER — TIZANIDINE 2 MG/1
2 TABLET ORAL 3 TIMES DAILY PRN
Qty: 60 TABLET | Refills: 0 | Status: SHIPPED | OUTPATIENT
Start: 2022-06-24 | End: 2022-12-08

## 2022-06-24 RX ORDER — OXYCODONE AND ACETAMINOPHEN 5; 325 MG/1; MG/1
1 TABLET ORAL EVERY 6 HOURS PRN
Qty: 12 TABLET | Refills: 0 | Status: SHIPPED | OUTPATIENT
Start: 2022-06-24 | End: 2022-09-12

## 2022-06-24 NOTE — TELEPHONE ENCOUNTER
Patient with severe neck pain. Will give a few percocet with Zanaflex until she can see the orthopedist next week.

## 2022-06-28 ENCOUNTER — TRANSFERRED RECORDS (OUTPATIENT)
Dept: HEALTH INFORMATION MANAGEMENT | Facility: CLINIC | Age: 72
End: 2022-06-28

## 2022-07-06 NOTE — PROGRESS NOTES
{PROVIDER CHARTING PREFERENCE:231968}    Joni De La Rosa is a 71 year old, presenting for the following health issues:  Depression, Hypertension, and Asthma      HPI     Hypertension Follow-up      Do you check your blood pressure regularly outside of the clinic? No     Are you following a low salt diet? Yes    Are your blood pressures ever more than 140 on the top number (systolic) OR more   than 90 on the bottom number (diastolic), for example 140/90? No   -Taking diltiazem 240 mg, losartan 50 mg, and spironolactone 50 mg without side effects.   -Follows with cardiology for frequent PVCs. ***    Depression and Anxiety Follow-Up    How are you doing with your depression since your last visit? Worsened over the last couple days as she found out she is having surgery tomorrow    How are you doing with your anxiety since your last visit?  See above    Are you having other symptoms that might be associated with depression or anxiety? No    Have you had a significant life event? Health Concerns     Do you have any concerns with your use of alcohol or other drugs? No     Follows with her counselor, Babs.     Takes Klonopin 0.5 mg PRN BID. ***    Cervicalgia; follows with Dr. Bravo. Cervical MRI from 6/1/22 showed asymmetric disc protrusion C6-C7 on the left moderately compressing the left C7 nerve root.     Asthma Follow-Up    Was ACT completed today?    Yes    ACT Total Scores 7/11/2022   ACT TOTAL SCORE (Goal Greater than or Equal to 20) 24   In the past 12 months, how many times did you visit the emergency room for your asthma without being admitted to the hospital? 0   In the past 12 months, how many times were you hospitalized overnight because of your asthma? 0          How many days per week do you miss taking your asthma controller medication?  0    Please describe any recent triggers for your asthma: upper respiratory infections, dust mites, pollens, animal dander, mold and humidity    Have you had any  Emergency Room Visits, Urgent Care Visits, or Hospital Admissions since your last office visit?  No      No recent PFTS. ***    Due for a mammogram and dexa-scan. ***    See me back 3 months-HTN, DM allow 40 ***      Social History     Tobacco Use     Smoking status: Never Smoker     Smokeless tobacco: Never Used   Vaping Use     Vaping Use: Never used   Substance Use Topics     Alcohol use: Yes     Alcohol/week: 1.0 standard drink     Types: 1 Glasses of wine per week     Comment: occ glass of wine     Drug use: No     PHQ 9/20/2021 5/20/2022 7/11/2022   PHQ-9 Total Score 2 6 7   Q9: Thoughts of better off dead/self-harm past 2 weeks Not at all Not at all Not at all     ZEFERINO-7 SCORE 9/2/2021 9/20/2021 7/11/2022   Total Score 6 6 11     {Last PHQ9 or GAD7 Responses (Optional):090246}        How many servings of fruits and vegetables do you eat daily?  4 or more    On average, how many sweetened beverages do you drink each day (Examples: soda, juice, sweet tea, etc.  Do NOT count diet or artificially sweetened beverages)?   0    How many days per week do you exercise enough to make your heart beat faster? 3 or less    How many minutes a day do you exercise enough to make your heart beat faster? 20 - 29    How many days per week do you miss taking your medication? 0        Review of Systems   {ROS COMP (Optional):985512}      Objective    BP (!) 172/78   Pulse 82   Temp 97.9  F (36.6  C)   Wt 89.8 kg (198 lb)   SpO2 97%   BMI 36.51 kg/m    Body mass index is 36.51 kg/m .  Physical Exam   {Exam List (Optional):905081}    {Diagnostic Test Results (Optional):542727}            .  ..

## 2022-07-11 ENCOUNTER — TELEPHONE (OUTPATIENT)
Dept: FAMILY MEDICINE | Facility: OTHER | Age: 72
End: 2022-07-11

## 2022-07-11 ENCOUNTER — OFFICE VISIT (OUTPATIENT)
Dept: FAMILY MEDICINE | Facility: OTHER | Age: 72
End: 2022-07-11
Attending: NURSE PRACTITIONER
Payer: COMMERCIAL

## 2022-07-11 VITALS
TEMPERATURE: 97.9 F | OXYGEN SATURATION: 97 % | HEART RATE: 82 BPM | SYSTOLIC BLOOD PRESSURE: 136 MMHG | DIASTOLIC BLOOD PRESSURE: 62 MMHG | BODY MASS INDEX: 36.51 KG/M2 | WEIGHT: 198 LBS

## 2022-07-11 DIAGNOSIS — E11.9 TYPE 2 DIABETES MELLITUS WITHOUT COMPLICATION, WITHOUT LONG-TERM CURRENT USE OF INSULIN (H): ICD-10-CM

## 2022-07-11 DIAGNOSIS — E78.5 HYPERLIPIDEMIA, UNSPECIFIED HYPERLIPIDEMIA TYPE: ICD-10-CM

## 2022-07-11 DIAGNOSIS — M54.2 CERVICALGIA: ICD-10-CM

## 2022-07-11 DIAGNOSIS — F41.1 GAD (GENERALIZED ANXIETY DISORDER): ICD-10-CM

## 2022-07-11 DIAGNOSIS — Z01.818 PREOP GENERAL PHYSICAL EXAM: Primary | ICD-10-CM

## 2022-07-11 DIAGNOSIS — E66.01 MORBID OBESITY (H): ICD-10-CM

## 2022-07-11 DIAGNOSIS — I10 ESSENTIAL HYPERTENSION: ICD-10-CM

## 2022-07-11 DIAGNOSIS — F33.0 MILD EPISODE OF RECURRENT MAJOR DEPRESSIVE DISORDER (H): ICD-10-CM

## 2022-07-11 LAB
ANION GAP SERPL CALCULATED.3IONS-SCNC: 3 MMOL/L (ref 3–14)
BASOPHILS # BLD AUTO: 0.1 10E3/UL (ref 0–0.2)
BASOPHILS NFR BLD AUTO: 1 %
BUN SERPL-MCNC: 13 MG/DL (ref 7–30)
CALCIUM SERPL-MCNC: 9.2 MG/DL (ref 8.5–10.1)
CHLORIDE BLD-SCNC: 110 MMOL/L (ref 94–109)
CO2 SERPL-SCNC: 24 MMOL/L (ref 20–32)
CREAT SERPL-MCNC: 0.87 MG/DL (ref 0.52–1.04)
EOSINOPHIL # BLD AUTO: 0.2 10E3/UL (ref 0–0.7)
EOSINOPHIL NFR BLD AUTO: 3 %
ERYTHROCYTE [DISTWIDTH] IN BLOOD BY AUTOMATED COUNT: 13.2 % (ref 10–15)
GFR SERPL CREATININE-BSD FRML MDRD: 72 ML/MIN/1.73M2
GLUCOSE BLD-MCNC: 100 MG/DL (ref 70–99)
HCT VFR BLD AUTO: 42.4 % (ref 35–47)
HGB BLD-MCNC: 14 G/DL (ref 11.7–15.7)
IMM GRANULOCYTES # BLD: 0.1 10E3/UL
IMM GRANULOCYTES NFR BLD: 1 %
LYMPHOCYTES # BLD AUTO: 1.6 10E3/UL (ref 0.8–5.3)
LYMPHOCYTES NFR BLD AUTO: 18 %
MCH RBC QN AUTO: 30.6 PG (ref 26.5–33)
MCHC RBC AUTO-ENTMCNC: 33 G/DL (ref 31.5–36.5)
MCV RBC AUTO: 93 FL (ref 78–100)
MONOCYTES # BLD AUTO: 0.8 10E3/UL (ref 0–1.3)
MONOCYTES NFR BLD AUTO: 9 %
NEUTROPHILS # BLD AUTO: 6 10E3/UL (ref 1.6–8.3)
NEUTROPHILS NFR BLD AUTO: 68 %
NRBC # BLD AUTO: 0 10E3/UL
NRBC BLD AUTO-RTO: 0 /100
PLATELET # BLD AUTO: 287 10E3/UL (ref 150–450)
POTASSIUM BLD-SCNC: 4.2 MMOL/L (ref 3.4–5.3)
RBC # BLD AUTO: 4.57 10E6/UL (ref 3.8–5.2)
SODIUM SERPL-SCNC: 137 MMOL/L (ref 133–144)
WBC # BLD AUTO: 8.8 10E3/UL (ref 4–11)

## 2022-07-11 PROCEDURE — 80048 BASIC METABOLIC PNL TOTAL CA: CPT | Mod: ZL | Performed by: NURSE PRACTITIONER

## 2022-07-11 PROCEDURE — 99214 OFFICE O/P EST MOD 30 MIN: CPT | Performed by: NURSE PRACTITIONER

## 2022-07-11 PROCEDURE — 85025 COMPLETE CBC W/AUTO DIFF WBC: CPT | Mod: ZL | Performed by: NURSE PRACTITIONER

## 2022-07-11 PROCEDURE — G0463 HOSPITAL OUTPT CLINIC VISIT: HCPCS

## 2022-07-11 PROCEDURE — 36415 COLL VENOUS BLD VENIPUNCTURE: CPT | Mod: ZL | Performed by: NURSE PRACTITIONER

## 2022-07-11 ASSESSMENT — PATIENT HEALTH QUESTIONNAIRE - PHQ9: SUM OF ALL RESPONSES TO PHQ QUESTIONS 1-9: 7

## 2022-07-11 ASSESSMENT — ASTHMA QUESTIONNAIRES
ACT_TOTALSCORE: 24
QUESTION_1 LAST FOUR WEEKS HOW MUCH OF THE TIME DID YOUR ASTHMA KEEP YOU FROM GETTING AS MUCH DONE AT WORK, SCHOOL OR AT HOME: NONE OF THE TIME
ACT_TOTALSCORE: 24
QUESTION_4 LAST FOUR WEEKS HOW OFTEN HAVE YOU USED YOUR RESCUE INHALER OR NEBULIZER MEDICATION (SUCH AS ALBUTEROL): NOT AT ALL
QUESTION_3 LAST FOUR WEEKS HOW OFTEN DID YOUR ASTHMA SYMPTOMS (WHEEZING, COUGHING, SHORTNESS OF BREATH, CHEST TIGHTNESS OR PAIN) WAKE YOU UP AT NIGHT OR EARLIER THAN USUAL IN THE MORNING: NOT AT ALL
QUESTION_2 LAST FOUR WEEKS HOW OFTEN HAVE YOU HAD SHORTNESS OF BREATH: NOT AT ALL
QUESTION_5 LAST FOUR WEEKS HOW WOULD YOU RATE YOUR ASTHMA CONTROL: WELL CONTROLLED

## 2022-07-11 ASSESSMENT — ANXIETY QUESTIONNAIRES
GAD7 TOTAL SCORE: 11
1. FEELING NERVOUS, ANXIOUS, OR ON EDGE: NEARLY EVERY DAY
GAD7 TOTAL SCORE: 11
6. BECOMING EASILY ANNOYED OR IRRITABLE: NEARLY EVERY DAY
7. FEELING AFRAID AS IF SOMETHING AWFUL MIGHT HAPPEN: NOT AT ALL
3. WORRYING TOO MUCH ABOUT DIFFERENT THINGS: NEARLY EVERY DAY
5. BEING SO RESTLESS THAT IT IS HARD TO SIT STILL: NOT AT ALL
4. TROUBLE RELAXING: NOT AT ALL
2. NOT BEING ABLE TO STOP OR CONTROL WORRYING: MORE THAN HALF THE DAYS
IF YOU CHECKED OFF ANY PROBLEMS ON THIS QUESTIONNAIRE, HOW DIFFICULT HAVE THESE PROBLEMS MADE IT FOR YOU TO DO YOUR WORK, TAKE CARE OF THINGS AT HOME, OR GET ALONG WITH OTHER PEOPLE: NOT DIFFICULT AT ALL

## 2022-07-11 ASSESSMENT — PAIN SCALES - GENERAL: PAINLEVEL: EXTREME PAIN (8)

## 2022-07-11 NOTE — TELEPHONE ENCOUNTER
Received a call from patient to after her appt this morning stating OA called her and postponed her surgery out until Wednesday at 1:30 due to needing a COVID test. Can you sign for the COVID test? Hospital lab is aware of needing it STAT. I will schedule her tomorrow and the lab will watch for it. I have signed for it standing orders.

## 2022-07-11 NOTE — NURSING NOTE
"Chief Complaint   Patient presents with     Depression     Hypertension     Asthma       Initial BP (!) 172/78   Pulse 82   Temp 97.9  F (36.6  C)   Wt 89.8 kg (198 lb)   SpO2 97%   BMI 36.51 kg/m   Estimated body mass index is 36.51 kg/m  as calculated from the following:    Height as of 6/1/22: 1.568 m (5' 1.75\").    Weight as of this encounter: 89.8 kg (198 lb).  Medication Reconciliation: complete  Stormy Bird MA  "

## 2022-07-11 NOTE — PROGRESS NOTES
[facility] [place]  [address]  [facility] [place] [facility] [place] [address]  Phone: [phone]  Primary Provider: Danisha Dyer  Pre-op Performing Provider: DANISHA DYER      PREOPERATIVE EVALUATION:  Today's date: 7/11/2022    Rj Rodríguez is a 71 year old female who presents for a preoperative evaluation.    Surgical Information:  Surgery/Procedure: Left Cervical C6-C7 microdiskectomy   Surgery Location: [facility]  Surgeon: Dr. Santiago  Surgery Date: 7-  Time of Surgery: 1 PM  Where patient plans to recover: At home with family  Fax number for surgical facility: [phone].    Type of Anesthesia Anticipated: to be determined    Assessment & Plan     The proposed surgical procedure is considered INTERMEDIATE risk.    Preop general physical exam  Cervicalgia  No concerns noted. Cleared for surgery. BMP and CBC unremarkable. EKG does on 5/2022. Sinus rhythm with PVCs.     Essential hypertension  /62. Controlled. She also forgot to take her medications this morning. I therefore suspect it will be slightly lower when she takes her medications.     Mild episode of recurrent major depressive disorder (H)  ZEFERINO (generalized anxiety disorder)  Stable.    Morbid obesity (H)  BMI 36. FYI for surgery.     Hyperlipidemia, unspecified hyperlipidemia type  Does not tolerate statin.     Type 2 diabetes mellitus without complication, without long-term current use of insulin (H)  A1C was 6.1 on 5/20/22.      Risks and Recommendations:  The patient has the following additional risks and recommendations for perioperative complications:  Pulmonary:    - Incentive spirometry post-op    Hold all medications the morning of surgery except your diltiazem.     RECOMMENDATION:  APPROVAL GIVEN to proceed with proposed procedure, without further diagnostic evaluation.    Rj Rodríguez with a functional capacity of greater than four MET's. There are no obvious contraindications to proceeding with surgery at  this time. Recommend that the surgeon review risks and benefits of the procedure prior to proceeding.     Was recommended to avoid eating and drinking anything 12 hours prior to surgery unless his surgeon tells him otherwise.     [id number]}    Subjective     HPI related to upcoming procedure: Patient having severe neck pain. Radiating into left arm. Not sleeping.     PROCEDURE: MR CERVICAL SPINE W/O CONTRAST 6/1/2022 9:10 AM     HISTORY: Cervical radiculopathy     COMPARISONS: None.     Meds/Dose Given:     TECHNIQUE: Images were obtained sagittally T1 STIR and T2 weighted.  Images were obtained axially T2 weighted     FINDINGS: The lower brainstem and craniocervical junction appear  normal. The C1-C2 articulation appears normal.     The C2-C3 and C3-C4 disks are normal.     At C4-C5 there is some mild annular bulging and no significant  cervical cord or nerve root compressions are noted.     There has been fusion at C5-C6. No recurrent or residual disc  herniation or protrusion is noted.     At C6-C7 there is an asymmetric disc protrusion on the left. This  mildly compresses the left side of the cervical cord and moderately  compresses the left C7 nerve root.     The C7-T1 disc appears normal.     Cervical facet joint degenerative changes are noted in the upper  cervical spine.     Intradurally the cervical cord is normal.     The paravertebral soft tissues are normal.                                                                        IMPRESSION: Asymmetric disc protrusion C6-C7 on the left moderately  compressing the left C7 nerve root.     NOHEMI WADDELL MD       Preop Questions 7/11/2022   1. Have you ever had a heart attack or stroke? No   2. Have you ever had surgery on your heart or blood vessels, such as a stent placement, a coronary artery bypass, or surgery on an artery in your head, neck, heart, or legs? No   3. Do you have chest pain with activity? No   4. Do you have a history of  heart failure? No    5. Do you currently have a cold, bronchitis or symptoms of other infection? No   6. Do you have a cough, shortness of breath, or wheezing? No   7. Do you or anyone in your family have previous history of blood clots? No   8. Do you or does anyone in your family have a serious bleeding problem such as prolonged bleeding following surgeries or cuts? No   9. Have you ever had problems with anemia or been told to take iron pills? No   10. Have you had any abnormal blood loss such as black, tarry or bloody stools, or abnormal vaginal bleeding? No   11. Have you ever had a blood transfusion? No   12. Are you willing to have a blood transfusion if it is medically needed before, during, or after your surgery? Yes   13. Have you or any of your relatives ever had problems with anesthesia? YES - trouble waking up   14. Do you have sleep apnea, excessive snoring or daytime drowsiness? No   15. Do you have any artifical heart valves or other implanted medical devices like a pacemaker, defibrillator, or continuous glucose monitor? No   16. Do you have artificial joints? YES - cervical fusion   17. Are you allergic to latex? YES: severe     Health Care Directive:  Patient does not have a Health Care Directive or Living Will: Discussed advance care planning with patient; however, patient declined at this time.    Preoperative Review of :   reviewed - controlled substances reflected in medication list.      Status of Chronic Conditions:  DEPRESSION and ANXIETY - Patient has a long history of Depression and Anxiety of moderate severity requiring medication for control with recent symptoms being slightly worse. Current symptoms of depression include anxiety, irritablility. Uses Klonopin as needed. No thoughts of suicide. Working with her counselor.      DIABETES - Patient has a longstanding history of Diabetes Type II . Patient is being treated with diet, oral agents and exercise and denies significant side effects. Control  has been good. Complicating factors include but are not limited to: hypertension. A1C was 6.1 on 5/20/22.      HYPERLIPIDEMIA - Patient has a long history of significant Hyperlipidemia requiring medication for treatment with recent fair control. Does not take a statin due to side effects.      HYPERTENSION/Frequent PVC- Patient has longstanding history of HTN and palpitations, currently denies any symptoms referable to elevated blood pressure. Specifically denies chest pain, dyspnea, orthopnea, PND or peripheral edema. Blood pressure readings have been in normal range. Current medication regimen is as listed below. Patient denies any side effects of medication. Currently taking Aldactone 50 mg, diltiazem 180 mg, and losartan 50 mg. She does follow with cardiology. Stress test was done on 2/10/22 and was negative for reversible ischemia. No longer having palpitations.      She does not need Covid testing.     Mets greater than 4, able to walk around the block without stopping.     Review of Systems  CONSTITUTIONAL: NEGATIVE for fever, chills, change in weight  INTEGUMENTARY/SKIN: NEGATIVE for worrisome rashes, moles or lesions  EYES: NEGATIVE for vision changes or irritation  ENT/MOUTH: NEGATIVE for ear, mouth and throat problems  RESP: NEGATIVE for significant cough or SOB  CV: NEGATIVE for chest pain, palpitations or peripheral edema  GI: NEGATIVE for nausea, abdominal pain, heartburn, or change in bowel habits  : NEGATIVE for frequency, dysuria, or hematuria  MUSCULOSKELETAL: NEGATIVE for significant arthralgias or myalgia  NEURO: radicular pain left neck into left arm  ENDOCRINE: NEGATIVE for temperature intolerance, skin/hair changes  HEME: NEGATIVE for bleeding problems  PSYCHIATRIC: NEGATIVE for changes in mood or affect    Patient Active Problem List    Diagnosis Date Noted     Morbid obesity (H) 01/10/2022     Priority: Medium     Shortness of breath 08/09/2021     Priority: Medium     Irregular heart rate  08/09/2021     Priority: Medium     Frequent PVCs 08/09/2021     Priority: Medium     Chest pain, unspecified type 08/09/2021     Priority: Medium     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     Priority: Medium     Diabetes mellitus, type 2 (H) 12/06/2019     Priority: Medium     [place] cardiac risk 11% in next 10 years 10/29/2019     Priority: Medium     S/P rotator cuff repair 10/26/2016     Priority: Medium     Mild episode of recurrent major depressive disorder (H) 10/22/2014     Priority: Medium     Migraine with aura and without status migrainosus, not intractable 04/21/2014     Priority: Medium     S/P cervical spinal fusion 03/21/2014     Priority: Medium     Cervicalgia 12/10/2013     Priority: Medium     Vertigo 03/18/2013     Priority: Medium     Tinnitus 03/18/2013     Priority: Medium     Chronic rhinitis 03/18/2013     Priority: Medium     Lumbago 07/10/2012     Priority: Medium     Presbyopia 01/25/2011     Priority: Medium     Primary open-angle glaucoma 01/25/2011     Priority: Medium     Overview:   IMO Update 10/11       ZEFERINO (generalized anxiety disorder) 12/06/2006     Priority: Medium     Essential hypertension 06/02/2004     Priority: Medium     Overview:   IMO Update        Past Medical History:   Diagnosis Date     Arthritis      Chronic back pain      Hypertension      Irregular heart beat      Sleep apnea      Past Surgical History:   Procedure Laterality Date     ANESTHESIA OUT OF OR MRI N/A 6/1/2022    Procedure: MRI CERVICAL SPINE;  Surgeon: GENERIC ANESTHESIA PROVIDER;  Location: [facility] OR     APPENDECTOMY       BACK SURGERY       CHOLECYSTECTOMY       COLONOSCOPY  2012    Repeat in 10 years     GYN SURGERY       HYSTERECTOMY      Ovaries     RELEASE CARPAL TUNNEL      LT     RELEASE CARPAL TUNNEL      RT x2     SURGICAL RADIOLOGY PROCEDURE N/A 2/12/2016    Procedure: SURGICAL RADIOLOGY PROCEDURE;  Surgeon: Provider, Generic Perianesthesia Nursing;  Location: [facility] OR     SURGICAL RADIOLOGY  PROCEDURE N/A 8/15/2016    Procedure: SURGICAL RADIOLOGY PROCEDURE;  Surgeon: Provider, Generic Perianesthesia Nursing;  Location: [facility] OR     Current Outpatient Medications   Medication Sig Dispense Refill     albuterol (PROAIR HFA/PROVENTIL HFA/VENTOLIN HFA) 108 (90 Base) MCG/ACT inhaler Inhale 2 puffs into the lungs every 6 hours 18 g 0     blood glucose (ACCU-CHEK GUIDE) test strip Use to test blood sugar 1 time daily or as directed. 50 each 11     blood glucose monitoring (ACCU-CHEK FASTCLIX) lancets Use to test blood sugar 1 time daily or as directed. 102 each 11     cetirizine (ZYRTEC) 10 MG tablet Take 1 tablet (10 mg) by mouth daily as needed for allergies 60 tablet 12     clonazePAM (KLONOPIN) 0.5 MG tablet Take 1 tablet by mouth twice daily as needed for anxiety 30 tablet 0     diltiazem ER (TIAZAC) 240 MG 24 hr ER beaded capsule Take 1 capsule (240 mg) by mouth daily 90 capsule 3     EPINEPHrine (ANY BX GENERIC EQUIV) 0.3 MG/0.3ML injection 2-pack INJECT CONTENTS OF 1 PEN AS NEEDED FOR ALLERGIC REACTION 2 each 0     ipratropium-albuterol (COMBIVENT RESPIMAT)  MCG/ACT inhaler Inhale 1 puff into the lungs 4 times daily as needed for wheezing or other (chest tightness) 4 g 3     losartan (COZAAR) 50 MG tablet Take 1 tablet (50 mg) by mouth daily 90 tablet 3     ORDER FOR ALLERGEN IMMUNOTHERAPY Continue with 0.5ml weekly subcutaneous allergy injections. Follow standard maintenance protocols. 10 mL PRN     spironolactone (ALDACTONE) 50 MG tablet Take 1 tablet (50 mg) by mouth daily 90 tablet 3     tiZANidine (ZANAFLEX) 2 MG tablet Take 1 tablet (2 mg) by mouth 3 times daily as needed for muscle spasms 60 tablet 0     triamcinolone (NASACORT) 55 MCG/ACT nasal aerosol Spray 2 sprays into both nostrils daily 2 Bottle 12       Allergies   Allergen Reactions     Fruit [Peach] Anaphylaxis and Hives     fresh peaches and any fruit that has a pit.  Kiwi's and apples also.  Can eat any fruit cooked.     Nuts  Anaphylaxis and Hives     All Raw Nuts, can eat nuts when roasted.     Ace Inhibitors      Upper respiratory infection     Alkylamines Hives     Antihistamines     Alprazolam Hives     Xanax     Diphenhydramine Hives and Other (See Comments)     Keeps her awake       Erythromycin      BASE; pt. Not sure if this is a true allergy.     Guaifed      Pt unsure of reaction     Guaifenesin      Guaifed     Hydrochlorothiazide      No Clinical Screening - See Comments      Allergic:  Fresh peaches and any fruit that has a pit.  Kiwi's and apples also.  All raw nuts.  Can eat nuts when roasted or any fruit cooked.  Some seasonal allergies :  Hayfever     Olmesartan Medoxomil Cough     Benicar     Phenylephrine Hcl      Guaifed     Pseudoephedrine Hcl      Guaifed     Seasonal Allergies      hayfever     Tramadol      Sleep disturbance. Can not sleep, and when able to fall asleep will have terrible nightmares     Tramadol Hcl      Ultram, insomnia, nightmares, headache     Venlafaxine Other (See Comments)     Heartburn, reflux     Zoloft [Sertraline]      paranoia     Latex Other (See Comments), Swelling, Difficulty breathing and Rash     Throat Closes          Social History     Tobacco Use     Smoking status: Never Smoker     Smokeless tobacco: Never Used   Substance Use Topics     Alcohol use: Yes     Alcohol/week: 1.0 standard drink     Types: 1 Glasses of wine per week     Comment: occ glass of wine     Family History   Problem Relation Age of Onset     Colon Cancer Maternal Aunt      Colon Cancer Maternal Uncle      Diabetes Father         Type 2     Hypertension Father      Alcoholism Father      Alcoholism Mother      History   Drug Use No         Objective     /62   Pulse 82   Temp 97.9  F (36.6  C)   Wt 89.8 kg (198 lb)   SpO2 97%   BMI 36.51 kg/m      Physical Exam    GENERAL APPEARANCE: alert, over weight and anxious     EYES: EOMI, PERRL     HENT: ear canals and TM's normal and nose and mouth without  ulcers or lesions     NECK: no adenopathy, no asymmetry, masses, or scars and thyroid normal to palpation     RESP: lungs clear to auscultation - no rales, rhonchi or wheezes     CV: irregular rhythm due to her PVCs, normal S1 S2, no S3 or S4 and no murmur, click or rub      ABDOMEN:  soft, nontender, no HSM or masses and bowel sounds normal     MS: extremities normal- no gross deformities noted, no evidence of inflammation in joints     SKIN: no suspicious lesions or rashes     NEURO: Normal strength and tone, sensory exam grossly normal, mentation intact and speech normal     PSYCH: mentation appears normal. and affect normal/bright     LYMPHATICS: No cervical adenopathy    Recent Labs   Lab Test 05/20/22  1332 04/18/22  1208 04/11/22  2023 01/10/22  0947 07/18/21  1352 07/18/21  1351   HGB 14.4 14.7   < > 14.3   < >  --     273   < > 280   < >  --    INR  --   --   --   --   --  0.94    139   < >  --    < > 139   POTASSIUM 3.6 3.5   < >  --    < > 3.6   CR 0.75 0.72   < >  --    < > 0.77   A1C 6.1*  --   --  5.9*   < >  --     < > = values in this interval not displayed.        Diagnostics:  Recent Results (from the past 24 hour(s))   Basic metabolic panel    Collection Time: 07/11/22 11:00 AM   Result Value Ref Range    Sodium 137 133 - 144 mmol/L    Potassium 4.2 3.4 - 5.3 mmol/L    Chloride 110 (H) 94 - 109 mmol/L    Carbon Dioxide (CO2) 24 20 - 32 mmol/L    Anion Gap 3 3 - 14 mmol/L    Urea Nitrogen 13 7 - 30 mg/dL    Creatinine 0.87 0.52 - 1.04 mg/dL    Calcium 9.2 8.5 - 10.1 mg/dL    Glucose 100 (H) 70 - 99 mg/dL    GFR Estimate 72 >60 mL/min/1.73m2   CBC with platelets and differential    Collection Time: 07/11/22 11:00 AM   Result Value Ref Range    WBC Count 8.8 4.0 - 11.0 10e3/uL    RBC Count 4.57 3.80 - 5.20 10e6/uL    Hemoglobin 14.0 11.7 - 15.7 g/dL    Hematocrit 42.4 35.0 - 47.0 %    MCV 93 78 - 100 fL    MCH 30.6 26.5 - 33.0 pg    MCHC 33.0 31.5 - 36.5 g/dL    RDW 13.2 10.0 - 15.0 %     Platelet Count 287 150 - 450 10e3/uL    % Neutrophils 68 %    % Lymphocytes 18 %    % Monocytes 9 %    % Eosinophils 3 %    % Basophils 1 %    % Immature Granulocytes 1 %    NRBCs per 100 WBC 0 <1 /100    Absolute Neutrophils 6.0 1.6 - 8.3 10e3/uL    Absolute Lymphocytes 1.6 0.8 - 5.3 10e3/uL    Absolute Monocytes 0.8 0.0 - 1.3 10e3/uL    Absolute Eosinophils 0.2 0.0 - 0.7 10e3/uL    Absolute Basophils 0.1 0.0 - 0.2 10e3/uL    Absolute Immature Granulocytes 0.1 <=0.4 10e3/uL    Absolute NRBCs 0.0 10e3/uL        No EKG this visit, completed in the last 90 days.     EKG on 5/20/22 - sinus rhythm with PVCs    Revised Cardiac Risk Index (RCRI):  The patient has the following serious cardiovascular risks for perioperative complications:   - No serious cardiac risks = 0 points     RCRI Interpretation: 0 points: Class I (very low risk - 0.4% complication rate)           Signed Electronically by: Sheyla Sanches NP  Copy of this evaluation report is provided to requesting physician.

## 2022-07-11 NOTE — PATIENT INSTRUCTIONS
Preparing for Your Surgery  Getting started  A nurse will call you to review your health history and instructions. They will give you an arrival time based on your scheduled surgery time. Please be ready to share:    Your doctor's clinic name and phone number    Your medical, surgical and anesthesia history    A list of allergies and sensitivities    A list of medicines, including herbal treatments and over-the-counter drugs    Whether the patient has a legal guardian (ask how to send us the papers in advance)  Please tell us if you're pregnant--or if there's any chance you might be pregnant. Some surgeries may injure a fetus (unborn baby), so they require a pregnancy test. Surgeries that are safe for a fetus don't always need a test, and you can choose whether to have one.   If you have a child who's having surgery, please ask for a copy of Preparing for Your Child's Surgery.    Preparing for surgery    Within 30 days of surgery: Have a pre-op exam (sometimes called an H&P, or History and Physical). This can be done at a clinic or pre-operative center.  ? If you're having a , you may not need this exam. Talk to your care team.    At your pre-op exam, talk to your care team about all medicines you take. If you need to stop any medicines before surgery, ask when to start taking them again.  ? We do this for your safety. Many medicines can make you bleed too much during surgery. Some change how well surgery (anesthesia) drugs work.    Call your insurance company to let them know you're having surgery. (If you don't have insurance, call [phone].)    Call your clinic if there's any change in your health. This includes signs of a cold or flu (sore throat, runny nose, cough, rash, fever). It also includes a scrape or scratch near the surgery site.    If you have questions on the day of surgery, call your hospital or surgery center.  COVID testing  You may need to be tested for COVID-19 before having  surgery. If so, we will give you instructions.  Eating and drinking guidelines  For your safety: Unless your surgeon tells you otherwise, follow the guidelines below.    Eat and drink as usual until 8 hours before surgery. After that, no food or milk.    Drink clear liquids until 2 hours before surgery. These are liquids you can see through, like water, Gatorade and Propel Water. You may also have black coffee and tea (no cream or milk).    Nothing by mouth within 2 hours of surgery. This includes gum, candy and breath mints.    If you drink alcohol: Stop drinking it the night before surgery.    If your care team tells you to take medicine on the morning of surgery, it's okay to take it with a sip of water.  Preventing infection    Shower or bathe the night before and morning of your surgery. Follow the instructions your clinic gave you. (If no instructions, use regular soap.)    Don't shave or clip hair near your surgery site. We'll remove the hair if needed.    Don't smoke or vape the morning of surgery. You may chew nicotine gum up to 2 hours before surgery. A nicotine patch is okay.  ? Note: Some surgeries require you to completely quit smoking and nicotine. Check with your surgeon.    Your care team will make every effort to keep you safe from infection. We will:  ? Clean our hands often with soap and water (or an alcohol-based hand rub).  ? Clean the skin at your surgery site with a special soap that kills germs.  ? Give you a special gown to keep you warm. (Cold raises the risk of infection.)  ? Wear special hair covers, masks, gowns and gloves during surgery.  ? Give antibiotic medicine, if prescribed. Not all surgeries need antibiotics.  What to bring on the day of surgery    Photo ID and insurance card    Copy of your health care directive, if you have one    Glasses and hearing aides (bring cases)  ? You can't wear contacts during surgery    Inhaler and eye drops, if you use them (tell us about these when  you arrive)    CPAP machine or breathing device, if you use them    A few personal items, if spending the night    If you have . . .  ? A pacemaker, ICD (cardiac defibrillator) or other implant: Bring the ID card.  ? An implanted stimulator: Bring the remote control.  ? A legal guardian: Bring a copy of the certified (court-stamped) guardianship papers.  Please remove any jewelry, including body piercings. Leave jewelry and other valuables at home.  If you're going home the day of surgery    You must have a responsible adult drive you home. They should stay with you overnight as well.    If you don't have someone to stay with you, and you aren't safe to go home alone, we may keep you overnight. Insurance often won't pay for this.  After surgery  If it's hard to control your pain or you need more pain medicine, please call your surgeon's office.  Questions?   If you have any questions for your care team, list them here: _________________________________________________________________________________________________________________________________________________________________________ ____________________________________ ____________________________________ ____________________________________  For informational purposes only. Not to replace the advice of your health care provider. Copyright   2003, 2019 [facility]. All rights reserved. Clinically reviewed by Susanna Owens MD. [employer] [id number] - REV 07/21.

## 2022-07-12 ENCOUNTER — ALLIED HEALTH/NURSE VISIT (OUTPATIENT)
Dept: FAMILY MEDICINE | Facility: OTHER | Age: 72
End: 2022-07-12
Attending: NURSE PRACTITIONER
Payer: MEDICARE

## 2022-07-12 DIAGNOSIS — F41.1 GENERALIZED ANXIETY DISORDER: ICD-10-CM

## 2022-07-12 DIAGNOSIS — Z01.818 PREOP GENERAL PHYSICAL EXAM: ICD-10-CM

## 2022-07-12 LAB — SARS-COV-2 RNA RESP QL NAA+PROBE: NEGATIVE

## 2022-07-12 PROCEDURE — U0005 INFEC AGEN DETEC AMPLI PROBE: HCPCS | Mod: ZL

## 2022-07-14 NOTE — TELEPHONE ENCOUNTER
Klonopin      Last Written Prescription Date:  5.5.22  Last Fill Quantity: #30,   # refills: 0  Last Office Visit: 7.11.22  Future Office visit:       Routing refill request to provider for review/approval because:  Drug not on the [facility], [facility] or [facility] refill protocol or controlled substance

## 2022-07-15 RX ORDER — CLONAZEPAM 0.5 MG/1
TABLET ORAL
Qty: 30 TABLET | Refills: 0 | Status: SHIPPED | OUTPATIENT
Start: 2022-07-15 | End: 2023-01-03

## 2022-07-26 NOTE — PROGRESS NOTES
Verified pt by name and date of birth. Allergy injection given. Pt waiting 30 minutes for observation.           Mustarde Flap Text: The defect edges were debeveled with a #15 scalpel blade.  Given the size, depth and location of the defect and the proximity to free margins a Mustarde flap was deemed most appropriate.  Using a sterile surgical marker, an appropriate flap was drawn incorporating the defect. The area thus outlined was incised with a #15 scalpel blade.  The skin margins were undermined to an appropriate distance in all directions utilizing iris scissors.

## 2022-08-03 ENCOUNTER — ALLIED HEALTH/NURSE VISIT (OUTPATIENT)
Dept: ALLERGY | Facility: OTHER | Age: 72
End: 2022-08-03
Attending: NURSE PRACTITIONER
Payer: MEDICARE

## 2022-08-03 DIAGNOSIS — J30.89 PERENNIAL ALLERGIC RHINITIS: Primary | ICD-10-CM

## 2022-08-03 PROCEDURE — 95117 IMMUNOTHERAPY INJECTIONS: CPT

## 2022-08-03 NOTE — PROGRESS NOTES
Prior to injection verified pt identity using pt name and date of birth.    Allergy injection/s given and charted on paper allergy flow sheet.  Patient left AMA, signing form, not staying for the observation period.    Morgan Devries RN on 8/3/2022 at 11:29 AM

## 2022-08-10 ENCOUNTER — ALLIED HEALTH/NURSE VISIT (OUTPATIENT)
Dept: ALLERGY | Facility: OTHER | Age: 72
End: 2022-08-10
Attending: NURSE PRACTITIONER
Payer: MEDICARE

## 2022-08-10 DIAGNOSIS — J30.89 PERENNIAL ALLERGIC RHINITIS: Primary | ICD-10-CM

## 2022-08-10 PROCEDURE — 95117 IMMUNOTHERAPY INJECTIONS: CPT

## 2022-08-10 NOTE — PROGRESS NOTES
Prior to injection verified pt identity using pt name and date of birth.    Allergy injection/s given and charted on paper allergy flow sheet.  Patient left AMA, signing form, not staying for the observation period.    Morgan Devries RN on 8/10/2022 at 11:16 AM

## 2022-08-17 ENCOUNTER — ALLIED HEALTH/NURSE VISIT (OUTPATIENT)
Dept: ALLERGY | Facility: OTHER | Age: 72
End: 2022-08-17
Attending: NURSE PRACTITIONER
Payer: MEDICARE

## 2022-08-17 ENCOUNTER — MEDICAL CORRESPONDENCE (OUTPATIENT)
Dept: MRI IMAGING | Facility: HOSPITAL | Age: 72
End: 2022-08-17

## 2022-08-17 DIAGNOSIS — J30.89 PERENNIAL ALLERGIC RHINITIS: Primary | ICD-10-CM

## 2022-08-17 PROCEDURE — 95117 IMMUNOTHERAPY INJECTIONS: CPT

## 2022-08-17 NOTE — PROGRESS NOTES
Prior to injection verified pt identity using pt name and date of birth.    Allergy injection/s given and charted on paper allergy flow sheet.  Patient left AMA, signing form, not staying for the observation period.    Morgan Devries RN on 8/17/2022 at 11:34 AM

## 2022-08-24 ENCOUNTER — MEDICAL CORRESPONDENCE (OUTPATIENT)
Dept: MRI IMAGING | Facility: HOSPITAL | Age: 72
End: 2022-08-24

## 2022-08-30 ENCOUNTER — TELEPHONE (OUTPATIENT)
Dept: FAMILY MEDICINE | Facility: OTHER | Age: 72
End: 2022-08-30

## 2022-08-30 NOTE — TELEPHONE ENCOUNTER
11:45 AM    Reason for Call: OVERBOOK    Patient is having the following symptoms: Preop 09/23/2022 / sedated MRI / [facility] [place]    The patient is requesting an appointment with Sheyla Sanches.    Was an appointment offered for this call? No    Preferred method for responding to this message: Telephone Call  What is your phone number ?[phone]    If we cannot reach you directly, may we leave a detailed response at the number you provided? Yes    Can this message wait until your PCP/provider returns, if unavailable today? Not applicable    Archana Sullivan

## 2022-08-31 ENCOUNTER — ALLIED HEALTH/NURSE VISIT (OUTPATIENT)
Dept: ALLERGY | Facility: OTHER | Age: 72
End: 2022-08-31
Attending: NURSE PRACTITIONER
Payer: MEDICARE

## 2022-08-31 DIAGNOSIS — J30.89 PERENNIAL ALLERGIC RHINITIS: Primary | ICD-10-CM

## 2022-08-31 PROCEDURE — 95117 IMMUNOTHERAPY INJECTIONS: CPT

## 2022-08-31 NOTE — PROGRESS NOTES
Prior to injection verified pt identity using pt name and date of birth.    Allergy injection/s given and charted on paper allergy flow sheet.  Patient left AMA, signing form, not staying for the observation period.    Linn Govea RN on 8/31/2022 at 11:29 AM

## 2022-09-14 ENCOUNTER — ALLIED HEALTH/NURSE VISIT (OUTPATIENT)
Dept: ALLERGY | Facility: OTHER | Age: 72
End: 2022-09-14
Attending: NURSE PRACTITIONER
Payer: MEDICARE

## 2022-09-14 DIAGNOSIS — J30.89 PERENNIAL ALLERGIC RHINITIS: Primary | ICD-10-CM

## 2022-09-14 PROCEDURE — 95117 IMMUNOTHERAPY INJECTIONS: CPT

## 2022-09-14 NOTE — PROGRESS NOTES
Prior to injection verified pt identity using pt name and date of birth.    Allergy injection/s given and charted on paper allergy flow sheet.  Patient left AMA, signing form, not staying for the observation period.    Linn Govea RN on 9/14/2022 at 10:46 AM

## 2022-09-19 ENCOUNTER — ALLIED HEALTH/NURSE VISIT (OUTPATIENT)
Dept: FAMILY MEDICINE | Facility: OTHER | Age: 72
End: 2022-09-19
Attending: NURSE PRACTITIONER
Payer: MEDICARE

## 2022-09-19 DIAGNOSIS — Z01.818 PREOP GENERAL PHYSICAL EXAM: Primary | ICD-10-CM

## 2022-09-19 LAB — SARS-COV-2 RNA RESP QL NAA+PROBE: NEGATIVE

## 2022-09-19 PROCEDURE — U0003 INFECTIOUS AGENT DETECTION BY NUCLEIC ACID (DNA OR RNA); SEVERE ACUTE RESPIRATORY SYNDROME CORONAVIRUS 2 (SARS-COV-2) (CORONAVIRUS DISEASE [COVID-19]), AMPLIFIED PROBE TECHNIQUE, MAKING USE OF HIGH THROUGHPUT TECHNOLOGIES AS DESCRIBED BY CMS-2020-01-R: HCPCS | Mod: ZL

## 2022-09-20 NOTE — H&P (VIEW-ONLY)
[facility] [place]  [address]  [place] [address]  Phone: [phone]  Primary Provider: Danisha Dyer  Pre-op Performing Provider: DANISHA DYER      PREOPERATIVE EVALUATION:  Today's date: 9/21/2022    Rj Rodríguez is a 71 year old female who presents for a preoperative evaluation.    Surgical Information:  Surgery/Procedure: Sedated Cervical MRI  Surgery Location: [facility]  Surgeon: Dr. Zimmer/Dr. Santiago  Surgery Date: 9/23/22  Time of Surgery: TBD  Where patient plans to recover: At home with family  Fax number for surgical facility: Note does not need to be faxed, will be available electronically in Epic.    Type of Anesthesia Anticipated: to be determined    Assessment & Plan     The proposed surgical procedure is considered LOW risk.    Preop general physical exam  Cervicalgia  Cervical nerve root impingement  Cleared for surgery. CBC and BMP unremarkable. EKG without acute changes.     Unable to get her influenza vaccine as she had her allergy injection this morning. [facility]     Essential hypertension  Controlled.     Generalized anxiety disorder  Mild episode of recurrent major depressive disorder (H)  Stable    Hyperlipidemia, unspecified hyperlipidemia type  Does not take statin. Does not tolerate.     Type 2 diabetes mellitus without complication, without long-term current use of insulin (H)  Controlled. A1C 5.9.   - Hemoglobin A1c        Risks and Recommendations:  The patient has the following additional risks and recommendations for perioperative complications:  Pulmonary:    - Incentive spirometry post-op    She will hold all medications the morning of surgery except her diltiazem. Ok to use her albuterol inhaler if needed.     RECOMMENDATION:  APPROVAL GIVEN to proceed with proposed procedure, without further diagnostic evaluation.    Rj Rodríguez with a functional capacity of greater than four MET's. There are no obvious contraindications  to proceeding with surgery at this time. Recommend that the surgeon review risks and benefits of the procedure prior to proceeding.     Was recommended to avoid eating and drinking anything 12 hours prior to surgery unless his surgeon tells him otherwise. }    Subjective     HPI related to upcoming procedure: Patient with chronic neck pain. Needs repeat MRI. Due to anxiety, plan will be to complete under sedation.       Preop Questions 9/21/2022   1. Have you ever had a heart attack or stroke? No   2. Have you ever had surgery on your heart or blood vessels, such as a stent placement, a coronary artery bypass, or surgery on an artery in your head, neck, heart, or legs? No   3. Do you have chest pain with activity? No   4. Do you have a history of  heart failure? No   5. Do you currently have a cold, bronchitis or symptoms of other infection? No   6. Do you have a cough, shortness of breath, or wheezing? No   7. Do you or anyone in your family have previous history of blood clots? No   8. Do you or does anyone in your family have a serious bleeding problem such as prolonged bleeding following surgeries or cuts? No   9. Have you ever had problems with anemia or been told to take iron pills? No   10. Have you had any abnormal blood loss such as black, tarry or bloody stools, or abnormal vaginal bleeding? No   11. Have you ever had a blood transfusion? No   12. Are you willing to have a blood transfusion if it is medically needed before, during, or after your surgery? Yes   13. Have you or any of your relatives ever had problems with anesthesia? YES - hard time waking up and gets sick    14. Do you have sleep apnea, excessive snoring or daytime drowsiness? No   15. Do you have any artifical heart valves or other implanted medical devices like a pacemaker, defibrillator, or continuous glucose monitor? No   16. Do you have artificial joints? YES - fusion in back, anchors in right shoulder    17. Are you allergic to latex?  YES        Health Care Directive:  Patient does not have a Health Care Directive or Living Will: Discussed advance care planning with patient; however, patient declined at this time.    Preoperative Review of :   reviewed - controlled substances reflected in medication list.      Status of Chronic Conditions:  DEPRESSION and ANXIETY - Patient has a long history of Depression and Anxiety of moderate severity requiring medication for control with recent symptoms being slightly worse. Current symptoms of depression include anxiety, irritablility. Uses Klonopin as needed. No thoughts of suicide. Working with her counselor.      DIABETES - Patient has a longstanding history of Diabetes Type II . Patient is being treated with diet, oral agents and exercise and denies significant side effects. Control has been good. Complicating factors include but are not limited to: hypertension. A1C was 6.1 on 5/20/22.      HYPERLIPIDEMIA - Patient has a long history of significant Hyperlipidemia requiring medication for treatment with recent fair control. Does not take a statin due to side effects.      HYPERTENSION/Frequent PVC- Patient has longstanding history of HTN and palpitations, currently denies any symptoms referable to elevated blood pressure. Specifically denies chest pain, dyspnea, orthopnea, PND or peripheral edema. Blood pressure readings have been in normal range. Current medication regimen is as listed below. Patient denies any side effects of medication. Currently taking Aldactone 50 mg, diltiazem 180 mg, and losartan 50 mg. She does follow with cardiology. Stress test was done on 2/10/22 and was negative for reversible ischemia. No longer having palpitations.      Covid testing done on 9/19/22. Negative.      Mets greater than 4, able to walk around the block without stopping.     Review of Systems  CONSTITUTIONAL: NEGATIVE for fever, chills, change in weight  INTEGUMENTARY/SKIN: NEGATIVE for worrisome rashes,  moles or lesions  EYES: NEGATIVE for vision changes or irritation  ENT/MOUTH: NEGATIVE for ear, mouth and throat problems  RESP: NEGATIVE for significant cough or SOB  CV: NEGATIVE for chest pain, palpitations or peripheral edema  GI: NEGATIVE for nausea, abdominal pain, heartburn, or change in bowel habits  : NEGATIVE for frequency, dysuria, or hematuria  MUSCULOSKELETAL:POSITIVE  for neck pain-radiates into left arm  NEURO: NEGATIVE for weakness, dizziness or paresthesias  ENDOCRINE: NEGATIVE for temperature intolerance, skin/hair changes  HEME: NEGATIVE for bleeding problems  PSYCHIATRIC: NEGATIVE for changes in mood or affect    Patient Active Problem List    Diagnosis Date Noted     Morbid obesity (H) 01/10/2022     Priority: Medium     Shortness of breath 08/09/2021     Priority: Medium     Irregular heart rate 08/09/2021     Priority: Medium     Frequent PVCs 08/09/2021     Priority: Medium     Chest pain, unspecified type 08/09/2021     Priority: Medium     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     Priority: Medium     Diabetes mellitus, type 2 (H) 12/06/2019     Priority: Medium     [place] cardiac risk 11% in next 10 years 10/29/2019     Priority: Medium     S/P rotator cuff repair 10/26/2016     Priority: Medium     Mild episode of recurrent major depressive disorder (H) 10/22/2014     Priority: Medium     Migraine with aura and without status migrainosus, not intractable 04/21/2014     Priority: Medium     S/P cervical spinal fusion 03/21/2014     Priority: Medium     Cervicalgia 12/10/2013     Priority: Medium     Vertigo 03/18/2013     Priority: Medium     Tinnitus 03/18/2013     Priority: Medium     Chronic rhinitis 03/18/2013     Priority: Medium     Lumbago 07/10/2012     Priority: Medium     Presbyopia 01/25/2011     Priority: Medium     Primary open-angle glaucoma 01/25/2011     Priority: Medium     Overview:   IMO Update 10/11       ZEFERINO (generalized anxiety disorder) 12/06/2006     Priority:  Medium     Essential hypertension 06/02/2004     Priority: Medium     Overview:   IMO Update        Past Medical History:   Diagnosis Date     Arthritis      Chronic back pain      Hypertension      Irregular heart beat      Sleep apnea      Past Surgical History:   Procedure Laterality Date     ANESTHESIA OUT OF OR MRI N/A 6/1/2022    Procedure: MRI CERVICAL SPINE;  Surgeon: GENERIC ANESTHESIA PROVIDER;  Location: [facility] OR     APPENDECTOMY       BACK SURGERY       CHOLECYSTECTOMY       COLONOSCOPY  2012    Repeat in 10 years     GYN SURGERY       HYSTERECTOMY      Ovaries     RELEASE CARPAL TUNNEL      LT     RELEASE CARPAL TUNNEL      RT x2     SURGICAL RADIOLOGY PROCEDURE N/A 2/12/2016    Procedure: SURGICAL RADIOLOGY PROCEDURE;  Surgeon: Provider, Generic Perianesthesia Nursing;  Location: [facility] OR     SURGICAL RADIOLOGY PROCEDURE N/A 8/15/2016    Procedure: SURGICAL RADIOLOGY PROCEDURE;  Surgeon: Provider, Generic Perianesthesia Nursing;  Location: [facility]     Current Outpatient Medications   Medication Sig Dispense Refill     albuterol (PROAIR HFA/PROVENTIL HFA/VENTOLIN HFA) 108 (90 Base) MCG/ACT inhaler Inhale 2 puffs into the lungs every 6 hours 18 g 0     blood glucose (ACCU-CHEK GUIDE) test strip Use to test blood sugar 1 time daily or as directed. 50 each 11     blood glucose monitoring (ACCU-CHEK FASTCLIX) lancets Use to test blood sugar 1 time daily or as directed. 102 each 11     cetirizine (ZYRTEC) 10 MG tablet Take 1 tablet (10 mg) by mouth daily as needed for allergies 60 tablet 12     clonazePAM (KLONOPIN) 0.5 MG tablet Take 1 tablet by mouth twice daily as needed for anxiety 30 tablet 0     diltiazem ER (TIAZAC) 240 MG 24 hr ER beaded capsule Take 1 capsule (240 mg) by mouth daily 90 capsule 3     EPINEPHrine (ANY BX GENERIC EQUIV) 0.3 MG/0.3ML injection 2-pack INJECT CONTENTS OF 1 PEN AS NEEDED FOR ALLERGIC REACTION 2 each 0     ipratropium-albuterol (COMBIVENT RESPIMAT)  MCG/ACT inhaler Inhale 1  puff into the lungs 4 times daily as needed for wheezing or other (chest tightness) 4 g 3     losartan (COZAAR) 50 MG tablet Take 1 tablet (50 mg) by mouth daily 90 tablet 3     ORDER FOR ALLERGEN IMMUNOTHERAPY Continue with 0.5ml weekly subcutaneous allergy injections. Follow standard maintenance protocols. 10 mL PRN     spironolactone (ALDACTONE) 50 MG tablet Take 1 tablet (50 mg) by mouth daily 90 tablet 3     tiZANidine (ZANAFLEX) 2 MG tablet Take 1 tablet (2 mg) by mouth 3 times daily as needed for muscle spasms 60 tablet 0     triamcinolone (NASACORT) 55 MCG/ACT nasal aerosol Spray 2 sprays into both nostrils daily 2 Bottle 12       Allergies   Allergen Reactions     Fruit [Peach] Anaphylaxis and Hives     fresh peaches and any fruit that has a pit.  Kiwi's and apples also.  Can eat any fruit cooked.     Nuts Anaphylaxis and Hives     All Raw Nuts, can eat nuts when roasted.     Ace Inhibitors      Upper respiratory infection     Alkylamines Hives     Antihistamines     Alprazolam Hives     Xanax     Diphenhydramine Hives and Other (See Comments)     Keeps her awake       Erythromycin      BASE; pt. Not sure if this is a true allergy.     Guaifed      Pt unsure of reaction     Guaifenesin      Guaifed     Hydrochlorothiazide      No Clinical Screening - See Comments      Allergic:  Fresh peaches and any fruit that has a pit.  Kiwi's and apples also.  All raw nuts.  Can eat nuts when roasted or any fruit cooked.  Some seasonal allergies :  Hayfever     Olmesartan Medoxomil Cough     Benicar     Phenylephrine Hcl      Guaifed     Pseudoephedrine Hcl      Guaifed     Seasonal Allergies      hayfever     Tramadol      Sleep disturbance. Can not sleep, and when able to fall asleep will have terrible nightmares     Tramadol Hcl      Ultram, insomnia, nightmares, headache     Venlafaxine Other (See Comments)     Heartburn, reflux     Zoloft [Sertraline]      paranoia     Latex Other (See Comments), Swelling,  "Difficulty breathing and Rash     Throat Closes          Social History     Tobacco Use     Smoking status: Never Smoker     Smokeless tobacco: Never Used   Substance Use Topics     Alcohol use: Yes     Alcohol/week: 1.0 standard drink     Types: 1 Glasses of wine per week     Comment: occ glass of wine     Family History   Problem Relation Age of Onset     Colon Cancer Maternal Aunt      Colon Cancer Maternal Uncle      Diabetes Father         Type 2     Hypertension Father      Alcoholism Father      Alcoholism Mother      History   Drug Use No         Objective     /74   Pulse 81   Temp 96.9  F (36.1  C) (Tympanic)   Resp 20   Ht 1.556 m (5' 1.25\")   Wt 90.7 kg (200 lb)   SpO2 97%   BMI 37.48 kg/m      Physical Exam    GENERAL APPEARANCE: alert, active, no distress and over weight     EYES: EOMI, PERRL     HENT: ear canals and TM's normal and nose and mouth without ulcers or lesions     NECK: no adenopathy, no asymmetry, masses, or scars and thyroid normal to palpation     RESP: lungs clear to auscultation - no rales, rhonchi or wheezes     CV: regular rate, irregular rhythm-frequent PVCs, normal S1 S2, no S3 or S4 and no murmur, click or rub     ABDOMEN:  soft, nontender, no HSM or masses and bowel sounds normal     MS: extremities normal- no gross deformities noted, no evidence of inflammation in joints, FROM in all extremities.     SKIN: no suspicious lesions or rashes     NEURO: Normal strength and tone, sensory exam grossly normal, mentation intact and speech normal     PSYCH: mentation appears normal. and affect normal/bright     LYMPHATICS: No cervical adenopathy    Recent Labs   Lab Test 07/11/22  1100 05/20/22  1332 04/11/22  2023 01/10/22  0947 07/18/21  1352 07/18/21  1351   HGB 14.0 14.4   < > 14.3   < >  --     269   < > 280   < >  --    INR  --   --   --   --   --  0.94    137   < >  --    < > 139   POTASSIUM 4.2 3.6   < >  --    < > 3.6   CR 0.87 0.75   < >  --    < > 0.77 "   A1C  --  6.1*  --  5.9*   < >  --     < > = values in this interval not displayed.        Diagnostics:  Recent Results (from the past 24 hour(s))   Basic metabolic panel    Collection Time: 09/21/22  1:34 PM   Result Value Ref Range    Sodium 138 133 - 144 mmol/L    Potassium 3.7 3.4 - 5.3 mmol/L    Chloride 107 94 - 109 mmol/L    Carbon Dioxide (CO2) 24 20 - 32 mmol/L    Anion Gap 7 3 - 14 mmol/L    Urea Nitrogen 12 7 - 30 mg/dL    Creatinine 0.76 0.52 - 1.04 mg/dL    Calcium 9.3 8.5 - 10.1 mg/dL    Glucose 100 (H) 70 - 99 mg/dL    GFR Estimate 84 >60 mL/min/1.73m2   CBC with platelets and differential    Collection Time: 09/21/22  1:34 PM   Result Value Ref Range    WBC Count 9.4 4.0 - 11.0 10e3/uL    RBC Count 4.59 3.80 - 5.20 10e6/uL    Hemoglobin 14.1 11.7 - 15.7 g/dL    Hematocrit 41.8 35.0 - 47.0 %    MCV 91 78 - 100 fL    MCH 30.7 26.5 - 33.0 pg    MCHC 33.7 31.5 - 36.5 g/dL    RDW 12.7 10.0 - 15.0 %    Platelet Count 291 150 - 450 10e3/uL    % Neutrophils 70 %    % Lymphocytes 19 %    % Monocytes 8 %    % Eosinophils 2 %    % Basophils 1 %    % Immature Granulocytes 0 %    NRBCs per 100 WBC 0 <1 /100    Absolute Neutrophils 6.5 1.6 - 8.3 10e3/uL    Absolute Lymphocytes 1.8 0.8 - 5.3 10e3/uL    Absolute Monocytes 0.8 0.0 - 1.3 10e3/uL    Absolute Eosinophils 0.2 0.0 - 0.7 10e3/uL    Absolute Basophils 0.1 0.0 - 0.2 10e3/uL    Absolute Immature Granulocytes 0.0 <=0.4 10e3/uL    Absolute NRBCs 0.0 10e3/uL        EKG; sinus rhythm with PVCs, VR 88, unchanged from previous EKGs    Revised Cardiac Risk Index (RCRI):  The patient has the following serious cardiovascular risks for perioperative complications:   - No serious cardiac risks = 0 points     RCRI Interpretation: 0 points: Class I (very low risk - 0.4% complication rate)           Signed Electronically by: Sheyla Sanches NP  Copy of this evaluation report is provided to requesting physician.

## 2022-09-20 NOTE — PROGRESS NOTES
[facility] - [place]  [address]  [place] [address]  Phone: [phone]  Primary Provider: Danisha Dyer  Pre-op Performing Provider: DANISHA DYER      PREOPERATIVE EVALUATION:  Today's date: 9/21/2022    Rj Rodríguez is a 71 year old female who presents for a preoperative evaluation.    Surgical Information:  Surgery/Procedure: Sedated Cervical MRI  Surgery Location: [facility]  Surgeon: Dr. Zimmer/Dr. Santiago  Surgery Date: 9/23/22  Time of Surgery: TBD  Where patient plans to recover: At home with family  Fax number for surgical facility: Note does not need to be faxed, will be available electronically in Epic.    Type of Anesthesia Anticipated: to be determined    Assessment & Plan     The proposed surgical procedure is considered LOW risk.    Preop general physical exam  Cervicalgia  Cervical nerve root impingement  Cleared for surgery. CBC and BMP unremarkable. EKG without acute changes.     Unable to get her influenza vaccine as she had her allergy injection this morning. [facility]     Essential hypertension  Controlled.     Generalized anxiety disorder  Mild episode of recurrent major depressive disorder (H)  Stable    Hyperlipidemia, unspecified hyperlipidemia type  Does not take statin. Does not tolerate.     Type 2 diabetes mellitus without complication, without long-term current use of insulin (H)  Controlled. A1C 5.9.   - Hemoglobin A1c        Risks and Recommendations:  The patient has the following additional risks and recommendations for perioperative complications:  Pulmonary:    - Incentive spirometry post-op    She will hold all medications the morning of surgery except her diltiazem. Ok to use her albuterol inhaler if needed.     RECOMMENDATION:  APPROVAL GIVEN to proceed with proposed procedure, without further diagnostic evaluation.    Rj Rodríguez with a functional capacity of greater than four MET's. There are no obvious contraindications  to proceeding with surgery at this time. Recommend that the surgeon review risks and benefits of the procedure prior to proceeding.     Was recommended to avoid eating and drinking anything 12 hours prior to surgery unless his surgeon tells him otherwise. }    Subjective     HPI related to upcoming procedure: Patient with chronic neck pain. Needs repeat MRI. Due to anxiety, plan will be to complete under sedation.       Preop Questions 9/21/2022   1. Have you ever had a heart attack or stroke? No   2. Have you ever had surgery on your heart or blood vessels, such as a stent placement, a coronary artery bypass, or surgery on an artery in your head, neck, heart, or legs? No   3. Do you have chest pain with activity? No   4. Do you have a history of  heart failure? No   5. Do you currently have a cold, bronchitis or symptoms of other infection? No   6. Do you have a cough, shortness of breath, or wheezing? No   7. Do you or anyone in your family have previous history of blood clots? No   8. Do you or does anyone in your family have a serious bleeding problem such as prolonged bleeding following surgeries or cuts? No   9. Have you ever had problems with anemia or been told to take iron pills? No   10. Have you had any abnormal blood loss such as black, tarry or bloody stools, or abnormal vaginal bleeding? No   11. Have you ever had a blood transfusion? No   12. Are you willing to have a blood transfusion if it is medically needed before, during, or after your surgery? Yes   13. Have you or any of your relatives ever had problems with anesthesia? YES - hard time waking up and gets sick    14. Do you have sleep apnea, excessive snoring or daytime drowsiness? No   15. Do you have any artifical heart valves or other implanted medical devices like a pacemaker, defibrillator, or continuous glucose monitor? No   16. Do you have artificial joints? YES - fusion in back, anchors in right shoulder    17. Are you allergic to latex?  YES        Health Care Directive:  Patient does not have a Health Care Directive or Living Will: Discussed advance care planning with patient; however, patient declined at this time.    Preoperative Review of :   reviewed - controlled substances reflected in medication list.      Status of Chronic Conditions:  DEPRESSION and ANXIETY - Patient has a long history of Depression and Anxiety of moderate severity requiring medication for control with recent symptoms being slightly worse. Current symptoms of depression include anxiety, irritablility. Uses Klonopin as needed. No thoughts of suicide. Working with her counselor.      DIABETES - Patient has a longstanding history of Diabetes Type II . Patient is being treated with diet, oral agents and exercise and denies significant side effects. Control has been good. Complicating factors include but are not limited to: hypertension. A1C was 6.1 on 5/20/22.      HYPERLIPIDEMIA - Patient has a long history of significant Hyperlipidemia requiring medication for treatment with recent fair control. Does not take a statin due to side effects.      HYPERTENSION/Frequent PVC- Patient has longstanding history of HTN and palpitations, currently denies any symptoms referable to elevated blood pressure. Specifically denies chest pain, dyspnea, orthopnea, PND or peripheral edema. Blood pressure readings have been in normal range. Current medication regimen is as listed below. Patient denies any side effects of medication. Currently taking Aldactone 50 mg, diltiazem 180 mg, and losartan 50 mg. She does follow with cardiology. Stress test was done on 2/10/22 and was negative for reversible ischemia. No longer having palpitations.      Covid testing done on 9/19/22. Negative.      Mets greater than 4, able to walk around the block without stopping.     Review of Systems  CONSTITUTIONAL: NEGATIVE for fever, chills, change in weight  INTEGUMENTARY/SKIN: NEGATIVE for worrisome rashes,  moles or lesions  EYES: NEGATIVE for vision changes or irritation  ENT/MOUTH: NEGATIVE for ear, mouth and throat problems  RESP: NEGATIVE for significant cough or SOB  CV: NEGATIVE for chest pain, palpitations or peripheral edema  GI: NEGATIVE for nausea, abdominal pain, heartburn, or change in bowel habits  : NEGATIVE for frequency, dysuria, or hematuria  MUSCULOSKELETAL:POSITIVE  for neck pain-radiates into left arm  NEURO: NEGATIVE for weakness, dizziness or paresthesias  ENDOCRINE: NEGATIVE for temperature intolerance, skin/hair changes  HEME: NEGATIVE for bleeding problems  PSYCHIATRIC: NEGATIVE for changes in mood or affect    Patient Active Problem List    Diagnosis Date Noted     Morbid obesity (H) 01/10/2022     Priority: Medium     Shortness of breath 08/09/2021     Priority: Medium     Irregular heart rate 08/09/2021     Priority: Medium     Frequent PVCs 08/09/2021     Priority: Medium     Chest pain, unspecified type 08/09/2021     Priority: Medium     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     Priority: Medium     Diabetes mellitus, type 2 (H) 12/06/2019     Priority: Medium     [place] cardiac risk 11% in next 10 years 10/29/2019     Priority: Medium     S/P rotator cuff repair 10/26/2016     Priority: Medium     Mild episode of recurrent major depressive disorder (H) 10/22/2014     Priority: Medium     Migraine with aura and without status migrainosus, not intractable 04/21/2014     Priority: Medium     S/P cervical spinal fusion 03/21/2014     Priority: Medium     Cervicalgia 12/10/2013     Priority: Medium     Vertigo 03/18/2013     Priority: Medium     Tinnitus 03/18/2013     Priority: Medium     Chronic rhinitis 03/18/2013     Priority: Medium     Lumbago 07/10/2012     Priority: Medium     Presbyopia 01/25/2011     Priority: Medium     Primary open-angle glaucoma 01/25/2011     Priority: Medium     Overview:   IMO Update 10/11       ZEFERINO (generalized anxiety disorder) 12/06/2006     Priority:  Medium     Essential hypertension 06/02/2004     Priority: Medium     Overview:   IMO Update        Past Medical History:   Diagnosis Date     Arthritis      Chronic back pain      Hypertension      Irregular heart beat      Sleep apnea      Past Surgical History:   Procedure Laterality Date     ANESTHESIA OUT OF OR MRI N/A 6/1/2022    Procedure: MRI CERVICAL SPINE;  Surgeon: GENERIC ANESTHESIA PROVIDER;  Location: [facility] OR     APPENDECTOMY       BACK SURGERY       CHOLECYSTECTOMY       COLONOSCOPY  2012    Repeat in 10 years     GYN SURGERY       HYSTERECTOMY      Ovaries     RELEASE CARPAL TUNNEL      LT     RELEASE CARPAL TUNNEL      RT x2     SURGICAL RADIOLOGY PROCEDURE N/A 2/12/2016    Procedure: SURGICAL RADIOLOGY PROCEDURE;  Surgeon: Provider, Generic Perianesthesia Nursing;  Location: [facility] OR     SURGICAL RADIOLOGY PROCEDURE N/A 8/15/2016    Procedure: SURGICAL RADIOLOGY PROCEDURE;  Surgeon: Provider, Generic Perianesthesia Nursing;  Location: [facility] OR     Current Outpatient Medications   Medication Sig Dispense Refill     albuterol (PROAIR HFA/PROVENTIL HFA/VENTOLIN HFA) 108 (90 Base) MCG/ACT inhaler Inhale 2 puffs into the lungs every 6 hours 18 g 0     blood glucose (ACCU-CHEK GUIDE) test strip Use to test blood sugar 1 time daily or as directed. 50 each 11     blood glucose monitoring (ACCU-CHEK FASTCLIX) lancets Use to test blood sugar 1 time daily or as directed. 102 each 11     cetirizine (ZYRTEC) 10 MG tablet Take 1 tablet (10 mg) by mouth daily as needed for allergies 60 tablet 12     clonazePAM (KLONOPIN) 0.5 MG tablet Take 1 tablet by mouth twice daily as needed for anxiety 30 tablet 0     diltiazem ER (TIAZAC) 240 MG 24 hr ER beaded capsule Take 1 capsule (240 mg) by mouth daily 90 capsule 3     EPINEPHrine (ANY BX GENERIC EQUIV) 0.3 MG/0.3ML injection 2-pack INJECT CONTENTS OF 1 PEN AS NEEDED FOR ALLERGIC REACTION 2 each 0     ipratropium-albuterol (COMBIVENT RESPIMAT)  MCG/ACT inhaler Inhale 1  puff into the lungs 4 times daily as needed for wheezing or other (chest tightness) 4 g 3     losartan (COZAAR) 50 MG tablet Take 1 tablet (50 mg) by mouth daily 90 tablet 3     ORDER FOR ALLERGEN IMMUNOTHERAPY Continue with 0.5ml weekly subcutaneous allergy injections. Follow standard maintenance protocols. 10 mL PRN     spironolactone (ALDACTONE) 50 MG tablet Take 1 tablet (50 mg) by mouth daily 90 tablet 3     tiZANidine (ZANAFLEX) 2 MG tablet Take 1 tablet (2 mg) by mouth 3 times daily as needed for muscle spasms 60 tablet 0     triamcinolone (NASACORT) 55 MCG/ACT nasal aerosol Spray 2 sprays into both nostrils daily 2 Bottle 12       Allergies   Allergen Reactions     Fruit [Peach] Anaphylaxis and Hives     fresh peaches and any fruit that has a pit.  Kiwi's and apples also.  Can eat any fruit cooked.     Nuts Anaphylaxis and Hives     All Raw Nuts, can eat nuts when roasted.     Ace Inhibitors      Upper respiratory infection     Alkylamines Hives     Antihistamines     Alprazolam Hives     Xanax     Diphenhydramine Hives and Other (See Comments)     Keeps her awake       Erythromycin      BASE; pt. Not sure if this is a true allergy.     Guaifed      Pt unsure of reaction     Guaifenesin      Guaifed     Hydrochlorothiazide      No Clinical Screening - See Comments      Allergic:  Fresh peaches and any fruit that has a pit.  Kiwi's and apples also.  All raw nuts.  Can eat nuts when roasted or any fruit cooked.  Some seasonal allergies :  Hayfever     Olmesartan Medoxomil Cough     Benicar     Phenylephrine Hcl      Guaifed     Pseudoephedrine Hcl      Guaifed     Seasonal Allergies      hayfever     Tramadol      Sleep disturbance. Can not sleep, and when able to fall asleep will have terrible nightmares     Tramadol Hcl      Ultram, insomnia, nightmares, headache     Venlafaxine Other (See Comments)     Heartburn, reflux     Zoloft [Sertraline]      paranoia     Latex Other (See Comments), Swelling,  "Difficulty breathing and Rash     Throat Closes          Social History     Tobacco Use     Smoking status: Never Smoker     Smokeless tobacco: Never Used   Substance Use Topics     Alcohol use: Yes     Alcohol/week: 1.0 standard drink     Types: 1 Glasses of wine per week     Comment: occ glass of wine     Family History   Problem Relation Age of Onset     Colon Cancer Maternal Aunt      Colon Cancer Maternal Uncle      Diabetes Father         Type 2     Hypertension Father      Alcoholism Father      Alcoholism Mother      History   Drug Use No         Objective     /74   Pulse 81   Temp 96.9  F (36.1  C) (Tympanic)   Resp 20   Ht 1.556 m (5' 1.25\")   Wt 90.7 kg (200 lb)   SpO2 97%   BMI 37.48 kg/m      Physical Exam    GENERAL APPEARANCE: alert, active, no distress and over weight     EYES: EOMI, PERRL     HENT: ear canals and TM's normal and nose and mouth without ulcers or lesions     NECK: no adenopathy, no asymmetry, masses, or scars and thyroid normal to palpation     RESP: lungs clear to auscultation - no rales, rhonchi or wheezes     CV: regular rate, irregular rhythm-frequent PVCs, normal S1 S2, no S3 or S4 and no murmur, click or rub     ABDOMEN:  soft, nontender, no HSM or masses and bowel sounds normal     MS: extremities normal- no gross deformities noted, no evidence of inflammation in joints, FROM in all extremities.     SKIN: no suspicious lesions or rashes     NEURO: Normal strength and tone, sensory exam grossly normal, mentation intact and speech normal     PSYCH: mentation appears normal. and affect normal/bright     LYMPHATICS: No cervical adenopathy    Recent Labs   Lab Test 07/11/22  1100 05/20/22  1332 04/11/22  2023 01/10/22  0947 07/18/21  1352 07/18/21  1351   HGB 14.0 14.4   < > 14.3   < >  --     269   < > 280   < >  --    INR  --   --   --   --   --  0.94    137   < >  --    < > 139   POTASSIUM 4.2 3.6   < >  --    < > 3.6   CR 0.87 0.75   < >  --    < > 0.77 "   A1C  --  6.1*  --  5.9*   < >  --     < > = values in this interval not displayed.        Diagnostics:  Recent Results (from the past 24 hour(s))   Basic metabolic panel    Collection Time: 09/21/22  1:34 PM   Result Value Ref Range    Sodium 138 133 - 144 mmol/L    Potassium 3.7 3.4 - 5.3 mmol/L    Chloride 107 94 - 109 mmol/L    Carbon Dioxide (CO2) 24 20 - 32 mmol/L    Anion Gap 7 3 - 14 mmol/L    Urea Nitrogen 12 7 - 30 mg/dL    Creatinine 0.76 0.52 - 1.04 mg/dL    Calcium 9.3 8.5 - 10.1 mg/dL    Glucose 100 (H) 70 - 99 mg/dL    GFR Estimate 84 >60 mL/min/1.73m2   CBC with platelets and differential    Collection Time: 09/21/22  1:34 PM   Result Value Ref Range    WBC Count 9.4 4.0 - 11.0 10e3/uL    RBC Count 4.59 3.80 - 5.20 10e6/uL    Hemoglobin 14.1 11.7 - 15.7 g/dL    Hematocrit 41.8 35.0 - 47.0 %    MCV 91 78 - 100 fL    MCH 30.7 26.5 - 33.0 pg    MCHC 33.7 31.5 - 36.5 g/dL    RDW 12.7 10.0 - 15.0 %    Platelet Count 291 150 - 450 10e3/uL    % Neutrophils 70 %    % Lymphocytes 19 %    % Monocytes 8 %    % Eosinophils 2 %    % Basophils 1 %    % Immature Granulocytes 0 %    NRBCs per 100 WBC 0 <1 /100    Absolute Neutrophils 6.5 1.6 - 8.3 10e3/uL    Absolute Lymphocytes 1.8 0.8 - 5.3 10e3/uL    Absolute Monocytes 0.8 0.0 - 1.3 10e3/uL    Absolute Eosinophils 0.2 0.0 - 0.7 10e3/uL    Absolute Basophils 0.1 0.0 - 0.2 10e3/uL    Absolute Immature Granulocytes 0.0 <=0.4 10e3/uL    Absolute NRBCs 0.0 10e3/uL        EKG; sinus rhythm with PVCs, VR 88, unchanged from previous EKGs    Revised Cardiac Risk Index (RCRI):  The patient has the following serious cardiovascular risks for perioperative complications:   - No serious cardiac risks = 0 points     RCRI Interpretation: 0 points: Class I (very low risk - 0.4% complication rate)           Signed Electronically by: Sheyla Sanches NP  Copy of this evaluation report is provided to requesting physician.

## 2022-09-21 ENCOUNTER — ALLIED HEALTH/NURSE VISIT (OUTPATIENT)
Dept: ALLERGY | Facility: OTHER | Age: 72
End: 2022-09-21
Attending: NURSE PRACTITIONER
Payer: MEDICARE

## 2022-09-21 ENCOUNTER — ANESTHESIA EVENT (OUTPATIENT)
Dept: SURGERY | Facility: HOSPITAL | Age: 72
End: 2022-09-21
Payer: MEDICARE

## 2022-09-21 ENCOUNTER — OFFICE VISIT (OUTPATIENT)
Dept: FAMILY MEDICINE | Facility: OTHER | Age: 72
End: 2022-09-21
Attending: NURSE PRACTITIONER
Payer: MEDICARE

## 2022-09-21 VITALS
WEIGHT: 200 LBS | HEIGHT: 61 IN | DIASTOLIC BLOOD PRESSURE: 74 MMHG | RESPIRATION RATE: 20 BRPM | HEART RATE: 81 BPM | SYSTOLIC BLOOD PRESSURE: 128 MMHG | TEMPERATURE: 96.9 F | OXYGEN SATURATION: 97 % | BODY MASS INDEX: 37.76 KG/M2

## 2022-09-21 DIAGNOSIS — E78.5 HYPERLIPIDEMIA, UNSPECIFIED HYPERLIPIDEMIA TYPE: ICD-10-CM

## 2022-09-21 DIAGNOSIS — M54.2 CERVICALGIA: ICD-10-CM

## 2022-09-21 DIAGNOSIS — I10 ESSENTIAL HYPERTENSION: ICD-10-CM

## 2022-09-21 DIAGNOSIS — J30.89 PERENNIAL ALLERGIC RHINITIS: Primary | ICD-10-CM

## 2022-09-21 DIAGNOSIS — F41.1 GENERALIZED ANXIETY DISORDER: ICD-10-CM

## 2022-09-21 DIAGNOSIS — E11.9 TYPE 2 DIABETES MELLITUS WITHOUT COMPLICATION, WITHOUT LONG-TERM CURRENT USE OF INSULIN (H): ICD-10-CM

## 2022-09-21 DIAGNOSIS — Z01.818 PREOP GENERAL PHYSICAL EXAM: Primary | ICD-10-CM

## 2022-09-21 DIAGNOSIS — F33.0 MILD EPISODE OF RECURRENT MAJOR DEPRESSIVE DISORDER (H): ICD-10-CM

## 2022-09-21 DIAGNOSIS — G54.2 CERVICAL NERVE ROOT IMPINGEMENT: ICD-10-CM

## 2022-09-21 LAB
ANION GAP SERPL CALCULATED.3IONS-SCNC: 7 MMOL/L (ref 3–14)
BASOPHILS # BLD AUTO: 0.1 10E3/UL (ref 0–0.2)
BASOPHILS NFR BLD AUTO: 1 %
BUN SERPL-MCNC: 12 MG/DL (ref 7–30)
CALCIUM SERPL-MCNC: 9.3 MG/DL (ref 8.5–10.1)
CHLORIDE BLD-SCNC: 107 MMOL/L (ref 94–109)
CO2 SERPL-SCNC: 24 MMOL/L (ref 20–32)
CREAT SERPL-MCNC: 0.76 MG/DL (ref 0.52–1.04)
EOSINOPHIL # BLD AUTO: 0.2 10E3/UL (ref 0–0.7)
EOSINOPHIL NFR BLD AUTO: 2 %
ERYTHROCYTE [DISTWIDTH] IN BLOOD BY AUTOMATED COUNT: 12.7 % (ref 10–15)
EST. AVERAGE GLUCOSE BLD GHB EST-MCNC: 123 MG/DL
GFR SERPL CREATININE-BSD FRML MDRD: 84 ML/MIN/1.73M2
GLUCOSE BLD-MCNC: 100 MG/DL (ref 70–99)
HBA1C MFR BLD: 5.9 % (ref 0–5.6)
HCT VFR BLD AUTO: 41.8 % (ref 35–47)
HGB BLD-MCNC: 14.1 G/DL (ref 11.7–15.7)
IMM GRANULOCYTES # BLD: 0 10E3/UL
IMM GRANULOCYTES NFR BLD: 0 %
LYMPHOCYTES # BLD AUTO: 1.8 10E3/UL (ref 0.8–5.3)
LYMPHOCYTES NFR BLD AUTO: 19 %
MCH RBC QN AUTO: 30.7 PG (ref 26.5–33)
MCHC RBC AUTO-ENTMCNC: 33.7 G/DL (ref 31.5–36.5)
MCV RBC AUTO: 91 FL (ref 78–100)
MONOCYTES # BLD AUTO: 0.8 10E3/UL (ref 0–1.3)
MONOCYTES NFR BLD AUTO: 8 %
NEUTROPHILS # BLD AUTO: 6.5 10E3/UL (ref 1.6–8.3)
NEUTROPHILS NFR BLD AUTO: 70 %
NRBC # BLD AUTO: 0 10E3/UL
NRBC BLD AUTO-RTO: 0 /100
PLATELET # BLD AUTO: 291 10E3/UL (ref 150–450)
POTASSIUM BLD-SCNC: 3.7 MMOL/L (ref 3.4–5.3)
RBC # BLD AUTO: 4.59 10E6/UL (ref 3.8–5.2)
SODIUM SERPL-SCNC: 138 MMOL/L (ref 133–144)
WBC # BLD AUTO: 9.4 10E3/UL (ref 4–11)

## 2022-09-21 PROCEDURE — 95165 ANTIGEN THERAPY SERVICES: CPT

## 2022-09-21 PROCEDURE — 85025 COMPLETE CBC W/AUTO DIFF WBC: CPT | Mod: ZL | Performed by: NURSE PRACTITIONER

## 2022-09-21 PROCEDURE — 95117 IMMUNOTHERAPY INJECTIONS: CPT

## 2022-09-21 PROCEDURE — 80048 BASIC METABOLIC PNL TOTAL CA: CPT | Mod: ZL | Performed by: NURSE PRACTITIONER

## 2022-09-21 PROCEDURE — G0463 HOSPITAL OUTPT CLINIC VISIT: HCPCS

## 2022-09-21 PROCEDURE — 93005 ELECTROCARDIOGRAM TRACING: CPT | Performed by: NURSE PRACTITIONER

## 2022-09-21 PROCEDURE — 95165 ANTIGEN THERAPY SERVICES: CPT | Performed by: NURSE PRACTITIONER

## 2022-09-21 PROCEDURE — 99214 OFFICE O/P EST MOD 30 MIN: CPT | Performed by: NURSE PRACTITIONER

## 2022-09-21 PROCEDURE — 36415 COLL VENOUS BLD VENIPUNCTURE: CPT | Mod: ZL | Performed by: NURSE PRACTITIONER

## 2022-09-21 PROCEDURE — 93010 ELECTROCARDIOGRAM REPORT: CPT | Mod: 77 | Performed by: INTERNAL MEDICINE

## 2022-09-21 PROCEDURE — G0463 HOSPITAL OUTPT CLINIC VISIT: HCPCS | Mod: 25

## 2022-09-21 PROCEDURE — 83036 HEMOGLOBIN GLYCOSYLATED A1C: CPT | Mod: ZL | Performed by: NURSE PRACTITIONER

## 2022-09-21 ASSESSMENT — ASTHMA QUESTIONNAIRES
QUESTION_5 LAST FOUR WEEKS HOW WOULD YOU RATE YOUR ASTHMA CONTROL: WELL CONTROLLED
ACT_TOTALSCORE: 22
QUESTION_1 LAST FOUR WEEKS HOW MUCH OF THE TIME DID YOUR ASTHMA KEEP YOU FROM GETTING AS MUCH DONE AT WORK, SCHOOL OR AT HOME: NONE OF THE TIME
QUESTION_2 LAST FOUR WEEKS HOW OFTEN HAVE YOU HAD SHORTNESS OF BREATH: NOT AT ALL
ACT_TOTALSCORE: 22
QUESTION_4 LAST FOUR WEEKS HOW OFTEN HAVE YOU USED YOUR RESCUE INHALER OR NEBULIZER MEDICATION (SUCH AS ALBUTEROL): ONCE A WEEK OR LESS
QUESTION_3 LAST FOUR WEEKS HOW OFTEN DID YOUR ASTHMA SYMPTOMS (WHEEZING, COUGHING, SHORTNESS OF BREATH, CHEST TIGHTNESS OR PAIN) WAKE YOU UP AT NIGHT OR EARLIER THAN USUAL IN THE MORNING: ONCE OR TWICE

## 2022-09-21 ASSESSMENT — PAIN SCALES - GENERAL: PAINLEVEL: SEVERE PAIN (6)

## 2022-09-21 ASSESSMENT — ENCOUNTER SYMPTOMS: DYSRHYTHMIAS: 1

## 2022-09-21 NOTE — PATIENT INSTRUCTIONS
Hold all medications the morning of surgery except  diltiazem. Ok to use her albuterol inhaler if needed.

## 2022-09-21 NOTE — PROGRESS NOTES
Allergy serum is mixed today at [facility],  into  2  (5 ml)  multi dose vial/vials.    Allergens included were:    Ragweed  0.2 ml of dilution # 1  Pigweed  0.2 ml of dilution # 1  Mugwort 0.2 ml of dilution # 0  Kochia  0.2 ml of dilution # 0  Russian Thistle 0.2 ml of dilution # 1  Jimenez Grass 0.2 ml of dilution # 1  Birch mix 0.2 ml of dilution # 1  Maple Mix 0.2 of dilution # 1  Elm Mix 0.2 ml of dilution # 1  Oak Mix 0.2 ml of dilution # 1  Frank Mix 0.2 ml of dilution # 1  Pine Mix 0.2 ml of dilution # 1  [place] 0.2 ml of dilution # 1  Black [place] 0.2 ml of dilution # 1  Aspen 0.2 ml of dilution # 0  Red [place] 0.2 ml of dilution # 0    Alternaria 0.2 ml of dilution # 1  Aspergillus 0.2 ml of dilution # 1  Hormodendrum 0.2 ml of dilution # 1  Helminthosporium 0.2 ml of dilution # 1  Penicillium 0.2 ml of dilution # 1  Epicoccum 0.2 ml of dilution # 1  Fusarium 0.2 ml of dilution # 1  Mucor 0.2 ml of dilution # 0  Grain Smut 0.2 ml of dilution # 0  Grass Smut 0.2 ml of dilution # 0  Cat 0.2 ml of dilution # 1  Dog 0.2 ml of dilution # 1  Feather Mix 0.2 ml of dilution # 0  Dust Mite Mix 0.2 ml of dilution # 1  Horse 0.2 ml of dilution # 0    Morgan Devries RN on 9/21/2022 at 11:03 AM

## 2022-09-21 NOTE — PROGRESS NOTES
Prior to injection verified pt identity using pt name and date of birth.      Prior to injection patient identity verified using name and date of birth.    SVT done on right arm, measuring 8 mm.  Passed  SVT done on left arm, measuring 11 mm.  Passed    Documented on paper flowsheet.     Allergy injection/s given and charted on paper allergy flow sheet.  Patient left AMA, signing form, not staying for the observation period.    Morgan Devries RN on 9/21/2022 at 11:02 AM

## 2022-09-21 NOTE — ANESTHESIA PREPROCEDURE EVALUATION
Anesthesia Pre-Procedure Evaluation    Patient: Rj Rodríguez   MRN: [id number] : 1950        Procedure : Procedure(s):  MRI CERVICAL SPINE          Past Medical History:   Diagnosis Date     Arthritis      Chronic back pain      Hypertension      Irregular heart beat      Sleep apnea       Past Surgical History:   Procedure Laterality Date     ANESTHESIA OUT OF OR MRI N/A 2022    Procedure: MRI CERVICAL SPINE;  Surgeon: GENERIC ANESTHESIA PROVIDER;  Location: [facility] OR     APPENDECTOMY       BACK SURGERY       CHOLECYSTECTOMY       COLONOSCOPY  2012    Repeat in 10 years     GYN SURGERY       HYSTERECTOMY      Ovaries     RELEASE CARPAL TUNNEL      LT     RELEASE CARPAL TUNNEL      RT x2     SURGICAL RADIOLOGY PROCEDURE N/A 2016    Procedure: SURGICAL RADIOLOGY PROCEDURE;  Surgeon: Provider, Generic Perianesthesia Nursing;  Location: [facility] OR     SURGICAL RADIOLOGY PROCEDURE N/A 8/15/2016    Procedure: SURGICAL RADIOLOGY PROCEDURE;  Surgeon: Provider, Generic Perianesthesia Nursing;  Location: [facility] OR      Allergies   Allergen Reactions     Fruit [Peach] Anaphylaxis and Hives     fresh peaches and any fruit that has a pit.  Kiwi's and apples also.  Can eat any fruit cooked.     Nuts Anaphylaxis and Hives     All Raw Nuts, can eat nuts when roasted.     Ace Inhibitors      Upper respiratory infection     Alkylamines Hives     Antihistamines     Alprazolam Hives     Xanax     Diphenhydramine Hives and Other (See Comments)     Keeps her awake       Erythromycin      BASE; pt. Not sure if this is a true allergy.     Guaifed      Pt unsure of reaction     Guaifenesin      Guaifed     Hydrochlorothiazide      No Clinical Screening - See Comments      Allergic:  Fresh peaches and any fruit that has a pit.  Kiwi's and apples also.  All raw nuts.  Can eat nuts when roasted or any fruit cooked.  Some seasonal allergies :  Hayfever     Olmesartan Medoxomil Cough     Benicar     Phenylephrine Hcl      Guaifed      Pseudoephedrine Hcl      Guaifed     Seasonal Allergies      hayfever     Tramadol      Sleep disturbance. Can not sleep, and when able to fall asleep will have terrible nightmares     Tramadol Hcl      Ultram, insomnia, nightmares, headache     Venlafaxine Other (See Comments)     Heartburn, reflux     Zoloft [Sertraline]      paranoia     Latex Other (See Comments), Swelling, Difficulty breathing and Rash     Throat Closes        Social History     Tobacco Use     Smoking status: Never Smoker     Smokeless tobacco: Never Used   Substance Use Topics     Alcohol use: Yes     Alcohol/week: 1.0 standard drink     Types: 1 Glasses of wine per week     Comment: occ glass of wine      Wt Readings from Last 1 Encounters:   07/11/22 89.8 kg (198 lb)        Anesthesia Evaluation   Pt has had prior anesthetic. Type: General and MAC.    History of anesthetic complications  - PONV.      ROS/MED HX  ENT/Pulmonary:     (+) sleep apnea, doesn't use CPAP, allergic rhinitis,     Neurologic: Comment: Brachial neuritis   Vertigo  Tinnitus      (+) migraines,     Cardiovascular:     (+) Dyslipidemia hypertension-----dysrhythmias, PVCs, Irregular Heartbeat/Palpitations, Previous cardiac testing   Echo: Date: Results:    Stress Test: Date: 2/10/22 Results:  Negative for inducible ischemia  ECG Reviewed: Date: 9/21/22 Results:  Sinus rhythm with PVCs, VR 88, unchanged from previous EKGs  Cath: Date: Results:      METS/Exercise Tolerance:     Hematologic:       Musculoskeletal: Comment: S/P cervical spinal fusion  S/P rotator cuff repair  (+) arthritis,     GI/Hepatic:  - neg GI/hepatic ROS     Renal/Genitourinary:  - neg Renal ROS     Endo:     (+) type II DM, Obesity,     Psychiatric/Substance Use:     (+) psychiatric history anxiety and depression     Infectious Disease:  - neg infectious disease ROS     Malignancy:  - neg malignancy ROS     Other:  - neg other ROS    (+) , H/O Chronic Pain (back),        Physical Exam    Airway   airway exam normal      Mallampati: II       Respiratory Devices and Support         Dental  no notable dental history     (+) partials      Cardiovascular   cardiovascular exam normal       Rhythm and rate: regular and normal     Pulmonary   pulmonary exam normal        breath sounds clear to auscultation           OUTSIDE LABS:  CBC:   Lab Results   Component Value Date    WBC 8.8 07/11/2022    WBC 10.1 05/20/2022    HGB 14.0 07/11/2022    HGB 14.4 05/20/2022    HCT 42.4 07/11/2022    HCT 43.3 05/20/2022     07/11/2022     05/20/2022     BMP:   Lab Results   Component Value Date     07/11/2022     05/20/2022    POTASSIUM 4.2 07/11/2022    POTASSIUM 3.6 05/20/2022    CHLORIDE 110 (H) 07/11/2022    CHLORIDE 107 05/20/2022    CO2 24 07/11/2022    CO2 25 05/20/2022    BUN 13 07/11/2022    BUN 11 05/20/2022    CR 0.87 07/11/2022    CR 0.75 05/20/2022     (H) 07/11/2022     (H) 05/20/2022     COAGS:   Lab Results   Component Value Date    PTT 26 09/13/2016    INR 0.94 07/18/2021     POC: No results found for: BGM, HCG, HCGS  HEPATIC:   Lab Results   Component Value Date    ALBUMIN 3.6 05/20/2022    PROTTOTAL 7.5 05/20/2022    ALT 29 05/20/2022    AST 20 05/20/2022    ALKPHOS 71 05/20/2022    BILITOTAL 0.7 05/20/2022     OTHER:   Lab Results   Component Value Date    LACT 1.5 08/12/2019    A1C 6.1 (H) 05/20/2022    EZRA 9.2 07/11/2022    MAG 2.2 01/10/2022    LIPASE 177 04/18/2022    TSH 1.37 01/10/2022    CRP 53.9 (H) 08/12/2019    SED 14 09/19/2016       Anesthesia Plan    ASA Status:  3   NPO Status:  NPO Appropriate    Anesthesia Type: MAC.     - Reason for MAC: chronic cardiopulmonary disease, straight local not clinically adequate              Consents    Anesthesia Plan(s) and associated risks, benefits, and realistic alternatives discussed. Questions answered and patient/representative(s) expressed understanding.     - Discussed: Risks, Benefits and Alternatives for BOTH  SEDATION and the PROCEDURE were discussed     - Discussed with:  Patient      - Extended Intubation/Ventilatory Support Discussed: No.      - Patient is DNR/DNI Status: No    Use of blood products discussed: No .     Postoperative Care            Comments:    Other Comments:  9/21/22            NORMAN Cummings CRNA

## 2022-09-21 NOTE — NURSING NOTE
"Chief Complaint   Patient presents with     Pre Op Exam       Initial BP (!) 140/76   Pulse 81   Temp 96.9  F (36.1  C) (Tympanic)   Resp 20   Ht 1.556 m (5' 1.25\")   Wt 90.7 kg (200 lb)   SpO2 97%   BMI 37.48 kg/m   Estimated body mass index is 37.48 kg/m  as calculated from the following:    Height as of this encounter: 1.556 m (5' 1.25\").    Weight as of this encounter: 90.7 kg (200 lb).  Medication Reconciliation: complete  Heather Liang LPN    "

## 2022-09-22 ENCOUNTER — TELEPHONE (OUTPATIENT)
Dept: INTERVENTIONAL RADIOLOGY/VASCULAR | Facility: HOSPITAL | Age: 72
End: 2022-09-22

## 2022-09-23 ENCOUNTER — ANESTHESIA (OUTPATIENT)
Dept: SURGERY | Facility: HOSPITAL | Age: 72
End: 2022-09-23
Payer: MEDICARE

## 2022-09-23 ENCOUNTER — HOSPITAL ENCOUNTER (OUTPATIENT)
Facility: HOSPITAL | Age: 72
Discharge: HOME | End: 2022-09-23
Attending: RADIOLOGY | Admitting: RADIOLOGY
Payer: MEDICARE

## 2022-09-23 ENCOUNTER — HOSPITAL ENCOUNTER (OUTPATIENT)
Dept: MRI IMAGING | Facility: HOSPITAL | Age: 72
Discharge: HOME | End: 2022-09-23
Attending: ORTHOPAEDIC SURGERY
Payer: MEDICARE

## 2022-09-23 VITALS
SYSTOLIC BLOOD PRESSURE: 109 MMHG | BODY MASS INDEX: 37.76 KG/M2 | DIASTOLIC BLOOD PRESSURE: 67 MMHG | OXYGEN SATURATION: 96 % | HEART RATE: 48 BPM | HEIGHT: 61 IN | WEIGHT: 200 LBS | TEMPERATURE: 96.9 F | RESPIRATION RATE: 18 BRPM

## 2022-09-23 DIAGNOSIS — M54.12 CERVICAL RADICULOPATHY: ICD-10-CM

## 2022-09-23 PROCEDURE — 710N000012 HC RECOVERY PHASE 2, PER MINUTE

## 2022-09-23 PROCEDURE — 72141 MRI NECK SPINE W/O DYE: CPT | Performed by: NURSE ANESTHETIST, CERTIFIED REGISTERED

## 2022-09-23 PROCEDURE — A9585 GADOBUTROL INJECTION: HCPCS | Performed by: RADIOLOGY

## 2022-09-23 PROCEDURE — 72156 MRI NECK SPINE W/O & W/DYE: CPT

## 2022-09-23 PROCEDURE — 250N000009 HC RX 250: Performed by: NURSE ANESTHETIST, CERTIFIED REGISTERED

## 2022-09-23 PROCEDURE — 258N000003 HC RX IP 258 OP 636: Performed by: NURSE ANESTHETIST, CERTIFIED REGISTERED

## 2022-09-23 PROCEDURE — 999N000141 HC STATISTIC PRE-PROCEDURE NURSING ASSESSMENT

## 2022-09-23 PROCEDURE — 250N000011 HC RX IP 250 OP 636: Performed by: NURSE ANESTHETIST, CERTIFIED REGISTERED

## 2022-09-23 PROCEDURE — 255N000002 HC RX 255 OP 636: Performed by: RADIOLOGY

## 2022-09-23 PROCEDURE — 370N000017 HC ANESTHESIA TECHNICAL FEE, PER MIN

## 2022-09-23 RX ORDER — KETAMINE HYDROCHLORIDE 10 MG/ML
INJECTION INTRAMUSCULAR; INTRAVENOUS PRN
Status: DISCONTINUED | OUTPATIENT
Start: 2022-09-23 | End: 2022-09-23

## 2022-09-23 RX ORDER — SODIUM CHLORIDE, SODIUM LACTATE, POTASSIUM CHLORIDE, CALCIUM CHLORIDE 600; 310; 30; 20 MG/100ML; MG/100ML; MG/100ML; MG/100ML
INJECTION, SOLUTION INTRAVENOUS CONTINUOUS
Status: DISCONTINUED | OUTPATIENT
Start: 2022-09-23 | End: 2022-09-23 | Stop reason: HOSPADM

## 2022-09-23 RX ORDER — ONDANSETRON 2 MG/ML
4 INJECTION INTRAMUSCULAR; INTRAVENOUS EVERY 30 MIN PRN
Status: DISCONTINUED | OUTPATIENT
Start: 2022-09-23 | End: 2022-09-23 | Stop reason: HOSPADM

## 2022-09-23 RX ORDER — GADOBUTROL 604.72 MG/ML
10 INJECTION INTRAVENOUS ONCE
Status: COMPLETED | OUTPATIENT
Start: 2022-09-23 | End: 2022-09-23

## 2022-09-23 RX ORDER — LIDOCAINE HYDROCHLORIDE 20 MG/ML
INJECTION, SOLUTION INFILTRATION; PERINEURAL PRN
Status: DISCONTINUED | OUTPATIENT
Start: 2022-09-23 | End: 2022-09-23

## 2022-09-23 RX ORDER — ONDANSETRON 4 MG/1
4 TABLET, ORALLY DISINTEGRATING ORAL EVERY 30 MIN PRN
Status: DISCONTINUED | OUTPATIENT
Start: 2022-09-23 | End: 2022-09-23 | Stop reason: HOSPADM

## 2022-09-23 RX ORDER — HYDRALAZINE HYDROCHLORIDE 20 MG/ML
2.5-5 INJECTION INTRAMUSCULAR; INTRAVENOUS EVERY 10 MIN PRN
Status: DISCONTINUED | OUTPATIENT
Start: 2022-09-23 | End: 2022-09-23 | Stop reason: HOSPADM

## 2022-09-23 RX ORDER — PROPOFOL 10 MG/ML
INJECTION, EMULSION INTRAVENOUS CONTINUOUS PRN
Status: DISCONTINUED | OUTPATIENT
Start: 2022-09-23 | End: 2022-09-23

## 2022-09-23 RX ORDER — LIDOCAINE 40 MG/G
CREAM TOPICAL
Status: DISCONTINUED | OUTPATIENT
Start: 2022-09-23 | End: 2022-09-23 | Stop reason: HOSPADM

## 2022-09-23 RX ADMIN — PROPOFOL 65 MCG/KG/MIN: 10 INJECTION, EMULSION INTRAVENOUS at 08:07

## 2022-09-23 RX ADMIN — Medication 10 MG: at 09:15

## 2022-09-23 RX ADMIN — Medication 10 MG: at 09:00

## 2022-09-23 RX ADMIN — MIDAZOLAM 2 MG: 1 INJECTION INTRAMUSCULAR; INTRAVENOUS at 08:00

## 2022-09-23 RX ADMIN — GADOBUTROL 10 ML: 604.72 INJECTION INTRAVENOUS at 09:14

## 2022-09-23 RX ADMIN — LIDOCAINE HYDROCHLORIDE 60 MG: 20 INJECTION, SOLUTION INFILTRATION; PERINEURAL at 08:04

## 2022-09-23 RX ADMIN — Medication 20 MG: at 08:10

## 2022-09-23 RX ADMIN — SODIUM CHLORIDE, POTASSIUM CHLORIDE, SODIUM LACTATE AND CALCIUM CHLORIDE: 600; 310; 30; 20 INJECTION, SOLUTION INTRAVENOUS at 07:50

## 2022-09-23 RX ADMIN — Medication 10 MG: at 08:45

## 2022-09-23 ASSESSMENT — ACTIVITIES OF DAILY LIVING (ADL)
ADLS_ACUITY_SCORE: 22
ADLS_ACUITY_SCORE: 35
ADLS_ACUITY_SCORE: 22

## 2022-09-23 NOTE — INTERVAL H&P NOTE
"I have reviewed the surgical (or preoperative) H&P that is linked to this encounter, and examined the patient. There are no significant changes    Clinical Conditions Present on Arrival:  Clinically Significant Risk Factors Present on Admission                   # Obesity: Estimated body mass index is 37.48 kg/m  as calculated from the following:    Height as of this encounter: 1.556 m (5' 1.25\").    Weight as of this encounter: 90.7 kg (200 lb).       "

## 2022-09-23 NOTE — OR NURSING
Patient and responsible adult given discharge instructions with no questions regarding instructions. Virgilio score 19/20. Pain level - pt will not rate.   Discharged from unit via wheelchair. Patient discharged to home with spouse.

## 2022-09-23 NOTE — ANESTHESIA POSTPROCEDURE EVALUATION
Patient: Rj Rodríguez    Procedure: Procedure(s):  MRI CERVICAL SPINE       Anesthesia Type:  MAC    Note:  Disposition: Outpatient   Postop Pain Control: Uneventful            Sign Out: Well controlled pain   PONV: No   Neuro/Psych: Uneventful            Sign Out: Acceptable/Baseline neuro status   Airway/Respiratory: Uneventful            Sign Out: Acceptable/Baseline resp. status   CV/Hemodynamics: Uneventful            Sign Out: Acceptable CV status; No obvious hypovolemia; No obvious fluid overload   Other NRE: NONE   DID A NON-ROUTINE EVENT OCCUR? No           Last vitals:  Vitals Value Taken Time   BP     Temp     Pulse     Resp     SpO2         Electronically Signed By: NORMAN MCCORMICK CRNA  September 23, 2022  11:34 AM

## 2022-09-23 NOTE — ANESTHESIA CARE TRANSFER NOTE
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    Anesthesia Type:   MAC     Note:    Oropharynx: oropharynx clear of all foreign objects and spontaneously breathing  Level of Consciousness: awake  Oxygen Supplementation: room air    Independent Airway: airway patency satisfactory and stable  Dentition: dentition unchanged  Vital Signs Stable: post-procedure vital signs reviewed and stable  Report to RN Given: handoff report given  Patient transferred to: Phase II    Handoff Report: Identifed the Patient, Identified the Reponsible Provider, Reviewed the pertinent medical history, Discussed the surgical course, Reviewed Intra-OP anesthesia mangement and issues during anesthesia, Set expectations for post-procedure period and Allowed opportunity for questions and acknowledgement of understanding      Vitals:  Vitals Value Taken Time   BP     Temp     Pulse     Resp     SpO2         Electronically Signed By: NORMAN MCCORMICK CRNA  September 23, 2022  11:33 AM

## 2022-09-23 NOTE — DISCHARGE INSTRUCTIONS
Post-Anesthesia Patient Instructions    IMMEDIATELY FOLLOWING SURGERY:  Do not drive or operate machinery for the first twenty four hours after surgery.  Do not make any important decisions for twenty four hours after surgery or while taking narcotic pain medications or sedatives.  If you develop intractable nausea and vomiting or a severe headache please notify your doctor immediately.    FOLLOW-UP:  Please make an appointment with your surgeon as instructed. You do not need to follow up with anesthesia unless specifically instructed to do so.    WOUND CARE INSTRUCTIONS (if applicable):  Keep a dry clean dressing on the anesthesia/puncture wound site if there is drainage.  Once the wound has quit draining you may leave it open to air.  Generally you should leave the bandage intact for twenty four hours unless there is drainage.  If the epidural site drains for more than 36-48 hours please call the anesthesia department.    QUESTIONS?:  Please feel free to call your physician or the hospital  if you have any questions, and they will be happy to assist you.

## 2022-09-23 NOTE — ANESTHESIA POSTPROCEDURE EVALUATION
Patient: Rj Rodríguez    Procedure: Procedure(s):  MRI CERVICAL SPINE       Anesthesia Type:  MAC    Note:  Disposition: Outpatient   Postop Pain Control: Uneventful            Sign Out: Well controlled pain   PONV: No   Neuro/Psych: Uneventful            Sign Out: Acceptable/Baseline neuro status   Airway/Respiratory: Uneventful            Sign Out: Acceptable/Baseline resp. status   CV/Hemodynamics: Uneventful            Sign Out: Acceptable CV status; No obvious hypovolemia; No obvious fluid overload   Other NRE: NONE   DID A NON-ROUTINE EVENT OCCUR? No           Last vitals:  Vitals Value Taken Time   /67 09/23/22 1015   Temp     Pulse 54 09/23/22 1015   Resp 18 09/23/22 1015   SpO2 96 % 09/23/22 1030   Vitals shown include unvalidated device data.    Electronically Signed By: NORMAN MCCORMICK CRNA  September 23, 2022  10:30 AM

## 2022-09-28 ENCOUNTER — ALLIED HEALTH/NURSE VISIT (OUTPATIENT)
Dept: ALLERGY | Facility: OTHER | Age: 72
End: 2022-09-28
Attending: NURSE PRACTITIONER
Payer: MEDICARE

## 2022-09-28 DIAGNOSIS — J30.89 PERENNIAL ALLERGIC RHINITIS: Primary | ICD-10-CM

## 2022-09-28 PROCEDURE — 95117 IMMUNOTHERAPY INJECTIONS: CPT

## 2022-09-28 NOTE — PROGRESS NOTES
Prior to injection verified pt identity using pt name and date of birth.    Allergy injection/s given and charted on paper allergy flow sheet.  Patient left AMA, signing form, not staying for the observation period.    Linn Govea RN on 9/28/2022 at 11:20 AM

## 2022-10-09 ENCOUNTER — HEALTH MAINTENANCE LETTER (OUTPATIENT)
Age: 72
End: 2022-10-09

## 2022-10-12 ENCOUNTER — ALLIED HEALTH/NURSE VISIT (OUTPATIENT)
Dept: ALLERGY | Facility: OTHER | Age: 72
End: 2022-10-12
Attending: NURSE PRACTITIONER
Payer: MEDICARE

## 2022-10-12 DIAGNOSIS — J30.89 PERENNIAL ALLERGIC RHINITIS: Primary | ICD-10-CM

## 2022-10-12 PROCEDURE — 95117 IMMUNOTHERAPY INJECTIONS: CPT

## 2022-10-12 NOTE — PROGRESS NOTES
Prior to injection verified pt identity using pt name and date of birth.    Allergy injection/s given and charted on paper allergy flow sheet.  Patient left AMA, signing form, not staying for the observation period.    Morgan Devries RN on 10/12/2022 at 11:17 AM

## 2022-10-13 NOTE — ADDENDUM NOTE
Addended by: JUNI HEREDIA on: 10/13/2022 08:35 AM     Modules accepted: Orders, Level of Service, SmartSet

## 2022-10-26 ENCOUNTER — ALLIED HEALTH/NURSE VISIT (OUTPATIENT)
Dept: ALLERGY | Facility: OTHER | Age: 72
End: 2022-10-26
Attending: NURSE PRACTITIONER
Payer: MEDICARE

## 2022-10-26 DIAGNOSIS — J30.89 PERENNIAL ALLERGIC RHINITIS: Primary | ICD-10-CM

## 2022-10-26 PROCEDURE — 95117 IMMUNOTHERAPY INJECTIONS: CPT

## 2022-10-26 NOTE — PROGRESS NOTES
Prior to injection verified pt identity using pt name and date of birth.    Allergy injection/s given and charted on paper allergy flow sheet.  Patient left AMA, signing form, not staying for the observation period.    Morgan Devries RN on 10/26/2022 at 11:13 AM

## 2022-11-09 ENCOUNTER — ALLIED HEALTH/NURSE VISIT (OUTPATIENT)
Dept: ALLERGY | Facility: OTHER | Age: 72
End: 2022-11-09
Attending: NURSE PRACTITIONER
Payer: MEDICARE

## 2022-11-09 DIAGNOSIS — J30.89 PERENNIAL ALLERGIC RHINITIS: Primary | ICD-10-CM

## 2022-11-09 PROCEDURE — 95117 IMMUNOTHERAPY INJECTIONS: CPT

## 2022-11-09 NOTE — PROGRESS NOTES
Prior to injection verified pt identity using pt name and date of birth.    Allergy injection/s given and charted on paper allergy flow sheet.  Patient left AMA, signing form, not staying for the observation period.    Morgan Devries RN on 11/9/2022 at 11:27 AM

## 2022-11-17 ENCOUNTER — TRANSFERRED RECORDS (OUTPATIENT)
Dept: HEALTH INFORMATION MANAGEMENT | Facility: CLINIC | Age: 72
End: 2022-11-17

## 2022-12-06 NOTE — TELEPHONE ENCOUNTER
Spoke with pt, pt was supposed to be seen this morning and was unable to make the appointment, pt will call and make an appointment.   Detail Level: Detailed Minocycline Pregnancy And Lactation Text: This medication is Pregnancy Category D and not consider safe during pregnancy. It is also excreted in breast milk. Azelaic Acid Counseling: Patient counseled that medicine may cause skin irritation and to avoid applying near the eyes.  In the event of skin irritation, the patient was advised to reduce the amount of the drug applied or use it less frequently.   The patient verbalized understanding of the proper use and possible adverse effects of azelaic acid.  All of the patient's questions and concerns were addressed. Bactrim Pregnancy And Lactation Text: This medication is Pregnancy Category D and is known to cause fetal risk.  It is also excreted in breast milk. Erythromycin Counseling:  I discussed with the patient the risks of erythromycin including but not limited to GI upset, allergic reaction, drug rash, diarrhea, increase in liver enzymes, and yeast infections. Include Pregnancy/Lactation Warning?: No Tazorac Pregnancy And Lactation Text: This medication is not safe during pregnancy. It is unknown if this medication is excreted in breast milk. Detail Level: Zone Birth Control Pills Pregnancy And Lactation Text: This medication should be avoided if pregnant and for the first 30 days post-partum. Topical Clindamycin Pregnancy And Lactation Text: This medication is Pregnancy Category B and is considered safe during pregnancy. It is unknown if it is excreted in breast milk. Isotretinoin Counseling: Patient should get monthly blood tests, not donate blood, not drive at night if vision affected, not share medication, and not undergo elective surgery for 6 months after tx completed. Side effects reviewed, pt to contact office should one occur. Benzoyl Peroxide Pregnancy And Lactation Text: This medication is Pregnancy Category C. It is unknown if benzoyl peroxide is excreted in breast milk. Isotretinoin Pregnancy And Lactation Text: This medication is Pregnancy Category X and is considered extremely dangerous during pregnancy. It is unknown if it is excreted in breast milk. Topical Sulfur Applications Counseling: Topical Sulfur Counseling: Patient counseled that this medication may cause skin irritation or allergic reactions.  In the event of skin irritation, the patient was advised to reduce the amount of the drug applied or use it less frequently.   The patient verbalized understanding of the proper use and possible adverse effects of topical sulfur application.  All of the patient's questions and concerns were addressed. High Dose Vitamin A Pregnancy And Lactation Text: High dose vitamin A therapy is contraindicated during pregnancy and breast feeding. Dapsone Counseling: I discussed with the patient the risks of dapsone including but not limited to hemolytic anemia, agranulocytosis, rashes, methemoglobinemia, kidney failure, peripheral neuropathy, headaches, GI upset, and liver toxicity.  Patients who start dapsone require monitoring including baseline LFTs and weekly CBCs for the first month, then every month thereafter.  The patient verbalized understanding of the proper use and possible adverse effects of dapsone.  All of the patient's questions and concerns were addressed. Doxycycline Counseling:  Patient counseled regarding possible photosensitivity and increased risk for sunburn.  Patient instructed to avoid sunlight, if possible.  When exposed to sunlight, patients should wear protective clothing, sunglasses, and sunscreen.  The patient was instructed to call the office immediately if the following severe adverse effects occur:  hearing changes, easy bruising/bleeding, severe headache, or vision changes.  The patient verbalized understanding of the proper use and possible adverse effects of doxycycline.  All of the patient's questions and concerns were addressed. Winlevi Counseling:  I discussed with the patient the risks of topical clascoterone including but not limited to erythema, scaling, itching, and stinging. Patient voiced their understanding. Topical Retinoid Pregnancy And Lactation Text: This medication is Pregnancy Category C. It is unknown if this medication is excreted in breast milk. Azithromycin Pregnancy And Lactation Text: This medication is considered safe during pregnancy and is also secreted in breast milk. Sarecycline Counseling: Patient advised regarding possible photosensitivity and discoloration of the teeth, skin, lips, tongue and gums.  Patient instructed to avoid sunlight, if possible.  When exposed to sunlight, patients should wear protective clothing, sunglasses, and sunscreen.  The patient was instructed to call the office immediately if the following severe adverse effects occur:  hearing changes, easy bruising/bleeding, severe headache, or vision changes.  The patient verbalized understanding of the proper use and possible adverse effects of sarecycline.  All of the patient's questions and concerns were addressed. Aklief Pregnancy And Lactation Text: It is unknown if this medication is safe to use during pregnancy.  It is unknown if this medication is excreted in breast milk.  Breastfeeding women should use the topical cream on the smallest area of the skin for the shortest time needed while breastfeeding.  Do not apply to nipple and areola. Winlevi Pregnancy And Lactation Text: This medication is considered safe during pregnancy and breastfeeding. Tazorac Counseling:  Patient advised that medication is irritating and drying.  Patient may need to apply sparingly and wash off after an hour before eventually leaving it on overnight.  The patient verbalized understanding of the proper use and possible adverse effects of tazorac.  All of the patient's questions and concerns were addressed. Bactrim Counseling:  I discussed with the patient the risks of sulfa antibiotics including but not limited to GI upset, allergic reaction, drug rash, diarrhea, dizziness, photosensitivity, and yeast infections.  Rarely, more serious reactions can occur including but not limited to aplastic anemia, agranulocytosis, methemoglobinemia, blood dyscrasias, liver or kidney failure, lung infiltrates or desquamative/blistering drug rashes. Birth Control Pills Counseling: Birth Control Pill Counseling: I discussed with the patient the potential side effects of OCPs including but not limited to increased risk of stroke, heart attack, thrombophlebitis, deep venous thrombosis, hepatic adenomas, breast changes, GI upset, headaches, and depression.  The patient verbalized understanding of the proper use and possible adverse effects of OCPs. All of the patient's questions and concerns were addressed. Erythromycin Pregnancy And Lactation Text: This medication is Pregnancy Category B and is considered safe during pregnancy. It is also excreted in breast milk. Topical Clindamycin Counseling: Patient counseled that this medication may cause skin irritation or allergic reactions.  In the event of skin irritation, the patient was advised to reduce the amount of the drug applied or use it less frequently.   The patient verbalized understanding of the proper use and possible adverse effects of clindamycin.  All of the patient's questions and concerns were addressed. Spironolactone Counseling: Patient advised regarding risks of diarrhea, abdominal pain, hyperkalemia, birth defects (for female patients), liver toxicity and renal toxicity. The patient may need blood work to monitor liver and kidney function and potassium levels while on therapy. The patient verbalized understanding of the proper use and possible adverse effects of spironolactone.  All of the patient's questions and concerns were addressed. Spironolactone Pregnancy And Lactation Text: This medication can cause feminization of the male fetus and should be avoided during pregnancy. The active metabolite is also found in breast milk. Azelaic Acid Pregnancy And Lactation Text: This medication is considered safe during pregnancy and breast feeding. High Dose Vitamin A Counseling: Side effects reviewed, pt to contact office should one occur. Tetracycline Counseling: Patient counseled regarding possible photosensitivity and increased risk for sunburn.  Patient instructed to avoid sunlight, if possible.  When exposed to sunlight, patients should wear protective clothing, sunglasses, and sunscreen.  The patient was instructed to call the office immediately if the following severe adverse effects occur:  hearing changes, easy bruising/bleeding, severe headache, or vision changes.  The patient verbalized understanding of the proper use and possible adverse effects of tetracycline.  All of the patient's questions and concerns were addressed. Patient understands to avoid pregnancy while on therapy due to potential birth defects. Benzoyl Peroxide Counseling: Patient counseled that medicine may cause skin irritation and bleach clothing.  In the event of skin irritation, the patient was advised to reduce the amount of the drug applied or use it less frequently.   The patient verbalized understanding of the proper use and possible adverse effects of benzoyl peroxide.  All of the patient's questions and concerns were addressed. Topical Sulfur Applications Pregnancy And Lactation Text: This medication is Pregnancy Category C and has an unknown safety profile during pregnancy. It is unknown if this topical medication is excreted in breast milk. Topical Retinoid counseling:  Patient advised to apply a pea-sized amount only at bedtime and wait 30 minutes after washing their face before applying.  If too drying, patient may add a non-comedogenic moisturizer. The patient verbalized understanding of the proper use and possible adverse effects of retinoids.  All of the patient's questions and concerns were addressed. Dapsone Pregnancy And Lactation Text: This medication is Pregnancy Category C and is not considered safe during pregnancy or breast feeding. Azithromycin Counseling:  I discussed with the patient the risks of azithromycin including but not limited to GI upset, allergic reaction, drug rash, diarrhea, and yeast infections. Doxycycline Pregnancy And Lactation Text: This medication is Pregnancy Category D and not consider safe during pregnancy. It is also excreted in breast milk but is considered safe for shorter treatment courses. Minocycline Counseling: Patient advised regarding possible photosensitivity and discoloration of the teeth, skin, lips, tongue and gums.  Patient instructed to avoid sunlight, if possible.  When exposed to sunlight, patients should wear protective clothing, sunglasses, and sunscreen.  The patient was instructed to call the office immediately if the following severe adverse effects occur:  hearing changes, easy bruising/bleeding, severe headache, or vision changes.  The patient verbalized understanding of the proper use and possible adverse effects of minocycline.  All of the patient's questions and concerns were addressed. Aklief counseling:  Patient advised to apply a pea-sized amount only at bedtime and wait 30 minutes after washing their face before applying.  If too drying, patient may add a non-comedogenic moisturizer.  The most commonly reported side effects including irritation, redness, scaling, dryness, stinging, burning, itching, and increased risk of sunburn.  The patient verbalized understanding of the proper use and possible adverse effects of retinoids.  All of the patient's questions and concerns were addressed.

## 2022-12-07 ENCOUNTER — ALLIED HEALTH/NURSE VISIT (OUTPATIENT)
Dept: ALLERGY | Facility: OTHER | Age: 72
End: 2022-12-07
Attending: NURSE PRACTITIONER
Payer: MEDICARE

## 2022-12-07 DIAGNOSIS — J30.89 PERENNIAL ALLERGIC RHINITIS: Primary | ICD-10-CM

## 2022-12-07 PROCEDURE — 95117 IMMUNOTHERAPY INJECTIONS: CPT

## 2022-12-07 NOTE — PROGRESS NOTES
Prior to injection verified pt identity using pt name and date of birth.    Allergy injection/s given and charted on paper allergy flow sheet.  Patient left AMA, signing form, not staying for the observation period.    Morgan Devries RN on 12/7/2022 at 11:10 AM

## 2022-12-08 ENCOUNTER — OFFICE VISIT (OUTPATIENT)
Dept: CARDIOLOGY | Facility: OTHER | Age: 72
End: 2022-12-08
Attending: NURSE PRACTITIONER
Payer: COMMERCIAL

## 2022-12-08 VITALS
TEMPERATURE: 98.5 F | HEART RATE: 76 BPM | DIASTOLIC BLOOD PRESSURE: 81 MMHG | RESPIRATION RATE: 16 BRPM | WEIGHT: 197 LBS | SYSTOLIC BLOOD PRESSURE: 152 MMHG | BODY MASS INDEX: 37.19 KG/M2 | HEIGHT: 61 IN | OXYGEN SATURATION: 97 %

## 2022-12-08 DIAGNOSIS — I10 ESSENTIAL HYPERTENSION: ICD-10-CM

## 2022-12-08 DIAGNOSIS — G47.33 OSA (OBSTRUCTIVE SLEEP APNEA): ICD-10-CM

## 2022-12-08 DIAGNOSIS — I49.3 FREQUENT PVCS: Primary | ICD-10-CM

## 2022-12-08 DIAGNOSIS — E11.9 TYPE 2 DIABETES MELLITUS WITHOUT COMPLICATION, WITHOUT LONG-TERM CURRENT USE OF INSULIN (H): ICD-10-CM

## 2022-12-08 PROCEDURE — G0463 HOSPITAL OUTPT CLINIC VISIT: HCPCS

## 2022-12-08 PROCEDURE — 99214 OFFICE O/P EST MOD 30 MIN: CPT | Performed by: NURSE PRACTITIONER

## 2022-12-08 ASSESSMENT — PAIN SCALES - GENERAL: PAINLEVEL: SEVERE PAIN (6)

## 2022-12-08 NOTE — PROGRESS NOTES
[facility] HEART CARE   CARDIOLOGY PROGRESS NOTE    Rj Rodríguez   [address]  [address]   [facility] [address]    Sheyla Sanhces     Chief Complaint   Patient presents with     Follow Up     6 month follow up for frequent PVC        HPI:   Ms. Rodríguez is a 72 year old female who presents for cardiology follow-up to visit on 5/31/22 with recent reports of increased exertional dyspnea and recently identified ventricular ectopy seen as isolated PVCs, ventricular coupletes and ventricular trigeminy on recent cardiac monitor.  Patient has a past medical history significant for migraine headaches, hypertension, obesity, REBECCA, DM 2, depression, significant anxiety, glaucoma and chronic lumbago.     Patient reported noticing irregular heart rate with in the office in Feb 2021. She was identified to have ventricular ectopy on ECG. No palpitations with this at that time. She had described episodes of fluttering palpitations associated to anxiety. She had reported increased exertional dyspnea and chest tightness largely related to anxiety events. She does not report any exertional chest tightness and no radiation to the arm, jaw, neck or back. No lightheadedness or syncope. No increased edema.      She described issues with allergies, possible asthma and on on immunotherapy. Avoided use of albuterol with fluttering palpitations. Has been off beta blocker since starting this. She was started on Diltiazem for HTN and increased ventricular ectopy burden. No tobacco use.     NM Lexiscan stress test due to frequent PVC's and increased exertional dyspnea. Study was completed on 2/10/22 revealing equivocal apical infarct vs apical thinning, no evidence of reversible ischemia. Normal LV systolic heart function which increased appropriately with stress. ECG with occasional ventricular ectopy at baseline which did not increase during lexiscan portion of the study and no ischemia ECG changes.     Echocardiogram on 2/15/22  revealing normal biventricular function, LVEF 55-60%. Borderline LVH, unable to assess diastolic function. Diastolic doppler findings suggest LV filling pressures to be increased. No RWMA's. Both atrial noted to be normal with atrial septum intact. No hemodynamically significant valvular abnormalities. Ascending aorta mildly dilated at 3.7 cm. No pericardial effusion.     INTERVAL HISTORY:  Today, patient reports mild improvement in dyspnea. No recurrence of pre-syncope or syncope. No chest pain or pressure. Palpitations and flip flop sensation in chest has pretty much resolved with increase in Diltiazem. Admits that she is planning for repeat back surgery at [facility] after the first of the year, significant anxiety with this.       PAST MEDICAL HISTORY:   Past Medical History:   Diagnosis Date     Arthritis      Chronic back pain      Hypertension      Irregular heart beat      Sleep apnea           FAMILY HISTORY:   Family History   Problem Relation Age of Onset     Colon Cancer Maternal Aunt      Colon Cancer Maternal Uncle      Diabetes Father         Type 2     Hypertension Father      Alcoholism Father      Alcoholism Mother           PAST SURGICAL HISTORY:   Past Surgical History:   Procedure Laterality Date     ANESTHESIA OUT OF OR MRI N/A 6/1/2022    Procedure: MRI CERVICAL SPINE;  Surgeon: GENERIC ANESTHESIA PROVIDER;  Location: [facility] OR     ANESTHESIA OUT OF OR MRI N/A 9/23/2022    Procedure: MRI CERVICAL SPINE;  Surgeon: GENERIC ANESTHESIA PROVIDER;  Location: [facility] OR     APPENDECTOMY       BACK SURGERY       CHOLECYSTECTOMY       COLONOSCOPY  2012    Repeat in 10 years     GYN SURGERY       HYSTERECTOMY      Ovaries     RELEASE CARPAL TUNNEL      LT     RELEASE CARPAL TUNNEL      RT x2     SURGICAL RADIOLOGY PROCEDURE N/A 2/12/2016    Procedure: SURGICAL RADIOLOGY PROCEDURE;  Surgeon: Provider, Generic Perianesthesia Nursing;  Location: [facility] OR     SURGICAL RADIOLOGY PROCEDURE N/A 8/15/2016     Procedure: SURGICAL RADIOLOGY PROCEDURE;  Surgeon: Provider, Generic Perianesthesia Nursing;  Location: [facility] OR          SOCIAL HISTORY:   Social History     Socioeconomic History     Marital status:      Spouse name: None     Number of children: None     Years of education: None     Highest education level: None   Tobacco Use     Smoking status: Never Smoker     Smokeless tobacco: Never Used   Vaping Use     Vaping Use: Never used   Substance and Sexual Activity     Alcohol use: Yes     Alcohol/week: 1.0 standard drink     Types: 1 Glasses of wine per week     Comment: occ glass of wine     Drug use: No     Sexual activity: Not Currently   Other Topics Concern     Caffeine Concern Yes     Comment: Coffee - 2 cups daily   Social History Narrative    10/11/2019: , lives in [place]           CURRENT MEDICATIONS:   Current Outpatient Medications   Medication     albuterol (PROAIR HFA/PROVENTIL HFA/VENTOLIN HFA) 108 (90 Base) MCG/ACT inhaler     blood glucose (ACCU-CHEK GUIDE) test strip     blood glucose monitoring (ACCU-CHEK FASTCLIX) lancets     cetirizine (ZYRTEC) 10 MG tablet     clonazePAM (KLONOPIN) 0.5 MG tablet     diltiazem ER (TIAZAC) 240 MG 24 hr ER beaded capsule     EPINEPHrine (ANY BX GENERIC EQUIV) 0.3 MG/0.3ML injection 2-pack     ipratropium-albuterol (COMBIVENT RESPIMAT)  MCG/ACT inhaler     losartan (COZAAR) 50 MG tablet     ORDER FOR ALLERGEN IMMUNOTHERAPY     spironolactone (ALDACTONE) 50 MG tablet     triamcinolone (NASACORT) 55 MCG/ACT nasal aerosol     No current facility-administered medications for this visit.       ALLERGIES:   Allergies   Allergen Reactions     Fruit [Peach] Anaphylaxis and Hives     fresh peaches and any fruit that has a pit.  Kiwi's and apples also.  Can eat any fruit cooked.     Nuts Anaphylaxis and Hives     All Raw Nuts, can eat nuts when roasted.     Ace Inhibitors      Upper respiratory infection     Alkylamines Hives     Antihistamines      Alprazolam Hives     Xanax     Diphenhydramine Hives and Other (See Comments)     Keeps her awake       Erythromycin      BASE; pt. Not sure if this is a true allergy.     Guaifed      Pt unsure of reaction     Guaifenesin      Guaifed     Hydrochlorothiazide      No Clinical Screening - See Comments      Allergic:  Fresh peaches and any fruit that has a pit.  Kiwi's and apples also.  All raw nuts.  Can eat nuts when roasted or any fruit cooked.  Some seasonal allergies :  Hayfever     Olmesartan Medoxomil Cough     Benicar     Phenylephrine Hcl      Guaifed     Pseudoephedrine Hcl      Guaifed     Seasonal Allergies      hayfever     Tramadol      Sleep disturbance. Can not sleep, and when able to fall asleep will have terrible nightmares     Tramadol Hcl      Ultram, insomnia, nightmares, headache     Venlafaxine Other (See Comments)     Heartburn, reflux     Zoloft [Sertraline]      paranoia     Latex Other (See Comments), Swelling, Difficulty breathing and Rash     Throat Closes            ROS:   CONSTITUTIONAL: No reported fever or chills. No changes in weight.  ENT: No visual disturbance, ear ache, epistaxis or sore throat.   CARDIOVASCULAR: No chest pain, chest pressure or chest discomfort. Palpitations resolved. No lower extremity edema.   RESPIRATORY: Positive for chronic dyspnea upon exertion, unchanged. No cough, wheezing or hemoptysis.   GI: No reported abdominal pain.  : No reported hematuria or dysuria. No hesitancy or incontinence.   NEUROLOGICAL: No syncope, ataxia, paresthesias or weakness.   HEMATOLOGIC: No history of anemia. No bleeding or excessive bruising. No history of blood clots.   MUSCULOSKELETAL: Positive for back pain.  ENDOCRINOLOGIC: No temperature intolerance. No hair or skin changes.  SKIN: No abnormal rashes or sores, no unusual itching.      PHYSICAL EXAM:   BP (!) 152/81 (BP Location: Right arm, Cuff Size: Adult Large)   Pulse 76   Temp 98.5  F (36.9  C) (Tympanic)   Resp 16   " Ht 1.556 m (5' 1.25\")   Wt 89.4 kg (197 lb)   SpO2 97%   BMI 36.92 kg/m    GENERAL: The patient is a well-developed, well-nourished, in no apparent distress.  HEENT: Head is normocephalic and atraumatic. Eyes are symmetrical with normal visual tracking. No icterus, no xanthelasmas. Nares appeared normal without nasal drainage. Mucous membranes are moist, no cyanosis.  NECK: Supple,  no cervical bruits, JVP not visible.   CHEST/ LUNGS: Lungs clear to auscultation, no rales, rhonchi or wheezes, no use of accessory muscles, no retractions, respirations unlabored and normal respiratory rate.   CARDIO: Regular rate and rhythm normal with S1 and S2, no S3 or S4 and no murmur, click or rub.   ABD: Abdomen is nondistended.   EXTREMITIES: No LE edema present.  MUSCULOSKELETAL: No visible joint swelling.   NEUROLOGIC: Alert and oriented X3. Normal speech, gait and affect. No focal neurologic deficits.   SKIN: No jaundice. No rashes or visible skin lesions present. No ecchymosis.       LAB RESULTS:   Office Visit on 09/21/2022   Component Date Value Ref Range Status     Sodium 09/21/2022 138  133 - 144 mmol/L Final     Potassium 09/21/2022 3.7  3.4 - 5.3 mmol/L Final     Chloride 09/21/2022 107  94 - 109 mmol/L Final     Carbon Dioxide (CO2) 09/21/2022 24  20 - 32 mmol/L Final     Anion Gap 09/21/2022 7  3 - 14 mmol/L Final     Urea Nitrogen 09/21/2022 12  7 - 30 mg/dL Final     Creatinine 09/21/2022 0.76  0.52 - 1.04 mg/dL Final     Calcium 09/21/2022 9.3  8.5 - 10.1 mg/dL Final     Glucose 09/21/2022 100 (H)  70 - 99 mg/dL Final     GFR Estimate 09/21/2022 84  >60 mL/min/1.73m2 Final    Effective December 21, 2021 eGFRcr in adults is calculated using the 2021 CKD-EPI creatinine equation which includes age and gender (Migdalia et al., NEJM, DOI: 10.1056/HVYOur2498891)     Estimated Average Glucose 09/21/2022 123  mg/dL Final     Hemoglobin A1C 09/21/2022 5.9 (H)  0.0 - 5.6 % Final    Normal <5.7%   Prediabetes 5.7-6.4%  "   Diabetes 6.5% or higher     Note: Adopted from ADA consensus guidelines.     WBC Count 09/21/2022 9.4  4.0 - 11.0 10e3/uL Final     RBC Count 09/21/2022 4.59  3.80 - 5.20 10e6/uL Final     Hemoglobin 09/21/2022 14.1  11.7 - 15.7 g/dL Final     Hematocrit 09/21/2022 41.8  35.0 - 47.0 % Final     MCV 09/21/2022 91  78 - 100 fL Final     MCH 09/21/2022 30.7  26.5 - 33.0 pg Final     MCHC 09/21/2022 33.7  31.5 - 36.5 g/dL Final     RDW 09/21/2022 12.7  10.0 - 15.0 % Final     Platelet Count 09/21/2022 291  150 - 450 10e3/uL Final     % Neutrophils 09/21/2022 70  % Final     % Lymphocytes 09/21/2022 19  % Final     % Monocytes 09/21/2022 8  % Final     % Eosinophils 09/21/2022 2  % Final     % Basophils 09/21/2022 1  % Final     % Immature Granulocytes 09/21/2022 0  % Final     NRBCs per 100 WBC 09/21/2022 0  <1 /100 Final     Absolute Neutrophils 09/21/2022 6.5  1.6 - 8.3 10e3/uL Final     Absolute Lymphocytes 09/21/2022 1.8  0.8 - 5.3 10e3/uL Final     Absolute Monocytes 09/21/2022 0.8  0.0 - 1.3 10e3/uL Final     Absolute Eosinophils 09/21/2022 0.2  0.0 - 0.7 10e3/uL Final     Absolute Basophils 09/21/2022 0.1  0.0 - 0.2 10e3/uL Final     Absolute Immature Granulocytes 09/21/2022 0.0  <=0.4 10e3/uL Final     Absolute NRBCs 09/21/2022 0.0  10e3/uL Final         ASSESSMENT:   Rj Rodríguez presents for cardiology follow-up to visit on 5/31/22 with recent reports of increased exertional dyspnea and recently identified ventricular ectopy seen as isolated PVCs, ventricular coupletes and ventricular trigeminy on recent cardiac monitor.  Patient has a past medical history significant for migraine headaches, hypertension, obesity, REBECCA, DM 2, depression, significant anxiety, glaucoma and chronic lumbago.  Today, patient reports mild improvement in dyspnea. No recurrence of pre-syncope or syncope. No chest pain or pressure. Palpitations and flip flop sensation in chest has pretty much resolved with increase in Diltiazem.  Admits that she is planning for repeat back surgery at [facility] in [place] after the first of the year, significant anxiety with this.     1. Frequent PVCs  2. Essential hypertension  3. Type 2 diabetes mellitus without complication, without long-term current use of insulin (H)  4. REBECCA (obstructive sleep apnea)    PLAN:   1. Previously reviewed results of stress test (2/2022) without evidence of ischemia. Recent TTE stable with normal biventricular function, no hemodynamically significant valvular abnormalities, possible borderline diastolic dysfunction.   2. Palpitations related to ectopic beats resolved with increase in Diltiazem to 240 mg daily, she will continue with this.   3. Additionally, she will continue on Losartan to 50 mg daily and Spironolactone 50 mg daily for HTN. BP elevated today with back pain and anxiety.  4. Previously reviewed untreated sleep apnea, may contribute to increased ectopy and may place patient at risk for developing other arrhythmias such as atrial fibrillation. She has not been on CPAP and reports sleep study years ago. She has previously requested to hold off on further sleep study at this time however, may consider home sleep study in the future.   5. No anginal symptoms, recent ECG stable in September, 2022.  6.  She will require a pre-op physical with primary care provider prior to back surgery at [facility], EKG at that visit prior to surgery. She will not require cardiology pre-op prior to surgery unless change in symptoms or change in EKG.     Follow-up with cardiology in 6 months, certainly sooner if needed.     Thank you for allowing me to participate in the care of your patient. Please do not hesitate to contact me if you have any questions.     Marie Rodrigues, APRN CNP [facility]

## 2022-12-08 NOTE — PATIENT INSTRUCTIONS
You will need to schedule a pre-op visit with primary care provider, EKG at that visit prior to surgery.   You will not require cardiology pre-op prior to surgery unless change in symptoms or change in EKG.

## 2022-12-19 ENCOUNTER — TELEPHONE (OUTPATIENT)
Dept: FAMILY MEDICINE | Facility: OTHER | Age: 72
End: 2022-12-19

## 2022-12-19 NOTE — TELEPHONE ENCOUNTER
2:33 PM    Reason for Call: OVERBOOK    Patient is having the following symptoms:  Preop physical before January 16.  Sheyla has no openings and pt  would like to see her.    The patient is requesting an appointment for  with Sheyla Sanches.    Was an appointment offered for this call? Yes  If yes : Appointment type              Date    Preferred method for responding to this message: Telephone Call  What is your phone number ?[phone]    If we cannot reach you directly, may we leave a detailed response at the number you provided? Yes    Can this message wait until your PCP/provider returns, if unavailable today? Not applicable, provider is in today    TOBY WEATHERS

## 2022-12-20 NOTE — PROGRESS NOTES
[facility] [place]  [address]  [place] [address]  Phone: [phone]  Primary Provider: Danisha Dyer  Pre-op Performing Provider: DANISHA DYER    [id number]}  PREOPERATIVE EVALUATION:  Today's date: 12/23/2022    Rj Rodríguez is a 72 year old female who presents for a preoperative evaluation.    Surgical Information:  Surgery/Procedure: Cervical Fusion  Surgery Location: [facility]  Surgeon: Dr. Bravo  Surgery Date: 1/16/22  Time of Surgery: TBD  Where patient plans to recover: At home with family      Type of Anesthesia Anticipated: General    Assessment & Plan     The proposed surgical procedure is considered INTERMEDIATE risk.    Preop general physical exam  Cervicalgia  Cleared for surgery. CBC and BMP unremarkable. A1c 5.9. EKG without acute changes.     Generalized anxiety disorder  Mild episode of recurrent major depressive disorder (H)  Mental health stable.     Type 2 diabetes mellitus without complication, without long-term current use of insulin (H)  Controlled. A1C 5.9. She does not take anything for her diabetes.     Hyperlipidemia, unspecified hyperlipidemia type  Does not take a statin. Does not tolerate.     Essential hypertension  Controlled.         Risks and Recommendations:  The patient has the following additional risks and recommendations for perioperative complications:  Pulmonary:    - Incentive spirometry post-op      She will hold all medications the morning of surgery except her diltiazem. Ok to use her albuterol inhaler if needed.     RECOMMENDATION:  APPROVAL GIVEN to proceed with proposed procedure, without further diagnostic evaluation.    Rj Rodríguez with a functional capacity of greater than four MET's. There are no obvious contraindications to proceeding with surgery at this time. Recommend that the surgeon review risks and benefits of the procedure prior to proceeding.     Was recommended to avoid eating and drinking anything 12 hours prior to surgery  unless his surgeon tells him otherwise.         Subjective     HPI related to upcoming procedure: Patient has chronic neck pain. H/O cervical fusion. Recently had diskectomy, but did not feel it helped. They are now going to do another cervical fusion.  Pain radiates into left hand. Also has some numbness in left arm and hand.       Preop Questions 12/23/2022   1. Have you ever had a heart attack or stroke? No   2. Have you ever had surgery on your heart or blood vessels, such as a stent placement, a coronary artery bypass, or surgery on an artery in your head, neck, heart, or legs? No   3. Do you have chest pain with activity? No   4. Do you have a history of  heart failure? No   5. Do you currently have a cold, bronchitis or symptoms of other infection? No   6. Do you have a cough, shortness of breath, or wheezing? No   7. Do you or anyone in your family have previous history of blood clots? No   8. Do you or does anyone in your family have a serious bleeding problem such as prolonged bleeding following surgeries or cuts? No   9. Have you ever had problems with anemia or been told to take iron pills? No   10. Have you had any abnormal blood loss such as black, tarry or bloody stools, or abnormal vaginal bleeding? No   11. Have you ever had a blood transfusion? No   12. Are you willing to have a blood transfusion if it is medically needed before, during, or after your surgery? Yes   13. Have you or any of your relatives ever had problems with anesthesia? YES - trouble waking up and gets nauseated   14. Do you have sleep apnea, excessive snoring or daytime drowsiness? No   15. Do you have any artifical heart valves or other implanted medical devices like a pacemaker, defibrillator, or continuous glucose monitor? UNKNOWN - fusion in back, anchors in right shoulder   16. Do you have artificial joints? No   17. Are you allergic to latex? YES        Health Care Directive:  Patient does not have a Health Care Directive  or Living Will: Discussed advance care planning with patient; however, patient declined at this time.    Preoperative Review of :   reviewed - controlled substances reflected in medication list.      Status of Chronic Conditions:  DEPRESSION and ANXIETY - Patient has a long history of Depression and Anxiety of moderate severity requiring medication for control with recent symptoms being slightly worse. Current symptoms of depression include anxiety, irritablility. Uses Klonopin as needed. No thoughts of suicide. Working with her counselor.     DIABETES - Patient has a longstanding history of Diabetes Type II . Patient is being treated with diet and exercise and denies significant side effects. Control has been good. Complicating factors include but are not limited to: hypertension. A1C was 5.9 on 9/21/22.      HYPERLIPIDEMIA - Patient has a long history of significant Hyperlipidemia requiring medication for treatment with recent fair control. Does not take a statin due to side effects.      HYPERTENSION/Frequent PVC- Patient has longstanding history of HTN and palpitations, currently denies any symptoms referable to elevated blood pressure. Specifically denies chest pain, dyspnea, orthopnea, PND or peripheral edema. Blood pressure readings have been in normal range. Current medication regimen is as listed below. Patient denies any side effects of medication. Currently taking Aldactone 50 mg, diltiazem 240 mg, and losartan 50 mg. She does follow with cardiology. Was last seen on 12/8/22. Note was reviewed. Stress test was done on 2/10/22 and was negative for reversible ischemia. No longer having palpitations.     Covid testing is not needed.      Mets greater than 4, able to walk around the block without stopping.    She recently had surgery on her neck, no issues.     Review of Systems  CONSTITUTIONAL: NEGATIVE for fever, chills, change in weight  INTEGUMENTARY/SKIN: NEGATIVE for worrisome rashes, moles or  lesions  EYES: NEGATIVE for vision changes or irritation  ENT/MOUTH: NEGATIVE for ear, mouth and throat problems  RESP: NEGATIVE for significant cough or SOB  CV: NEGATIVE for chest pain, palpitations or peripheral edema  GI: NEGATIVE for nausea, abdominal pain, heartburn, or change in bowel habits  : NEGATIVE for frequency, dysuria, or hematuria  MUSCULOSKELETAL:POSITIVE  for neck pain  NEURO: NEGATIVE for weakness, dizziness or paresthesias  ENDOCRINE: NEGATIVE for temperature intolerance, skin/hair changes  HEME: NEGATIVE for bleeding problems  PSYCHIATRIC: POSITIVE foranxiety    Patient Active Problem List    Diagnosis Date Noted     Morbid obesity (H) 01/10/2022     Priority: Medium     Shortness of breath 08/09/2021     Priority: Medium     Irregular heart rate 08/09/2021     Priority: Medium     Frequent PVCs 08/09/2021     Priority: Medium     Chest pain, unspecified type 08/09/2021     Priority: Medium     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     Priority: Medium     Diabetes mellitus, type 2 (H) 12/06/2019     Priority: Medium     [place] cardiac risk 11% in next 10 years 10/29/2019     Priority: Medium     S/P rotator cuff repair 10/26/2016     Priority: Medium     Mild episode of recurrent major depressive disorder (H) 10/22/2014     Priority: Medium     Migraine with aura and without status migrainosus, not intractable 04/21/2014     Priority: Medium     S/P cervical spinal fusion 03/21/2014     Priority: Medium     Cervicalgia 12/10/2013     Priority: Medium     Vertigo 03/18/2013     Priority: Medium     Tinnitus 03/18/2013     Priority: Medium     Chronic rhinitis 03/18/2013     Priority: Medium     Lumbago 07/10/2012     Priority: Medium     Presbyopia 01/25/2011     Priority: Medium     Primary open-angle glaucoma 01/25/2011     Priority: Medium     Overview:   IMO Update 10/11       ZEFERINO (generalized anxiety disorder) 12/06/2006     Priority: Medium     Essential hypertension 06/02/2004      Priority: Medium     Overview:   IMO Update        Past Medical History:   Diagnosis Date     Arthritis      Chronic back pain      Hypertension      Irregular heart beat      Sleep apnea      Past Surgical History:   Procedure Laterality Date     ANESTHESIA OUT OF OR MRI N/A 6/1/2022    Procedure: MRI CERVICAL SPINE;  Surgeon: GENERIC ANESTHESIA PROVIDER;  Location: [facility]     ANESTHESIA OUT OF OR MRI N/A 9/23/2022    Procedure: MRI CERVICAL SPINE;  Surgeon: GENERIC ANESTHESIA PROVIDER;  Location: [facility]     APPENDECTOMY       BACK SURGERY       CHOLECYSTECTOMY       COLONOSCOPY  2012    Repeat in 10 years     GYN SURGERY       HYSTERECTOMY      Ovaries     RELEASE CARPAL TUNNEL      LT     RELEASE CARPAL TUNNEL      RT x2     SURGICAL RADIOLOGY PROCEDURE N/A 2/12/2016    Procedure: SURGICAL RADIOLOGY PROCEDURE;  Surgeon: Provider, Generic Perianesthesia Nursing;  Location: [facility] OR     SURGICAL RADIOLOGY PROCEDURE N/A 8/15/2016    Procedure: SURGICAL RADIOLOGY PROCEDURE;  Surgeon: Provider, Generic Perianesthesia Nursing;  Location: [facility] OR     Current Outpatient Medications   Medication Sig Dispense Refill     albuterol (PROAIR HFA/PROVENTIL HFA/VENTOLIN HFA) 108 (90 Base) MCG/ACT inhaler Inhale 2 puffs into the lungs every 6 hours 18 g 0     blood glucose (ACCU-CHEK GUIDE) test strip Use to test blood sugar 1 time daily or as directed. 50 each 11     blood glucose monitoring (ACCU-CHEK FASTCLIX) lancets Use to test blood sugar 1 time daily or as directed. 102 each 11     cetirizine (ZYRTEC) 10 MG tablet Take 1 tablet (10 mg) by mouth daily as needed for allergies 60 tablet 12     clonazePAM (KLONOPIN) 0.5 MG tablet Take 1 tablet by mouth twice daily as needed for anxiety 30 tablet 0     diltiazem ER (TIAZAC) 240 MG 24 hr ER beaded capsule Take 1 capsule (240 mg) by mouth daily 90 capsule 3     EPINEPHrine (ANY BX GENERIC EQUIV) 0.3 MG/0.3ML injection 2-pack INJECT CONTENTS OF 1 PEN AS NEEDED FOR ALLERGIC REACTION 2  each 0     ipratropium-albuterol (COMBIVENT RESPIMAT)  MCG/ACT inhaler Inhale 1 puff into the lungs 4 times daily as needed for wheezing or other (chest tightness) 4 g 3     losartan (COZAAR) 50 MG tablet Take 1 tablet (50 mg) by mouth daily 90 tablet 3     ORDER FOR ALLERGEN IMMUNOTHERAPY Continue with 0.5ml weekly subcutaneous allergy injections. Follow standard maintenance protocols. 10 mL PRN     spironolactone (ALDACTONE) 50 MG tablet Take 1 tablet (50 mg) by mouth daily 90 tablet 3     triamcinolone (NASACORT) 55 MCG/ACT nasal aerosol Spray 2 sprays into both nostrils daily 2 Bottle 12       Allergies   Allergen Reactions     Fruit [Peach] Anaphylaxis and Hives     fresh peaches and any fruit that has a pit.  Kiwi's and apples also.  Can eat any fruit cooked.     Nuts Anaphylaxis and Hives     All Raw Nuts, can eat nuts when roasted.     Ace Inhibitors      Upper respiratory infection     Alkylamines Hives     Antihistamines     Alprazolam Hives     Xanax     Diphenhydramine Hives and Other (See Comments)     Keeps her awake       Erythromycin      BASE; pt. Not sure if this is a true allergy.     Guaifed      Pt unsure of reaction     Guaifenesin      Guaifed     Hydrochlorothiazide      No Clinical Screening - See Comments      Allergic:  Fresh peaches and any fruit that has a pit.  Kiwi's and apples also.  All raw nuts.  Can eat nuts when roasted or any fruit cooked.  Some seasonal allergies :  Hayfever     Olmesartan Medoxomil Cough     Benicar     Phenylephrine Hcl      Guaifed     Pseudoephedrine Hcl      Guaifed     Seasonal Allergies      hayfever     Tramadol      Sleep disturbance. Can not sleep, and when able to fall asleep will have terrible nightmares     Tramadol Hcl      Ultram, insomnia, nightmares, headache     Venlafaxine Other (See Comments)     Heartburn, reflux     Zoloft [Sertraline]      paranoia     Latex Other (See Comments), Swelling, Difficulty breathing and Rash     Throat  "Closes          Social History     Tobacco Use     Smoking status: Never     Smokeless tobacco: Never   Substance Use Topics     Alcohol use: Yes     Alcohol/week: 1.0 standard drink     Types: 1 Glasses of wine per week     Comment: occ glass of wine     Family History   Problem Relation Age of Onset     Colon Cancer Maternal Aunt      Colon Cancer Maternal Uncle      Diabetes Father         Type 2     Hypertension Father      Alcoholism Father      Alcoholism Mother      History   Drug Use No         Objective     /62 (BP Location: Right arm, Patient Position: Sitting, Cuff Size: Adult Large)   Pulse 75   Temp 98.4  F (36.9  C) (Tympanic)   Ht 1.556 m (5' 1.25\")   Wt 90.7 kg (200 lb)   SpO2 98%   BMI 37.48 kg/m      Physical Exam    GENERAL APPEARANCE: active, no distress and over weight      EYES: EOMI, PERRL     HENT: ear canals and TM's normal and nose and mouth without ulcers or lesions     NECK: no adenopathy, no asymmetry, masses, or scars and thyroid normal to palpation     RESP: lungs clear to auscultation - no rales, rhonchi or wheezes     CV: regular rates, irregular rhythm-frequent PVCs,  no murmur, click or rub     ABDOMEN:  soft, nontender, no HSM or masses and bowel sounds normal     MS: extremities normal- no gross deformities noted, no evidence of inflammation in joints, FROM in all extremities.     SKIN: no suspicious lesions or rashes     NEURO: Normal strength and tone, sensory exam grossly normal, mentation intact and speech normal     PSYCH: mentation appears normal. and affect normal/bright     LYMPHATICS: No cervical adenopathy    Recent Labs   Lab Test 09/21/22  1334 07/11/22  1100 05/20/22  1332 07/18/21  1352 07/18/21  1351   HGB 14.1 14.0 14.4   < >  --     287 269   < >  --    INR  --   --   --   --  0.94    137 137   < > 139   POTASSIUM 3.7 4.2 3.6   < > 3.6   CR 0.76 0.87 0.75   < > 0.77   A1C 5.9*  --  6.1*   < >  --     < > = values in this interval not " displayed.        Diagnostics:  Recent Results (from the past 24 hour(s))   Hemoglobin A1c    Collection Time: 12/23/22 10:53 AM   Result Value Ref Range    Estimated Average Glucose 123 mg/dL    Hemoglobin A1C 5.9 (H) <5.7 %   Basic metabolic panel    Collection Time: 12/23/22 10:53 AM   Result Value Ref Range    Sodium 139 136 - 145 mmol/L    Potassium 4.0 3.4 - 5.3 mmol/L    Chloride 104 98 - 107 mmol/L    Carbon Dioxide (CO2) 23 22 - 29 mmol/L    Anion Gap 12 7 - 15 mmol/L    Urea Nitrogen 14.9 8.0 - 23.0 mg/dL    Creatinine 0.82 0.51 - 0.95 mg/dL    Calcium 9.8 8.8 - 10.2 mg/dL    Glucose 111 (H) 70 - 99 mg/dL    GFR Estimate 77 >60 mL/min/1.73m2   CBC with platelets and differential    Collection Time: 12/23/22 10:53 AM   Result Value Ref Range    WBC Count 10.6 4.0 - 11.0 10e3/uL    RBC Count 4.90 3.80 - 5.20 10e6/uL    Hemoglobin 14.8 11.7 - 15.7 g/dL    Hematocrit 44.4 35.0 - 47.0 %    MCV 91 78 - 100 fL    MCH 30.2 26.5 - 33.0 pg    MCHC 33.3 31.5 - 36.5 g/dL    RDW 12.9 10.0 - 15.0 %    Platelet Count 289 150 - 450 10e3/uL    % Neutrophils 68 %    % Lymphocytes 20 %    % Monocytes 9 %    % Eosinophils 2 %    % Basophils 1 %    % Immature Granulocytes 0 %    NRBCs per 100 WBC 0 <1 /100    Absolute Neutrophils 7.1 1.6 - 8.3 10e3/uL    Absolute Lymphocytes 2.1 0.8 - 5.3 10e3/uL    Absolute Monocytes 1.0 0.0 - 1.3 10e3/uL    Absolute Eosinophils 0.2 0.0 - 0.7 10e3/uL    Absolute Basophils 0.1 0.0 - 0.2 10e3/uL    Absolute Immature Granulocytes 0.0 <=0.4 10e3/uL    Absolute NRBCs 0.0 10e3/uL        EKG: Normal Sinus Rhythm, normal axis, normal intervals, no acute ST/T changes c/w ischemia, no LVH by voltage criteria, unchanged from previous tracings, frequent PVCs which is not new    Revised Cardiac Risk Index (RCRI):  The patient has the following serious cardiovascular risks for perioperative complications:   - No serious cardiac risks = 0 points     RCRI Interpretation: 0 points: Class I (very low risk -  0.4% complication rate)           Signed Electronically by: Sheyla Sanches NP  Copy of this evaluation report is provided to requesting physician.

## 2022-12-23 ENCOUNTER — OFFICE VISIT (OUTPATIENT)
Dept: FAMILY MEDICINE | Facility: OTHER | Age: 72
End: 2022-12-23
Attending: NURSE PRACTITIONER
Payer: COMMERCIAL

## 2022-12-23 ENCOUNTER — TELEPHONE (OUTPATIENT)
Dept: FAMILY MEDICINE | Facility: OTHER | Age: 72
End: 2022-12-23

## 2022-12-23 VITALS
HEART RATE: 75 BPM | BODY MASS INDEX: 37.76 KG/M2 | HEIGHT: 61 IN | WEIGHT: 200 LBS | DIASTOLIC BLOOD PRESSURE: 62 MMHG | SYSTOLIC BLOOD PRESSURE: 130 MMHG | TEMPERATURE: 98.4 F | OXYGEN SATURATION: 98 %

## 2022-12-23 DIAGNOSIS — E78.5 HYPERLIPIDEMIA, UNSPECIFIED HYPERLIPIDEMIA TYPE: ICD-10-CM

## 2022-12-23 DIAGNOSIS — Z01.818 PREOP GENERAL PHYSICAL EXAM: Primary | ICD-10-CM

## 2022-12-23 DIAGNOSIS — E11.9 TYPE 2 DIABETES MELLITUS WITHOUT COMPLICATION, WITHOUT LONG-TERM CURRENT USE OF INSULIN (H): ICD-10-CM

## 2022-12-23 DIAGNOSIS — F41.1 GENERALIZED ANXIETY DISORDER: ICD-10-CM

## 2022-12-23 DIAGNOSIS — M54.2 CERVICALGIA: ICD-10-CM

## 2022-12-23 DIAGNOSIS — I10 ESSENTIAL HYPERTENSION: ICD-10-CM

## 2022-12-23 DIAGNOSIS — F33.0 MILD EPISODE OF RECURRENT MAJOR DEPRESSIVE DISORDER (H): ICD-10-CM

## 2022-12-23 LAB
ANION GAP SERPL CALCULATED.3IONS-SCNC: 12 MMOL/L (ref 7–15)
BASOPHILS # BLD AUTO: 0.1 10E3/UL (ref 0–0.2)
BASOPHILS NFR BLD AUTO: 1 %
BUN SERPL-MCNC: 14.9 MG/DL (ref 8–23)
CALCIUM SERPL-MCNC: 9.8 MG/DL (ref 8.8–10.2)
CHLORIDE SERPL-SCNC: 104 MMOL/L (ref 98–107)
CREAT SERPL-MCNC: 0.82 MG/DL (ref 0.51–0.95)
DEPRECATED HCO3 PLAS-SCNC: 23 MMOL/L (ref 22–29)
EOSINOPHIL # BLD AUTO: 0.2 10E3/UL (ref 0–0.7)
EOSINOPHIL NFR BLD AUTO: 2 %
ERYTHROCYTE [DISTWIDTH] IN BLOOD BY AUTOMATED COUNT: 12.9 % (ref 10–15)
EST. AVERAGE GLUCOSE BLD GHB EST-MCNC: 123 MG/DL
GFR SERPL CREATININE-BSD FRML MDRD: 77 ML/MIN/1.73M2
GLUCOSE SERPL-MCNC: 111 MG/DL (ref 70–99)
HBA1C MFR BLD: 5.9 %
HCT VFR BLD AUTO: 44.4 % (ref 35–47)
HGB BLD-MCNC: 14.8 G/DL (ref 11.7–15.7)
IMM GRANULOCYTES # BLD: 0 10E3/UL
IMM GRANULOCYTES NFR BLD: 0 %
LYMPHOCYTES # BLD AUTO: 2.1 10E3/UL (ref 0.8–5.3)
LYMPHOCYTES NFR BLD AUTO: 20 %
MCH RBC QN AUTO: 30.2 PG (ref 26.5–33)
MCHC RBC AUTO-ENTMCNC: 33.3 G/DL (ref 31.5–36.5)
MCV RBC AUTO: 91 FL (ref 78–100)
MONOCYTES # BLD AUTO: 1 10E3/UL (ref 0–1.3)
MONOCYTES NFR BLD AUTO: 9 %
NEUTROPHILS # BLD AUTO: 7.1 10E3/UL (ref 1.6–8.3)
NEUTROPHILS NFR BLD AUTO: 68 %
NRBC # BLD AUTO: 0 10E3/UL
NRBC BLD AUTO-RTO: 0 /100
PLATELET # BLD AUTO: 289 10E3/UL (ref 150–450)
POTASSIUM SERPL-SCNC: 4 MMOL/L (ref 3.4–5.3)
RBC # BLD AUTO: 4.9 10E6/UL (ref 3.8–5.2)
SODIUM SERPL-SCNC: 139 MMOL/L (ref 136–145)
WBC # BLD AUTO: 10.6 10E3/UL (ref 4–11)

## 2022-12-23 PROCEDURE — G0463 HOSPITAL OUTPT CLINIC VISIT: HCPCS

## 2022-12-23 PROCEDURE — G0463 HOSPITAL OUTPT CLINIC VISIT: HCPCS | Mod: 25

## 2022-12-23 PROCEDURE — 85025 COMPLETE CBC W/AUTO DIFF WBC: CPT | Mod: ZL | Performed by: NURSE PRACTITIONER

## 2022-12-23 PROCEDURE — 80048 BASIC METABOLIC PNL TOTAL CA: CPT | Mod: ZL | Performed by: NURSE PRACTITIONER

## 2022-12-23 PROCEDURE — 36415 COLL VENOUS BLD VENIPUNCTURE: CPT | Mod: ZL | Performed by: NURSE PRACTITIONER

## 2022-12-23 PROCEDURE — 83036 HEMOGLOBIN GLYCOSYLATED A1C: CPT | Mod: ZL | Performed by: NURSE PRACTITIONER

## 2022-12-23 PROCEDURE — 99214 OFFICE O/P EST MOD 30 MIN: CPT | Performed by: NURSE PRACTITIONER

## 2022-12-23 PROCEDURE — 93005 ELECTROCARDIOGRAM TRACING: CPT | Performed by: NURSE PRACTITIONER

## 2022-12-23 PROCEDURE — 93010 ELECTROCARDIOGRAM REPORT: CPT | Mod: 77 | Performed by: INTERNAL MEDICINE

## 2022-12-23 ASSESSMENT — PAIN SCALES - GENERAL: PAINLEVEL: SEVERE PAIN (7)

## 2022-12-23 NOTE — PATIENT INSTRUCTIONS
Hold all medications the morning of surgery except her diltiazem. Ok to use her albuterol inhaler if needed.

## 2023-01-01 DIAGNOSIS — F41.1 GENERALIZED ANXIETY DISORDER: ICD-10-CM

## 2023-01-03 RX ORDER — CLONAZEPAM 0.5 MG/1
TABLET ORAL
Qty: 30 TABLET | Refills: 0 | Status: SHIPPED | OUTPATIENT
Start: 2023-01-03 | End: 2024-06-24

## 2023-01-03 NOTE — TELEPHONE ENCOUNTER
Klonopin      Last Written Prescription Date:  7.25.22  Last Fill Quantity: #30,   # refills: 0  Last Office Visit: 12.23.22  Future Office visit:       Routing refill request to provider for review/approval because:  Drug not on the [facility], [facility] or [facility] refill protocol or controlled substance

## 2023-01-11 ENCOUNTER — TELEPHONE (OUTPATIENT)
Dept: OTOLARYNGOLOGY | Facility: OTHER | Age: 73
End: 2023-01-11

## 2023-01-11 DIAGNOSIS — J30.89 PERENNIAL ALLERGIC RHINITIS: ICD-10-CM

## 2023-01-11 RX ORDER — EPINEPHRINE 0.3 MG/.3ML
0.3 INJECTION SUBCUTANEOUS PRN
Qty: 2 EACH | Refills: 1 | Status: SHIPPED | OUTPATIENT
Start: 2023-01-11

## 2023-01-11 NOTE — TELEPHONE ENCOUNTER
Patient called and needs a new prescription sent over to [facility] for her epi-pen.      Thank You      Belen Delaney

## 2023-01-16 ENCOUNTER — TRANSFERRED RECORDS (OUTPATIENT)
Dept: HEALTH INFORMATION MANAGEMENT | Facility: CLINIC | Age: 73
End: 2023-01-16

## 2023-02-08 ENCOUNTER — ALLIED HEALTH/NURSE VISIT (OUTPATIENT)
Dept: ALLERGY | Facility: OTHER | Age: 73
End: 2023-02-08
Attending: NURSE PRACTITIONER
Payer: MEDICARE

## 2023-02-08 DIAGNOSIS — J30.89 PERENNIAL ALLERGIC RHINITIS: Primary | ICD-10-CM

## 2023-02-08 PROCEDURE — 95117 IMMUNOTHERAPY INJECTIONS: CPT

## 2023-02-08 NOTE — PROGRESS NOTES
Patient needs an updated order to continue arlyn allergy injections.  New order pended.  Please review and sign, thank you!    Morgan Devries RN on 2/8/2023 at 11:13 AM

## 2023-02-08 NOTE — PROGRESS NOTES
Prior to injection verified pt identity using pt name and date of birth.    Allergy injection/s given and charted on paper allergy flow sheet.  Patient left AMA, signing form, not staying for the observation period.    Morgan Devries RN on 2/8/2023 at 11:08 AM

## 2023-02-09 ENCOUNTER — TRANSFERRED RECORDS (OUTPATIENT)
Dept: HEALTH INFORMATION MANAGEMENT | Facility: CLINIC | Age: 73
End: 2023-02-09

## 2023-02-15 ENCOUNTER — ALLIED HEALTH/NURSE VISIT (OUTPATIENT)
Dept: ALLERGY | Facility: OTHER | Age: 73
End: 2023-02-15
Attending: NURSE PRACTITIONER
Payer: MEDICARE

## 2023-02-15 DIAGNOSIS — J30.89 PERENNIAL ALLERGIC RHINITIS: Primary | ICD-10-CM

## 2023-02-15 PROCEDURE — 95117 IMMUNOTHERAPY INJECTIONS: CPT

## 2023-02-15 NOTE — PROGRESS NOTES
Prior to injection verified pt identity using pt name and date of birth.    Allergy injection/s given and charted on paper allergy flow sheet.  Patient left AMA, signing form, not staying for the observation period.    Linn Govea RN on 2/15/2023 at 11:03 AM

## 2023-02-22 ENCOUNTER — ALLIED HEALTH/NURSE VISIT (OUTPATIENT)
Dept: ALLERGY | Facility: OTHER | Age: 73
End: 2023-02-22
Attending: NURSE PRACTITIONER
Payer: MEDICARE

## 2023-02-22 DIAGNOSIS — J30.89 PERENNIAL ALLERGIC RHINITIS: Primary | ICD-10-CM

## 2023-02-22 PROCEDURE — 95117 IMMUNOTHERAPY INJECTIONS: CPT

## 2023-02-22 NOTE — PROGRESS NOTES
Prior to injection verified pt identity using pt name and date of birth.    Allergy injection/s given and charted on paper allergy flow sheet.  Patient left AMA, signing form, not staying for the observation period.    Linn Govea RN on 2/22/2023 at 10:55 AM

## 2023-03-02 ENCOUNTER — ALLIED HEALTH/NURSE VISIT (OUTPATIENT)
Dept: ALLERGY | Facility: OTHER | Age: 73
End: 2023-03-02
Attending: NURSE PRACTITIONER
Payer: MEDICARE

## 2023-03-02 DIAGNOSIS — J30.89 PERENNIAL ALLERGIC RHINITIS: Primary | ICD-10-CM

## 2023-03-02 PROCEDURE — 95117 IMMUNOTHERAPY INJECTIONS: CPT

## 2023-03-02 NOTE — PROGRESS NOTES
Prior to injection verified pt identity using pt name and date of birth.    Allergy injection/s given and charted on paper allergy flow sheet.  Patient left AMA, signing form, not staying for the observation period.    Morgan Devries RN on 3/2/2023 at 11:13 AM

## 2023-03-08 ENCOUNTER — ALLIED HEALTH/NURSE VISIT (OUTPATIENT)
Dept: ALLERGY | Facility: OTHER | Age: 73
End: 2023-03-08
Attending: NURSE PRACTITIONER
Payer: MEDICARE

## 2023-03-08 DIAGNOSIS — J30.89 PERENNIAL ALLERGIC RHINITIS: Primary | ICD-10-CM

## 2023-03-08 PROCEDURE — 95165 ANTIGEN THERAPY SERVICES: CPT

## 2023-03-08 PROCEDURE — 95165 ANTIGEN THERAPY SERVICES: CPT | Performed by: NURSE PRACTITIONER

## 2023-03-08 NOTE — PROGRESS NOTES
Allergy serum is mixed today at [facility],  into  2  (5 ml)  multi dose vial/vials.    Allergens included were:    Ragweed  0.2 ml of dilution # 1  Pigweed  0.2 ml of dilution # 1  Mugwort 0.2 ml of dilution # 0  Kochia  0.2 ml of dilution # 0  Russian Thistle 0.2 ml of dilution # 1  Jimenez Grass 0.2 ml of dilution # 1  Birch mix 0.2 ml of dilution # 1  Maple Mix 0.2 of dilution # 1  Elm Mix 0.2 ml of dilution # 1  Oak Mix 0.2 ml of dilution # 1  Frank Mix 0.2 ml of dilution # 1  Pine Mix 0.2 ml of dilution # 1  Eastern [place] 0.2 ml of dilution # 1  Black [place] 0.2 ml of dilution # 1  Aspen 0.2 ml of dilution # 0  Red Waldo 0.2 ml of dilution # 0    Alternaria 0.2 ml of dilution # 1  Aspergillus 0.2 ml of dilution # 1  Hormodendrum 0.2 ml of dilution # 1  Helminthosporium 0.2 ml of dilution # 1  Penicillium 0.2 ml of dilution # 1  Epicoccum 0.2 ml of dilution # 1  Fusarium 0.2 ml of dilution # 1  Mucor 0.2 ml of dilution # 0  Grain Smut 0.2 ml of dilution # 0  Grass Smut 0.2 ml of dilution # 0  Cat 0.2 ml of dilution # 1  Dog 0.2 ml of dilution # 1  Feather Mix 0.2 ml of dilution # 0  Dust Mite Mix 0.2 ml of dilution # 1  Horse 0.2 ml of dilution # 0    Linn Govea RN on 3/8/2023 at 9:38 AM

## 2023-03-09 ENCOUNTER — TRANSFERRED RECORDS (OUTPATIENT)
Dept: HEALTH INFORMATION MANAGEMENT | Facility: CLINIC | Age: 73
End: 2023-03-09

## 2023-03-27 ENCOUNTER — TELEPHONE (OUTPATIENT)
Dept: FAMILY MEDICINE | Facility: OTHER | Age: 73
End: 2023-03-27

## 2023-03-27 ENCOUNTER — OFFICE VISIT (OUTPATIENT)
Dept: FAMILY MEDICINE | Facility: OTHER | Age: 73
End: 2023-03-27
Attending: NURSE PRACTITIONER
Payer: COMMERCIAL

## 2023-03-27 ENCOUNTER — ANCILLARY PROCEDURE (OUTPATIENT)
Dept: GENERAL RADIOLOGY | Facility: OTHER | Age: 73
End: 2023-03-27
Attending: NURSE PRACTITIONER
Payer: MEDICARE

## 2023-03-27 VITALS
HEART RATE: 83 BPM | BODY MASS INDEX: 37.76 KG/M2 | HEIGHT: 61 IN | TEMPERATURE: 98.8 F | OXYGEN SATURATION: 97 % | WEIGHT: 200 LBS | DIASTOLIC BLOOD PRESSURE: 60 MMHG | SYSTOLIC BLOOD PRESSURE: 122 MMHG

## 2023-03-27 DIAGNOSIS — J06.9 ACUTE URI: Primary | ICD-10-CM

## 2023-03-27 DIAGNOSIS — J06.9 ACUTE URI: ICD-10-CM

## 2023-03-27 PROCEDURE — 71046 X-RAY EXAM CHEST 2 VIEWS: CPT | Mod: TC

## 2023-03-27 PROCEDURE — 99213 OFFICE O/P EST LOW 20 MIN: CPT | Performed by: NURSE PRACTITIONER

## 2023-03-27 PROCEDURE — G0463 HOSPITAL OUTPT CLINIC VISIT: HCPCS

## 2023-03-27 PROCEDURE — G0463 HOSPITAL OUTPT CLINIC VISIT: HCPCS | Mod: 25

## 2023-03-27 RX ORDER — PREDNISONE 20 MG/1
40 TABLET ORAL DAILY
Qty: 10 TABLET | Refills: 0 | Status: SHIPPED | OUTPATIENT
Start: 2023-03-27 | End: 2023-04-01

## 2023-03-27 RX ORDER — METHOCARBAMOL 750 MG/1
750 TABLET, FILM COATED ORAL 4 TIMES DAILY
COMMUNITY
Start: 2023-01-17 | End: 2023-05-08

## 2023-03-27 ASSESSMENT — ASTHMA QUESTIONNAIRES
QUESTION_3 LAST FOUR WEEKS HOW OFTEN DID YOUR ASTHMA SYMPTOMS (WHEEZING, COUGHING, SHORTNESS OF BREATH, CHEST TIGHTNESS OR PAIN) WAKE YOU UP AT NIGHT OR EARLIER THAN USUAL IN THE MORNING: TWO OR THREE NIGHTS A WEEK
QUESTION_5 LAST FOUR WEEKS HOW WOULD YOU RATE YOUR ASTHMA CONTROL: SOMEWHAT CONTROLLED
ACT_TOTALSCORE: 11
QUESTION_1 LAST FOUR WEEKS HOW MUCH OF THE TIME DID YOUR ASTHMA KEEP YOU FROM GETTING AS MUCH DONE AT WORK, SCHOOL OR AT HOME: MOST OF THE TIME
QUESTION_2 LAST FOUR WEEKS HOW OFTEN HAVE YOU HAD SHORTNESS OF BREATH: MORE THAN ONCE A DAY
ACT_TOTALSCORE: 11
QUESTION_4 LAST FOUR WEEKS HOW OFTEN HAVE YOU USED YOUR RESCUE INHALER OR NEBULIZER MEDICATION (SUCH AS ALBUTEROL): TWO OR THREE TIMES PER WEEK

## 2023-03-27 ASSESSMENT — ANXIETY QUESTIONNAIRES
GAD7 TOTAL SCORE: 1
3. WORRYING TOO MUCH ABOUT DIFFERENT THINGS: NOT AT ALL
4. TROUBLE RELAXING: NOT AT ALL
IF YOU CHECKED OFF ANY PROBLEMS ON THIS QUESTIONNAIRE, HOW DIFFICULT HAVE THESE PROBLEMS MADE IT FOR YOU TO DO YOUR WORK, TAKE CARE OF THINGS AT HOME, OR GET ALONG WITH OTHER PEOPLE: NOT DIFFICULT AT ALL
7. FEELING AFRAID AS IF SOMETHING AWFUL MIGHT HAPPEN: NOT AT ALL
GAD7 TOTAL SCORE: 1
2. NOT BEING ABLE TO STOP OR CONTROL WORRYING: NOT AT ALL
6. BECOMING EASILY ANNOYED OR IRRITABLE: NOT AT ALL
5. BEING SO RESTLESS THAT IT IS HARD TO SIT STILL: NOT AT ALL
1. FEELING NERVOUS, ANXIOUS, OR ON EDGE: SEVERAL DAYS

## 2023-03-27 ASSESSMENT — PAIN SCALES - GENERAL: PAINLEVEL: NO PAIN (0)

## 2023-03-27 ASSESSMENT — PATIENT HEALTH QUESTIONNAIRE - PHQ9: SUM OF ALL RESPONSES TO PHQ QUESTIONS 1-9: 3

## 2023-03-27 NOTE — PROGRESS NOTES
"  Assessment & Plan     Acute URI  CXR clear. Breathing comfortably. Oxygen sats 97 percent. No fevers.     Feeling better. She has, however, been very wheezey. Will treat with prednisone 40 mg daily times 5 days. She was made aware of the side effects.     Symptomatic cares also encouraged. The lack of efficacy of antibiotics was discussed. The patient will follow up with new or worsening symptoms.     - XR Chest 2 Views; Future    BMI:   Estimated body mass index is 37.48 kg/m  as calculated from the following:    Height as of this encounter: 1.556 m (5' 1.25\").    Weight as of this encounter: 90.7 kg (200 lb).   Weight management plan: Discussed healthy diet and exercise guidelines    Sheyla Sanches NP  [facility] [place] is a 72 year old, presenting for the following health issues:  URI    HPI     Acute Illness  Acute illness concerns: URI symptoms  Onset/Duration: 2 weeks  Symptoms:  Fever: yes at the beginning, but these have resolved  Chills/Sweats: No  Headache (location?): YES  Sinus Pressure: YES  Conjunctivitis:  No  Ear Pain: no  Rhinorrhea: YES  Congestion: YES  Sore Throat: No, she was having a sore throat, but resolved  Cough: YES-productive of clear sputum-bark7  Wheeze: YES  Decreased Appetite: No  Nausea: No  Vomiting: No  Diarrhea: No  Dysuria/Freq.: No  Dysuria or Hematuria: No  Fatigue/Achiness: No  Sick/Strep Exposure: No  Therapies tried and outcome: Combivent  She does not smoke.   She does have seasonal allergies that are acting up with the melting snow.   Home Covid test was negative.         Review of Systems   Constitutional, HEENT, cardiovascular, pulmonary, gi and gu systems are negative, except as otherwise noted.      Objective    /60   Pulse 83   Temp 98.8  F (37.1  C)   Ht 1.556 m (5' 1.25\")   Wt 90.7 kg (200 lb)   SpO2 97%   BMI 37.48 kg/m    Body mass index is 37.48 kg/m .  Physical Exam   GENERAL: alert, no distress and over " weight  EYES: Eyes grossly normal to inspection, PERRL and conjunctivae and sclerae normal  HENT: ear canals and TM's normal, nose and mouth without ulcers or lesions  NECK: no adenopathy  RESP: lungs clear to auscultation - no rales, rhonchi or wheezes  CV: regular rate and rhythm, no murmur, no peripheral edema  ABDOMEN: soft, nontender, obese  MS: no gross musculoskeletal defects noted, no edema  NEURO: Normal strength and tone, mentation intact and speech normal  PSYCH: mentation appears normal, affect normal/bright          Exam:  XR CHEST 2 VIEWS     HISTORY: Acute URI.     COMPARISON:  4/18/2022     FINDINGS:      The cardiomediastinal contours are normal.       No focal consolidation, effusion, or pneumothorax.       No acute osseous abnormality.                                                                       IMPRESSION:       No acute cardiopulmonary process.       MADELAINE LUCAS MD

## 2023-03-27 NOTE — TELEPHONE ENCOUNTER
9:03 AM    Reason for Call: OVERBOOK    Patient is having the following symptoms: heavy bronchule wheezing in chest x2 weeks, coughing    The patient is requesting an appointment for Sheyla Sanches    Was an appointment offered for this call? Yes  If yes : Appointment type              Date    Preferred method for responding to this message: Telephone Call  What is your phone number ? [phone]    If we cannot reach you directly, may we leave a detailed response at the number you provided? Yes    Can this message wait until your PCP/provider returns, if unavailable today? Provider is in today    TOBY WEATHERS

## 2023-04-03 ENCOUNTER — TELEPHONE (OUTPATIENT)
Dept: FAMILY MEDICINE | Facility: OTHER | Age: 73
End: 2023-04-03

## 2023-04-03 NOTE — TELEPHONE ENCOUNTER
8:19 AM    Reason for Call: Phone Call    Description: Rj would like a call back, she was seen last week and is not feeling any better even after taking the prednisone . Please call Rj to advise    Was an appointment offered for this call? No  If yes : Appointment type              Date    Preferred method for responding to this message: Telephone Call  What is your phone number ?  [phone]    If we cannot reach you directly, may we leave a detailed response at the number you provided? Yes    Can this message wait until your PCP/provider returns, if available today?  Not applicable    Krystle Hanley

## 2023-04-03 NOTE — TELEPHONE ENCOUNTER
Sheyla Sanches, Karen Patel 38 minutes ago (9:13 AM)     BH  I would recommend she go to urgent care. We are full and she might need additional imaging.

## 2023-04-12 DIAGNOSIS — I10 ESSENTIAL HYPERTENSION: ICD-10-CM

## 2023-04-12 NOTE — TELEPHONE ENCOUNTER
Cozaar       Last Written Prescription Date:  4/13/22  Last Fill Quantity: 90,   # refills: 3    Aldactone       Last Written Prescription Date:  4/13/22  Last Fill Quantity: 90,   # refills: 3  Last Office Visit: 3/27/23  Future Office visit:    Next 5 appointments (look out 90 days)    May 03, 2023 10:30 AM  (Arrive by 10:15 AM)  Return Visit with Felisha Tyler NP  [facility] [place] ([facility] [place] ) [address]  [place] [address]  [phone]   Jun 26, 2023  9:30 AM  (Arrive by 9:15 AM)  SHORT with Sheyla Sanches NP  [facility] [place] ([facility] [place] ) [address]  [place] [address]  [phone]

## 2023-04-14 RX ORDER — SPIRONOLACTONE 50 MG/1
TABLET, FILM COATED ORAL
Qty: 90 TABLET | Refills: 0 | Status: SHIPPED | OUTPATIENT
Start: 2023-04-14 | End: 2023-07-13

## 2023-04-14 RX ORDER — LOSARTAN POTASSIUM 50 MG/1
TABLET ORAL
Qty: 90 TABLET | Refills: 0 | Status: SHIPPED | OUTPATIENT
Start: 2023-04-14 | End: 2023-07-13

## 2023-04-19 ENCOUNTER — ALLIED HEALTH/NURSE VISIT (OUTPATIENT)
Dept: ALLERGY | Facility: OTHER | Age: 73
End: 2023-04-19
Attending: NURSE PRACTITIONER
Payer: MEDICARE

## 2023-04-19 DIAGNOSIS — J30.89 PERENNIAL ALLERGIC RHINITIS: Primary | ICD-10-CM

## 2023-04-19 PROCEDURE — 95117 IMMUNOTHERAPY INJECTIONS: CPT

## 2023-04-19 NOTE — PROGRESS NOTES
Prior to injection patient identity verified using name and date of birth.    SVT done on left arm, measuring 11 mm.  Passed  SVT done on right arm, measuring 9 mm.  Passed.  Documented on paper flowsheet.     Allergy injection/s given and charted on paper allergy flow sheet.  Patient left AMA, signing form, not staying for the observation period.    Linn Govea RN on 4/19/2023 at 11:13 AM

## 2023-04-20 ENCOUNTER — TRANSFERRED RECORDS (OUTPATIENT)
Dept: HEALTH INFORMATION MANAGEMENT | Facility: CLINIC | Age: 73
End: 2023-04-20

## 2023-04-26 ENCOUNTER — ALLIED HEALTH/NURSE VISIT (OUTPATIENT)
Dept: ALLERGY | Facility: OTHER | Age: 73
End: 2023-04-26
Attending: NURSE PRACTITIONER
Payer: MEDICARE

## 2023-04-26 DIAGNOSIS — J30.89 PERENNIAL ALLERGIC RHINITIS: Primary | ICD-10-CM

## 2023-04-26 PROCEDURE — 95117 IMMUNOTHERAPY INJECTIONS: CPT

## 2023-04-26 NOTE — PROGRESS NOTES
Prior to injection verified pt identity using pt name and date of birth.    Allergy injection/s given and charted on paper allergy flow sheet.  Patient left AMA, signing form, not staying for the observation period.    Linn Govea RN on 4/26/2023 at 11:01 AM

## 2023-05-03 ENCOUNTER — OFFICE VISIT (OUTPATIENT)
Dept: OTOLARYNGOLOGY | Facility: OTHER | Age: 73
End: 2023-05-03
Attending: NURSE PRACTITIONER
Payer: MEDICARE

## 2023-05-03 ENCOUNTER — ALLIED HEALTH/NURSE VISIT (OUTPATIENT)
Dept: ALLERGY | Facility: OTHER | Age: 73
End: 2023-05-03
Attending: NURSE PRACTITIONER
Payer: COMMERCIAL

## 2023-05-03 VITALS
BODY MASS INDEX: 36.25 KG/M2 | SYSTOLIC BLOOD PRESSURE: 146 MMHG | OXYGEN SATURATION: 97 % | WEIGHT: 192 LBS | TEMPERATURE: 97.4 F | HEIGHT: 61 IN | DIASTOLIC BLOOD PRESSURE: 80 MMHG | HEART RATE: 53 BPM

## 2023-05-03 DIAGNOSIS — J30.89 PERENNIAL ALLERGIC RHINITIS: Primary | ICD-10-CM

## 2023-05-03 DIAGNOSIS — R05.2 SUBACUTE COUGH: ICD-10-CM

## 2023-05-03 DIAGNOSIS — R06.2 WHEEZES: ICD-10-CM

## 2023-05-03 DIAGNOSIS — J45.20 MILD INTERMITTENT REACTIVE AIRWAY DISEASE WITHOUT COMPLICATION: ICD-10-CM

## 2023-05-03 PROCEDURE — G0463 HOSPITAL OUTPT CLINIC VISIT: HCPCS | Mod: 25

## 2023-05-03 PROCEDURE — 95117 IMMUNOTHERAPY INJECTIONS: CPT

## 2023-05-03 PROCEDURE — 99214 OFFICE O/P EST MOD 30 MIN: CPT | Mod: 25 | Performed by: NURSE PRACTITIONER

## 2023-05-03 PROCEDURE — 31575 DIAGNOSTIC LARYNGOSCOPY: CPT | Performed by: NURSE PRACTITIONER

## 2023-05-03 RX ORDER — BUDESONIDE AND FORMOTEROL FUMARATE DIHYDRATE 80; 4.5 UG/1; UG/1
2 AEROSOL RESPIRATORY (INHALATION) 2 TIMES DAILY
Qty: 10.2 G | Refills: 0 | Status: SHIPPED | OUTPATIENT
Start: 2023-05-03 | End: 2023-08-07

## 2023-05-03 ASSESSMENT — PAIN SCALES - GENERAL: PAINLEVEL: NO PAIN (0)

## 2023-05-03 NOTE — Clinical Note
Can you call Rj and make sure Cardiology got a hold of her to schedule for visit in the next week?  Thanks Promise

## 2023-05-03 NOTE — PATIENT INSTRUCTIONS
Thank you for allowing Felisha Tyler NP and our ENT team to participate in your care.  If your medications are too expensive, please give the nurse a call.  We can possibly change this medication.  If you have a scheduling or an appointment question please contact our Health Unit Coordinator at their direct line [phone].    Start Symbicort as prescribed; call if it is too expensive    Promise will contact cardiology

## 2023-05-03 NOTE — PROGRESS NOTES
Prior to injection verified pt identity using pt name and date of birth.    Allergy injection/s given and charted on paper allergy flow sheet.  Patient left AMA, signing form, not staying for the observation period.    Morgan Devries RN on 5/3/2023 at 10:44 AM

## 2023-05-03 NOTE — PROGRESS NOTES
Otolaryngology Note         Chief Complaint:     Patient presents with:  Follow Up: SCIT           History of Present Illness:     Rj Rodríguez is a 72 year old female seen today for follow up SCIT.      She reports overall she has been doing well.  She is every other week maintenance and her allergy symptoms have improved.  She has not had any issues with injections.  She was a bit behind due to a recent death in the family.  She is the process of building back up.      She reports after her Moderna injection she started having palpitations.  She has followed with Cardiology and was started on diltiazem with improvement.      She reports onset of cough in March.  It started with systemic symptoms of achiness and moved to feeling of a tickle in her throat.  She was coughing up clear sputum.  She reports cough is worse in the morning.  Coughing to the point of vomiting at times.  She was seen by Sheyla Sanches NP on 3/27/2023 for the same symptoms.  She was treated with prednisone burst of 40 mg daily x5 days.  Chest x-ray as below.  She is denied any fevers, chills, further systemic symptoms.  She reports this morning she was in the shower and was coughing to the point of almost vomiting.  Humidity does make the cough worse.  She was previously on Combivent inhaler and was having good control of her symptoms.  She does pay out-of-pocket for her Combivent and notes it is in the $500 range to pay for.  Recently her Combivent inhaler malfunctioned and has not been operating correctly.  She has been using albuterol as a replacement to the Combivent and does note some improvement with use.      She does note that she has been under a great deal of stress recently with the recent death of her father-in-law.  There is been a lot of traveling to the [facility].  She also states that she has been working with her therapist regarding some old buried memories and has had increased anxiety due to that.  She does report overall  that her anxiety symptoms have improved over the years with working with her therapist.  She does take Klonopin infrequently as needed for panic attacks.    Chest xray completed 3/27/23 - she was treated with prednisone without much improvement.    FINDINGS:      The cardiomediastinal contours are normal.       No focal consolidation, effusion, or pneumothorax.       No acute osseous abnormality.                                                                       IMPRESSION:       No acute cardiopulmonary process.      No PFT completed.  She did have cervical spine surgery with an anterior approach completed in January.  She lost her voice for 3 weeks after surgery.  She has had some minimal dysphagia that has improved over time.  She has not had flexible laryngoscopy completed since surgery.  She does continue to note some off-and-on hoarseness of her voice.    NM Lexiscan stress test due to frequent PVC's and increased exertional dyspnea. Study was completed on 2/10/22 revealing equivocal apical infarct vs apical thinning, no evidence of reversible ischemia. Normal LV systolic heart function which increased appropriately with stress. ECG with occasional ventricular ectopy at baseline which did not increase during lexiscan portion of the study and no ischemia ECG changes.      Echocardiogram on 2/15/22 revealing normal biventricular function, LVEF 55-60%. Borderline LVH, unable to assess diastolic function. Diastolic doppler findings suggest LV filling pressures to be increased. No RWMA's. Both atrial noted to be normal with atrial septum intact. No hemodynamically significant valvular abnormalities. Ascending aorta mildly dilated at 3.7 cm. No pericardial effusion.     MQT 12/20/19:  Dilution 6: Ragweed, grass, birch, oak, cat, dust  Dilution 5: Pigweed, thistle, maple, Frank, pine, cottonwood, walnut  Dilution 2: elm, alternaria, aspergillus, Epicoccum, fusarium         Medications:     Current Outpatient Rx    Medication Sig Dispense Refill     albuterol (PROAIR HFA/PROVENTIL HFA/VENTOLIN HFA) 108 (90 Base) MCG/ACT inhaler Inhale 2 puffs into the lungs every 6 hours 18 g 0     blood glucose (ACCU-CHEK GUIDE) test strip Use to test blood sugar 1 time daily or as directed. 50 each 11     blood glucose monitoring (ACCU-CHEK FASTCLIX) lancets Use to test blood sugar 1 time daily or as directed. 102 each 11     budesonide-formoterol (SYMBICORT) 80-4.5 MCG/ACT Inhaler Inhale 2 puffs into the lungs 2 times daily 10.2 g 0     cetirizine (ZYRTEC) 10 MG tablet Take 1 tablet (10 mg) by mouth daily as needed for allergies 60 tablet 12     clonazePAM (KLONOPIN) 0.5 MG tablet Take 1 tablet by mouth twice daily as needed for anxiety 30 tablet 0     COMBIVENT RESPIMAT  MCG/ACT inhaler INHALE 1 PUFF BY MOUTH 4 TIMES DAILY AS NEEDED FOR WHEEZING OR  CHEST  TIGHTNESS 4 g 0     diltiazem ER (TIAZAC) 240 MG 24 hr ER beaded capsule Take 1 capsule (240 mg) by mouth daily 90 capsule 3     EPINEPHrine (ANY BX GENERIC EQUIV) 0.3 MG/0.3ML injection 2-pack Inject 0.3 mLs (0.3 mg) into the muscle as needed for anaphylaxis May repeat one time in 5-15 minutes if response to initial dose is inadequate. 2 each 1     losartan (COZAAR) 50 MG tablet Take 1 tablet by mouth once daily 90 tablet 0     ORDER FOR ALLERGEN IMMUNOTHERAPY Continue on allergy injections (SCIT), 0.5ml every other week injections. Follow standard maintenance protocols. 5 mL PRN     spironolactone (ALDACTONE) 50 MG tablet Take 1 tablet by mouth once daily 90 tablet 0     triamcinolone (NASACORT) 55 MCG/ACT nasal aerosol Spray 2 sprays into both nostrils daily 2 Bottle 12     methocarbamol (ROBAXIN) 750 MG tablet Take 750 mg by mouth 4 times daily (Patient not taking: Reported on 3/27/2023)              Allergies:     Allergies: Fruit [peach]; Nuts; Ace inhibitors; Alprazolam; Antihistamines, chlorpheniramine-type; Diphenhydramine; Erythromycin; Guaifed; Guaifenesin;  "Hydrochlorothiazide; No clinical screening - see comments; Olmesartan medoxomil; Phenylephrine hcl; Pseudoephedrine hcl; Seasonal allergies; Tramadol; Tramadol hcl; Venlafaxine; Zoloft [sertraline]; and Latex          Past Medical History:     Past Medical History:   Diagnosis Date     Arthritis      Chronic back pain      Hypertension      Irregular heart beat      Sleep apnea             Past Surgical History:     Past Surgical History:   Procedure Laterality Date     ANESTHESIA OUT OF OR MRI N/A 6/1/2022    Procedure: MRI CERVICAL SPINE;  Surgeon: GENERIC ANESTHESIA PROVIDER;  Location: [facility] OR     ANESTHESIA OUT OF OR MRI N/A 9/23/2022    Procedure: MRI CERVICAL SPINE;  Surgeon: GENERIC ANESTHESIA PROVIDER;  Location: [facility] OR     APPENDECTOMY       BACK SURGERY       CHOLECYSTECTOMY       COLONOSCOPY  2012    Repeat in 10 years     GYN SURGERY       HYSTERECTOMY      Ovaries     RELEASE CARPAL TUNNEL      LT     RELEASE CARPAL TUNNEL      RT x2     SURGICAL RADIOLOGY PROCEDURE N/A 2/12/2016    Procedure: SURGICAL RADIOLOGY PROCEDURE;  Surgeon: Provider, Generic Perianesthesia Nursing;  Location: [facility] OR     SURGICAL RADIOLOGY PROCEDURE N/A 8/15/2016    Procedure: SURGICAL RADIOLOGY PROCEDURE;  Surgeon: Provider, Generic Perianesthesia Nursing;  Location: [facility] OR       ENT family history reviewed         Social History:     Social History     Tobacco Use     Smoking status: Never     Smokeless tobacco: Never   Vaping Use     Vaping status: Never Used   Substance Use Topics     Alcohol use: Yes     Alcohol/week: 1.0 standard drink of alcohol     Types: 1 Glasses of wine per week     Comment: occ glass of wine     Drug use: No            Review of Systems:     ROS: See HPI         Physical Exam:     BP (!) 146/80 (BP Location: Right arm, Patient Position: Sitting, Cuff Size: Adult Large)   Pulse 53   Temp 97.4  F (36.3  C) (Tympanic)   Ht 1.556 m (5' 1.25\")   Wt 87.1 kg (192 lb)   SpO2 97%   BMI 35.98 kg/m  "     General - The patient is well nourished and well developed, and appears to have good nutritional status.  Alert and oriented to person and place, answers questions and cooperates with examination appropriately.   Head and Face - Normocephalic and atraumatic, with no gross asymmetry noted.  The facial nerve is intact, with strong symmetric movements.  Voice and Breathing - The patient was breathing comfortably without the use of accessory muscles. There was no wheezing, stridor. The patients voice was clear and strong, and had appropriate pitch and quality.  Ears - External ear normal. Canals are patent. Right tympanic membrane is intact without effusion, retraction or mass. Left tympanic membrane is intact without effusion, retraction or mass.  Eyes - Extraocular movements intact, sclera were not icteric or injected.  Mouth - Examination of the oral cavity showed pink, healthy oral mucosa. Dentition in good condition. No lesions or ulcerations noted. The tongue was mobile and midline.   Throat - The walls of the oropharynx were smooth, pink, moist, symmetric, and had no lesions or ulcerations.  The tonsillar pillars and soft palate were symmetric. The uvula was midline on elevation.    Neck - no palpable lymphadenopathy.  Palpation of the thyroid was soft and smooth, with no nodules or goiter appreciated.  The trachea was mobile and midline.  Nose - External contour is symmetric, no gross deflection or scars.  Nasal mucosa is pink and moist with no abnormal mucus.  The septum and turbinates were evaluated with nasal speculum, no polyps, masses, or purulence noted on examination.  Lungs are clear to ausculation bilaterally, S1, S2, bradycardia with a rate at 40.      Attempts at mirror laryngoscopy were not possible due to gag reflex.  Therefore I proceeded with a fiberoptic examination after informed consent.  First I sprayed both sides of the nose with a mixture of lidocaine and neosynephrine.  I then passed  the scope through the nasal cavity.     The nasopharynx was mucosally covered and symmetric.  The eustachian tube openings were unobstructed.  Going further down I had a clear view of the base of tongue which had hypertrophic appearing lingual tonsil tissue, mostly obstructing the view of the vallecula.  No asymmetry or obvious masses.  The epiglottis was smooth and mucosally covered.  The supraglottic larynx was then clearly visualized.  The vocal cords moved smoothly and symmetrically and were pearly white.  The arytenoids appear normal, with minimal interarytenoid tissue that is not erythematous.  The pyriform sinuses were open and without cyn mass or pooling of secretions upon valsalva, and the limited view of the postcricoid region did not show any lesions.  The patient tolerated the procedure well.         Assessment and Plan:       ICD-10-CM    1. Perennial allergic rhinitis  J30.89       2. Wheezes  R06.2 budesonide-formoterol (SYMBICORT) 80-4.5 MCG/ACT Inhaler      3. Mild intermittent reactive airway disease without complication  J45.20       4. Subacute cough  R05.2         Will start Symbicort SMART dosing  Reassured no masses or significant abnormalities on flexible laryngoscopy  Lungs Clear  Sent message to Cardiology and PcP re: heart rate, they will see her in the next week    Felisha MAZA  [facility] ENT

## 2023-05-03 NOTE — Clinical Note
Hi ladies, I saw Rj today for allergy follow up, she was complaining of cough and shortness of breath - worse with climbing stairs.  Lungs are clear - but her heart rate was 40 at rest.  I asked her to contact you, but I also told her I would send you a message so you were aware.  I did start her on Symbicort for her previous history of wheezing, but wondering if she needs cardiac meds adjusted.  Thanks!! Promise

## 2023-05-03 NOTE — LETTER
5/3/2023         RE: Rj Rodríguez  [address]  [address]  [facility] [address]        Dear Colleague,    Thank you for referring your patient, Rj Rodríguez, to the [facility]. Please see a copy of my visit note below.    Otolaryngology Note         Chief Complaint:     Patient presents with:  Follow Up: SCIT           History of Present Illness:     Rj Rodríguez is a 72 year old female seen today for follow up SCIT.      She reports overall she has been doing well.  She is every other week maintenance and her allergy symptoms have improved.  She has not had any issues with injections.  She was a bit behind due to a recent death in the family.  She is the process of building back up.      She reports after her Moderna injection she started having palpitations.  She has followed with Cardiology and was started on diltiazem with improvement.      She reports onset of cough in March.  It started with systemic symptoms of achiness and moved to feeling of a tickle in her throat.  She was coughing up clear sputum.  She reports cough is worse in the morning.  Coughing to the point of vomiting at times.  She was seen by Sheyla Sanches NP on 3/27/2023 for the same symptoms.  She was treated with prednisone burst of 40 mg daily x5 days.  Chest x-ray as below.  She is denied any fevers, chills, further systemic symptoms.  She reports this morning she was in the shower and was coughing to the point of almost vomiting.  Humidity does make the cough worse.  She was previously on Combivent inhaler and was having good control of her symptoms.  She does pay out-of-pocket for her Combivent and notes it is in the $500 range to pay for.  Recently her Combivent inhaler malfunctioned and has not been operating correctly.  She has been using albuterol as a replacement to the Combivent and does note some improvement with use.      She does note that she has been under a great deal of stress recently  with the recent death of her father-in-law.  There is been a lot of traveling to the cities.  She also states that she has been working with her therapist regarding some old buried memories and has had increased anxiety due to that.  She does report overall that her anxiety symptoms have improved over the years with working with her therapist.  She does take Klonopin infrequently as needed for panic attacks.    Chest xray completed 3/27/23 - she was treated with prednisone without much improvement.    FINDINGS:      The cardiomediastinal contours are normal.       No focal consolidation, effusion, or pneumothorax.       No acute osseous abnormality.                                                                       IMPRESSION:       No acute cardiopulmonary process.      No PFT completed.  She did have cervical spine surgery with an anterior approach completed in January.  She lost her voice for 3 weeks after surgery.  She has had some minimal dysphagia that has improved over time.  She has not had flexible laryngoscopy completed since surgery.  She does continue to note some off-and-on hoarseness of her voice.    NM Lexiscan stress test due to frequent PVC's and increased exertional dyspnea. Study was completed on 2/10/22 revealing equivocal apical infarct vs apical thinning, no evidence of reversible ischemia. Normal LV systolic heart function which increased appropriately with stress. ECG with occasional ventricular ectopy at baseline which did not increase during lexiscan portion of the study and no ischemia ECG changes.      Echocardiogram on 2/15/22 revealing normal biventricular function, LVEF 55-60%. Borderline LVH, unable to assess diastolic function. Diastolic doppler findings suggest LV filling pressures to be increased. No RWMA's. Both atrial noted to be normal with atrial septum intact. No hemodynamically significant valvular abnormalities. Ascending aorta mildly dilated at 3.7 cm. No pericardial  effusion.     MQT 12/20/19:  Dilution 6: Ragweed, grass, birch, oak, cat, dust  Dilution 5: Pigweed, thistle, maple, Frank, pine, cottonwood, walnut  Dilution 2: elm, alternaria, aspergillus, Epicoccum, fusarium         Medications:     Current Outpatient Rx   Medication Sig Dispense Refill     albuterol (PROAIR HFA/PROVENTIL HFA/VENTOLIN HFA) 108 (90 Base) MCG/ACT inhaler Inhale 2 puffs into the lungs every 6 hours 18 g 0     blood glucose (ACCU-CHEK GUIDE) test strip Use to test blood sugar 1 time daily or as directed. 50 each 11     blood glucose monitoring (ACCU-CHEK FASTCLIX) lancets Use to test blood sugar 1 time daily or as directed. 102 each 11     budesonide-formoterol (SYMBICORT) 80-4.5 MCG/ACT Inhaler Inhale 2 puffs into the lungs 2 times daily 10.2 g 0     cetirizine (ZYRTEC) 10 MG tablet Take 1 tablet (10 mg) by mouth daily as needed for allergies 60 tablet 12     clonazePAM (KLONOPIN) 0.5 MG tablet Take 1 tablet by mouth twice daily as needed for anxiety 30 tablet 0     COMBIVENT RESPIMAT  MCG/ACT inhaler INHALE 1 PUFF BY MOUTH 4 TIMES DAILY AS NEEDED FOR WHEEZING OR  CHEST  TIGHTNESS 4 g 0     diltiazem ER (TIAZAC) 240 MG 24 hr ER beaded capsule Take 1 capsule (240 mg) by mouth daily 90 capsule 3     EPINEPHrine (ANY BX GENERIC EQUIV) 0.3 MG/0.3ML injection 2-pack Inject 0.3 mLs (0.3 mg) into the muscle as needed for anaphylaxis May repeat one time in 5-15 minutes if response to initial dose is inadequate. 2 each 1     losartan (COZAAR) 50 MG tablet Take 1 tablet by mouth once daily 90 tablet 0     ORDER FOR ALLERGEN IMMUNOTHERAPY Continue on allergy injections (SCIT), 0.5ml every other week injections. Follow standard maintenance protocols. 5 mL PRN     spironolactone (ALDACTONE) 50 MG tablet Take 1 tablet by mouth once daily 90 tablet 0     triamcinolone (NASACORT) 55 MCG/ACT nasal aerosol Spray 2 sprays into both nostrils daily 2 Bottle 12     methocarbamol (ROBAXIN) 750 MG tablet Take 750 mg  by mouth 4 times daily (Patient not taking: Reported on 3/27/2023)              Allergies:     Allergies: Fruit [peach]; Nuts; Ace inhibitors; Alprazolam; Antihistamines, chlorpheniramine-type; Diphenhydramine; Erythromycin; Guaifed; Guaifenesin; Hydrochlorothiazide; No clinical screening - see comments; Olmesartan medoxomil; Phenylephrine hcl; Pseudoephedrine hcl; Seasonal allergies; Tramadol; Tramadol hcl; Venlafaxine; Zoloft [sertraline]; and Latex          Past Medical History:     Past Medical History:   Diagnosis Date     Arthritis      Chronic back pain      Hypertension      Irregular heart beat      Sleep apnea             Past Surgical History:     Past Surgical History:   Procedure Laterality Date     ANESTHESIA OUT OF OR MRI N/A 6/1/2022    Procedure: MRI CERVICAL SPINE;  Surgeon: GENERIC ANESTHESIA PROVIDER;  Location: [facility] OR     ANESTHESIA OUT OF OR MRI N/A 9/23/2022    Procedure: MRI CERVICAL SPINE;  Surgeon: GENERIC ANESTHESIA PROVIDER;  Location: [facility] OR     APPENDECTOMY       BACK SURGERY       CHOLECYSTECTOMY       COLONOSCOPY  2012    Repeat in 10 years     GYN SURGERY       HYSTERECTOMY      Ovaries     RELEASE CARPAL TUNNEL      LT     RELEASE CARPAL TUNNEL      RT x2     SURGICAL RADIOLOGY PROCEDURE N/A 2/12/2016    Procedure: SURGICAL RADIOLOGY PROCEDURE;  Surgeon: Provider, Generic Perianesthesia Nursing;  Location: [facility] OR     SURGICAL RADIOLOGY PROCEDURE N/A 8/15/2016    Procedure: SURGICAL RADIOLOGY PROCEDURE;  Surgeon: Provider, Generic Perianesthesia Nursing;  Location: [facility] OR       ENT family history reviewed         Social History:     Social History     Tobacco Use     Smoking status: Never     Smokeless tobacco: Never   Vaping Use     Vaping status: Never Used   Substance Use Topics     Alcohol use: Yes     Alcohol/week: 1.0 standard drink of alcohol     Types: 1 Glasses of wine per week     Comment: occ glass of wine     Drug use: No            Review of Systems:     ROS: See HPI          "Physical Exam:     BP (!) 146/80 (BP Location: Right arm, Patient Position: Sitting, Cuff Size: Adult Large)   Pulse 53   Temp 97.4  F (36.3  C) (Tympanic)   Ht 1.556 m (5' 1.25\")   Wt 87.1 kg (192 lb)   SpO2 97%   BMI 35.98 kg/m      General - The patient is well nourished and well developed, and appears to have good nutritional status.  Alert and oriented to person and place, answers questions and cooperates with examination appropriately.   Head and Face - Normocephalic and atraumatic, with no gross asymmetry noted.  The facial nerve is intact, with strong symmetric movements.  Voice and Breathing - The patient was breathing comfortably without the use of accessory muscles. There was no wheezing, stridor. The patients voice was clear and strong, and had appropriate pitch and quality.  Ears - External ear normal. Canals are patent. Right tympanic membrane is intact without effusion, retraction or mass. Left tympanic membrane is intact without effusion, retraction or mass.  Eyes - Extraocular movements intact, sclera were not icteric or injected.  Mouth - Examination of the oral cavity showed pink, healthy oral mucosa. Dentition in good condition. No lesions or ulcerations noted. The tongue was mobile and midline.   Throat - The walls of the oropharynx were smooth, pink, moist, symmetric, and had no lesions or ulcerations.  The tonsillar pillars and soft palate were symmetric. The uvula was midline on elevation.    Neck - no palpable lymphadenopathy.  Palpation of the thyroid was soft and smooth, with no nodules or goiter appreciated.  The trachea was mobile and midline.  Nose - External contour is symmetric, no gross deflection or scars.  Nasal mucosa is pink and moist with no abnormal mucus.  The septum and turbinates were evaluated with nasal speculum, no polyps, masses, or purulence noted on examination.  Lungs are clear to ausculation bilaterally, S1, S2, bradycardia with a rate at 40.      Attempts at " mirror laryngoscopy were not possible due to gag reflex.  Therefore I proceeded with a fiberoptic examination after informed consent.  First I sprayed both sides of the nose with a mixture of lidocaine and neosynephrine.  I then passed the scope through the nasal cavity.     The nasopharynx was mucosally covered and symmetric.  The eustachian tube openings were unobstructed.  Going further down I had a clear view of the base of tongue which had hypertrophic appearing lingual tonsil tissue, mostly obstructing the view of the vallecula.  No asymmetry or obvious masses.  The epiglottis was smooth and mucosally covered.  The supraglottic larynx was then clearly visualized.  The vocal cords moved smoothly and symmetrically and were pearly white.  The arytenoids appear normal, with minimal interarytenoid tissue that is not erythematous.  The pyriform sinuses were open and without cyn mass or pooling of secretions upon valsalva, and the limited view of the postcricoid region did not show any lesions.  The patient tolerated the procedure well.         Assessment and Plan:       ICD-10-CM    1. Perennial allergic rhinitis  J30.89       2. Wheezes  R06.2 budesonide-formoterol (SYMBICORT) 80-4.5 MCG/ACT Inhaler      3. Mild intermittent reactive airway disease without complication  J45.20       4. Subacute cough  R05.2         Will start Symbicort SMART dosing  Reassured no masses or significant abnormalities on flexible laryngoscopy  Lungs Clear  Sent message to Cardiology and [facility] re: heart rate, they will see her in the next week    Felisha MAZA  [facility]        Again, thank you for allowing me to participate in the care of your patient.        Sincerely,        Felisha Tyler NP

## 2023-05-08 ENCOUNTER — HOSPITAL ENCOUNTER (EMERGENCY)
Facility: HOSPITAL | Age: 73
Discharge: HOME | End: 2023-05-08
Attending: STUDENT IN AN ORGANIZED HEALTH CARE EDUCATION/TRAINING PROGRAM | Admitting: STUDENT IN AN ORGANIZED HEALTH CARE EDUCATION/TRAINING PROGRAM
Payer: MEDICARE

## 2023-05-08 VITALS
OXYGEN SATURATION: 94 % | HEIGHT: 61 IN | HEART RATE: 83 BPM | SYSTOLIC BLOOD PRESSURE: 137 MMHG | WEIGHT: 192 LBS | TEMPERATURE: 97.8 F | DIASTOLIC BLOOD PRESSURE: 71 MMHG | RESPIRATION RATE: 18 BRPM | BODY MASS INDEX: 36.25 KG/M2

## 2023-05-08 DIAGNOSIS — R00.2 PALPITATIONS: ICD-10-CM

## 2023-05-08 LAB
ANION GAP SERPL CALCULATED.3IONS-SCNC: 8 MMOL/L (ref 7–15)
BASOPHILS # BLD AUTO: 0.1 10E3/UL (ref 0–0.2)
BASOPHILS NFR BLD AUTO: 1 %
BUN SERPL-MCNC: 9.4 MG/DL (ref 8–23)
CALCIUM SERPL-MCNC: 10 MG/DL (ref 8.8–10.2)
CHLORIDE SERPL-SCNC: 106 MMOL/L (ref 98–107)
CREAT SERPL-MCNC: 0.86 MG/DL (ref 0.51–0.95)
DEPRECATED HCO3 PLAS-SCNC: 26 MMOL/L (ref 22–29)
EOSINOPHIL # BLD AUTO: 0.1 10E3/UL (ref 0–0.7)
EOSINOPHIL NFR BLD AUTO: 2 %
ERYTHROCYTE [DISTWIDTH] IN BLOOD BY AUTOMATED COUNT: 12.7 % (ref 10–15)
GFR SERPL CREATININE-BSD FRML MDRD: 72 ML/MIN/1.73M2
GLUCOSE SERPL-MCNC: 131 MG/DL (ref 70–99)
HCT VFR BLD AUTO: 42.7 % (ref 35–47)
HGB BLD-MCNC: 14.3 G/DL (ref 11.7–15.7)
HOLD SPECIMEN: NORMAL
IMM GRANULOCYTES # BLD: 0 10E3/UL
IMM GRANULOCYTES NFR BLD: 0 %
LYMPHOCYTES # BLD AUTO: 1.1 10E3/UL (ref 0.8–5.3)
LYMPHOCYTES NFR BLD AUTO: 12 %
MAGNESIUM SERPL-MCNC: 2.1 MG/DL (ref 1.7–2.3)
MCH RBC QN AUTO: 30.4 PG (ref 26.5–33)
MCHC RBC AUTO-ENTMCNC: 33.5 G/DL (ref 31.5–36.5)
MCV RBC AUTO: 91 FL (ref 78–100)
MONOCYTES # BLD AUTO: 0.9 10E3/UL (ref 0–1.3)
MONOCYTES NFR BLD AUTO: 9 %
NEUTROPHILS # BLD AUTO: 7.4 10E3/UL (ref 1.6–8.3)
NEUTROPHILS NFR BLD AUTO: 76 %
NRBC # BLD AUTO: 0 10E3/UL
NRBC BLD AUTO-RTO: 0 /100
PLATELET # BLD AUTO: 260 10E3/UL (ref 150–450)
POTASSIUM SERPL-SCNC: 4.2 MMOL/L (ref 3.4–5.3)
RBC # BLD AUTO: 4.71 10E6/UL (ref 3.8–5.2)
SODIUM SERPL-SCNC: 140 MMOL/L (ref 136–145)
TSH SERPL DL<=0.005 MIU/L-ACNC: 1.6 UIU/ML (ref 0.3–4.2)
WBC # BLD AUTO: 9.6 10E3/UL (ref 4–11)

## 2023-05-08 PROCEDURE — 84443 ASSAY THYROID STIM HORMONE: CPT | Performed by: STUDENT IN AN ORGANIZED HEALTH CARE EDUCATION/TRAINING PROGRAM

## 2023-05-08 PROCEDURE — 80048 BASIC METABOLIC PNL TOTAL CA: CPT | Performed by: STUDENT IN AN ORGANIZED HEALTH CARE EDUCATION/TRAINING PROGRAM

## 2023-05-08 PROCEDURE — 83735 ASSAY OF MAGNESIUM: CPT | Performed by: STUDENT IN AN ORGANIZED HEALTH CARE EDUCATION/TRAINING PROGRAM

## 2023-05-08 PROCEDURE — 99284 EMERGENCY DEPT VISIT MOD MDM: CPT | Performed by: STUDENT IN AN ORGANIZED HEALTH CARE EDUCATION/TRAINING PROGRAM

## 2023-05-08 PROCEDURE — 85004 AUTOMATED DIFF WBC COUNT: CPT | Performed by: STUDENT IN AN ORGANIZED HEALTH CARE EDUCATION/TRAINING PROGRAM

## 2023-05-08 PROCEDURE — 99284 EMERGENCY DEPT VISIT MOD MDM: CPT

## 2023-05-08 PROCEDURE — 36415 COLL VENOUS BLD VENIPUNCTURE: CPT | Performed by: STUDENT IN AN ORGANIZED HEALTH CARE EDUCATION/TRAINING PROGRAM

## 2023-05-08 ASSESSMENT — ACTIVITIES OF DAILY LIVING (ADL): ADLS_ACUITY_SCORE: 37

## 2023-05-08 NOTE — ED TRIAGE NOTES
"Arrived to ER room 11 via [facility] with c/o dizziness and lightheadedness that she woke up with this morning. Denies history of vertigo or dizziness. Very anxious and states \"please don't close the door, I need the door open.\" Patient shaking and short with answers. States \"I don't know\" when asked if balance is off. States \"I don't think so\" when asked if vision changes. States she needs a bathroom immediately. EMS reports patient did stand to get to their stretcher and was not very steady but did not walk far. Patient very focused on door being open and getting to bathroom so difficult to get triage questions answered and BEFAST assessment done.       "

## 2023-05-08 NOTE — ED NOTES
Discharge instructions reviewed with patient.  encouraged to return with new or worsening symptoms  No questions or concerns.  Copy of AVS in hand on discharge  Spouse here to bring pt home.  To vehicle via wc. Pt stated her anxiety is better now just sleepy from the Klonopin

## 2023-05-08 NOTE — ED NOTES
Patient arrived via ambulance.  Pt very anxious and reports she took 2 of her klonopin PTA.  Pt stated that this morning she woke up felt fine and then went to the bathroom and just felt off.  Pt stated that she gets these times where her heart goes fast and stated that happened since the 2nd dose of Moderna.  Pt is following with cardiology and has appointment with Kimberly soon.  Pt is hard to assess due the fact she is worried with the door closed and being alone in the room but also stated that her spouse is here but makes her anxiety worse.  Pt reports she really cant state what she is feeling.

## 2023-05-08 NOTE — ED NOTES
Patient sitting on EOB, tears in eyes. Pt reports she is worrying about everything and her family.  She wants to be with all her family and not miss anything.  Pt spouse at bedside  door slightly opened for comfort and shades opened per pt request

## 2023-05-08 NOTE — DISCHARGE INSTRUCTIONS
Return to the emergency department for worsening symptoms or new concerning symptoms.  Follow-up with your primary care provider within the next 1-2 weeks.  Call to schedule an appointment.  Get the Zio patch placed as soon as possible.

## 2023-05-08 NOTE — ED PROVIDER NOTES
History     Chief Complaint   Patient presents with     Dizziness     Anxiety     HPI  Rj Rodríguez is a 72 year old female with the below past medical history particularly notable for anxiety and some phobias who presents to the emergency department today complaining of lightheadedness.  She denies vertigo states that she is experienced vertigo in the past.  She tells me she feels a little bit off but struggles to define it beyond that.  She also notes that she has had symptoms like this before.  Denies headache, chest pain, shortness of breath, abdominal pain, nausea, vomiting, diarrhea.  Denies swelling in her legs cough, fever, hemoptysis.  Denies vertigo.  No other complaints at this time    Allergies:  Allergies   Allergen Reactions     Fruit [Peach] Anaphylaxis and Hives     fresh peaches and any fruit that has a pit.  Kiwi's and apples also.  Can eat any fruit cooked.     Nuts Anaphylaxis and Hives     All Raw Nuts, can eat nuts when roasted.     Ace Inhibitors      Upper respiratory infection     Alprazolam Hives     Xanax     Antihistamines, Chlorpheniramine-Type Hives     Antihistamines     Diphenhydramine Hives and Other (See Comments)     Keeps her awake       Erythromycin      BASE; pt. Not sure if this is a true allergy.     Guaifed      Pt unsure of reaction     Guaifenesin      Guaifed     Hydrochlorothiazide      No Clinical Screening - See Comments      Allergic:  Fresh peaches and any fruit that has a pit.  Kiwi's and apples also.  All raw nuts.  Can eat nuts when roasted or any fruit cooked.  Some seasonal allergies :  Hayfever     Olmesartan Medoxomil Cough     Benicar     Phenylephrine Hcl      Guaifed     Pseudoephedrine Hcl      Guaifed     Seasonal Allergies      hayfever     Tramadol      Sleep disturbance. Can not sleep, and when able to fall asleep will have terrible nightmares     Tramadol Hcl      Ultram, insomnia, nightmares, headache     Venlafaxine Other (See Comments)      Heartburn, reflux     Zoloft [Sertraline]      paranoia     Latex Other (See Comments), Swelling, Difficulty breathing and Rash     Throat Closes         Problem List:    Patient Active Problem List    Diagnosis Date Noted     Morbid obesity (H) 01/10/2022     Priority: Medium     Shortness of breath 08/09/2021     Priority: Medium     Irregular heart rate 08/09/2021     Priority: Medium     Frequent PVCs 08/09/2021     Priority: Medium     Chest pain, unspecified type 08/09/2021     Priority: Medium     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     Priority: Medium     Diabetes mellitus, type 2 (H) 12/06/2019     Priority: Medium     [place] cardiac risk 11% in next 10 years 10/29/2019     Priority: Medium     S/P rotator cuff repair 10/26/2016     Priority: Medium     Mild episode of recurrent major depressive disorder (H) 10/22/2014     Priority: Medium     Migraine with aura and without status migrainosus, not intractable 04/21/2014     Priority: Medium     S/P cervical spinal fusion 03/21/2014     Priority: Medium     Cervicalgia 12/10/2013     Priority: Medium     Vertigo 03/18/2013     Priority: Medium     Tinnitus 03/18/2013     Priority: Medium     Chronic rhinitis 03/18/2013     Priority: Medium     Lumbago 07/10/2012     Priority: Medium     Presbyopia 01/25/2011     Priority: Medium     Primary open-angle glaucoma 01/25/2011     Priority: Medium     Overview:   IMO Update 10/11       ZEFERINO (generalized anxiety disorder) 12/06/2006     Priority: Medium     Essential hypertension 06/02/2004     Priority: Medium     Overview:   IMO Update          Past Medical History:    Past Medical History:   Diagnosis Date     Arthritis      Chronic back pain      Hypertension      Irregular heart beat      Sleep apnea        Past Surgical History:    Past Surgical History:   Procedure Laterality Date     ANESTHESIA OUT OF OR MRI N/A 6/1/2022    Procedure: MRI CERVICAL SPINE;  Surgeon: GENERIC ANESTHESIA PROVIDER;  Location: [facility]  OR     ANESTHESIA OUT OF OR MRI N/A 9/23/2022    Procedure: MRI CERVICAL SPINE;  Surgeon: GENERIC ANESTHESIA PROVIDER;  Location: [facility] OR     APPENDECTOMY       BACK SURGERY       CHOLECYSTECTOMY       COLONOSCOPY  2012    Repeat in 10 years     GYN SURGERY       HYSTERECTOMY      Ovaries     RELEASE CARPAL TUNNEL      LT     RELEASE CARPAL TUNNEL      RT x2     SURGICAL RADIOLOGY PROCEDURE N/A 2/12/2016    Procedure: SURGICAL RADIOLOGY PROCEDURE;  Surgeon: Provider, Generic Perianesthesia Nursing;  Location: [facility] OR     SURGICAL RADIOLOGY PROCEDURE N/A 8/15/2016    Procedure: SURGICAL RADIOLOGY PROCEDURE;  Surgeon: Provider, Generic Perianesthesia Nursing;  Location: [facility] OR       Family History:    Family History   Problem Relation Age of Onset     Colon Cancer Maternal Aunt      Colon Cancer Maternal Uncle      Diabetes Father         Type 2     Hypertension Father      Alcoholism Father      Alcoholism Mother        Social History:  Marital Status:   [2]  Social History     Tobacco Use     Smoking status: Never     Smokeless tobacco: Never   Vaping Use     Vaping status: Never Used   Substance Use Topics     Alcohol use: Yes     Alcohol/week: 1.0 standard drink of alcohol     Types: 1 Glasses of wine per week     Comment: occ glass of wine     Drug use: No        Medications:    albuterol (PROAIR HFA/PROVENTIL HFA/VENTOLIN HFA) 108 (90 Base) MCG/ACT inhaler  blood glucose (ACCU-CHEK GUIDE) test strip  blood glucose monitoring (ACCU-CHEK FASTCLIX) lancets  budesonide-formoterol (SYMBICORT) 80-4.5 MCG/ACT Inhaler  cetirizine (ZYRTEC) 10 MG tablet  clonazePAM (KLONOPIN) 0.5 MG tablet  COMBIVENT RESPIMAT  MCG/ACT inhaler  diltiazem ER (TIAZAC) 240 MG 24 hr ER beaded capsule  EPINEPHrine (ANY BX GENERIC EQUIV) 0.3 MG/0.3ML injection 2-pack  losartan (COZAAR) 50 MG tablet  ORDER FOR ALLERGEN IMMUNOTHERAPY  spironolactone (ALDACTONE) 50 MG tablet  triamcinolone (NASACORT) 55 MCG/ACT nasal aerosol          Review  "of Systems  A complete review of systems was performed and is otherwise negative.     Physical Exam   BP: (!) 181/109  Pulse: 86  Temp: 98.5  F (36.9  C)  Resp: 20  Height: 154.9 cm (5' 1\")  Weight: 87.1 kg (192 lb)  SpO2: 94 %      Physical Exam  Constitutional: Alert and conversant. NAD   HENT: NCAT   Eyes: Normal pupils   Neck: supple   CV: Normal rate, regular rhythm, no murmur   Pulmonary/Chest: Non-labored respirations, clear to auscultation bilaterally   Abdominal: Soft, non-tender, non-distended   MSK: ALATORRE.   Neuro: Alert and appropriate   Skin: Warm and dry. No diaphoresis. No rashes on exposed skin    Psych: Appropriate mood and affect       ED Course              ED Course as of 05/08/23 1231   Mon May 08, 2023   1229 Rj Rodríguez is a 72 year old old female presenting with palpitations  Ddx: PVCs, SVT, atrial fibrillation, atrial flutter, WPW, ACS, PE, hyperthyroidism, electrolyte dyscrasias,    Vitals initially abnormal but then all within normal limits after  Exam reassuring, nontoxic and well-appearing    Patient here with palpitations, found to have PVCs on telemetry consistent with symptoms, where the patient can identify the palpitation while we identified the PVC.  EKG reassuring without any signs of acute arrhythmias or ischemia.  No findings to support WPW, [place], SVT, atrial fibrillation, atrial flutter, HOCM, or other potentially dangerous arrhythmias.  Laboratory evaluation revealed no acute findings requiring intervention.    Patient overall doing well throughout the stay, no other concerning associated symptoms such as chest pain or shortness of breath to suggest ACS or PE or dissection.  Appropriate for further outpatient management, discharged in stable edition all question answered and return precautions given recommending close follow-up with PCP.  There may be a component of anxiety driving her presentation     Procedures              Results for orders placed or performed during " the hospital encounter of 05/08/23 (from the past 24 hour(s))   CBC with platelets differential    Narrative    The following orders were created for panel order CBC with platelets differential.  Procedure                               Abnormality         Status                     ---------                               -----------         ------                     CBC with platelets and d...[[id number]]                      Final result                 Please view results for these tests on the individual orders.   Basic metabolic panel   Result Value Ref Range    Sodium 140 136 - 145 mmol/L    Potassium 4.2 3.4 - 5.3 mmol/L    Chloride 106 98 - 107 mmol/L    Carbon Dioxide (CO2) 26 22 - 29 mmol/L    Anion Gap 8 7 - 15 mmol/L    Urea Nitrogen 9.4 8.0 - 23.0 mg/dL    Creatinine 0.86 0.51 - 0.95 mg/dL    Calcium 10.0 8.8 - 10.2 mg/dL    Glucose 131 (H) 70 - 99 mg/dL    GFR Estimate 72 >60 mL/min/1.73m2   Magnesium   Result Value Ref Range    Magnesium 2.1 1.7 - 2.3 mg/dL   TSH with free T4 reflex   Result Value Ref Range    TSH 1.60 0.30 - 4.20 uIU/mL   CBC with platelets and differential   Result Value Ref Range    WBC Count 9.6 4.0 - 11.0 10e3/uL    RBC Count 4.71 3.80 - 5.20 10e6/uL    Hemoglobin 14.3 11.7 - 15.7 g/dL    Hematocrit 42.7 35.0 - 47.0 %    MCV 91 78 - 100 fL    MCH 30.4 26.5 - 33.0 pg    MCHC 33.5 31.5 - 36.5 g/dL    RDW 12.7 10.0 - 15.0 %    Platelet Count 260 150 - 450 10e3/uL    % Neutrophils 76 %    % Lymphocytes 12 %    % Monocytes 9 %    % Eosinophils 2 %    % Basophils 1 %    % Immature Granulocytes 0 %    NRBCs per 100 WBC 0 <1 /100    Absolute Neutrophils 7.4 1.6 - 8.3 10e3/uL    Absolute Lymphocytes 1.1 0.8 - 5.3 10e3/uL    Absolute Monocytes 0.9 0.0 - 1.3 10e3/uL    Absolute Eosinophils 0.1 0.0 - 0.7 10e3/uL    Absolute Basophils 0.1 0.0 - 0.2 10e3/uL    Absolute Immature Granulocytes 0.0 <=0.4 10e3/uL    Absolute NRBCs 0.0 10e3/uL   Extra Tube    Narrative    The following orders were  created for panel order Extra Tube.  Procedure                               Abnormality         Status                     ---------                               -----------         ------                     Extra Blue Top Tube[[id number]]                              Final result               Extra Red Top Tube[[id number]]                               Final result               Extra Heparinized Syringe[[id number]]                        Final result                 Please view results for these tests on the individual orders.   Extra Blue Top Tube   Result Value Ref Range    Hold Specimen JIC    Extra Red Top Tube   Result Value Ref Range    Hold Specimen JIC    Extra Heparinized Syringe   Result Value Ref Range    Hold Specimen JIC        Medications - No data to display    Assessments & Plan (with Medical Decision Making)     I have reviewed the nursing notes.    I have reviewed the findings, diagnosis, plan and need for follow up with the patient.    New Prescriptions    No medications on file       Final diagnoses:   Palpitations       5/8/2023   [facility]     Jomar Lema MD  05/08/23 7831

## 2023-05-09 ENCOUNTER — OFFICE VISIT (OUTPATIENT)
Dept: CARDIOLOGY | Facility: OTHER | Age: 73
End: 2023-05-09
Attending: NURSE PRACTITIONER
Payer: COMMERCIAL

## 2023-05-09 VITALS
BODY MASS INDEX: 37.38 KG/M2 | HEART RATE: 68 BPM | SYSTOLIC BLOOD PRESSURE: 130 MMHG | WEIGHT: 198 LBS | DIASTOLIC BLOOD PRESSURE: 62 MMHG | HEIGHT: 61 IN | OXYGEN SATURATION: 96 %

## 2023-05-09 DIAGNOSIS — I49.8 VENTRICULAR BIGEMINY: ICD-10-CM

## 2023-05-09 DIAGNOSIS — I49.3 FREQUENT PVCS: Primary | ICD-10-CM

## 2023-05-09 DIAGNOSIS — I10 ESSENTIAL HYPERTENSION: ICD-10-CM

## 2023-05-09 DIAGNOSIS — I51.89 DIASTOLIC DYSFUNCTION: ICD-10-CM

## 2023-05-09 DIAGNOSIS — R06.09 DOE (DYSPNEA ON EXERTION): ICD-10-CM

## 2023-05-09 DIAGNOSIS — R00.2 PALPITATIONS: ICD-10-CM

## 2023-05-09 PROCEDURE — 93010 ELECTROCARDIOGRAM REPORT: CPT | Mod: 76 | Performed by: INTERNAL MEDICINE

## 2023-05-09 PROCEDURE — 93005 ELECTROCARDIOGRAM TRACING: CPT | Performed by: NURSE PRACTITIONER

## 2023-05-09 PROCEDURE — 99215 OFFICE O/P EST HI 40 MIN: CPT | Performed by: NURSE PRACTITIONER

## 2023-05-09 PROCEDURE — G0463 HOSPITAL OUTPT CLINIC VISIT: HCPCS

## 2023-05-09 RX ORDER — FUROSEMIDE 20 MG
20 TABLET ORAL DAILY
Qty: 30 TABLET | Refills: 0 | Status: SHIPPED | OUTPATIENT
Start: 2023-05-09 | End: 2023-06-06

## 2023-05-09 ASSESSMENT — PAIN SCALES - GENERAL: PAINLEVEL: NO PAIN (0)

## 2023-05-09 NOTE — PATIENT INSTRUCTIONS
Thank you for allowing Lizeth Crisostomo CNP and our  team to participate in your care. Please call our office at [phone] with scheduling questions or if you need to cancel or change your appointment. With any other questions or concerns you may call cardiology nurse at [phone] or [phone].       If you experience chest pain, chest pressure, chest tightness, shortness of breath, fainting, lightheadedness, nausea, vomiting, or other concerning symptoms, please report to the Emergency Department or call 911. These symptoms may be emergent, and best treated in the Emergency Department.        Dyspnea on exertion- likely related to underlying allergies, reactive airway; however, history of diastolic dysfunction which could be contributing. We will try furosemide 20 mg once daily for a week and see if there is improvement in her weights and breathing.     PVC- history of PVCs with recent recurrence of symptoms. Again, could be related to diastolic dysfunction with increased dyspnea, trace LE edema. We will see if there is any improvement with use of furosemide 20 mg once daily. Plan for zio patch. Could consider increasing diltiazem if diuresis does not help. Cannot take beta blockers with immunotherapy for allergies.       Follow-up after zio patch certainly sooner with acute concerns.    Lizeth Crisostomo CNP

## 2023-05-09 NOTE — PROGRESS NOTES
[facility] HEART CARE   CARDIOLOGY PROGRESS NOTE     Chief Complaint   Patient presents with     Consult     Hypertension     Heart Problem          Diagnosis:    ICD-10-CM    1. Frequent PVCs  I49.3 EKG 12-lead complete w/read - (Clinic Performed)     Leadless EKG Monitor 8 to 14 Days      2. Essential hypertension  I10 EKG 12-lead complete w/read - (Clinic Performed)      3. Palpitations  R00.2 Leadless EKG Monitor 8 to 14 Days      4. Ventricular bigeminy  I49.8 Leadless EKG Monitor 8 to 14 Days      5. Diastolic dysfunction  I51.89 furosemide (LASIX) 20 MG tablet      6. SOLIS (dyspnea on exertion)  R06.09 furosemide (LASIX) 20 MG tablet            Assessment/Plan:    1. Palpitations  2. Frequent PVCs    Leadless EKG Monitor 8/2021    Underlying rhythm was sinus. Hrt rate ranged from 48 bpm, maximum heart rate of 158 bmp, averaging 71 bmp. No significant bradycardia, pauses, Mobitz type II or 3rd degree heart block.    No atrial fibrillation on this study.    x1 triggered events and x1 diary entries.  These corresponded to VE and sinus rhythm.    Rare, less than 1% of PAC's, atrial couplets, and atrial triplets.    Occasional PVC's at 3.0%.    Frequent ventricular couplets of 5.3%.    + episodes of ventricular bigeminy lasting up to 17.4 sec's.    + episodes of ventricular trigeminy lasting up to 1 min and 4 sec's.    NM Lexiscan Stress Test 2/2022    The nuclear stress test demonstrates an equivocal apical infarct vs thinning. No evidence of reversible ischemia.     The left ventricular ejection fraction at rest is 58%.  The left ventricular ejection fraction at stress is 68%.     Baseline electrocardiogram demonstrates sinus rhythm. There were occasional premature ventricular contractions. The patient did not develop any acute ECG changes or arrhythmias during the Lexiscan portion of the study.    Transthoracic Echocardiogram 2/2022    Global and regional left ventricular function is normal with an EF of  55-60%.    Relative wall thickness is increased consistent with concentric remodeling. Left ventricular diastolic function is indeterminate. Diastolic Doppler findings (E/E' ratio and/or other parameters) suggest left ventricular filling pressures are increased     Previously palpitations related to ectopic beats resolved with increase in Diltiazem to 240 mg daily, she will continue with this. Recent recurrence with ED visit which brings her in for follow-up today.    Cannot be on beta-blockers with immunotherapy for allergies.     Plan for zio patch to evaluate PVC burden, symptoms    Wt Readings from Last 5 Encounters:   05/09/23 89.8 kg (198 lb)   05/08/23 87.1 kg (192 lb)   05/03/23 87.1 kg (192 lb)   03/27/23 90.7 kg (200 lb)   12/23/22 90.7 kg (200 lb)         3. Hypertension, controlled    Continue Losartan to 50 mg daily    Continue Spironolactone 50 mg daily    Previously reviewed untreated sleep apnea, may contribute to increased ectopy and may place patient at risk for developing other arrhythmias such as atrial fibrillation. She has not been on CPAP and reports sleep study years ago. She has previously requested to hold off on further sleep study at this time however, may consider home sleep study in the future.     No medication changes today  BP Readings from Last 6 Encounters:   05/09/23 130/62   05/08/23 137/71   05/03/23 (!) 146/80   03/27/23 122/60   12/23/22 130/62   12/08/22 (!) 152/81       4. Type II diabetes mellitus    Continue management by PCP    Statin: She is not on statin for primary prevention with ASCVD risk of 23.7%  Lab Results   Component Value Date    A1C 5.9 12/23/2022    A1C 5.9 09/21/2022    A1C 6.1 05/20/2022    A1C 5.9 01/10/2022    A1C 5.8 10/08/2021    A1C 5.8 03/10/2021    A1C 5.9 02/19/2020    A1C 6.5 11/11/2019         Recent Labs   Lab Test 01/10/22  0946 03/10/21  1235   CHOL 175 162   HDL 44* 53   LDL 77 87   TRIG 268* 110     The 10-year ASCVD risk score (Noah PARRISH,  et al., 2019) is: 23.7%    Values used to calculate the score:      Age: 72 years      Sex: Female      Is Non- : No      Diabetic: Yes      Tobacco smoker: No      Systolic Blood Pressure: 130 mmHg      Is BP treated: Yes      HDL Cholesterol: 44 mg/dL      Total Cholesterol: 175 mg/dL        5. Obstructive sleep apnea    Diagnosed at some point    Does not wear CPAP    Has not been interested in repeating sleep evaluation          Interval history:  Rj Leyva is a pleasant 72-year old female who presents for cardiology follow-up. She was previously following with my colleague Marie AUSTIN, CNP. Recall, she has a cardiovascular history including hypertension, dyslipidemia, palpitations- symptomatic PVCs. She has a non-cardiac history including diabetes mellitus, obstructive sleep apnea, depression, anxiety, glaucoma, chronic lumbago.     Today, Ms. Leyva presents to follow-up on recent ED visit 5/8/2023 for lightheadedness, palpitations. She tells me that she woke up yesterday morning and felt good. Then later in the morning she started feeling jumping in her chest with associated dyspnea. She did feel lightheaded and thought she might pass out so laid down. She has not had recurrence of symptoms. She does continue with feeling frequent palpitations. No chest pain or pressure. No significant dyspnea, dyspnea on exertion. She does struggle with allergies and allergy symptoms.     Sleep history: Prior sleep evaluation and used to wear CPAP. Does not currently wear CPAP.    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 Rodríguez is a 72 year old female who presents for cardiology follow-up to visit on 5/31/22 with recent reports of increased exertional dyspnea and recently identified ventricular  ectopy seen as isolated PVCs, ventricular coupletes and ventricular trigeminy on recent cardiac monitor.  Patient has a past medical history significant for migraine headaches, hypertension, obesity, REBECCA, DM 2, depression, significant anxiety, glaucoma and chronic lumbago.     Patient reported noticing irregular heart rate with in the office in Feb 2021. She was identified to have ventricular ectopy on ECG. No palpitations with this at that time. She had described episodes of fluttering palpitations associated to anxiety. She had reported increased exertional dyspnea and chest tightness largely related to anxiety events. She does not report any exertional chest tightness and no radiation to the arm, jaw, neck or back. No lightheadedness or syncope. No increased edema.      She described issues with allergies, possible asthma and on on immunotherapy. Avoided use of albuterol with fluttering palpitations. Has been off beta blocker since starting this. She was started on Diltiazem for HTN and increased ventricular ectopy burden. No tobacco use.     NM Lexiscan stress test due to frequent PVC's and increased exertional dyspnea. Study was completed on 2/10/22 revealing equivocal apical infarct vs apical thinning, no evidence of reversible ischemia. Normal LV systolic heart function which increased appropriately with stress. ECG with occasional ventricular ectopy at baseline which did not increase during lexiscan portion of the study and no ischemia ECG changes.     Echocardiogram on 2/15/22 revealing normal biventricular function, LVEF 55-60%. Borderline LVH, unable to assess diastolic function. Diastolic doppler findings suggest LV filling pressures to be increased. No RWMA's. Both atrial noted to be normal with atrial septum intact. No hemodynamically significant valvular abnormalities. Ascending aorta mildly dilated at 3.7 cm. No pericardial effusion.     INTERVAL HISTORY:  Today, patient reports mild improvement in  dyspnea. No recurrence of pre-syncope or syncope. No chest pain or pressure. Palpitations and flip flop sensation in chest has pretty much resolved with increase in Diltiazem. Admits that she is planning for repeat back surgery at [facility] after the first of the year, significant anxiety with this.         RELEVANT TESTING:  NM Lexiscan Stress Test 2/2022    Narrative     The nuclear stress test demonstrates an equivocal apical infarct vs   thinning. No evidence of reversible ischemia.      Left ventricular function is normal.      The left ventricular ejection fraction at rest is 58%.  The left   ventricular ejection fraction at stress is 68%.      There is no prior study for comparison.       ECG Summary    ECG Baseline electrocardiogram demonstrates sinus rhythm. There were occasional premature ventricular contractions, .   The patient did not develop any acute ECG changes or arrhythmias during the Lexiscan portion of the study.          Transthoracic Echocardiogram 2/2022  Interpretation Summary  Left ventricular size is normal. Global and regional left ventricular function  is normal with an EF of 55-60%. Biplane LVEF is 56%. Relative wall thickness  is increased consistent with concentric remodeling. Left ventricular diastolic  function is indeterminate. Diastolic Doppler findings (E/E' ratio and/or other  parameters) suggest left ventricular filling pressures are increased. The  Ejection Fraction was calculated using Bi-plane non contrast. No regional wall  motion abnormalities are seen.  The right ventricle is normal in function and size.  No significant valvular abnormality  IVC diameter <2.1 cm collapsing >50% with sniff suggests a normal RA pressure  of 3 mmHg.  No pericardial effusion is present.  There is no prior study for direct comparison.      Leadless EKG Monitor 8/2021  Conclusion  Zio XT patch report on Rj Rodríguez.  Ordered secondary to shortness of breath.   Worn for 13 days  and 0 hr's.  After removing artifact, total time was 12 days and 16 hr's. Placed on 8/11/2021 at 11:48 AM and completed on 8/24/2021 at 11:35 AM.   Underlying rhythm was sinus.   Hrt rate ranged from 48 bpm, maximum heart rate of 158 bmp, averaging 71 bmp.   No significant bradycardia, pauses, Mobitz type II or 3rd degree heart block.   No atrial fibrillation on this study.   x1 triggered events and x1 diary entries.  These corresponded to VE and sinus rhythm.   x0 runs of VT.     x2 runs of SVT lasting up to 5 beats with a maximum heart rate of 158 bmp.   Rare, less than 1% of PAC's, atrial couplets, and atrial triplets.   Occasional PVC's at 3.0%.   Frequent ventricular couplets of 5.3%.   + episodes of ventricular bigeminy lasting up to 17.4 sec's.   + episodes of ventricular trigeminy lasting up to 1 min and 4 sec's.        Past Medical History:   Diagnosis Date     Arthritis      Chronic back pain      Hypertension      Irregular heart beat      Sleep apnea        Past Surgical History:   Procedure Laterality Date     ANESTHESIA OUT OF OR MRI N/A 6/1/2022    Procedure: MRI CERVICAL SPINE;  Surgeon: GENERIC ANESTHESIA PROVIDER;  Location: [facility] OR     ANESTHESIA OUT OF OR MRI N/A 9/23/2022    Procedure: MRI CERVICAL SPINE;  Surgeon: GENERIC ANESTHESIA PROVIDER;  Location: [facility] OR     APPENDECTOMY       BACK SURGERY       CHOLECYSTECTOMY       COLONOSCOPY  2012    Repeat in 10 years     GYN SURGERY       HYSTERECTOMY      Ovaries     RELEASE CARPAL TUNNEL      LT     RELEASE CARPAL TUNNEL      RT x2     SURGICAL RADIOLOGY PROCEDURE N/A 2/12/2016    Procedure: SURGICAL RADIOLOGY PROCEDURE;  Surgeon: Provider, Generic Perianesthesia Nursing;  Location: [facility] OR     SURGICAL RADIOLOGY PROCEDURE N/A 8/15/2016    Procedure: SURGICAL RADIOLOGY PROCEDURE;  Surgeon: Provider, Generic Perianesthesia Nursing;  Location: [facility] OR       Allergies   Allergen Reactions     Fruit [Peach] Anaphylaxis and Hives     fresh peaches and any  fruit that has a pit.  Kiwi's and apples also.  Can eat any fruit cooked.     Nuts Anaphylaxis and Hives     All Raw Nuts, can eat nuts when roasted.     Ace Inhibitors      Upper respiratory infection     Alprazolam Hives     Xanax     Antihistamines, Chlorpheniramine-Type Hives     Antihistamines     Diphenhydramine Hives and Other (See Comments)     Keeps her awake       Erythromycin      BASE; pt. Not sure if this is a true allergy.     Guaifed      Pt unsure of reaction     Guaifenesin      Guaifed     Hydrochlorothiazide      No Clinical Screening - See Comments      Allergic:  Fresh peaches and any fruit that has a pit.  Kiwi's and apples also.  All raw nuts.  Can eat nuts when roasted or any fruit cooked.  Some seasonal allergies :  Hayfever     Olmesartan Medoxomil Cough     Benicar     Phenylephrine Hcl      Guaifed     Pseudoephedrine Hcl      Guaifed     Seasonal Allergies      hayfever     Tramadol      Sleep disturbance. Can not sleep, and when able to fall asleep will have terrible nightmares     Tramadol Hcl      Ultram, insomnia, nightmares, headache     Venlafaxine Other (See Comments)     Heartburn, reflux     Zoloft [Sertraline]      paranoia     Latex Other (See Comments), Swelling, Difficulty breathing and Rash     Throat Closes         Current Outpatient Medications   Medication Sig Dispense Refill     albuterol (PROAIR HFA/PROVENTIL HFA/VENTOLIN HFA) 108 (90 Base) MCG/ACT inhaler Inhale 2 puffs into the lungs every 6 hours 18 g 0     blood glucose (ACCU-CHEK GUIDE) test strip Use to test blood sugar 1 time daily or as directed. 50 each 11     blood glucose monitoring (ACCU-CHEK FASTCLIX) lancets Use to test blood sugar 1 time daily or as directed. 102 each 11     budesonide-formoterol (SYMBICORT) 80-4.5 MCG/ACT Inhaler Inhale 2 puffs into the lungs 2 times daily 10.2 g 0     cetirizine (ZYRTEC) 10 MG tablet Take 1 tablet (10 mg) by mouth daily as needed for allergies 60 tablet 12      clonazePAM (KLONOPIN) 0.5 MG tablet Take 1 tablet by mouth twice daily as needed for anxiety 30 tablet 0     COMBIVENT RESPIMAT  MCG/ACT inhaler INHALE 1 PUFF BY MOUTH 4 TIMES DAILY AS NEEDED FOR WHEEZING OR  CHEST  TIGHTNESS 4 g 0     diltiazem ER (TIAZAC) 240 MG 24 hr ER beaded capsule Take 1 capsule (240 mg) by mouth daily 90 capsule 3     EPINEPHrine (ANY BX GENERIC EQUIV) 0.3 MG/0.3ML injection 2-pack Inject 0.3 mLs (0.3 mg) into the muscle as needed for anaphylaxis May repeat one time in 5-15 minutes if response to initial dose is inadequate. 2 each 1     furosemide (LASIX) 20 MG tablet Take 1 tablet (20 mg) by mouth daily 30 tablet 0     losartan (COZAAR) 50 MG tablet Take 1 tablet by mouth once daily 90 tablet 0     ORDER FOR ALLERGEN IMMUNOTHERAPY Continue on allergy injections (SCIT), 0.5ml every other week injections. Follow standard maintenance protocols. 5 mL PRN     spironolactone (ALDACTONE) 50 MG tablet Take 1 tablet by mouth once daily 90 tablet 0     triamcinolone (NASACORT) 55 MCG/ACT nasal aerosol Spray 2 sprays into both nostrils daily 2 Bottle 12       Social History     Socioeconomic History     Marital status:      Spouse name: Not on file     Number of children: Not on file     Years of education: Not on file     Highest education level: Not on file   Occupational History     Not on file   Tobacco Use     Smoking status: Never     Smokeless tobacco: Never   Vaping Use     Vaping status: Never Used   Substance and Sexual Activity     Alcohol use: Yes     Alcohol/week: 1.0 standard drink of alcohol     Types: 1 Glasses of wine per week     Comment: occ glass of wine     Drug use: No     Sexual activity: Not Currently   Other Topics Concern      Service Not Asked     Blood Transfusions Not Asked     Caffeine Concern Yes     Comment: Coffee - 2 cups daily     Occupational Exposure Not Asked     Hobby Hazards Not Asked     Sleep Concern Not Asked     Stress Concern Not Asked  "    Weight Concern Not Asked     Special Diet Not Asked     Back Care Not Asked     Exercise Not Asked     Bike Helmet Not Asked     Seat Belt Not Asked     Self-Exams Not Asked     Parent/sibling w/ CABG, MI or angioplasty before 65F 55M? Not Asked   Social History Narrative    10/11/2019: , lives in [place]      Social Determinants of Health     Financial Resource Strain: Not on file   Food Insecurity: Not on file   Transportation Needs: Not on file   Physical Activity: Not on file   Stress: Not on file   Social Connections: Not on file   Intimate Partner Violence: Not on file   Housing Stability: Not on file       LAB RESULTS:   Orders placed or performed in visit on 05/09/23     furosemide (LASIX) 20 MG tablet         Review of systems: Negative except that which was noted in the HPI.    Physical examination:    Vitals: /62 (BP Location: Left arm, Patient Position: Sitting, Cuff Size: Adult Regular)   Pulse 68   Ht 1.549 m (5' 1\")   Wt 89.8 kg (198 lb)   SpO2 96%   BMI 37.41 kg/m    BMI= Body mass index is 37.41 kg/m .      GENERAL APPEARANCE: healthy, alert and no distress  HEENT: no icterus, no xanthelasmas, normal pupil size and reaction, no cyanosis.  NECK: no adenopathy, no asymmetry, masses.  CHEST: lungs clear to auscultation - no rales, rhonchi or wheezes, no use of accessory muscles, no retractions, respirations are unlabored, normal respiratory rate  CARDIOVASCULAR: regular rhythm, normal S1 with physiologic split S2, no S3 or S4 and no murmur, click or rub  EXTREMITIES: no clubbing, cyanosis or edema  NEURO: alert and oriented normal speech, and affect  VASC: No vascular bruits heard.  SKIN: no ecchymoses, no rashes         EKG Interpretation:      Rhythm: Normal sinus   Rate: Normal- 68 bpm  Axis: Normal  Ectopy: Bigeminy- PVCs  Conduction: normal ADEOLA 158 ms, normal QRS duration 90 ms, normal  ms, normal QTc 423 ms  ST Segments/ T Waves: No acute ischemic changes  Q Waves: " None    Thank you for allowing me to participate in the care of your patient. Please do not hesitate to contact me if you have any questions.       Total time spent on day of visit, including review of tests, obtaining/reviewing separately obtained history, ordering medications/tests/procedures, communicating with PCP/consultants, and documenting in electronic medical record: 45 minutes.       Lizeth Crisostomo, CNP

## 2023-05-10 ENCOUNTER — HOSPITAL ENCOUNTER (OUTPATIENT)
Dept: CARDIOLOGY | Facility: HOSPITAL | Age: 73
Discharge: HOME | End: 2023-05-10
Attending: STUDENT IN AN ORGANIZED HEALTH CARE EDUCATION/TRAINING PROGRAM | Admitting: INTERNAL MEDICINE
Payer: MEDICARE

## 2023-05-10 ENCOUNTER — TELEPHONE (OUTPATIENT)
Dept: ALLERGY | Facility: OTHER | Age: 73
End: 2023-05-10

## 2023-05-10 DIAGNOSIS — I49.8 VENTRICULAR BIGEMINY: ICD-10-CM

## 2023-05-10 DIAGNOSIS — I49.3 FREQUENT PVCS: ICD-10-CM

## 2023-05-10 DIAGNOSIS — R00.2 PALPITATIONS: ICD-10-CM

## 2023-05-10 PROCEDURE — 93248 EXT ECG>7D<15D REV&INTERPJ: CPT | Performed by: INTERNAL MEDICINE

## 2023-05-10 PROCEDURE — 93246 EXT ECG>7D<15D RECORDING: CPT

## 2023-05-10 NOTE — PROGRESS NOTES
Zio patch placed on patient in outpatient appointment today. Patient was instructed to wear patch for 14 days. Skin was prepped and patch was placed following [employer] guidelines .     Patient was instructed to push button when symptomatic and fill out diary. Patient was instructed not to submerse in water and no swimming, saunas, or hot tubs. Showers may be taken keeping back towards the water. If the area DOES become wet pat it dry with a cloth. If skin irritation occurs patient was instructed to remove patch and contact [employer].    Patient understands we do not receive results until they mail patch back inside the box. Staff reminded patient of outside billing notice which was explained to patient during the scheduling process of this monitor. Patient also instructed to contact [employer] with any questions [phone].    Patient had no further questions and agreed with plan. Patient was sent home with the box, information pamphlet and booklet to natanael any incidents in.

## 2023-05-10 NOTE — TELEPHONE ENCOUNTER
Patient's allergy shot was held today 5/10 and held until cleared by cardiologist. BP- 122/58, P- 60, O2 - 96%. Pulse was jumping from 30s-70s, in which patient stated that this was normal for her. Provider came and evaluated patient status along with assessing heart beat. Patient stated that she was going to be getting some type of monitor today for her heart. Provider directed to hold allergy shots until her cardiologist evaluates her. Patient reported understanding.     Linn Govea RN on 5/10/2023 at 11:25 AM

## 2023-05-31 DIAGNOSIS — I49.3 FREQUENT PVCS: ICD-10-CM

## 2023-05-31 DIAGNOSIS — I10 ESSENTIAL HYPERTENSION: ICD-10-CM

## 2023-05-31 RX ORDER — DILTIAZEM HYDROCHLORIDE 240 MG/1
CAPSULE, EXTENDED RELEASE ORAL
Qty: 90 CAPSULE | Refills: 0 | Status: SHIPPED | OUTPATIENT
Start: 2023-05-31 | End: 2023-09-05

## 2023-05-31 NOTE — TELEPHONE ENCOUNTER
[id number] sent Rx request for the following:      Requested Prescriptions   Pending Prescriptions Disp Refills     diltiazem ER (TIAZAC) 240 MG 24 hr ER beaded capsule [Pharmacy Med Name: dilTIAZem HCl ER Beads 240 MG Oral Capsule Extended Release 24 Hour] 90 capsule 0     Sig: Take 1 capsule by mouth once daily       Calcium Channel Blockers Protocol  Failed - 5/31/2023 12:33 PM        Failed - Normal ALT in past 12 months     Recent Labs   Lab Test 05/20/22  1332   ALT 29        Last Prescription Date:   05/31/22  Last Fill Qty/Refills:         90, R-3  Last Office Visit:              05/09/23   Future Office visit:             Next 5 appointments (look out 90 days)    Jun 06, 2023 11:00 AM  (Arrive by 10:45 AM)  Return Visit with Lizeth Crisostomo CNP  [facility] ([facility] ) [address] [place] [address]  [phone]   Jun 26, 2023  9:30 AM  (Arrive by 9:15 AM)  SHORT with Sheyla Sanches NP  [facility] ([facility] [place] ) [address]  [place] [address]  [phone]        Ysabel Oakley RN on 5/31/2023 at 2:45 PM

## 2023-06-05 DIAGNOSIS — I51.89 DIASTOLIC DYSFUNCTION: ICD-10-CM

## 2023-06-05 DIAGNOSIS — R06.09 DOE (DYSPNEA ON EXERTION): ICD-10-CM

## 2023-06-06 ENCOUNTER — OFFICE VISIT (OUTPATIENT)
Dept: CARDIOLOGY | Facility: OTHER | Age: 73
End: 2023-06-06
Attending: NURSE PRACTITIONER
Payer: COMMERCIAL

## 2023-06-06 VITALS
WEIGHT: 196.3 LBS | BODY MASS INDEX: 37.06 KG/M2 | HEIGHT: 61 IN | TEMPERATURE: 97.7 F | SYSTOLIC BLOOD PRESSURE: 111 MMHG | HEART RATE: 71 BPM | OXYGEN SATURATION: 96 % | DIASTOLIC BLOOD PRESSURE: 60 MMHG

## 2023-06-06 DIAGNOSIS — I51.89 DIASTOLIC DYSFUNCTION: ICD-10-CM

## 2023-06-06 DIAGNOSIS — E66.09 CLASS 2 OBESITY DUE TO EXCESS CALORIES WITHOUT SERIOUS COMORBIDITY WITH BODY MASS INDEX (BMI) OF 37.0 TO 37.9 IN ADULT: ICD-10-CM

## 2023-06-06 DIAGNOSIS — E11.9 TYPE 2 DIABETES MELLITUS WITHOUT COMPLICATION, WITHOUT LONG-TERM CURRENT USE OF INSULIN (H): ICD-10-CM

## 2023-06-06 DIAGNOSIS — E66.812 CLASS 2 OBESITY DUE TO EXCESS CALORIES WITHOUT SERIOUS COMORBIDITY WITH BODY MASS INDEX (BMI) OF 37.0 TO 37.9 IN ADULT: ICD-10-CM

## 2023-06-06 DIAGNOSIS — G47.33 OSA (OBSTRUCTIVE SLEEP APNEA): ICD-10-CM

## 2023-06-06 DIAGNOSIS — I49.3 FREQUENT PVCS: ICD-10-CM

## 2023-06-06 DIAGNOSIS — I10 ESSENTIAL HYPERTENSION: ICD-10-CM

## 2023-06-06 DIAGNOSIS — R00.2 PALPITATIONS: Primary | ICD-10-CM

## 2023-06-06 PROCEDURE — 99214 OFFICE O/P EST MOD 30 MIN: CPT | Performed by: NURSE PRACTITIONER

## 2023-06-06 PROCEDURE — G0463 HOSPITAL OUTPT CLINIC VISIT: HCPCS

## 2023-06-06 RX ORDER — FUROSEMIDE 20 MG
20 TABLET ORAL DAILY
Qty: 90 TABLET | Refills: 3 | Status: SHIPPED | OUTPATIENT
Start: 2023-06-06 | End: 2024-05-20

## 2023-06-06 ASSESSMENT — PAIN SCALES - GENERAL: PAINLEVEL: NO PAIN (0)

## 2023-06-06 NOTE — PROGRESS NOTES
[facility] HEART CARE   CARDIOLOGY PROGRESS NOTE     Chief Complaint   Patient presents with     Heart Problem     2 week follow up          Diagnosis:    ICD-10-CM    1. Palpitations  R00.2       2. 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 Essential hypertension  I10       4. Diastolic dysfunction  I51.89       5. Type 2 diabetes mellitus without complication, without long-term current use of insulin (H)  E11.9       6. REBECCA (obstructive sleep apnea)  G47.33       7. Class 2 obesity due to excess calories without serious comorbidity with body mass index (BMI) of 37.0 to 37.9 in adult  E66.09     Z68.37             Assessment/Plan:    1. Palpitations  2. Frequent PVCs    Symptoms have improved since prior visit without any medication adjustments.     Labs including CBC, TSH, electrolytes- normal    Reviewed leadless EKG monitor results from 5/2023    Underlying rhythm was sinus. Heart rate ranged from 29 bpm, maximum heart rate of 112 bpm, averaging 63 bpm.    x2 triggered events and x2 diary entries.  These corresponded to SVE, VE, ventricular bigeminy, and sinus rhythm.    Occasional PVC's at 1.3%.    Leadless EKG Monitor 8/2021    Underlying rhythm was sinus. Hrt rate ranged from 48 bpm, maximum heart rate of 158 bmp, averaging 71 bmp. No significant bradycardia, pauses, Mobitz type II or 3rd degree heart block.    No atrial fibrillation on this study.    x1 triggered events and x1 diary entries.  These corresponded to VE and sinus rhythm.    Rare, less than 1% of PAC's, atrial couplets, and atrial triplets.    Occasional PVC's at 3.0%.    Frequent ventricular couplets of 5.3%.    + episodes of ventricular bigeminy lasting up to 17.4 sec's.    + episodes of ventricular trigeminy lasting up to 1 min and 4 sec's.    NM Lexiscan Stress Test 2/2022    The nuclear stress test demonstrates an equivocal apical infarct vs thinning. No evidence of reversible ischemia.     The left ventricular ejection fraction at rest is 58%.  The left  ventricular ejection fraction at stress is 68%.     Baseline electrocardiogram demonstrates sinus rhythm. There were occasional premature ventricular contractions. The patient did not develop any acute ECG changes or arrhythmias during the Lexiscan portion of the study.    Transthoracic Echocardiogram 2/2022    Global and regional left ventricular function is normal with an EF of 55-60%.    Relative wall thickness is increased consistent with concentric remodeling. Left ventricular diastolic function is indeterminate. Diastolic Doppler findings (E/E' ratio and/or other parameters) suggest left ventricular filling pressures are increased    Previously palpitations related to ectopic beats resolved with increase in Diltiazem to 240 mg daily, she will continue with this. Recent recurrence with ED visit which is what brought her back to cardiology for follow-up.    Cannot be on beta-blockers with immunotherapy for allergies.     Wt Readings from Last 5 Encounters:   06/06/23 89 kg (196 lb 4.8 oz)   05/09/23 89.8 kg (198 lb)   05/08/23 87.1 kg (192 lb)   05/03/23 87.1 kg (192 lb)   03/27/23 90.7 kg (200 lb)         3. Hypertension with blood pressure goal less than 130/80, controlled  4. Diastolic dysfunction    Continue Losartan to 50 mg daily    Continue Spironolactone 50 mg daily    Continue diltiazem for palpitations/PVCs    Previously reviewed untreated sleep apnea, may contribute to increased ectopy and may place patient at risk for developing other arrhythmias such as atrial fibrillation. She has not been on CPAP and reports sleep study years ago. She has previously requested to hold off on further sleep study at this time however, may consider home sleep study in the future.     No medication changes today  BP Readings from Last 6 Encounters:   06/06/23 111/60   05/09/23 130/62   05/08/23 137/71   05/03/23 (!) 146/80   03/27/23 122/60   12/23/22 130/62       5. Type II diabetes mellitus    Continue management by  PCP    Statin: She is not on statin for primary prevention with ASCVD risk of 23.7%  Lab Results   Component Value Date    A1C 5.9 12/23/2022    A1C 5.9 09/21/2022    A1C 6.1 05/20/2022    A1C 5.9 01/10/2022         Recent Labs   Lab Test 01/10/22  0946 03/10/21  1235   CHOL 175 162   HDL 44* 53   LDL 77 87   TRIG 268* 110     The 10-year ASCVD risk score (Noah PARRISH, et al., 2019) is: 17.8%    Values used to calculate the score:      Age: 72 years      Sex: Female      Is Non- : No      Diabetic: Yes      Tobacco smoker: No      Systolic Blood Pressure: 111 mmHg      Is BP treated: Yes      HDL Cholesterol: 44 mg/dL      Total Cholesterol: 175 mg/dL        6. Obesity    She has been working on lifestyle modifications and has noticed some weight loss and improved fasting glucose levels    Encouraged her to continue with lifestyle modifications      7. Obstructive sleep apnea    Diagnosed at some point    Does not wear CPAP    Has not been interested in repeating sleep evaluation          Interval history:  Rj Leyva is a pleasant 72-year old female who presents for cardiology follow-up. She was previously following with my colleague Marie AUSTIN CNP. Recall, she has a cardiovascular history including hypertension, dyslipidemia, palpitations- symptomatic PVCs. She has a non-cardiac history including diabetes mellitus, obstructive sleep apnea, depression, anxiety, glaucoma, chronic lumbago.     Ms. Leyva presented at prior visit to follow-up on recent ED visit 5/8/2023 for lightheadedness, palpitations. She tells me that she woke up yesterday morning and felt good. Then later in the morning she started feeling jumping in her chest with associated dyspnea. She did feel lightheaded and thought she might pass out so laid down and later presented to the ED for evaluation.     She has had improvement in symptoms since prior visit. We did not make any adjustments to her medications at  prior visit. She did make lifestyle modifications including dietary changes and not eating after dinner and has felt better and noticed improved fasting glucose levels. She is wanting to continue with these changes. She otherwise denies any chest pain or pressure, dyspnea, dyspne aon exertion, LE edema.    Sleep history: Prior sleep evaluation and used to wear CPAP. Does not currently wear CPAP.    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 Rodríguez is a 72 year old female who presents for cardiology follow-up to visit on 5/31/22 with recent reports of increased exertional dyspnea and recently identified ventricular ectopy seen as isolated PVCs, ventricular coupletes and ventricular trigeminy on recent cardiac monitor.  Patient has a past medical history significant for migraine headaches, hypertension, obesity, REBECCA, DM 2, depression, significant anxiety, glaucoma and chronic lumbago.     Patient reported noticing irregular heart rate with in the office in Feb 2021. She was identified to have ventricular ectopy on ECG. No palpitations with this at that time. She had described episodes of fluttering palpitations associated to anxiety. She had reported increased exertional dyspnea and chest tightness largely related to anxiety events. She does not report any exertional chest tightness and no radiation to the arm, jaw, neck or back. No lightheadedness or syncope. No increased edema.      She described issues with allergies, possible asthma and on on immunotherapy. Avoided use of albuterol with fluttering palpitations. Has been off beta blocker since starting this. She was started on Diltiazem for HTN and increased ventricular ectopy burden. No tobacco use.     NM Lexiscan stress test due to frequent PVC's and increased exertional  dyspnea. Study was completed on 2/10/22 revealing equivocal apical infarct vs apical thinning, no evidence of reversible ischemia. Normal LV systolic heart function which increased appropriately with stress. ECG with occasional ventricular ectopy at baseline which did not increase during lexiscan portion of the study and no ischemia ECG changes.     Echocardiogram on 2/15/22 revealing normal biventricular function, LVEF 55-60%. Borderline LVH, unable to assess diastolic function. Diastolic doppler findings suggest LV filling pressures to be increased. No RWMA's. Both atrial noted to be normal with atrial septum intact. No hemodynamically significant valvular abnormalities. Ascending aorta mildly dilated at 3.7 cm. No pericardial effusion.     INTERVAL HISTORY:  Today, patient reports mild improvement in dyspnea. No recurrence of pre-syncope or syncope. No chest pain or pressure. Palpitations and flip flop sensation in chest has pretty much resolved with increase in Diltiazem. Admits that she is planning for repeat back surgery at [facility] in [place] after the first of the year, significant anxiety with this.         RELEVANT TESTING:  NM Leadless EKG Monitor 5/2023  Conclusion  Zio XT patch report on Rj Rodríguez.  Ordered secondary to palpitations.   Worn for 12 days and 9 hr's. After removing artifact, total time was 8 days and 22 hr's. Placed on 5/10/23 at 11:41 AM and completed on 5/22/2023 at 8:44 PM.   Underlying rhythm was sinus.   Hrt rate ranged from 29 bpm, maximum heart rate of 112 bmp, averaging 63 bmp.   No significant pauses, Mobitz type II or 3rd degree heart block.   No atrial fibrillation on this study.   x2 triggered events and x2 diary entries.  These corresponded to SVE, VE, ventricular bigeminy, and sinus rhythm.   x0 runs of VT     x0 runs of SVT.   Rare, <1% of PAC's, atrial couplets, atrial triplets, and ventricular couplets.   Occasional PVC's at 1.3%.   + episodes of ventricular  bigeminy lasting up to 27.6 sec's.   + episodes of ventricular trigeminy lasting up to 1 min and 15 sec's.      NM Lexiscan Stress Test 2/2022    Narrative     The nuclear stress test demonstrates an equivocal apical infarct vs   thinning. No evidence of reversible ischemia.      Left ventricular function is normal.      The left ventricular ejection fraction at rest is 58%.  The left   ventricular ejection fraction at stress is 68%.      There is no prior study for comparison.       ECG Summary    ECG Baseline electrocardiogram demonstrates sinus rhythm. There were occasional premature ventricular contractions, .   The patient did not develop any acute ECG changes or arrhythmias during the Lexiscan portion of the study.          Transthoracic Echocardiogram 2/2022  Interpretation Summary  Left ventricular size is normal. Global and regional left ventricular function  is normal with an EF of 55-60%. Biplane LVEF is 56%. Relative wall thickness  is increased consistent with concentric remodeling. Left ventricular diastolic  function is indeterminate. Diastolic Doppler findings (E/E' ratio and/or other  parameters) suggest left ventricular filling pressures are increased. The  Ejection Fraction was calculated using Bi-plane non contrast. No regional wall  motion abnormalities are seen.  The right ventricle is normal in function and size.  No significant valvular abnormality  IVC diameter <2.1 cm collapsing >50% with sniff suggests a normal RA pressure  of 3 mmHg.  No pericardial effusion is present.  There is no prior study for direct comparison.      Leadless EKG Monitor 8/2021  Conclusion  Zio XT patch report on Rj Rodríguez.  Ordered secondary to shortness of breath.   Worn for 13 days and 0 hr's.  After removing artifact, total time was 12 days and 16 hr's. Placed on 8/11/2021 at 11:48 AM and completed on 8/24/2021 at 11:35 AM.   Underlying rhythm was sinus.   Hrt rate ranged from 48 bpm, maximum heart rate of  158 bmp, averaging 71 bmp.   No significant bradycardia, pauses, Mobitz type II or 3rd degree heart block.   No atrial fibrillation on this study.   x1 triggered events and x1 diary entries.  These corresponded to VE and sinus rhythm.   x0 runs of VT.     x2 runs of SVT lasting up to 5 beats with a maximum heart rate of 158 bmp.   Rare, less than 1% of PAC's, atrial couplets, and atrial triplets.   Occasional PVC's at 3.0%.   Frequent ventricular couplets of 5.3%.   + episodes of ventricular bigeminy lasting up to 17.4 sec's.   + episodes of ventricular trigeminy lasting up to 1 min and 4 sec's.        Past Medical History:   Diagnosis Date     Arthritis      Chronic back pain      Hypertension      Irregular heart beat      Sleep apnea        Past Surgical History:   Procedure Laterality Date     ANESTHESIA OUT OF OR MRI N/A 6/1/2022    Procedure: MRI CERVICAL SPINE;  Surgeon: GENERIC ANESTHESIA PROVIDER;  Location: [facility] OR     ANESTHESIA OUT OF OR MRI N/A 9/23/2022    Procedure: MRI CERVICAL SPINE;  Surgeon: GENERIC ANESTHESIA PROVIDER;  Location: [facility] OR     APPENDECTOMY       BACK SURGERY       CHOLECYSTECTOMY       COLONOSCOPY  2012    Repeat in 10 years     GYN SURGERY       HYSTERECTOMY      Ovaries     RELEASE CARPAL TUNNEL      LT     RELEASE CARPAL TUNNEL      RT x2     SURGICAL RADIOLOGY PROCEDURE N/A 2/12/2016    Procedure: SURGICAL RADIOLOGY PROCEDURE;  Surgeon: Provider, Generic Perianesthesia Nursing;  Location: [facility] OR     SURGICAL RADIOLOGY PROCEDURE N/A 8/15/2016    Procedure: SURGICAL RADIOLOGY PROCEDURE;  Surgeon: Provider, Generic Perianesthesia Nursing;  Location: [facility] OR       Allergies   Allergen Reactions     Fruit [Peach] Anaphylaxis and Hives     fresh peaches and any fruit that has a pit.  Kiwi's and apples also.  Can eat any fruit cooked.     Nuts Anaphylaxis and Hives     All Raw Nuts, can eat nuts when roasted.     Ace Inhibitors      Upper respiratory infection     Alprazolam Hives     Xanax      Antihistamines, Chlorpheniramine-Type Hives     Antihistamines     Diphenhydramine Hives and Other (See Comments)     Keeps her awake       Erythromycin      BASE; pt. Not sure if this is a true allergy.     Guaifed      Pt unsure of reaction     Guaifenesin      Guaifed     Hydrochlorothiazide      No Clinical Screening - See Comments      Allergic:  Fresh peaches and any fruit that has a pit.  Kiwi's and apples also.  All raw nuts.  Can eat nuts when roasted or any fruit cooked.  Some seasonal allergies :  Hayfever     Olmesartan Medoxomil Cough     Benicar     Phenylephrine Hcl      Guaifed     Pseudoephedrine Hcl      Guaifed     Seasonal Allergies      hayfever     Tramadol      Sleep disturbance. Can not sleep, and when able to fall asleep will have terrible nightmares     Tramadol Hcl      Ultram, insomnia, nightmares, headache     Venlafaxine Other (See Comments)     Heartburn, reflux     Zoloft [Sertraline]      paranoia     Latex Other (See Comments), Swelling, Difficulty breathing and Rash     Throat Closes         Current Outpatient Medications   Medication Sig Dispense Refill     albuterol (PROAIR HFA/PROVENTIL HFA/VENTOLIN HFA) 108 (90 Base) MCG/ACT inhaler Inhale 2 puffs into the lungs every 6 hours 18 g 0     blood glucose (ACCU-CHEK GUIDE) test strip Use to test blood sugar 1 time daily or as directed. 50 each 11     blood glucose monitoring (ACCU-CHEK FASTCLIX) lancets Use to test blood sugar 1 time daily or as directed. 102 each 11     budesonide-formoterol (SYMBICORT) 80-4.5 MCG/ACT Inhaler Inhale 2 puffs into the lungs 2 times daily 10.2 g 0     cetirizine (ZYRTEC) 10 MG tablet Take 1 tablet (10 mg) by mouth daily as needed for allergies 60 tablet 12     clonazePAM (KLONOPIN) 0.5 MG tablet Take 1 tablet by mouth twice daily as needed for anxiety 30 tablet 0     COMBIVENT RESPIMAT  MCG/ACT inhaler INHALE 1 PUFF BY MOUTH 4 TIMES DAILY AS NEEDED FOR WHEEZING OR  CHEST  TIGHTNESS 4 g 0      diltiazem ER (TIAZAC) 240 MG 24 hr ER beaded capsule Take 1 capsule by mouth once daily 90 capsule 0     EPINEPHrine (ANY BX GENERIC EQUIV) 0.3 MG/0.3ML injection 2-pack Inject 0.3 mLs (0.3 mg) into the muscle as needed for anaphylaxis May repeat one time in 5-15 minutes if response to initial dose is inadequate. 2 each 1     losartan (COZAAR) 50 MG tablet Take 1 tablet by mouth once daily 90 tablet 0     ORDER FOR ALLERGEN IMMUNOTHERAPY Continue on allergy injections (SCIT), 0.5ml every other week injections. Follow standard maintenance protocols. 5 mL PRN     spironolactone (ALDACTONE) 50 MG tablet Take 1 tablet by mouth once daily 90 tablet 0     triamcinolone (NASACORT) 55 MCG/ACT nasal aerosol Spray 2 sprays into both nostrils daily 2 Bottle 12     furosemide (LASIX) 20 MG tablet Take 1 tablet (20 mg) by mouth daily 90 tablet 3       Social History     Socioeconomic History     Marital status:      Spouse name: Not on file     Number of children: Not on file     Years of education: Not on file     Highest education level: Not on file   Occupational History     Not on file   Tobacco Use     Smoking status: Never     Smokeless tobacco: Never   Vaping Use     Vaping status: Never Used   Substance and Sexual Activity     Alcohol use: Yes     Alcohol/week: 1.0 standard drink of alcohol     Types: 1 Glasses of wine per week     Comment: occ glass of wine     Drug use: No     Sexual activity: Not Currently   Other Topics Concern      Service Not Asked     Blood Transfusions Not Asked     Caffeine Concern Yes     Comment: Coffee - 2 cups daily     Occupational Exposure Not Asked     Hobby Hazards Not Asked     Sleep Concern Not Asked     Stress Concern Not Asked     Weight Concern Not Asked     Special Diet Not Asked     Back Care Not Asked     Exercise Not Asked     Bike Helmet Not Asked     Seat Belt Not Asked     Self-Exams Not Asked     Parent/sibling w/ CABG, MI or angioplasty before 65F 55M? Not  "Asked   Social History Narrative    10/11/2019: , lives in [place]      Social Determinants of Health     Financial Resource Strain: Not on file   Food Insecurity: Not on file   Transportation Needs: Not on file   Physical Activity: Not on file   Stress: Not on file   Social Connections: Not on file   Intimate Partner Violence: Not on file   Housing Stability: Not on file       LAB RESULTS:   Orders placed or performed in visit on 06/05/23     furosemide (LASIX) 20 MG tablet         Review of systems: Negative except that which was noted in the HPI.    Physical examination:    Vitals: /60 (BP Location: Left arm, Patient Position: Sitting, Cuff Size: Adult Large)   Pulse 71   Temp 97.7  F (36.5  C) (Tympanic)   Ht 1.549 m (5' 1\")   Wt 89 kg (196 lb 4.8 oz)   SpO2 96%   BMI 37.09 kg/m    BMI= Body mass index is 37.09 kg/m .      GENERAL APPEARANCE: healthy, alert and no distress  HEENT: no icterus, no xanthelasmas, normal pupil size and reaction, no cyanosis.  NECK: no adenopathy, no asymmetry, masses.  CHEST: lungs clear to auscultation - no rales, rhonchi or wheezes, no use of accessory muscles, no retractions, respirations are unlabored, normal respiratory rate  CARDIOVASCULAR: regular rhythm, normal S1 with physiologic split S2, no S3 or S4 and no murmur, click or rub  EXTREMITIES: no clubbing, cyanosis or edema  NEURO: alert and oriented normal speech, and affect  VASC: No vascular bruits heard.  SKIN: no ecchymoses, no rashes      Thank you for allowing me to participate in the care of your patient. Please do not hesitate to contact me if you have any questions.         Lizeth Crisostomo, ARNOLD    "

## 2023-06-06 NOTE — TELEPHONE ENCOUNTER
furosemide (LASIX) 20 MG tablet      Last Written Prescription Date:  5/9/23  Last Fill Quantity: 30,   # refills: 0  Last Office Visit: 6/6/23  Future Office visit:    Next 5 appointments (look out 90 days)    Jun 26, 2023  9:30 AM  (Arrive by 9:15 AM)  SHORT with Sheyla Sanches NP  [facility] ([facility] ) [address]  [place] [address]  [phone]           Routing refill request to provider for review/approval because:  Drug not on the [facility], P or [facility] refill protocol or controlled substance

## 2023-06-06 NOTE — PATIENT INSTRUCTIONS
No medication changes today.     Follow-up with cardiology in 6 months, certainly sooner with acute concerns.     Lizeth Crisostomo, CNP

## 2023-06-22 NOTE — PROGRESS NOTES
Assessment & Plan     Type 2 diabetes mellitus without complication, without long-term current use of insulin (H)  A1C pending. Will notify patient of the results when available and intervene accordingly. On statin. Blood pressure at goal. Encouraged to schedule eye exam. See me back in 6 months, sooner with new or worsening symptoms.     - Hemoglobin A1c  - Albumin Random Urine Quantitative with Creat Ratio  - Comprehensive metabolic panel (BMP + Alb, Alk Phos, ALT, AST, Total. Bili, TP)    Hyperlipidemia, unspecified hyperlipidemia type  Does not tolerate statin. Will recheck lipids tomorrow. Will notify patient of the results when available and intervene accordingly.     - Lipid Profile (Chol, Trig, HDL, LDL calc)  - STATIN NOT PRESCRIBED (INTENTIONAL); Please choose reason not prescribed from choices below.    Essential hypertension  Well controlled. Continue current medications. Encouraged daily exercise and a low sodium diet. Recommended checking BP's 2x/wk, call the clinic if consistantly s>140 or d>90. Follow up in 6 months.     ZEFERINO (generalized anxiety disorder)  Mild episode of recurrent major depressive disorder (H)  Mental health well controlled. Continue counseling with rare Klonopin.      Shortness of breath  Mild intermittent reactive airway disease without complication  ACT 19. She feels the smokey air and humidity are affecting her breathing. Using Symbicort once daily. Will increase to BID and reassess in 4 weeks. Sooner with new or worsening symptoms.     Encounter for screening colonoscopy  - K[facility]([employer]); Future  -Will notify patient of the results when available and intervene accordingly.     Arthralgia of both hands  Patient with bilateral hand cramping. No erythema or swelling. Will test for rheumatoid arthritis and tick borne illnesses. Will notify patient of the results when available and intervene accordingly.     Rheumatoid factor, Cyclic Citrullinated Peptide         Antibody IgG, ESR: Erythrocyte sedimentation         rate, CRP, inflammation, Lyme Disease Total Abs        Bld with Reflex to Confirm CLIA, Parasite         stain, Babesia antibody IgG IgM, Anaplasma         phagocytoph Antibody IgM        Sheyla Sanches NP  [facility] [place] De La Rosa is a 72 year old, presenting for the following health issues:  Diabetes      HPI     Diabetes Follow-up    How often are you checking your blood sugar? A few times a week, fasting glucose typically around   What time of day are you checking your blood sugars (select all that apply)?  Before and after meals  Have you had any blood sugars above 200?  No  Have you had any blood sugars below 70?  No    What symptoms do you notice when your blood sugar is low?  Dizzy and Weak    What concerns do you have today about your diabetes? None     Do you have any of these symptoms? (Select all that apply)  No numbness or tingling in feet.  No redness, sores or blisters on feet.  No complaints of excessive thirst.  No reports of blurry vision.  No significant changes to weight.    Have you had a diabetic eye exam in the last 12 months? No     A1c was 5.9 on 12/23/22.    Working on weight loss-limiting her carbs.     Denies chest pain, dizziness and syncope. Does have chronic shortness of breath related to her asthma. Also has palpitations-follows with cardiology.       She currently is not taking anything for her diabetes.     Hyperlipidemia Follow-Up      Are you regularly taking any medication or supplement to lower your cholesterol?   No-does not tolerate    Are you having muscle aches or other side effects that you think could be caused by your cholesterol lowering medication?  No   -Denies chest pain, dizziness and syncope. Does have chronic shortness of breath related to her asthma. Also has palpitations-follows with cardiology.       Hypertension Follow-up      Do you check your blood pressure regularly  outside of the clinic? Yes , around 112/70    Are you following a low salt diet? Yes    Are your blood pressures ever more than 140 on the top number (systolic) OR more   than 90 on the bottom number (diastolic), for example 140/90? No     Also has diastolic heart dysfunction with frequent PVCs. Follows with cardiology.     Taking losartan 50 mg, spironolactone 50 mg, lasix 20 mg, and diltiazem without side effects.     Due for a dexa-scan. Declines.     Due for a mammogram. Declines.     Due for a colonoscopy. Declines, willing to do the Cologuard test.     BP Readings from Last 2 Encounters:   06/26/23 128/68   06/06/23 111/60     Hemoglobin A1C (%)   Date Value   12/23/2022 5.9 (H)   09/21/2022 5.9 (H)   03/10/2021 5.8 (H)   02/19/2020 5.9 (H)     LDL Cholesterol Calculated (mg/dL)   Date Value   01/10/2022 77   03/10/2021 87   11/11/2019 61       Depression and Anxiety Follow-Up    How are you doing with your depression since your last visit? Improved     How are you doing with your anxiety since your last visit?  Improved     Are you having other symptoms that might be associated with depression or anxiety? No    Have you had a significant life event? Grief or Loss     Do you have any concerns with your use of alcohol or other drugs? No     No thoughts of self harm.     Follows with [facility] counselor.     Uses Klonopin very rarely.     Seasonal allergies controlled with Zyrtec twice daily. Follows with ENT.     She also complains of hand stiffness and pain. Worse in the morning when waking up. No erythema or swelling. She has not taken anything for her symptoms. Thinks she sleeps with her hands in fists at night. No family h/o RA.     Social History     Tobacco Use     Smoking status: Never     Smokeless tobacco: Never   Vaping Use     Vaping Use: Never used   Substance Use Topics     Alcohol use: Yes     Alcohol/week: 1.0 standard drink of alcohol     Types: 1 Glasses of wine per week     Comment: occ glass  of wine     Drug use: No         5/20/2022     1:00 PM 7/11/2022    10:00 AM 3/27/2023     1:20 PM   PHQ   PHQ-9 Total Score 6 7 3   Q9: Thoughts of better off dead/self-harm past 2 weeks Not at all Not at all Not at all         9/20/2021    10:00 AM 7/11/2022    10:00 AM 3/27/2023     1:21 PM   ZEFERINO-7 SCORE   Total Score 6 11 1         3/27/2023     1:20 PM   Last PHQ-9   1.  Little interest or pleasure in doing things 0   2.  Feeling down, depressed, or hopeless 0   3.  Trouble falling or staying asleep, or sleeping too much 3   4.  Feeling tired or having little energy 0   5.  Poor appetite or overeating 0   6.  Feeling bad about yourself 0   7.  Trouble concentrating 0   8.  Moving slowly or restless 0   Q9: Thoughts of better off dead/self-harm past 2 weeks 0   PHQ-9 Total Score 3   Difficulty at work, home, or with people Not difficult at all         3/27/2023     1:21 PM   ZEFERINO-7    1. Feeling nervous, anxious, or on edge 1   2. Not being able to stop or control worrying 0   3. Worrying too much about different things 0   4. Trouble relaxing 0   5. Being so restless that it is hard to sit still 0   6. Becoming easily annoyed or irritable 0   7. Feeling afraid, as if something awful might happen 0   ZEFERINO-7 Total Score 1   If you checked any problems, how difficult have they made it for you to do your work, take care of things at home, or get along with other people? Not difficult at all         Asthma Follow-Up    Was ACT completed today?  Yes        6/26/2023     8:59 AM   ACT Total Scores   ACT TOTAL SCORE (Goal Greater than or Equal to 20) 19   In the past 12 months, how many times did you visit the emergency room for your asthma without being admitted to the hospital? 0   In the past 12 months, how many times were you hospitalized overnight because of your asthma? 0       How many days per week do you miss taking your asthma controller medication?  I do not have an asthma controller medication    Please  describe any recent triggers for your asthma: smoke and humidity    Have you had any Emergency Room Visits, Urgent Care Visits, or Hospital Admissions since your last office visit?  No    -Symptoms worsen with humid air and poor air quality.   -No recent PFTs. Willing to complete.   -She never smoked, but has been around second hand smoke a lot of her life.   -Symptoms often triggered by allergies; follows with ENT.   -Using Symbicort twice daily.       How many servings of fruits and vegetables do you eat daily?  4 or more    On average, how many sweetened beverages do you drink each day (Examples: soda, juice, sweet tea, etc.  Do NOT count diet or artificially sweetened beverages)?   1    How many days per week do you exercise enough to make your heart beat faster? 7    How many minutes a day do you exercise enough to make your heart beat faster? 30 - 60    How many days per week do you miss taking your medication? 0        Review of Systems   Constitutional, HEENT, cardiovascular, pulmonary, gi and gu systems are negative, except as otherwise noted.      Objective    /68 (BP Location: Right arm, Patient Position: Sitting, Cuff Size: Adult Large)   Pulse 68   Temp 97.9  F (36.6  C) (Tympanic)   Wt 86.5 kg (190 lb 9.6 oz)   SpO2 95%   BMI 36.01 kg/m    Body mass index is 36.01 kg/m .  Physical Exam   GENERAL: alert, no distress and over weight  EYES: Eyes grossly normal to inspection, PERRL and conjunctivae and sclerae normal  HENT: ear canals and TM's normal, nose and mouth without ulcers or lesions  NECK: no adenopathy, no asymmetry, masses, or scars and thyroid normal to palpation  RESP: lungs clear to auscultation - no rales, rhonchi or wheezes  CV: regular rate and rhythm, no murmur, click or rub, no peripheral edema and peripheral pulses strong  ABDOMEN: soft, nontender, obese, no masses and bowel sounds normal  MS: no gross musculoskeletal defects noted, no edema  NEURO: Normal strength and tone,  mentation intact and speech normal  PSYCH: mentation appears normal, affect normal/bright  Diabetic foot exam: normal DP and PT pulses, no trophic changes or ulcerative lesions and normal sensory exam    Will collect labs tomorrow.

## 2023-06-26 ENCOUNTER — OFFICE VISIT (OUTPATIENT)
Dept: FAMILY MEDICINE | Facility: OTHER | Age: 73
End: 2023-06-26
Attending: NURSE PRACTITIONER
Payer: COMMERCIAL

## 2023-06-26 ENCOUNTER — LAB (OUTPATIENT)
Dept: FAMILY MEDICINE | Facility: OTHER | Age: 73
End: 2023-06-26

## 2023-06-26 VITALS
WEIGHT: 190.6 LBS | BODY MASS INDEX: 36.01 KG/M2 | HEART RATE: 68 BPM | OXYGEN SATURATION: 95 % | DIASTOLIC BLOOD PRESSURE: 68 MMHG | TEMPERATURE: 97.9 F | SYSTOLIC BLOOD PRESSURE: 128 MMHG

## 2023-06-26 DIAGNOSIS — Z12.11 ENCOUNTER FOR SCREENING COLONOSCOPY: ICD-10-CM

## 2023-06-26 DIAGNOSIS — F41.1 GAD (GENERALIZED ANXIETY DISORDER): ICD-10-CM

## 2023-06-26 DIAGNOSIS — F33.0 MILD EPISODE OF RECURRENT MAJOR DEPRESSIVE DISORDER (H): ICD-10-CM

## 2023-06-26 DIAGNOSIS — M25.542 ARTHRALGIA OF BOTH HANDS: ICD-10-CM

## 2023-06-26 DIAGNOSIS — M25.541 ARTHRALGIA OF BOTH HANDS: ICD-10-CM

## 2023-06-26 DIAGNOSIS — E78.5 HYPERLIPIDEMIA, UNSPECIFIED HYPERLIPIDEMIA TYPE: ICD-10-CM

## 2023-06-26 DIAGNOSIS — J45.20 MILD INTERMITTENT REACTIVE AIRWAY DISEASE WITHOUT COMPLICATION: ICD-10-CM

## 2023-06-26 DIAGNOSIS — R06.02 SHORTNESS OF BREATH: ICD-10-CM

## 2023-06-26 DIAGNOSIS — E11.9 TYPE 2 DIABETES MELLITUS WITHOUT COMPLICATION, WITHOUT LONG-TERM CURRENT USE OF INSULIN (H): Primary | ICD-10-CM

## 2023-06-26 DIAGNOSIS — I10 ESSENTIAL HYPERTENSION: ICD-10-CM

## 2023-06-26 PROBLEM — E66.01 MORBID OBESITY (H): Status: RESOLVED | Noted: 2022-01-10 | Resolved: 2023-06-26

## 2023-06-26 PROCEDURE — 99214 OFFICE O/P EST MOD 30 MIN: CPT | Performed by: NURSE PRACTITIONER

## 2023-06-26 PROCEDURE — G0463 HOSPITAL OUTPT CLINIC VISIT: HCPCS | Performed by: NURSE PRACTITIONER

## 2023-06-26 ASSESSMENT — ASTHMA QUESTIONNAIRES
ACT_TOTALSCORE: 19
QUESTION_2 LAST FOUR WEEKS HOW OFTEN HAVE YOU HAD SHORTNESS OF BREATH: ONCE A DAY
ACT_TOTALSCORE: 19
QUESTION_4 LAST FOUR WEEKS HOW OFTEN HAVE YOU USED YOUR RESCUE INHALER OR NEBULIZER MEDICATION (SUCH AS ALBUTEROL): TWO OR THREE TIMES PER WEEK
QUESTION_1 LAST FOUR WEEKS HOW MUCH OF THE TIME DID YOUR ASTHMA KEEP YOU FROM GETTING AS MUCH DONE AT WORK, SCHOOL OR AT HOME: NONE OF THE TIME
QUESTION_5 LAST FOUR WEEKS HOW WOULD YOU RATE YOUR ASTHMA CONTROL: WELL CONTROLLED
QUESTION_3 LAST FOUR WEEKS HOW OFTEN DID YOUR ASTHMA SYMPTOMS (WHEEZING, COUGHING, SHORTNESS OF BREATH, CHEST TIGHTNESS OR PAIN) WAKE YOU UP AT NIGHT OR EARLIER THAN USUAL IN THE MORNING: NOT AT ALL

## 2023-06-26 ASSESSMENT — PAIN SCALES - GENERAL: PAINLEVEL: SEVERE PAIN (7)

## 2023-06-28 ENCOUNTER — APPOINTMENT (OUTPATIENT)
Dept: LAB | Facility: OTHER | Age: 73
End: 2023-06-28
Payer: MEDICARE

## 2023-06-28 LAB
ALBUMIN SERPL BCG-MCNC: 4.1 G/DL (ref 3.5–5.2)
ALP SERPL-CCNC: 91 U/L (ref 35–104)
ALT SERPL W P-5'-P-CCNC: 22 U/L (ref 0–50)
ANION GAP SERPL CALCULATED.3IONS-SCNC: 12 MMOL/L (ref 7–15)
AST SERPL W P-5'-P-CCNC: 24 U/L (ref 0–45)
BILIRUB SERPL-MCNC: 0.9 MG/DL
BUN SERPL-MCNC: 14 MG/DL (ref 8–23)
CALCIUM SERPL-MCNC: 9.9 MG/DL (ref 8.8–10.2)
CHLORIDE SERPL-SCNC: 102 MMOL/L (ref 98–107)
CHOLEST SERPL-MCNC: 166 MG/DL
CREAT SERPL-MCNC: 0.98 MG/DL (ref 0.51–0.95)
CREAT UR-MCNC: 104.4 MG/DL
CRP SERPL-MCNC: 19.77 MG/L
DEPRECATED HCO3 PLAS-SCNC: 24 MMOL/L (ref 22–29)
ERYTHROCYTE [SEDIMENTATION RATE] IN BLOOD BY WESTERGREN METHOD: 33 MM/HR (ref 0–30)
EST. AVERAGE GLUCOSE BLD GHB EST-MCNC: 126 MG/DL
GFR SERPL CREATININE-BSD FRML MDRD: 62 ML/MIN/1.73M2
GLUCOSE SERPL-MCNC: 111 MG/DL (ref 70–99)
HBA1C MFR BLD: 6 %
HDLC SERPL-MCNC: 46 MG/DL
LDLC SERPL CALC-MCNC: 85 MG/DL
MICROALBUMIN UR-MCNC: <12 MG/L
MICROALBUMIN/CREAT UR: NORMAL MG/G{CREAT}
NONHDLC SERPL-MCNC: 120 MG/DL
POTASSIUM SERPL-SCNC: 4.3 MMOL/L (ref 3.4–5.3)
PROT SERPL-MCNC: 7.6 G/DL (ref 6.4–8.3)
SODIUM SERPL-SCNC: 138 MMOL/L (ref 136–145)
TRIGL SERPL-MCNC: 174 MG/DL

## 2023-06-28 PROCEDURE — 83036 HEMOGLOBIN GLYCOSYLATED A1C: CPT | Mod: ZL | Performed by: NURSE PRACTITIONER

## 2023-06-28 PROCEDURE — 86666 EHRLICHIA ANTIBODY: CPT | Mod: ZL | Performed by: NURSE PRACTITIONER

## 2023-06-28 PROCEDURE — 36415 COLL VENOUS BLD VENIPUNCTURE: CPT | Mod: ZL | Performed by: NURSE PRACTITIONER

## 2023-06-28 PROCEDURE — 86431 RHEUMATOID FACTOR QUANT: CPT | Mod: ZL | Performed by: NURSE PRACTITIONER

## 2023-06-28 PROCEDURE — 82570 ASSAY OF URINE CREATININE: CPT | Mod: ZL | Performed by: NURSE PRACTITIONER

## 2023-06-28 PROCEDURE — 86753 PROTOZOA ANTIBODY NOS: CPT | Mod: ZL | Performed by: NURSE PRACTITIONER

## 2023-06-28 PROCEDURE — 80053 COMPREHEN METABOLIC PANEL: CPT | Mod: ZL | Performed by: NURSE PRACTITIONER

## 2023-06-28 PROCEDURE — 86200 CCP ANTIBODY: CPT | Mod: ZL | Performed by: NURSE PRACTITIONER

## 2023-06-28 PROCEDURE — 86140 C-REACTIVE PROTEIN: CPT | Mod: ZL | Performed by: NURSE PRACTITIONER

## 2023-06-28 PROCEDURE — 80061 LIPID PANEL: CPT | Mod: ZL | Performed by: NURSE PRACTITIONER

## 2023-06-28 PROCEDURE — 85652 RBC SED RATE AUTOMATED: CPT | Mod: ZL | Performed by: NURSE PRACTITIONER

## 2023-06-28 PROCEDURE — 86618 LYME DISEASE ANTIBODY: CPT | Mod: ZL | Performed by: NURSE PRACTITIONER

## 2023-06-28 PROCEDURE — 87015 SPECIMEN INFECT AGNT CONCNTJ: CPT | Mod: ZL | Performed by: NURSE PRACTITIONER

## 2023-06-29 LAB
ANAPLASMA BLD MOD GIEMSA: NEGATIVE
B BURGDOR IGG+IGM SER QL: 0.09
B MICROTI BLD SMEAR: NEGATIVE
CCP AB SER IA-ACNC: 0.8 U/ML
EHRLICHIA SPEC QL MICRO: NEGATIVE
RHEUMATOID FACT SER NEPH-ACNC: <7 IU/ML

## 2023-07-01 LAB
A PHAGOCYTOPH IGM TITR SER IF: NORMAL {TITER}
B MICROTI IGG TITR SER: NORMAL {TITER}
B MICROTI IGM TITR SER: NORMAL {TITER}

## 2023-07-07 ENCOUNTER — ALLIED HEALTH/NURSE VISIT (OUTPATIENT)
Dept: ALLERGY | Facility: OTHER | Age: 73
End: 2023-07-07
Attending: NURSE PRACTITIONER
Payer: MEDICARE

## 2023-07-07 DIAGNOSIS — J30.89 PERENNIAL ALLERGIC RHINITIS: Primary | ICD-10-CM

## 2023-07-07 PROCEDURE — 95165 ANTIGEN THERAPY SERVICES: CPT | Performed by: NURSE PRACTITIONER

## 2023-07-07 PROCEDURE — 95165 ANTIGEN THERAPY SERVICES: CPT

## 2023-07-07 NOTE — PROGRESS NOTES
Allergy serum is mixed today at [facility],  into  2  (5 ml)  multi dose vial/vials.    Allergens included were:    Ragweed  0.2 ml of dilution # 1  Pigweed  0.2 ml of dilution # 1  Mugwort 0.2 ml of dilution # 0  Kochia  0.2 ml of dilution # 0  Russian Thistle 0.2 ml of dilution # 1  Jimenez Grass 0.2 ml of dilution # 1  Birch mix 0.2 ml of dilution # 1  Maple Mix 0.2 of dilution # 1  Elm Mix 0.2 ml of dilution # 1  Oak Mix 0.2 ml of dilution # 1  Frank Mix 0.2 ml of dilution # 1  Pine Mix 0.2 ml of dilution # 1  Eastern [place]ulpa 0.2 ml of dilution # 1  Black Transfer 0.2 ml of dilution # 1  Aspen 0.2 ml of dilution # 0  Red [place] 0.2 ml of dilution # 0    Alternaria 0.2 ml of dilution # 1  Aspergillus 0.2 ml of dilution # 1  Hormodendrum 0.2 ml of dilution # 1  Helminthosporium 0.2 ml of dilution # 1  Penicillium 0.2 ml of dilution # 1  Epicoccum 0.2 ml of dilution # 1  Fusarium 0.2 ml of dilution # 1  Mucor 0.2 ml of dilution # 0  Grain Smut 0.2 ml of dilution # 0  Grass Smut 0.2 ml of dilution # 0  Cat 0.2 ml of dilution # 1  Dog 0.2 ml of dilution # 1  Feather Mix 0.2 ml of dilution # 0  Dust Mite Mix 0.2 ml of dilution # 1  Horse 0.2 ml of dilution # 0    Linn Govea RN on 7/7/2023 at 4:59 PM

## 2023-07-11 DIAGNOSIS — I10 ESSENTIAL HYPERTENSION: ICD-10-CM

## 2023-07-13 RX ORDER — LOSARTAN POTASSIUM 50 MG/1
TABLET ORAL
Qty: 90 TABLET | Refills: 0 | Status: SHIPPED | OUTPATIENT
Start: 2023-07-13 | End: 2023-09-11

## 2023-07-13 RX ORDER — SPIRONOLACTONE 50 MG/1
TABLET, FILM COATED ORAL
Qty: 90 TABLET | Refills: 0 | Status: SHIPPED | OUTPATIENT
Start: 2023-07-13 | End: 2023-10-12

## 2023-07-13 NOTE — TELEPHONE ENCOUNTER
Cozaar, Spironolactone      Last Written Prescription Date:  4.14.23  Last Fill Quantity: #90, #90,   # refills: 0  Last Office Visit: 6.26.23  Future Office visit:    Next 5 appointments (look out 90 days)    Aug 07, 2023  2:30 PM  (Arrive by 2:15 PM)  SHORT with Sheyla Sanches NP  [facility] [place] ([facility] [place] ) [address]  [place] [address]  [phone]           Routing refill request to provider for review/approval because:

## 2023-07-16 LAB — NONINV COLON CA DNA+OCC BLD SCRN STL QL: NEGATIVE

## 2023-08-06 NOTE — PROGRESS NOTES
Assessment & Plan     Mild intermittent reactive airway disease without complication  ACT 22. Controlled. See me 6 months, sooner with new or worsening symptoms.     Seasonal allergic rhinitis, unspecified trigger  Controlled with Zyrtec twice daily with Nasacort.     Arthralgia, unspecified joint  Last seen on 6/28/23. Complained of muscle aches. Stopped the symbicort and is now feeling better. Muscle aches have resolved. ESR and CRP was elevated. Will recheck today    Class 2 severe obesity due to excess calories with serious comorbidity and body mass index (BMI) of 35.0 to 35.9 in adult (H)  BMI 35. Diet and exercise encouraged.     Elevated serum creatinine  Recent kidney function slightly elevated. Will recheck today. Will notify patient of the results when available and intervene accordingly.     Elevated erythrocyte sedimentation rate  Elevated C-reactive protein (CRP)        As noted above, ESR and CRP were recently elevated with her muscle aches. Now feeling better. Muscle aches resolved when she stopped her Symbicort. Will recheck ESR and CRP. Will notify patient of the results when available and intervene accordingly.     Sheyla Sanches NP  [facility] [place]    Joni De La Rosa is a 72 year old, presenting for the following health issues:  Asthma    HPI     Asthma Follow-Up    Last seen on 6/26/23. ACT was 19. The smokey air and humidity were affecting her breathing. Had only been taking her Symbicort once daily. Was encouraged to take this twice daily. She feels it caused muscle aches. Stopped the Symbicort.     Also taking Zyrtec BID with Nasacort.      Feeling better today.     Feels her asthma is controlled.     Muscle aches resolved when she stopped taking the Symbicort.     Was ACT completed today?  Yes        8/7/2023     2:13 PM   ACT Total Scores   ACT TOTAL SCORE (Goal Greater than or Equal to 20) 22   In the past 12 months, how many times did you visit the emergency room  for your asthma without being admitted to the hospital? 0   In the past 12 months, how many times were you hospitalized overnight because of your asthma? 0        How many days per week do you miss taking your asthma controller medication?  I do not have an asthma controller medication  Please describe any recent triggers for your asthma: smoke, pollens, and exercise or sports  Have you had any Emergency Room Visits, Urgent Care Visits, or Hospital Admissions since your last office visit?  No        Review of Systems   Constitutional, HEENT, cardiovascular, pulmonary, gi and gu systems are negative, except as otherwise noted.      Objective    /68   Pulse 63   Resp 16   Wt 84.5 kg (186 lb 6 oz)   SpO2 97%   BMI 35.22 kg/m    Body mass index is 35.22 kg/m .  Physical Exam   GENERAL: alert, no distress, and obese  EYES: Eyes grossly normal to inspection, PERRL and conjunctivae and sclerae normal  HENT: ear canals and TM's normal, nose and mouth without ulcers or lesions  NECK: no adenopathy, no asymmetry, masses, or scars and thyroid normal to palpation  RESP: lungs clear to auscultation - no rales, rhonchi or wheezes  CV: regular rate and rhythm, no murmur, click or rub, no peripheral edema  ABDOMEN: soft, nontender, obese, no masses and bowel sounds normal  MS: no gross musculoskeletal defects noted, no edema  PSYCH: mentation appears normal, affect normal/bright

## 2023-08-07 ENCOUNTER — OFFICE VISIT (OUTPATIENT)
Dept: FAMILY MEDICINE | Facility: OTHER | Age: 73
End: 2023-08-07
Attending: NURSE PRACTITIONER
Payer: COMMERCIAL

## 2023-08-07 VITALS
DIASTOLIC BLOOD PRESSURE: 68 MMHG | SYSTOLIC BLOOD PRESSURE: 124 MMHG | BODY MASS INDEX: 35.22 KG/M2 | HEART RATE: 63 BPM | OXYGEN SATURATION: 97 % | RESPIRATION RATE: 16 BRPM | WEIGHT: 186.38 LBS

## 2023-08-07 DIAGNOSIS — E66.01 CLASS 2 SEVERE OBESITY DUE TO EXCESS CALORIES WITH SERIOUS COMORBIDITY AND BODY MASS INDEX (BMI) OF 35.0 TO 35.9 IN ADULT (H): ICD-10-CM

## 2023-08-07 DIAGNOSIS — R70.0 ELEVATED ERYTHROCYTE SEDIMENTATION RATE: ICD-10-CM

## 2023-08-07 DIAGNOSIS — J45.20 MILD INTERMITTENT REACTIVE AIRWAY DISEASE WITHOUT COMPLICATION: Primary | ICD-10-CM

## 2023-08-07 DIAGNOSIS — M25.50 ARTHRALGIA, UNSPECIFIED JOINT: ICD-10-CM

## 2023-08-07 DIAGNOSIS — R79.89 ELEVATED SERUM CREATININE: ICD-10-CM

## 2023-08-07 DIAGNOSIS — E66.812 CLASS 2 SEVERE OBESITY DUE TO EXCESS CALORIES WITH SERIOUS COMORBIDITY AND BODY MASS INDEX (BMI) OF 35.0 TO 35.9 IN ADULT (H): ICD-10-CM

## 2023-08-07 DIAGNOSIS — J30.2 SEASONAL ALLERGIC RHINITIS, UNSPECIFIED TRIGGER: ICD-10-CM

## 2023-08-07 DIAGNOSIS — R79.82 ELEVATED C-REACTIVE PROTEIN (CRP): ICD-10-CM

## 2023-08-07 LAB
ANION GAP SERPL CALCULATED.3IONS-SCNC: 10 MMOL/L (ref 7–15)
BUN SERPL-MCNC: 18.5 MG/DL (ref 8–23)
CALCIUM SERPL-MCNC: 10.1 MG/DL (ref 8.8–10.2)
CHLORIDE SERPL-SCNC: 103 MMOL/L (ref 98–107)
CREAT SERPL-MCNC: 1.01 MG/DL (ref 0.51–0.95)
CRP SERPL-MCNC: 14.76 MG/L
DEPRECATED HCO3 PLAS-SCNC: 23 MMOL/L (ref 22–29)
ERYTHROCYTE [SEDIMENTATION RATE] IN BLOOD BY WESTERGREN METHOD: 28 MM/HR (ref 0–30)
GFR SERPL CREATININE-BSD FRML MDRD: 60 ML/MIN/1.73M2
GLUCOSE SERPL-MCNC: 104 MG/DL (ref 70–99)
POTASSIUM SERPL-SCNC: 4.2 MMOL/L (ref 3.4–5.3)
SODIUM SERPL-SCNC: 136 MMOL/L (ref 136–145)

## 2023-08-07 PROCEDURE — 99214 OFFICE O/P EST MOD 30 MIN: CPT | Performed by: NURSE PRACTITIONER

## 2023-08-07 PROCEDURE — 85652 RBC SED RATE AUTOMATED: CPT | Mod: ZL | Performed by: NURSE PRACTITIONER

## 2023-08-07 PROCEDURE — 36415 COLL VENOUS BLD VENIPUNCTURE: CPT | Mod: ZL | Performed by: NURSE PRACTITIONER

## 2023-08-07 PROCEDURE — 82947 ASSAY GLUCOSE BLOOD QUANT: CPT | Mod: ZL | Performed by: NURSE PRACTITIONER

## 2023-08-07 PROCEDURE — G0463 HOSPITAL OUTPT CLINIC VISIT: HCPCS

## 2023-08-07 PROCEDURE — 82374 ASSAY BLOOD CARBON DIOXIDE: CPT | Mod: ZL | Performed by: NURSE PRACTITIONER

## 2023-08-07 PROCEDURE — 84132 ASSAY OF SERUM POTASSIUM: CPT | Mod: ZL | Performed by: NURSE PRACTITIONER

## 2023-08-07 PROCEDURE — 86140 C-REACTIVE PROTEIN: CPT | Mod: ZL | Performed by: NURSE PRACTITIONER

## 2023-08-07 ASSESSMENT — ASTHMA QUESTIONNAIRES: ACT_TOTALSCORE: 22

## 2023-08-07 ASSESSMENT — PAIN SCALES - GENERAL: PAINLEVEL: NO PAIN (0)

## 2023-08-08 NOTE — PATIENT INSTRUCTIONS
Will start Allergy shots after January 1st   Epi Pen prior to starting shots  Restart Zyrtec today    Follow up in 3 months or sooner if needed    Thank you for allowing Felisha MAZA and our ENT team to participate in your care.  If your medications are too expensive, please give the nurse a call.  We can possibly change this medication.  If you have a scheduling or an appointment question please contact our Health Unit Coordinator at their direct line [phone].   ALL nursing questions or concerns can be directed to your ENT nurse at: [phone] (Tiffanie) or [phone] (Emma)    
No

## 2023-08-19 ENCOUNTER — HEALTH MAINTENANCE LETTER (OUTPATIENT)
Age: 73
End: 2023-08-19

## 2023-08-30 DIAGNOSIS — I10 ESSENTIAL HYPERTENSION: ICD-10-CM

## 2023-08-30 DIAGNOSIS — I49.3 FREQUENT PVCS: ICD-10-CM

## 2023-08-31 NOTE — PATIENT INSTRUCTIONS
Ears were cleaned  Normal ear exam. No fluid or infection.   Follow TMJ precautions. Warm compresses to outer jaw line.   TMJ/ Jaw clenching may cause ear fullness/ pressure.     Consider audiogram.       Thank you for allowing ARELI Simmons and our ENT team to participate in your care.  If your medications are too expensive, please give the nurse a call.  We can possibly change this medication.  If you have a scheduling or an appointment question please contact our Health Unit Coordinator at their direct line [phone].   ALL nursing questions or concerns can be directed to your ENT nurse, Raymond, at: [phone]

## 2023-08-31 NOTE — TELEPHONE ENCOUNTER
"Pharmacy sent Rx request for the following:      Requested Prescriptions   Pending Prescriptions Disp Refills    diltiazem ER (TIAZAC) 240 MG 24 hr ER beaded capsule [Pharmacy Med Name: dilTIAZem HCl ER Beads 240 MG Oral Capsule Extended Release 24 Hour] 90 capsule      Sig: Take 1 capsule (240 mg) by mouth daily       Calcium Channel Blockers Protocol  Failed - 8/30/2023 12:49 PM        Failed - Normal serum creatinine on file in past 12 months     Recent Labs   Lab Test 08/07/23  1507   CR 1.01*       Ok to refill medication if creatinine is low          Passed - Blood pressure under 140/90 in past 12 months     BP Readings from Last 3 Encounters:   08/07/23 124/68   06/26/23 128/68   06/06/23 111/60                 Passed - Normal ALT in past 12 months     Recent Labs   Lab Test 06/28/23  0738   ALT 22             Passed - Recent (12 mo) or future (30 days) visit within the authorizing provider's specialty     Patient has had an office visit with the authorizing provider or a provider within the authorizing providers department within the previous 12 mos or has a future within next 30 days. See \"Patient Info\" tab in inbasket, or \"Choose Columns\" in Meds & Orders section of the refill encounter.              Passed - Medication is active on med list        Passed - Patient is age 18 or older        Passed - No active pregnancy on record        Passed - No positive pregnancy test in past 12 months             Last Prescription Date:   5/31/2023  Last Fill Qty/Refills:         90, R-0  Last Office Visit:              With Dr Crisostomo on 6/6/2023   Future Office visit:           With Dr Crisostomo on 12/13/2023.    Please review and advise. If patient is now being seen by Dr Crisostomo, should she be the one approving future refills? If so, please route to provider. Thank you.     Jo Ann Coppola LPN on 8/31/2023 at 9:54 AM    "

## 2023-09-05 RX ORDER — DILTIAZEM HYDROCHLORIDE 240 MG/1
240 CAPSULE, EXTENDED RELEASE ORAL DAILY
Qty: 90 CAPSULE | Refills: 3 | Status: SHIPPED | OUTPATIENT
Start: 2023-09-05 | End: 2024-08-27

## 2023-09-06 ENCOUNTER — OFFICE VISIT (OUTPATIENT)
Dept: OTOLARYNGOLOGY | Facility: OTHER | Age: 73
End: 2023-09-06
Attending: PHYSICIAN ASSISTANT
Payer: COMMERCIAL

## 2023-09-06 VITALS
SYSTOLIC BLOOD PRESSURE: 112 MMHG | HEART RATE: 73 BPM | RESPIRATION RATE: 18 BRPM | TEMPERATURE: 97.5 F | DIASTOLIC BLOOD PRESSURE: 50 MMHG | OXYGEN SATURATION: 97 %

## 2023-09-06 DIAGNOSIS — M26.609 TMJ (TEMPOROMANDIBULAR JOINT SYNDROME): ICD-10-CM

## 2023-09-06 DIAGNOSIS — H93.13 TINNITUS, BILATERAL: ICD-10-CM

## 2023-09-06 DIAGNOSIS — H93.8X2 SENSATION OF FULLNESS IN EAR, LEFT: Primary | ICD-10-CM

## 2023-09-06 PROCEDURE — G0463 HOSPITAL OUTPT CLINIC VISIT: HCPCS | Performed by: PHYSICIAN ASSISTANT

## 2023-09-06 PROCEDURE — 99213 OFFICE O/P EST LOW 20 MIN: CPT | Mod: 25 | Performed by: PHYSICIAN ASSISTANT

## 2023-09-06 PROCEDURE — 92504 EAR MICROSCOPY EXAMINATION: CPT | Performed by: PHYSICIAN ASSISTANT

## 2023-09-06 ASSESSMENT — PAIN SCALES - GENERAL: PAINLEVEL: NO PAIN (0)

## 2023-09-06 NOTE — PROGRESS NOTES
Otolaryngology Consultation    Patient: Rj Rodríguez  : 1950    Chief Complaint   Patient presents with    Ear Problem     Foreign body in ear, left ear. Denies pain/drainage, but occasional bleeding (not in a while).      HPI:  Rj Rodríguez is a 72 year old female seen today for FB in ear. Rj feels like there is something in her ear, intermittent bleeding along her left ear. She feels there is something in her left ear, and ear fullness.   She was seen by PCP and noted normal ear exam.   Rj has felt some fullness with her ears related to allergies.   Reports blood on her pillow in the morning.     No significant hearing concerns.   She does have clenching, TMJ is bothersome. She feels significant tenderness with her bite/ jaw movement.   Denies COM or otologic surgeries.   Denies otalgia  Denies worrisome tinnitus. Intermittent tinnitus.   Denies fluctuating hearing loss or tinnitus.   Denies vertigo or facial paraesthesia.     Audio: 13 left WNL, Right low frequency HL       She held  SCIT in May 2023.  Paused SCIT.   She does not wish to start SCIT at this time.   Following AH with good control.       Current Outpatient Rx   Medication Sig Dispense Refill    albuterol (PROAIR HFA/PROVENTIL HFA/VENTOLIN HFA) 108 (90 Base) MCG/ACT inhaler Inhale 2 puffs into the lungs every 6 hours 18 g 0    blood glucose (ACCU-CHEK GUIDE) test strip Use to test blood sugar 1 time daily or as directed. 50 each 11    blood glucose monitoring (ACCU-CHEK FASTCLIX) lancets Use to test blood sugar 1 time daily or as directed. 102 each 11    cetirizine (ZYRTEC) 10 MG tablet Take 1 tablet (10 mg) by mouth daily as needed for allergies 60 tablet 12    clonazePAM (KLONOPIN) 0.5 MG tablet Take 1 tablet by mouth twice daily as needed for anxiety 30 tablet 0    diltiazem ER (TIAZAC) 240 MG 24 hr ER beaded capsule Take 1 capsule (240 mg) by mouth daily 90 capsule 3    EPINEPHrine (ANY BX GENERIC EQUIV) 0.3 MG/0.3ML  injection 2-pack Inject 0.3 mLs (0.3 mg) into the muscle as needed for anaphylaxis May repeat one time in 5-15 minutes if response to initial dose is inadequate. 2 each 1    furosemide (LASIX) 20 MG tablet Take 1 tablet (20 mg) by mouth daily 90 tablet 3    losartan (COZAAR) 50 MG tablet Take 1 tablet by mouth once daily 90 tablet 0    ORDER FOR ALLERGEN IMMUNOTHERAPY Continue on allergy injections (SCIT), 0.5ml every other week injections. Follow standard maintenance protocols. 5 mL PRN    spironolactone (ALDACTONE) 50 MG tablet Take 1 tablet by mouth once daily 90 tablet 0    STATIN NOT PRESCRIBED (INTENTIONAL) Please choose reason not prescribed from choices below.      triamcinolone (NASACORT) 55 MCG/ACT nasal aerosol Spray 2 sprays into both nostrils daily 2 Bottle 12       Allergies: Fruit [peach]; Nuts; Ace inhibitors; Alprazolam; Antihistamines, chlorpheniramine-type; Diphenhydramine; Erythromycin; Guaifed; Guaifenesin; Hydrochlorothiazide; No clinical screening - see comments; Olmesartan medoxomil; Phenylephrine hcl; Pseudoephedrine hcl; Seasonal allergies; Statins; Tramadol; Tramadol hcl; Venlafaxine; Zoloft [sertraline]; and Latex     Past Medical History:   Diagnosis Date    Arthritis     Chronic back pain     Hypertension     Irregular heart beat     Sleep apnea        Past Surgical History:   Procedure Laterality Date    ANESTHESIA OUT OF OR MRI N/A 6/1/2022    Procedure: MRI CERVICAL SPINE;  Surgeon: GENERIC ANESTHESIA PROVIDER;  Location: [facility]    ANESTHESIA OUT OF OR MRI N/A 9/23/2022    Procedure: MRI CERVICAL SPINE;  Surgeon: GENERIC ANESTHESIA PROVIDER;  Location: [facility]    APPENDECTOMY      BACK SURGERY      CHOLECYSTECTOMY      COLONOSCOPY  2012    Repeat in 10 years    GYN SURGERY      HYSTERECTOMY      Ovaries    RELEASE CARPAL TUNNEL      LT    RELEASE CARPAL TUNNEL      RT x2    SURGICAL RADIOLOGY PROCEDURE N/A 2/12/2016    Procedure: SURGICAL RADIOLOGY PROCEDURE;  Surgeon: Provider, Generic  Perianesthesia Nursing;  Location: [facility]    SURGICAL RADIOLOGY PROCEDURE N/A 8/15/2016    Procedure: SURGICAL RADIOLOGY PROCEDURE;  Surgeon: Provider, Generic Perianesthesia Nursing;  Location: [facility]       ENT family history reviewed    Social History     Tobacco Use    Smoking status: Never    Smokeless tobacco: Never   Vaping Use    Vaping Use: Never used   Substance Use Topics    Alcohol use: Yes     Alcohol/week: 1.0 standard drink of alcohol     Types: 1 Glasses of wine per week     Comment: occ glass of wine    Drug use: No       Review of Systems  ROS: 10 point ROS neg other than the symptoms noted above in the HPI     Physical Exam  /50 (BP Location: Right arm, Patient Position: Sitting, Cuff Size: Adult Large)   Pulse 73   Temp 97.5  F (36.4  C) (Tympanic)   Resp 18   SpO2 97%     General - The patient is well nourished and well developed, and appears to have good nutritional status.  Alert and oriented to person and place, answers questions and cooperates with examination appropriately.   Head and Face - Normocephalic and atraumatic, with no gross asymmetry noted.  The facial nerve is intact, with strong symmetric movements.  Voice and Breathing - The patient was breathing comfortably without the use of accessory muscles. There was no wheezing, stridor, or stertor.  The patients voice was clear and strong, and had appropriate pitch and quality.  Ears -ears examined with otoscope and under otologic microscopy.  Outer ears appear normal.  Posterior ears appear normal.  There is no ulceration or lesions present.  The external auditory canals are with mild cerumen which was removed with cup forceps and cerumen loop.  The canals are clear there is no polyp or granulation tissue.  No edema.  The tympanic membranes are intact without effusion, retraction or mass.  Bony landmarks are intact.  Eyes - Extraocular movements intact, and the pupils were reactive to light.  Sclera were not icteric or injected,  conjunctiva were pink and moist.  Mouth - Examination of the oral cavity showed pink, healthy oral mucosa. No lesions or ulcerations noted.  The tongue was mobile and midline, and the dentition were in good condition.    Throat - The walls of the oropharynx were smooth, pink, moist, symmetric, and had no lesions or ulcerations.  The tonsillar pillars and soft palate were symmetric.  The uvula was midline on elevation.    Neck - Normal midline excursion of the laryngotracheal complex during swallowing.  Full range of motion on passive movement.  Palpation of the occipital, submental, submandibular, internal jugular chain, and supraclavicular nodes did not demonstrate any abnormal lymph nodes or masses.  Palpation of the thyroid was soft and smooth, with no nodules or goiter appreciated.  The trachea was mobile and midline.  Nose - External contour is symmetric, no gross deflection or scars.  Nasal mucosa is pink and moist with no abnormal mucus.    Masseter and pterygoids are tight to palpation on the left with TMJ click and crepitus upon opening and limited jaw opening        Impression and Plan- Rj Rodríguez is a 72 year old female with:    ICD-10-CM    1. Sensation of fullness in ear, left  H93.8X2       2. TMJ (temporomandibular joint syndrome)  M26.609       3. Tinnitus, bilateral  H93.13             Reassured normal ear exam.  There is no effusion or retraction.  I did not locate a foreign body.  However she does have complaints of fullness and pressure in her ears which could be correlated with her TMJ and jaw misalignment.  Discussed home remedies using warm compresses and she is following with her dentist for a fractured tooth which has affected her bite.    Consider physical therapy.    Recommended audiogram, patient will consider and may schedule in future.  There is degree of asymmetrical changes recommended MRI of the inner ear.    Hearing protection.    At this time, patient will be holding allergy  injections.     Hilary Lord PA-C  ENT  [facility], [place]

## 2023-09-06 NOTE — LETTER
2023         RE: Rj Rodríguez  [address]  [address]  [facility] [address]        Dear Colleague,    Thank you for referring your patient, Rj Rodríguez, to the [facility]. Please see a copy of my visit note below.    Otolaryngology Consultation    Patient: Rj Rodríguez  : 1950    Chief Complaint   Patient presents with     Ear Problem     Foreign body in ear, left ear. Denies pain/drainage, but occasional bleeding (not in a while).      HPI:  Rj Rodríguez is a 72 year old female seen today for FB in ear. Rj feels like there is something in her ear, intermittent bleeding along her left ear. She feels there is something in her left ear, and ear fullness.   She was seen by PCP and noted normal ear exam.   Rj has felt some fullness with her ears related to allergies.   Reports blood on her pillow in the morning.     No significant hearing concerns.   She does have clenching, TMJ is bothersome. She feels significant tenderness with her bite/ jaw movement.   Denies COM or otologic surgeries.   Denies otalgia  Denies worrisome tinnitus. Intermittent tinnitus.   Denies fluctuating hearing loss or tinnitus.   Denies vertigo or facial paraesthesia.     Audio: 13 left WNL, Right low frequency HL       She held  SCIT in May 2023.  Paused SCIT.   She does not wish to start SCIT at this time.   Following AH with good control.       Current Outpatient Rx   Medication Sig Dispense Refill     albuterol (PROAIR HFA/PROVENTIL HFA/VENTOLIN HFA) 108 (90 Base) MCG/ACT inhaler Inhale 2 puffs into the lungs every 6 hours 18 g 0     blood glucose (ACCU-CHEK GUIDE) test strip Use to test blood sugar 1 time daily or as directed. 50 each 11     blood glucose monitoring (ACCU-CHEK FASTCLIX) lancets Use to test blood sugar 1 time daily or as directed. 102 each 11     cetirizine (ZYRTEC) 10 MG tablet Take 1 tablet (10 mg) by mouth daily as needed for allergies 60 tablet 12      clonazePAM (KLONOPIN) 0.5 MG tablet Take 1 tablet by mouth twice daily as needed for anxiety 30 tablet 0     diltiazem ER (TIAZAC) 240 MG 24 hr ER beaded capsule Take 1 capsule (240 mg) by mouth daily 90 capsule 3     EPINEPHrine (ANY BX GENERIC EQUIV) 0.3 MG/0.3ML injection 2-pack Inject 0.3 mLs (0.3 mg) into the muscle as needed for anaphylaxis May repeat one time in 5-15 minutes if response to initial dose is inadequate. 2 each 1     furosemide (LASIX) 20 MG tablet Take 1 tablet (20 mg) by mouth daily 90 tablet 3     losartan (COZAAR) 50 MG tablet Take 1 tablet by mouth once daily 90 tablet 0     ORDER FOR ALLERGEN IMMUNOTHERAPY Continue on allergy injections (SCIT), 0.5ml every other week injections. Follow standard maintenance protocols. 5 mL PRN     spironolactone (ALDACTONE) 50 MG tablet Take 1 tablet by mouth once daily 90 tablet 0     STATIN NOT PRESCRIBED (INTENTIONAL) Please choose reason not prescribed from choices below.       triamcinolone (NASACORT) 55 MCG/ACT nasal aerosol Spray 2 sprays into both nostrils daily 2 Bottle 12       Allergies: Fruit [peach]; Nuts; Ace inhibitors; Alprazolam; Antihistamines, chlorpheniramine-type; Diphenhydramine; Erythromycin; Guaifed; Guaifenesin; Hydrochlorothiazide; No clinical screening - see comments; Olmesartan medoxomil; Phenylephrine hcl; Pseudoephedrine hcl; Seasonal allergies; Statins; Tramadol; Tramadol hcl; Venlafaxine; Zoloft [sertraline]; and Latex     Past Medical History:   Diagnosis Date     Arthritis      Chronic back pain      Hypertension      Irregular heart beat      Sleep apnea        Past Surgical History:   Procedure Laterality Date     ANESTHESIA OUT OF OR MRI N/A 6/1/2022    Procedure: MRI CERVICAL SPINE;  Surgeon: GENERIC ANESTHESIA PROVIDER;  Location: [facility]     ANESTHESIA OUT OF OR MRI N/A 9/23/2022    Procedure: MRI CERVICAL SPINE;  Surgeon: GENERIC ANESTHESIA PROVIDER;  Location: [facility]     APPENDECTOMY       BACK SURGERY        CHOLECYSTECTOMY       COLONOSCOPY  2012    Repeat in 10 years     GYN SURGERY       HYSTERECTOMY      Ovaries     RELEASE CARPAL TUNNEL      LT     RELEASE CARPAL TUNNEL      RT x2     SURGICAL RADIOLOGY PROCEDURE N/A 2/12/2016    Procedure: SURGICAL RADIOLOGY PROCEDURE;  Surgeon: Provider, Generic Perianesthesia Nursing;  Location: [facility]     SURGICAL RADIOLOGY PROCEDURE N/A 8/15/2016    Procedure: SURGICAL RADIOLOGY PROCEDURE;  Surgeon: Provider, Generic Perianesthesia Nursing;  Location: [facility]       ENT family history reviewed    Social History     Tobacco Use     Smoking status: Never     Smokeless tobacco: Never   Vaping Use     Vaping Use: Never used   Substance Use Topics     Alcohol use: Yes     Alcohol/week: 1.0 standard drink of alcohol     Types: 1 Glasses of wine per week     Comment: occ glass of wine     Drug use: No       Review of Systems  ROS: 10 point ROS neg other than the symptoms noted above in the HPI     Physical Exam  /50 (BP Location: Right arm, Patient Position: Sitting, Cuff Size: Adult Large)   Pulse 73   Temp 97.5  F (36.4  C) (Tympanic)   Resp 18   SpO2 97%     General - The patient is well nourished and well developed, and appears to have good nutritional status.  Alert and oriented to person and place, answers questions and cooperates with examination appropriately.   Head and Face - Normocephalic and atraumatic, with no gross asymmetry noted.  The facial nerve is intact, with strong symmetric movements.  Voice and Breathing - The patient was breathing comfortably without the use of accessory muscles. There was no wheezing, stridor, or stertor.  The patients voice was clear and strong, and had appropriate pitch and quality.  Ears -ears examined with otoscope and under otologic microscopy.  Outer ears appear normal.  Posterior ears appear normal.  There is no ulceration or lesions present.  The external auditory canals are with mild cerumen which was removed with cup forceps  and cerumen loop.  The canals are clear there is no polyp or granulation tissue.  No edema.  The tympanic membranes are intact without effusion, retraction or mass.  Bony landmarks are intact.  Eyes - Extraocular movements intact, and the pupils were reactive to light.  Sclera were not icteric or injected, conjunctiva were pink and moist.  Mouth - Examination of the oral cavity showed pink, healthy oral mucosa. No lesions or ulcerations noted.  The tongue was mobile and midline, and the dentition were in good condition.    Throat - The walls of the oropharynx were smooth, pink, moist, symmetric, and had no lesions or ulcerations.  The tonsillar pillars and soft palate were symmetric.  The uvula was midline on elevation.    Neck - Normal midline excursion of the laryngotracheal complex during swallowing.  Full range of motion on passive movement.  Palpation of the occipital, submental, submandibular, internal jugular chain, and supraclavicular nodes did not demonstrate any abnormal lymph nodes or masses.  Palpation of the thyroid was soft and smooth, with no nodules or goiter appreciated.  The trachea was mobile and midline.  Nose - External contour is symmetric, no gross deflection or scars.  Nasal mucosa is pink and moist with no abnormal mucus.    Masseter and pterygoids are tight to palpation on the left with TMJ click and crepitus upon opening and limited jaw opening        Impression and Plan- Rj Rodríguez is a 72 year old female with:    ICD-10-CM    1. Sensation of fullness in ear, left  H93.8X2       2. TMJ (temporomandibular joint syndrome)  M26.609       3. Tinnitus, bilateral  H93.13             Reassured normal ear exam.  There is no effusion or retraction.  I did not locate a foreign body.  However she does have complaints of fullness and pressure in her ears which could be correlated with her TMJ and jaw misalignment.  Discussed home remedies using warm compresses and she is following with her  dentist for a fractured tooth which has affected her bite.    Consider physical therapy.    Recommended audiogram, patient will consider and may schedule in future.  There is degree of asymmetrical changes recommended MRI of the inner ear.    Hearing protection.    At this time, patient will be holding allergy injections.     Hilary Lord PA-C  ENT  [facility], [place]      Again, thank you for allowing me to participate in the care of your patient.        Sincerely,        Hilary Lord PA-C

## 2023-09-07 NOTE — PROGRESS NOTES
"  Assessment & Plan     Elevated C-reactive protein (CRP)  Feeling well. No fevers. Muscle aches resolved. Will recheck a CRP as it was slightly elevated. Will notify patient of the results when available and intervene accordingly.     - CRP, inflammation    Elevated serum creatinine  Drinking fluids. Will recheck a kidney function today. Will notify patient of the results when available and intervene accordingly.     - Basic metabolic panel    Encounter for screening mammogram for breast cancer  Due for a mammogram. Willing to schedule in 11/2023. Ordered.     - MA Screen Bilateral w/Carlitos; Future        Sheyla Sanches NP  [facility] - BAILEY De La Rosa is a 72 year old, presenting for the following health issues:  CRP follow up      HPI     Follow-up CRP elevation  Patient was seen on 8/7/23 and complained of resolving muscle aches. Was having muscle aches while taking Symbicort, but these resolved when she stopped the medication. Tick testing was negative. RA testing was negative.     Today she notes that she feels good. No longer on Symbicort. Muscles aches have resolved. Started biking short distances daily. Weight continues to trend down. No fevers.     Due for a mammogram. Willing to get in 11/2023.     Recent creatinine was slightly elevated. Trying to drink more. Will recheck today.         Review of Systems   Constitutional, HEENT, cardiovascular, pulmonary, gi and gu systems are negative, except as otherwise noted.      Objective    /70 (BP Location: Left arm, Patient Position: Sitting, Cuff Size: Adult Regular)   Pulse 58   Temp 98.3  F (36.8  C) (Tympanic)   Ht 1.549 m (5' 1\")   Wt 83.5 kg (184 lb)   SpO2 96%   BMI 34.77 kg/m    Body mass index is 34.77 kg/m .  Physical Exam   GENERAL: healthy, alert and no distress, overweight  EYES: Eyes grossly normal to inspection, PERRL and conjunctivae and sclerae normal  HENT: ear canals and TM's normal, nose and mouth without " ulcers or lesions  NECK: no adenopathy, no asymmetry, masses, or scars and thyroid normal to palpation  RESP: lungs clear to auscultation - no rales, rhonchi or wheezes  CV: regular rate and rhythm, no murmur, no peripheral edema  ABDOMEN: soft, nontender, obese, no masses and bowel sounds normal  MS: no gross musculoskeletal defects noted, no edema  NEURO: Normal strength and tone, mentation intact and speech normal  PSYCH: mentation appears normal, affect normal/bright    Labs in process                Answers submitted by the patient for this visit:  Patient Health Questionnaire (Submitted on 9/11/2023)  PHQ9 TOTAL SCORE: 4  ZEFERINO-7 (Submitted on 9/11/2023)  ZEFERINO 7 TOTAL SCORE: 0

## 2023-09-11 ENCOUNTER — OFFICE VISIT (OUTPATIENT)
Dept: FAMILY MEDICINE | Facility: OTHER | Age: 73
End: 2023-09-11
Attending: NURSE PRACTITIONER
Payer: COMMERCIAL

## 2023-09-11 VITALS
HEIGHT: 61 IN | OXYGEN SATURATION: 96 % | BODY MASS INDEX: 34.74 KG/M2 | SYSTOLIC BLOOD PRESSURE: 116 MMHG | TEMPERATURE: 98.3 F | WEIGHT: 184 LBS | HEART RATE: 58 BPM | DIASTOLIC BLOOD PRESSURE: 70 MMHG

## 2023-09-11 DIAGNOSIS — R79.82 ELEVATED C-REACTIVE PROTEIN (CRP): Primary | ICD-10-CM

## 2023-09-11 DIAGNOSIS — R79.89 ELEVATED SERUM CREATININE: ICD-10-CM

## 2023-09-11 DIAGNOSIS — Z12.31 ENCOUNTER FOR SCREENING MAMMOGRAM FOR BREAST CANCER: ICD-10-CM

## 2023-09-11 PROCEDURE — 99213 OFFICE O/P EST LOW 20 MIN: CPT | Performed by: NURSE PRACTITIONER

## 2023-09-11 PROCEDURE — G0463 HOSPITAL OUTPT CLINIC VISIT: HCPCS

## 2023-09-11 ASSESSMENT — ANXIETY QUESTIONNAIRES
1. FEELING NERVOUS, ANXIOUS, OR ON EDGE: NOT AT ALL
IF YOU CHECKED OFF ANY PROBLEMS ON THIS QUESTIONNAIRE, HOW DIFFICULT HAVE THESE PROBLEMS MADE IT FOR YOU TO DO YOUR WORK, TAKE CARE OF THINGS AT HOME, OR GET ALONG WITH OTHER PEOPLE: NOT DIFFICULT AT ALL
6. BECOMING EASILY ANNOYED OR IRRITABLE: NOT AT ALL
5. BEING SO RESTLESS THAT IT IS HARD TO SIT STILL: NOT AT ALL
2. NOT BEING ABLE TO STOP OR CONTROL WORRYING: NOT AT ALL
7. FEELING AFRAID AS IF SOMETHING AWFUL MIGHT HAPPEN: NOT AT ALL
GAD7 TOTAL SCORE: 0
3. WORRYING TOO MUCH ABOUT DIFFERENT THINGS: NOT AT ALL
GAD7 TOTAL SCORE: 0
4. TROUBLE RELAXING: NOT AT ALL

## 2023-09-11 ASSESSMENT — PATIENT HEALTH QUESTIONNAIRE - PHQ9
SUM OF ALL RESPONSES TO PHQ QUESTIONS 1-9: 4
SUM OF ALL RESPONSES TO PHQ QUESTIONS 1-9: 4

## 2023-09-11 ASSESSMENT — PAIN SCALES - GENERAL: PAINLEVEL: NO PAIN (0)

## 2023-09-11 ASSESSMENT — ASTHMA QUESTIONNAIRES: ACT_TOTALSCORE: 23

## 2023-10-05 ENCOUNTER — APPOINTMENT (OUTPATIENT)
Dept: GENERAL RADIOLOGY | Facility: HOSPITAL | Age: 73
End: 2023-10-05
Attending: STUDENT IN AN ORGANIZED HEALTH CARE EDUCATION/TRAINING PROGRAM
Payer: MEDICARE

## 2023-10-05 ENCOUNTER — HOSPITAL ENCOUNTER (EMERGENCY)
Facility: HOSPITAL | Age: 73
Discharge: HOME | End: 2023-10-05
Attending: STUDENT IN AN ORGANIZED HEALTH CARE EDUCATION/TRAINING PROGRAM | Admitting: STUDENT IN AN ORGANIZED HEALTH CARE EDUCATION/TRAINING PROGRAM
Payer: MEDICARE

## 2023-10-05 VITALS
HEART RATE: 56 BPM | OXYGEN SATURATION: 90 % | RESPIRATION RATE: 20 BRPM | SYSTOLIC BLOOD PRESSURE: 105 MMHG | DIASTOLIC BLOOD PRESSURE: 62 MMHG | TEMPERATURE: 98.9 F

## 2023-10-05 DIAGNOSIS — S89.92XA INJURY OF LEFT LOWER EXTREMITY, INITIAL ENCOUNTER: ICD-10-CM

## 2023-10-05 PROCEDURE — 73552 X-RAY EXAM OF FEMUR 2/>: CPT | Mod: LT

## 2023-10-05 PROCEDURE — 93010 ELECTROCARDIOGRAM REPORT: CPT | Performed by: INTERNAL MEDICINE

## 2023-10-05 PROCEDURE — 99284 EMERGENCY DEPT VISIT MOD MDM: CPT | Mod: 25 | Performed by: STUDENT IN AN ORGANIZED HEALTH CARE EDUCATION/TRAINING PROGRAM

## 2023-10-05 PROCEDURE — 96374 THER/PROPH/DIAG INJ IV PUSH: CPT | Performed by: STUDENT IN AN ORGANIZED HEALTH CARE EDUCATION/TRAINING PROGRAM

## 2023-10-05 PROCEDURE — 250N000013 HC RX MED GY IP 250 OP 250 PS 637: Performed by: STUDENT IN AN ORGANIZED HEALTH CARE EDUCATION/TRAINING PROGRAM

## 2023-10-05 PROCEDURE — 250N000011 HC RX IP 250 OP 636: Mod: JZ | Performed by: STUDENT IN AN ORGANIZED HEALTH CARE EDUCATION/TRAINING PROGRAM

## 2023-10-05 PROCEDURE — 73502 X-RAY EXAM HIP UNI 2-3 VIEWS: CPT

## 2023-10-05 PROCEDURE — 99283 EMERGENCY DEPT VISIT LOW MDM: CPT | Performed by: STUDENT IN AN ORGANIZED HEALTH CARE EDUCATION/TRAINING PROGRAM

## 2023-10-05 PROCEDURE — 73562 X-RAY EXAM OF KNEE 3: CPT | Mod: LT

## 2023-10-05 PROCEDURE — 93005 ELECTROCARDIOGRAM TRACING: CPT | Mod: RTG

## 2023-10-05 RX ORDER — OXYCODONE HYDROCHLORIDE 5 MG/1
5 TABLET ORAL ONCE
Status: COMPLETED | OUTPATIENT
Start: 2023-10-05 | End: 2023-10-05

## 2023-10-05 RX ORDER — CLONAZEPAM 0.25 MG/1
0.5 TABLET, ORALLY DISINTEGRATING ORAL ONCE
Status: COMPLETED | OUTPATIENT
Start: 2023-10-05 | End: 2023-10-05

## 2023-10-05 RX ORDER — DROPERIDOL 2.5 MG/ML
2 INJECTION, SOLUTION INTRAMUSCULAR; INTRAVENOUS ONCE
Status: COMPLETED | OUTPATIENT
Start: 2023-10-05 | End: 2023-10-05

## 2023-10-05 RX ORDER — ACETAMINOPHEN 325 MG/1
975 TABLET ORAL ONCE
Status: COMPLETED | OUTPATIENT
Start: 2023-10-05 | End: 2023-10-05

## 2023-10-05 RX ORDER — CLONAZEPAM 1 MG/1
2 TABLET, ORALLY DISINTEGRATING ORAL ONCE
Status: DISCONTINUED | OUTPATIENT
Start: 2023-10-05 | End: 2023-10-05 | Stop reason: DRUGHIGH

## 2023-10-05 RX ADMIN — CLONAZEPAM 0.5 MG: 0.25 TABLET, ORALLY DISINTEGRATING ORAL at 12:31

## 2023-10-05 RX ADMIN — ACETAMINOPHEN 975 MG: 325 TABLET, FILM COATED ORAL at 11:43

## 2023-10-05 RX ADMIN — DROPERIDOL 2 MG: 2.5 INJECTION, SOLUTION INTRAMUSCULAR; INTRAVENOUS at 13:13

## 2023-10-05 RX ADMIN — OXYCODONE HYDROCHLORIDE 5 MG: 5 TABLET ORAL at 11:43

## 2023-10-05 ASSESSMENT — ACTIVITIES OF DAILY LIVING (ADL): ADLS_ACUITY_SCORE: 37

## 2023-10-05 NOTE — ED TRIAGE NOTES
Pt comes in via ambulance with c/o of left knee and leg pain.  Pt was in the garage and felt a pop when stepping on a step then couldn't step on it again.  Pt has high anxiety.  Most pain in left knee and hurts all the way up to the hip.  Pt did not fall.

## 2023-10-05 NOTE — ED PROVIDER NOTES
History     Chief Complaint   Patient presents with    Knee Pain     HPI  Rj Rodríguez is a 72 year old female who presents to the emergency department with concerns of left leg pain.  Patient was up on a stepping stool in her garage.  When patient stepped down she felt a pop.  Since then she has had severe pain with any attempt to ambulate or move her left lower extremity.  Patient has pain from her hip down to her knee.  No pain distal to that.  Denies any numbness.  No other concerns or injuries.  Not feel sick.    Allergies:  Allergies   Allergen Reactions    Fruit [Peach] Anaphylaxis and Hives     fresh peaches and any fruit that has a pit.  Kiwi's and apples also.  Can eat any fruit cooked.    Nuts Anaphylaxis and Hives     All Raw Nuts, can eat nuts when roasted.    Ace Inhibitors      Upper respiratory infection    Alprazolam Hives     Xanax    Antihistamines, Chlorpheniramine-Type Hives     Antihistamines    Diphenhydramine Hives and Other (See Comments)     Keeps her awake      Erythromycin      BASE; pt. Not sure if this is a true allergy.    Guaifed      Pt unsure of reaction    Guaifenesin      Guaifed    Hydrochlorothiazide     No Clinical Screening - See Comments      Allergic:  Fresh peaches and any fruit that has a pit.  Kiwi's and apples also.  All raw nuts.  Can eat nuts when roasted or any fruit cooked.  Some seasonal allergies :  Hayfever    Olmesartan Medoxomil Cough     Benicar    Phenylephrine Hcl      Guaifed    Pseudoephedrine Hcl      Guaifed    Seasonal Allergies      hayfever    Statins     Tramadol      Sleep disturbance. Can not sleep, and when able to fall asleep will have terrible nightmares    Tramadol Hcl      Ultram, insomnia, nightmares, headache    Venlafaxine Other (See Comments)     Heartburn, reflux    Zoloft [Sertraline]      paranoia    Latex Other (See Comments), Swelling, Difficulty breathing and Rash     Throat Closes         Problem List:    Patient Active Problem  List    Diagnosis Date Noted    Class 2 severe obesity due to excess calories with serious comorbidity in adult (H) 08/07/2023     Priority: Medium    Shortness of breath 08/09/2021     Priority: Medium    Irregular heart rate 08/09/2021     Priority: Medium    Frequent PVCs 08/09/2021     Priority: Medium    Chest pain, unspecified type 08/09/2021     Priority: Medium    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     Priority: Medium    Diabetes mellitus, type 2 (H) 12/06/2019     Priority: Medium    [place] cardiac risk 11% in next 10 years 10/29/2019     Priority: Medium    S/P rotator cuff repair 10/26/2016     Priority: Medium    Mild episode of recurrent major depressive disorder (H24) 10/22/2014     Priority: Medium    Migraine with aura and without status migrainosus, not intractable 04/21/2014     Priority: Medium    S/P cervical spinal fusion 03/21/2014     Priority: Medium    Cervicalgia 12/10/2013     Priority: Medium    Vertigo 03/18/2013     Priority: Medium    Tinnitus 03/18/2013     Priority: Medium    Chronic rhinitis 03/18/2013     Priority: Medium    Lumbago 07/10/2012     Priority: Medium    Presbyopia 01/25/2011     Priority: Medium    Primary open-angle glaucoma 01/25/2011     Priority: Medium     Overview:   IMO Update 10/11      ZEFERINO (generalized anxiety disorder) 12/06/2006     Priority: Medium    Essential hypertension 06/02/2004     Priority: Medium     Overview:   IMO Update          Past Medical History:    Past Medical History:   Diagnosis Date    Arthritis     Chronic back pain     Hypertension     Irregular heart beat     Sleep apnea        Past Surgical History:    Past Surgical History:   Procedure Laterality Date    ANESTHESIA OUT OF OR MRI N/A 6/1/2022    Procedure: MRI CERVICAL SPINE;  Surgeon: GENERIC ANESTHESIA PROVIDER;  Location: [facility]    ANESTHESIA OUT OF OR MRI N/A 9/23/2022    Procedure: MRI CERVICAL SPINE;  Surgeon: GENERIC ANESTHESIA PROVIDER;  Location: [facility]    APPENDECTOMY       BACK SURGERY      CHOLECYSTECTOMY      COLONOSCOPY  2012    Repeat in 10 years    GYN SURGERY      HYSTERECTOMY      Ovaries    RELEASE CARPAL TUNNEL      LT    RELEASE CARPAL TUNNEL      RT x2    SURGICAL RADIOLOGY PROCEDURE N/A 2/12/2016    Procedure: SURGICAL RADIOLOGY PROCEDURE;  Surgeon: Provider, Generic Perianesthesia Nursing;  Location: [facility]    SURGICAL RADIOLOGY PROCEDURE N/A 8/15/2016    Procedure: SURGICAL RADIOLOGY PROCEDURE;  Surgeon: Provider, Generic Perianesthesia Nursing;  Location: [facility]       Family History:    Family History   Problem Relation Age of Onset    Colon Cancer Maternal Aunt     Colon Cancer Maternal Uncle     Diabetes Father         Type 2    Hypertension Father     Alcoholism Father     Alcoholism Mother        Social History:  Marital Status:   [2]  Social History     Tobacco Use    Smoking status: Never    Smokeless tobacco: Never   Vaping Use    Vaping Use: Never used   Substance Use Topics    Alcohol use: Yes     Alcohol/week: 1.0 standard drink of alcohol     Types: 1 Glasses of wine per week     Comment: occ glass of wine    Drug use: No        Medications:    albuterol (PROAIR HFA/PROVENTIL HFA/VENTOLIN HFA) 108 (90 Base) MCG/ACT inhaler  blood glucose (ACCU-CHEK GUIDE) test strip  blood glucose monitoring (ACCU-CHEK FASTCLIX) lancets  cetirizine (ZYRTEC) 10 MG tablet  clonazePAM (KLONOPIN) 0.5 MG tablet  diltiazem ER (TIAZAC) 240 MG 24 hr ER beaded capsule  EPINEPHrine (ANY BX GENERIC EQUIV) 0.3 MG/0.3ML injection 2-pack  furosemide (LASIX) 20 MG tablet  spironolactone (ALDACTONE) 50 MG tablet  STATIN NOT PRESCRIBED (INTENTIONAL)  triamcinolone (NASACORT) 55 MCG/ACT nasal aerosol          Review of Systems  SEE HPI  Physical Exam   BP: 176/100  Pulse: 90  Temp: 98.9  F (37.2  C)  Resp: 20  SpO2: 95 %      Physical Exam  Constitutional: Alert and conversant. NAD   HENT: Atraumatic  Eyes: Normal pupils   Neck: Normal ROM  CV: Normal rate, regular rhythm, no murmur    Pulmonary/Chest: Non-labored respirations, clear to auscultation bilaterally   Abdominal: Soft, non-tender, non-distended   MSK: Tenderness of left hip, thigh, knee. Strongly opposed to moving. Neurovascularly intact distally.   Neuro: Alert and appropriate. Cnx, Strength, and Sensation grossly intact  Skin: Warm and dry. No diaphoresis. No rashes on exposed skin    Psych: Appropriate mood and affect     ED Course   Impression and Plan: Patient presents with concern of left knee and hip pain.  Vitals reviewed, unconcerning.  Patient with exam which she does not want to move her left leg at all.  It appears to be of equal length to the right.  No obvious deformities appreciated.  Tenderness in multiple areas from the patient's left hip to left knee.  Plan at this time will be to obtain x-rays of the patient's left hip, femur, and knee.  Symptoms will be treated with oxycodone and Tylenol.  Final plan pending results.           ED Course as of 10/05/23 1420   Thu Oct 05, 2023   1149 He was quite anxious on reexamination.  We will therefore the patient a dose of Klonopin.   1251 IMPRESSION:   No acute osseous abnormality.     1251 IMPRESSION:   No acute osseous abnormality.     1251 IMPRESSION:   No acute osseous abnormality.     1253 Fractures on any x-ray.   1402 With significant improvement of symptoms after droperidol.  However, still had significant pain with trying to ambulate.  We will attempt with a knee immobilizer for comfort.  We may also need a walker potentially.  Final plan pending patient's response.   1413 Able to ambulate with a knee immobilizer and walker.  As such, we will progress with discharge.  Advise close follow-up with primary care or orthopedics for further evaluation if symptoms continue.  Return precautions were discussed including any severe worsening of any distal neurologic or skin color changes.  All questions were answered, and patient was discharged in good condition.      Procedures                  Results for orders placed or performed during the hospital encounter of 10/05/23 (from the past 24 hour(s))   XR Pelvis including Hip Left 2-3 Views    Narrative    PROCEDURE:  XR PELVIS AND HIP LEFT 2 VIEWS    HISTORY: Pain. Fall    COMPARISON:  None.    TECHNIQUE:  XR PELVIS 2 VIEW LEFT HIP    FINDINGS:    No acute osseous pathology. No suspicious osseous lesion. The joints  are appropriately aligned. Mild bilateral hip joint space narrowing.  Bony overgrowth of the femoral head neck junction, left greater than  right..     Lower lumbar spondylosis and degenerative changes of the sacroiliac  joints. Nonobstructive bowel gas pattern.      Impression    IMPRESSION:   No acute osseous abnormality.    MIKE BATES MD         SYSTEM ID:  [id number]   XR Femur Left 2 Views    Narrative    PROCEDURE:  XR FEMUR LEFT 2 VIEWS    HISTORY: Pain    COMPARISON:  None.    TECHNIQUE:  XR FEMUR LEFT 2 VIEWS    FINDINGS:   No fracture or dislocation is identified. No suspicious osseous  lesion. The joint spaces are preserved.     No foreign body or subcutaneous emphysema.        Impression    IMPRESSION:   No acute osseous abnormality.    MIKE BATES MD         SYSTEM ID:  [id number]   XR Knee Left 3 Views    Narrative    PROCEDURE:  XR KNEE LEFT 3 VIEWS    HISTORY: Pain    COMPARISON:  None.    TECHNIQUE:  XR KNEE LEFT 3 VIEWS    FINDINGS:    No acute osseous abnormality. Joints are appropriately aligned. Joint  spaces are preserved.     No patellar tilt or lateral subluxation. Patellar enthesopathy. A few  periarticular mineralizations. No large joint effusion.      Impression    IMPRESSION:   No acute osseous abnormality.    MIKE BATES MD         SYSTEM ID:  [id number]   EKG 12-lead, tracing only   Result Value Ref Range    Systolic Blood Pressure  mmHg    Diastolic Blood Pressure  mmHg    Ventricular Rate 69 BPM    Atrial Rate 69 BPM    MT Interval 180 ms    QRS Duration 86 ms     ms    QTc  426 ms    P Axis 66 degrees    R AXIS -14 degrees    T Axis 47 degrees    Interpretation ECG       Sinus rhythm  Possible Anterior infarct , age undetermined  Abnormal ECG  No previous ECGs available         Medications   acetaminophen (TYLENOL) tablet 975 mg (975 mg Oral $Given 10/5/23 1143)   oxyCODONE (ROXICODONE) tablet 5 mg (5 mg Oral $Given 10/5/23 1143)   clonazePAM (klonoPIN) ODT tab 0.5 mg (0.5 mg Oral $Given 10/5/23 1231)   droPERidol (INAPSINE) injection 2 mg (2 mg Intravenous $Given 10/5/23 1313)       Assessments & Plan (with Medical Decision Making)     I have reviewed the nursing notes.    I have reviewed the findings, diagnosis, plan and need for follow up with the patient.          New Prescriptions    No medications on file       Final diagnoses:   Injury of left lower extremity, initial encounter       10/5/2023   [facility]       Ulisses Soriano MD  10/05/23 3150

## 2023-10-05 NOTE — DISCHARGE INSTRUCTIONS
You presented today with concern of left leg injury.  Our evaluation was overall reassuring.  Please be sure to monitor your symptoms closely.  If they get significantly worse, please return.  Enriquez, please follow-up with either orthopedic surgery or your primary care physician for further evaluation if symptoms continue.

## 2023-10-07 LAB
ATRIAL RATE - MUSE: 69 BPM
DIASTOLIC BLOOD PRESSURE - MUSE: NORMAL MMHG
INTERPRETATION ECG - MUSE: NORMAL
P AXIS - MUSE: 66 DEGREES
PR INTERVAL - MUSE: 180 MS
QRS DURATION - MUSE: 86 MS
QT - MUSE: 398 MS
QTC - MUSE: 426 MS
R AXIS - MUSE: -14 DEGREES
SYSTOLIC BLOOD PRESSURE - MUSE: NORMAL MMHG
T AXIS - MUSE: 47 DEGREES
VENTRICULAR RATE- MUSE: 69 BPM

## 2023-10-10 ENCOUNTER — TRANSFERRED RECORDS (OUTPATIENT)
Dept: HEALTH INFORMATION MANAGEMENT | Facility: CLINIC | Age: 73
End: 2023-10-10
Payer: COMMERCIAL

## 2023-10-10 DIAGNOSIS — I10 ESSENTIAL HYPERTENSION: ICD-10-CM

## 2023-10-11 NOTE — TELEPHONE ENCOUNTER
Spironolactone      Last Written Prescription Date:  7/13/23  Last Fill Quantity: 90,   # refills: 0  Last Office Visit: 9/11/23  Future Office visit:    Next 5 appointments (look out 90 days)      Nov 13, 2023  8:00 AM  (Arrive by 7:45 AM)  SHORT with Sheyla Sanches NP  [facility] [place] ([facility] [place] ) [address]  [place] [address]  [phone]     Dec 13, 2023  1:30 PM  (Arrive by 1:15 PM)  Return Visit with Lizeth Crisostomo CNP  [facility] [place] ([facility] [place] ) [address]  [place] [address]  [phone]             Routing refill request to provider for review/approval because:

## 2023-10-12 RX ORDER — SPIRONOLACTONE 50 MG/1
TABLET, FILM COATED ORAL
Qty: 90 TABLET | Refills: 0 | Status: SHIPPED | OUTPATIENT
Start: 2023-10-12 | End: 2024-01-10

## 2023-10-24 ENCOUNTER — TRANSFERRED RECORDS (OUTPATIENT)
Dept: HEALTH INFORMATION MANAGEMENT | Facility: HOSPITAL | Age: 73
End: 2023-10-24

## 2023-11-08 NOTE — PROGRESS NOTES
"  Assessment & Plan     Type 2 diabetes mellitus without complication, without long-term current use of insulin (H)  A1C 5.7. Controlled without medication. BP at goal. Eye exam scheduled. Declines to start a statin. Will reassess in 6 months.     - Hemoglobin A1c  - Basic metabolic panel    Hyperlipidemia, unspecified hyperlipidemia type  Does not tolerate statin. Declines to try another medication.     Essential hypertension  Well controlled. Continue current medications. Encouraged daily exercise and a low sodium diet. Recommended checking BP's 2x/wk, call the clinic if consistantly s>140 or d>90. Follow up in 3 months.     Mild intermittent reactive airway disease without complication  ACT 25. Controlled. Reassess 3 months.     Seasonal allergic rhinitis, unspecified trigger  Controlled. Continue current medications.     Elevated C-reactive protein (CRP)  Mildly elevated at 14, but stable. Recent Cologuard normal. Recent ESR was normal. Mammogram scheduled. Feels well. No fevers. No abdominal pain. No nausea or vomiting.    Does have left knee pain and swelling without erythema. Following with ortho who feels she has a meniscus tear. Possibly the cause of the elevation. Since it is just mildly elevated, will monitor.     - CRP, inflammation    CKD  Kidney function just mildly decreased, but stable. Will recheck in 3 months. If further decreased, will order renal US and UA. Will continue to avoid NSAIDs.     0956}     BMI:   Estimated body mass index is 32.88 kg/m  as calculated from the following:    Height as of this encounter: 1.549 m (5' 1\").    Weight as of this encounter: 78.9 kg (174 lb).   Weight management plan: Discussed healthy diet and exercise guidelines        Sheyla Sanches NP  [facility] [place]   Rj is a 72 year old, presenting for the following health issues:  Diabetes, Hypertension, Lipids, and Asthma      HPI     Diabetes Follow-up    How often are you checking " your blood sugar? A few times a week  What time of day are you checking your blood sugars (select all that apply)?  Before and after meals  Have you had any blood sugars above 200?  No  Have you had any blood sugars below 70?  No  What symptoms do you notice when your blood sugar is low?  Dizzy  What concerns do you have today about your diabetes? None   Do you have any of these symptoms? (Select all that apply)  No numbness or tingling in feet.  No redness, sores or blisters on feet.  No complaints of excessive thirst.  No reports of blurry vision.  No significant changes to weight.  Have you had a diabetic eye exam in the last 12 months? No-but scheduled  A1c was 6.0 on 6/28/23.   Working on weight loss-limiting her carbs. Down 10 pounds in the past 8 weeks.   Denies chest pain, shortness of breath, palpitations, dizziness and syncope.   She currently is not taking anything for her diabetes.       Hyperlipidemia Follow-Up    Are you regularly taking any medication or supplement to lower your cholesterol?   No-does not tolerate  Are you having muscle aches or other side effects that you think could be caused by your cholesterol lowering medication?  No  Denies chest pain, shortness of breath, dizziness, syncope, or palpitations.        Hypertension Follow-up    Do you check your blood pressure regularly outside of the clinic? No   Are you following a low salt diet? Yes  Are your blood pressures ever more than 140 on the top number (systolic) OR more   than 90 on the bottom number (diastolic), for example 140/90? No not checking  Also has diastolic heart dysfunction with frequent PVCs. Follows with cardiology.      Taking losartan 50 mg, spironolactone 50 mg, lasix 20 mg, and diltiazem without side effects.     Due for a dexa-scan. Declines.      Due for a mammogram. Scheduled for today.     CRP was recently elevated at 14.76. ESR was normal. Feels well. No fevers. No abdominal pain. No nausea or vomiting. Mammogram  "scheduled. Recent Cologuard negative. Was having some joint aches, but those resolved. RF and CCP were negative. Does have chronic left knee pain and swelling-following with ortho. Per ortho, they think she has a meniscus tear.     Asthma Follow-Up    Often triggered by seasonal allergies; allergies currently controlled.     Asthma Follow-Up    Was ACT completed today?  Yes        11/13/2023     8:25 AM   ACT Total Scores   ACT TOTAL SCORE (Goal Greater than or Equal to 20) 25   In the past 12 months, how many times did you visit the emergency room for your asthma without being admitted to the hospital? 0   In the past 12 months, how many times were you hospitalized overnight because of your asthma? 0        How many days per week do you miss taking your asthma controller medication?  I do not have an asthma controller medication  Please describe any recent triggers for your asthma: None  Have you had any Emergency Room Visits, Urgent Care Visits, or Hospital Admissions since your last office visit?  No      BP Readings from Last 2 Encounters:   11/13/23 122/60   10/05/23 105/62     Hemoglobin A1C (%)   Date Value   11/13/2023 5.7 (H)   06/28/2023 6.0 (H)   03/10/2021 5.8 (H)   02/19/2020 5.9 (H)     LDL Cholesterol Calculated (mg/dL)   Date Value   06/28/2023 85   01/10/2022 77   03/10/2021 87   11/11/2019 61       Review of Systems   Constitutional, HEENT, cardiovascular, pulmonary, gi and gu systems are negative, except as otherwise noted.      Objective    /60   Pulse 58   Temp 96.8  F (36  C) (Tympanic)   Resp 18   Ht 1.549 m (5' 1\")   Wt 78.9 kg (174 lb)   SpO2 98%   BMI 32.88 kg/m    Body mass index is 32.88 kg/m .  Physical Exam   GENERAL: alert, no distress, and obese  EYES: Eyes grossly normal to inspection, PERRL and conjunctivae and sclerae normal  HENT: ear canals and TM's normal, nose and mouth without ulcers or lesions  NECK: no adenopathy, no asymmetry, masses, or scars and thyroid " normal to palpation  RESP: lungs clear to auscultation - no rales, rhonchi or wheezes  CV: regular rate and rhythm, no murmur, and peripheral pulses strong  ABDOMEN: soft, nontender, obese, no masses and bowel sounds normal  MS: Some left knee swelling, no erythema  PSYCH: mentation appears normal, affect normal/bright  Diabetic foot exam: normal DP and PT pulses, no trophic changes or ulcerative lesions, and normal sensory exam      Results for orders placed or performed in visit on 11/13/23   Hemoglobin A1c     Status: Abnormal   Result Value Ref Range    Estimated Average Glucose 117 mg/dL    Hemoglobin A1C 5.7 (H) <5.7 %   Basic metabolic panel     Status: Abnormal   Result Value Ref Range    Sodium 138 135 - 145 mmol/L    Potassium 4.3 3.4 - 5.3 mmol/L    Chloride 102 98 - 107 mmol/L    Carbon Dioxide (CO2) 25 22 - 29 mmol/L    Anion Gap 11 7 - 15 mmol/L    Urea Nitrogen 17.4 8.0 - 23.0 mg/dL    Creatinine 1.00 (H) 0.51 - 0.95 mg/dL    GFR Estimate 60 (L) >60 mL/min/1.73m2    Calcium 10.2 8.8 - 10.2 mg/dL    Glucose 104 (H) 70 - 99 mg/dL   CRP, inflammation     Status: Abnormal   Result Value Ref Range    CRP Inflammation 14.33 (H) <5.00 mg/L   Extra Tube     Status: None    Narrative    The following orders were created for panel order Extra Tube.  Procedure                               Abnormality         Status                     ---------                               -----------         ------                     Extra Green Top (Lithium...[[id number]]                      Final result               Extra Purple Top Tube[[id number]]                            Final result                 Please view results for these tests on the individual orders.   Extra Green Top (Lithium Heparin) Tube     Status: None   Result Value Ref Range    Hold Specimen JIC    Extra Purple Top Tube     Status: None   Result Value Ref Range    Hold Specimen JIC

## 2023-11-13 ENCOUNTER — OFFICE VISIT (OUTPATIENT)
Dept: FAMILY MEDICINE | Facility: OTHER | Age: 73
End: 2023-11-13
Attending: NURSE PRACTITIONER
Payer: MEDICARE

## 2023-11-13 ENCOUNTER — APPOINTMENT (OUTPATIENT)
Dept: LAB | Facility: OTHER | Age: 73
End: 2023-11-13
Attending: NURSE PRACTITIONER
Payer: MEDICARE

## 2023-11-13 ENCOUNTER — TELEPHONE (OUTPATIENT)
Dept: MAMMOGRAPHY | Facility: OTHER | Age: 73
End: 2023-11-13

## 2023-11-13 ENCOUNTER — ANCILLARY PROCEDURE (OUTPATIENT)
Dept: MAMMOGRAPHY | Facility: OTHER | Age: 73
End: 2023-11-13
Attending: NURSE PRACTITIONER
Payer: MEDICARE

## 2023-11-13 VITALS
WEIGHT: 174 LBS | HEART RATE: 58 BPM | BODY MASS INDEX: 32.85 KG/M2 | TEMPERATURE: 96.8 F | OXYGEN SATURATION: 98 % | HEIGHT: 61 IN | RESPIRATION RATE: 18 BRPM | DIASTOLIC BLOOD PRESSURE: 60 MMHG | SYSTOLIC BLOOD PRESSURE: 122 MMHG

## 2023-11-13 DIAGNOSIS — J45.20 MILD INTERMITTENT REACTIVE AIRWAY DISEASE WITHOUT COMPLICATION: ICD-10-CM

## 2023-11-13 DIAGNOSIS — E11.9 TYPE 2 DIABETES MELLITUS WITHOUT COMPLICATION, WITHOUT LONG-TERM CURRENT USE OF INSULIN (H): Primary | ICD-10-CM

## 2023-11-13 DIAGNOSIS — R79.82 ELEVATED C-REACTIVE PROTEIN (CRP): ICD-10-CM

## 2023-11-13 DIAGNOSIS — I10 ESSENTIAL HYPERTENSION: ICD-10-CM

## 2023-11-13 DIAGNOSIS — N18.2 CKD (CHRONIC KIDNEY DISEASE) STAGE 2, GFR 60-89 ML/MIN: ICD-10-CM

## 2023-11-13 DIAGNOSIS — Z12.31 ENCOUNTER FOR SCREENING MAMMOGRAM FOR BREAST CANCER: ICD-10-CM

## 2023-11-13 DIAGNOSIS — J30.2 SEASONAL ALLERGIC RHINITIS, UNSPECIFIED TRIGGER: ICD-10-CM

## 2023-11-13 DIAGNOSIS — E78.5 HYPERLIPIDEMIA, UNSPECIFIED HYPERLIPIDEMIA TYPE: ICD-10-CM

## 2023-11-13 LAB
ANION GAP SERPL CALCULATED.3IONS-SCNC: 11 MMOL/L (ref 7–15)
BUN SERPL-MCNC: 17.4 MG/DL (ref 8–23)
CALCIUM SERPL-MCNC: 10.2 MG/DL (ref 8.8–10.2)
CHLORIDE SERPL-SCNC: 102 MMOL/L (ref 98–107)
CREAT SERPL-MCNC: 1 MG/DL (ref 0.51–0.95)
CRP SERPL-MCNC: 14.33 MG/L
DEPRECATED HCO3 PLAS-SCNC: 25 MMOL/L (ref 22–29)
EGFRCR SERPLBLD CKD-EPI 2021: 60 ML/MIN/1.73M2
EST. AVERAGE GLUCOSE BLD GHB EST-MCNC: 117 MG/DL
GLUCOSE SERPL-MCNC: 104 MG/DL (ref 70–99)
HBA1C MFR BLD: 5.7 %
HOLD SPECIMEN: NORMAL
HOLD SPECIMEN: NORMAL
POTASSIUM SERPL-SCNC: 4.3 MMOL/L (ref 3.4–5.3)
SODIUM SERPL-SCNC: 138 MMOL/L (ref 135–145)

## 2023-11-13 PROCEDURE — 80048 BASIC METABOLIC PNL TOTAL CA: CPT | Mod: ZL | Performed by: NURSE PRACTITIONER

## 2023-11-13 PROCEDURE — 99214 OFFICE O/P EST MOD 30 MIN: CPT | Performed by: NURSE PRACTITIONER

## 2023-11-13 PROCEDURE — 86140 C-REACTIVE PROTEIN: CPT | Mod: ZL | Performed by: NURSE PRACTITIONER

## 2023-11-13 PROCEDURE — 90662 IIV NO PRSV INCREASED AG IM: CPT

## 2023-11-13 PROCEDURE — 36415 COLL VENOUS BLD VENIPUNCTURE: CPT | Mod: ZL | Performed by: NURSE PRACTITIONER

## 2023-11-13 PROCEDURE — 77067 SCR MAMMO BI INCL CAD: CPT | Mod: TC

## 2023-11-13 PROCEDURE — 83036 HEMOGLOBIN GLYCOSYLATED A1C: CPT | Mod: ZL | Performed by: NURSE PRACTITIONER

## 2023-11-13 PROCEDURE — G0463 HOSPITAL OUTPT CLINIC VISIT: HCPCS | Mod: 25

## 2023-11-13 ASSESSMENT — ASTHMA QUESTIONNAIRES: ACT_TOTALSCORE: 25

## 2023-11-13 ASSESSMENT — PAIN SCALES - GENERAL: PAINLEVEL: MILD PAIN (2)

## 2023-11-13 NOTE — TELEPHONE ENCOUNTER
Spoke to patient about needing ultrasound on left breast - transferred to scheduling.     Linn ZAVALA RN

## 2023-11-16 ENCOUNTER — HOSPITAL ENCOUNTER (OUTPATIENT)
Dept: ULTRASOUND IMAGING | Facility: HOSPITAL | Age: 73
Discharge: HOME | End: 2023-11-16
Attending: NURSE PRACTITIONER | Admitting: NURSE PRACTITIONER
Payer: MEDICARE

## 2023-11-16 DIAGNOSIS — N63.21 MASS OF UPPER OUTER QUADRANT OF LEFT BREAST: ICD-10-CM

## 2023-11-16 PROCEDURE — 76642 ULTRASOUND BREAST LIMITED: CPT | Mod: LT

## 2023-11-21 ENCOUNTER — HOSPITAL ENCOUNTER (OUTPATIENT)
Dept: MAMMOGRAPHY | Facility: HOSPITAL | Age: 73
Discharge: HOME | End: 2023-11-21
Attending: RADIOLOGY | Admitting: RADIOLOGY
Payer: MEDICARE

## 2023-11-21 ENCOUNTER — HOSPITAL ENCOUNTER (OUTPATIENT)
Dept: ULTRASOUND IMAGING | Facility: HOSPITAL | Age: 73
Discharge: HOME | End: 2023-11-21
Attending: SURGERY
Payer: MEDICARE

## 2023-11-21 ENCOUNTER — TRANSFERRED RECORDS (OUTPATIENT)
Dept: HEALTH INFORMATION MANAGEMENT | Facility: CLINIC | Age: 73
End: 2023-11-21

## 2023-11-21 ENCOUNTER — HOSPITAL ENCOUNTER (OUTPATIENT)
Facility: HOSPITAL | Age: 73
Discharge: HOME | End: 2023-11-21
Attending: RADIOLOGY | Admitting: RADIOLOGY
Payer: MEDICARE

## 2023-11-21 VITALS — OXYGEN SATURATION: 98 % | SYSTOLIC BLOOD PRESSURE: 135 MMHG | HEART RATE: 64 BPM | DIASTOLIC BLOOD PRESSURE: 83 MMHG

## 2023-11-21 DIAGNOSIS — N63.21 MASS OF UPPER OUTER QUADRANT OF LEFT BREAST: ICD-10-CM

## 2023-11-21 PROCEDURE — 250N000009 HC RX 250: Performed by: RADIOLOGY

## 2023-11-21 PROCEDURE — 88305 TISSUE EXAM BY PATHOLOGIST: CPT | Mod: TC | Performed by: SURGERY

## 2023-11-21 PROCEDURE — 88360 TUMOR IMMUNOHISTOCHEM/MANUAL: CPT | Mod: 26 | Performed by: PATHOLOGY

## 2023-11-21 PROCEDURE — 272N000032 US BREAST BIOPSY VACUUM LEFT

## 2023-11-21 PROCEDURE — 88305 TISSUE EXAM BY PATHOLOGIST: CPT | Mod: 26 | Performed by: PATHOLOGY

## 2023-11-21 PROCEDURE — 19083 BX BREAST 1ST LESION US IMAG: CPT | Mod: LT

## 2023-11-21 PROCEDURE — 999N000065 MA POST PROCEDURE LEFT

## 2023-11-21 RX ORDER — LIDOCAINE HYDROCHLORIDE 10 MG/ML
5-10 INJECTION, SOLUTION EPIDURAL; INFILTRATION; INTRACAUDAL; PERINEURAL
Status: DISCONTINUED | OUTPATIENT
Start: 2023-11-21 | End: 2023-11-21 | Stop reason: HOSPADM

## 2023-11-21 RX ORDER — LIDOCAINE HYDROCHLORIDE 10 MG/ML
5-10 INJECTION, SOLUTION EPIDURAL; INFILTRATION; INTRACAUDAL; PERINEURAL
Status: COMPLETED | OUTPATIENT
Start: 2023-11-21 | End: 2023-11-21

## 2023-11-21 RX ADMIN — LIDOCAINE HYDROCHLORIDE 5 ML: 10 INJECTION, SOLUTION EPIDURAL; INFILTRATION; INTRACAUDAL; PERINEURAL at 10:42

## 2023-11-21 ASSESSMENT — ACTIVITIES OF DAILY LIVING (ADL)
ADLS_ACUITY_SCORE: 37
ADLS_ACUITY_SCORE: 37

## 2023-11-21 NOTE — PROGRESS NOTES
Patient here for ultrasound guided biopsy of left breast.  Procedure reviewed with patient by writer and radiologist, questions answered.  Time out performed prior to biopsy.  Biopsy completed by radiologist, clip placed.  Pressure held to biopsy site for 10 minutes.  Steri strips and guaze with tegaderm dressing applied.   Post clip mammogram completed.  Ice pack applied over dressing.  Discharge instructions reviewed with patient, patient verbalizes understanding of instructions.  Discharged to home in stable condition with no evidence of bleeding from biopsy site.      Prior to procedure, patient's palpated heart rate was 40 bpm. Patient denied any symptoms, states that this is a normal thing. Palpated second time prior to procedure and it was 70 bpm. Patient did take anxiety medication prior to procedure. Palpated pulse for third time post procedure and was 64 bpm. Pulse seemed to be at an irregular rate as well.  Patient again denied any symptoms and stated that she has been working with cardiology regarding this and that it may be due to receiving a vaccine. Provider will be updated.     Linn ZAVALA RN

## 2023-11-22 ENCOUNTER — TELEPHONE (OUTPATIENT)
Dept: MAMMOGRAPHY | Facility: OTHER | Age: 73
End: 2023-11-22

## 2023-11-22 NOTE — TELEPHONE ENCOUNTER
Called patient to check on status post breast biopsy.  Patient reports pain 2/10.  Patient has been using Tylenol, said she is a little sore.  Patient reports no bleeding. Did tell patient that her provider is aware of her heart rate yesterday, directed patient that if she ever has any symptoms to go to the ER, patient reported understanding.     Linn ZAVALA RN

## 2023-11-28 ENCOUNTER — TRANSFERRED RECORDS (OUTPATIENT)
Dept: HEALTH INFORMATION MANAGEMENT | Facility: HOSPITAL | Age: 73
End: 2023-11-28

## 2023-11-28 LAB
PATH REPORT.COMMENTS IMP SPEC: ABNORMAL
PATH REPORT.COMMENTS IMP SPEC: ABNORMAL
PATH REPORT.COMMENTS IMP SPEC: YES
PATH REPORT.FINAL DX SPEC: ABNORMAL
PATH REPORT.GROSS SPEC: ABNORMAL
PATH REPORT.MICROSCOPIC SPEC OTHER STN: ABNORMAL
PATHOLOGY SYNOPTIC REPORT: ABNORMAL
PHOTO IMAGE: ABNORMAL

## 2023-11-29 ENCOUNTER — OFFICE VISIT (OUTPATIENT)
Dept: FAMILY MEDICINE | Facility: OTHER | Age: 73
End: 2023-11-29
Attending: SURGERY
Payer: COMMERCIAL

## 2023-11-29 ENCOUNTER — TELEPHONE (OUTPATIENT)
Dept: MAMMOGRAPHY | Facility: OTHER | Age: 73
End: 2023-11-29

## 2023-11-29 ENCOUNTER — OFFICE VISIT (OUTPATIENT)
Dept: SURGERY | Facility: OTHER | Age: 73
End: 2023-11-29
Attending: SURGERY
Payer: MEDICARE

## 2023-11-29 VITALS
TEMPERATURE: 98.1 F | DIASTOLIC BLOOD PRESSURE: 74 MMHG | HEART RATE: 66 BPM | OXYGEN SATURATION: 98 % | SYSTOLIC BLOOD PRESSURE: 132 MMHG | WEIGHT: 174 LBS | BODY MASS INDEX: 32.85 KG/M2 | HEIGHT: 61 IN

## 2023-11-29 VITALS
HEIGHT: 61 IN | OXYGEN SATURATION: 98 % | RESPIRATION RATE: 16 BRPM | HEART RATE: 67 BPM | DIASTOLIC BLOOD PRESSURE: 72 MMHG | BODY MASS INDEX: 32.85 KG/M2 | WEIGHT: 174 LBS | SYSTOLIC BLOOD PRESSURE: 146 MMHG

## 2023-11-29 DIAGNOSIS — Z01.818 PREOP GENERAL PHYSICAL EXAM: Primary | ICD-10-CM

## 2023-11-29 DIAGNOSIS — F41.1 GAD (GENERALIZED ANXIETY DISORDER): ICD-10-CM

## 2023-11-29 DIAGNOSIS — Z17.0 MALIGNANT NEOPLASM OF LEFT BREAST IN FEMALE, ESTROGEN RECEPTOR POSITIVE, UNSPECIFIED SITE OF BREAST (H): Primary | ICD-10-CM

## 2023-11-29 DIAGNOSIS — E78.5 HYPERLIPIDEMIA, UNSPECIFIED HYPERLIPIDEMIA TYPE: ICD-10-CM

## 2023-11-29 DIAGNOSIS — C50.912 MALIGNANT NEOPLASM OF LEFT BREAST IN FEMALE, ESTROGEN RECEPTOR POSITIVE, UNSPECIFIED SITE OF BREAST (H): Primary | ICD-10-CM

## 2023-11-29 DIAGNOSIS — F33.0 MILD EPISODE OF RECURRENT MAJOR DEPRESSIVE DISORDER (H): ICD-10-CM

## 2023-11-29 DIAGNOSIS — E11.9 TYPE 2 DIABETES MELLITUS WITHOUT COMPLICATION, WITHOUT LONG-TERM CURRENT USE OF INSULIN (H): ICD-10-CM

## 2023-11-29 DIAGNOSIS — I10 ESSENTIAL HYPERTENSION: ICD-10-CM

## 2023-11-29 DIAGNOSIS — I49.3 FREQUENT PVCS: ICD-10-CM

## 2023-11-29 DIAGNOSIS — C50.912 MALIGNANT NEOPLASM OF LEFT FEMALE BREAST, UNSPECIFIED ESTROGEN RECEPTOR STATUS, UNSPECIFIED SITE OF BREAST (H): ICD-10-CM

## 2023-11-29 DIAGNOSIS — J45.20 MILD INTERMITTENT REACTIVE AIRWAY DISEASE WITHOUT COMPLICATION: ICD-10-CM

## 2023-11-29 PROBLEM — R07.9 CHEST PAIN, UNSPECIFIED TYPE: Status: RESOLVED | Noted: 2021-08-09 | Resolved: 2023-11-29

## 2023-11-29 LAB
ANION GAP SERPL CALCULATED.3IONS-SCNC: 10 MMOL/L (ref 7–15)
BASOPHILS # BLD AUTO: 0.1 10E3/UL (ref 0–0.2)
BASOPHILS NFR BLD AUTO: 1 %
BUN SERPL-MCNC: 12.5 MG/DL (ref 8–23)
CALCIUM SERPL-MCNC: 9.8 MG/DL (ref 8.8–10.2)
CHLORIDE SERPL-SCNC: 105 MMOL/L (ref 98–107)
CREAT SERPL-MCNC: 0.83 MG/DL (ref 0.51–0.95)
CRP SERPL-MCNC: 12.09 MG/L
DEPRECATED HCO3 PLAS-SCNC: 24 MMOL/L (ref 22–29)
EGFRCR SERPLBLD CKD-EPI 2021: 75 ML/MIN/1.73M2
EOSINOPHIL # BLD AUTO: 0.1 10E3/UL (ref 0–0.7)
EOSINOPHIL NFR BLD AUTO: 1 %
ERYTHROCYTE [DISTWIDTH] IN BLOOD BY AUTOMATED COUNT: 13.2 % (ref 10–15)
GLUCOSE SERPL-MCNC: 106 MG/DL (ref 70–99)
HCT VFR BLD AUTO: 41.6 % (ref 35–47)
HGB BLD-MCNC: 14 G/DL (ref 11.7–15.7)
IMM GRANULOCYTES # BLD: 0 10E3/UL
IMM GRANULOCYTES NFR BLD: 0 %
LYMPHOCYTES # BLD AUTO: 1.9 10E3/UL (ref 0.8–5.3)
LYMPHOCYTES NFR BLD AUTO: 20 %
MCH RBC QN AUTO: 31.2 PG (ref 26.5–33)
MCHC RBC AUTO-ENTMCNC: 33.7 G/DL (ref 31.5–36.5)
MCV RBC AUTO: 93 FL (ref 78–100)
MONOCYTES # BLD AUTO: 0.6 10E3/UL (ref 0–1.3)
MONOCYTES NFR BLD AUTO: 7 %
NEUTROPHILS # BLD AUTO: 6.6 10E3/UL (ref 1.6–8.3)
NEUTROPHILS NFR BLD AUTO: 71 %
NRBC # BLD AUTO: 0 10E3/UL
NRBC BLD AUTO-RTO: 0 /100
PLATELET # BLD AUTO: 267 10E3/UL (ref 150–450)
POTASSIUM SERPL-SCNC: 3.7 MMOL/L (ref 3.4–5.3)
RBC # BLD AUTO: 4.49 10E6/UL (ref 3.8–5.2)
SODIUM SERPL-SCNC: 139 MMOL/L (ref 135–145)
WBC # BLD AUTO: 9.3 10E3/UL (ref 4–11)

## 2023-11-29 PROCEDURE — G0463 HOSPITAL OUTPT CLINIC VISIT: HCPCS | Mod: 27

## 2023-11-29 PROCEDURE — 36415 COLL VENOUS BLD VENIPUNCTURE: CPT | Mod: ZL | Performed by: NURSE PRACTITIONER

## 2023-11-29 PROCEDURE — 93005 ELECTROCARDIOGRAM TRACING: CPT | Performed by: NURSE PRACTITIONER

## 2023-11-29 PROCEDURE — G0463 HOSPITAL OUTPT CLINIC VISIT: HCPCS | Mod: 25

## 2023-11-29 PROCEDURE — 99204 OFFICE O/P NEW MOD 45 MIN: CPT | Performed by: SURGERY

## 2023-11-29 PROCEDURE — G0463 HOSPITAL OUTPT CLINIC VISIT: HCPCS | Mod: 25,27

## 2023-11-29 PROCEDURE — 99214 OFFICE O/P EST MOD 30 MIN: CPT | Performed by: NURSE PRACTITIONER

## 2023-11-29 PROCEDURE — 93010 ELECTROCARDIOGRAM REPORT: CPT | Mod: 77 | Performed by: INTERNAL MEDICINE

## 2023-11-29 PROCEDURE — 85025 COMPLETE CBC W/AUTO DIFF WBC: CPT | Mod: ZL | Performed by: NURSE PRACTITIONER

## 2023-11-29 PROCEDURE — 80048 BASIC METABOLIC PNL TOTAL CA: CPT | Mod: ZL | Performed by: NURSE PRACTITIONER

## 2023-11-29 PROCEDURE — 86140 C-REACTIVE PROTEIN: CPT | Mod: ZL | Performed by: NURSE PRACTITIONER

## 2023-11-29 PROCEDURE — G0463 HOSPITAL OUTPT CLINIC VISIT: HCPCS

## 2023-11-29 ASSESSMENT — PAIN SCALES - GENERAL
PAINLEVEL: MODERATE PAIN (4)
PAINLEVEL: NO PAIN (0)

## 2023-11-29 NOTE — PATIENT INSTRUCTIONS
Thank you for allowing Dr. Molina and our surgical team to participate in your care. Please call our health unit coordinator at [phone] with scheduling questions or the nurse at [phone] with any other questions or concerns.

## 2023-11-29 NOTE — PROGRESS NOTES
CLINIC NOTE - CONSULT  11/29/2023    Patient: Rj Rodríguez  Referring Physician: No ref. provider found    Reason for Referral   Biopsy positive left breast cancer    This is a 73 year old female with biopsy proven left breast cancer.  Biopsy was performed on 11/21/23 and showed ductal carcinoma of the left breast.  The patient is here to discuss options.     Family  History of Breast Cancer: a negative family history for breast cancer.   Personal History of Breast Cancer: NO   Personal history of previous biopsies: NO   Age at Menarche: 19   P9,G6   Age at First Pregnancy: 21   Years of OCP use: 0   Still having periods: NO   Age at Menopause: Surgical at 53   Use of HRT: NO    Past Medical History:  Past Medical History:   Diagnosis Date    Arthritis     Chronic back pain     Hypertension     Irregular heart beat     Sleep apnea        Past Surgical History:  Past Surgical History:   Procedure Laterality Date    ANESTHESIA OUT OF OR MRI N/A 6/1/2022    Procedure: MRI CERVICAL SPINE;  Surgeon: GENERIC ANESTHESIA PROVIDER;  Location: [facility]    ANESTHESIA OUT OF OR MRI N/A 9/23/2022    Procedure: MRI CERVICAL SPINE;  Surgeon: GENERIC ANESTHESIA PROVIDER;  Location: [facility]    APPENDECTOMY      BACK SURGERY      CHOLECYSTECTOMY      COLONOSCOPY  2012    Repeat in 10 years    GYN SURGERY      HYSTERECTOMY      Ovaries    RELEASE CARPAL TUNNEL      LT    RELEASE CARPAL TUNNEL      RT x2    SURGICAL RADIOLOGY PROCEDURE N/A 2/12/2016    Procedure: SURGICAL RADIOLOGY PROCEDURE;  Surgeon: Provider, Generic Perianesthesia Nursing;  Location: [facility]    SURGICAL RADIOLOGY PROCEDURE N/A 8/15/2016    Procedure: SURGICAL RADIOLOGY PROCEDURE;  Surgeon: Provider, Generic Perianesthesia Nursing;  Location: [facility]       Family History History:  Family History   Problem Relation Age of Onset    Colon Cancer Maternal Aunt     Colon Cancer Maternal Uncle     Diabetes Father         Type 2    Hypertension Father     Alcoholism Father      Alcoholism Mother        History of Tobacco Use:  History   Smoking Status    Never   Smokeless Tobacco    Never       Current Medications:  Current Outpatient Medications   Medication Sig Dispense Refill    blood glucose (ACCU-CHEK GUIDE) test strip Use to test blood sugar 1 time daily or as directed. 50 each 11    blood glucose monitoring (ACCU-CHEK FASTCLIX) lancets Use to test blood sugar 1 time daily or as directed. 102 each 11    cetirizine (ZYRTEC) 10 MG tablet Take 1 tablet (10 mg) by mouth daily as needed for allergies 60 tablet 12    clonazePAM (KLONOPIN) 0.5 MG tablet Take 1 tablet by mouth twice daily as needed for anxiety 30 tablet 0    diltiazem ER (TIAZAC) 240 MG 24 hr ER beaded capsule Take 1 capsule (240 mg) by mouth daily 90 capsule 3    EPINEPHrine (ANY BX GENERIC EQUIV) 0.3 MG/0.3ML injection 2-pack Inject 0.3 mLs (0.3 mg) into the muscle as needed for anaphylaxis May repeat one time in 5-15 minutes if response to initial dose is inadequate. 2 each 1    furosemide (LASIX) 20 MG tablet Take 1 tablet (20 mg) by mouth daily 90 tablet 3    spironolactone (ALDACTONE) 50 MG tablet Take 1 tablet by mouth once daily 90 tablet 0    STATIN NOT PRESCRIBED (INTENTIONAL) Please choose reason not prescribed from choices below.      triamcinolone (NASACORT) 55 MCG/ACT nasal aerosol Spray 2 sprays into both nostrils daily 2 Bottle 12    albuterol (PROAIR HFA/PROVENTIL HFA/VENTOLIN HFA) 108 (90 Base) MCG/ACT inhaler Inhale 2 puffs into the lungs every 6 hours 18 g 0       Allergies:  Allergies   Allergen Reactions    Fruit [Peach] Anaphylaxis and Hives     fresh peaches and any fruit that has a pit.  Kiwi's and apples also.  Can eat any fruit cooked.    Nuts Anaphylaxis and Hives     All Raw Nuts, can eat nuts when roasted.    Ace Inhibitors      Upper respiratory infection    Alprazolam Hives     Xanax    Antihistamines, Chlorpheniramine-Type Hives     Antihistamines    Diphenhydramine Hives and Other (See  "Comments)     Keeps her awake      Erythromycin      BASE; pt. Not sure if this is a true allergy.    Guaifed      Pt unsure of reaction    Guaifenesin      Guaifed    Hydrochlorothiazide     No Clinical Screening - See Comments      Allergic:  Fresh peaches and any fruit that has a pit.  Kiwi's and apples also.  All raw nuts.  Can eat nuts when roasted or any fruit cooked.  Some seasonal allergies :  Hayfever    Olmesartan Medoxomil Cough     Benicar    Phenylephrine Hcl      Guaifed    Pseudoephedrine Hcl      Guaifed    Seasonal Allergies      hayfever    Statins     Tramadol      Sleep disturbance. Can not sleep, and when able to fall asleep will have terrible nightmares    Tramadol Hcl      Ultram, insomnia, nightmares, headache    Venlafaxine Other (See Comments)     Heartburn, reflux    Zoloft [Sertraline]      paranoia    Latex Other (See Comments), Swelling, Difficulty breathing and Rash     Throat Closes         ROS:  Pertinent items are noted in HPI.  All other systems are negative.    PHYSICAL EXAM:     Vital signs: BP (!) 146/72 (BP Location: Right arm, Cuff Size: Adult Regular)   Pulse 67   Resp 16   Ht 1.556 m (5' 1.25\")   Wt 78.9 kg (174 lb)   SpO2 98%   BMI 32.61 kg/m     Weight: [unfilled]   BMI: Body mass index is 32.61 kg/m .   General: Normal, healthy, cooperative, in no acute distress, alert   Skin: no jaundice   HEENT: PERRLA and EOMI   Neck: supple     PATHOLOGY:  Final Diagnosis     A.  Breast, left, 1:00, 6 cm from nipple, ultrasound-guided core needle biopsy:  -Invasive ductal carcinoma, grade 1.  -See synoptic reports below.        Electronically signed by Steve Fletcher DO on 11/28/2023 at 10:36 AM     Synoptic Checklist     INVASIVE CARCINOMA OF THE BREAST: Biopsy   Protocol posted: 3/22/2023INVASIVE CARCINOMA OF THE BREAST: BIOPSY - All Specimens  SPECIMEN   Procedure  Needle biopsy   Specimen Laterality  Left   TUMOR   Tumor Site  Clock position     1 o'clock   Tumor Site  " Distance from nipple (Centimeters): 6 cm   Histologic Type  Invasive carcinoma of no special type (ductal)   Histologic Grade (Noemi Histologic Score)     Glandular (Acinar) / Tubular Differentiation  Score 1   Nuclear Pleomorphism  Score 1   Mitotic Rate  Score 1   Overall Grade  Grade 1 (scores of 3, 4 or 5)   Largest Invasive Focus in this Limited Biopsy Sample  5 mm   Ductal Carcinoma In Situ (DCIS)  Not identified   Lymphatic and / or Vascular Invasion  Not identified   Microcalcifications  Present in invasive carcinoma     Present in non-neoplastic tissue   ADDITIONAL FINDINGS   Additional Findings  Focal usual ductal hyperplasia, mild fibrocystic changes   .   Breast Biomarker Reporting Template   Protocol posted: 3/22/2023BREAST: BIOMARKER REPORTING TEMPLATE - A  Test(s) Performed     Estrogen Receptor (ER) Status  Positive (greater than 10% of cells demonstrate nuclear positivity)   Percentage of Cells with Nuclear Positivity  %   Average Intensity of Staining  Strong   Test Type  Food and Drug Administration (FDA) cleared (test / vendor): Roche   Primary Antibody  SP1   Scoring System  No separate scoring system used   Test(s) Performed     Progesterone Receptor (PgR) Status  Positive   Percentage of Cells with Nuclear Positivity  71-80%   Average Intensity of Staining  Moderate   Test Type  Food and Drug Administration (FDA) cleared (test / vendor): Roche   Primary Antibody  1E2   Scoring System  No separate scoring system used   Test(s) Performed     HER2 by Immunohistochemistry  Negative (Score 1+)   Test Type  Food and Drug Administration (FDA) cleared (test / vendor): Roche   Primary Antibody  4B5   Cold Ischemia and Fixation Times  Meet requirements specified in latest version of the ASCO / CAP Guidelines   METHODS   Fixative  Formalin   Image Analysis  Not performed   .        Gross Description      A(1). Breast, Left, left breast 1:00 6cmfn:  The specimen is received in a container  labeled with the patient's name, medical record number and left breast, 1:00, 6 cm from nipple.  In formalin are 11 yellow needle core biopsy segments that range in length from 1.1 cm up to 2.5 cm the cores have a diameter that ranges from 0.2 cm up to 0.4 cm.  Also present in the specimen container are several bits of similar-appearing yellow adipose tissue that measure in aggregate 1.6 x 1 x 0.1 cm.  All in 5 cassettes with the intact core segments in cassettes A1-A4.     Microscopic Description      A microscopic examination was performed.     MCRS  N/A Yes Abnormal      Performing Labs      The technical component processing of the immunohistochemical stains was completed at [facility].  All other technical component processing was completed at [facility] along with the professional interpretations.        Resulting Agency [facility]                Specimen Collected: 11/21/23 10:45 AM Last Resulted: 11/28/23 10:36 AM               ASSESSMENT:  73 year old female with biopsy proven left breast cancer.  Biopsy showed ductal carcinoma of the left breast.    PLAN:   Discussed with the patient breast cancer and the treatment of it, including lumpectomy, sentinel node biopsies, axillary node dissections, complete mastectomies and modified radical mastectomies.  The risks, benefits and alternatives of each procedure were explained.  Discussed needs and indications for neoadjuvant and post procedure chemotherapy and radiation therapy.  Discussed options for reconstruction and timing of the reconstruction if desired.  Answered all of the patient's and any accompanied individuals questions.    At the conclusion of the discussion the patient desires to make a follow up appointment to decide on surgical options after contemplating the information.  This will be arranged.    We did have a long discussion about options.  She is somewhat leaning towards  mastectomy though would like to contemplate it.  With respect that we will go ahead and get an MRI of her bilateral breasts along with BRCA1/BRCA2 to better give her risk assessment to help her make a decision.The patient will be given information for the local support group.

## 2023-11-29 NOTE — H&P (VIEW-ONLY)
[facility] [place]  [address] [facility] [place] [address]  Phone: [phone]  Primary Provider: Danisha Dyer  Pre-op Performing Provider: DANISHA DYER      PREOPERATIVE EVALUATION:  Today's date: 11/29/2023    Rj is a 73 year old, presenting for the following:  Pre-Op Exam      Surgical Information:  Surgery/Procedure: MRI with sedation   [place] [facility] [place]: anesthesia and Dr. Molina  Surgery Date: TBD  Time of Surgery: TBD  Where patient plans to recover: At home with family  Fax number for surgical facility: Note does not need to be faxed, will be available electronically in Epic.    Assessment & Plan     The proposed surgical procedure is considered LOW risk.    Preop general physical exam  Malignant neoplasm of left female breast, unspecified estrogen receptor status, unspecified site of breast (H)  No concerns noted. Cleared for surgery. Blood work and EKG unremarkable.     Type 2 diabetes mellitus without complication, without long-term current use of insulin (H)  Controlled. Recent A1c 5.7.     Hyperlipidemia, unspecified hyperlipidemia type  Does not tolerate statin.     Essential hypertension  Controlled.     Mild intermittent reactive airway disease without complication  Controlled.     ZEFERINO (generalized anxiety disorder)  Mild episode of recurrent major depressive disorder (H24)  Stable.     Frequent PVCs   Feeling well. EKG today without PVCs.     Risks and Recommendations:  The patient has the following additional risks and recommendations for perioperative complications:  Pulmonary:    - Incentive spirometry post-op    Will hold all medications the morning of surgery except her diltiazem.     RECOMMENDATION:  APPROVAL GIVEN to proceed with proposed procedure, without further diagnostic evaluation.    Rj PFEIFFER Anne with a functional capacity of greater than four MET's. There are no obvious contraindications to proceeding with surgery at this time. Recommend that the  surgeon review risks and benefits of the procedure prior to proceeding.     Was recommended to avoid eating and drinking anything 12 hours prior to surgery unless his surgeon tells him otherwise.     Subjective       HPI related to upcoming procedure: Patient with breast cancer. Recent biopsy showed cancer. Plan is to proceed with breast MRI under sedation.         11/29/2023    12:32 PM   Preop Questions   1. Have you ever had a heart attack or stroke? No   2. Have you ever had surgery on your heart or blood vessels, such as a stent placement, a coronary artery bypass, or surgery on an artery in your head, neck, heart, or legs? No   3. Do you have chest pain with activity? No   4. Do you have a history of  heart failure? No   5. Do you currently have a cold, bronchitis or symptoms of other infection? No   6. Do you have a cough, shortness of breath, or wheezing? No   7. Do you or anyone in your family have previous history of blood clots? No   8. Do you or does anyone in your family have a serious bleeding problem such as prolonged bleeding following surgeries or cuts? No   9. Have you ever had problems with anemia or been told to take iron pills? No   10. Have you had any abnormal blood loss such as black, tarry or bloody stools, or abnormal vaginal bleeding? No   11. Have you ever had a blood transfusion? No   12. Are you willing to have a blood transfusion if it is medically needed before, during, or after your surgery? Yes   13. Have you or any of your relatives ever had problems with anesthesia? YES - has a hard time waking up after anesthesia    14. Do you have sleep apnea, excessive snoring or daytime drowsiness? No   15. Do you have any artifical heart valves or other implanted medical devices like a pacemaker, defibrillator, or continuous glucose monitor? No   16. Do you have artificial joints? No   17. Are you allergic to latex? YES: swelling, difficulty breathing, rash        Health Care  Directive:  Patient does not have a Health Care Directive or Living Will: Discussed advance care planning with patient; however, patient declined at this time.    Preoperative Review of :   reviewed - controlled substances reflected in medication list.  [id number]    Status of Chronic Conditions:  DEPRESSION and ANXIETY - Patient has a long history of Depression and Anxiety of moderate severity requiring medication for control with recent symptoms being slightly worse. Current symptoms of depression include anxiety and irritability with recent diagnosis of breast cancer. Uses Klonopin as needed. No thoughts of suicide. Working with her counselor.     DIABETES - Patient has a longstanding history of Diabetes Type II . Patient is being treated with diet and exercise and denies significant side effects. Control has been good. Complicating factors include but are not limited to: hypertension. A1C was 5.7 on 11/13/23.      HYPERLIPIDEMIA - Patient has a long history of significant Hyperlipidemia requiring medication for treatment with recent fair control. Does not take a statin due to side effects.      HYPERTENSION/Frequent PVC- Patient has longstanding history of HTN and palpitations, currently denies any symptoms referable to elevated blood pressure. Specifically denies chest pain, dyspnea, orthopnea, PND or peripheral edema. Blood pressure readings have been in normal range. Current medication regimen is as listed below. Patient denies any side effects of medication. Currently taking Aldactone 50 mg, diltiazem 240 mg, and lasix 20 mg. She does follow with cardiology. Was last seen on 6/6/23. Note was reviewed. Stress test was done on 2/10/22 and was negative for reversible ischemia. No longer having palpitations.    ASTHMA - controlled. Rare use of albuterol.      Mets greater than 4, able to walk around the block without stopping.     She has had surgery in the past and done well.     Review of  Systems  CONSTITUTIONAL: NEGATIVE for fever, chills, change in weight  INTEGUMENTARY/SKIN: NEGATIVE for worrisome rashes, moles or lesions  EYES: NEGATIVE for vision changes or irritation  ENT/MOUTH: NEGATIVE for ear, mouth and throat problems  RESP: NEGATIVE for significant cough or SOB  CV: NEGATIVE for chest pain, palpitations or peripheral edema  GI: NEGATIVE for nausea, abdominal pain, heartburn, or change in bowel habits  : NEGATIVE for frequency, dysuria, or hematuria  MUSCULOSKELETAL: NEGATIVE for significant arthralgias or myalgia  NEURO: NEGATIVE for weakness, dizziness or paresthesias  ENDOCRINE: NEGATIVE for temperature intolerance, skin/hair changes  HEME: NEGATIVE for bleeding problems  PSYCHIATRIC: NEGATIVE for changes in mood or affect    Patient Active Problem List    Diagnosis Date Noted    Class 2 severe obesity due to excess calories with serious comorbidity in adult (H) 08/07/2023     Priority: Medium    Shortness of breath 08/09/2021     Priority: Medium    Irregular heart rate 08/09/2021     Priority: Medium    Frequent PVCs 08/09/2021     Priority: Medium    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     Priority: Medium    Diabetes mellitus, type 2 (H) 12/06/2019     Priority: Medium    Robinson cardiac risk 11% in next 10 years 10/29/2019     Priority: Medium    S/P rotator cuff repair 10/26/2016     Priority: Medium    Mild episode of recurrent major depressive disorder (H24) 10/22/2014     Priority: Medium    Migraine with aura and without status migrainosus, not intractable 04/21/2014     Priority: Medium    S/P cervical spinal fusion 03/21/2014     Priority: Medium    Cervicalgia 12/10/2013     Priority: Medium    Vertigo 03/18/2013     Priority: Medium    Tinnitus 03/18/2013     Priority: Medium    Chronic rhinitis 03/18/2013     Priority: Medium    Lumbago 07/10/2012     Priority: Medium    Presbyopia 01/25/2011     Priority: Medium    Primary open-angle glaucoma 01/25/2011     Priority:  Medium     Overview:   IMO Update 10/11      ZEFERINO (generalized anxiety disorder) 12/06/2006     Priority: Medium    Essential hypertension 06/02/2004     Priority: Medium     Overview:   IMO Update        Past Medical History:   Diagnosis Date    Arthritis     Chronic back pain     Hypertension     Irregular heart beat     Sleep apnea      Past Surgical History:   Procedure Laterality Date    ANESTHESIA OUT OF OR MRI N/A 6/1/2022    Procedure: MRI CERVICAL SPINE;  Surgeon: GENERIC ANESTHESIA PROVIDER;  Location: [facility]    ANESTHESIA OUT OF OR MRI N/A 9/23/2022    Procedure: MRI CERVICAL SPINE;  Surgeon: GENERIC ANESTHESIA PROVIDER;  Location: [facility]    APPENDECTOMY      BACK SURGERY      CHOLECYSTECTOMY      COLONOSCOPY  2012    Repeat in 10 years    GYN SURGERY      HYSTERECTOMY      Ovaries    RELEASE CARPAL TUNNEL      LT    RELEASE CARPAL TUNNEL      RT x2    SURGICAL RADIOLOGY PROCEDURE N/A 2/12/2016    Procedure: SURGICAL RADIOLOGY PROCEDURE;  Surgeon: Provider, Generic Perianesthesia Nursing;  Location: [facility]    SURGICAL RADIOLOGY PROCEDURE N/A 8/15/2016    Procedure: SURGICAL RADIOLOGY PROCEDURE;  Surgeon: Provider, Generic Perianesthesia Nursing;  Location: [facility]     Current Outpatient Medications   Medication Sig Dispense Refill    albuterol (PROAIR HFA/PROVENTIL HFA/VENTOLIN HFA) 108 (90 Base) MCG/ACT inhaler Inhale 2 puffs into the lungs every 6 hours 18 g 0    blood glucose (ACCU-CHEK GUIDE) test strip Use to test blood sugar 1 time daily or as directed. 50 each 11    blood glucose monitoring (ACCU-CHEK FASTCLIX) lancets Use to test blood sugar 1 time daily or as directed. 102 each 11    cetirizine (ZYRTEC) 10 MG tablet Take 1 tablet (10 mg) by mouth daily as needed for allergies 60 tablet 12    clonazePAM (KLONOPIN) 0.5 MG tablet Take 1 tablet by mouth twice daily as needed for anxiety 30 tablet 0    diltiazem ER (TIAZAC) 240 MG 24 hr ER beaded capsule Take 1 capsule (240 mg) by mouth daily 90 capsule 3     EPINEPHrine (ANY BX GENERIC EQUIV) 0.3 MG/0.3ML injection 2-pack Inject 0.3 mLs (0.3 mg) into the muscle as needed for anaphylaxis May repeat one time in 5-15 minutes if response to initial dose is inadequate. 2 each 1    furosemide (LASIX) 20 MG tablet Take 1 tablet (20 mg) by mouth daily 90 tablet 3    spironolactone (ALDACTONE) 50 MG tablet Take 1 tablet by mouth once daily 90 tablet 0    STATIN NOT PRESCRIBED (INTENTIONAL) Please choose reason not prescribed from choices below.      triamcinolone (NASACORT) 55 MCG/ACT nasal aerosol Spray 2 sprays into both nostrils daily 2 Bottle 12       Allergies   Allergen Reactions    Fruit [Peach] Anaphylaxis and Hives     fresh peaches and any fruit that has a pit.  Kiwi's and apples also.  Can eat any fruit cooked.    Nuts Anaphylaxis and Hives     All Raw Nuts, can eat nuts when roasted.    Ace Inhibitors      Upper respiratory infection    Alprazolam Hives     Xanax    Antihistamines, Chlorpheniramine-Type Hives     Antihistamines    Diphenhydramine Hives and Other (See Comments)     Keeps her awake      Erythromycin      BASE; pt. Not sure if this is a true allergy.    Guaifed      Pt unsure of reaction    Guaifenesin      Guaifed    Hydrochlorothiazide     No Clinical Screening - See Comments      Allergic:  Fresh peaches and any fruit that has a pit.  Kiwi's and apples also.  All raw nuts.  Can eat nuts when roasted or any fruit cooked.  Some seasonal allergies :  Hayfever    Olmesartan Medoxomil Cough     Benicar    Phenylephrine Hcl      Guaifed    Pseudoephedrine Hcl      Guaifed    Seasonal Allergies      hayfever    Statins     Tramadol      Sleep disturbance. Can not sleep, and when able to fall asleep will have terrible nightmares    Tramadol Hcl      Ultram, insomnia, nightmares, headache    Venlafaxine Other (See Comments)     Heartburn, reflux    Zoloft [Sertraline]      paranoia    Latex Other (See Comments), Swelling, Difficulty breathing and Rash      "Throat Closes          Social History     Tobacco Use    Smoking status: Never    Smokeless tobacco: Never   Substance Use Topics    Alcohol use: Yes     Alcohol/week: 1.0 standard drink of alcohol     Types: 1 Glasses of wine per week     Comment: occ glass of wine     Family History   Problem Relation Age of Onset    Colon Cancer Maternal Aunt     Colon Cancer Maternal Uncle     Diabetes Father         Type 2    Hypertension Father     Alcoholism Father     Alcoholism Mother      History   Drug Use No         Objective     /74 (BP Location: Left arm, Patient Position: Sitting, Cuff Size: Adult Regular)   Pulse 66   Temp 98.1  F (36.7  C) (Tympanic)   Ht 1.556 m (5' 1.25\")   Wt 78.9 kg (174 lb)   SpO2 98%   BMI 32.61 kg/m      Physical Exam    GENERAL APPEARANCE: healthy, alert and no distress, overweight     EYES: EOMI,  PERRL     HENT: ear canals and TM's normal and nose and mouth without ulcers or lesions     NECK: no adenopathy, no asymmetry, masses, or scars and thyroid normal to palpation     RESP: lungs clear to auscultation - no rales, rhonchi or wheezes     CV: regular rates and rhythm, no murmur, click or rub     ABDOMEN:  soft, nontender, no HSM or masses and bowel sounds normal     MS: extremities normal- no gross deformities noted, no evidence of inflammation in joints, FROM in all extremities.     SKIN: no suspicious lesions or rashes     NEURO: Normal strength and tone, sensory exam grossly normal, mentation intact and speech normal     PSYCH: mentation appears normal. and affect normal/bright     LYMPHATICS: No cervical adenopathy    Recent Labs   Lab Test 11/13/23  0750 08/07/23  1507 06/28/23  0738 05/08/23  1013 12/23/22  1053   HGB  --   --   --  14.3 14.8   PLT  --   --   --  260 289    136 138 140 139   POTASSIUM 4.3 4.2 4.3 4.2 4.0   CR 1.00* 1.01* 0.98* 0.86 0.82   A1C 5.7*  --  6.0*  --  5.9*        Diagnostics:  Recent Results (from the past 24 hour(s))   CRP, " inflammation    Collection Time: 11/29/23  1:08 PM   Result Value Ref Range    CRP Inflammation 12.09 (H) <5.00 mg/L   Basic metabolic panel    Collection Time: 11/29/23  1:08 PM   Result Value Ref Range    Sodium 139 135 - 145 mmol/L    Potassium 3.7 3.4 - 5.3 mmol/L    Chloride 105 98 - 107 mmol/L    Carbon Dioxide (CO2) 24 22 - 29 mmol/L    Anion Gap 10 7 - 15 mmol/L    Urea Nitrogen 12.5 8.0 - 23.0 mg/dL    Creatinine 0.83 0.51 - 0.95 mg/dL    GFR Estimate 75 >60 mL/min/1.73m2    Calcium 9.8 8.8 - 10.2 mg/dL    Glucose 106 (H) 70 - 99 mg/dL   CBC with platelets and differential    Collection Time: 11/29/23  1:08 PM   Result Value Ref Range    WBC Count 9.3 4.0 - 11.0 10e3/uL    RBC Count 4.49 3.80 - 5.20 10e6/uL    Hemoglobin 14.0 11.7 - 15.7 g/dL    Hematocrit 41.6 35.0 - 47.0 %    MCV 93 78 - 100 fL    MCH 31.2 26.5 - 33.0 pg    MCHC 33.7 31.5 - 36.5 g/dL    RDW 13.2 10.0 - 15.0 %    Platelet Count 267 150 - 450 10e3/uL    % Neutrophils 71 %    % Lymphocytes 20 %    % Monocytes 7 %    % Eosinophils 1 %    % Basophils 1 %    % Immature Granulocytes 0 %    NRBCs per 100 WBC 0 <1 /100    Absolute Neutrophils 6.6 1.6 - 8.3 10e3/uL    Absolute Lymphocytes 1.9 0.8 - 5.3 10e3/uL    Absolute Monocytes 0.6 0.0 - 1.3 10e3/uL    Absolute Eosinophils 0.1 0.0 - 0.7 10e3/uL    Absolute Basophils 0.1 0.0 - 0.2 10e3/uL    Absolute Immature Granulocytes 0.0 <=0.4 10e3/uL    Absolute NRBCs 0.0 10e3/uL   EKG 12-lead complete w/read - (Clinic Performed)    Collection Time: 11/29/23  1:24 PM   Result Value Ref Range    Systolic Blood Pressure  mmHg    Diastolic Blood Pressure  mmHg    Ventricular Rate 55 BPM    Atrial Rate 55 BPM    SD Interval 200 ms    QRS Duration 90 ms     ms    QTc 403 ms    P Axis 56 degrees    R AXIS -12 degrees    T Axis 32 degrees    Interpretation ECG       Sinus bradycardia  Cannot rule out Anterior infarct (cited on or before 05-OCT-2023)  Abnormal ECG  When compared with ECG of 05-OCT-2023  13:09,  No significant change was found          EKG: sinus bradycardia, normal axis, normal intervals, no acute ST/T changes c/w ischemia, no LVH by voltage criteria, unchanged from previous tracings    Revised Cardiac Risk Index (RCRI):  The patient has the following serious cardiovascular risks for perioperative complications:   - No serious cardiac risks = 0 points     RCRI Interpretation: 0 points: Class I (very low risk - 0.4% complication rate)         Signed Electronically by: Sheyla Sanches NP  Copy of this evaluation report is provided to requesting physician.

## 2023-11-29 NOTE — PATIENT INSTRUCTIONS
Preparing for Your Surgery  Getting started  A nurse will call you to review your health history and instructions. They will give you an arrival time based on your scheduled surgery time. Please be ready to share:  Your doctor's clinic name and phone number  Your medical, surgical, and anesthesia history  A list of allergies and sensitivities  A list of medicines, including herbal treatments and over-the-counter drugs  Whether the patient has a legal guardian (ask how to send us the papers in advance)  Please tell us if you're pregnant--or if there's any chance you might be pregnant. Some surgeries may injure a fetus (unborn baby), so they require a pregnancy test. Surgeries that are safe for a fetus don't always need a test, and you can choose whether to have one.   If you have a child who's having surgery, please ask for a copy of Preparing for Your Child's Surgery.    Preparing for surgery  Within 10 to 30 days of surgery: Have a pre-op exam (sometimes called an H&P, or History and Physical). This can be done at a clinic or pre-operative center.  If you're having a , you may not need this exam. Talk to your care team.  At your pre-op exam, talk to your care team about all medicines you take. If you need to stop any medicines before surgery, ask when to start taking them again.  We do this for your safety. Many medicines can make you bleed too much during surgery. Some change how well surgery (anesthesia) drugs work.  Call your insurance company to let them know you're having surgery. (If you don't have insurance, call [phone].)  Call your clinic if there's any change in your health. This includes signs of a cold or flu (sore throat, runny nose, cough, rash, fever). It also includes a scrape or scratch near the surgery site.  If you have questions on the day of surgery, call your hospital or surgery center.  Eating and drinking guidelines  For your safety: Unless your surgeon tells you otherwise,  follow the guidelines below.  Eat and drink as usual until 8 hours before you arrive for surgery. After that, no food or milk.  Drink clear liquids until 2 hours before you arrive. These are liquids you can see through, like water, Gatorade, and Propel Water. They also include plain black coffee and tea (no cream or milk), candy, and breath mints. You can spit out gum when you arrive.  If you drink alcohol: Stop drinking it the night before surgery.  If your care team tells you to take medicine on the morning of surgery, it's okay to take it with a sip of water.  Preventing infection  Shower or bathe the night before and morning of your surgery. Follow the instructions your clinic gave you. (If no instructions, use regular soap.)  Don't shave or clip hair near your surgery site. We'll remove the hair if needed.  Don't smoke or vape the morning of surgery. You may chew nicotine gum up to 2 hours before surgery. A nicotine patch is okay.  Note: Some surgeries require you to completely quit smoking and nicotine. Check with your surgeon.  Your care team will make every effort to keep you safe from infection. We will:  Clean our hands often with soap and water (or an alcohol-based hand rub).  Clean the skin at your surgery site with a special soap that kills germs.  Give you a special gown to keep you warm. (Cold raises the risk of infection.)  Wear special hair covers, masks, gowns and gloves during surgery.  Give antibiotic medicine, if prescribed. Not all surgeries need antibiotics.  What to bring on the day of surgery  Photo ID and insurance card  Copy of your health care directive, if you have one  Glasses and hearing aids (bring cases)  You can't wear contacts during surgery  Inhaler and eye drops, if you use them (tell us about these when you arrive)  CPAP machine or breathing device, if you use them  A few personal items, if spending the night  If you have . . .  A pacemaker, ICD (cardiac defibrillator) or other  implant: Bring the ID card.  An implanted stimulator: Bring the remote control.  A legal guardian: Bring a copy of the certified (court-stamped) guardianship papers.  Please remove any jewelry, including body piercings. Leave jewelry and other valuables at home.  If you're going home the day of surgery  You must have a responsible adult drive you home. They should stay with you overnight as well.  If you don't have someone to stay with you, and you aren't safe to go home alone, we may keep you overnight. Insurance often won't pay for this.  After surgery  If it's hard to control your pain or you need more pain medicine, please call your surgeon's office.  Questions?   If you have any questions for your care team, list them here: _________________________________________________________________________________________________________________________________________________________________________ ____________________________________ ____________________________________ ____________________________________  For informational purposes only. Not to replace the advice of your health care provider. Copyright   2003, 2019 [facility]. All rights reserved. Clinically reviewed by Susanna Owens MD. [employer] [id number] - REV 12/22.

## 2023-11-29 NOTE — PROGRESS NOTES
[facility] [place]  [address] [address]  Phone: [phone]  Primary Provider: Danisha Dyer  Pre-op Performing Provider: DANISHA DYER      PREOPERATIVE EVALUATION:  Today's date: 11/29/2023    Rj is a 73 year old, presenting for the following:  Pre-Op Exam      Surgical Information:  Surgery/Procedure: MRI with sedation   [place] [facility] [place]: anesthesia and Dr. Molina  Surgery Date: TBD  Time of Surgery: TBD  Where patient plans to recover: At home with family  Fax number for surgical facility: Note does not need to be faxed, will be available electronically in Epic.    Assessment & Plan     The proposed surgical procedure is considered LOW risk.    Preop general physical exam  Malignant neoplasm of left female breast, unspecified estrogen receptor status, unspecified site of breast (H)  No concerns noted. Cleared for surgery. Blood work and EKG unremarkable.     Type 2 diabetes mellitus without complication, without long-term current use of insulin (H)  Controlled. Recent A1c 5.7.     Hyperlipidemia, unspecified hyperlipidemia type  Does not tolerate statin.     Essential hypertension  Controlled.     Mild intermittent reactive airway disease without complication  Controlled.     ZEFERINO (generalized anxiety disorder)  Mild episode of recurrent major depressive disorder (H24)  Stable.     Frequent PVCs   Feeling well. EKG today without PVCs.     Risks and Recommendations:  The patient has the following additional risks and recommendations for perioperative complications:  Pulmonary:    - Incentive spirometry post-op    Will hold all medications the morning of surgery except her diltiazem.     RECOMMENDATION:  APPROVAL GIVEN to proceed with proposed procedure, without further diagnostic evaluation.    Rj PFEIFFER Anne with a functional capacity of greater than four MET's. There are no obvious contraindications to proceeding with surgery at this time. Recommend that the  surgeon review risks and benefits of the procedure prior to proceeding.     Was recommended to avoid eating and drinking anything 12 hours prior to surgery unless his surgeon tells him otherwise.     Subjective       HPI related to upcoming procedure: Patient with breast cancer. Recent biopsy showed cancer. Plan is to proceed with breast MRI under sedation.         11/29/2023    12:32 PM   Preop Questions   1. Have you ever had a heart attack or stroke? No   2. Have you ever had surgery on your heart or blood vessels, such as a stent placement, a coronary artery bypass, or surgery on an artery in your head, neck, heart, or legs? No   3. Do you have chest pain with activity? No   4. Do you have a history of  heart failure? No   5. Do you currently have a cold, bronchitis or symptoms of other infection? No   6. Do you have a cough, shortness of breath, or wheezing? No   7. Do you or anyone in your family have previous history of blood clots? No   8. Do you or does anyone in your family have a serious bleeding problem such as prolonged bleeding following surgeries or cuts? No   9. Have you ever had problems with anemia or been told to take iron pills? No   10. Have you had any abnormal blood loss such as black, tarry or bloody stools, or abnormal vaginal bleeding? No   11. Have you ever had a blood transfusion? No   12. Are you willing to have a blood transfusion if it is medically needed before, during, or after your surgery? Yes   13. Have you or any of your relatives ever had problems with anesthesia? YES - has a hard time waking up after anesthesia    14. Do you have sleep apnea, excessive snoring or daytime drowsiness? No   15. Do you have any artifical heart valves or other implanted medical devices like a pacemaker, defibrillator, or continuous glucose monitor? No   16. Do you have artificial joints? No   17. Are you allergic to latex? YES: swelling, difficulty breathing, rash        Health Care  Directive:  Patient does not have a Health Care Directive or Living Will: Discussed advance care planning with patient; however, patient declined at this time.    Preoperative Review of :   reviewed - controlled substances reflected in medication list.  [id number]}    Status of Chronic Conditions:  DEPRESSION and ANXIETY - Patient has a long history of Depression and Anxiety of moderate severity requiring medication for control with recent symptoms being slightly worse. Current symptoms of depression include anxiety and irritability with recent diagnosis of breast cancer. Uses Klonopin as needed. No thoughts of suicide. Working with her counselor.     DIABETES - Patient has a longstanding history of Diabetes Type II . Patient is being treated with diet and exercise and denies significant side effects. Control has been good. Complicating factors include but are not limited to: hypertension. A1C was 5.7 on 11/13/23.      HYPERLIPIDEMIA - Patient has a long history of significant Hyperlipidemia requiring medication for treatment with recent fair control. Does not take a statin due to side effects.      HYPERTENSION/Frequent PVC- Patient has longstanding history of HTN and palpitations, currently denies any symptoms referable to elevated blood pressure. Specifically denies chest pain, dyspnea, orthopnea, PND or peripheral edema. Blood pressure readings have been in normal range. Current medication regimen is as listed below. Patient denies any side effects of medication. Currently taking Aldactone 50 mg, diltiazem 240 mg, and lasix 20 mg. She does follow with cardiology. Was last seen on 6/6/23. Note was reviewed. Stress test was done on 2/10/22 and was negative for reversible ischemia. No longer having palpitations.    ASTHMA - controlled. Rare use of albuterol.      Mets greater than 4, able to walk around the block without stopping.     She has had surgery in the past and done well.     Review of  Systems  CONSTITUTIONAL: NEGATIVE for fever, chills, change in weight  INTEGUMENTARY/SKIN: NEGATIVE for worrisome rashes, moles or lesions  EYES: NEGATIVE for vision changes or irritation  ENT/MOUTH: NEGATIVE for ear, mouth and throat problems  RESP: NEGATIVE for significant cough or SOB  CV: NEGATIVE for chest pain, palpitations or peripheral edema  GI: NEGATIVE for nausea, abdominal pain, heartburn, or change in bowel habits  : NEGATIVE for frequency, dysuria, or hematuria  MUSCULOSKELETAL: NEGATIVE for significant arthralgias or myalgia  NEURO: NEGATIVE for weakness, dizziness or paresthesias  ENDOCRINE: NEGATIVE for temperature intolerance, skin/hair changes  HEME: NEGATIVE for bleeding problems  PSYCHIATRIC: NEGATIVE for changes in mood or affect    Patient Active Problem List    Diagnosis Date Noted    Class 2 severe obesity due to excess calories with serious comorbidity in adult (H) 08/07/2023     Priority: Medium    Shortness of breath 08/09/2021     Priority: Medium    Irregular heart rate 08/09/2021     Priority: Medium    Frequent PVCs 08/09/2021     Priority: Medium    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     Priority: Medium    Diabetes mellitus, type 2 (H) 12/06/2019     Priority: Medium    [place] cardiac risk 11% in next 10 years 10/29/2019     Priority: Medium    S/P rotator cuff repair 10/26/2016     Priority: Medium    Mild episode of recurrent major depressive disorder (H24) 10/22/2014     Priority: Medium    Migraine with aura and without status migrainosus, not intractable 04/21/2014     Priority: Medium    S/P cervical spinal fusion 03/21/2014     Priority: Medium    Cervicalgia 12/10/2013     Priority: Medium    Vertigo 03/18/2013     Priority: Medium    Tinnitus 03/18/2013     Priority: Medium    Chronic rhinitis 03/18/2013     Priority: Medium    Lumbago 07/10/2012     Priority: Medium    Presbyopia 01/25/2011     Priority: Medium    Primary open-angle glaucoma 01/25/2011     Priority:  Medium     Overview:   IMO Update 10/11      ZEFERINO (generalized anxiety disorder) 12/06/2006     Priority: Medium    Essential hypertension 06/02/2004     Priority: Medium     Overview:   IMO Update        Past Medical History:   Diagnosis Date    Arthritis     Chronic back pain     Hypertension     Irregular heart beat     Sleep apnea      Past Surgical History:   Procedure Laterality Date    ANESTHESIA OUT OF OR MRI N/A 6/1/2022    Procedure: MRI CERVICAL SPINE;  Surgeon: GENERIC ANESTHESIA PROVIDER;  Location: [facility]    ANESTHESIA OUT OF OR MRI N/A 9/23/2022    Procedure: MRI CERVICAL SPINE;  Surgeon: GENERIC ANESTHESIA PROVIDER;  Location: [facility]    APPENDECTOMY      BACK SURGERY      CHOLECYSTECTOMY      COLONOSCOPY  2012    Repeat in 10 years    GYN SURGERY      HYSTERECTOMY      Ovaries    RELEASE CARPAL TUNNEL      LT    RELEASE CARPAL TUNNEL      RT x2    SURGICAL RADIOLOGY PROCEDURE N/A 2/12/2016    Procedure: SURGICAL RADIOLOGY PROCEDURE;  Surgeon: Provider, Generic Perianesthesia Nursing;  Location: [facility]    SURGICAL RADIOLOGY PROCEDURE N/A 8/15/2016    Procedure: SURGICAL RADIOLOGY PROCEDURE;  Surgeon: Provider, Generic Perianesthesia Nursing;  Location: [facility]     Current Outpatient Medications   Medication Sig Dispense Refill    albuterol (PROAIR HFA/PROVENTIL HFA/VENTOLIN HFA) 108 (90 Base) MCG/ACT inhaler Inhale 2 puffs into the lungs every 6 hours 18 g 0    blood glucose (ACCU-CHEK GUIDE) test strip Use to test blood sugar 1 time daily or as directed. 50 each 11    blood glucose monitoring (ACCU-CHEK FASTCLIX) lancets Use to test blood sugar 1 time daily or as directed. 102 each 11    cetirizine (ZYRTEC) 10 MG tablet Take 1 tablet (10 mg) by mouth daily as needed for allergies 60 tablet 12    clonazePAM (KLONOPIN) 0.5 MG tablet Take 1 tablet by mouth twice daily as needed for anxiety 30 tablet 0    diltiazem ER (TIAZAC) 240 MG 24 hr ER beaded capsule Take 1 capsule (240 mg) by mouth daily 90 capsule 3     EPINEPHrine (ANY BX GENERIC EQUIV) 0.3 MG/0.3ML injection 2-pack Inject 0.3 mLs (0.3 mg) into the muscle as needed for anaphylaxis May repeat one time in 5-15 minutes if response to initial dose is inadequate. 2 each 1    furosemide (LASIX) 20 MG tablet Take 1 tablet (20 mg) by mouth daily 90 tablet 3    spironolactone (ALDACTONE) 50 MG tablet Take 1 tablet by mouth once daily 90 tablet 0    STATIN NOT PRESCRIBED (INTENTIONAL) Please choose reason not prescribed from choices below.      triamcinolone (NASACORT) 55 MCG/ACT nasal aerosol Spray 2 sprays into both nostrils daily 2 Bottle 12       Allergies   Allergen Reactions    Fruit [Peach] Anaphylaxis and Hives     fresh peaches and any fruit that has a pit.  Kiwi's and apples also.  Can eat any fruit cooked.    Nuts Anaphylaxis and Hives     All Raw Nuts, can eat nuts when roasted.    Ace Inhibitors      Upper respiratory infection    Alprazolam Hives     Xanax    Antihistamines, Chlorpheniramine-Type Hives     Antihistamines    Diphenhydramine Hives and Other (See Comments)     Keeps her awake      Erythromycin      BASE; pt. Not sure if this is a true allergy.    Guaifed      Pt unsure of reaction    Guaifenesin      Guaifed    Hydrochlorothiazide     No Clinical Screening - See Comments      Allergic:  Fresh peaches and any fruit that has a pit.  Kiwi's and apples also.  All raw nuts.  Can eat nuts when roasted or any fruit cooked.  Some seasonal allergies :  Hayfever    Olmesartan Medoxomil Cough     Benicar    Phenylephrine Hcl      Guaifed    Pseudoephedrine Hcl      Guaifed    Seasonal Allergies      hayfever    Statins     Tramadol      Sleep disturbance. Can not sleep, and when able to fall asleep will have terrible nightmares    Tramadol Hcl      Ultram, insomnia, nightmares, headache    Venlafaxine Other (See Comments)     Heartburn, reflux    Zoloft [Sertraline]      paranoia    Latex Other (See Comments), Swelling, Difficulty breathing and Rash      "Throat Closes          Social History     Tobacco Use    Smoking status: Never    Smokeless tobacco: Never   Substance Use Topics    Alcohol use: Yes     Alcohol/week: 1.0 standard drink of alcohol     Types: 1 Glasses of wine per week     Comment: occ glass of wine     Family History   Problem Relation Age of Onset    Colon Cancer Maternal Aunt     Colon Cancer Maternal Uncle     Diabetes Father         Type 2    Hypertension Father     Alcoholism Father     Alcoholism Mother      History   Drug Use No         Objective     /74 (BP Location: Left arm, Patient Position: Sitting, Cuff Size: Adult Regular)   Pulse 66   Temp 98.1  F (36.7  C) (Tympanic)   Ht 1.556 m (5' 1.25\")   Wt 78.9 kg (174 lb)   SpO2 98%   BMI 32.61 kg/m      Physical Exam    GENERAL APPEARANCE: healthy, alert and no distress, overweight     EYES: EOMI,  PERRL     HENT: ear canals and TM's normal and nose and mouth without ulcers or lesions     NECK: no adenopathy, no asymmetry, masses, or scars and thyroid normal to palpation     RESP: lungs clear to auscultation - no rales, rhonchi or wheezes     CV: regular rates and rhythm, no murmur, click or rub     ABDOMEN:  soft, nontender, no HSM or masses and bowel sounds normal     MS: extremities normal- no gross deformities noted, no evidence of inflammation in joints, FROM in all extremities.     SKIN: no suspicious lesions or rashes     NEURO: Normal strength and tone, sensory exam grossly normal, mentation intact and speech normal     PSYCH: mentation appears normal. and affect normal/bright     LYMPHATICS: No cervical adenopathy    Recent Labs   Lab Test 11/13/23  0750 08/07/23  1507 06/28/23  0738 05/08/23  1013 12/23/22  1053   HGB  --   --   --  14.3 14.8   PLT  --   --   --  260 289    136 138 140 139   POTASSIUM 4.3 4.2 4.3 4.2 4.0   CR 1.00* 1.01* 0.98* 0.86 0.82   A1C 5.7*  --  6.0*  --  5.9*        Diagnostics:  Recent Results (from the past 24 hour(s))   CRP, " inflammation    Collection Time: 11/29/23  1:08 PM   Result Value Ref Range    CRP Inflammation 12.09 (H) <5.00 mg/L   Basic metabolic panel    Collection Time: 11/29/23  1:08 PM   Result Value Ref Range    Sodium 139 135 - 145 mmol/L    Potassium 3.7 3.4 - 5.3 mmol/L    Chloride 105 98 - 107 mmol/L    Carbon Dioxide (CO2) 24 22 - 29 mmol/L    Anion Gap 10 7 - 15 mmol/L    Urea Nitrogen 12.5 8.0 - 23.0 mg/dL    Creatinine 0.83 0.51 - 0.95 mg/dL    GFR Estimate 75 >60 mL/min/1.73m2    Calcium 9.8 8.8 - 10.2 mg/dL    Glucose 106 (H) 70 - 99 mg/dL   CBC with platelets and differential    Collection Time: 11/29/23  1:08 PM   Result Value Ref Range    WBC Count 9.3 4.0 - 11.0 10e3/uL    RBC Count 4.49 3.80 - 5.20 10e6/uL    Hemoglobin 14.0 11.7 - 15.7 g/dL    Hematocrit 41.6 35.0 - 47.0 %    MCV 93 78 - 100 fL    MCH 31.2 26.5 - 33.0 pg    MCHC 33.7 31.5 - 36.5 g/dL    RDW 13.2 10.0 - 15.0 %    Platelet Count 267 150 - 450 10e3/uL    % Neutrophils 71 %    % Lymphocytes 20 %    % Monocytes 7 %    % Eosinophils 1 %    % Basophils 1 %    % Immature Granulocytes 0 %    NRBCs per 100 WBC 0 <1 /100    Absolute Neutrophils 6.6 1.6 - 8.3 10e3/uL    Absolute Lymphocytes 1.9 0.8 - 5.3 10e3/uL    Absolute Monocytes 0.6 0.0 - 1.3 10e3/uL    Absolute Eosinophils 0.1 0.0 - 0.7 10e3/uL    Absolute Basophils 0.1 0.0 - 0.2 10e3/uL    Absolute Immature Granulocytes 0.0 <=0.4 10e3/uL    Absolute NRBCs 0.0 10e3/uL   EKG 12-lead complete w/read - (Clinic Performed)    Collection Time: 11/29/23  1:24 PM   Result Value Ref Range    Systolic Blood Pressure  mmHg    Diastolic Blood Pressure  mmHg    Ventricular Rate 55 BPM    Atrial Rate 55 BPM    VA Interval 200 ms    QRS Duration 90 ms     ms    QTc 403 ms    P Axis 56 degrees    R AXIS -12 degrees    T Axis 32 degrees    Interpretation ECG       Sinus bradycardia  Cannot rule out Anterior infarct (cited on or before 05-OCT-2023)  Abnormal ECG  When compared with ECG of 05-OCT-2023  13:09,  No significant change was found          EKG: sinus bradycardia, normal axis, normal intervals, no acute ST/T changes c/w ischemia, no LVH by voltage criteria, unchanged from previous tracings    Revised Cardiac Risk Index (RCRI):  The patient has the following serious cardiovascular risks for perioperative complications:   - No serious cardiac risks = 0 points     RCRI Interpretation: 0 points: Class I (very low risk - 0.4% complication rate)         Signed Electronically by: Sheyla Sanches NP  Copy of this evaluation report is provided to requesting physician.

## 2023-12-01 LAB
ATRIAL RATE - MUSE: 55 BPM
DIASTOLIC BLOOD PRESSURE - MUSE: NORMAL MMHG
INTERPRETATION ECG - MUSE: NORMAL
P AXIS - MUSE: 56 DEGREES
PR INTERVAL - MUSE: 200 MS
QRS DURATION - MUSE: 90 MS
QT - MUSE: 422 MS
QTC - MUSE: 403 MS
R AXIS - MUSE: -12 DEGREES
SYSTOLIC BLOOD PRESSURE - MUSE: NORMAL MMHG
T AXIS - MUSE: 32 DEGREES
VENTRICULAR RATE- MUSE: 55 BPM

## 2023-12-04 ENCOUNTER — ANESTHESIA EVENT (OUTPATIENT)
Dept: SURGERY | Facility: HOSPITAL | Age: 73
End: 2023-12-04
Payer: MEDICARE

## 2023-12-04 RX ORDER — ONDANSETRON 4 MG/1
4 TABLET, ORALLY DISINTEGRATING ORAL EVERY 30 MIN PRN
Status: CANCELLED | OUTPATIENT
Start: 2023-12-04

## 2023-12-04 RX ORDER — ONDANSETRON 2 MG/ML
4 INJECTION INTRAMUSCULAR; INTRAVENOUS EVERY 30 MIN PRN
Status: CANCELLED | OUTPATIENT
Start: 2023-12-04

## 2023-12-04 ASSESSMENT — ENCOUNTER SYMPTOMS: DYSRHYTHMIAS: 1

## 2023-12-04 NOTE — ANESTHESIA PREPROCEDURE EVALUATION
Anesthesia Pre-Procedure Evaluation    Patient: Rj Rodríguez   MRN: [id number] : 1950        Procedure : Procedure(s):  Anesthesia out of OR MRI          Past Medical History:   Diagnosis Date     Arthritis      Chronic back pain      Hypertension      Irregular heart beat      Sleep apnea       Past Surgical History:   Procedure Laterality Date     ANESTHESIA OUT OF OR MRI N/A 2022    Procedure: MRI CERVICAL SPINE;  Surgeon: GENERIC ANESTHESIA PROVIDER;  Location: [facility]     ANESTHESIA OUT OF OR MRI N/A 2022    Procedure: MRI CERVICAL SPINE;  Surgeon: GENERIC ANESTHESIA PROVIDER;  Location: [facility]     APPENDECTOMY       BACK SURGERY       CHOLECYSTECTOMY       COLONOSCOPY  2012    Repeat in 10 years     GYN SURGERY       HYSTERECTOMY      Ovaries     RELEASE CARPAL TUNNEL      LT     RELEASE CARPAL TUNNEL      RT x2     SURGICAL RADIOLOGY PROCEDURE N/A 2016    Procedure: SURGICAL RADIOLOGY PROCEDURE;  Surgeon: Provider, Generic Perianesthesia Nursing;  Location: [facility]     SURGICAL RADIOLOGY PROCEDURE N/A 8/15/2016    Procedure: SURGICAL RADIOLOGY PROCEDURE;  Surgeon: Provider, Generic Perianesthesia Nursing;  Location: [facility]      Allergies   Allergen Reactions     Fruit [Peach] Anaphylaxis and Hives     fresh peaches and any fruit that has a pit.  Kiwi's and apples also.  Can eat any fruit cooked.     Nuts Anaphylaxis and Hives     All Raw Nuts, can eat nuts when roasted.     Ace Inhibitors      Upper respiratory infection     Alprazolam Hives     Xanax     Antihistamines, Chlorpheniramine-Type Hives     Antihistamines     Diphenhydramine Hives and Other (See Comments)     Keeps her awake       Erythromycin      BASE; pt. Not sure if this is a true allergy.     Guaifed      Pt unsure of reaction     Guaifenesin      Guaifed     Hydrochlorothiazide      No Clinical Screening - See Comments      Allergic:  Fresh peaches and any fruit that has a pit.  Kiwi's and apples also.  All raw nuts.  Can  eat nuts when roasted or any fruit cooked.  Some seasonal allergies :  Hayfever     Olmesartan Medoxomil Cough     Benicar     Phenylephrine Hcl      Guaifed     Pseudoephedrine Hcl      Guaifed     Seasonal Allergies      hayfever     Statins      Tramadol      Sleep disturbance. Can not sleep, and when able to fall asleep will have terrible nightmares     Tramadol Hcl      Ultram, insomnia, nightmares, headache     Venlafaxine Other (See Comments)     Heartburn, reflux     Zoloft [Sertraline]      paranoia     Latex Other (See Comments), Swelling, Difficulty breathing and Rash     Throat Closes        Social History     Tobacco Use     Smoking status: Never     Smokeless tobacco: Never   Substance Use Topics     Alcohol use: Yes     Alcohol/week: 1.0 standard drink of alcohol     Types: 1 Glasses of wine per week     Comment: occ glass of wine      Wt Readings from Last 1 Encounters:   11/29/23 78.9 kg (174 lb)        Anesthesia Evaluation   Pt has had prior anesthetic. Type: General and MAC.    History of anesthetic complications  - PONV.  slow to wake.    ROS/MED HX  ENT/Pulmonary: Comment: Pt denies REBECCA  Reactive airway disease    (+) sleep apnea, doesn't use CPAP,         allergic rhinitis,         Intermittent, asthma  Treatment: Inhaler prn,                 Neurologic: Comment: S/P spinal fusion  Brachial neuritis   Vertigo  Tinnitus      (+)      migraines,                          Cardiovascular:     (+) Dyslipidemia hypertension- -   -  - -                        dysrhythmias, PVCs, Irregular Heartbeat/Palpitations,       Previous cardiac testing   Echo: Date: Results:    Stress Test:  Date: 2/10/22 Results:  negative for inducible ischemia  ECG Reviewed:  Date: 11/29/23 Results:  2022 sinus rhythm with PVCs, VR 88, unchanged from previous EKGs    2023 SB, cannot rule out anterior infarct   Cath:  Date: Results:      METS/Exercise Tolerance:     Hematologic:       Musculoskeletal: Comment: S/P cervical  "spinal fusion  S/P rotator cuff repair  (+)  arthritis,             GI/Hepatic:  - neg GI/hepatic ROS     Renal/Genitourinary:  - neg Renal ROS     Endo:     (+)  type II DM, Last HgA1c: 5.7, date: 11-13-23,           Obesity,       Psychiatric/Substance Use:     (+) psychiatric history anxiety and depression       Infectious Disease:  - neg infectious disease ROS     Malignancy:  - neg malignancy ROS     Other:  - neg other ROS    (+)  , H/O Chronic Pain (back),         Physical Exam    Airway      Comment: Cervical fusion C5-7, ok ROM, not full    Mallampati: III   TM distance: > 3 FB   Neck ROM: limited   Mouth opening: > 3 cm    Respiratory Devices and Support         Dental       (+) Minor Abnormalities - some fillings, tiny chips      Cardiovascular       Comment: Can hear frequent PVCs   Rhythm and rate: regular and normal     Pulmonary           breath sounds clear to auscultation   (+) decreased breath sounds       OUTSIDE LABS:  CBC:   Lab Results   Component Value Date    WBC 9.3 11/29/2023    WBC 9.6 05/08/2023    HGB 14.0 11/29/2023    HGB 14.3 05/08/2023    HCT 41.6 11/29/2023    HCT 42.7 05/08/2023     11/29/2023     05/08/2023     BMP:   Lab Results   Component Value Date     11/29/2023     11/13/2023    POTASSIUM 3.7 11/29/2023    POTASSIUM 4.3 11/13/2023    CHLORIDE 105 11/29/2023    CHLORIDE 102 11/13/2023    CO2 24 11/29/2023    CO2 25 11/13/2023    BUN 12.5 11/29/2023    BUN 17.4 11/13/2023    CR 0.83 11/29/2023    CR 1.00 (H) 11/13/2023     (H) 11/29/2023     (H) 11/13/2023     COAGS:   Lab Results   Component Value Date    PTT 26 09/13/2016    INR 0.94 07/18/2021     POC: No results found for: \"BGM\", \"HCG\", \"HCGS\"  HEPATIC:   Lab Results   Component Value Date    ALBUMIN 4.1 06/28/2023    PROTTOTAL 7.6 06/28/2023    ALT 22 06/28/2023    AST 24 06/28/2023    ALKPHOS 91 06/28/2023    BILITOTAL 0.9 06/28/2023     OTHER:   Lab Results   Component Value Date " "   LACT 1.5 08/12/2019    A1C 5.7 (H) 11/13/2023    EZRA 9.8 11/29/2023    MAG 2.1 05/08/2023    LIPASE 177 04/18/2022    TSH 1.60 05/08/2023    CRP 53.9 (H) 08/12/2019    SED 28 08/07/2023       Anesthesia Plan    ASA Status:  3    NPO Status:  NPO Appropriate    Anesthesia Type: MAC.     - Reason for MAC: chronic cardiopulmonary disease, straight local not clinically adequate              Consents    Anesthesia Plan(s) and associated risks, benefits, and realistic alternatives discussed. Questions answered and patient/representative(s) expressed understanding.     - Discussed: Risks, Benefits and Alternatives for BOTH SEDATION and the PROCEDURE were discussed     - Discussed with:  Patient      - Extended Intubation/Ventilatory Support Discussed: No.      - Patient is DNR/DNI Status: No     Use of blood products discussed: No .     Postoperative Care            Comments:    Other Comments: HP 11/29/23 Myron   Will take own inhaler prior to MRI           NORMAN MCCORMICK CRNA    I have reviewed the pertinent notes and labs in the chart from the past 30 days and (re)examined the patient.  Any updates or changes from those notes are reflected in this note.      # Hypercalcemia: Highest Ca = 10.2 mg/dL in last 30 days, will monitor as appropriate         # Obesity: Estimated body mass index is 32.61 kg/m  as calculated from the following:    Height as of 11/29/23: 1.556 m (5' 1.25\").    Weight as of 11/29/23: 78.9 kg (174 lb).      "

## 2023-12-06 DIAGNOSIS — Z17.0 MALIGNANT NEOPLASM OF LEFT BREAST IN FEMALE, ESTROGEN RECEPTOR POSITIVE, UNSPECIFIED SITE OF BREAST (H): Primary | ICD-10-CM

## 2023-12-06 DIAGNOSIS — C50.912 MALIGNANT NEOPLASM OF LEFT BREAST IN FEMALE, ESTROGEN RECEPTOR POSITIVE, UNSPECIFIED SITE OF BREAST (H): Primary | ICD-10-CM

## 2023-12-07 ENCOUNTER — HOSPITAL ENCOUNTER (OUTPATIENT)
Dept: MRI IMAGING | Facility: HOSPITAL | Age: 73
Discharge: HOME | End: 2023-12-07
Attending: SURGERY | Admitting: SURGERY
Payer: MEDICARE

## 2023-12-07 ENCOUNTER — ANESTHESIA (OUTPATIENT)
Dept: SURGERY | Facility: HOSPITAL | Age: 73
End: 2023-12-07
Payer: MEDICARE

## 2023-12-07 ENCOUNTER — HOSPITAL ENCOUNTER (OUTPATIENT)
Facility: HOSPITAL | Age: 73
Discharge: HOME | End: 2023-12-07
Attending: SURGERY | Admitting: SURGERY
Payer: MEDICARE

## 2023-12-07 VITALS
SYSTOLIC BLOOD PRESSURE: 151 MMHG | HEART RATE: 73 BPM | BODY MASS INDEX: 32.85 KG/M2 | OXYGEN SATURATION: 98 % | RESPIRATION RATE: 16 BRPM | TEMPERATURE: 97 F | WEIGHT: 174 LBS | DIASTOLIC BLOOD PRESSURE: 110 MMHG | HEIGHT: 61 IN

## 2023-12-07 DIAGNOSIS — Z17.0 MALIGNANT NEOPLASM OF LEFT BREAST IN FEMALE, ESTROGEN RECEPTOR POSITIVE, UNSPECIFIED SITE OF BREAST (H): ICD-10-CM

## 2023-12-07 DIAGNOSIS — C50.912 MALIGNANT NEOPLASM OF LEFT BREAST IN FEMALE, ESTROGEN RECEPTOR POSITIVE, UNSPECIFIED SITE OF BREAST (H): ICD-10-CM

## 2023-12-07 PROCEDURE — G1010 CDSM STANSON: HCPCS

## 2023-12-07 PROCEDURE — 250N000009 HC RX 250: Performed by: NURSE ANESTHETIST, CERTIFIED REGISTERED

## 2023-12-07 PROCEDURE — 77049 MRI BREAST C-+ W/CAD BI: CPT | Performed by: NURSE ANESTHETIST, CERTIFIED REGISTERED

## 2023-12-07 PROCEDURE — 99100 ANES PT EXTEME AGE<1 YR&>70: CPT | Performed by: NURSE ANESTHETIST, CERTIFIED REGISTERED

## 2023-12-07 PROCEDURE — 250N000011 HC RX IP 250 OP 636: Performed by: NURSE ANESTHETIST, CERTIFIED REGISTERED

## 2023-12-07 PROCEDURE — 370N000017 HC ANESTHESIA TECHNICAL FEE, PER MIN

## 2023-12-07 PROCEDURE — 710N000012 HC RECOVERY PHASE 2, PER MINUTE

## 2023-12-07 PROCEDURE — 999N000141 HC STATISTIC PRE-PROCEDURE NURSING ASSESSMENT

## 2023-12-07 PROCEDURE — 255N000002 HC RX 255 OP 636: Mod: JZ | Performed by: RADIOLOGY

## 2023-12-07 PROCEDURE — A9585 GADOBUTROL INJECTION: HCPCS | Mod: JZ | Performed by: RADIOLOGY

## 2023-12-07 RX ORDER — GLYCOPYRROLATE 0.2 MG/ML
INJECTION, SOLUTION INTRAMUSCULAR; INTRAVENOUS PRN
Status: DISCONTINUED | OUTPATIENT
Start: 2023-12-07 | End: 2023-12-07

## 2023-12-07 RX ORDER — LIDOCAINE 40 MG/G
CREAM TOPICAL
Status: DISCONTINUED | OUTPATIENT
Start: 2023-12-07 | End: 2023-12-07 | Stop reason: HOSPADM

## 2023-12-07 RX ORDER — GADOBUTROL 604.72 MG/ML
10 INJECTION INTRAVENOUS ONCE
Status: COMPLETED | OUTPATIENT
Start: 2023-12-07 | End: 2023-12-07

## 2023-12-07 RX ORDER — LIDOCAINE HYDROCHLORIDE 20 MG/ML
INJECTION, SOLUTION INFILTRATION; PERINEURAL PRN
Status: DISCONTINUED | OUTPATIENT
Start: 2023-12-07 | End: 2023-12-07

## 2023-12-07 RX ORDER — PROPOFOL 10 MG/ML
INJECTION, EMULSION INTRAVENOUS CONTINUOUS PRN
Status: DISCONTINUED | OUTPATIENT
Start: 2023-12-07 | End: 2023-12-07

## 2023-12-07 RX ORDER — KETAMINE HYDROCHLORIDE 10 MG/ML
INJECTION INTRAMUSCULAR; INTRAVENOUS PRN
Status: DISCONTINUED | OUTPATIENT
Start: 2023-12-07 | End: 2023-12-07

## 2023-12-07 RX ORDER — SODIUM CHLORIDE, SODIUM LACTATE, POTASSIUM CHLORIDE, CALCIUM CHLORIDE 600; 310; 30; 20 MG/100ML; MG/100ML; MG/100ML; MG/100ML
INJECTION, SOLUTION INTRAVENOUS CONTINUOUS
Status: DISCONTINUED | OUTPATIENT
Start: 2023-12-07 | End: 2023-12-07 | Stop reason: HOSPADM

## 2023-12-07 RX ADMIN — PROPOFOL 50 MCG/KG/MIN: 10 INJECTION, EMULSION INTRAVENOUS at 08:05

## 2023-12-07 RX ADMIN — Medication 10 MG: at 08:00

## 2023-12-07 RX ADMIN — GADOBUTROL 10 ML: 604.72 INJECTION INTRAVENOUS at 08:32

## 2023-12-07 RX ADMIN — Medication 10 MG: at 08:05

## 2023-12-07 RX ADMIN — GLYCOPYRROLATE 0.2 MG: 0.2 INJECTION, SOLUTION INTRAMUSCULAR; INTRAVENOUS at 07:55

## 2023-12-07 RX ADMIN — MIDAZOLAM 2 MG: 1 INJECTION INTRAMUSCULAR; INTRAVENOUS at 07:55

## 2023-12-07 RX ADMIN — LIDOCAINE HYDROCHLORIDE 80 MG: 20 INJECTION, SOLUTION INFILTRATION; PERINEURAL at 08:00

## 2023-12-07 ASSESSMENT — ACTIVITIES OF DAILY LIVING (ADL)
ADLS_ACUITY_SCORE: 37
ADLS_ACUITY_SCORE: 35

## 2023-12-07 NOTE — ANESTHESIA POSTPROCEDURE EVALUATION
Patient: Rj Rodríguez    Procedure: Procedure(s):  Anesthesia out of OR MRI       Anesthesia Type:  MAC    Note:  Disposition: Outpatient   Postop Pain Control: Uneventful            Sign Out: Well controlled pain   PONV: No   Neuro/Psych: Uneventful            Sign Out: Acceptable/Baseline neuro status   Airway/Respiratory: Uneventful            Sign Out: Acceptable/Baseline resp. status   CV/Hemodynamics: Uneventful            Sign Out: Acceptable CV status; No obvious hypovolemia; No obvious fluid overload   Other NRE: NONE   DID A NON-ROUTINE EVENT OCCUR? No         Last vitals:  Vitals Value Taken Time   /96 12/07/23 0900   Temp     Pulse 88 12/07/23 0900   Resp     SpO2 97 % 12/07/23 0912   Vitals shown include unfiled device data.    Electronically Signed By: NORMAN Regan CRNA  December 7, 2023  9:12 AM

## 2023-12-07 NOTE — PLAN OF CARE
Patient and responsible adult given discharge instructions with no questions regarding instructions. Virgilio score 19. Pain level 0/10.  Discharged from unit via ambulating. Patient discharged to home.

## 2023-12-07 NOTE — ANESTHESIA CARE TRANSFER NOTE
Patient: Rj Rodríguez    Procedure: Procedure(s):  Anesthesia out of OR MRI       Diagnosis: Malignant neoplasm of left breast (H) [C50.912]  Diagnosis Additional Information: No value filed.    Anesthesia Type:   No value filed.     Note:    Oropharynx: spontaneously breathing  Level of Consciousness: awake  Oxygen Supplementation: room air    Independent Airway: airway patency satisfactory and stable  Dentition: dentition unchanged  Vital Signs Stable: post-procedure vital signs reviewed and stable  Report to RN Given: handoff report given  Patient transferred to: Phase II    Handoff Report: Identifed the Patient, Identified the Reponsible Provider, Reviewed the pertinent medical history, Discussed the surgical course, Reviewed Intra-OP anesthesia mangement and issues during anesthesia, Set expectations for post-procedure period and Allowed opportunity for questions and acknowledgement of understanding    Vitals:  Vitals Value Taken Time   /96 12/07/23 0900   Temp     Pulse 88 12/07/23 0900   Resp     SpO2 96 % 12/07/23 0902   Vitals shown include unfiled device data.    Electronically Signed By: NORMAN Regan CRNA  December 7, 2023  9:03 AM

## 2023-12-07 NOTE — INTERVAL H&P NOTE
"I have reviewed the surgical (or preoperative) H&P that is linked to this encounter, and examined the patient. There are no significant changes    Clinical Conditions Present on Arrival:  Clinically Significant Risk Factors Present on Admission          # Hypercalcemia: Highest Ca = 10.2 mg/dL in last 30 days, will monitor as appropriate         # Obesity: Estimated body mass index is 32.88 kg/m  as calculated from the following:    Height as of this encounter: 1.549 m (5' 1\").    Weight as of this encounter: 78.9 kg (174 lb).       "

## 2023-12-08 ENCOUNTER — LAB (OUTPATIENT)
Dept: LAB | Facility: OTHER | Age: 73
End: 2023-12-08
Payer: MEDICARE

## 2023-12-08 DIAGNOSIS — Z17.0 MALIGNANT NEOPLASM OF LEFT BREAST IN FEMALE, ESTROGEN RECEPTOR POSITIVE, UNSPECIFIED SITE OF BREAST (H): ICD-10-CM

## 2023-12-08 DIAGNOSIS — C50.912 MALIGNANT NEOPLASM OF LEFT BREAST IN FEMALE, ESTROGEN RECEPTOR POSITIVE, UNSPECIFIED SITE OF BREAST (H): ICD-10-CM

## 2023-12-08 LAB
INTERPRETATION: NORMAL
INTERPRETATION: NORMAL
LAB PDF RESULT: NORMAL
LAB PDF RESULT: NORMAL
SIGNIFICANT RESULTS: NORMAL
SIGNIFICANT RESULTS: NORMAL
SPECIMEN DESCRIPTION: NORMAL
SPECIMEN DESCRIPTION: NORMAL
TEST DETAILS, MDL: NORMAL
TEST DETAILS, MDL: NORMAL

## 2023-12-11 ENCOUNTER — LAB (OUTPATIENT)
Dept: LAB | Facility: CLINIC | Age: 73
End: 2023-12-11
Payer: MEDICARE

## 2023-12-11 DIAGNOSIS — C50.912 MALIGNANT NEOPLASM OF LEFT BREAST IN FEMALE, ESTROGEN RECEPTOR POSITIVE, UNSPECIFIED SITE OF BREAST (H): Primary | ICD-10-CM

## 2023-12-11 DIAGNOSIS — Z17.0 MALIGNANT NEOPLASM OF LEFT BREAST IN FEMALE, ESTROGEN RECEPTOR POSITIVE, UNSPECIFIED SITE OF BREAST (H): Primary | ICD-10-CM

## 2023-12-11 PROCEDURE — 81162 BRCA1&2 GEN FULL SEQ DUP/DEL: CPT | Performed by: SURGERY

## 2023-12-11 PROCEDURE — G0452 MOLECULAR PATHOLOGY INTERPR: HCPCS | Mod: 26 | Performed by: PATHOLOGY

## 2023-12-11 PROCEDURE — 36415 COLL VENOUS BLD VENIPUNCTURE: CPT

## 2023-12-13 ENCOUNTER — TELEPHONE (OUTPATIENT)
Dept: FAMILY MEDICINE | Facility: OTHER | Age: 73
End: 2023-12-13

## 2023-12-13 ENCOUNTER — OFFICE VISIT (OUTPATIENT)
Dept: CARDIOLOGY | Facility: OTHER | Age: 73
End: 2023-12-13
Attending: NURSE PRACTITIONER
Payer: COMMERCIAL

## 2023-12-13 ENCOUNTER — PREP FOR PROCEDURE (OUTPATIENT)
Dept: SURGERY | Facility: OTHER | Age: 73
End: 2023-12-13

## 2023-12-13 ENCOUNTER — TELEPHONE (OUTPATIENT)
Dept: MAMMOGRAPHY | Facility: OTHER | Age: 73
End: 2023-12-13

## 2023-12-13 ENCOUNTER — OFFICE VISIT (OUTPATIENT)
Dept: SURGERY | Facility: OTHER | Age: 73
End: 2023-12-13
Attending: SURGERY
Payer: COMMERCIAL

## 2023-12-13 VITALS
OXYGEN SATURATION: 98 % | DIASTOLIC BLOOD PRESSURE: 88 MMHG | HEART RATE: 71 BPM | TEMPERATURE: 97.5 F | SYSTOLIC BLOOD PRESSURE: 135 MMHG

## 2023-12-13 VITALS
WEIGHT: 170.6 LBS | HEART RATE: 77 BPM | OXYGEN SATURATION: 94 % | BODY MASS INDEX: 32.21 KG/M2 | HEIGHT: 61 IN | DIASTOLIC BLOOD PRESSURE: 79 MMHG | SYSTOLIC BLOOD PRESSURE: 132 MMHG | RESPIRATION RATE: 16 BRPM

## 2023-12-13 DIAGNOSIS — C50.912 MALIGNANT NEOPLASM OF LEFT BREAST (H): Primary | ICD-10-CM

## 2023-12-13 DIAGNOSIS — I10 ESSENTIAL HYPERTENSION: ICD-10-CM

## 2023-12-13 DIAGNOSIS — Z17.0 MALIGNANT NEOPLASM OF LEFT BREAST IN FEMALE, ESTROGEN RECEPTOR POSITIVE, UNSPECIFIED SITE OF BREAST (H): Primary | ICD-10-CM

## 2023-12-13 DIAGNOSIS — I49.3 FREQUENT PVCS: Primary | ICD-10-CM

## 2023-12-13 DIAGNOSIS — R00.2 PALPITATIONS: ICD-10-CM

## 2023-12-13 DIAGNOSIS — C50.912 MALIGNANT NEOPLASM OF LEFT BREAST IN FEMALE, ESTROGEN RECEPTOR POSITIVE, UNSPECIFIED SITE OF BREAST (H): Primary | ICD-10-CM

## 2023-12-13 DIAGNOSIS — E11.9 TYPE 2 DIABETES MELLITUS WITHOUT COMPLICATION, WITHOUT LONG-TERM CURRENT USE OF INSULIN (H): ICD-10-CM

## 2023-12-13 DIAGNOSIS — I51.89 DIASTOLIC DYSFUNCTION: ICD-10-CM

## 2023-12-13 LAB — INTERPRETATION: NORMAL

## 2023-12-13 PROCEDURE — G0463 HOSPITAL OUTPT CLINIC VISIT: HCPCS

## 2023-12-13 PROCEDURE — G0463 HOSPITAL OUTPT CLINIC VISIT: HCPCS | Mod: 27 | Performed by: SURGERY

## 2023-12-13 PROCEDURE — 99214 OFFICE O/P EST MOD 30 MIN: CPT | Performed by: SURGERY

## 2023-12-13 PROCEDURE — 99214 OFFICE O/P EST MOD 30 MIN: CPT | Performed by: NURSE PRACTITIONER

## 2023-12-13 ASSESSMENT — PAIN SCALES - GENERAL
PAINLEVEL: MILD PAIN (2)
PAINLEVEL: MILD PAIN (2)

## 2023-12-13 NOTE — TELEPHONE ENCOUNTER
December 13, 2023    Left message to schedule preop for upcoming [email or web address] [place]    -Jessica Sauceda  [facility]

## 2023-12-13 NOTE — TELEPHONE ENCOUNTER
Patient called and is wanting to meet next week stating that she has some questions with moving forward with a mastectomy per Dr. Molina. Stated that she would like some handouts. Told her that she can certainly come to the breast center on Monday at 1pm and we can discuss her questions. Directed her that if she has questions/concerns from now until then to talk with /his nurse or her PCP as I will be out of the office not returning until Monday. Patient reported understanding.     Linn ZAVALA RN

## 2023-12-13 NOTE — PROGRESS NOTES
CLINIC NOTE - FOLLOW UP  12/13/2023    Patient:Rj Rodríguez    Reason for Visit: Follow up from prior clinic visit for left breast cancer    This is a 73 year old female here for follow up from a prior clinic visit for left breast cancer. Her prior medical records were reviewed.       She did have an MRI of her bilateral breasts.  The MRI showed no evidence of synchronous or a synchronous lesions.    She is here to discuss options.    Current Medications:  Current Outpatient Medications   Medication Sig Dispense Refill    albuterol (PROAIR HFA/PROVENTIL HFA/VENTOLIN HFA) 108 (90 Base) MCG/ACT inhaler Inhale 2 puffs into the lungs every 6 hours 18 g 0    blood glucose (ACCU-CHEK GUIDE) test strip Use to test blood sugar 1 time daily or as directed. 50 each 11    blood glucose monitoring (ACCU-CHEK FASTCLIX) lancets Use to test blood sugar 1 time daily or as directed. 102 each 11    cetirizine (ZYRTEC) 10 MG tablet Take 1 tablet (10 mg) by mouth daily as needed for allergies 60 tablet 12    clonazePAM (KLONOPIN) 0.5 MG tablet Take 1 tablet by mouth twice daily as needed for anxiety 30 tablet 0    diltiazem ER (TIAZAC) 240 MG 24 hr ER beaded capsule Take 1 capsule (240 mg) by mouth daily 90 capsule 3    EPINEPHrine (ANY BX GENERIC EQUIV) 0.3 MG/0.3ML injection 2-pack Inject 0.3 mLs (0.3 mg) into the muscle as needed for anaphylaxis May repeat one time in 5-15 minutes if response to initial dose is inadequate. 2 each 1    furosemide (LASIX) 20 MG tablet Take 1 tablet (20 mg) by mouth daily 90 tablet 3    spironolactone (ALDACTONE) 50 MG tablet Take 1 tablet by mouth once daily 90 tablet 0    triamcinolone (NASACORT) 55 MCG/ACT nasal aerosol Spray 2 sprays into both nostrils daily 2 Bottle 12       Allergies:  Allergies   Allergen Reactions    Fruit [Peach] Anaphylaxis and Hives     fresh peaches and any fruit that has a pit.  Kiwi's and apples also.  Can eat any fruit cooked.    Nuts Anaphylaxis and Hives     All Raw  Nuts, can eat nuts when roasted.    Ace Inhibitors      Upper respiratory infection    Alprazolam Hives     Xanax    Antihistamines, Chlorpheniramine-Type Hives     Antihistamines    Diphenhydramine Hives and Other (See Comments)     Keeps her awake      Erythromycin      BASE; pt. Not sure if this is a true allergy.    Guaifed      Pt unsure of reaction    Guaifenesin      Guaifed    Hydrochlorothiazide     No Clinical Screening - See Comments      Allergic:  Fresh peaches and any fruit that has a pit.  Kiwi's and apples also.  All raw nuts.  Can eat nuts when roasted or any fruit cooked.  Some seasonal allergies :  Hayfever    Olmesartan Medoxomil Cough     Benicar    Phenylephrine Hcl      Guaifed    Pseudoephedrine Hcl      Guaifed    Seasonal Allergies      hayfever    Statins     Tramadol      Sleep disturbance. Can not sleep, and when able to fall asleep will have terrible nightmares    Tramadol Hcl      Ultram, insomnia, nightmares, headache    Venlafaxine Other (See Comments)     Heartburn, reflux    Zoloft [Sertraline]      paranoia    Latex Other (See Comments), Swelling, Difficulty breathing and Rash     Throat Closes       PHYSICAL EXAM:     Vital signs: /88 (BP Location: Right arm, Patient Position: Sitting, Cuff Size: Adult Large)   Pulse 71   Temp 97.5  F (36.4  C) (Tympanic)   SpO2 98%    Weight: [unfilled]   BMI: There is no height or weight on file to calculate BMI.   General: Normal, healthy, cooperative, in no acute distress, alert   Skin: no jaundice   HEENT: PERRLA and EOMI   Neck: supple     ASSESSMENT:  73 year old female here as follow up from a prior clinic visit for left breast cancer no evidence of a synchronous or synchronous lesions no evidence of avni metastases.  Genetics are still pending.    PLAN: Did explain to her and her family that given the size of the lesion and no evidence of other lesions or metastatic disease that she would be a candidate for breast conservation.   All of her questions were answered.  At the end of that conversation the patient desires for bilateral mastectomies rather than breast conservation.  I do think this is reasonable.  We will go ahead and schedule her for a left sentinel node biopsy and bilateral mastectomies.    The risks, benefits, and alternatives to the planned procedure were fully discussed with the patient and/or the patient's representative(s). The risks of bleeding, infection, death, missing pathology, the need for additional procedures intra-operatively, the possible need for intra-operative consults, the possible need for transfusion therapy, cardiopulmonary compromise, the possible need for additional surgery for a complication were discussed with the patient and/or the patient's representative(s). The patient's and/or patient's representative(s) questions were addressed and answered. Informed consent was obtained from the patient and/or the patient's representative(s). The patient and/or the patient's representative(s) consent to proceed.

## 2023-12-13 NOTE — TELEPHONE ENCOUNTER
2:56 PM  December 13, 2023    Patient requesting overbook for pre-op with Sheyla Sanches.   Procedure [email or web address] [email or web address]. Sheyla's first opening 1/31/24. Please advise    -Jessica PFEIFFER  [facility]

## 2023-12-13 NOTE — PATIENT INSTRUCTIONS
Thank you for allowing Lizeth Crisostomo CNP and our  team to participate in your care. Please call our office at [phone] with scheduling questions or if you need to cancel or change your appointment. With any other questions or concerns you may call cardiology nurse at  [phone].       If you experience chest pain, chest pressure, chest tightness, shortness of breath, fainting, lightheadedness, nausea, vomiting, or other concerning symptoms, please report to the Emergency Department or call 911. These symptoms may be emergent, and best treated in the Emergency Department.

## 2023-12-13 NOTE — PROGRESS NOTES
[facility] HEART CARE   CARDIOLOGY PROGRESS NOTE     Chief Complaint   Patient presents with    Follow Up    Heart Problem          Diagnosis:    ICD-10-CM    1. Frequent PVCs  I49.3       2. Palpitations  R00.2       3. Essential hypertension  I10       4. Diastolic dysfunction  I51.89       5. Type 2 diabetes mellitus without complication, without long-term current use of insulin (H)  E11.9               Assessment/Plan:    Palpitations  Frequent PVCs  Symptoms have not recurred. Suspect hypervolemia with diastolic dysfunction could have been contributing. Symptoms improved with starting furosemide.   Reviewed leadless EKG monitor results from 5/2023  Underlying rhythm was sinus. Heart rate ranged from 29 bpm, maximum heart rate of 112 bpm, averaging 63 bpm.  x2 triggered events and x2 diary entries.  These corresponded to SVE, VE, ventricular bigeminy, and sinus rhythm.  Occasional PVC's at 1.3%.  Leadless EKG Monitor 8/2021  Underlying rhythm was sinus. Hrt rate ranged from 48 bpm, maximum heart rate of 158 bmp, averaging 71 bmp. No significant bradycardia, pauses, Mobitz type II or 3rd degree heart block.  No atrial fibrillation on this study.  x1 triggered events and x1 diary entries.  These corresponded to VE and sinus rhythm.  Rare, less than 1% of PAC's, atrial couplets, and atrial triplets.  Occasional PVC's at 3.0%.  Frequent ventricular couplets of 5.3%.  + episodes of ventricular bigeminy lasting up to 17.4 sec's.  + episodes of ventricular trigeminy lasting up to 1 min and 4 sec's.  NM Lexiscan Stress Test 2/2022  The nuclear stress test demonstrates an equivocal apical infarct vs thinning. No evidence of reversible ischemia.   The left ventricular ejection fraction at rest is 58%.  The left ventricular ejection fraction at stress is 68%.   Baseline electrocardiogram demonstrates sinus rhythm. There were occasional premature ventricular contractions. The patient did not develop any acute ECG changes or  arrhythmias during the Lexiscan portion of the study.  Transthoracic Echocardiogram 2/2022  Global and regional left ventricular function is normal with an EF of 55-60%.  Relative wall thickness is increased consistent with concentric remodeling. Left ventricular diastolic function is indeterminate. Diastolic Doppler findings (E/E' ratio and/or other parameters) suggest left ventricular filling pressures are increased  Cannot be on beta-blockers with immunotherapy for allergies.     Wt Readings from Last 5 Encounters:   12/13/23 77.4 kg (170 lb 9.6 oz)   12/07/23 78.9 kg (174 lb)   11/29/23 78.9 kg (174 lb)   11/29/23 78.9 kg (174 lb)   11/13/23 78.9 kg (174 lb)         Hypertension with blood pressure goal less than 130/80, controlled  Diastolic dysfunction  Continue Losartan to 50 mg daily  Continue Spironolactone 50 mg daily  Continue diltiazem for palpitations/PVCs  Previously reviewed untreated sleep apnea, may contribute to increased ectopy and may place patient at risk for developing other arrhythmias such as atrial fibrillation. She has not been on CPAP and reports sleep study years ago. She has previously requested to hold off on further sleep study at this time however, may consider home sleep study in the future.   No medication changes today  BP Readings from Last 6 Encounters:   12/13/23 132/79   12/13/23 135/88   12/07/23 (!) 151/110   11/29/23 132/74   11/29/23 (!) 146/72   11/21/23 135/83       Type II diabetes mellitus  Continue management by PCP  Statin: She is not on statin for primary prevention with ASCVD risk of 23.7%    Lab Results   Component Value Date    A1C 5.7 11/13/2023    A1C 6.0 06/28/2023    A1C 5.9 12/23/2022       Recent Labs   Lab Test 01/10/22  0946 03/10/21  1235   CHOL 175 162   HDL 44* 53   LDL 77 87   TRIG 268* 110     The 10-year ASCVD risk score (Noah PARRISH, et al., 2019) is: 26.2%    Values used to calculate the score:      Age: 73 years      Sex: Female      Is Non-  : No      Diabetic: Yes      Tobacco smoker: No      Systolic Blood Pressure: 132 mmHg      Is BP treated: Yes      HDL Cholesterol: 46 mg/dL      Total Cholesterol: 166 mg/dL        Obesity  She has been working on lifestyle modifications and has noticed some weight loss and improved fasting glucose levels  Encouraged her to continue with lifestyle modifications      Obstructive sleep apnea  Diagnosed at some point  Does not wear CPAP  Has not been interested in repeating sleep evaluation    Follow-up with cardiology in 1 year, certainly sooner with acute concerns.       Interval history:  Rj Leyva is a pleasant 72-year old female who presents for cardiology follow-up. She was previously following with my colleague Marie AUSTIN CNP. Recall, she has a cardiovascular history including hypertension, dyslipidemia, palpitations- symptomatic PVCs. She has a non-cardiac history including diabetes mellitus, obstructive sleep apnea, depression, anxiety, glaucoma, chronic lumbago.     Today, Ms. Leyva endorses that she has overall been feeling well. She has not had recurrence of symptomatic PVCs. She has not had any chest discomfort, dyspnea, dyspnea on exertion, palpitations, lightheadedness, near-syncope.She did recently find out she has breast cancer and has a bilateral mastectomy scheduled for January 2024. This has caused her increased stress/anxiety but she feels the process has been manageable and she is feeling comfortable with her plan for management of this. She has been doing well with furosemide. Improvement in bilateral LE edema. She did tear meniscus of left knee and sometimes gets some swelling of the knee. Planning for surgery to repair meniscus after her breast cancer surgery/management. She has been mindful of eating less and what she is eating. She has lost some weight and happy about this. No orthopnea, no PND.       Sleep history: Prior sleep evaluation and used to wear  CPAP. Does not currently wear CPAP.    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 Rodríguez is a 72 year old female who presents for cardiology follow-up to visit on 5/31/22 with recent reports of increased exertional dyspnea and recently identified ventricular ectopy seen as isolated PVCs, ventricular coupletes and ventricular trigeminy on recent cardiac monitor.  Patient has a past medical history significant for migraine headaches, hypertension, obesity, REBECCA, DM 2, depression, significant anxiety, glaucoma and chronic lumbago.     Patient reported noticing irregular heart rate with in the office in Feb 2021. She was identified to have ventricular ectopy on ECG. No palpitations with this at that time. She had described episodes of fluttering palpitations associated to anxiety. She had reported increased exertional dyspnea and chest tightness largely related to anxiety events. She does not report any exertional chest tightness and no radiation to the arm, jaw, neck or back. No lightheadedness or syncope. No increased edema.      She described issues with allergies, possible asthma and on on immunotherapy. Avoided use of albuterol with fluttering palpitations. Has been off beta blocker since starting this. She was started on Diltiazem for HTN and increased ventricular ectopy burden. No tobacco use.     NM Lexiscan stress test due to frequent PVC's and increased exertional dyspnea. Study was completed on 2/10/22 revealing equivocal apical infarct vs apical thinning, no evidence of reversible ischemia. Normal LV systolic heart function which increased appropriately with stress. ECG with occasional ventricular ectopy at baseline which did not increase during lexiscan portion of the study and no ischemia ECG changes.     Echocardiogram  on 2/15/22 revealing normal biventricular function, LVEF 55-60%. Borderline LVH, unable to assess diastolic function. Diastolic doppler findings suggest LV filling pressures to be increased. No RWMA's. Both atrial noted to be normal with atrial septum intact. No hemodynamically significant valvular abnormalities. Ascending aorta mildly dilated at 3.7 cm. No pericardial effusion.     INTERVAL HISTORY:  Today, patient reports mild improvement in dyspnea. No recurrence of pre-syncope or syncope. No chest pain or pressure. Palpitations and flip flop sensation in chest has pretty much resolved with increase in Diltiazem. Admits that she is planning for repeat back surgery at [facility] in [place] after the first of the year, significant anxiety with this.         RELEVANT TESTING:  NM Leadless EKG Monitor 5/2023  Conclusion  Zio XT patch report on Rj PFEIFFERAlise Anne.  Ordered secondary to palpitations.   Worn for 12 days and 9 hr's. After removing artifact, total time was 8 days and 22 hr's. Placed on 5/10/23 at 11:41 AM and completed on 5/22/2023 at 8:44 PM.   Underlying rhythm was sinus.   Hrt rate ranged from 29 bpm, maximum heart rate of 112 bmp, averaging 63 bmp.   No significant pauses, Mobitz type II or 3rd degree heart block.   No atrial fibrillation on this study.   x2 triggered events and x2 diary entries.  These corresponded to SVE, VE, ventricular bigeminy, and sinus rhythm.   x0 runs of VT     x0 runs of SVT.   Rare, <1% of PAC's, atrial couplets, atrial triplets, and ventricular couplets.   Occasional PVC's at 1.3%.   + episodes of ventricular bigeminy lasting up to 27.6 sec's.   + episodes of ventricular trigeminy lasting up to 1 min and 15 sec's.      NM Lexiscan Stress Test 2/2022    Narrative    The nuclear stress test demonstrates an equivocal apical infarct vs   thinning. No evidence of reversible ischemia.     Left ventricular function is normal.     The left ventricular ejection fraction at rest is  58%.  The left   ventricular ejection fraction at stress is 68%.     There is no prior study for comparison.       ECG Summary    ECG Baseline electrocardiogram demonstrates sinus rhythm. There were occasional premature ventricular contractions, .   The patient did not develop any acute ECG changes or arrhythmias during the Lexiscan portion of the study.          Transthoracic Echocardiogram 2/2022  Interpretation Summary  Left ventricular size is normal. Global and regional left ventricular function  is normal with an EF of 55-60%. Biplane LVEF is 56%. Relative wall thickness  is increased consistent with concentric remodeling. Left ventricular diastolic  function is indeterminate. Diastolic Doppler findings (E/E' ratio and/or other  parameters) suggest left ventricular filling pressures are increased. The  Ejection Fraction was calculated using Bi-plane non contrast. No regional wall  motion abnormalities are seen.  The right ventricle is normal in function and size.  No significant valvular abnormality  IVC diameter <2.1 cm collapsing >50% with sniff suggests a normal RA pressure  of 3 mmHg.  No pericardial effusion is present.  There is no prior study for direct comparison.      Leadless EKG Monitor 8/2021  Conclusion  Zio XT patch report on Rj Rodríguez.  Ordered secondary to shortness of breath.   Worn for 13 days and 0 hr's.  After removing artifact, total time was 12 days and 16 hr's. Placed on 8/11/2021 at 11:48 AM and completed on 8/24/2021 at 11:35 AM.   Underlying rhythm was sinus.   Hrt rate ranged from 48 bpm, maximum heart rate of 158 bmp, averaging 71 bmp.   No significant bradycardia, pauses, Mobitz type II or 3rd degree heart block.   No atrial fibrillation on this study.   x1 triggered events and x1 diary entries.  These corresponded to VE and sinus rhythm.   x0 runs of VT.     x2 runs of SVT lasting up to 5 beats with a maximum heart rate of 158 bmp.   Rare, less than 1% of PAC's, atrial  couplets, and atrial triplets.   Occasional PVC's at 3.0%.   Frequent ventricular couplets of 5.3%.   + episodes of ventricular bigeminy lasting up to 17.4 sec's.   + episodes of ventricular trigeminy lasting up to 1 min and 4 sec's.        Past Medical History:   Diagnosis Date    Arthritis     Chronic back pain     Hypertension     Irregular heart beat     Sleep apnea        Past Surgical History:   Procedure Laterality Date    ANESTHESIA OUT OF OR MRI N/A 6/1/2022    Procedure: MRI CERVICAL SPINE;  Surgeon: GENERIC ANESTHESIA PROVIDER;  Location: [facility] OR    ANESTHESIA OUT OF OR MRI N/A 9/23/2022    Procedure: MRI CERVICAL SPINE;  Surgeon: GENERIC ANESTHESIA PROVIDER;  Location: [facility] OR    ANESTHESIA OUT OF OR MRI Left 12/7/2023    Procedure: Anesthesia out of OR MRI;  Surgeon: GENERIC ANESTHESIA PROVIDER;  Location: [facility] OR    APPENDECTOMY      BACK SURGERY      CHOLECYSTECTOMY      COLONOSCOPY  2012    Repeat in 10 years    GYN SURGERY      HYSTERECTOMY      [place]    RELEASE CARPAL TUNNEL      LT    RELEASE CARPAL TUNNEL      RT x2    SURGICAL RADIOLOGY PROCEDURE N/A 2/12/2016    Procedure: SURGICAL RADIOLOGY PROCEDURE;  Surgeon: Provider, Generic Perianesthesia Nursing;  Location: [facility] OR    SURGICAL RADIOLOGY PROCEDURE N/A 8/15/2016    Procedure: SURGICAL RADIOLOGY PROCEDURE;  Surgeon: Provider, Generic Perianesthesia Nursing;  Location: [facility] OR       Allergies   Allergen Reactions    Fruit [Peach] Anaphylaxis and Hives     fresh peaches and any fruit that has a pit.  Kiwi's and apples also.  Can eat any fruit cooked.    Nuts Anaphylaxis and Hives     All Raw Nuts, can eat nuts when roasted.    Ace Inhibitors      Upper respiratory infection    Alprazolam Hives     Xanax    Antihistamines, Chlorpheniramine-Type Hives     Antihistamines    Diphenhydramine Hives and Other (See Comments)     Keeps her awake      Erythromycin      BASE; pt. Not sure if this is a true allergy.    Guaifed      Pt unsure of reaction     Guaifenesin      Guaifed    Hydrochlorothiazide     No Clinical Screening - See Comments      Allergic:  Fresh peaches and any fruit that has a pit.  Kiwi's and apples also.  All raw nuts.  Can eat nuts when roasted or any fruit cooked.  Some seasonal allergies :  Hayfever    Olmesartan Medoxomil Cough     Benicar    Phenylephrine Hcl      Guaifed    Pseudoephedrine Hcl      Guaifed    Seasonal Allergies      hayfever    Statins     Tramadol      Sleep disturbance. Can not sleep, and when able to fall asleep will have terrible nightmares    Tramadol Hcl      Ultram, insomnia, nightmares, headache    Venlafaxine Other (See Comments)     Heartburn, reflux    Zoloft [Sertraline]      paranoia    Latex Other (See Comments), Swelling, Difficulty breathing and Rash     Throat Closes         Current Outpatient Medications   Medication Sig Dispense Refill    albuterol (PROAIR HFA/PROVENTIL HFA/VENTOLIN HFA) 108 (90 Base) MCG/ACT inhaler Inhale 2 puffs into the lungs every 6 hours 18 g 0    blood glucose (ACCU-CHEK GUIDE) test strip Use to test blood sugar 1 time daily or as directed. 50 each 11    blood glucose monitoring (ACCU-CHEK FASTCLIX) lancets Use to test blood sugar 1 time daily or as directed. 102 each 11    cetirizine (ZYRTEC) 10 MG tablet Take 1 tablet (10 mg) by mouth daily as needed for allergies 60 tablet 12    clonazePAM (KLONOPIN) 0.5 MG tablet Take 1 tablet by mouth twice daily as needed for anxiety 30 tablet 0    diltiazem ER (TIAZAC) 240 MG 24 hr ER beaded capsule Take 1 capsule (240 mg) by mouth daily 90 capsule 3    EPINEPHrine (ANY BX GENERIC EQUIV) 0.3 MG/0.3ML injection 2-pack Inject 0.3 mLs (0.3 mg) into the muscle as needed for anaphylaxis May repeat one time in 5-15 minutes if response to initial dose is inadequate. 2 each 1    furosemide (LASIX) 20 MG tablet Take 1 tablet (20 mg) by mouth daily 90 tablet 3    spironolactone (ALDACTONE) 50 MG tablet Take 1 tablet by mouth once daily 90 tablet 0     triamcinolone (NASACORT) 55 MCG/ACT nasal aerosol Spray 2 sprays into both nostrils daily 2 Bottle 12       Social History     Socioeconomic History    Marital status:      Spouse name: Not on file    Number of children: Not on file    Years of education: Not on file    Highest education level: Not on file   Occupational History    Not on file   Tobacco Use    Smoking status: Never    Smokeless tobacco: Never   Vaping Use    Vaping Use: Never used   Substance and Sexual Activity    Alcohol use: Yes     Alcohol/week: 1.0 standard drink of alcohol     Types: 1 Glasses of wine per week     Comment: occ glass of wine    Drug use: No    Sexual activity: Not Currently   Other Topics Concern     Service Not Asked    Blood Transfusions Not Asked    Caffeine Concern Yes     Comment: Coffee - 2 cups daily    Occupational Exposure Not Asked    Hobby Hazards Not Asked    Sleep Concern Not Asked    Stress Concern Not Asked    Weight Concern Not Asked    Special Diet Not Asked    Back Care Not Asked    Exercise Not Asked    Bike Helmet Not Asked    Seat Belt Not Asked    Self-Exams Not Asked    Parent/sibling w/ CABG, MI or angioplasty before 65F 55M? Not Asked   Social History Narrative    10/11/2019: , lives in [facility] Determinants of Health     Financial Resource Strain: Low Risk  (11/29/2023)    Financial Resource Strain     Within the past 12 months, have you or your family members you live with been unable to get utilities (heat, electricity) when it was really needed?: No   Food Insecurity: Low Risk  (11/29/2023)    Food Insecurity     Within the past 12 months, did you worry that your food would run out before you got money to buy more?: No     Within the past 12 months, did the food you bought just not last and you didn t have money to get more?: No   Transportation Needs: Low Risk  (11/29/2023)    Transportation Needs     Within the past 12 months, has lack of transportation kept you  "from medical appointments, getting your medicines, non-medical meetings or appointments, work, or from getting things that you need?: No   Physical Activity: Not on file   Stress: Not on file   Social Connections: Not on file   Interpersonal Safety: Not on file   Housing Stability: Low Risk  (11/29/2023)    Housing Stability     Do you have housing? : Yes     Are you worried about losing your housing?: No       LAB RESULTS:   Orders placed or performed in visit on 12/07/23    midazolam (VERSED) injection *Discontinued*    glycopyrrolate (ROBINUL) injection *Discontinued*    lidocaine 2% injection (MDV) *Discontinued*    ketamine (KETALAR) injection *Discontinued*    propofol (DIPRIVAN) infusion *Discontinued*         Review of systems: Negative except that which was noted in the HPI.    Physical examination:    Vitals: /79   Pulse 77   Resp 16   Ht 1.549 m (5' 1\")   Wt 77.4 kg (170 lb 9.6 oz)   SpO2 94%   BMI 32.23 kg/m    BMI= Body mass index is 32.23 kg/m .      GENERAL APPEARANCE: healthy, alert and no distress  HEENT: no icterus, no xanthelasmas, normal pupil size and reaction, no cyanosis.  NECK: no adenopathy, no asymmetry, masses.  CHEST: lungs clear to auscultation - no rales, rhonchi or wheezes, no use of accessory muscles, no retractions, respirations are unlabored, normal respiratory rate  CARDIOVASCULAR: regular rhythm, normal S1 with physiologic split S2, no S3 or S4 and no murmur, click or rub  EXTREMITIES: +trace bilateral LE edema  NEURO: alert and oriented normal speech, and affect  VASC: No vascular bruits heard.  SKIN: no ecchymoses, no rashes      Thank you for allowing me to participate in the care of your patient. Please do not hesitate to contact me if you have any questions.         Lizeth Crisostomo, ARNOLD    "

## 2023-12-18 ENCOUNTER — TELEPHONE (OUTPATIENT)
Dept: MAMMOGRAPHY | Facility: OTHER | Age: 73
End: 2023-12-18

## 2023-12-18 NOTE — TELEPHONE ENCOUNTER
Patient also stated that her surgeon made comments regarding how he isn't as good at stitching as he used to be so he might use staples for her procedure. Told patient I was unsure what the process is as I am not involved in the OR but that if she has any concerns or questions she needs to address these with Dr. Molina or that it is her right to get a second opinion.     Linn ZAVALA RN

## 2023-12-18 NOTE — TELEPHONE ENCOUNTER
Patient came into breast center today as she has some questions regarding her mastectomy procedures. Gave patient a handout from forms on demand on the mastectomy procedure. Recommended that patient meet with surgeon prior to surgery date so that all questions/concerns are answered. Also educated patient that she can always get a second opinion if she is unsure about something as well and in the healthcare field that is her right as a patient. Patient agreed to schedule appt with her surgeon Dr. Molina to discuss questions regarding pre and post procedure along with care process. Sent a message to Dr. Molina's nurse regarding scheduling patient with him.     Linn ZAVALA RN

## 2023-12-19 ENCOUNTER — OFFICE VISIT (OUTPATIENT)
Dept: SURGERY | Facility: OTHER | Age: 73
End: 2023-12-19
Attending: SURGERY
Payer: MEDICARE

## 2023-12-19 VITALS
TEMPERATURE: 97.3 F | HEART RATE: 72 BPM | HEIGHT: 61 IN | DIASTOLIC BLOOD PRESSURE: 60 MMHG | OXYGEN SATURATION: 97 % | SYSTOLIC BLOOD PRESSURE: 126 MMHG | WEIGHT: 171 LBS | BODY MASS INDEX: 32.28 KG/M2

## 2023-12-19 DIAGNOSIS — C50.912 MALIGNANT NEOPLASM OF LEFT BREAST IN FEMALE, ESTROGEN RECEPTOR POSITIVE, UNSPECIFIED SITE OF BREAST (H): Primary | ICD-10-CM

## 2023-12-19 DIAGNOSIS — Z17.0 MALIGNANT NEOPLASM OF LEFT BREAST IN FEMALE, ESTROGEN RECEPTOR POSITIVE, UNSPECIFIED SITE OF BREAST (H): Primary | ICD-10-CM

## 2023-12-19 PROCEDURE — 99212 OFFICE O/P EST SF 10 MIN: CPT | Performed by: SURGERY

## 2023-12-19 PROCEDURE — G0463 HOSPITAL OUTPT CLINIC VISIT: HCPCS

## 2023-12-19 ASSESSMENT — PAIN SCALES - GENERAL: PAINLEVEL: NO PAIN (0)

## 2023-12-19 NOTE — PROGRESS NOTES
This is a patient with known breast cancer.  She is scheduled for bilateral mastectomy with a left sentinel node biopsy.  She is here with questions.    We had a long discussion about the questions that she had.  All of them were answered to her satisfaction.  As noted above she is scheduled for a bilateral mastectomy with left sentinel node biopsy for left breast cancer.

## 2024-01-05 NOTE — PATIENT INSTRUCTIONS
Hold all medications the morning of surgery except diltiazem.      Preparing for Your Surgery  Getting started  A nurse will call you to review your health history and instructions. They will give you an arrival time based on your scheduled surgery time. Please be ready to share:  Your doctor's clinic name and phone number  Your medical, surgical, and anesthesia history  A list of allergies and sensitivities  A list of medicines, including herbal treatments and over-the-counter drugs  Whether the patient has a legal guardian (ask how to send us the papers in advance)  Please tell us if you're pregnant--or if there's any chance you might be pregnant. Some surgeries may injure a fetus (unborn baby), so they require a pregnancy test. Surgeries that are safe for a fetus don't always need a test, and you can choose whether to have one.   If you have a child who's having surgery, please ask for a copy of Preparing for Your Child's Surgery.    Preparing for surgery  Within 10 to 30 days of surgery: Have a pre-op exam (sometimes called an H&P, or History and Physical). This can be done at a clinic or pre-operative center.  If you're having a , you may not need this exam. Talk to your care team.  At your pre-op exam, talk to your care team about all medicines you take. If you need to stop any medicines before surgery, ask when to start taking them again.  We do this for your safety. Many medicines can make you bleed too much during surgery. Some change how well surgery (anesthesia) drugs work.  Call your insurance company to let them know you're having surgery. (If you don't have insurance, call [phone].)  Call your clinic if there's any change in your health. This includes signs of a cold or flu (sore throat, runny nose, cough, rash, fever). It also includes a scrape or scratch near the surgery site.  If you have questions on the day of surgery, call your hospital or surgery center.  Eating and drinking  guidelines  For your safety: Unless your surgeon tells you otherwise, follow the guidelines below.  Eat and drink as usual until 8 hours before you arrive for surgery. After that, no food or milk.  Drink clear liquids until 2 hours before you arrive. These are liquids you can see through, like water, Gatorade, and Propel Water. They also include plain black coffee and tea (no cream or milk), candy, and breath mints. You can spit out gum when you arrive.  If you drink alcohol: Stop drinking it the night before surgery.  If your care team tells you to take medicine on the morning of surgery, it's okay to take it with a sip of water.  Preventing infection  Shower or bathe the night before and morning of your surgery. Follow the instructions your clinic gave you. (If no instructions, use regular soap.)  Don't shave or clip hair near your surgery site. We'll remove the hair if needed.  Don't smoke or vape the morning of surgery. You may chew nicotine gum up to 2 hours before surgery. A nicotine patch is okay.  Note: Some surgeries require you to completely quit smoking and nicotine. Check with your surgeon.  Your care team will make every effort to keep you safe from infection. We will:  Clean our hands often with soap and water (or an alcohol-based hand rub).  Clean the skin at your surgery site with a special soap that kills germs.  Give you a special gown to keep you warm. (Cold raises the risk of infection.)  Wear special hair covers, masks, gowns and gloves during surgery.  Give antibiotic medicine, if prescribed. Not all surgeries need antibiotics.  What to bring on the day of surgery  Photo ID and insurance card  Copy of your health care directive, if you have one  Glasses and hearing aids (bring cases)  You can't wear contacts during surgery  Inhaler and eye drops, if you use them (tell us about these when you arrive)  CPAP machine or breathing device, if you use them  A few personal items, if spending the  night  If you have . . .  A pacemaker, ICD (cardiac defibrillator) or other implant: Bring the ID card.  An implanted stimulator: Bring the remote control.  A legal guardian: Bring a copy of the certified (court-stamped) guardianship papers.  Please remove any jewelry, including body piercings. Leave jewelry and other valuables at home.  If you're going home the day of surgery  You must have a responsible adult drive you home. They should stay with you overnight as well.  If you don't have someone to stay with you, and you aren't safe to go home alone, we may keep you overnight. Insurance often won't pay for this.  After surgery  If it's hard to control your pain or you need more pain medicine, please call your surgeon's office.  Questions?   If you have any questions for your care team, list them here: _________________________________________________________________________________________________________________________________________________________________________ ____________________________________ ____________________________________ ____________________________________  For informational purposes only. Not to replace the advice of your health care provider. Copyright   2003, 2019 [employer]. All rights reserved. Clinically reviewed by Susanna Owens MD. [employer] 229298 - REV 12/22.

## 2024-01-05 NOTE — PROGRESS NOTES
[facility] [place]  [address]  [place] [address]  Phone: [phone]  Primary Provider: Danisha Dyer  Pre-op Performing Provider: DANISHA DYER      PREOPERATIVE EVALUATION:  Today's date: 1/8/2024    Rj is a 73 year old, presenting for the following:  Pre-Op Exam        Surgical Information:  Surgery/Procedure: Bilateral Mastectomy   Surgery Location: [facility]  Surgeon: Dr. Molina  Surgery Date: 1/16/24 Tuesday  Time of Surgery: TBD  Where patient plans to recover: in the hospital  Fax number for surgical facility: Note does not need to be faxed, will be available electronically in Epic.    Assessment & Plan     The proposed surgical procedure is considered INTERMEDIATE risk.    Preop general physical exam  Malignant neoplasm of left female breast, unspecified estrogen receptor status, unspecified site of breast (H)  Cleared for surgery. Recent EKG unremarkable. CBC and BMP unremarkable today.     Type 2 diabetes mellitus without complication, without long-term current use of insulin (H)  Controlled. Recent A1C 5.7.     Hyperlipidemia, unspecified hyperlipidemia type  Does not tolerate statins.     Essential hypertension  Controlled.     Mild intermittent reactive airway disease without complication  Controlled.     ZEFERINO (generalized anxiety disorder)  Mild episode of recurrent major depressive disorder (H24)  Stable. Uses Klonopin as needed. Does get very anxious in the hospital setting. Does need Klonopin as needed.     Frequent PVCs  Stable. FYI for surgery.     Hypertensive heart disease with heart failure (H)  Controlled.     Class 2 severe obesity  BMI 32. FYI for surgery.         Risks and Recommendations:  The patient has the following additional risks and recommendations for perioperative complications:  Pulmonary:    - Incentive spirometry post-op    Will hold all medications the morning of surgery except her diltiazem.     Stop all ibuprofen and supplements 7 days prior.      RECOMMENDATION:  APPROVAL GIVEN to proceed with proposed procedure, without further diagnostic evaluation.    Rj Rodríguez with a functional capacity of greater than four MET's. There are no obvious contraindications to proceeding with surgery at this time. Recommend that the surgeon review risks and benefits of the procedure prior to proceeding.     Was recommended to avoid eating and drinking anything 12 hours prior to surgery unless his surgeon tells him otherwise. 56}    Subjective       HPI related to upcoming procedure: Patient with breast cancer. Plans to proceed with mastectomy.           1/8/2024     9:39 AM   Preop Questions   1. Have you ever had a heart attack or stroke? No   2. Have you ever had surgery on your heart or blood vessels, such as a stent placement, a coronary artery bypass, or surgery on an artery in your head, neck, heart, or legs? No   3. Do you have chest pain with activity? No   4. Do you have a history of  heart failure? No   5. Do you currently have a cold, bronchitis or symptoms of other infection? No   6. Do you have a cough, shortness of breath, or wheezing? No   7. Do you or anyone in your family have previous history of blood clots? No   8. Do you or does anyone in your family have a serious bleeding problem such as prolonged bleeding following surgeries or cuts? No   9. Have you ever had problems with anemia or been told to take iron pills? No   10. Have you had any abnormal blood loss such as black, tarry or bloody stools, or abnormal vaginal bleeding? No   11. Have you ever had a blood transfusion? No   12. Are you willing to have a blood transfusion if it is medically needed before, during, or after your surgery? Yes   13. Have you or any of your relatives ever had problems with anesthesia? YES - hard time waking up   14. Do you have sleep apnea, excessive snoring or daytime drowsiness? No   15. Do you have any artifical heart valves or other implanted medical devices  like a pacemaker, defibrillator, or continuous glucose monitor? No   16. Do you have artificial joints? No   17. Are you allergic to latex? YES:        Health Care Directive:  Patient does not have a Health Care Directive or Living Will: Discussed advance care planning with patient; however, patient declined at this time.    Preoperative Review of :   reviewed - no record of controlled substances prescribed.      Status of Chronic Conditions:    DEPRESSION and ANXIETY - Patient has a long history of Depression and Anxiety of moderate severity requiring medication for control with recent symptoms being slightly worse. Current symptoms of depression include anxiety and irritability with recent diagnosis of breast cancer. Uses Klonopin as needed. No thoughts of suicide. Working with her counselor.      DIABETES - Patient has a longstanding history of Diabetes Type II . Patient is being treated with diet and exercise and denies significant side effects. Control has been good. Complicating factors include but are not limited to: hypertension. A1C was 5.7 on 11/13/23.      HYPERLIPIDEMIA - Patient has a long history of significant Hyperlipidemia requiring medication for treatment with recent fair control. Does not take a statin due to side effects.      HYPERTENSION/Frequent PVC- Patient has longstanding history of HTN and palpitations, currently denies any symptoms referable to elevated blood pressure. Specifically denies chest pain, dyspnea, orthopnea, PND or peripheral edema. Blood pressure readings have been in normal range. Current medication regimen is as listed below. Patient denies any side effects of medication. Currently taking Aldactone 50 mg, diltiazem 240 mg, and lasix 20 mg. She does follow with cardiology. Was last seen on 12/13/23. Note was reviewed. Stress test was done on 2/10/22 and was negative for reversible ischemia. No longer having palpitations.      ASTHMA - controlled. Rare use of  albuterol.      Mets greater than 4, able to walk around the block without stopping.      She has had surgery in the past and done well.     Review of Systems  CONSTITUTIONAL: NEGATIVE for fever, chills, change in weight  INTEGUMENTARY/SKIN: NEGATIVE for worrisome rashes, moles or lesions  EYES: NEGATIVE for vision changes or irritation  ENT/MOUTH: NEGATIVE for ear, mouth and throat problems  RESP: NEGATIVE for significant cough or SOB  CV: NEGATIVE for chest pain, palpitations or peripheral edema  GI: NEGATIVE for nausea, abdominal pain, heartburn, or change in bowel habits  : NEGATIVE for frequency, dysuria, or hematuria  MUSCULOSKELETAL: NEGATIVE for significant arthralgias or myalgia  NEURO: NEGATIVE for weakness, dizziness or paresthesias  ENDOCRINE: NEGATIVE for temperature intolerance, skin/hair changes  HEME: NEGATIVE for bleeding problems  PSYCHIATRIC: NEGATIVE for changes in mood or affect    Patient Active Problem List    Diagnosis Date Noted    Hypertensive heart disease with heart failure (H) 01/08/2024     Priority: Medium    Shortness of breath 08/09/2021     Priority: Medium    Irregular heart rate 08/09/2021     Priority: Medium    Frequent PVCs 08/09/2021     Priority: Medium    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     Priority: Medium    Diabetes mellitus, type 2 (H) 12/06/2019     Priority: Medium    [place] cardiac risk 11% in next 10 years 10/29/2019     Priority: Medium    S/P rotator cuff repair 10/26/2016     Priority: Medium    Mild episode of recurrent major depressive disorder (H24) 10/22/2014     Priority: Medium    Migraine with aura and without status migrainosus, not intractable 04/21/2014     Priority: Medium    S/P cervical spinal fusion 03/21/2014     Priority: Medium    Cervicalgia 12/10/2013     Priority: Medium    Vertigo 03/18/2013     Priority: Medium    Tinnitus 03/18/2013     Priority: Medium    Chronic rhinitis 03/18/2013     Priority: Medium    Lumbago 07/10/2012      Priority: Medium    Presbyopia 01/25/2011     Priority: Medium    Primary open-angle glaucoma 01/25/2011     Priority: Medium     Overview:   IMO Update 10/11      ZEFERINO (generalized anxiety disorder) 12/06/2006     Priority: Medium    Essential hypertension 06/02/2004     Priority: Medium     Overview:   IMO Update        Past Medical History:   Diagnosis Date    Arthritis     Chronic back pain     Hypertension     Irregular heart beat     Sleep apnea      Past Surgical History:   Procedure Laterality Date    ANESTHESIA OUT OF OR MRI N/A 6/1/2022    Procedure: MRI CERVICAL SPINE;  Surgeon: GENERIC ANESTHESIA PROVIDER;  Location: [facility]    ANESTHESIA OUT OF OR MRI N/A 9/23/2022    Procedure: MRI CERVICAL SPINE;  Surgeon: GENERIC ANESTHESIA PROVIDER;  Location: [facility]    ANESTHESIA OUT OF OR MRI Left 12/7/2023    Procedure: Anesthesia out of OR MRI;  Surgeon: GENERIC ANESTHESIA PROVIDER;  Location: [facility]    APPENDECTOMY      BACK SURGERY      CHOLECYSTECTOMY      COLONOSCOPY  2012    Repeat in 10 years    GYN SURGERY      HYSTERECTOMY      Ovaries    RELEASE CARPAL TUNNEL      LT    RELEASE CARPAL TUNNEL      RT x2    SURGICAL RADIOLOGY PROCEDURE N/A 2/12/2016    Procedure: SURGICAL RADIOLOGY PROCEDURE;  Surgeon: Provider, Generic Perianesthesia Nursing;  Location: [facility]    SURGICAL RADIOLOGY PROCEDURE N/A 8/15/2016    Procedure: SURGICAL RADIOLOGY PROCEDURE;  Surgeon: Provider, Generic Perianesthesia Nursing;  Location: [facility]     Current Outpatient Medications   Medication Sig Dispense Refill    blood glucose (ACCU-CHEK GUIDE) test strip Use to test blood sugar 1 time daily or as directed. 50 each 11    blood glucose monitoring (ACCU-CHEK FASTCLIX) lancets Use to test blood sugar 1 time daily or as directed. 102 each 11    cetirizine (ZYRTEC) 10 MG tablet Take 1 tablet (10 mg) by mouth daily as needed for allergies 60 tablet 12    clonazePAM (KLONOPIN) 0.5 MG tablet Take 1 tablet by mouth twice daily as needed for  anxiety 30 tablet 0    diltiazem ER (TIAZAC) 240 MG 24 hr ER beaded capsule Take 1 capsule (240 mg) by mouth daily 90 capsule 3    furosemide (LASIX) 20 MG tablet Take 1 tablet (20 mg) by mouth daily 90 tablet 3    spironolactone (ALDACTONE) 50 MG tablet Take 1 tablet by mouth once daily 90 tablet 0    triamcinolone (NASACORT) 55 MCG/ACT nasal aerosol Spray 2 sprays into both nostrils daily 2 Bottle 12    albuterol (PROAIR HFA/PROVENTIL HFA/VENTOLIN HFA) 108 (90 Base) MCG/ACT inhaler Inhale 2 puffs into the lungs every 6 hours (Patient not taking: Reported on 1/8/2024) 18 g 0    EPINEPHrine (ANY BX GENERIC EQUIV) 0.3 MG/0.3ML injection 2-pack Inject 0.3 mLs (0.3 mg) into the muscle as needed for anaphylaxis May repeat one time in 5-15 minutes if response to initial dose is inadequate. (Patient not taking: Reported on 1/8/2024) 2 each 1       Allergies   Allergen Reactions    Fruit [Peach] Anaphylaxis and Hives     fresh peaches and any fruit that has a pit.  Kiwi's and apples also.  Can eat any fruit cooked.    Nuts Anaphylaxis and Hives     All Raw Nuts, can eat nuts when roasted.    Ace Inhibitors      Upper respiratory infection    Alprazolam Hives     Xanax    Antihistamines, Chlorpheniramine-Type Hives     Antihistamines    Diphenhydramine Hives and Other (See Comments)     Keeps her awake      Erythromycin      BASE; pt. Not sure if this is a true allergy.    Guaifed      Pt unsure of reaction    Guaifenesin      Guaifed    Hydrochlorothiazide     No Clinical Screening - See Comments      Allergic:  Fresh peaches and any fruit that has a pit.  Kiwi's and apples also.  All raw nuts.  Can eat nuts when roasted or any fruit cooked.  Some seasonal allergies :  Hayfever    Olmesartan Medoxomil Cough     Benicar    Phenylephrine Hcl      Guaifed    Pseudoephedrine Hcl      Guaifed    Seasonal Allergies      hayfever    Statins     Tramadol      Sleep disturbance. Can not sleep, and when able to fall asleep will have  terrible nightmares    Tramadol Hcl      Ultram, insomnia, nightmares, headache    Venlafaxine Other (See Comments)     Heartburn, reflux    Zoloft [Sertraline]      paranoia    Latex Other (See Comments), Swelling, Difficulty breathing and Rash     Throat Closes          Social History     Tobacco Use    Smoking status: Never     Passive exposure: Never    Smokeless tobacco: Never   Substance Use Topics    Alcohol use: Yes     Alcohol/week: 1.0 standard drink of alcohol     Types: 1 Glasses of wine per week     Comment: occ glass of wine     Family History   Problem Relation Age of Onset    Colon Cancer Maternal Aunt     Colon Cancer Maternal Uncle     Diabetes Father         Type 2    Hypertension Father     Alcoholism Father     Alcoholism Mother      History   Drug Use No         Objective     /68 (BP Location: Left arm, Patient Position: Chair, Cuff Size: Adult Large)   Pulse 58   Temp 98.5  F (36.9  C) (Tympanic)   Resp 16   Wt 78.2 kg (172 lb 4.8 oz)   SpO2 94%   BMI 32.29 kg/m      Physical Exam    GENERAL APPEARANCE: healthy, alert, no distress, and over weight     EYES: EOMI, PERRL     HENT: ear canals and TM's normal and nose and mouth without ulcers or lesions     NECK: no adenopathy, no asymmetry, masses, or scars and thyroid normal to palpation     RESP: lungs clear to auscultation - no rales, rhonchi or wheezes     CV: regular rhythm, normal S1 with physiologic split S2, no S3 or S4 and no murmur, click or rub      ABDOMEN:  soft, nontender, no HSM or masses and bowel sounds normal     MS: extremities normal- no gross deformities noted, no evidence of inflammation in joints, FROM in all extremities.     SKIN: no suspicious lesions or rashes     NEURO: Normal strength and tone, sensory exam grossly normal, mentation intact and speech normal     PSYCH: mentation appears normal. and affect normal/bright     LYMPHATICS: No cervical adenopathy    Recent Labs   Lab Test 11/29/23  1308  11/13/23  0750 08/07/23  1507 06/28/23  0738 05/08/23  1013   HGB 14.0  --   --   --  14.3     --   --   --  260    138   < > 138 140   POTASSIUM 3.7 4.3   < > 4.3 4.2   CR 0.83 1.00*   < > 0.98* 0.86   A1C  --  5.7*  --  6.0*  --     < > = values in this interval not displayed.        Diagnostics:  Recent Results (from the past 24 hour(s))   Basic metabolic panel    Collection Time: 01/08/24 10:46 AM   Result Value Ref Range    Sodium 140 135 - 145 mmol/L    Potassium 4.1 3.4 - 5.3 mmol/L    Chloride 105 98 - 107 mmol/L    Carbon Dioxide (CO2) 25 22 - 29 mmol/L    Anion Gap 10 7 - 15 mmol/L    Urea Nitrogen 11.9 8.0 - 23.0 mg/dL    Creatinine 0.86 0.51 - 0.95 mg/dL    GFR Estimate 72 >60 mL/min/1.73m2    Calcium 9.4 8.8 - 10.2 mg/dL    Glucose 105 (H) 70 - 99 mg/dL   CBC with platelets and differential    Collection Time: 01/08/24 10:46 AM   Result Value Ref Range    WBC Count 8.3 4.0 - 11.0 10e3/uL    RBC Count 4.46 3.80 - 5.20 10e6/uL    Hemoglobin 13.9 11.7 - 15.7 g/dL    Hematocrit 41.1 35.0 - 47.0 %    MCV 92 78 - 100 fL    MCH 31.2 26.5 - 33.0 pg    MCHC 33.8 31.5 - 36.5 g/dL    RDW 13.1 10.0 - 15.0 %    Platelet Count 261 150 - 450 10e3/uL    % Neutrophils 72 %    % Lymphocytes 17 %    % Monocytes 8 %    % Eosinophils 2 %    % Basophils 1 %    % Immature Granulocytes 0 %    NRBCs per 100 WBC 0 <1 /100    Absolute Neutrophils 6.0 1.6 - 8.3 10e3/uL    Absolute Lymphocytes 1.4 0.8 - 5.3 10e3/uL    Absolute Monocytes 0.7 0.0 - 1.3 10e3/uL    Absolute Eosinophils 0.1 0.0 - 0.7 10e3/uL    Absolute Basophils 0.1 0.0 - 0.2 10e3/uL    Absolute Immature Granulocytes 0.0 <=0.4 10e3/uL    Absolute NRBCs 0.0 10e3/uL        EKG 11/29/23; EKG: sinus bradycardia, normal axis, normal intervals, no acute ST/T changes c/w ischemia, no LVH by voltage criteria, unchanged from previous tracings     Revised Cardiac Risk Index (RCRI):  The patient has the following serious cardiovascular risks for perioperative  complications:   - No serious cardiac risks = 0 points     RCRI Interpretation: 0 points: Class I (very low risk - 0.4% complication rate)         Signed Electronically by: Sheyla Sanches NP  Copy of this evaluation report is provided to requesting physician.

## 2024-01-08 ENCOUNTER — OFFICE VISIT (OUTPATIENT)
Dept: FAMILY MEDICINE | Facility: OTHER | Age: 74
End: 2024-01-08
Attending: NURSE PRACTITIONER
Payer: COMMERCIAL

## 2024-01-08 VITALS
OXYGEN SATURATION: 94 % | DIASTOLIC BLOOD PRESSURE: 68 MMHG | HEART RATE: 58 BPM | RESPIRATION RATE: 16 BRPM | BODY MASS INDEX: 32.29 KG/M2 | WEIGHT: 172.3 LBS | SYSTOLIC BLOOD PRESSURE: 126 MMHG | TEMPERATURE: 98.5 F

## 2024-01-08 DIAGNOSIS — Z01.818 PREOP GENERAL PHYSICAL EXAM: Primary | ICD-10-CM

## 2024-01-08 DIAGNOSIS — F33.0 MILD EPISODE OF RECURRENT MAJOR DEPRESSIVE DISORDER (H): ICD-10-CM

## 2024-01-08 DIAGNOSIS — C50.912 MALIGNANT NEOPLASM OF LEFT FEMALE BREAST, UNSPECIFIED ESTROGEN RECEPTOR STATUS, UNSPECIFIED SITE OF BREAST (H): ICD-10-CM

## 2024-01-08 DIAGNOSIS — E66.811 CLASS 1 OBESITY WITHOUT SERIOUS COMORBIDITY WITH BODY MASS INDEX (BMI) OF 32.0 TO 32.9 IN ADULT, UNSPECIFIED OBESITY TYPE: ICD-10-CM

## 2024-01-08 DIAGNOSIS — J45.20 MILD INTERMITTENT REACTIVE AIRWAY DISEASE WITHOUT COMPLICATION: ICD-10-CM

## 2024-01-08 DIAGNOSIS — E78.5 HYPERLIPIDEMIA, UNSPECIFIED HYPERLIPIDEMIA TYPE: ICD-10-CM

## 2024-01-08 DIAGNOSIS — I11.0 HYPERTENSIVE HEART DISEASE WITH HEART FAILURE (H): ICD-10-CM

## 2024-01-08 DIAGNOSIS — I49.3 FREQUENT PVCS: ICD-10-CM

## 2024-01-08 DIAGNOSIS — I10 ESSENTIAL HYPERTENSION: ICD-10-CM

## 2024-01-08 DIAGNOSIS — E11.9 TYPE 2 DIABETES MELLITUS WITHOUT COMPLICATION, WITHOUT LONG-TERM CURRENT USE OF INSULIN (H): ICD-10-CM

## 2024-01-08 DIAGNOSIS — F41.1 GAD (GENERALIZED ANXIETY DISORDER): ICD-10-CM

## 2024-01-08 PROBLEM — E66.812 CLASS 2 SEVERE OBESITY DUE TO EXCESS CALORIES WITH SERIOUS COMORBIDITY IN ADULT (H): Status: RESOLVED | Noted: 2023-08-07 | Resolved: 2024-01-08

## 2024-01-08 PROBLEM — E66.01 CLASS 2 SEVERE OBESITY DUE TO EXCESS CALORIES WITH SERIOUS COMORBIDITY IN ADULT (H): Status: RESOLVED | Noted: 2023-08-07 | Resolved: 2024-01-08

## 2024-01-08 LAB
ANION GAP SERPL CALCULATED.3IONS-SCNC: 10 MMOL/L (ref 7–15)
BASOPHILS # BLD AUTO: 0.1 10E3/UL (ref 0–0.2)
BASOPHILS NFR BLD AUTO: 1 %
BUN SERPL-MCNC: 11.9 MG/DL (ref 8–23)
CALCIUM SERPL-MCNC: 9.4 MG/DL (ref 8.8–10.2)
CHLORIDE SERPL-SCNC: 105 MMOL/L (ref 98–107)
CREAT SERPL-MCNC: 0.86 MG/DL (ref 0.51–0.95)
DEPRECATED HCO3 PLAS-SCNC: 25 MMOL/L (ref 22–29)
EGFRCR SERPLBLD CKD-EPI 2021: 72 ML/MIN/1.73M2
EOSINOPHIL # BLD AUTO: 0.1 10E3/UL (ref 0–0.7)
EOSINOPHIL NFR BLD AUTO: 2 %
ERYTHROCYTE [DISTWIDTH] IN BLOOD BY AUTOMATED COUNT: 13.1 % (ref 10–15)
GLUCOSE SERPL-MCNC: 105 MG/DL (ref 70–99)
HCT VFR BLD AUTO: 41.1 % (ref 35–47)
HGB BLD-MCNC: 13.9 G/DL (ref 11.7–15.7)
IMM GRANULOCYTES # BLD: 0 10E3/UL
IMM GRANULOCYTES NFR BLD: 0 %
LYMPHOCYTES # BLD AUTO: 1.4 10E3/UL (ref 0.8–5.3)
LYMPHOCYTES NFR BLD AUTO: 17 %
MCH RBC QN AUTO: 31.2 PG (ref 26.5–33)
MCHC RBC AUTO-ENTMCNC: 33.8 G/DL (ref 31.5–36.5)
MCV RBC AUTO: 92 FL (ref 78–100)
MONOCYTES # BLD AUTO: 0.7 10E3/UL (ref 0–1.3)
MONOCYTES NFR BLD AUTO: 8 %
NEUTROPHILS # BLD AUTO: 6 10E3/UL (ref 1.6–8.3)
NEUTROPHILS NFR BLD AUTO: 72 %
NRBC # BLD AUTO: 0 10E3/UL
NRBC BLD AUTO-RTO: 0 /100
PLATELET # BLD AUTO: 261 10E3/UL (ref 150–450)
POTASSIUM SERPL-SCNC: 4.1 MMOL/L (ref 3.4–5.3)
RBC # BLD AUTO: 4.46 10E6/UL (ref 3.8–5.2)
SODIUM SERPL-SCNC: 140 MMOL/L (ref 135–145)
WBC # BLD AUTO: 8.3 10E3/UL (ref 4–11)

## 2024-01-08 PROCEDURE — 99214 OFFICE O/P EST MOD 30 MIN: CPT | Performed by: NURSE PRACTITIONER

## 2024-01-08 PROCEDURE — 85025 COMPLETE CBC W/AUTO DIFF WBC: CPT | Mod: ZL | Performed by: NURSE PRACTITIONER

## 2024-01-08 PROCEDURE — 36415 COLL VENOUS BLD VENIPUNCTURE: CPT | Mod: ZL | Performed by: NURSE PRACTITIONER

## 2024-01-08 PROCEDURE — 80048 BASIC METABOLIC PNL TOTAL CA: CPT | Mod: ZL | Performed by: NURSE PRACTITIONER

## 2024-01-08 PROCEDURE — G0463 HOSPITAL OUTPT CLINIC VISIT: HCPCS | Performed by: NURSE PRACTITIONER

## 2024-01-09 NOTE — OR NURSING
Instructed to bring albuterol inhaler day of procedure.Hold furosemide & spironolactone day of procedure, continue other medications as prescribed.

## 2024-01-10 ENCOUNTER — ANESTHESIA EVENT (OUTPATIENT)
Dept: SURGERY | Facility: HOSPITAL | Age: 74
End: 2024-01-10
Payer: MEDICARE

## 2024-01-10 DIAGNOSIS — I10 ESSENTIAL HYPERTENSION: ICD-10-CM

## 2024-01-10 RX ORDER — SPIRONOLACTONE 50 MG/1
TABLET, FILM COATED ORAL
Qty: 90 TABLET | Refills: 2 | Status: SHIPPED | OUTPATIENT
Start: 2024-01-10

## 2024-01-10 ASSESSMENT — ENCOUNTER SYMPTOMS: DYSRHYTHMIAS: 1

## 2024-01-10 ASSESSMENT — LIFESTYLE VARIABLES: TOBACCO_USE: 0

## 2024-01-10 NOTE — ANESTHESIA PREPROCEDURE EVALUATION
Anesthesia Pre-Procedure Evaluation    Patient: Rj Rodríguez   MRN: [id number] : 1950        Procedure : Procedure(s):  Left sintinal node biopsy, bilateral mastectomy          Past Medical History:   Diagnosis Date    Arthritis     Chronic back pain     Hypertension     Irregular heart beat     Sleep apnea       Past Surgical History:   Procedure Laterality Date    ANESTHESIA OUT OF OR MRI N/A 2022    Procedure: MRI CERVICAL SPINE;  Surgeon: GENERIC ANESTHESIA PROVIDER;  Location: [facility]    ANESTHESIA OUT OF OR MRI N/A 2022    Procedure: MRI CERVICAL SPINE;  Surgeon: GENERIC ANESTHESIA PROVIDER;  [facility]: [facility]    ANESTHESIA OUT OF OR MRI Left 2023    Procedure: Anesthesia out of OR MRI;  Surgeon: GENERIC ANESTHESIA PROVIDER;  Location: [facility]    APPENDECTOMY      BACK SURGERY      CHOLECYSTECTOMY      COLONOSCOPY      Repeat in 10 years    GYN SURGERY      HYSTERECTOMY      Ovaries    RELEASE CARPAL TUNNEL      LT    RELEASE CARPAL TUNNEL      RT x2    SURGICAL RADIOLOGY PROCEDURE N/A 2016    Procedure: SURGICAL RADIOLOGY PROCEDURE;  Surgeon: Provider, Generic Perianesthesia Nursing;  Location: [facility]    SURGICAL RADIOLOGY PROCEDURE N/A 8/15/2016    Procedure: SURGICAL RADIOLOGY PROCEDURE;  Surgeon: Provider, Generic Perianesthesia Nursing;  Location: [facility]      Allergies   Allergen Reactions    Fruit [Peach] Anaphylaxis and Hives     fresh peaches and any fruit that has a pit.  Kiwi's and apples also.  Can eat any fruit cooked.    Nuts Anaphylaxis and Hives     All Raw Nuts, can eat nuts when roasted.    Ace Inhibitors      Upper respiratory infection    Alprazolam Hives     Xanax    Antihistamines, Chlorpheniramine-Type Hives     Antihistamines    Diphenhydramine Hives and Other (See Comments)     Keeps her awake      Erythromycin      BASE; pt. Not sure if this is a true allergy.    Guaifed      Pt unsure of reaction    Guaifenesin      Guaifed    Hydrochlorothiazide     No  Clinical Screening - See Comments      Allergic:  Fresh peaches and any fruit that has a pit.  Kiwi's and apples also.  All raw nuts.  Can eat nuts when roasted or any fruit cooked.  Some seasonal allergies :  Hayfever    Olmesartan Medoxomil Cough     Benicar    Phenylephrine Hcl      Guaifed    Pseudoephedrine Hcl      Guaifed    Seasonal Allergies      hayfever    Statins     Tramadol      Sleep disturbance. Can not sleep, and when able to fall asleep will have terrible nightmares    Tramadol Hcl      Ultram, insomnia, nightmares, headache    Venlafaxine Other (See Comments)     Heartburn, reflux    Zoloft [Sertraline]      paranoia    Latex Other (See Comments), Swelling, Difficulty breathing and Rash     Throat Closes        Social History     Tobacco Use    Smoking status: Never     Passive exposure: Never    Smokeless tobacco: Never   Substance Use Topics    Alcohol use: Yes     Alcohol/week: 1.0 standard drink of alcohol     Types: 1 Glasses of wine per week     Comment: occ glass of wine      Wt Readings from Last 1 Encounters:   01/08/24 78.2 kg (172 lb 4.8 oz)        Anesthesia Evaluation   Pt has had prior anesthetic. Type: General and MAC.    History of anesthetic complications  - PONV.  slow to wake.    ROS/MED HX  ENT/Pulmonary: Comment: Pt denies REBECCA  Reactive airway disease    (+) sleep apnea, doesn't use CPAP,         allergic rhinitis,          Intermittent, asthma (controlled, rare use of albuterol)  Treatment: Inhaler prn,              (-) tobacco use   Neurologic: Comment: S/P spinal fusion  Brachial neuritis   Vertigo  Tinnitus      (+)      migraines,                          Cardiovascular: Comment: Diastolic dysfunction  Follows cardiology - last visit 12/13/23    Regarding palpitations: Symptoms have not recurred. Suspect hypervolemia with diastolic dysfunction could have been contributing. Symptoms improved with starting furosemide.    (+) Dyslipidemia hypertension- -   -  - -                         dysrhythmias (PVC's stable), PVCs, Irregular Heartbeat/Palpitations,       Previous cardiac testing   Echo: Date: 2022 Results:   Global and regional left ventricular function is normal with an EF of 55-60%.   Relative wall thickness is increased consistent with concentric remodeling. Left ventricular diastolic function is indeterminate. Diastolic Doppler findings (E/E' ratio and/or other parameters) suggest left ventricular filling pressures are increased    Stress Test:  Date: 2/10/22 Results:  negative for inducible ischemia  ECG Reviewed:  Date: 11/29/23 Results:  2022 sinus rhythm with PVCs, VR 88, unchanged from previous EKGs    2023 SB, cannot rule out anterior infarct   Cath:  Date: Results:      METS/Exercise Tolerance: >4 METS    Hematologic:  - neg hematologic  ROS     Musculoskeletal: Comment: S/P cervical spinal fusion  S/P rotator cuff repair  (+)  arthritis,             GI/Hepatic:  - neg GI/hepatic ROS     Renal/Genitourinary:  - neg Renal ROS     Endo:     (+)  type II DM, Last HgA1c: 5.7, date: 11-13-23, Not using insulin,          Obesity,       Psychiatric/Substance Use:     (+) psychiatric history (PRN Klonopin as she is anxious in hospital setting) anxiety and depression       Infectious Disease:  - neg infectious disease ROS     Malignancy:  - neg malignancy ROS     Other: Comment: Latex allergy - neg other ROS    (+)  , H/O Chronic Pain (back),         Physical Exam    Airway        Mallampati: III   TM distance: > 3 FB   Neck ROM: full   Mouth opening: > 3 cm    Respiratory Devices and Support         Dental       (+) Minor Abnormalities - some fillings, tiny chips      Cardiovascular   cardiovascular exam normal       Rhythm and rate: regular and normal     Pulmonary   pulmonary exam normal        breath sounds clear to auscultation       OUTSIDE LABS:  CBC:   Lab Results   Component Value Date    WBC 8.3 01/08/2024    WBC 9.3 11/29/2023    HGB 13.9 01/08/2024    HGB 14.0  "11/29/2023    HCT 41.1 01/08/2024    HCT 41.6 11/29/2023     01/08/2024     11/29/2023     BMP:   Lab Results   Component Value Date     01/08/2024     11/29/2023    POTASSIUM 4.1 01/08/2024    POTASSIUM 3.7 11/29/2023    CHLORIDE 105 01/08/2024    CHLORIDE 105 11/29/2023    CO2 25 01/08/2024    CO2 24 11/29/2023    BUN 11.9 01/08/2024    BUN 12.5 11/29/2023    CR 0.86 01/08/2024    CR 0.83 11/29/2023     (H) 01/08/2024     (H) 11/29/2023     COAGS:   Lab Results   Component Value Date    PTT 26 09/13/2016    INR 0.94 07/18/2021     POC: No results found for: \"BGM\", \"HCG\", \"HCGS\"  HEPATIC:   Lab Results   Component Value Date    ALBUMIN 4.1 06/28/2023    PROTTOTAL 7.6 06/28/2023    ALT 22 06/28/2023    AST 24 06/28/2023    ALKPHOS 91 06/28/2023    BILITOTAL 0.9 06/28/2023     OTHER:   Lab Results   Component Value Date    LACT 1.5 08/12/2019    A1C 5.7 (H) 11/13/2023    EZRA 9.4 01/08/2024    MAG 2.1 05/08/2023    LIPASE 177 04/18/2022    TSH 1.60 05/08/2023    CRP 53.9 (H) 08/12/2019    SED 28 08/07/2023       Anesthesia Plan    ASA Status:  3    NPO Status:  NPO Appropriate    Anesthesia Type: General.     - Airway: ETT   Induction: Intravenous.   Maintenance: Balanced.        Consents    Anesthesia Plan(s) and associated risks, benefits, and realistic alternatives discussed. Questions answered and patient/representative(s) expressed understanding.     - Discussed: Risks, Benefits and Alternatives for BOTH SEDATION and the PROCEDURE were discussed     - Discussed with:  Patient      - Extended Intubation/Ventilatory Support Discussed: No.      - Patient is DNR/DNI Status: No     Use of blood products discussed: No .     Postoperative Care       PONV prophylaxis: Ondansetron (or other 5HT-3), Dexamethasone or Solumedrol     Comments:    Other Comments: HX PONV, hard to wake after anesthesia  Preop Harle 1/8/24    Very anxious, will need versed for aurelio, ant ser in OR    Pt has " "own inhaler, will use on way back to OR             NORMAN Hernandez CNP    I have reviewed the pertinent notes and labs in the chart from the past 30 days and (re)examined the patient.  Any updates or changes from those notes are reflected in this note.              # Obesity: Estimated body mass index is 32.29 kg/m  as calculated from the following:    Height as of 12/19/23: 1.556 m (5' 1.25\").    Weight as of 1/8/24: 78.2 kg (172 lb 4.8 oz).      "

## 2024-01-11 ENCOUNTER — TRANSFERRED RECORDS (OUTPATIENT)
Dept: HEALTH INFORMATION MANAGEMENT | Facility: CLINIC | Age: 74
End: 2024-01-11
Payer: COMMERCIAL

## 2024-01-16 ENCOUNTER — HOSPITAL ENCOUNTER (OUTPATIENT)
Facility: HOSPITAL | Age: 74
Setting detail: OBSERVATION
Discharge: HOME | End: 2024-01-19
Attending: SURGERY | Admitting: SURGERY
Payer: MEDICARE

## 2024-01-16 ENCOUNTER — ANESTHESIA (OUTPATIENT)
Dept: SURGERY | Facility: HOSPITAL | Age: 74
End: 2024-01-16
Payer: MEDICARE

## 2024-01-16 ENCOUNTER — HOSPITAL ENCOUNTER (OUTPATIENT)
Dept: NUCLEAR MEDICINE | Facility: HOSPITAL | Age: 74
Discharge: HOME | End: 2024-01-16
Attending: SURGERY | Admitting: SURGERY
Payer: MEDICARE

## 2024-01-16 ENCOUNTER — APPOINTMENT (OUTPATIENT)
Dept: ULTRASOUND IMAGING | Facility: HOSPITAL | Age: 74
End: 2024-01-16
Attending: NURSE ANESTHETIST, CERTIFIED REGISTERED
Payer: MEDICARE

## 2024-01-16 DIAGNOSIS — Z17.0 MALIGNANT NEOPLASM OF LEFT BREAST IN FEMALE, ESTROGEN RECEPTOR POSITIVE, UNSPECIFIED SITE OF BREAST (H): Primary | ICD-10-CM

## 2024-01-16 DIAGNOSIS — C50.912 MALIGNANT NEOPLASM OF LEFT BREAST IN FEMALE, ESTROGEN RECEPTOR POSITIVE, UNSPECIFIED SITE OF BREAST (H): Primary | ICD-10-CM

## 2024-01-16 DIAGNOSIS — C50.912 MALIGNANT NEOPLASM OF LEFT BREAST IN FEMALE, ESTROGEN RECEPTOR POSITIVE, UNSPECIFIED SITE OF BREAST (H): ICD-10-CM

## 2024-01-16 DIAGNOSIS — Z17.0 MALIGNANT NEOPLASM OF LEFT BREAST IN FEMALE, ESTROGEN RECEPTOR POSITIVE, UNSPECIFIED SITE OF BREAST (H): ICD-10-CM

## 2024-01-16 PROBLEM — C50.919 BREAST CANCER (H): Status: ACTIVE | Noted: 2024-01-16

## 2024-01-16 LAB
GLUCOSE BLDC GLUCOMTR-MCNC: 110 MG/DL (ref 70–99)
GLUCOSE BLDC GLUCOMTR-MCNC: 122 MG/DL (ref 70–99)
GLUCOSE BLDC GLUCOMTR-MCNC: 162 MG/DL (ref 70–99)
GLUCOSE BLDC GLUCOMTR-MCNC: 172 MG/DL (ref 70–99)
GLUCOSE BLDC GLUCOMTR-MCNC: 238 MG/DL (ref 70–99)
MAGNESIUM SERPL-MCNC: 1.9 MG/DL (ref 1.7–2.3)

## 2024-01-16 PROCEDURE — 710N000010 HC RECOVERY PHASE 1, LEVEL 2, PER MIN: Performed by: SURGERY

## 2024-01-16 PROCEDURE — 99221 1ST HOSP IP/OBS SF/LOW 40: CPT | Mod: 24 | Performed by: INTERNAL MEDICINE

## 2024-01-16 PROCEDURE — 258N000003 HC RX IP 258 OP 636: Performed by: SURGERY

## 2024-01-16 PROCEDURE — 19303 MAST SIMPLE COMPLETE: CPT | Mod: 50 | Performed by: SURGERY

## 2024-01-16 PROCEDURE — 120N000001 HC R&B MED SURG/OB

## 2024-01-16 PROCEDURE — 96374 THER/PROPH/DIAG INJ IV PUSH: CPT | Mod: XU

## 2024-01-16 PROCEDURE — 250N000011 HC RX IP 250 OP 636: Performed by: SURGERY

## 2024-01-16 PROCEDURE — 250N000013 HC RX MED GY IP 250 OP 250 PS 637: Performed by: SURGERY

## 2024-01-16 PROCEDURE — 93005 ELECTROCARDIOGRAM TRACING: CPT

## 2024-01-16 PROCEDURE — 360N000076 HC SURGERY LEVEL 3, PER MIN: Performed by: SURGERY

## 2024-01-16 PROCEDURE — 999N000157 HC STATISTIC RCP TIME EA 10 MIN

## 2024-01-16 PROCEDURE — 96375 TX/PRO/DX INJ NEW DRUG ADDON: CPT | Mod: XU

## 2024-01-16 PROCEDURE — 93010 ELECTROCARDIOGRAM REPORT: CPT | Performed by: INTERNAL MEDICINE

## 2024-01-16 PROCEDURE — 82962 GLUCOSE BLOOD TEST: CPT

## 2024-01-16 PROCEDURE — 36415 COLL VENOUS BLD VENIPUNCTURE: CPT | Performed by: INTERNAL MEDICINE

## 2024-01-16 PROCEDURE — C9290 INJ, BUPIVACAINE LIPOSOME: HCPCS | Performed by: NURSE ANESTHETIST, CERTIFIED REGISTERED

## 2024-01-16 PROCEDURE — 64450 NJX AA&/STRD OTHER PN/BRANCH: CPT | Mod: XU | Performed by: NURSE ANESTHETIST, CERTIFIED REGISTERED

## 2024-01-16 PROCEDURE — 250N000025 HC SEVOFLURANE, PER MIN: Performed by: SURGERY

## 2024-01-16 PROCEDURE — 343N000001 HC RX 343: Performed by: RADIOLOGY

## 2024-01-16 PROCEDURE — 370N000017 HC ANESTHESIA TECHNICAL FEE, PER MIN: Performed by: SURGERY

## 2024-01-16 PROCEDURE — 99100 ANES PT EXTEME AGE<1 YR&>70: CPT | Performed by: NURSE ANESTHETIST, CERTIFIED REGISTERED

## 2024-01-16 PROCEDURE — 78195 LYMPH SYSTEM IMAGING: CPT | Mod: MG

## 2024-01-16 PROCEDURE — 999N000141 HC STATISTIC PRE-PROCEDURE NURSING ASSESSMENT: Performed by: SURGERY

## 2024-01-16 PROCEDURE — 250N000011 HC RX IP 250 OP 636: Performed by: NURSE ANESTHETIST, CERTIFIED REGISTERED

## 2024-01-16 PROCEDURE — 19303 MAST SIMPLE COMPLETE: CPT | Performed by: NURSE ANESTHETIST, CERTIFIED REGISTERED

## 2024-01-16 PROCEDURE — 272N000001 HC OR GENERAL SUPPLY STERILE: Performed by: SURGERY

## 2024-01-16 PROCEDURE — 83735 ASSAY OF MAGNESIUM: CPT | Performed by: INTERNAL MEDICINE

## 2024-01-16 PROCEDURE — 88307 TISSUE EXAM BY PATHOLOGIST: CPT | Mod: TC | Performed by: SURGERY

## 2024-01-16 PROCEDURE — 38500 BIOPSY/REMOVAL LYMPH NODES: CPT | Performed by: SURGERY

## 2024-01-16 PROCEDURE — A9541 TC99M SULFUR COLLOID: HCPCS | Performed by: RADIOLOGY

## 2024-01-16 PROCEDURE — 88307 TISSUE EXAM BY PATHOLOGIST: CPT | Mod: 26 | Performed by: PATHOLOGY

## 2024-01-16 PROCEDURE — 250N000009 HC RX 250: Performed by: NURSE ANESTHETIST, CERTIFIED REGISTERED

## 2024-01-16 PROCEDURE — 258N000003 HC RX IP 258 OP 636: Performed by: NURSE ANESTHETIST, CERTIFIED REGISTERED

## 2024-01-16 PROCEDURE — 64999 UNLISTED PX NERVOUS SYSTEM: CPT | Mod: XU | Performed by: NURSE ANESTHETIST, CERTIFIED REGISTERED

## 2024-01-16 RX ORDER — NALOXONE HYDROCHLORIDE 0.4 MG/ML
0.4 INJECTION, SOLUTION INTRAMUSCULAR; INTRAVENOUS; SUBCUTANEOUS
Status: DISCONTINUED | OUTPATIENT
Start: 2024-01-16 | End: 2024-01-19 | Stop reason: HOSPADM

## 2024-01-16 RX ORDER — ISOSULFAN BLUE 50 MG/5ML
INJECTION, SOLUTION SUBCUTANEOUS PRN
Status: DISCONTINUED | OUTPATIENT
Start: 2024-01-16 | End: 2024-01-16 | Stop reason: HOSPADM

## 2024-01-16 RX ORDER — DEXTROSE MONOHYDRATE 25 G/50ML
25-50 INJECTION, SOLUTION INTRAVENOUS
Status: DISCONTINUED | OUTPATIENT
Start: 2024-01-16 | End: 2024-01-19 | Stop reason: HOSPADM

## 2024-01-16 RX ORDER — PROCHLORPERAZINE MALEATE 5 MG
5 TABLET ORAL EVERY 6 HOURS PRN
Status: DISCONTINUED | OUTPATIENT
Start: 2024-01-16 | End: 2024-01-19 | Stop reason: HOSPADM

## 2024-01-16 RX ORDER — LIDOCAINE 40 MG/G
CREAM TOPICAL
Status: DISCONTINUED | OUTPATIENT
Start: 2024-01-16 | End: 2024-01-19 | Stop reason: HOSPADM

## 2024-01-16 RX ORDER — SPIRONOLACTONE 25 MG/1
50 TABLET ORAL DAILY
Status: DISCONTINUED | OUTPATIENT
Start: 2024-01-16 | End: 2024-01-19 | Stop reason: HOSPADM

## 2024-01-16 RX ORDER — HYDROXYZINE HYDROCHLORIDE 10 MG/1
10 TABLET, FILM COATED ORAL EVERY 6 HOURS PRN
Status: DISCONTINUED | OUTPATIENT
Start: 2024-01-16 | End: 2024-01-19 | Stop reason: HOSPADM

## 2024-01-16 RX ORDER — CEFAZOLIN SODIUM/WATER 2 G/20 ML
2 SYRINGE (ML) INTRAVENOUS SEE ADMIN INSTRUCTIONS
Status: DISCONTINUED | OUTPATIENT
Start: 2024-01-16 | End: 2024-01-19 | Stop reason: HOSPADM

## 2024-01-16 RX ORDER — ONDANSETRON 2 MG/ML
4 INJECTION INTRAMUSCULAR; INTRAVENOUS EVERY 30 MIN PRN
Status: DISCONTINUED | OUTPATIENT
Start: 2024-01-16 | End: 2024-01-16 | Stop reason: HOSPADM

## 2024-01-16 RX ORDER — BUPIVACAINE HYDROCHLORIDE 2.5 MG/ML
INJECTION, SOLUTION EPIDURAL; INFILTRATION; INTRACAUDAL
Status: COMPLETED
Start: 2024-01-16 | End: 2024-01-16

## 2024-01-16 RX ORDER — ONDANSETRON 4 MG/1
4 TABLET, ORALLY DISINTEGRATING ORAL EVERY 6 HOURS PRN
Status: DISCONTINUED | OUTPATIENT
Start: 2024-01-16 | End: 2024-01-19 | Stop reason: HOSPADM

## 2024-01-16 RX ORDER — HYDROMORPHONE HYDROCHLORIDE 1 MG/ML
0.4 INJECTION, SOLUTION INTRAMUSCULAR; INTRAVENOUS; SUBCUTANEOUS EVERY 5 MIN PRN
Status: DISCONTINUED | OUTPATIENT
Start: 2024-01-16 | End: 2024-01-16 | Stop reason: HOSPADM

## 2024-01-16 RX ORDER — DEXAMETHASONE SODIUM PHOSPHATE 10 MG/ML
INJECTION, SOLUTION INTRAMUSCULAR; INTRAVENOUS
Status: DISCONTINUED
Start: 2024-01-16 | End: 2024-01-16 | Stop reason: HOSPADM

## 2024-01-16 RX ORDER — POLYETHYLENE GLYCOL 3350 17 G/17G
17 POWDER, FOR SOLUTION ORAL DAILY
Status: DISCONTINUED | OUTPATIENT
Start: 2024-01-17 | End: 2024-01-19 | Stop reason: HOSPADM

## 2024-01-16 RX ORDER — PROPOFOL 10 MG/ML
INJECTION, EMULSION INTRAVENOUS PRN
Status: DISCONTINUED | OUTPATIENT
Start: 2024-01-16 | End: 2024-01-16

## 2024-01-16 RX ORDER — NALOXONE HYDROCHLORIDE 0.4 MG/ML
0.2 INJECTION, SOLUTION INTRAMUSCULAR; INTRAVENOUS; SUBCUTANEOUS
Status: DISCONTINUED | OUTPATIENT
Start: 2024-01-16 | End: 2024-01-19 | Stop reason: HOSPADM

## 2024-01-16 RX ORDER — OXYCODONE HYDROCHLORIDE 5 MG/1
5 TABLET ORAL EVERY 4 HOURS PRN
Status: DISCONTINUED | OUTPATIENT
Start: 2024-01-16 | End: 2024-01-19 | Stop reason: HOSPADM

## 2024-01-16 RX ORDER — AMOXICILLIN 250 MG
1 CAPSULE ORAL 2 TIMES DAILY
Status: DISCONTINUED | OUTPATIENT
Start: 2024-01-16 | End: 2024-01-19 | Stop reason: HOSPADM

## 2024-01-16 RX ORDER — DILTIAZEM HYDROCHLORIDE 240 MG/1
240 CAPSULE, COATED, EXTENDED RELEASE ORAL DAILY
Status: DISCONTINUED | OUTPATIENT
Start: 2024-01-17 | End: 2024-01-19 | Stop reason: HOSPADM

## 2024-01-16 RX ORDER — ONDANSETRON 4 MG/1
4 TABLET, ORALLY DISINTEGRATING ORAL EVERY 30 MIN PRN
Status: DISCONTINUED | OUTPATIENT
Start: 2024-01-16 | End: 2024-01-16 | Stop reason: HOSPADM

## 2024-01-16 RX ORDER — BISACODYL 10 MG
10 SUPPOSITORY, RECTAL RECTAL DAILY PRN
Status: DISCONTINUED | OUTPATIENT
Start: 2024-01-16 | End: 2024-01-19 | Stop reason: HOSPADM

## 2024-01-16 RX ORDER — LIDOCAINE HYDROCHLORIDE 20 MG/ML
INJECTION, SOLUTION INFILTRATION; PERINEURAL PRN
Status: DISCONTINUED | OUTPATIENT
Start: 2024-01-16 | End: 2024-01-16

## 2024-01-16 RX ORDER — HYDROMORPHONE HYDROCHLORIDE 1 MG/ML
0.2 INJECTION, SOLUTION INTRAMUSCULAR; INTRAVENOUS; SUBCUTANEOUS EVERY 5 MIN PRN
Status: DISCONTINUED | OUTPATIENT
Start: 2024-01-16 | End: 2024-01-16 | Stop reason: HOSPADM

## 2024-01-16 RX ORDER — ACETAMINOPHEN 325 MG/1
650 TABLET ORAL EVERY 4 HOURS PRN
Status: DISCONTINUED | OUTPATIENT
Start: 2024-01-19 | End: 2024-01-19 | Stop reason: HOSPADM

## 2024-01-16 RX ORDER — HYDROMORPHONE HCL IN WATER/PF 6 MG/30 ML
0.4 PATIENT CONTROLLED ANALGESIA SYRINGE INTRAVENOUS
Status: DISCONTINUED | OUTPATIENT
Start: 2024-01-16 | End: 2024-01-19 | Stop reason: HOSPADM

## 2024-01-16 RX ORDER — DEXAMETHASONE SODIUM PHOSPHATE 10 MG/ML
INJECTION, SOLUTION INTRAMUSCULAR; INTRAVENOUS
Status: COMPLETED
Start: 2024-01-16 | End: 2024-01-16

## 2024-01-16 RX ORDER — CEFAZOLIN SODIUM/WATER 2 G/20 ML
2 SYRINGE (ML) INTRAVENOUS
Status: COMPLETED | OUTPATIENT
Start: 2024-01-16 | End: 2024-01-16

## 2024-01-16 RX ORDER — DEXAMETHASONE SODIUM PHOSPHATE 10 MG/ML
INJECTION, SOLUTION INTRAMUSCULAR; INTRAVENOUS
Status: COMPLETED | OUTPATIENT
Start: 2024-01-16 | End: 2024-01-16

## 2024-01-16 RX ORDER — FENTANYL CITRATE 50 UG/ML
INJECTION, SOLUTION INTRAMUSCULAR; INTRAVENOUS PRN
Status: DISCONTINUED | OUTPATIENT
Start: 2024-01-16 | End: 2024-01-16

## 2024-01-16 RX ORDER — ONDANSETRON 2 MG/ML
4 INJECTION INTRAMUSCULAR; INTRAVENOUS EVERY 6 HOURS PRN
Status: DISCONTINUED | OUTPATIENT
Start: 2024-01-16 | End: 2024-01-19 | Stop reason: HOSPADM

## 2024-01-16 RX ORDER — SODIUM CHLORIDE, SODIUM LACTATE, POTASSIUM CHLORIDE, CALCIUM CHLORIDE 600; 310; 30; 20 MG/100ML; MG/100ML; MG/100ML; MG/100ML
INJECTION, SOLUTION INTRAVENOUS CONTINUOUS
Status: DISCONTINUED | OUTPATIENT
Start: 2024-01-16 | End: 2024-01-16 | Stop reason: HOSPADM

## 2024-01-16 RX ORDER — DILTIAZEM HYDROCHLORIDE 240 MG/1
240 CAPSULE, EXTENDED RELEASE ORAL DAILY
Status: DISCONTINUED | OUTPATIENT
Start: 2024-01-17 | End: 2024-01-16

## 2024-01-16 RX ORDER — LABETALOL 20 MG/4 ML (5 MG/ML) INTRAVENOUS SYRINGE
PRN
Status: DISCONTINUED | OUTPATIENT
Start: 2024-01-16 | End: 2024-01-16

## 2024-01-16 RX ORDER — FENTANYL CITRATE 50 UG/ML
25 INJECTION, SOLUTION INTRAMUSCULAR; INTRAVENOUS EVERY 5 MIN PRN
Status: DISCONTINUED | OUTPATIENT
Start: 2024-01-16 | End: 2024-01-16 | Stop reason: HOSPADM

## 2024-01-16 RX ORDER — CLONAZEPAM 0.5 MG/1
0.5 TABLET ORAL 2 TIMES DAILY PRN
Status: DISCONTINUED | OUTPATIENT
Start: 2024-01-16 | End: 2024-01-19 | Stop reason: HOSPADM

## 2024-01-16 RX ORDER — OXYCODONE HYDROCHLORIDE 5 MG/1
10 TABLET ORAL EVERY 4 HOURS PRN
Status: DISCONTINUED | OUTPATIENT
Start: 2024-01-16 | End: 2024-01-19 | Stop reason: HOSPADM

## 2024-01-16 RX ORDER — FENTANYL CITRATE 50 UG/ML
50 INJECTION, SOLUTION INTRAMUSCULAR; INTRAVENOUS EVERY 5 MIN PRN
Status: DISCONTINUED | OUTPATIENT
Start: 2024-01-16 | End: 2024-01-16 | Stop reason: HOSPADM

## 2024-01-16 RX ORDER — BUPIVACAINE HYDROCHLORIDE 2.5 MG/ML
INJECTION, SOLUTION EPIDURAL; INFILTRATION; INTRACAUDAL
Status: COMPLETED | OUTPATIENT
Start: 2024-01-16 | End: 2024-01-16

## 2024-01-16 RX ORDER — ONDANSETRON 2 MG/ML
INJECTION INTRAMUSCULAR; INTRAVENOUS PRN
Status: DISCONTINUED | OUTPATIENT
Start: 2024-01-16 | End: 2024-01-16

## 2024-01-16 RX ORDER — ACETAMINOPHEN 325 MG/1
975 TABLET ORAL EVERY 8 HOURS
Status: COMPLETED | OUTPATIENT
Start: 2024-01-16 | End: 2024-01-19

## 2024-01-16 RX ORDER — FUROSEMIDE 20 MG
20 TABLET ORAL DAILY
Status: DISCONTINUED | OUTPATIENT
Start: 2024-01-16 | End: 2024-01-19 | Stop reason: HOSPADM

## 2024-01-16 RX ORDER — CETIRIZINE HYDROCHLORIDE 10 MG/1
10 TABLET ORAL DAILY PRN
Status: DISCONTINUED | OUTPATIENT
Start: 2024-01-16 | End: 2024-01-19 | Stop reason: HOSPADM

## 2024-01-16 RX ORDER — NICOTINE POLACRILEX 4 MG
15-30 LOZENGE BUCCAL
Status: DISCONTINUED | OUTPATIENT
Start: 2024-01-16 | End: 2024-01-19 | Stop reason: HOSPADM

## 2024-01-16 RX ORDER — SODIUM CHLORIDE, SODIUM LACTATE, POTASSIUM CHLORIDE, CALCIUM CHLORIDE 600; 310; 30; 20 MG/100ML; MG/100ML; MG/100ML; MG/100ML
INJECTION, SOLUTION INTRAVENOUS CONTINUOUS
Status: DISCONTINUED | OUTPATIENT
Start: 2024-01-16 | End: 2024-01-17

## 2024-01-16 RX ORDER — ALBUTEROL SULFATE 90 UG/1
2 AEROSOL, METERED RESPIRATORY (INHALATION) EVERY 6 HOURS
Status: DISCONTINUED | OUTPATIENT
Start: 2024-01-16 | End: 2024-01-16

## 2024-01-16 RX ORDER — FLUTICASONE PROPIONATE 50 MCG
2 SPRAY, SUSPENSION (ML) NASAL DAILY
Status: DISCONTINUED | OUTPATIENT
Start: 2024-01-16 | End: 2024-01-19 | Stop reason: HOSPADM

## 2024-01-16 RX ORDER — HYDROMORPHONE HCL IN WATER/PF 6 MG/30 ML
0.2 PATIENT CONTROLLED ANALGESIA SYRINGE INTRAVENOUS
Status: DISCONTINUED | OUTPATIENT
Start: 2024-01-16 | End: 2024-01-19 | Stop reason: HOSPADM

## 2024-01-16 RX ADMIN — HYDROMORPHONE HYDROCHLORIDE 0.5 MG: 1 INJECTION, SOLUTION INTRAMUSCULAR; INTRAVENOUS; SUBCUTANEOUS at 14:54

## 2024-01-16 RX ADMIN — ROCURONIUM BROMIDE 20 MG: 10 INJECTION INTRAVENOUS at 14:35

## 2024-01-16 RX ADMIN — BUPIVACAINE HYDROCHLORIDE 12 ML: 2.5 INJECTION, SOLUTION EPIDURAL; INFILTRATION; INTRACAUDAL at 13:36

## 2024-01-16 RX ADMIN — SODIUM CHLORIDE, POTASSIUM CHLORIDE, SODIUM LACTATE AND CALCIUM CHLORIDE: 600; 310; 30; 20 INJECTION, SOLUTION INTRAVENOUS at 15:27

## 2024-01-16 RX ADMIN — LABETALOL 20 MG/4 ML (5 MG/ML) INTRAVENOUS SYRINGE 10 MG: at 15:41

## 2024-01-16 RX ADMIN — ONDANSETRON 4 MG: 2 INJECTION INTRAMUSCULAR; INTRAVENOUS at 17:49

## 2024-01-16 RX ADMIN — HYDROMORPHONE HYDROCHLORIDE 0.2 MG: 0.2 INJECTION, SOLUTION INTRAMUSCULAR; INTRAVENOUS; SUBCUTANEOUS at 18:01

## 2024-01-16 RX ADMIN — ROCURONIUM BROMIDE 50 MG: 10 INJECTION INTRAVENOUS at 13:53

## 2024-01-16 RX ADMIN — SODIUM CHLORIDE, POTASSIUM CHLORIDE, SODIUM LACTATE AND CALCIUM CHLORIDE: 600; 310; 30; 20 INJECTION, SOLUTION INTRAVENOUS at 22:59

## 2024-01-16 RX ADMIN — SUGAMMADEX 160 MG: 100 INJECTION, SOLUTION INTRAVENOUS at 15:23

## 2024-01-16 RX ADMIN — ACETAMINOPHEN 975 MG: 325 TABLET, FILM COATED ORAL at 18:28

## 2024-01-16 RX ADMIN — DEXAMETHASONE SODIUM PHOSPHATE 2 MG: 10 INJECTION, SOLUTION INTRAMUSCULAR; INTRAVENOUS at 13:36

## 2024-01-16 RX ADMIN — HYDROMORPHONE HYDROCHLORIDE 0.5 MG: 1 INJECTION, SOLUTION INTRAMUSCULAR; INTRAVENOUS; SUBCUTANEOUS at 15:27

## 2024-01-16 RX ADMIN — LIDOCAINE HYDROCHLORIDE 60 MG: 20 INJECTION, SOLUTION INFILTRATION; PERINEURAL at 13:36

## 2024-01-16 RX ADMIN — BUPIVACAINE 20 ML: 13.3 INJECTION, SUSPENSION, LIPOSOMAL INFILTRATION at 12:35

## 2024-01-16 RX ADMIN — PROPOFOL 50 MG: 10 INJECTION, EMULSION INTRAVENOUS at 13:43

## 2024-01-16 RX ADMIN — SUGAMMADEX 40 MG: 100 INJECTION, SOLUTION INTRAVENOUS at 15:36

## 2024-01-16 RX ADMIN — BUPIVACAINE HYDROCHLORIDE 48 ML: 2.5 INJECTION, SOLUTION EPIDURAL; INFILTRATION; INTRACAUDAL at 12:35

## 2024-01-16 RX ADMIN — OXYCODONE HYDROCHLORIDE 5 MG: 5 TABLET ORAL at 21:45

## 2024-01-16 RX ADMIN — FENTANYL CITRATE 50 MCG: 50 INJECTION INTRAMUSCULAR; INTRAVENOUS at 13:43

## 2024-01-16 RX ADMIN — Medication 80 MG: at 13:36

## 2024-01-16 RX ADMIN — Medication 2.12 MILLICURIE: at 13:11

## 2024-01-16 RX ADMIN — FENTANYL CITRATE 100 MCG: 50 INJECTION INTRAMUSCULAR; INTRAVENOUS at 13:36

## 2024-01-16 RX ADMIN — FENTANYL CITRATE 50 MCG: 50 INJECTION INTRAMUSCULAR; INTRAVENOUS at 14:39

## 2024-01-16 RX ADMIN — MIDAZOLAM 2 MG: 1 INJECTION INTRAMUSCULAR; INTRAVENOUS at 12:44

## 2024-01-16 RX ADMIN — PROPOFOL 30 MCG/KG/MIN: 10 INJECTION, EMULSION INTRAVENOUS at 14:27

## 2024-01-16 RX ADMIN — PROPOFOL 150 MG: 10 INJECTION, EMULSION INTRAVENOUS at 13:36

## 2024-01-16 RX ADMIN — SENNOSIDES AND DOCUSATE SODIUM 1 TABLET: 8.6; 5 TABLET ORAL at 21:37

## 2024-01-16 RX ADMIN — CLONAZEPAM 0.5 MG: 0.5 TABLET ORAL at 22:32

## 2024-01-16 RX ADMIN — DEXAMETHASONE SODIUM PHOSPHATE 8 MG: 10 INJECTION, SOLUTION INTRAMUSCULAR; INTRAVENOUS at 12:35

## 2024-01-16 RX ADMIN — Medication 2 G: at 13:16

## 2024-01-16 RX ADMIN — ONDANSETRON 4 MG: 2 INJECTION INTRAMUSCULAR; INTRAVENOUS at 13:42

## 2024-01-16 RX ADMIN — SODIUM CHLORIDE, POTASSIUM CHLORIDE, SODIUM LACTATE AND CALCIUM CHLORIDE: 600; 310; 30; 20 INJECTION, SOLUTION INTRAVENOUS at 13:12

## 2024-01-16 ASSESSMENT — ACTIVITIES OF DAILY LIVING (ADL)
ADLS_ACUITY_SCORE: 22
ADLS_ACUITY_SCORE: 24
ADLS_ACUITY_SCORE: 22
ADLS_ACUITY_SCORE: 24
ADLS_ACUITY_SCORE: 22
ADLS_ACUITY_SCORE: 24

## 2024-01-16 NOTE — OR NURSING
PACU Respiratory Event Documentation     1) Episodes of Apnea greater than or equal to 10 seconds: 0    2) Bradypnea - less than 8 breaths per minute: 0    3) Pain score on 0 to 10 scale: 0    4) Pain-sedation mismatch (yes or no): 0    5) Repeated 02 desaturation less than 90% (yes or no): 0    Anesthesia notified? (yes or no): 0    Any of the above events occuring repeatedly in separate 30 minute intervals may be considered recurrent PACU respiratory events.

## 2024-01-16 NOTE — OP NOTE
REPORT OF OPERATION  DATE OF PROCEDURE: 1/16/2024    PATIENT: Rj Rodríguez    SURGERY PERFORMED: Left sentinel node biopsy and bilateral complete mastectomy    PREOPERATIVE DIAGNOSIS: Left breast cancer    POSTOPERATIVE DIAGNOSIS: Same    SURGEON: Ethan Molina MD    ASSISTANTS: None    ANESTHESIA: General Endotracheal Anesthesia    COMPLICATIONS: None apparent    TRANSFUSIONS: None    TISSUE TO PATHOLOGY: [place] nodes x1 to pathology for pathologic diagnosis and bilateral breast(s) pathology for pathological diagnosis    FINDINGS: 1 left sentinel nodes were identified. They were blue.    INDICATIONS: This is a 73 year old female with left breast cancer.  Patient desires bilateral mastectomy.  Patient will be taken to the operating room for a left sentinel node biopsy and bilateral complete mastectomy.    DESCRIPTIONS OF PROCEDURE IN DETAIL: After consent was obtained the patient was taken to the operative suite and darren in the supine position.  The patient was identified and the correct patient was confirmed.  General Endotracheal Anesthesia was administered by anesthesia.  The patient was sterilely prepped and draped in the usual fashion.  A time out was performed verifying the correct patient and the correct procedure.  The entire operative team was in agreement.  All necessary equipment and supplies were in the room.     Beginning on the left side and using the gamma counter, the injection site at the left nipple/areolar complex was interrogated and had [place] count of [id number].  Interrogation in the left axilla showed a hotspot with 266 counts.  Using this site the skin was sharply entered and dissection carried down to isolation of a hot node.  The node was blue.  The gamma counter was extensively utilized during this dissection and localization.  A total of 1 sentinel nodes were identified.  The counts of the nodes were 4619, respectively.  These were sent to pathology for pathological diagnosis.  At the  conclusion of the isolation of sentinel nodes the background counts in the left axilla was 77.  Hemostasis was assured.       Attention was then turned to the left complete mastectomy.  Through an ellipsoid incision, encompassing the nipple/areolar complex, the skin was sharply incised.  Using the Bovie electrocautery flaps were then created, superiorly to the level of the clavicle, medially to the mediastinum, inferiorly to the insertion of the rectus abdominis, and laterally to the anterior edge of the latissimus dorsi.  Once the flaps were created the left breast along with the fascia of the pectoralis major were removed en bloc and this was sent to pathology for pathologic diagnosis.  Hemostasis was assured.  The wound was irrigated with sterile water.  One 10 mm Berlin-Guerra drain(s) was placed through separate stab incision(s) and secured to the skin with 2-0 nylon suture.  The mastectomy incision was then closed by closing the dermis with simple interrupted 3-0 Vicryl.  The skin was closed with staples.    Attention was then turned to the right complete mastectomy.  Through an ellipsoid incision, encompassing the nipple/areolar complex, the skin was sharply incised.  Using the Bovie electrocautery flaps were then created, superiorly to the level of the clavicle, medially to the mediastinum, inferiorly to the insertion of the rectus abdominis, and laterally to the anterior edge of the latissimus dorsi.  Once the flaps were created the right breast along with the fascia of the pectoralis major were removed en bloc and this was sent to pathology for pathologic diagnosis.  Hemostasis was assured.  The wound was irrigated with sterile water.  One 10 mm Berlin-Guerra drain(s) was placed through separate stab incision(s) and secured to the skin with 2-0 nylon suture.  The mastectomy incision was then closed by closing the dermis with simple interrupted 3-0 Vicryl.  The skin was closed with staples.    A Prevena  wound VAC system was placed.       All needle, sponge and instrument counts were correct x2.  The patient was awakened in the operating room extubated without difficulty and taken to the recovery room in stable condition tolerated the procedure well.

## 2024-01-16 NOTE — OR NURSING
Pt to PACU with CRNA and RN. Report received. CRNA attempted to place an oral airway as pt is snoring.  Pt became restless and attempting to pull at tubing.  Pt as a simple face mask and is tolerating ok at 5LPM.

## 2024-01-16 NOTE — ANESTHESIA POSTPROCEDURE EVALUATION
Patient: Rj Rodríguez    Procedure: Procedure(s):  Left sentinel node biopsy, bilateral mastectomy       Anesthesia Type:  General    Note:  Disposition: Inpatient   Postop Pain Control: Uneventful            Sign Out: Well controlled pain   PONV: No   Neuro/Psych: Uneventful            Sign Out: Acceptable/Baseline neuro status   Airway/Respiratory: Uneventful            Sign Out: Acceptable/Baseline resp. status   CV/Hemodynamics: Uneventful            Sign Out: Acceptable CV status; No obvious hypovolemia; No obvious fluid overload   Other NRE: NONE   DID A NON-ROUTINE EVENT OCCUR? No       Last vitals:  Vitals Value Taken Time   /72 01/16/24 1640   Temp 97.8  F (36.6  C) 01/16/24 1640   Pulse 67 01/16/24 1645   Resp 14 01/16/24 1645   SpO2 94 % 01/16/24 1649   Vitals shown include unfiled device data.    Electronically Signed By: NORMAN Regan CRNA  January 16, 2024  5:59 PM

## 2024-01-16 NOTE — ANESTHESIA PROCEDURE NOTES
Other (anterior serratous) Procedure Note    Pre-Procedure   Staff -        CRNA: Joseph Todd APRN CRNA       Performed By: CRNA       Location: OR       Procedure Start/Stop Times: 1/16/2024 1:36 PM and 1/16/2024 1:40 PM       Pre-Anesthestic Checklist: patient identified, IV checked, site marked, risks and benefits discussed, informed consent, monitors and equipment checked, pre-op evaluation, at physician/surgeon's request and post-op pain management  Timeout:       Correct Patient: Yes        Correct Procedure: Yes        Correct Site: Yes        Correct Position: Yes        Correct Laterality: Yes        Site Marked: Yes  Procedure Documentation  Procedure: Other (anterior serratous)       Diagnosis: POST OP PAIN CONTROL       Laterality: left       Patient Position: sitting       Skin prep: Chloraprep       Needle Type: insulated and short bevel       Needle Gauge: 20.        Needle Length (Inches): 4        Ultrasound guided       1. Ultrasound was used to identify targeted nerve, plexus, vascular marker, or fascial plane and place a needle adjacent to it in real-time.       2. Ultrasound was used to visualize the spread of anesthetic in close proximity to the above referenced structure.       3. A permanent image is entered into the patient's record.       4. The visualized anatomic structures appeared normal.       5. There were no apparent abnormal pathologic findings.    Assessment/Narrative         The placement was negative for: blood aspirated, painful injection and site bleeding       Paresthesias: No.       Bolus given via needle..        Secured via.        Insertion/Infusion Method: Single Shot       Complications: none       Injection made incrementally with aspirations every 5 mL.    Medication(s) Administered   Bupivacaine 0.25% PF (Infiltration) - Infiltration   12 mL - 1/16/2024 1:36:00 PM  Dexamethasone 10 mg/mL PF (Perineural) - Perineural   2 mg - 1/16/2024 1:36:00 PM  Medication  "Administration Time: 1/16/2024 1:36 PM     Comments:  Risks for regional anesthesia include but not limited to: infection, seizures, continued weakness or numbness, pain at injection site, injury to blood vessels    With 8mL sodium chloride.      FOR [facility] ([facility] ONLY:   Pain Team Contact information: please page the Pain Team Via Personera. Search \"Pain\". During daytime hours, please page the attending first. At night please page the resident first.      "

## 2024-01-16 NOTE — ANESTHESIA PROCEDURE NOTES
Airway       Patient location during procedure: OR       Procedure Start/Stop Times: 1/16/2024 1:38 PM  Staff -        CRNA: Donald Carl APRN CRNA       Performed By: CRNAIndications and Patient Condition       Indications for airway management: aurea-procedural       Induction type:intravenous       Mask difficulty assessment: 0 - not attempted    Final Airway Details       Final airway type: endotracheal airway       Successful airway: ETT - single  Endotracheal Airway Details        ETT size (mm): 7.0       Cuffed: yes       Cuff volume (mL): 6       Successful intubation technique: direct laryngoscopy       DL Blade Type: MAC 3       Grade View of Cords: 2       Adjucts: stylet       Position: Right       Measured from: lips       Secured at (cm): 22       Bite block used: None    Post intubation assessment        Placement verified by: capnometry, equal breath sounds and chest rise        Number of attempts at approach: 1       Number of other approaches attempted: 0       Secured with: tape       Ease of procedure: easy       Dentition: Intact and Unchanged    Medication(s) Administered   Medication Administration Time: 1/16/2024 1:38 PM

## 2024-01-16 NOTE — ANESTHESIA PROCEDURE NOTES
Erector spinae Procedure Note    Pre-Procedure   Staff -        CRNA: Donald Carl APRN CRNA       Other Anesthesia Staff: Joseph Todd APRN CRNA       Performed By: CRNA       Location: pre-op       Procedure Start/Stop Times: 1/16/2024 12:35 PM and 1/16/2024 12:47 PM       Pre-Anesthestic Checklist: patient identified, IV checked, site marked, risks and benefits discussed, informed consent, monitors and equipment checked, pre-op evaluation, at physician/surgeon's request and post-op pain management  Timeout:       Correct Patient: Yes        Correct Procedure: Yes        Correct Site: Yes        Correct Position: Yes        Correct Laterality: Yes        Site Marked: Yes  Procedure Documentation  Procedure: Erector spinae       Diagnosis: POST OP PAIN CONTROL       Laterality: bilateral       Patient Position: sitting       Skin prep: Chloraprep       Insertion Site: T4-5, T5-6.       Needle Type: insulated and short bevel       Needle Gauge: 20.        Needle Length (Inches): 4        Ultrasound guided       1. Ultrasound was used to identify targeted nerve, plexus, vascular marker, or fascial plane and place a needle adjacent to it in real-time.       2. Ultrasound was used to visualize the spread of anesthetic in close proximity to the above referenced structure.       3. A permanent image is entered into the patient's record.       4. The visualized anatomic structures appeared normal.       5. There were no apparent abnormal pathologic findings.    Assessment/Narrative         The placement was negative for: blood aspirated, painful injection and site bleeding       Paresthesias: No.       Bolus given via needle..        Secured via.        Insertion/Infusion Method: Single Shot       Complications: none       Injection made incrementally with aspirations every 5 mL.    Medication(s) Administered   Bupivacaine 0.25% PF (Infiltration) - Infiltration   48 mL - 1/16/2024 12:35:00 PM  Bupivacaine  "liposome (Exparel) 1.3% LA inj susp (Infiltration) - Infiltration   20 mL - 1/16/2024 12:35:00 PM  Dexamethasone 10 mg/mL PF (Perineural) - Perineural   8 mg - 1/16/2024 12:35:00 PM  Medication Administration Time: 1/16/2024 12:35 PM     Comments:  Risks for regional anesthesia include but not limited to: infection, seizures, continued weakness or numbness, pain at injection site, injury to blood vessels    8ml saline was mixed with each syringe of bup      FOR [facility] ([facility]) ONLY:   Pain Team Contact information: please page the Pain Team Via OneTok. Search \"Pain\". During daytime hours, please page the attending first. At night please page the resident first.      "

## 2024-01-17 ENCOUNTER — APPOINTMENT (OUTPATIENT)
Dept: PHYSICAL THERAPY | Facility: HOSPITAL | Age: 74
End: 2024-01-17
Attending: SURGERY
Payer: MEDICARE

## 2024-01-17 ENCOUNTER — APPOINTMENT (OUTPATIENT)
Dept: OCCUPATIONAL THERAPY | Facility: HOSPITAL | Age: 74
End: 2024-01-17
Attending: SURGERY
Payer: MEDICARE

## 2024-01-17 LAB
ANION GAP SERPL CALCULATED.3IONS-SCNC: 7 MMOL/L (ref 7–15)
ATRIAL RATE - MUSE: 57 BPM
BASOPHILS # BLD AUTO: 0 10E3/UL (ref 0–0.2)
BASOPHILS NFR BLD AUTO: 0 %
BUN SERPL-MCNC: 15.9 MG/DL (ref 8–23)
CALCIUM SERPL-MCNC: 9.3 MG/DL (ref 8.8–10.2)
CHLORIDE SERPL-SCNC: 105 MMOL/L (ref 98–107)
CREAT SERPL-MCNC: 0.82 MG/DL (ref 0.51–0.95)
DEPRECATED HCO3 PLAS-SCNC: 25 MMOL/L (ref 22–29)
DIASTOLIC BLOOD PRESSURE - MUSE: NORMAL MMHG
EGFRCR SERPLBLD CKD-EPI 2021: 76 ML/MIN/1.73M2
EOSINOPHIL # BLD AUTO: 0 10E3/UL (ref 0–0.7)
EOSINOPHIL NFR BLD AUTO: 0 %
ERYTHROCYTE [DISTWIDTH] IN BLOOD BY AUTOMATED COUNT: 12.9 % (ref 10–15)
GLUCOSE BLDC GLUCOMTR-MCNC: 119 MG/DL (ref 70–99)
GLUCOSE BLDC GLUCOMTR-MCNC: 125 MG/DL (ref 70–99)
GLUCOSE BLDC GLUCOMTR-MCNC: 99 MG/DL (ref 70–99)
GLUCOSE SERPL-MCNC: 130 MG/DL (ref 70–99)
HCT VFR BLD AUTO: 35.7 % (ref 35–47)
HGB BLD-MCNC: 11.9 G/DL (ref 11.7–15.7)
IMM GRANULOCYTES # BLD: 0.1 10E3/UL
IMM GRANULOCYTES NFR BLD: 1 %
INTERPRETATION ECG - MUSE: NORMAL
LYMPHOCYTES # BLD AUTO: 0.5 10E3/UL (ref 0.8–5.3)
LYMPHOCYTES NFR BLD AUTO: 3 %
MCH RBC QN AUTO: 30.8 PG (ref 26.5–33)
MCHC RBC AUTO-ENTMCNC: 33.3 G/DL (ref 31.5–36.5)
MCV RBC AUTO: 93 FL (ref 78–100)
MONOCYTES # BLD AUTO: 0.3 10E3/UL (ref 0–1.3)
MONOCYTES NFR BLD AUTO: 2 %
NEUTROPHILS # BLD AUTO: 14.2 10E3/UL (ref 1.6–8.3)
NEUTROPHILS NFR BLD AUTO: 94 %
NRBC # BLD AUTO: 0 10E3/UL
NRBC BLD AUTO-RTO: 0 /100
P AXIS - MUSE: 59 DEGREES
PLATELET # BLD AUTO: 224 10E3/UL (ref 150–450)
POTASSIUM SERPL-SCNC: 4.8 MMOL/L (ref 3.4–5.3)
PR INTERVAL - MUSE: 236 MS
QRS DURATION - MUSE: 90 MS
QT - MUSE: 442 MS
QTC - MUSE: 430 MS
R AXIS - MUSE: -15 DEGREES
RBC # BLD AUTO: 3.86 10E6/UL (ref 3.8–5.2)
SODIUM SERPL-SCNC: 137 MMOL/L (ref 135–145)
SYSTOLIC BLOOD PRESSURE - MUSE: NORMAL MMHG
T AXIS - MUSE: 62 DEGREES
VENTRICULAR RATE- MUSE: 57 BPM
WBC # BLD AUTO: 15 10E3/UL (ref 4–11)

## 2024-01-17 PROCEDURE — 999N000157 HC STATISTIC RCP TIME EA 10 MIN

## 2024-01-17 PROCEDURE — 96376 TX/PRO/DX INJ SAME DRUG ADON: CPT

## 2024-01-17 PROCEDURE — 80048 BASIC METABOLIC PNL TOTAL CA: CPT | Performed by: INTERNAL MEDICINE

## 2024-01-17 PROCEDURE — 250N000013 HC RX MED GY IP 250 OP 250 PS 637: Performed by: SURGERY

## 2024-01-17 PROCEDURE — 85025 COMPLETE CBC W/AUTO DIFF WBC: CPT | Performed by: SURGERY

## 2024-01-17 PROCEDURE — 250N000013 HC RX MED GY IP 250 OP 250 PS 637: Performed by: INTERNAL MEDICINE

## 2024-01-17 PROCEDURE — 97166 OT EVAL MOD COMPLEX 45 MIN: CPT | Mod: GO

## 2024-01-17 PROCEDURE — 97161 PT EVAL LOW COMPLEX 20 MIN: CPT | Mod: GP

## 2024-01-17 PROCEDURE — 250N000011 HC RX IP 250 OP 636: Performed by: SURGERY

## 2024-01-17 PROCEDURE — 82962 GLUCOSE BLOOD TEST: CPT | Mod: GZ

## 2024-01-17 PROCEDURE — 96375 TX/PRO/DX INJ NEW DRUG ADDON: CPT

## 2024-01-17 PROCEDURE — 250N000011 HC RX IP 250 OP 636: Performed by: INTERNAL MEDICINE

## 2024-01-17 PROCEDURE — 36415 COLL VENOUS BLD VENIPUNCTURE: CPT | Performed by: SURGERY

## 2024-01-17 RX ORDER — DIPHENHYDRAMINE HYDROCHLORIDE 50 MG/ML
25 INJECTION INTRAMUSCULAR; INTRAVENOUS ONCE
Status: COMPLETED | OUTPATIENT
Start: 2024-01-17 | End: 2024-01-17

## 2024-01-17 RX ORDER — DIPHENHYDRAMINE HYDROCHLORIDE 50 MG/ML
25 INJECTION INTRAMUSCULAR; INTRAVENOUS EVERY 6 HOURS PRN
Status: DISCONTINUED | OUTPATIENT
Start: 2024-01-17 | End: 2024-01-19 | Stop reason: HOSPADM

## 2024-01-17 RX ADMIN — SENNOSIDES AND DOCUSATE SODIUM 1 TABLET: 8.6; 5 TABLET ORAL at 21:46

## 2024-01-17 RX ADMIN — HYDROMORPHONE HYDROCHLORIDE 0.2 MG: 0.2 INJECTION, SOLUTION INTRAMUSCULAR; INTRAVENOUS; SUBCUTANEOUS at 04:18

## 2024-01-17 RX ADMIN — SENNOSIDES AND DOCUSATE SODIUM 1 TABLET: 8.6; 5 TABLET ORAL at 09:43

## 2024-01-17 RX ADMIN — OXYCODONE HYDROCHLORIDE 5 MG: 5 TABLET ORAL at 08:49

## 2024-01-17 RX ADMIN — DILTIAZEM HYDROCHLORIDE 240 MG: 240 CAPSULE, EXTENDED RELEASE ORAL at 09:43

## 2024-01-17 RX ADMIN — POLYETHYLENE GLYCOL 3350 17 G: 17 POWDER, FOR SOLUTION ORAL at 09:43

## 2024-01-17 RX ADMIN — SPIRONOLACTONE 50 MG: 25 TABLET ORAL at 09:43

## 2024-01-17 RX ADMIN — ACETAMINOPHEN 975 MG: 325 TABLET, FILM COATED ORAL at 18:30

## 2024-01-17 RX ADMIN — OXYCODONE HYDROCHLORIDE 5 MG: 5 TABLET ORAL at 19:03

## 2024-01-17 RX ADMIN — ACETAMINOPHEN 975 MG: 325 TABLET, FILM COATED ORAL at 09:45

## 2024-01-17 RX ADMIN — ACETAMINOPHEN 975 MG: 325 TABLET, FILM COATED ORAL at 02:57

## 2024-01-17 RX ADMIN — CLONAZEPAM 0.5 MG: 0.5 TABLET ORAL at 19:03

## 2024-01-17 RX ADMIN — DIPHENHYDRAMINE HYDROCHLORIDE 25 MG: 50 INJECTION, SOLUTION INTRAMUSCULAR; INTRAVENOUS at 13:02

## 2024-01-17 ASSESSMENT — ACTIVITIES OF DAILY LIVING (ADL)
ADLS_ACUITY_SCORE: 24

## 2024-01-17 NOTE — PLAN OF CARE
[facility] Inpatient Admission Note:    Patient admitted to 4204/4204-1 at approximately 1658 via cart accompanied by nurse from surgery . Report received from Laverne DU RN in SBAR format at 1658 via face to face in room. Patient transferred to bed via slide board.. Patient is alert and oriented X 3, denies pain; rates at 0 on 0-10 scale.  Patient oriented to room, unit, hourly rounding, and plan of care. Explained admission packet and patient handbook with patient bill of rights brochure. Will continue to monitor and document as needed.     Inpatient Nursing criteria listed below was met:    Health care directives status obtained and documented: Yes    Patient identifies a surrogate decision maker: Yes If yes, who:Marli Iyer (daughter( Contact Information:[phone]    If initial lactic acid greater than 2.0, repeat lactic acid drawn within one hour of arrival to unit: NA. If no, state reason: N/A    Clergy visit ordered if patient requests: Yes    Skin issues/needs documented: Yes    Isolation Patient: no    Fall Prevention Yes: Care plan updated, education given and documented, sticker and magnet in place: Yes    Care Plan initiated: Yes    Education Documented (including assessment): Yes    Patient has discharge needs : Yes If yes, please explain:wound care
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
## 2024-01-17 NOTE — PROGRESS NOTES
01/17/24 1251   Appointment Info   Signing Clinician's Name / Credentials (OT) Mirtha Molina OTR/L   Living Environment   People in Home spouse   Current Living Arrangements house   Home Accessibility stairs within home;stairs to enter home   Number of Stairs, Main Entrance 4   Stair Railings, Main Entrance railings safe and in good condition;railings on both sides of stairs   Number of Stairs, Within Home, Primary greater than 10 stairs   Stair Railings, Within Home, Primary railings safe and in good condition   Transportation Anticipated family or friend will provide   Living Environment Comments Pt reports she lives with her spouse. Home is 2-story and a basement but pt can remain on the main floor once inside. Bathroom has a tub/shower combination and a regular toilet. No grab bars.   Self-Care   Usual Activity Tolerance good   Current Activity Tolerance poor   Equipment Currently Used at Home none   Fall history within last six months yes   Number of times patient has fallen within last six months 1   Activity/Exercise/Self-Care Comment Pt reports she was previously independent with ADLs. Did not use a AD but has both a FWW and cane if needed. Pt had one fall in October resulting in a meniscus tear of L knee.   Instrumental Activities of Daily Living (IADL)   IADL Comments Spouse and/or family plan to assist pt with IADLs   General Information   Onset of Illness/Injury or Date of Surgery 01/16/24   Referring Physician Dr. Molina   Patient/Family Therapy Goal Statement (OT) Pt plans to return home with family support   Additional Occupational Profile Info/Pertinent History of Current Problem Pt underwent bilateral complete mastectomy with left sentinel node biopsy due to left breast cancer.   General Observations and Info Spouse and daughter present during evaluation   Cognitive Status Examination   Orientation Status orientation to person, place and time   Affect/Mental Status (Cognitive) WNL   Follows Commands  WNL   Cognitive Status Comments no concerns   Visual Perception   Visual Impairment/Limitations WFL   Pain Assessment   Patient Currently in Pain Yes, see Vital Sign flowsheet  (Pt had some pain/discomfort over surgical site when transferring)   Range of Motion Comprehensive   Comment, General Range of Motion Functional though formal ROM assessment deferred at this time due to procedure yesterday   Strength Comprehensive (MMT)   Comment, General Manual Muscle Testing (MMT) Assessment Some post op weakness noted during ADLs/functional mobility   Bed Mobility   Bed Mobility supine-sit   Supine-Sit [place] (Bed Mobility) modified independence   Transfers   Transfers sit-stand transfer   Sit-Stand Transfer   Sit-Stand [place] (Transfers) contact guard   Sit/Stand Transfer Comments some assistance needed due to pain and reports of dizziness   Balance   Balance Comments CGA needed for ambulating and transferring due to pain and reports of dizziness   Activities of Daily Living   BADL Assessment/Intervention lower body dressing;grooming;upper body dressing   Upper Body Dressing Assessment/Training   Comment, (Upper Body Dressing) pt required assistance to don the robe, some difficulties due to procedure and pain   [place] Level (Upper Body Dressing) moderate assist (50% patient effort);maximum assist (25% patient effort)   Lower Body Dressing Assessment/Training   Comment, (Lower Body Dressing) spouse donned pt's socks today   [place] Level (Lower Body Dressing) dependent (less than 25% patient effort)   Grooming Assessment/Training   Position (Grooming) unsupported sitting   [place] Level (Grooming) set up   Comment, (Grooming) pt unable to tolerate in standing today, did push her into the bathroom in the wheelchair and the RN was going to assist her with washing up   Clinical Impression   Criteria for Skilled Therapeutic Interventions Met (OT) Yes, treatment indicated   OT Diagnosis pain,  weakness, decreased activity tolerance   Influenced by the following impairments double masectomy, pain   Assessment of Occupational Performance 1-3 Performance Deficits   Identified Performance Deficits ADLs   Planned Therapy Interventions (OT) ADL retraining;progressive activity/exercise;home program guidelines;strengthening;risk factor education   Clinical Decision Making Complexity (OT) problem focused assessment/low complexity   Risk & Benefits of therapy have been explained evaluation/treatment results reviewed;care plan/treatment goals reviewed;risks/benefits reviewed;current/potential barriers reviewed;participants voiced agreement with care plan;participants included;patient;spouse/significant other;daughter   Clinical Impression Comments Pt is exhibiting increased difficultie with ADLs/functional mobility due to pain, weakness, decreased activity tolerance on day 1 bilateral complete mastectomy with left sentinel node biopsy due to left breast cancer. Based off of assessment today, pt would not tolerate discharging home. Hopefully pt's symptoms will improve so she can return home upon discharge. Pt will need to be able to manage 4 steps with PT prior to discharge. Pt does have good family support though. Will continue to work with pt in therapy to progress as tolerated.   OT Total Evaluation Time   OT [place], Moderate Complexity Minutes (43635) 30   OT Goals   Therapy Frequency (OT) 5 times/week   OT Predicted Duration/Target Date for Goal Attainment 01/24/24   OT Goals Hygiene/Grooming;Toilet Transfer/Toileting;Lower Body Dressing   OT: Hygiene/Grooming modified independent   OT: Lower Body Dressing Modified independent   OT: Toilet Transfer/Toileting Modified independent   OT Discharge Planning   OT Plan Progress ADLs, strength, and activity tolerance as able   OT Discharge Recommendation (DC Rec) home with assist;Transitional Care Facility  (OP cancer rehab/PT)   OT Rationale for DC Rec Pt is exhibiting  increased difficultie with ADLs/functional mobility due to pain, weakness, decreased activity tolerance on day 1 bilateral complete mastectomy with left sentinel node biopsy due to left breast cancer. Based off of assessment today, pt would not tolerate discharging home. Hopefully pt's symptoms will improve so she can return home upon discharge. Pt will need to be able to manage 4 steps with PT prior to discharge. Pt does have good family support though. Will continue to work with pt in therapy to progress as tolerated.   OT Brief overview of current status mod I supine to sit, CGA sit-stand and ambulating short distances, totalA LB dressing, set up for some grooming tasks in sitting and the RN was going to assist with others (both sponge bathing and grooming). limited today due to post op pain, weakness, dizziness, and decreased activity tolerance   Total Session Time   Total Session Time (sum of timed and untimed services) 30

## 2024-01-17 NOTE — UTILIZATION REVIEW
"Admission Status; Secondary Review Determination     Admission Date: 1/16/2024  7:57 AM       Under the authority of the Utilization Management Committee, the utilization review process indicated a secondary review on the above patient.  The review outcome is based on review of the medical records, discussions with staff, and applying clinical experience noted on the date of the review.        ()      Inpatient Status Appropriate - This patient's medical care is consistent with medical management for inpatient care and reasonable inpatient medical practice.      () Observation Status Appropriate - This patient does not meet hospital inpatient criteria and is placed in observation status. If this patient's primary payer is Medicare and was admitted as an inpatient, Condition Code 44 should be used and patient status changed to \"observation\".   () Admission Status NOT Appropriate - This patient's medical care is not consistent with medical management for Inpatient or Observation Status.        (x) Outpatient Procedure Status Appropriate - Procedure not on Medicare Inpatient list and no complications at the time of this review       RATIONALE FOR DETERMINATION      Brief clinical presentation, information copied from the chart, abbreviated and edited for relevant content:       Admitted inpatient for OP procedure. Paged team to change to OP. If unable to discharge by tomorrow consider another review, but plan is discharge tomorrow.     Rj Rodríguez is a 73 year old female with left breast cancer.  Patient desires bilateral mastectomy.  Patient was taken to the operating room for a left sentinel node biopsy and bilateral complete mastectomy yesterday. No complications.         In summary, the severity of illness, intensity of service provided, expected length of stay and risk for adverse outcome make the care complex, high risk and appropriate for hospital admission.        The information on this document is " developed by the utilization review team in order for the business office to ensure compliance.  This only denotes the appropriateness of proper admission status and does not reflect the quality of care rendered.         The definitions of Inpatient Status and Observation Status used in making the determination above are those provided in the CMS Coverage Manual, Chapter 1 and Chapter 6, section 70.4.      Sincerely,      Prerna Castaneda MD   Utilization Review/ Case Management  [facility].

## 2024-01-17 NOTE — PROVIDER NOTIFICATION
DATE:  January 17, 2024   TIME OF NOTIFICATION:  12:58 PM   NOTIFICATION DETAILS: Patient took bite of lunch and instantly had itching/swelling to throat (difficulty swallowing).  Patients chart states allergy to diphenhydramine, which patient reports no allergy to benadryl and this is her first step with reactions at home.   NOTIFICATION INTERVENTIONS:  Provider ordered 25 mg Diphenhydramine Once.   NOTIFICAITON GIVEN TO (PROVIDER):  Ellen Dominguez MD  RESPONSE: Diphenhydramine given @ 1302, 1317 Patient reports decrease in itching and swelling   Maribel Masters RN on January 17, 2024 at 12:58 PM

## 2024-01-17 NOTE — PROGRESS NOTES
01/17/24 1207   Appointment Info   Signing Clinician's Name / Credentials (PT) Boris Hernández DPT   Rehab Comments (PT) Co-evaluation performed with OT. Pt. accompanied in room by  and daughter.       Present no   Living Environment   People in Home spouse   Current Living Arrangements house   Home Accessibility stairs within home;stairs to enter home   Number of Stairs, Main Entrance 4   Stair Railings, Main Entrance railings safe and in good condition;railings on both sides of stairs   Number of Stairs, Within Home, Primary greater than 10 stairs   Stair Railings, Within Home, Primary railings safe and in good condition   Transportation Anticipated family or friend will provide   Living Environment Comments Pt. voices living at home with her . She does have 4 stairs to enter with rails on both sides. Does have 14 stairs to basement but will not have to use them once in home. Pt. will be accompanied by family while she recovers.   Self-Care   Usual Activity Tolerance good   Current Activity Tolerance poor   Equipment Currently Used at Home none  (Pt. does have a walker and cane should she need to use them.)   Fall history within last six months no   General Information   Onset of Illness/Injury or Date of Surgery 01/16/24   Referring Physician Dr. Molina   Patient/Family Therapy Goals Statement (PT) Pt. would like to D/C to home with help of her family.   Pertinent History of Current Problem (include personal factors and/or comorbidities that impact the POC) Pt. admitted to inpatient following double mastectomy on 1/16/24.   General Observations Pt. in visible pain with use of UE. Drains and tube in place.   Cognition   Affect/Mental Status (Cognition) WNL   Orientation Status (Cognition) oriented x 4   Follows Commands (Cognition) WNL   Cognitive Status Comments WNL   Pain Assessment   Patient Currently in Pain Yes, see Vital Sign flowsheet  (Pt. voicing post surgical.)   Range  of Motion (ROM)   Range of Motion ROM deficits secondary to surgical procedure;ROM deficits secondary to pain;ROM deficits secondary to swelling;ROM deficits secondary to weakness   Strength (Manual Muscle Testing)   Strength Comments Strength impaired due to pain and post surgical status.   Bed Mobility   Bed Mobility supine-sit   Supine-Sit [place] (Bed Mobility) modified independence   Bed Mobility Limitations decreased ability to use arms for pushing/pulling;impaired ability to control trunk for mobility   Impairments Contributing to Impaired Bed Mobility pain;decreased ROM;decreased strength   Assistive Device (Bed Mobility) bed rails   Comment, (Bed Mobility) Pt. supine to sit Mod I with bed rails. Did require assist of 1 to manage tubes and lines.   Transfers   Transfers sit-stand transfer   Maintains Weight-bearing Status (Transfers) able to maintain   Transfer Safety Concerns Noted decreased balance during turns;decreased weight-shifting ability   Impairments Contributing to Impaired Transfers pain;decreased ROM;decreased strength;impaired balance;decreased flexibility   Comment, (Transfers) Pt. sit to stand via CGA and use of UE. Considerable discomfort with UE use and verbal cues provided to avoid pushing when possible. Once pt. upright staggered and appeared dizzy. Pt. then transferred to wheelchair.   Sit-Stand Transfer   Sit-Stand [place] (Transfers) contact guard   Comment, (Sit-Stand Transfer) Pt. sit to stand via CGA and use of UE. Considerable discomfort with UE use and verbal cues provided to avoid pushing when possible. Once pt. upright staggered and appeared dizzy. Pt. then transferred to wheelchair.   Gait/Stairs (Locomotion)   [place] Level (Gait) contact guard   Distance in Feet (Gait) 20'   Pattern (Gait) swing-to   Deviations/Abnormal Patterns (Gait) rodolfo decreased;base of support, wide;gait speed decreased;stride length decreased;weight shifting decreased   Maintains  Weight-bearing Status (Gait) able to maintain   Comment, (Gait/Stairs) Pt. ambulated 20' in hallway with CGA and wheelchair follow. Pt. demonstrating balance and equilibrium difficulties. One instance of LOB requiring correction by therapist.   Balance   Balance Comments As above   Clinical Impression   Criteria for Skilled Therapeutic Intervention Yes, treatment indicated   PT Diagnosis (PT) Decreased functional activity tolerance secondary to post surgical status.   Influenced by the following impairments pain, ROM, strength, activity tolerance.   Functional limitations due to impairments Pt. demonstrating decreased ability for self cares and mobility.   Clinical Presentation (PT Evaluation Complexity) stable   Clinical Presentation Rationale Therapist discretion   Clinical Decision Making (Complexity) low complexity   Planned Therapy Interventions (PT) balance training;gait training;bed mobility training;neuromuscular re-education;ROM (range of motion);stair training;strengthening;stretching;transfer training;progressive activity/exercise;home program guidelines   Risk & Benefits of therapy have been explained evaluation/treatment results reviewed;care plan/treatment goals reviewed;risks/benefits reviewed;current/potential barriers reviewed;participants voiced agreement with care plan;participants included;patient;spouse/significant other   Clinical Impression Comments Pt. D/C recommendation at this stage unclear. Likely that will be able to return home with family support once pain subsides. Will need to verify safe performance of stairs prior to.   PT Total Evaluation Time   PT Eval, Low Complexity Minutes ([id number]) 30   Physical Therapy Goals   PT Frequency 6x/week   PT Predicted Duration/Target Date for Goal Attainment 01/24/24   PT Goals Transfers;Gait;Bed Mobility;Stairs   PT: Bed Mobility Modified independent   PT: Transfers Modified independent   PT: Gait Modified independent   PT: Stairs Modified  independent   PT Discharge Planning   PT Plan Progress functional mobility as tolerated. Evaluate stairs prior to D/C.   PT Discharge Recommendation (DC Rec) home with outpatient physical therapy;home with assist;Transitional Care Facility   PT Rationale for DC Rec Likely will be home with assist/outpatient therapy. Will need to progress mobility and safely perform stairs prior to D/C.   PT Brief overview of current status As above.   PT Equipment Needed at Discharge   (Pt. has equipment.)   Total Session Time   Total Session Time (sum of timed and untimed services) 30

## 2024-01-17 NOTE — PLAN OF CARE
See flowsheet for full assessment.     Dr. Frias at bedside and notified patient has voided 50 mLs since pre-op, and refused lasix and spironolactone. MD acknowledged and stated not to bladder scan unless patient does not void by morning. MD aware of BG result. See record for new orders placed.     1 L O2 NC. Patient tolerating ambulation with assist. Pt reports pain 4/10 and denies pain medication at this time. Pt able to drink oral fluids. Daughter at bedside and plans to sleep in room over night, pt with unlabored respirations observed, visiting with family. See flowsheet for DINAH and wound vac drainage.    Bed alarm on, and call light in place. Pt and family verbalized understanding of use of call light.

## 2024-01-17 NOTE — PLAN OF CARE
See flowsheet for full assessment. VSS. 1.5 L O2 NC per orders as patient's SpO2 dropping below 92% when sleeping. Voiding spontaneously. Patient ambulating to bathroom with RN assist x 2, increased pain during activity, see MAR for medications given. Daughter at bedside. DINAH drain tubing stripped bilaterally, see flowsheet. Ice placed on surgical site intermittently. Pt encouraged to drink more fluids. A&O. Bed alarm on, call light in reach.

## 2024-01-17 NOTE — PROVIDER NOTIFICATION
DATE:  January 17, 2024   TIME OF NOTIFICATION:  1:21 PM   NOTIFICATION : Sent message to provider for PRN order for Diphenhydramine.   NOTIFICAITON GIVEN TO (PROVIDER):  MD Maribel Rice RN on January 17, 2024 at 1:21 PM

## 2024-01-17 NOTE — PLAN OF CARE
Face to face report given with opportunity to observe patient.    Report given to Lashay Echevarria RN   1/17/2024  7:23 AM

## 2024-01-17 NOTE — CONSULTS
"[facility]  Consult Note - Hospitalist Service  Date of Admission:  1/16/2024  Consult Requested by: GEORGE Dey  Reason for Consult: Hyperglycemia    Assessment & Plan   Rj Rodríguez is a 73 year old female admitted on 1/16/2024. She underwent bilateral complete mastectomy with left sentinel node biopsy due to left breast cancer.  Found hyperglycemic with glucose 238.  Hospitalist consulted for glycemic management.  Patient seen and examined in the room.  Critical points of past medical history, chief complaint noticed.    Hospital problems  Hyperglycemia  -No history of diabetes with most recent hemoglobin A1c 5.6  -Monitor glycemia as needed with low intensity sliding scale    2.  History of sleep apnea  -Monitor overnight for saturation    3.  Cardiac arrhythmia  -She is on high-dose Cardizem and bradycardia at base  -Telemetry  -Check magnesium and TSH    4.  Medication reconciliation  -Hold Lasix and spironolactone at this moment  -Reassess in the morning    5.  Low urine output since surgery  -Will let her drink and continue maintenance fluids  -Check urine with bladder scan in the morning  -No intervention since then     Clinically Significant Risk Factors Present on Admission                  # Hypertension: Noted on problem list      # Obesity: Estimated body mass index is 31.86 kg/m  as calculated from the following:    Height as of this encounter: 1.556 m (5' 1.25\").    Weight as of this encounter: 77.1 kg (170 lb).              Charly Frias MD  Hospitalist Service  Securely message with [employer] (more info)  Text page via [facility] Paging/Directory   ______________________________________________________________________    Chief Complaint   Fatigue    History is obtained from the patient, electronic health record, and emergency department physician    History of Present Illness   Rj Rodríguez is a 73 year old female who has PMH of HTN, arrhythmia, back pain, post op spine surgery, sleep " apnea, obesity, insulin resistance but no DM-2 (last HgA1C 5.6) who underwent bilateral mastectomy due to left breast cancer and found hyperglycemic.  Patient reports that since she lost significant amount of weight her sugars are much better controlled.  She is not taking any diabetic pills and checks glycemia once twice a week.  Her sugar ranges from high 90s to highest 138 according to the patient.  She also stated that since she received 2 Moderna vaccines her heart rate became irregular going between slow fast with no related to activity.  She was seen cardiologist several times.  Underwent stress test and echo which were both came normal.  Nurse reported that since surgery she had only 50 urine output.  Patient has no complaints but weakness and some tenderness in the surgical area.  Otherwise her vitals are stable.  Labs: I see no other labs but glycemia checked only.  I will order TSH and magnesium.        Past Medical History    Past Medical History:   Diagnosis Date    Arthritis     Chronic back pain     Hypertension     Irregular heart beat     Sleep apnea        Past Surgical History   Past Surgical History:   Procedure Laterality Date    ANESTHESIA OUT OF OR MRI N/A 6/1/2022    Procedure: MRI CERVICAL SPINE;  Surgeon: GENERIC ANESTHESIA PROVIDER;  Location: [facility]    ANESTHESIA OUT OF OR MRI N/A 9/23/2022    Procedure: MRI CERVICAL SPINE;  Surgeon: GENERIC ANESTHESIA PROVIDER;  Location: [facility]    ANESTHESIA OUT OF OR MRI Left 12/7/2023    Procedure: Anesthesia out of OR MRI;  Surgeon: GENERIC ANESTHESIA PROVIDER;  Location: [facility] OR    APPENDECTOMY      BACK SURGERY      CHOLECYSTECTOMY      COLONOSCOPY  2012    Repeat in 10 years    GYN SURGERY      HYSTERECTOMY      Ovaries    RELEASE CARPAL TUNNEL      LT    RELEASE CARPAL TUNNEL      RT x2    SURGICAL RADIOLOGY PROCEDURE N/A 2/12/2016    Procedure: SURGICAL RADIOLOGY PROCEDURE;  Surgeon: Provider, Generic Perianesthesia Nursing;  Location: [facility] OR     SURGICAL RADIOLOGY PROCEDURE N/A 8/15/2016    Procedure: SURGICAL RADIOLOGY PROCEDURE;  Surgeon: Provider, Generic Perianesthesia Nursing;  Location: [facility] OR       Medications   Medications Prior to Admission   Medication Sig Dispense Refill Last Dose    albuterol (PROAIR HFA/PROVENTIL HFA/VENTOLIN HFA) 108 (90 Base) MCG/ACT inhaler Inhale 2 puffs into the lungs every 6 hours 18 g 0 More than a month    blood glucose (ACCU-CHEK GUIDE) test strip Use to test blood sugar 1 time daily or as directed. 50 each 11     blood glucose monitoring (ACCU-CHEK FASTCLIX) lancets Use to test blood sugar 1 time daily or as directed. 102 each 11     cetirizine (ZYRTEC) 10 MG tablet Take 1 tablet (10 mg) by mouth daily as needed for allergies 60 tablet 12 1/15/2024 at am    clonazePAM (KLONOPIN) 0.5 MG tablet Take 1 tablet by mouth twice daily as needed for anxiety 30 tablet 0 1/16/2024 at 0730    diltiazem ER (TIAZAC) 240 MG 24 hr ER beaded capsule Take 1 capsule (240 mg) by mouth daily 90 capsule 3 1/16/2024 at 0600    EPINEPHrine (ANY BX GENERIC EQUIV) 0.3 MG/0.3ML injection 2-pack Inject 0.3 mLs (0.3 mg) into the muscle as needed for anaphylaxis May repeat one time in 5-15 minutes if response to initial dose is inadequate. 2 each 1     furosemide (LASIX) 20 MG tablet Take 1 tablet (20 mg) by mouth daily 90 tablet 3 1/15/2024 at am    spironolactone (ALDACTONE) 50 MG tablet Take 1 tablet by mouth once daily 90 tablet 2 1/15/2024 at am    triamcinolone (NASACORT) 55 MCG/ACT nasal aerosol Spray 2 sprays into both nostrils daily 2 Bottle 12 Past Month          Review of Systems    10 points of review of system obtained.  Pertinent positives and negatives listed in history of present illness.  The rest is negative.    Social History   I have reviewed this patient's social history and updated it with pertinent information if needed.  Social History     Tobacco Use    Smoking status: Never     Passive exposure: Never    Smokeless tobacco:  Never   Vaping Use    Vaping Use: Never used   Substance Use Topics    Alcohol use: Yes     Alcohol/week: 1.0 standard drink of alcohol     Types: 1 Glasses of wine per week     Comment: occ glass of wine    Drug use: No         Family History   I have reviewed this patient's family history and updated it with pertinent information if needed.  Family History   Problem Relation Age of Onset    Colon Cancer Maternal Aunt     Colon Cancer Maternal Uncle     Diabetes Father         Type 2    Hypertension Father     Alcoholism Father     Alcoholism Mother          Allergies   Allergies   Allergen Reactions    Fruit [Peach] Anaphylaxis and Hives     fresh peaches and any fruit that has a pit.  Kiwi's and apples also.  Can eat any fruit cooked.    Nuts Anaphylaxis and Hives     All Raw Nuts, can eat nuts when roasted.    Ace Inhibitors      Upper respiratory infection    Alprazolam Hives     Xanax    Antihistamines, Chlorpheniramine-Type Hives     Antihistamines    Diphenhydramine Hives and Other (See Comments)     Keeps her awake      Erythromycin      BASE; pt. Not sure if this is a true allergy.    Guaifed      Pt unsure of reaction    Guaifenesin      Guaifed    Hydrochlorothiazide     No Clinical Screening - See Comments      Allergic:  Fresh peaches and any fruit that has a pit.  Kiwi's and apples also.  All raw nuts.  Can eat nuts when roasted or any fruit cooked.  Some seasonal allergies :  Hayfever    Olmesartan Medoxomil Cough     Benicar    Phenylephrine Hcl      Guaifed    Pseudoephedrine Hcl      Guaifed    Seasonal Allergies      hayfever    Statins     Tramadol      Sleep disturbance. Can not sleep, and when able to fall asleep will have terrible nightmares    Tramadol Hcl      Ultram, insomnia, nightmares, headache    Venlafaxine Other (See Comments)     Heartburn, reflux    Zoloft [Sertraline]      paranoia    Latex Other (See Comments), Swelling, Difficulty breathing and Rash     Throat Closes           Physical Exam   Vital Signs: Temp: 97.9  F (36.6  C) Temp src: Oral BP: 142/71 Pulse: 65   Resp: 16 SpO2: 96 % O2 Device: Nasal cannula Oxygen Delivery: 1 LPM  Weight: 170 lbs 0 oz    General Appearance: Young elderly not in any distress  Respiratory: Clear lungs  Cardiovascular: Bradycardic with skipped beats no S3 no S4.  GI: Soft nontender nondistended no organomegaly no pulsatile masses  Skin: Warm well-perfused no edema  Other: Post bilateral mastectomies with wound VAC.  Dressings dry.    Medical Decision Making       45 MINUTES SPENT BY ME on the date of service doing chart review, history, exam, documentation & further activities per the note.      Data         Imaging results reviewed over the past 24 hrs:   Recent Results (from the past 24 hour(s))   NM Lymphoscintigraphy Injection and Scan    Narrative    PROCEDURE: NM LYMPHOSCINTIGRAPHY INJECTION AND SCAN 1/16/2024 1:58 PM    HISTORY: Malignant neoplasm of left breast in female, estrogen  receptor positive, unspecified site of breast (H); Malignant neoplasm  of left breast in female, estrogen receptor positive, unspecified site  of breast (H)    COMPARISONS: None.    TECHNIQUE: The procedure was discussed with patient and informed  consent was obtained. Timeout procedure was followed.    Four Injections were made around the left nipple using technetium 99m  sulfur colloid with 1% lidocaine. Doses are 0.540 mCi, 0.513 mCi,  0.5-3 mCi and 0.530 mCi.    Imaging was performed. There is visualization of a left axillary lymph  node. Visualization occurred at approximately 120 minutes. Images were  carried out to 160 mm. Site of axillary lymph node was then marked on  the patient by the nuclear medicine technologist.    Database error occurred and the images were lost. Images were reviewed  by Dr. Galvez before this error occurred.           Impression    IMPRESSION: Lymphoscintigraphy injection on the left with  visualization of a left axillary lymph  node.    GRADY LOPEZ MD         SYSTEM ID:  [id number]

## 2024-01-17 NOTE — PLAN OF CARE
"Rj Rodríguez is a 73 year old female patient.  No diagnosis found.  Past Medical History:   Diagnosis Date    Arthritis     Chronic back pain     Hypertension     Irregular heart beat     Sleep apnea      No current outpatient medications on file.     Allergies   Allergen Reactions    Fruit [Peach] Anaphylaxis and Hives     fresh peaches and any fruit that has a pit.  Kiwi's and apples also.  Can eat any fruit cooked.    Nuts Anaphylaxis and Hives     All Raw Nuts, can eat nuts when roasted.    Ace Inhibitors      Upper respiratory infection    Alprazolam Hives     Xanax    Antihistamines, Chlorpheniramine-Type Hives     Antihistamines    Diphenhydramine Hives and Other (See Comments)     Keeps her awake      Erythromycin      BASE; pt. Not sure if this is a true allergy.    Guaifed      Pt unsure of reaction    Guaifenesin      Guaifed    Hydrochlorothiazide     No Clinical Screening - See Comments      Allergic:  Fresh peaches and any fruit that has a pit.  Kiwi's and apples also.  All raw nuts.  Can eat nuts when roasted or any fruit cooked.  Some seasonal allergies :  Hayfever    Olmesartan Medoxomil Cough     Benicar    Phenylephrine Hcl      Guaifed    Pseudoephedrine Hcl      Guaifed    Seasonal Allergies      hayfever    Statins     Tramadol      Sleep disturbance. Can not sleep, and when able to fall asleep will have terrible nightmares    Tramadol Hcl      Ultram, insomnia, nightmares, headache    Venlafaxine Other (See Comments)     Heartburn, reflux    Zoloft [Sertraline]      paranoia    Latex Other (See Comments), Swelling, Difficulty breathing and Rash     Throat Closes       Principal Problem:    Breast cancer (H)    Blood pressure 167/80, pulse 65, temperature 97.6  F (36.4  C), temperature source Oral, resp. rate 16, height 1.556 m (5' 1.25\"), weight 77.1 kg (170 lb), SpO2 96%.    Subjective  Objective:  Vital signs: (most recent): Blood pressure 167/80, pulse 65, temperature 97.6  F (36.4  C), " "temperature source Oral, resp. rate 16, height 1.556 m (5' 1.25\"), weight 77.1 kg (170 lb), SpO2 96%.      Assessment & Plan    Patient post-op double mastectomy day 1.  Patient reports pain 3/10 worsening with activity and worse on L. Side at DINAH drain site then R. Side.  Patient A/O, calm and cooperative.  Daughter in room with patient.  Eating well at this time. Will continue to monitor.    Maribel Masters RN  1/17/2024  "

## 2024-01-17 NOTE — PROGRESS NOTES
Hospitalist Brief Progress Note    Patient somewhat hypertensive, unheld home Lasix and spironolactone this morning.  Blood glucose well-controlled yesterday evening and this morning on sliding scale coverage, will continue.  Mag within normal limits.  Disposition per surgery.    Addendum:  Called for allergic reaction to her food, which involved throat tightness.  Benadryl ordered.  Will move patient down to 3rd floor for closer monitoring.    Ellen Dominguez MD

## 2024-01-17 NOTE — PLAN OF CARE
Patient is A&O, VSS, afebrile, and she reported pain rated 4-6/10. Scheduled tylenol and PRN diluadid given with some relief. Patient declined further inteverntions at this time. On 1 L of O2 via NC. Lung sounds are clear and equal bilaterally. Bowel sounds active x4. Nausea & dry-heaving reported, PRN IV zofran, a cool cloth, a fan, and sips of water were given with relief. DINAH drains to the left and right chest wall and the wound vac are in place with bloody/bright red drainage, output as charted. Patient tolerating sips of ice water and crackers. Patient breathing easily and visiting with family. Bed alarm is on and the call light is within reach.    Face to face report given with opportunity to observe patient.    Report given to SELINA Todd RN   1/16/2024  7:31 PM

## 2024-01-17 NOTE — PLAN OF CARE
2230: Patient had a panic attack stating she has a history of them especially when feeling claustrophobic. RN and pt's daughter at bedside redirecting pt. Cold wash cloths and essential oils provided. Patient stated she wanted to take klonopin as this helps her at home per pt report, see MAR.    2250: Patient verbalized she feels better and denies any needs at this time

## 2024-01-17 NOTE — PROGRESS NOTES
INPATIENT ROUNDING NOTE  1/17/2024    Patient: Rj Rodríguez    Physician of Record: Ethan Molina MD    Admitting diagnosis: Malignant neoplasm of left breast (H) [C50.912]  Breast cancer (H) [C50.919]    Procedure(s):  Left sentinel node biopsy, bilateral mastectomy     POD: 1 Day Post-Op    Current Diet: Regular    CURRENT MEDICATIONS:  Continuous Medications:  Current Facility-Administered Medications   Medication Last Rate    lactated ringers 10 mL/hr at 01/16/24 1312    lactated ringers 100 mL/hr at 01/16/24 4019       Scheduled Medications:  Current Facility-Administered Medications   Medication Dose Route Frequency    acetaminophen  975 mg Oral Q8H    ceFAZolin  2 g Intravenous See Admin Instructions    diltiazem ER COATED BEADS  240 mg Oral Daily    fluticasone  2 spray Both Nostrils Daily    furosemide  20 mg Oral Daily    insulin aspart  0.5-2.5 Units Subcutaneous TID AC    insulin aspart  0.5-2.5 Units Subcutaneous At Bedtime    polyethylene glycol  17 g Oral Daily    senna-docusate  1 tablet Oral BID    sodium chloride (PF)  3 mL Intracatheter Q8H    sodium chloride (PF)  3 mL Intracatheter Q8H    spironolactone  50 mg Oral Daily       PRN Medications:  Current Facility-Administered Medications   Medication Dose Route Frequency    [START ON 1/19/2024] acetaminophen  650 mg Oral Q4H PRN    bisacodyl  10 mg Rectal Daily PRN    cetirizine  10 mg Oral Daily PRN    clonazePAM  0.5 mg Oral BID PRN    glucose  15-30 g Oral Q15 Min PRN    Or    dextrose  25-50 mL Intravenous Q15 Min PRN    Or    glucagon  1 mg Subcutaneous Q15 Min PRN    HYDROmorphone  0.2 mg Intravenous Q2H PRN    Or    HYDROmorphone  0.4 mg Intravenous Q2H PRN    hydrOXYzine HCl  10 mg Oral Q6H PRN    lidocaine 4%   Topical Q1H PRN    lidocaine 4%   Topical Q1H PRN    lidocaine (buffered or not buffered)  0.1-1 mL Other Q1H PRN    lidocaine (buffered or not buffered)  0.1-1 mL Other Q1H PRN    magnesium hydroxide  30 mL Oral Daily PRN     "naloxone  0.2 mg Intravenous Q2 Min PRN    Or    naloxone  0.4 mg Intravenous Q2 Min PRN    Or    naloxone  0.2 mg Intramuscular Q2 Min PRN    Or    naloxone  0.4 mg Intramuscular Q2 Min PRN    ondansetron  4 mg Oral Q6H PRN    Or    ondansetron  4 mg Intravenous Q6H PRN    oxyCODONE  5 mg Oral Q4H PRN    Or    oxyCODONE  10 mg Oral Q4H PRN    prochlorperazine  5 mg Intravenous Q6H PRN    Or    prochlorperazine  5 mg Oral Q6H PRN    sodium chloride (PF)  3 mL Intracatheter q1 min prn    sodium chloride (PF)  3 mL Intracatheter q1 min prn       SUBJECTIVE:   Nausea: No. Vomiting: No. Fever: No. Chills: No. Excessive burping: No. Pain control: good. Tolerating current diet: Yes.     PHYSICAL EXAM:   Vital signs: /80 (BP Location: Left leg)   Pulse 65   Temp 97.6  F (36.4  C) (Oral)   Resp 16   Ht 1.556 m (5' 1.25\")   Wt 77.1 kg (170 lb)   SpO2 95%   BMI 31.86 kg/m     BMI: Body mass index is 31.86 kg/m .   General: Normal, healthy, cooperative, in no acute distress, alert   Lungs: respirations are non-labored   Abdominal: non-distended   Wound:  prevena wound vac intact   Extremities: No cyanosis, clubbing or edema noted bilaterally in Upper and Lower Extremities   Neurological: without deficit    INPUT/OUTPUT:      Intake/Output Summary (Last 24 hours) at 1/17/2024 1030  Last data filed at 1/17/2024 0928  Gross per 24 hour   Intake 2346 ml   Output 1105 ml   Net 1241 ml       I/O last 3 completed shifts:  In: 2346 [P.O.:350; I.V.:1996]  Out: 855 [Urine:675; Drains:180]    LABS:    Last CBC Rrsults:   Recent Labs   Lab Test 01/17/24  0547 01/08/24  1046 11/29/23  1308   WBC 15.0* 8.3 9.3   RBC 3.86 4.46 4.49   HGB 11.9 13.9 14.0   HCT 35.7 41.1 41.6   MCV 93 92 93   MCH 30.8 31.2 31.2   MCHC 33.3 33.8 33.7   RDW 12.9 13.1 13.2    261 267       Last Comprehensive Metabolic panel:  Recent Labs   Lab Test 01/17/24  0547 01/16/24  2140 01/16/24  1905 01/16/24  0822 01/08/24  1046 11/29/23  1308 " 08/07/23  1507 06/28/23  0738 07/11/22  1100 05/20/22  1332 04/18/22  1208     --   --   --  140 139   < > 138   < > 137 139   POTASSIUM 4.8  --   --   --  4.1 3.7   < > 4.3   < > 3.6 3.5   CHLORIDE 105  --   --   --  105 105   < > 102   < > 107 107   CO2 25  --   --   --  25 24   < > 24   < > 25 29   ANIONGAP 7  --   --   --  10 10   < > 12   < > 5 3   * 172* 238*   < > 105* 106*   < > 111*   < > 101* 109*   BUN 15.9  --   --   --  11.9 12.5   < > 14.0   < > 11 17   CR 0.82  --   --   --  0.86 0.83   < > 0.98*   < > 0.75 0.72   GFRESTIMATED 76  --   --   --  72 75   < > 62   < > 86 90   EZRA 9.3  --   --   --  9.4 9.8   < > 9.9   < > 9.4 9.1   BILITOTAL  --   --   --   --   --   --   --  0.9  --  0.7 1.2   ALKPHOS  --   --   --   --   --   --   --  91  --  71 77   ALT  --   --   --   --   --   --   --  22  --  29 76*   AST  --   --   --   --   --   --   --  24  --  20 21    < > = values in this interval not displayed.       Recent Labs   Lab Test 01/16/24 2055 06/28/23  0738 05/08/23  1013 05/20/22  1332 04/18/22  1208 01/10/22  0946   MAG 1.9  --  2.1  --   --  2.2   ALBUMIN  --  4.1  --  3.6 3.4  --        ASSESSMENT:    1 Day Post-Op from Procedure(s):  Left sentinel node biopsy, bilateral mastectomy.      PLAN:   Saline lock  Education on drain care and prevena care  Possible discharge home tomorrow

## 2024-01-18 ENCOUNTER — APPOINTMENT (OUTPATIENT)
Dept: PHYSICAL THERAPY | Facility: HOSPITAL | Age: 74
End: 2024-01-18
Attending: SURGERY
Payer: MEDICARE

## 2024-01-18 ENCOUNTER — APPOINTMENT (OUTPATIENT)
Dept: OCCUPATIONAL THERAPY | Facility: HOSPITAL | Age: 74
End: 2024-01-18
Attending: SURGERY
Payer: MEDICARE

## 2024-01-18 LAB
GLUCOSE BLDC GLUCOMTR-MCNC: 103 MG/DL (ref 70–99)
GLUCOSE BLDC GLUCOMTR-MCNC: 108 MG/DL (ref 70–99)
GLUCOSE BLDC GLUCOMTR-MCNC: 112 MG/DL (ref 70–99)
GLUCOSE BLDC GLUCOMTR-MCNC: 141 MG/DL (ref 70–99)
GLUCOSE BLDC GLUCOMTR-MCNC: 180 MG/DL (ref 70–99)
GLUCOSE BLDC GLUCOMTR-MCNC: 194 MG/DL (ref 70–99)

## 2024-01-18 PROCEDURE — 250N000013 HC RX MED GY IP 250 OP 250 PS 637: Performed by: SURGERY

## 2024-01-18 PROCEDURE — 250N000013 HC RX MED GY IP 250 OP 250 PS 637: Performed by: HOSPITALIST

## 2024-01-18 PROCEDURE — 97530 THERAPEUTIC ACTIVITIES: CPT | Mod: GP

## 2024-01-18 PROCEDURE — 82962 GLUCOSE BLOOD TEST: CPT | Mod: GZ

## 2024-01-18 PROCEDURE — 999N000157 HC STATISTIC RCP TIME EA 10 MIN

## 2024-01-18 PROCEDURE — 97530 THERAPEUTIC ACTIVITIES: CPT | Mod: GO

## 2024-01-18 PROCEDURE — 250N000013 HC RX MED GY IP 250 OP 250 PS 637: Performed by: INTERNAL MEDICINE

## 2024-01-18 RX ORDER — DEXTROSE MONOHYDRATE 25 G/50ML
25-50 INJECTION, SOLUTION INTRAVENOUS
Status: DISCONTINUED | OUTPATIENT
Start: 2024-01-18 | End: 2024-01-18

## 2024-01-18 RX ORDER — CALCIUM CITRATE/VITAMIN D3 200MG-6.25
1 TABLET ORAL DAILY
COMMUNITY

## 2024-01-18 RX ORDER — CETIRIZINE HYDROCHLORIDE 10 MG/1
10-20 TABLET ORAL DAILY
COMMUNITY

## 2024-01-18 RX ORDER — ALBUTEROL SULFATE 90 UG/1
2 AEROSOL, METERED RESPIRATORY (INHALATION) EVERY 6 HOURS PRN
COMMUNITY

## 2024-01-18 RX ORDER — TRIAMCINOLONE ACETONIDE 55 UG/1
2 SPRAY, METERED NASAL DAILY PRN
COMMUNITY

## 2024-01-18 RX ORDER — CALCIUM CARBONATE 500 MG/1
1000 TABLET, CHEWABLE ORAL 3 TIMES DAILY PRN
Status: DISCONTINUED | OUTPATIENT
Start: 2024-01-18 | End: 2024-01-19 | Stop reason: HOSPADM

## 2024-01-18 RX ORDER — NICOTINE POLACRILEX 4 MG
15-30 LOZENGE BUCCAL
Status: DISCONTINUED | OUTPATIENT
Start: 2024-01-18 | End: 2024-01-18

## 2024-01-18 RX ORDER — DIPHENHYDRAMINE HYDROCHLORIDE 12.5 MG/1
2 BAR, CHEWABLE ORAL PRN
COMMUNITY

## 2024-01-18 RX ADMIN — ACETAMINOPHEN 975 MG: 325 TABLET, FILM COATED ORAL at 10:53

## 2024-01-18 RX ADMIN — ACETAMINOPHEN 975 MG: 325 TABLET, FILM COATED ORAL at 18:54

## 2024-01-18 RX ADMIN — FLUTICASONE PROPIONATE 2 SPRAY: 50 SPRAY, METERED NASAL at 08:42

## 2024-01-18 RX ADMIN — ACETAMINOPHEN 975 MG: 325 TABLET, FILM COATED ORAL at 02:34

## 2024-01-18 RX ADMIN — FUROSEMIDE 20 MG: 20 TABLET ORAL at 08:43

## 2024-01-18 RX ADMIN — POLYETHYLENE GLYCOL 3350 17 G: 17 POWDER, FOR SOLUTION ORAL at 08:43

## 2024-01-18 RX ADMIN — SENNOSIDES AND DOCUSATE SODIUM 1 TABLET: 8.6; 5 TABLET ORAL at 08:43

## 2024-01-18 RX ADMIN — HYDROXYZINE HYDROCHLORIDE 10 MG: 10 TABLET ORAL at 21:06

## 2024-01-18 RX ADMIN — DILTIAZEM HYDROCHLORIDE 240 MG: 240 CAPSULE, EXTENDED RELEASE ORAL at 08:43

## 2024-01-18 RX ADMIN — OXYCODONE HYDROCHLORIDE 10 MG: 5 TABLET ORAL at 10:53

## 2024-01-18 RX ADMIN — SENNOSIDES AND DOCUSATE SODIUM 1 TABLET: 8.6; 5 TABLET ORAL at 21:07

## 2024-01-18 RX ADMIN — CLONAZEPAM 0.5 MG: 0.5 TABLET ORAL at 19:26

## 2024-01-18 RX ADMIN — OXYCODONE HYDROCHLORIDE 5 MG: 5 TABLET ORAL at 00:00

## 2024-01-18 RX ADMIN — OXYCODONE HYDROCHLORIDE 10 MG: 5 TABLET ORAL at 16:14

## 2024-01-18 RX ADMIN — CALCIUM CARBONATE 1000 MG: 500 TABLET, CHEWABLE ORAL at 04:54

## 2024-01-18 RX ADMIN — SPIRONOLACTONE 50 MG: 25 TABLET ORAL at 08:43

## 2024-01-18 ASSESSMENT — ACTIVITIES OF DAILY LIVING (ADL)
ADLS_ACUITY_SCORE: 25
ADLS_ACUITY_SCORE: 24
ADLS_ACUITY_SCORE: 25
ADLS_ACUITY_SCORE: 25

## 2024-01-18 NOTE — PLAN OF CARE
Pt A&O x 4 this shift. Pt afebrile, and remains on room air this shift. Lung sounds are clear. Pt complaints of pain to upper chest/incision. Pt receives scheduled tylenol, and PRN oxycodone this shift. Prevena wound vac in place with no drainage to cannister to upper chest. DINAH drains in place with bright red drainage. Pt has 20 ml output from each DINAH drain this shift. Blood pressures taken on L leg this shift.     04:50- Pt complaints of heart burn/indigestion. Pt receives PRN Tums.   Pt up stand by assist to bathroom this shift.   Pt continent of bladder, and voiding spontaneously this shift.     Face to face report given with opportunity to observe patient.    Report given to Champ RN.     Beatriz Buchanan RN   1/18/2024  7:17 AM

## 2024-01-18 NOTE — PROGRESS NOTES
01/18/24 1300   Appointment Info   Signing Clinician's Name / Credentials (OT) Ramya Lara OTR/L   Rehab Comments (OT) Patient's spouse was present during OT session today.   Interventions   Interventions Quick Adds Therapeutic Activity   Therapeutic Activities   Therapeutic Activity Minutes (87412) 14   Symptoms noted during/after treatment fatigue   Treatment Detail/Skilled Intervention Patient was supine in bed at time of OT arrival and spouse was present. Patient reported feeling well today but does continue to have some soreness in chest and left underarm. Patient was able to transfer supine to sitting eob mod I. She was able to stand with SBA. Patient ambulated ~75 feet with CGA and use of hand rails in hallway. Slow rodolfo as patient reported that she still feels slightly unsteady with ambulation. Patient was able to transfer sitting eob to supine with SBA. Patient supine in bed with call light within reach at end of session.   OT Discharge Planning   OT Plan Progress ADLs, strength, and activity tolerance as able   OT Discharge Recommendation (DC Rec) home with assist;Transitional Care Facility   OT Rationale for DC Rec Patient was previously living with spouse and was independent with ADLs. Patient was able to complete transfers and ambulated ~75 feet with CGA today. She does continue to demonstrate slight unsteadiness with ambulation. Patient reports having good support from family and neighbors. Recommend home if patient is able to receive assistance with ADLs and ambulation as needed.   OT Brief overview of current status CGA for ambulation and transfers; patient ambulated ~75 feet with CGA; mod I for bed mobility   Total Session Time   Timed Code Treatment Minutes 14   Total Session Time (sum of timed and untimed services) 14   Psychosocial Support   Trust Relationship/Rapport emotional support provided;empathic listening provided;choices provided;thoughts/feelings acknowledged

## 2024-01-18 NOTE — PROGRESS NOTES
01/18/24 1500   Appointment Info   Signing Clinician's Name / Credentials (PT) Madai Mckay DPT   Interventions   Interventions Quick Adds Therapeutic Activity   Therapeutic Activity   Therapeutic Activities: dynamic activities to improve functional performance Minutes (13931) 25   Symptoms Noted During/After Treatment Fatigue   Treatment Detail/Skilled Intervention Pt resting in bed at start of treatment and agreeable to session. Pt was emotional and stating she had a bad day. Provided emotional support to patient. Completed sit to stand c supervision. During ambulation she required HHA at first but as she progressed ambulation improved and she did not use any assist. Ambulated 150' x2 with 5 minute rest break between bouts of ambulation. Provided more encouragement to patient once back in room. Pt resting comfortably in bed at end of session.   PT Discharge Planning   PT Plan Progress functional mobility as tolerated. Evaluate stairs prior to D/C.   PT Discharge Recommendation (DC Rec) home with assist;home with home care physical therapy;home with outpatient physical therapy   PT Rationale for DC Rec Still need to trial stairs tomorrow but mobility is improving   Total Session Time   Timed Code Treatment Minutes 25   Total Session Time (sum of timed and untimed services) 25

## 2024-01-18 NOTE — PLAN OF CARE
Pt transferred to floor from [facility] at this time with staff.   Report received from SELINA López.

## 2024-01-18 NOTE — MEDICATION SCRIBE - ADMISSION MEDICATION HISTORY
Medication Scribe Admission Medication History    Admission medication history is complete. The information provided in this note is only as accurate as the sources available at the time of the update.    Information Source(s): Patient and CareEverywhere/SureScripts via in-person    Pertinent Information:   Patient manages her own medications and is a good historian.     Changes made to PTA medication list:  Added: multivitamin gummie, kids benadryl  Deleted: None  Changed:   Proair from scheduled to PRN  Zyrtec from PRN to scheduled (pt takes 1-2 tabs daily per allergy season)  Nasacort from scheduled to PRN      Allergies reviewed with patient and updates made in EHR: yes    Medication History Completed By: Antonieta Cruz 1/18/2024 8:58 AM    PTA Med List   Medication Sig Last Dose    albuterol (PROAIR HFA/PROVENTIL HFA/VENTOLIN HFA) 108 (90 Base) MCG/ACT inhaler Inhale 2 puffs into the lungs every 6 hours as needed for shortness of breath Past Week    blood glucose (ACCU-CHEK GUIDE) test strip Use to test blood sugar 1 time daily or as directed.     blood glucose monitoring (ACCU-CHEK FASTCLIX) lancets Use to test blood sugar 1 time daily or as directed.     cetirizine (ZYRTEC) 10 MG tablet Take 10-20 mg by mouth daily (10 mg in the winter, 20 mg in allergy season) Past Week    cetirizine (ZYRTEC) 10 MG tablet Take 1 tablet (10 mg) by mouth daily as needed for allergies 1/15/2024 at am    clonazePAM (KLONOPIN) 0.5 MG tablet Take 1 tablet by mouth twice daily as needed for anxiety 1/16/2024 at 0730    diltiazem ER (TIAZAC) 240 MG 24 hr ER beaded capsule Take 1 capsule (240 mg) by mouth daily 1/16/2024 at 0600    diphenhydrAMINE HCl (BENADRYL ALLERGY CHILDRENS) 12.5 MG CHEW Take 2 tablets by mouth as needed (allergic reaction) Past Week    EPINEPHrine (ANY BX GENERIC EQUIV) 0.3 MG/0.3ML injection 2-pack Inject 0.3 mLs (0.3 mg) into the muscle as needed for anaphylaxis May repeat one time in 5-15 minutes if response  to initial dose is inadequate.     furosemide (LASIX) 20 MG tablet Take 1 tablet (20 mg) by mouth daily 1/15/2024 at am    Multiple Vitamins-Minerals (MULTIVITAMIN GUMMIES WOMENS) CHEW Take 1 Dose by mouth daily Past Month    spironolactone (ALDACTONE) 50 MG tablet Take 1 tablet by mouth once daily 1/15/2024 at am    triamcinolone (NASACORT) 55 MCG/ACT nasal aerosol Spray 2 sprays into both nostrils daily as needed (seasonal allergies) More than a month

## 2024-01-18 NOTE — PLAN OF CARE
Pt A&O, VSS. Lung sounds clear. Prevena wound vac in place with no drainage to cannister to upper chest. DINAH drains in place with bright red drainage. Pt has output of 10 mL this shift on left side 20 mL on right side. Pt. given PRN Charis 10mg (2x) for pain in chest/incision. Pt stand by assist. Continent of bladder, and voiding spontaneously this shift.     Face to face report given with opportunity to observe patient.    Report given to SELINA Knox RN   1/18/2024  7:18 PM

## 2024-01-18 NOTE — PROGRESS NOTES
INPATIENT ROUNDING NOTE  1/18/2024    Patient: Rj Rodríguez    Physician of Record: Ethan Molina MD    Admitting diagnosis: Malignant neoplasm of left breast (H) [C50.912]  Breast cancer (H) [C50.919]    Procedure(s):  Left sentinel node biopsy, bilateral mastectomy     POD: 2 Days Post-Op    Current Diet: Regular    CURRENT MEDICATIONS:  Continuous Medications:  Current Facility-Administered Medications   Medication Last Rate       Scheduled Medications:  Current Facility-Administered Medications   Medication Dose Route Frequency    acetaminophen  975 mg Oral Q8H    ceFAZolin  2 g Intravenous See Admin Instructions    diltiazem ER COATED BEADS  240 mg Oral Daily    fluticasone  2 spray Both Nostrils Daily    furosemide  20 mg Oral Daily    insulin aspart  0.5-2.5 Units Subcutaneous TID AC    insulin aspart  0.5-2.5 Units Subcutaneous At Bedtime    polyethylene glycol  17 g Oral Daily    senna-docusate  1 tablet Oral BID    sodium chloride (PF)  3 mL Intracatheter Q8H    spironolactone  50 mg Oral Daily       PRN Medications:  Current Facility-Administered Medications   Medication Dose Route Frequency    [START ON 1/19/2024] acetaminophen  650 mg Oral Q4H PRN    bisacodyl  10 mg Rectal Daily PRN    calcium carbonate  1,000 mg Oral TID PRN    cetirizine  10 mg Oral Daily PRN    clonazePAM  0.5 mg Oral BID PRN    glucose  15-30 g Oral Q15 Min PRN    Or    dextrose  25-50 mL Intravenous Q15 Min PRN    Or    glucagon  1 mg Subcutaneous Q15 Min PRN    diphenhydrAMINE  25 mg Intravenous Q6H PRN    HYDROmorphone  0.2 mg Intravenous Q2H PRN    Or    HYDROmorphone  0.4 mg Intravenous Q2H PRN    hydrOXYzine HCl  10 mg Oral Q6H PRN    lidocaine 4%   Topical Q1H PRN    lidocaine 4%   Topical Q1H PRN    lidocaine (buffered or not buffered)  0.1-1 mL Other Q1H PRN    lidocaine (buffered or not buffered)  0.1-1 mL Other Q1H PRN    magnesium hydroxide  30 mL Oral Daily PRN    naloxone  0.2 mg Intravenous Q2 Min PRN    Or     "naloxone  0.4 mg Intravenous Q2 Min PRN    Or    naloxone  0.2 mg Intramuscular Q2 Min PRN    Or    naloxone  0.4 mg Intramuscular Q2 Min PRN    ondansetron  4 mg Oral Q6H PRN    Or    ondansetron  4 mg Intravenous Q6H PRN    oxyCODONE  5 mg Oral Q4H PRN    Or    oxyCODONE  10 mg Oral Q4H PRN    prochlorperazine  5 mg Intravenous Q6H PRN    Or    prochlorperazine  5 mg Oral Q6H PRN    sodium chloride (PF)  3 mL Intracatheter q1 min prn    sodium chloride (PF)  3 mL Intracatheter q1 min prn       SUBJECTIVE:   Nausea: No. Vomiting: No. Fever: No. Chills: No. Excessive burping: No. Flatus: Yes. BM: Yes. Pain is 3/10. Pain control: good. Tolerating current diet: Yes.  She is very anxious.  She did have a slight reaction to some food she ate yesterday.  She would like to stay 1 more night.    PHYSICAL EXAM:   Vital signs: /85 (BP Location: Left leg, Cuff Size: Adult Regular)   Pulse 57   Temp 97.4  F (36.3  C) (Tympanic)   Resp 18   Ht 1.556 m (5' 1.25\")   Wt 77.1 kg (170 lb)   SpO2 96%   BMI 31.86 kg/m     Weight: [unfilled]   BMI: Body mass index is 31.86 kg/m .   General: Normal, healthy, cooperative, in no acute distress, alert   HEENT: PERRLA and EOMI   Neck: supple   Lungs: clear to auscultation   CV: Regular rate and rhythm without murmer   Abdominal: Abdomen soft, non-tender. BS normal. No masses, organomegaly   Wound: Closed wound. Clean, dry and intact. Healing well.   Extremities: No cyanosis, clubbing or edema noted bilaterally in Upper and Lower Extremities   Neurological: without deficit    INPUT/OUTPUT:      Intake/Output Summary (Last 24 hours) at 1/18/2024 1411  Last data filed at 1/18/2024 0252  Gross per 24 hour   Intake --   Output 420 ml   Net -420 ml       I/O last 3 completed shifts:  In: 555 [I.V.:555]  Out: 710 [Urine:650; Drains:60]    LABS:    Last CBC Rrsults:   Recent Labs   Lab Test 01/17/24  0547 01/08/24  1046 11/29/23  1308   WBC 15.0* 8.3 9.3   RBC 3.86 4.46 4.49   HGB 11.9 " 13.9 14.0   HCT 35.7 41.1 41.6   MCV 93 92 93   MCH 30.8 31.2 31.2   MCHC 33.3 33.8 33.7   RDW 12.9 13.1 13.2    261 267       Last Comprehensive Metabolic panel:  Recent Labs   Lab Test 01/18/24  1257 01/18/24  1046 01/18/24  0800 01/17/24  1235 01/17/24  0547 01/16/24  0822 01/08/24  1046 11/29/23  1308 08/07/23  1507 06/28/23  0738 07/11/22  1100 05/20/22  1332 04/18/22  1208   NA  --   --   --   --  137  --  140 139   < > 138   < > 137 139   POTASSIUM  --   --   --   --  4.8  --  4.1 3.7   < > 4.3   < > 3.6 3.5   CHLORIDE  --   --   --   --  105  --  105 105   < > 102   < > 107 107   CO2  --   --   --   --  25  --  25 24   < > 24   < > 25 29   ANIONGAP  --   --   --   --  7  --  10 10   < > 12   < > 5 3   * 194* 112*   < > 130*   < > 105* 106*   < > 111*   < > 101* 109*   BUN  --   --   --   --  15.9  --  11.9 12.5   < > 14.0   < > 11 17   CR  --   --   --   --  0.82  --  0.86 0.83   < > 0.98*   < > 0.75 0.72   GFRESTIMATED  --   --   --   --  76  --  72 75   < > 62   < > 86 90   EZRA  --   --   --   --  9.3  --  9.4 9.8   < > 9.9   < > 9.4 9.1   BILITOTAL  --   --   --   --   --   --   --   --   --  0.9  --  0.7 1.2   ALKPHOS  --   --   --   --   --   --   --   --   --  91  --  71 77   ALT  --   --   --   --   --   --   --   --   --  22  --  29 76*   AST  --   --   --   --   --   --   --   --   --  24  --  20 21    < > = values in this interval not displayed.       Recent Labs   Lab Test 01/16/24 2055 06/28/23  0738 05/08/23  1013 05/20/22  1332 04/18/22  1208 01/10/22  0946   MAG 1.9  --  2.1  --   --  2.2   ALBUMIN  --  4.1  --  3.6 3.4  --        ASSESSMENT:    2 Days Post-Op from Procedure(s):  Left sentinel node biopsy, bilateral mastectomy.     Overall doing well.    PLAN: Will continue to monitor to make sure she has no other allergic reactions.  Will plan on sending her home tomorrow.

## 2024-01-18 NOTE — UTILIZATION REVIEW
"  Admission Status; Secondary Review Determination         Under the authority of the Utilization Management Committee, the utilization review process indicated a secondary review on the above patient.  The review outcome is based on review of the medical records, discussions with staff, and applying clinical experience noted on the date of the review.       (x) Observation Status Appropriate - This patient does not meet hospital inpatient criteria and is placed in observation status. If this patient's primary payer is Medicare and was admitted as an inpatient, Condition Code 44 should be used and patient status changed to \"observation\".     RATIONALE FOR DETERMINATION   Patient is a 73-year-old female admitted on 1/16/2024.  Patient has a diagnosis of left breast cancer and had a surgical procedure of sentinel node x 1 sent to pathology and bilateral complete mastectomy.  Patient has had a unremarkable and uncomplicated postop course.  Blood pressures have ranged slightly high and over the last 24 hours are in the 150s over 70s, averaging.  Physical therapy and Occupational Therapy have seen the patient and are recommending home cares and follow-up will be with surgery.  Social work confirms that the patient is able to return to prior living arrangement.  Patient also has diabetes mellitus and blood sugars are stable with current management.  Recommendation is to switch to observation status as there is a mention in the surgical providers notes of an allergic reaction and needs to be observed for potential recurrence.  If the patient does not discharge tomorrow and there is no other medical reason for remaining in the hospital, would recommend correction observation status at that time.  Dr. Ethan Molina will be sent this recommendation to switch to observation status via text on his cell phone.- left VM      The severity of illness, intensity of service provided, expected LOS and risk for adverse outcome make the " Result reviewed care complex, high risk and appropriate for hospital admission.        The information on this document is developed by the utilization review team in order for the business office to ensure compliance.  This only denotes the appropriateness of proper admission status and does not reflect the quality of care rendered.         The definitions of Inpatient Status and Observation Status used in making the determination above are those provided in the CMS Coverage Manual, Chapter 1 and Chapter 6, section 70.4.      Sincerely,     Dimitri Gama MD  Physician Advisor  Utilization Review/ Case Management  [facility].

## 2024-01-19 ENCOUNTER — APPOINTMENT (OUTPATIENT)
Dept: PHYSICAL THERAPY | Facility: HOSPITAL | Age: 74
End: 2024-01-19
Attending: SURGERY
Payer: MEDICARE

## 2024-01-19 ENCOUNTER — APPOINTMENT (OUTPATIENT)
Dept: OCCUPATIONAL THERAPY | Facility: HOSPITAL | Age: 74
End: 2024-01-19
Attending: SURGERY
Payer: MEDICARE

## 2024-01-19 VITALS
BODY MASS INDEX: 32.1 KG/M2 | SYSTOLIC BLOOD PRESSURE: 155 MMHG | RESPIRATION RATE: 18 BRPM | HEART RATE: 63 BPM | DIASTOLIC BLOOD PRESSURE: 78 MMHG | WEIGHT: 170 LBS | OXYGEN SATURATION: 90 % | HEIGHT: 61 IN | TEMPERATURE: 97.5 F

## 2024-01-19 LAB
GLUCOSE BLDC GLUCOMTR-MCNC: 112 MG/DL (ref 70–99)
GLUCOSE BLDC GLUCOMTR-MCNC: 96 MG/DL (ref 70–99)

## 2024-01-19 PROCEDURE — G0378 HOSPITAL OBSERVATION PER HR: HCPCS

## 2024-01-19 PROCEDURE — 250N000013 HC RX MED GY IP 250 OP 250 PS 637: Performed by: SURGERY

## 2024-01-19 PROCEDURE — 82962 GLUCOSE BLOOD TEST: CPT

## 2024-01-19 PROCEDURE — 97530 THERAPEUTIC ACTIVITIES: CPT | Mod: GO

## 2024-01-19 PROCEDURE — 250N000013 HC RX MED GY IP 250 OP 250 PS 637: Performed by: INTERNAL MEDICINE

## 2024-01-19 PROCEDURE — 999N000157 HC STATISTIC RCP TIME EA 10 MIN

## 2024-01-19 PROCEDURE — 97530 THERAPEUTIC ACTIVITIES: CPT | Mod: GP

## 2024-01-19 RX ORDER — HYDROCODONE BITARTRATE AND ACETAMINOPHEN 5; 325 MG/1; MG/1
1 TABLET ORAL EVERY 6 HOURS PRN
Qty: 18 TABLET | Refills: 0 | Status: SHIPPED | OUTPATIENT
Start: 2024-01-19 | End: 2024-01-22

## 2024-01-19 RX ADMIN — OXYCODONE HYDROCHLORIDE 10 MG: 5 TABLET ORAL at 14:10

## 2024-01-19 RX ADMIN — FUROSEMIDE 20 MG: 20 TABLET ORAL at 08:37

## 2024-01-19 RX ADMIN — SENNOSIDES AND DOCUSATE SODIUM 1 TABLET: 8.6; 5 TABLET ORAL at 08:37

## 2024-01-19 RX ADMIN — FLUTICASONE PROPIONATE 2 SPRAY: 50 SPRAY, METERED NASAL at 08:38

## 2024-01-19 RX ADMIN — ACETAMINOPHEN 975 MG: 325 TABLET, FILM COATED ORAL at 10:48

## 2024-01-19 RX ADMIN — OXYCODONE HYDROCHLORIDE 10 MG: 5 TABLET ORAL at 08:37

## 2024-01-19 RX ADMIN — DILTIAZEM HYDROCHLORIDE 240 MG: 240 CAPSULE, EXTENDED RELEASE ORAL at 08:37

## 2024-01-19 RX ADMIN — POLYETHYLENE GLYCOL 3350 17 G: 17 POWDER, FOR SOLUTION ORAL at 08:38

## 2024-01-19 RX ADMIN — ACETAMINOPHEN 975 MG: 325 TABLET, FILM COATED ORAL at 02:23

## 2024-01-19 RX ADMIN — SPIRONOLACTONE 50 MG: 25 TABLET ORAL at 08:37

## 2024-01-19 ASSESSMENT — ACTIVITIES OF DAILY LIVING (ADL)
ADLS_ACUITY_SCORE: 25

## 2024-01-19 NOTE — DISCHARGE SUMMARY
INPATIENT DISCHARGE SUMMARY  1/19/2024    Patient'S Name: Rj Rodríguez    Admitting Physician of Record: Ethan Molina MD    Discharging Physician: Ethan Molina MD    Date of admission: 1/16/2024     Date of discharge: 1/19/2024    Admitting diagnosis: Malignant neoplasm of left breast (H) [C50.912]  Breast cancer (H) [C50.919]    Discharge diagnosis: Same    Procedures: Procedure(s):  Left sentinel node biopsy, bilateral mastectomy    Consultants: None and Medicine    Hospital course: The patient was admitted to the hospital and taken to the operating room and underwent an Procedure(s):  Left sentinel node biopsy, bilateral mastectomy.  The patient tolerated the procedure(s) well and was transferred to the [facility].  Her postoperative course has been completely unremarkable.  At the time of discharge she is eating a Regular diet, is having good pain control on oral medications, and is passing gas and is having bowel movements.  The patient will be discharged home in good condition.    Discharge instructions include:    Patient will be discharged to Home   Diet:   Active Diet and Nourishment Order   Procedures    Regular Diet Adult    Diet      Activity : No lifting elbow above the level of the shoulder.   Follow-up:     The patient will follow up with her primary care provider in 1 weeks.      The patient will follow up with me in 1 weeks.   Medications include:    All prior medications    Oxycodone (Norco) for pain control.

## 2024-01-19 NOTE — PROGRESS NOTES
01/19/24 1400   Appointment Info   Signing Clinician's Name / Credentials (PT) Ubaldo Mckay DPT   Rehab Comments (PT) Pt was resting in bed with  visiting at bedside.  Pt was very agreeable to treatment and stated she is supposed to D/C home later today.   Interventions   Interventions Quick Adds Therapeutic Activity   Therapeutic Activity   Therapeutic Activities: dynamic activities to improve functional performance Minutes (20905) 20   Symptoms Noted During/After Treatment Fatigue  (Mild)   Treatment Detail/Skilled Intervention Pt moved sit<->sup (HOB mod elevated) each Tameka.  Pt moved sit<->stand = SBA from bed and from transport chair.  Pt amb approx 100 ft from room down to small rehab gym CGA (no device) initially and lightly touching wall on occasion, but gradually improved and over last 50 ft pt was SBA, but still with slow overall pace and mildly wide REED, but no LOB.  Pt ascended/descended 4 steps SBA with bilat railings using a step-to pattern throughout.  Upon returning to her room she transferred transport chair<->bed SBA.  Ended visit with pt in bed, call button in easy reach, bed alarm on and brought new ice for pt for her 7/10 post surgical pain.   PT Discharge Planning   PT Plan Progress functional mobility as tolerated. Evaluate stairs prior to D/C.   PT Discharge Recommendation (DC Rec) home with assist;home with outpatient physical therapy   PT Rationale for DC Rec Pt appears will be safe to return home with limited assist from her  as needed.  Would benefit from outpatient PT to help restore mobility to baseline level.   PT Brief overview of current status Amb x 100 - 150 ft with CGA intiailly the improves to SBA; transfers =SBA; stairs x 4 = SBA with bilat railings.   PT Equipment Needed at Discharge   (None)   Total Session Time   Timed Code Treatment Minutes 20   Total Session Time (sum of timed and untimed services) 20   Psychosocial Support   Trust Relationship/Rapport  empathic listening provided;questions answered;questions encouraged;reassurance provided;emotional support provided;care explained;choices provided

## 2024-01-19 NOTE — PLAN OF CARE
"Reason for admission: Breast Cancer  Pt is Aox4.  Pt remained in bed this shit . Makes needs known, Call light within reach, wheels locked, ID band on. Pt reports pain at 5/10 and reports this is her goal pain level and declined pain medication. IV SL  At beginning of shift pt appeared very anxious and was tearful PRN hydroxyzine and talking was effective this shift  DINAH drain output as charted, DINAH drain sites intact with old dry drainage, pt watched and partially assisted with DINAH drain emptying  Wound Vac intact, seal maintained on wound vac    /64 (BP Location: Left leg, Patient Position: Semi-Medina's, Cuff Size: Adult Regular)   Pulse 58   Temp 96.9  F (36.1  C) (Tympanic)   Resp 18   Ht 1.556 m (5' 1.25\")   Wt 77.1 kg (170 lb)   SpO2 94%   BMI 31.86 kg/m      Face to face report given with opportunity to observe patient.    Report given to Erika Myers RN on 1/19/2024 at 7:00 AM          "

## 2024-01-19 NOTE — PLAN OF CARE
Goal Outcome Evaluation:    Pt A&O, VSS. Lung sounds clear. Prevena wound vac in place with no drainage to cannister to upper chest. DINAH drains in place with bright red drainage. Pt has output of 20 mL this shift on left side and 20 mL on right side. Pt. given PRN Charis 10mg (2x) for pain in chest/incision this shift. Pt stand by assist. Continent of bladder, and voiding spontaneously this shift.     Patient discharged at 3:27 PM via wheel chair accompanied by spouse, daughter, and staff. Prescriptions sent to patients preferred pharmacy. All belongings sent with patient.     Discharge instructions reviewed with Patient. Listed belongings gathered and returned to patient. yes    Patient discharged to Home.     Surgical Patient   Surgical Procedures during stay: Yes  Did patient receive discharge instruction on wound care and recognition of infection symptoms? Yes    MISC  Follow up appointment made:  Yes  Home medications returned to patient: N/A  Patient reports pain was well managed at discharge: Yes

## 2024-01-19 NOTE — PROGRESS NOTES
INPATIENT ROUNDING NOTE  1/19/2024    Patient: Rj Rodríguez    Physician of Record: Ethan Molina MD    Admitting diagnosis: Malignant neoplasm of left breast (H) [C50.912]  Breast cancer (H) [C50.919]    Procedure(s):  Left sentinel node biopsy, bilateral mastectomy     POD: 3 Days Post-Op    Current Diet: Regular    CURRENT MEDICATIONS:  Continuous Medications:  Current Facility-Administered Medications   Medication Last Rate       Scheduled Medications:  Current Facility-Administered Medications   Medication Dose Route Frequency    acetaminophen  975 mg Oral Q8H    ceFAZolin  2 g Intravenous See Admin Instructions    diltiazem ER COATED BEADS  240 mg Oral Daily    fluticasone  2 spray Both Nostrils Daily    furosemide  20 mg Oral Daily    insulin aspart  1-3 Units Subcutaneous TID AC    insulin aspart  1-3 Units Subcutaneous At Bedtime    polyethylene glycol  17 g Oral Daily    senna-docusate  1 tablet Oral BID    sodium chloride (PF)  3 mL Intracatheter Q8H    spironolactone  50 mg Oral Daily       PRN Medications:  Current Facility-Administered Medications   Medication Dose Route Frequency    acetaminophen  650 mg Oral Q4H PRN    bisacodyl  10 mg Rectal Daily PRN    calcium carbonate  1,000 mg Oral TID PRN    cetirizine  10 mg Oral Daily PRN    clonazePAM  0.5 mg Oral BID PRN    glucose  15-30 g Oral Q15 Min PRN    Or    dextrose  25-50 mL Intravenous Q15 Min PRN    Or    glucagon  1 mg Subcutaneous Q15 Min PRN    diphenhydrAMINE  25 mg Intravenous Q6H PRN    HYDROmorphone  0.2 mg Intravenous Q2H PRN    Or    HYDROmorphone  0.4 mg Intravenous Q2H PRN    hydrOXYzine HCl  10 mg Oral Q6H PRN    lidocaine 4%   Topical Q1H PRN    lidocaine 4%   Topical Q1H PRN    lidocaine (buffered or not buffered)  0.1-1 mL Other Q1H PRN    lidocaine (buffered or not buffered)  0.1-1 mL Other Q1H PRN    magnesium hydroxide  30 mL Oral Daily PRN    naloxone  0.2 mg Intravenous Q2 Min PRN    Or    naloxone  0.4 mg Intravenous Q2  "Min PRN    Or    naloxone  0.2 mg Intramuscular Q2 Min PRN    Or    naloxone  0.4 mg Intramuscular Q2 Min PRN    ondansetron  4 mg Oral Q6H PRN    Or    ondansetron  4 mg Intravenous Q6H PRN    oxyCODONE  5 mg Oral Q4H PRN    Or    oxyCODONE  10 mg Oral Q4H PRN    prochlorperazine  5 mg Intravenous Q6H PRN    Or    prochlorperazine  5 mg Oral Q6H PRN    sodium chloride (PF)  3 mL Intracatheter q1 min prn    sodium chloride (PF)  3 mL Intracatheter q1 min prn       SUBJECTIVE:   Doing well    PHYSICAL EXAM:   Vital signs: /64 (BP Location: Left leg, Patient Position: Semi-Medina's, Cuff Size: Adult Regular)   Pulse 58   Temp 96.9  F (36.1  C) (Tympanic)   Resp 18   Ht 1.556 m (5' 1.25\")   Wt 77.1 kg (170 lb)   SpO2 94%   BMI 31.86 kg/m     Weight: [unfilled]   BMI: Body mass index is 31.86 kg/m .   General: Normal, healthy, cooperative, in no acute distress, alert   HEENT: PERRLA and EOMI   Neck: supple   Lungs: clear to auscultation   CV: Regular rate and rhythm without murmer   Abdominal: Abdomen soft, non-tender. BS normal. No masses, organomegaly   Wound: Closed wound. Clean, dry and intact. Healing well.   Extremities: No cyanosis, clubbing or edema noted bilaterally in Upper and Lower Extremities   Neurological: without deficit    INPUT/OUTPUT:      Intake/Output Summary (Last 24 hours) at 1/19/2024 0954  Last data filed at 1/19/2024 0833  Gross per 24 hour   Intake 480 ml   Output 50 ml   Net 430 ml       I/O last 3 completed shifts:  In: 240 [P.O.:240]  Out: 50 [Drains:50]    LABS:    Last CBC Rrsults:   Recent Labs   Lab Test 01/17/24  0547 01/08/24  1046 11/29/23  1308   WBC 15.0* 8.3 9.3   RBC 3.86 4.46 4.49   HGB 11.9 13.9 14.0   HCT 35.7 41.1 41.6   MCV 93 92 93   MCH 30.8 31.2 31.2   MCHC 33.3 33.8 33.7   RDW 12.9 13.1 13.2    261 267       Last Comprehensive Metabolic panel:  Recent Labs   Lab Test 01/19/24  0733 01/18/24  2110 01/18/24  1714 01/17/24  1235 01/17/24  0547 " 01/16/24 0822 01/08/24  1046 11/29/23  1308 08/07/23  1507 06/28/23  0738 07/11/22  1100 05/20/22  1332 04/18/22  1208   NA  --   --   --   --  137  --  140 139   < > 138   < > 137 139   POTASSIUM  --   --   --   --  4.8  --  4.1 3.7   < > 4.3   < > 3.6 3.5   CHLORIDE  --   --   --   --  105  --  105 105   < > 102   < > 107 107   CO2  --   --   --   --  25  --  25 24   < > 24   < > 25 29   ANIONGAP  --   --   --   --  7  --  10 10   < > 12   < > 5 3   GLC 96 108* 180*   < > 130*   < > 105* 106*   < > 111*   < > 101* 109*   BUN  --   --   --   --  15.9  --  11.9 12.5   < > 14.0   < > 11 17   CR  --   --   --   --  0.82  --  0.86 0.83   < > 0.98*   < > 0.75 0.72   GFRESTIMATED  --   --   --   --  76  --  72 75   < > 62   < > 86 90   EZRA  --   --   --   --  9.3  --  9.4 9.8   < > 9.9   < > 9.4 9.1   BILITOTAL  --   --   --   --   --   --   --   --   --  0.9  --  0.7 1.2   ALKPHOS  --   --   --   --   --   --   --   --   --  91  --  71 77   ALT  --   --   --   --   --   --   --   --   --  22  --  29 76*   AST  --   --   --   --   --   --   --   --   --  24  --  20 21    < > = values in this interval not displayed.       Recent Labs   Lab Test 01/16/24 2055 06/28/23 0738 05/08/23  1013 05/20/22  1332 04/18/22  1208 01/10/22  0946   MAG 1.9  --  2.1  --   --  2.2   ALBUMIN  --  4.1  --  3.6 3.4  --        ASSESSMENT:    3 Days Post-Op from Procedure(s):  Left sentinel node biopsy, bilateral mastectomy.    All doing well.    PLAN: Will discharged home.  Will follow-up next week.

## 2024-01-19 NOTE — PROGRESS NOTES
01/19/24 1100   Appointment Info   Signing Clinician's Name / Credentials (OT) Ramya Lara OTR/L   Rehab Comments (OT) Patient's  was also present during session today.   Interventions   Interventions Quick Adds Therapeutic Activity   Therapeutic Activities   Therapeutic Activity Minutes (32728) 15   Treatment Detail/Skilled Intervention Patient supine in bed at time of OT arrival. Patient was able to ambulate ~15 feet to bathroom. She was able to complete clothing management and toileting hygiene with SBA. While standing at sink, patient completed grooming tasks including washing her face and brushing her teeth. She was able to stand for ~5 minutes with supervision during grooming tasks. Patient ambulated ~15 feet back to bed with CGA. Patient reported feeling much more steady when ambulating today. Patient seated in bed with call light within reach.   OT Discharge Planning   OT Plan Progress ADLs, strength, and activity tolerance as able.   OT Discharge Recommendation (DC Rec) home with assist   OT Rationale for DC Rec Patient was previously living alone and was independent with ADLs. Patient was able to ambulate with CGA and was able to complete toileting tasks and grooming tasks while standing with SBA. Recommend home with assistance.   OT Brief overview of current status SBA for transfers; CGA for ambulation; SBA for toileting tasks; grooming tasks while standing with supervision   Total Session Time   Timed Code Treatment Minutes 15   Total Session Time (sum of timed and untimed services) 15   Psychosocial Support   Trust Relationship/Rapport emotional support provided;empathic listening provided;choices provided;thoughts/feelings acknowledged

## 2024-01-22 LAB
PATH REPORT.COMMENTS IMP SPEC: ABNORMAL
PATH REPORT.COMMENTS IMP SPEC: YES
PATH REPORT.FINAL DX SPEC: ABNORMAL
PATH REPORT.GROSS SPEC: ABNORMAL
PATH REPORT.MICROSCOPIC SPEC OTHER STN: ABNORMAL
PATH REPORT.RELEVANT HX SPEC: ABNORMAL
PATHOLOGY SYNOPTIC REPORT: ABNORMAL
PHOTO IMAGE: ABNORMAL

## 2024-01-23 ENCOUNTER — OFFICE VISIT (OUTPATIENT)
Dept: SURGERY | Facility: OTHER | Age: 74
End: 2024-01-23
Attending: NURSE PRACTITIONER
Payer: MEDICARE

## 2024-01-23 VITALS
RESPIRATION RATE: 18 BRPM | HEART RATE: 66 BPM | TEMPERATURE: 98.3 F | OXYGEN SATURATION: 97 % | DIASTOLIC BLOOD PRESSURE: 85 MMHG | SYSTOLIC BLOOD PRESSURE: 163 MMHG

## 2024-01-23 DIAGNOSIS — C50.912 MALIGNANT NEOPLASM OF LEFT BREAST IN FEMALE, ESTROGEN RECEPTOR POSITIVE, UNSPECIFIED SITE OF BREAST (H): Primary | ICD-10-CM

## 2024-01-23 DIAGNOSIS — Z17.0 MALIGNANT NEOPLASM OF LEFT BREAST IN FEMALE, ESTROGEN RECEPTOR POSITIVE, UNSPECIFIED SITE OF BREAST (H): Primary | ICD-10-CM

## 2024-01-23 PROCEDURE — 99024 POSTOP FOLLOW-UP VISIT: CPT | Performed by: NURSE PRACTITIONER

## 2024-01-23 ASSESSMENT — PAIN SCALES - GENERAL: PAINLEVEL: MODERATE PAIN (5)

## 2024-01-23 NOTE — PROGRESS NOTES
CLINIC NOTE - POST-OP SURGERY  1/23/2024    Patient:Rj Rodríguez    Procedure: Left sentinel node biopsy and bilateral complete mastectomy     This is a 73 year old female who is 1 weeks s/p Left sentinel node biopsy and bilateral complete mastectomy.  The patient has no complaints today.  She does have less than 30 ml of drainage out of her DINAH drains in 24 hours for last 3 days. She has a prevena wound vac on which is working without problems.    Current Medications:  Current Outpatient Medications   Medication Sig Dispense Refill    albuterol (PROAIR HFA/PROVENTIL HFA/VENTOLIN HFA) 108 (90 Base) MCG/ACT inhaler Inhale 2 puffs into the lungs every 6 hours as needed for shortness of breath      blood glucose (ACCU-CHEK GUIDE) test strip Use to test blood sugar 1 time daily or as directed. 50 each 11    blood glucose monitoring (ACCU-CHEK FASTCLIX) lancets Use to test blood sugar 1 time daily or as directed. 102 each 11    cetirizine (ZYRTEC) 10 MG tablet Take 10-20 mg by mouth daily (10 mg in the winter, 20 mg in allergy season)      clonazePAM (KLONOPIN) 0.5 MG tablet Take 1 tablet by mouth twice daily as needed for anxiety 30 tablet 0    diltiazem ER (TIAZAC) 240 MG 24 hr ER beaded capsule Take 1 capsule (240 mg) by mouth daily 90 capsule 3    diphenhydrAMINE HCl (BENADRYL ALLERGY CHILDRENS) 12.5 MG CHEW Take 2 tablets by mouth as needed (allergic reaction)      EPINEPHrine (ANY BX GENERIC EQUIV) 0.3 MG/0.3ML injection 2-pack Inject 0.3 mLs (0.3 mg) into the muscle as needed for anaphylaxis May repeat one time in 5-15 minutes if response to initial dose is inadequate. 2 each 1    furosemide (LASIX) 20 MG tablet Take 1 tablet (20 mg) by mouth daily 90 tablet 3    Multiple Vitamins-Minerals (MULTIVITAMIN GUMMIES WOMENS) CHEW Take 1 Dose by mouth daily      spironolactone (ALDACTONE) 50 MG tablet Take 1 tablet by mouth once daily 90 tablet 2    triamcinolone (NASACORT) 55 MCG/ACT nasal aerosol Spray 2 sprays into  both nostrils daily as needed (seasonal allergies)         Allergies:  Allergies   Allergen Reactions    Dust Mite Extract Difficulty breathing    Fruit [Peach] Anaphylaxis and Hives     fresh peaches and any fruit that has a pit.  Kiwi's and apples also.  Can eat any fruit cooked.    Latex Other (See Comments), Swelling, Difficulty breathing and Rash     Throat Closes      Nuts Anaphylaxis and Hives     All Raw Nuts, can eat nuts when roasted.    Other Food Allergy Other (See Comments) and Difficulty breathing     Mackey powder    Pollen Extract Difficulty breathing     All fruits and vegetables need to be washed and rinsed prior to eating.     Ace Inhibitors      Upper respiratory infection    Antihistamines, Chlorpheniramine-Type Hives     Antihistamines    Benicar [Olmesartan] Cough    Effexor [Venlafaxine] Other (See Comments)     Heartburn, reflux    Erythromycin      BASE; pt. Not sure if this is a true allergy.    Hydrochlorothiazide     Phenylephrine Hcl      Guaifed    Seasonal Allergies      hayfever    Sm Guaifenesin-Pseudoephedrine [Pseudoephedrine-Guaifenesin]      Guaifed    Statins     Trees     Ultram [Tramadol]      Sleep disturbance. Can not sleep, and when able to fall asleep will have terrible nightmares    Xanax [Alprazolam] Hives     Xanax    Zoloft [Sertraline]      paranoia       PHYSICAL EXAM:   Vital signs: BP (!) 163/85 (BP Location: Left leg, Patient Position: Sitting, Cuff Size: Adult Large)   Pulse 66   Temp 98.3  F (36.8  C) (Tympanic)   Resp 18   SpO2 97%    BMI: There is no height or weight on file to calculate BMI.   General: Normal, healthy, cooperative, in no acute distress, alert   Lungs: respirations are non-labored   Abdominal: non-distended   Wounds:  Prevena wound vac intact.  DINAH drains X 2 intact with serosanguineous drainage noted.     PATHOLOGY:  Case Report   Date Value Ref Range Status   01/16/2024   Final    Surgical Pathology Report                         Case:  [id number]                                  Authorizing Provider:  Ethan Molina MD  Collected:           01/16/2024 02:16 PM          Ordering Location:     [facility] Operating Room     Received:            01/16/2024 03:45 PM          Pathologist:           Steve Fletcher DO                                                         Specimens:   A) - Lymph Node(s), [place], Left [place] Node                                                    B) - Breast, Left, Left breast, short stitch superior long stitch lateral                           C) - Breast, Right, right breast, short stitch superior, long stitch lateral                Final Diagnosis   Date Value Ref Range Status   01/16/2024   Final    A.  [place] lymph node, left axilla, excisional biopsy:  -Lymph node with partial fat replacement and small scattered foci of hemosiderin deposition.  -Negative for metastatic tumor (0/1).  -See synoptic report below.    B.  Breast, left, simple mastectomy:  -Biopsy site with residual invasive ductal carcinoma, grade 1.  -Focal lobular carcinoma in situ in close proximity to the invasive carcinoma.  -Atrophic changes, few foci of the usual ductal hyperplasia, few small duct intraductal papillomas, focal apocrine metaplasia,     focal stromal fibrosis and few microcalcifications within invasive tumor and benign ducts.  -See synoptic report below.    C.  Breast, right, simple mastectomy:  -Atrophic changes, focal stromal fibrosis, rare small foci of usual ductal hyperplasia, focal apocrine metaplasia, and occasional    microcalcifications within benign ducts.          ASSESSMENT:    73 year old female who is 1 weeks s/p Left sentinel node biopsy and bilateral complete mastectomy.  Doing well.     PLAN:   DINAH Drains were removed without problems.  Pathology report was discussed with the patient and a referral oncology was made for the patient.    Follow-up 1 week. Sooner with problems/concerns.

## 2024-01-25 ENCOUNTER — PATIENT OUTREACH (OUTPATIENT)
Dept: SURGERY | Facility: OTHER | Age: 74
End: 2024-01-25

## 2024-01-25 NOTE — PROGRESS NOTES
"Clinic Care Coordination Contact  [facility]: Post-Discharge Note  SITUATION                                                      Admission:    Admission Date: 01/16/24   Reason for Admission: Malignanat neoplasm of left breast  Discharge:   Discharge Date: 01/19/24  Discharge Diagnosis: Malignant neoplasm of left breast    BACKGROUND                                                      Per hospital discharge summary and inpatient provider notes:    The patient was admitted to the hospital and taken to the operating room and underwent an Procedure(s):  Left sentinel node biopsy, bilateral mastectomy.  The patient tolerated the procedure(s) well and was transferred to the [facility].  Her postoperative course has been completely unremarkable.  At the time of discharge she is eating a Regular diet, is having good pain control on oral medications, and is passing gas and is having bowel movements.  The patient will be discharged home in good condition.    ASSESSMENT           Discharge Assessment  How are you doing now that you are home?: \"I'm doing better day by day. It has been very overwhelming but I've had great support by my family and friends. My daughter bought a mastectomy pillow for me and I've been using that. Little things like getting dressed have been hard. Tylenol has seemed to help better than the Norco, I don't like how the Norco makes me feel.\"  is present for appt. Patient has been having a hard time with the DINAH drains, I ensured that she knew how to work with them but she stated it was more so just them being an inconvenience to carry around. She has them pinned to her clothes at the appt which I told her is the best way to carry them. They are unconfortable for her but were actually removed during the clinic appt.  How are your symptoms? (Red Flag symptoms escalate to triage hotline per guidelines): Improved  Do you feel your condition is stable enough to be safe at home until your provider " visit?: Yes  Does the patient have their discharge instructions? : Yes  Does the patient have questions regarding their discharge instructions? : No  Were you started on any new medications or were there changes to any of your previous medications? : Yes  Does the patient have all of their medications?: Yes  Do you have questions regarding any of your medications? : No  Do you have all of your needed medical supplies or equipment (DME)?  (i.e. oxygen tank, CPAP, cane, etc.): Yes  Discharge follow-up appointment scheduled within 14 calendar days? : Yes  Discharge Follow Up Appointment Date: 01/23/24 (RN CC spoke with patient prior to Lelia coming in to complete the post discharge questions on this day 01/23)  Discharge Follow Up Appointment Scheduled with?: Specialty Care Provider    Post-op ([facility] Only)  If the patient had a surgery or procedure, do they have any questions for a nurse?: Yes (see comment) (See above description)    Post-op (Clinicians Only)  Did the patient have surgery or a procedure: Yes  Incision:  (wound vac in place)  Drainage: Yes  Fever: No  Chills: No  Redness: No  Warmth: No  Swelling: No  Incision site pain: Yes  Eating & Drinking: eating and drinking without complaints/concerns  PO Intake: clear liquids;regular diet  Additional Symptoms: nausea;decreased appetite  Bowel Function: normal  Date of last BM: 01/23/24  Urinary Status: voiding without complaint/concerns    Surgery              General Surgery & Colorectal Assessment                                  PLAN                                                      Outpatient Plan:      Discharge instructions include:               Patient will be discharged to Home              Diet:       Active Diet and Nourishment Order   Procedures    Regular Diet Adult    Diet                  Activity : No lifting elbow above the level of the shoulder.              Follow-up:                           The patient will follow up with her primary  care provider in 1 weeks.                          The patient will follow up with me in 1 weeks.              Medications include:                          All prior medications                          Oxycodone (Norco) for pain control.    Future Appointments   Date Time Provider Department Center   1/30/2024  1:00 PM Avelina Kwok MD [facility]   1/30/2024  3:00 PM Lelia Lemus, NP [facility]   2/13/2024  8:00 AM Sheyla Sanches, NP [facility]   12/13/2024 12:30 PM Lizeth Crisostomo CNP [facility]         For any urgent concerns, please contact our 24 hour nurse triage line: [phone] ([phone])         Haydee Allen RN

## 2024-01-30 ENCOUNTER — ONCOLOGY VISIT (OUTPATIENT)
Dept: ONCOLOGY | Facility: OTHER | Age: 74
End: 2024-01-30
Attending: INTERNAL MEDICINE
Payer: MEDICARE

## 2024-01-30 ENCOUNTER — OFFICE VISIT (OUTPATIENT)
Dept: SURGERY | Facility: OTHER | Age: 74
End: 2024-01-30
Attending: INTERNAL MEDICINE
Payer: COMMERCIAL

## 2024-01-30 ENCOUNTER — CARE COORDINATION (OUTPATIENT)
Dept: ONCOLOGY | Facility: OTHER | Age: 74
End: 2024-01-30

## 2024-01-30 VITALS
BODY MASS INDEX: 33.28 KG/M2 | HEART RATE: 68 BPM | WEIGHT: 169.53 LBS | OXYGEN SATURATION: 97 % | HEIGHT: 60 IN | TEMPERATURE: 98.2 F

## 2024-01-30 VITALS
SYSTOLIC BLOOD PRESSURE: 128 MMHG | OXYGEN SATURATION: 97 % | HEART RATE: 65 BPM | WEIGHT: 169 LBS | HEIGHT: 60 IN | DIASTOLIC BLOOD PRESSURE: 62 MMHG | TEMPERATURE: 97.8 F | BODY MASS INDEX: 33.18 KG/M2

## 2024-01-30 DIAGNOSIS — C50.912 MALIGNANT NEOPLASM OF LEFT BREAST IN FEMALE, ESTROGEN RECEPTOR POSITIVE, UNSPECIFIED SITE OF BREAST (H): ICD-10-CM

## 2024-01-30 DIAGNOSIS — Z17.0 MALIGNANT NEOPLASM OF LEFT BREAST IN FEMALE, ESTROGEN RECEPTOR POSITIVE, UNSPECIFIED SITE OF BREAST (H): ICD-10-CM

## 2024-01-30 DIAGNOSIS — Z90.13 STATUS POST MASTECTOMY, BILATERAL: Primary | ICD-10-CM

## 2024-01-30 PROCEDURE — G0463 HOSPITAL OUTPT CLINIC VISIT: HCPCS

## 2024-01-30 PROCEDURE — 99024 POSTOP FOLLOW-UP VISIT: CPT | Performed by: NURSE PRACTITIONER

## 2024-01-30 PROCEDURE — 99205 OFFICE O/P NEW HI 60 MIN: CPT | Mod: 24 | Performed by: INTERNAL MEDICINE

## 2024-01-30 ASSESSMENT — PAIN SCALES - GENERAL
PAINLEVEL: NO PAIN (0)
PAINLEVEL: NO PAIN (0)

## 2024-01-30 ASSESSMENT — PATIENT HEALTH QUESTIONNAIRE - PHQ9: SUM OF ALL RESPONSES TO PHQ QUESTIONS 1-9: 12

## 2024-01-30 NOTE — PROGRESS NOTES
CLINIC NOTE - POST-OP SURGERY  1/30/2024    Patient:Rj Rodríguez    Procedure: Left sentinel node biopsy and bilateral complete mastectomy     This is a 73 year old female who is 2 weeks s/p Left sentinel node biopsy and bilateral complete mastectomy.  The patient has no complaints today.  She was seen by oncology today and states that was a good appointment.    Current Medications:  Current Outpatient Medications   Medication Sig Dispense Refill    albuterol (PROAIR HFA/PROVENTIL HFA/VENTOLIN HFA) 108 (90 Base) MCG/ACT inhaler Inhale 2 puffs into the lungs every 6 hours as needed for shortness of breath      blood glucose (ACCU-CHEK GUIDE) test strip Use to test blood sugar 1 time daily or as directed. 50 each 11    blood glucose monitoring (ACCU-CHEK FASTCLIX) lancets Use to test blood sugar 1 time daily or as directed. 102 each 11    cetirizine (ZYRTEC) 10 MG tablet Take 10-20 mg by mouth daily (10 mg in the winter, 20 mg in allergy season)      clonazePAM (KLONOPIN) 0.5 MG tablet Take 1 tablet by mouth twice daily as needed for anxiety 30 tablet 0    diltiazem ER (TIAZAC) 240 MG 24 hr ER beaded capsule Take 1 capsule (240 mg) by mouth daily 90 capsule 3    diphenhydrAMINE HCl (BENADRYL ALLERGY CHILDRENS) 12.5 MG CHEW Take 2 tablets by mouth as needed (allergic reaction)      EPINEPHrine (ANY BX GENERIC EQUIV) 0.3 MG/0.3ML injection 2-pack Inject 0.3 mLs (0.3 mg) into the muscle as needed for anaphylaxis May repeat one time in 5-15 minutes if response to initial dose is inadequate. 2 each 1    furosemide (LASIX) 20 MG tablet Take 1 tablet (20 mg) by mouth daily 90 tablet 3    Multiple Vitamins-Minerals (MULTIVITAMIN GUMMIES WOMENS) CHEW Take 1 Dose by mouth daily      spironolactone (ALDACTONE) 50 MG tablet Take 1 tablet by mouth once daily 90 tablet 2    triamcinolone (NASACORT) 55 MCG/ACT nasal aerosol Spray 2 sprays into both nostrils daily as needed (seasonal allergies)         Allergies:  Allergies    Allergen Reactions    Dust Mite Extract Difficulty breathing    Fruit [Peach] Anaphylaxis and Hives     fresh peaches and any fruit that has a pit.  Kiwi's and apples also.  Can eat any fruit cooked.    Latex Other (See Comments), Swelling, Difficulty breathing and Rash     Throat Closes      Nuts Anaphylaxis and Hives     All Raw Nuts, can eat nuts when roasted.    Other Food Allergy Other (See Comments) and Difficulty breathing     Mackey powder    Pollen Extract Difficulty breathing     All fruits and vegetables need to be washed and rinsed prior to eating.     Ace Inhibitors      Upper respiratory infection    Antihistamines, Chlorpheniramine-Type Hives     Antihistamines    Benicar [Olmesartan] Cough    Effexor [Venlafaxine] Other (See Comments)     Heartburn, reflux    Erythromycin      BASE; pt. Not sure if this is a true allergy.    Hydrochlorothiazide     Phenylephrine Hcl      Guaifed    Seasonal Allergies      hayfever    Sm Guaifenesin-Pseudoephedrine [Pseudoephedrine-Guaifenesin]      Guaifed    Statins     Trees     Ultram [Tramadol]      Sleep disturbance. Can not sleep, and when able to fall asleep will have terrible nightmares    Xanax [Alprazolam] Hives     Xanax    Zoloft [Sertraline]      paranoia       PHYSICAL EXAM:   Vital signs: /62 (BP Location: Left arm, Cuff Size: Adult Regular)   Pulse 65   Temp 97.8  F (36.6  C) (Tympanic)   Ht 1.524 m (5')   Wt 76.7 kg (169 lb)   SpO2 97%   BMI 33.01 kg/m     BMI: Body mass index is 33.01 kg/m .   General: Normal, healthy, cooperative, in no acute distress, alert   Lungs: respirations are non-labored   Abdominal: non-distended   Wounds:  Prevena wound vac intact.       PATHOLOGY:  Case Report   Date Value Ref Range Status   01/16/2024   Final    Surgical Pathology Report                         Case: [id number]                                  Authorizing Provider:  Ethan Molina MD  Collected:           01/16/2024 02:16 PM           Ordering Location:     [facility] Operating Room     Received:            01/16/2024 03:45 PM          Pathologist:           Steve Fletcher DO                                                         Specimens:   A) - Lymph Node(s), [place], Left [place] Node                                                    B) - Breast, Left, Left breast, short stitch superior long stitch lateral                           C) - Breast, Right, right breast, short stitch superior, long stitch lateral                Final Diagnosis   Date Value Ref Range Status   01/16/2024   Final    A.  [place] lymph node, left axilla, excisional biopsy:  -Lymph node with partial fat replacement and small scattered foci of hemosiderin deposition.  -Negative for metastatic tumor (0/1).  -See synoptic report below.    B.  Breast, left, simple mastectomy:  -Biopsy site with residual invasive ductal carcinoma, grade 1.  -Focal lobular carcinoma in situ in close proximity to the invasive carcinoma.  -Atrophic changes, few foci of the usual ductal hyperplasia, few small duct intraductal papillomas, focal apocrine metaplasia,     focal stromal fibrosis and few microcalcifications within invasive tumor and benign ducts.  -See synoptic report below.    C.  Breast, right, simple mastectomy:  -Atrophic changes, focal stromal fibrosis, rare small foci of usual ductal hyperplasia, focal apocrine metaplasia, and occasional    microcalcifications within benign ducts.          ASSESSMENT:    73 year old female who is 2 weeks s/p Left sentinel node biopsy and bilateral complete mastectomy.  Doing well.     PLAN:   Prevena wound vac was removed without problems.    Follow-up 1 week for staple removal. Sooner with problems/concerns.

## 2024-01-30 NOTE — PATIENT INSTRUCTIONS
Thank you for allowing Lelia Lemus CNP and our surgical team to participate in your care. Please call our health unit coordinator at [phone] with scheduling questions or the nurse at [phone] with any other questions or concerns.      Follow up in 1 week.

## 2024-01-30 NOTE — PROGRESS NOTES
"Care Coordination Note:    Oncology ROEL met with very pleasant patient and her spouse during her medical oncology appointment today. SW introduced self and explained role in the unit. Patient recently had a double mastectomy and healing from that. Patient was alert and oriented, though overwhelmed and emotional. Patient stated that it has all been going very fast and there is always something new and tons of appointments.   Patient stated that she has been looking into different resources and information, but she has her good and bad days, so she hasn't been looking into anything else. She claimed that she had a person from the cancer society reach out and encourage her to seek out resources to relieve some burdens in the household. Patient did express concern about mounting co-pays and potential medication costs.  SW brought patient and spouse applications for [employer], [employer], [employer], and [employer]. SW went through each application explaining process and benefits of each program.   Patient stated that she is having her spouse handle medical forms, applications, etc. SW made sure to reiterate to reach out with any questions that may arise.  Patient was interested in cancer rehab, when it is appropriate to do so. She also stated hearing about the support group at the end of February and she is planning to go to gain more support. Patient does have a strong support system and stated she has reached out to a few friends who have had cancer as well. She also stated that she is so glad that she chose to have her care with [facility], the staff in all areas she's been in during this process have been \"like family, and a warm & needed hug.\"  Before patient left, ROEL provided her with a [employer] booklet to look over if any of the products could help her during this time or just know other options out there.   ROEL will be following up with patient and spouse at a later date and then also go through a more thorough " assessment when able.  Patient does have SW's contact information for any further questions or assistance.    Maggy Sampson [facility]  Oncology Social Worker

## 2024-01-30 NOTE — NURSING NOTE
Oncology Rooming Note    January 30, 2024 1:01 PM   Rj Rodríguez is a 73 year old female who presents for:    Chief Complaint   Patient presents with    Oncology Clinic Visit     New consult Breast Cancer     Initial Vitals: Pulse 68   Temp 98.2  F (36.8  C) (Tympanic)   Ht 1.524 m (5')   Wt 76.9 kg (169 lb 8.5 oz)   SpO2 97%   BMI 33.11 kg/m   Estimated body mass index is 33.11 kg/m  as calculated from the following:    Height as of this encounter: 1.524 m (5').    Weight as of this encounter: 76.9 kg (169 lb 8.5 oz). Body surface area is 1.8 meters squared.  No Pain (0) Comment: Data Unavailable   No LMP recorded. Patient has had a hysterectomy.  Allergies reviewed: Yes  Medications reviewed: Yes    Medications: Medication refills not needed today.  Pharmacy name entered into [facility]: [id number] - [place], [place] - [address]     Frailty Screening:   Is the patient here for a new oncology consult visit in cancer care? 1. Yes. Over the past month, have you experienced difficulty or required a caregiver to assist with:   1. Balance, walking or general mobility (including any falls)? NO  2. Completion of self-care tasks such as bathing, dressing, toileting, grooming/hygiene?  NO  3. Concentration or memory that affects your daily life?  NO       Clinical concerns: none       Lana Buchanan LPN

## 2024-02-02 NOTE — PROGRESS NOTES
MEDICAL ONCOLOGY CONSULT NOTE  Jan 30, 2024    Reason for consult: Breast cancer    HISTORY OF PRESENT ILLNESS  Rj Rodríguez is a 73 year old female with PMH as stated below who is seen in the oncology clinic for history of breast cancer.    Her history in short is as follows:    Ms. Rodríguez was found to have a abnormality on her screening mammogram.    11/13/2023: Mammogram screening bilateral with tomosynthesis:No architectural distortion, dominant mass or suspicious  microcalcifications are identified in the right breast.      There is a small slightly spiculated appearing nodule in the upper outer quadrant of the left breast near the junction of middle and  anterior thirds. This is probably in the 1 to 2:00 position. Recommend ultrasound to evaluate this further.     11/16/2023: Ultrasound breast left:There is an irregular slightly hypoechoic mass in the left  breast at 1-2 o'clock, 6 cm from the nipple measuring 0.8 x 0.8 x 0.5 cm. No definite internal vascularity. No other cystic or solid masses  are demonstrated.     11/21/2023: Ultrasound breast biopsy: Invasive ductal carcinoma, grade 1.  ER 91 to 100%, [place] 71 to 80% and HER2 IHC +1    12/7/2023: MRI breast bilateral with and without contrast:Enhancement reflecting the sampled mass and surrounding postbiopsy  change in the left upper outer breast spans 2.0 x 1.7 cm. No additional sites of highly suspicious enhancement in either breast.  No evidence of pathologic adenopathy.    1/16/2024: Bilateral simple mastectomy and left sentinel lymph node biopsy:    Pathology left breast showed biopsy site with residual invasive carcinoma, grade 1, 5.4 mm, margins negative.  focal lobular carcinoma in situ in close proximity to invasive carcinoma. pT1b N0    1 sentinel lymph node was examined which was negative for cancer.    Right breast simple mastectomy: Atrophic changes, focal stromal fibrosis, rare small foci of usual ductal hyperplasia, focal apical right  metaplasia and occasional microcalcification within benign ducts.    She overall is doing well.  She denies any weight loss, appetite loss, any pain anywhere.  She has a wound VAC currently in place which is draining slightly.  Denies any fevers or chills.    GYN history: LMP: Hysterectomy 2003.  6 full-term pregnancies.  Denies any prior use of oral birth control or estrogen replacement.  No prior history of breast biopsy.    REVIEW OF SYSTEMS  A 12-point ROS negative except as in HPI      Current Outpatient Medications   Medication Sig Dispense Refill    cetirizine (ZYRTEC) 10 MG tablet Take 10-20 mg by mouth daily (10 mg in the winter, 20 mg in allergy season)      diltiazem ER (TIAZAC) 240 MG 24 hr ER beaded capsule Take 1 capsule (240 mg) by mouth daily 90 capsule 3    furosemide (LASIX) 20 MG tablet Take 1 tablet (20 mg) by mouth daily 90 tablet 3    Multiple Vitamins-Minerals (MULTIVITAMIN GUMMIES WOMENS) CHEW Take 1 Dose by mouth daily      spironolactone (ALDACTONE) 50 MG tablet Take 1 tablet by mouth once daily 90 tablet 2    triamcinolone (NASACORT) 55 MCG/ACT nasal aerosol Spray 2 sprays into both nostrils daily as needed (seasonal allergies)      albuterol (PROAIR HFA/PROVENTIL HFA/VENTOLIN HFA) 108 (90 Base) MCG/ACT inhaler Inhale 2 puffs into the lungs every 6 hours as needed for shortness of breath      blood glucose (ACCU-CHEK GUIDE) test strip Use to test blood sugar 1 time daily or as directed. 50 each 11    blood glucose monitoring (ACCU-CHEK FASTCLIX) lancets Use to test blood sugar 1 time daily or as directed. 102 each 11    clonazePAM (KLONOPIN) 0.5 MG tablet Take 1 tablet by mouth twice daily as needed for anxiety 30 tablet 0    diphenhydrAMINE HCl (BENADRYL ALLERGY CHILDRENS) 12.5 MG CHEW Take 2 tablets by mouth as needed (allergic reaction)      EPINEPHrine (ANY BX GENERIC EQUIV) 0.3 MG/0.3ML injection 2-pack Inject 0.3 mLs (0.3 mg) into the muscle as needed for anaphylaxis May repeat one  time in 5-15 minutes if response to initial dose is inadequate. 2 each 1       Allergies   Allergen Reactions    Dust Mite Extract Difficulty breathing    Fruit [Peach] Anaphylaxis and Hives     fresh peaches and any fruit that has a pit.  Kiwi's and apples also.  Can eat any fruit cooked.    Latex Other (See Comments), Swelling, Difficulty breathing and Rash     Throat Closes      Nuts Anaphylaxis and Hives     All Raw Nuts, can eat nuts when roasted.    Other Food Allergy Other (See Comments) and Difficulty breathing     Mackey powder    Pollen Extract Difficulty breathing     All fruits and vegetables need to be washed and rinsed prior to eating.     Ace Inhibitors      Upper respiratory infection    Antihistamines, Chlorpheniramine-Type Hives     Antihistamines    Benicar [Olmesartan] Cough    Effexor [Venlafaxine] Other (See Comments)     Heartburn, reflux    Erythromycin      BASE; pt. Not sure if this is a true allergy.    Hydrochlorothiazide     Phenylephrine Hcl      Guaifed    Seasonal Allergies      hayfever    Sm Guaifenesin-Pseudoephedrine [Pseudoephedrine-Guaifenesin]      Guaifed    Statins     Trees     Ultram [Tramadol]      Sleep disturbance. Can not sleep, and when able to fall asleep will have terrible nightmares    Xanax [Alprazolam] Hives     Xanax    Zoloft [Sertraline]      paranoia     Immunization History   Administered Date(s) Administered    COVID-19 Monovalent 18+ (Moderna) 03/30/2021, 04/27/2021    Flu, Unspecified 01/22/2013, 09/24/2014, 10/30/2015, 10/27/2016, 10/24/2018    Influenza (High Dose) 3 valent vaccine 10/24/2018, 09/19/2019    Influenza (IIV3) PF 12/06/2006, 11/20/2009, 11/09/2010, 10/18/2011, 01/22/2013, 10/11/2013    Influenza Vaccine 65+ (Fluzone HD) 10/24/2020, 01/10/2022, 11/13/2023    Pneumo Conj 13-V (2010&after) 01/09/2019    Pneumococcal 23 valent 10/11/2019    TD,PF 7+ (Tenivac) 05/02/2019    TDAP (Adacel,Boostrix) 03/26/2009    Td (Adult), Adsorbed 10/13/1976        Past Medical History:   Diagnosis Date    Arthritis     Cancer (H)     Chronic back pain     Diabetes (H)     Hypertension     Irregular heart beat     Sleep apnea     Uncomplicated asthma        Past Surgical History:   Procedure Laterality Date    ANESTHESIA OUT OF OR MRI N/A 6/1/2022    Procedure: MRI CERVICAL SPINE;  Surgeon: GENERIC ANESTHESIA PROVIDER;  Location: [facility]    ANESTHESIA OUT OF OR MRI N/A 9/23/2022    Procedure: MRI CERVICAL SPINE;  Surgeon: GENERIC ANESTHESIA PROVIDER;  Location: [facility]    ANESTHESIA OUT OF OR MRI Left 12/7/2023    Procedure: Anesthesia out of OR MRI;  Surgeon: GENERIC ANESTHESIA PROVIDER;  Location: [facility]    APPENDECTOMY      BACK SURGERY      CHOLECYSTECTOMY      COLONOSCOPY  2012    Repeat in 10 years    GYN SURGERY      HYSTERECTOMY      Ovaries    MASTECTOMY SIMPLE BILATERAL, SENTINEL NODE BILATERAL, COMBINED N/A 1/16/2024    Procedure: Left sentinel node biopsy, bilateral mastectomy;  Surgeon: Ethan Molina MD;  Location: [facility]    RELEASE CARPAL TUNNEL      LT    RELEASE CARPAL TUNNEL      RT x2    SURGICAL RADIOLOGY PROCEDURE N/A 2/12/2016    Procedure: SURGICAL RADIOLOGY PROCEDURE;  Surgeon: Provider, Generic Perianesthesia Nursing;  Location: [facility]    SURGICAL RADIOLOGY PROCEDURE N/A 8/15/2016    Procedure: SURGICAL RADIOLOGY PROCEDURE;  Surgeon: Provider, Generic Perianesthesia Nursing;  Location: [facility]       SOCIAL HISTORY  History   Smoking Status    Never   Smokeless Tobacco    Never    Social History    Substance and Sexual Activity      Alcohol use: Yes        Alcohol/week: 1.0 standard drink of alcohol        Types: 1 Glasses of wine per week        Comment: occ glass of wine     History   Drug Use No       FAMILY HISTORY  Family History   Problem Relation Age of Onset    Diabetes Mother     Alcoholism Mother     Heart Failure Mother     Cerebrovascular Disease Father     Diabetes Father         Type 2    Hypertension Father     Alcoholism Father      Colon Cancer Maternal Aunt     Colon Cancer Maternal Uncle        PHYSICAL EXAMINATION  Pulse 68   Temp 98.2  F (36.8  C) (Tympanic)   Ht 1.524 m (5')   Wt 76.9 kg (169 lb 8.5 oz)   SpO2 97%   BMI 33.11 kg/m    Wt Readings from Last 2 Encounters:   01/30/24 76.7 kg (169 lb)   01/30/24 76.9 kg (169 lb 8.5 oz)     Physical Exam  Constitutional:       Appearance: Normal appearance.   Pulmonary:      Effort: Pulmonary effort is normal.   Chest:      Comments: Wound VAC present over chest.  There were unable to assess mastectomy incision.  Lymphadenopathy:      Upper Body:      Right upper body: No axillary adenopathy.      Left upper body: No axillary adenopathy.   Neurological:      General: No focal deficit present.      Mental Status: She is alert and oriented to person, place, and time.   Psychiatric:         Mood and Affect: Mood normal.     Laboratory and imaging:     Latest Reference Range & Units 01/17/24 05:47   WBC 4.0 - 11.0 10e3/uL 15.0 (H)   Hemoglobin 11.7 - 15.7 g/dL 11.9   Hematocrit 35.0 - 47.0 % 35.7   Platelet Count 150 - 450 10e3/uL 224   (H): Data is abnormally high    12/11/2023: Hereditary cancer BRCA1/BRCA2: Negative    12/11/2023: Next generation sequencing breast: Negative    ASSESSMENT AND PLAN    1.  Stage IA left breast cancer-ER positive, ID positive and HER2 negative.    Status post bilateral mastectomy and left sentinel lymph node biopsy 1/2024.  pT1b N0    She is currently healing from surgery.  Still has a wound VAC.    Discussed today recommendations for adjuvant therapy.  As she has had a bilateral mastectomy she is not a candidate for adjuvant radiation.    We also discussed given she has a hormone positive breast cancer and that the size of her tumor was 0.5 cm she is a candidate for sending the Oncotype DX testing to determine the recurrence score which would determine if she is a candidate for chemotherapy.  Based on her tumor grade and ER positivity it is unlikely.  However  I would recommend sending the Oncotype DX testing as it is also prognostic.    She is a candidate for endocrine therapy for total of 5 years with an aromatase inhibitor.  Discussed the expected side effects can include arthralgia, myalgia, osteoporosis and elevated cholesterol.    For now she is still healing and therefore will wait until she is completely healed and we have the results of the Oncotype DX before be start her on an aromatase inhibitor.    Will plan to follow-up in clinic in 3 weeks.    Total time spent on the patient on day of encounter was 60 minutes doing chart review, review of test results, interpretation of results, patient visit and documentation.       Avelina Kwok MD

## 2024-02-06 ENCOUNTER — OFFICE VISIT (OUTPATIENT)
Dept: SURGERY | Facility: OTHER | Age: 74
End: 2024-02-06
Attending: NURSE PRACTITIONER
Payer: MEDICARE

## 2024-02-06 VITALS
DIASTOLIC BLOOD PRESSURE: 70 MMHG | SYSTOLIC BLOOD PRESSURE: 126 MMHG | WEIGHT: 173 LBS | HEIGHT: 60 IN | BODY MASS INDEX: 33.96 KG/M2 | TEMPERATURE: 98.1 F | RESPIRATION RATE: 16 BRPM | HEART RATE: 64 BPM | OXYGEN SATURATION: 98 %

## 2024-02-06 DIAGNOSIS — Z90.13 STATUS POST MASTECTOMY, BILATERAL: Primary | ICD-10-CM

## 2024-02-06 PROCEDURE — G0463 HOSPITAL OUTPT CLINIC VISIT: HCPCS

## 2024-02-06 PROCEDURE — 99024 POSTOP FOLLOW-UP VISIT: CPT | Performed by: NURSE PRACTITIONER

## 2024-02-06 ASSESSMENT — PAIN SCALES - GENERAL: PAINLEVEL: NO PAIN (0)

## 2024-02-06 NOTE — PROGRESS NOTES
CLINIC NOTE - POST-OP SURGERY  2/6/2024    Patient:Rj Rodríguez    Procedure: Left sentinel node biopsy and bilateral complete mastectomy     This is a 73 year old female who is 3 weeks s/p Left sentinel node biopsy and bilateral complete mastectomy.  The patient has no complaints today.      Current Medications:  Current Outpatient Medications   Medication Sig Dispense Refill    albuterol (PROAIR HFA/PROVENTIL HFA/VENTOLIN HFA) 108 (90 Base) MCG/ACT inhaler Inhale 2 puffs into the lungs every 6 hours as needed for shortness of breath      blood glucose (ACCU-CHEK GUIDE) test strip Use to test blood sugar 1 time daily or as directed. 50 each 11    blood glucose monitoring (ACCU-CHEK FASTCLIX) lancets Use to test blood sugar 1 time daily or as directed. 102 each 11    cetirizine (ZYRTEC) 10 MG tablet Take 10-20 mg by mouth daily (10 mg in the winter, 20 mg in allergy season)      clonazePAM (KLONOPIN) 0.5 MG tablet Take 1 tablet by mouth twice daily as needed for anxiety 30 tablet 0    diltiazem ER (TIAZAC) 240 MG 24 hr ER beaded capsule Take 1 capsule (240 mg) by mouth daily 90 capsule 3    diphenhydrAMINE HCl (BENADRYL ALLERGY CHILDRENS) 12.5 MG CHEW Take 2 tablets by mouth as needed (allergic reaction)      EPINEPHrine (ANY BX GENERIC EQUIV) 0.3 MG/0.3ML injection 2-pack Inject 0.3 mLs (0.3 mg) into the muscle as needed for anaphylaxis May repeat one time in 5-15 minutes if response to initial dose is inadequate. 2 each 1    furosemide (LASIX) 20 MG tablet Take 1 tablet (20 mg) by mouth daily 90 tablet 3    Multiple Vitamins-Minerals (MULTIVITAMIN GUMMIES WOMENS) CHEW Take 1 Dose by mouth daily      spironolactone (ALDACTONE) 50 MG tablet Take 1 tablet by mouth once daily 90 tablet 2    triamcinolone (NASACORT) 55 MCG/ACT nasal aerosol Spray 2 sprays into both nostrils daily as needed (seasonal allergies)         Allergies:  Allergies   Allergen Reactions    Dust Mite Extract Difficulty breathing    Fruit  [Peach] Anaphylaxis and Hives     fresh peaches and any fruit that has a pit.  Kiwi's and apples also.  Can eat any fruit cooked.    Latex Other (See Comments), Swelling, Difficulty breathing and Rash     Throat Closes      Nuts Anaphylaxis and Hives     All Raw Nuts, can eat nuts when roasted.    Other Food Allergy Other (See Comments) and Difficulty breathing     Mackey powder    Pollen Extract Difficulty breathing     All fruits and vegetables need to be washed and rinsed prior to eating.     Ace Inhibitors      Upper respiratory infection    Antihistamines, Chlorpheniramine-Type Hives     Antihistamines    Benicar [Olmesartan] Cough    Effexor [Venlafaxine] Other (See Comments)     Heartburn, reflux    Erythromycin      BASE; pt. Not sure if this is a true allergy.    Hydrochlorothiazide     Phenylephrine Hcl      Guaifed    Seasonal Allergies      hayfever    Sm Guaifenesin-Pseudoephedrine [Pseudoephedrine-Guaifenesin]      Guaifed    Statins     Trees     Ultram [Tramadol]      Sleep disturbance. Can not sleep, and when able to fall asleep will have terrible nightmares    Xanax [Alprazolam] Hives     Xanax    Zoloft [Sertraline]      paranoia       PHYSICAL EXAM:   Vital signs: /70 (BP Location: Right arm, Cuff Size: Adult Regular)   Pulse 64   Temp 98.1  F (36.7  C) (Tympanic)   Resp 16   Ht 1.524 m (5')   Wt 78.5 kg (173 lb)   SpO2 98%   BMI 33.79 kg/m     BMI: Body mass index is 33.79 kg/m .   General: Normal, healthy, cooperative, in no acute distress, alert   Lungs: respirations are non-labored   Abdominal: non-distended   Wounds:  staples are intact to incisional sites     PATHOLOGY:  Case Report   Date Value Ref Range Status   01/16/2024   Final    Surgical Pathology Report                         Case: [id number]                                  Authorizing Provider:  Ethan Molina MD  Collected:           01/16/2024 02:16 PM          Ordering Location:     [facility]      Received:            01/16/2024 03:45 PM          Pathologist:           Steve Fletcher DO                                                         Specimens:   A) - Lymph Node(s), [place], Left [place] Node                                                    B) - Breast, Left, Left breast, short stitch superior long stitch lateral                           C) - Breast, Right, right breast, short stitch superior, long stitch lateral                Final Diagnosis   Date Value Ref Range Status   01/16/2024   Final    A.  [place] lymph node, left axilla, excisional biopsy:  -Lymph node with partial fat replacement and small scattered foci of hemosiderin deposition.  -Negative for metastatic tumor (0/1).  -See synoptic report below.    B.  Breast, left, simple mastectomy:  -Biopsy site with residual invasive ductal carcinoma, grade 1.  -Focal lobular carcinoma in situ in close proximity to the invasive carcinoma.  -Atrophic changes, few foci of the usual ductal hyperplasia, few small duct intraductal papillomas, focal apocrine metaplasia,     focal stromal fibrosis and few microcalcifications within invasive tumor and benign ducts.  -See synoptic report below.    C.  Breast, right, simple mastectomy:  -Atrophic changes, focal stromal fibrosis, rare small foci of usual ductal hyperplasia, focal apocrine metaplasia, and occasional    microcalcifications within benign ducts.          ASSESSMENT:    73 year old female who is 3 weeks s/p Left sentinel node biopsy and bilateral complete mastectomy.  Doing well.     PLAN:   Some but not all staples were removed from the incisional site without problems.    Follow-up 1 week for further staple removal. Sooner with problems/concerns.

## 2024-02-06 NOTE — PATIENT INSTRUCTIONS
Thank you for allowing Lelia Lemus CNP and our surgical team to participate in your care. Please call our health unit coordinator at [phone] with scheduling questions or the nurse at [phone] with any other questions or concerns.

## 2024-02-07 ENCOUNTER — TRANSFERRED RECORDS (OUTPATIENT)
Dept: HEALTH INFORMATION MANAGEMENT | Facility: CLINIC | Age: 74
End: 2024-02-07
Payer: COMMERCIAL

## 2024-02-12 NOTE — PROGRESS NOTES
Assessment & Plan     (E11.9) Type 2 diabetes mellitus without complication, without long-term current use of insulin (H)  (primary encounter diagnosis)  Plan: A1C in process. Will notify patient of the results when available and intervene accordingly.   BP at goal. Does not tolerate statins. Plans to schedule eye exam. Will see her back in 6 months, sooner with new or worsening symptoms.     (I10) Essential hypertension  Plan: Well controlled. Continue current medications. Encouraged daily exercise and a low sodium diet. Recommended checking BP's 2x/wk, call the clinic if consistantly s>140 or d>90. Follow up in 6 months.       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 Continue rare Klonopin with counseling.     (I49.9) Irregular heart rate  Plan: Controlled. Continue diltiazem.       Post Medication Reconciliation Status:  Discharge medications reconciled, continue medications without change        Subjective   Rj is a 73 year old, presenting for the following health issues:  Diabetes, Lipids, Hypertension, Asthma, Depression, and Anxiety    HPI     Diabetes Follow-up    How often are you checking your blood sugar? A few times a week  What time of day are you checking your blood sugars (select all that apply)?  Before meals  Have you had any blood sugars above 200?  No  Have you had any blood sugars below 70?  No  What symptoms do you notice when your blood sugar is low?  Dizzy  What concerns do you have today about your diabetes? None   Do you have any of these symptoms? (Select all that apply)  No numbness or tingling in feet.  No redness, sores or blisters on feet.  No complaints of excessive thirst.  No reports of blurry vision.  No significant changes to weight.  Have you had a diabetic eye exam in the last 12 months? No  Working on weight loss with healthier food choices.   Denies chest pain, shortness of breath,  palpitations, dizziness and syncope.   She currently is not taking anything for her diabetes.   Due for an eye exam. Encouraged to schedule.     Asthma Follow-Up    Was ACT completed today?  Yes        2/13/2024     7:34 AM   ACT Total Scores   ACT TOTAL SCORE (Goal Greater than or Equal to 20) 23   In the past 12 months, how many times did you visit the emergency room for your asthma without being admitted to the hospital? 0   In the past 12 months, how many times were you hospitalized overnight because of your asthma? 0        How many days per week do you miss taking your asthma controller medication?  0 Has not used recently  Please describe any recent triggers for your asthma: pollens and humidity  Have you had any Emergency Room Visits, Urgent Care Visits, or Hospital Admissions since your last office visit?  No    Hyperlipidemia Follow-Up    Are you regularly taking any medication or supplement to lower your cholesterol?   No, does not tolerate  Are you having muscle aches or other side effects that you think could be caused by your cholesterol lowering medication?  No    Hypertension Follow-up    Do you check your blood pressure regularly outside of the clinic? No   Are you following a low salt diet? Yes  Are your blood pressures ever more than 140 on the top number (systolic) OR more   than 90 on the bottom number (diastolic), for example 140/90? Not checking  Also has diastolic heart dysfunction with frequent PVCs. Follows with cardiology.      Taking spironolactone 50 mg, lasix 20 mg, and diltiazem without side effects.      Due for a dexa-scan. Declines.     BP Readings from Last 2 Encounters:   02/13/24 128/60   02/06/24 126/70     Hemoglobin A1C (%)   Date Value   11/13/2023 5.7 (H)   06/28/2023 6.0 (H)   03/10/2021 5.8 (H)   02/19/2020 5.9 (H)     LDL Cholesterol Calculated (mg/dL)   Date Value   06/28/2023 85   01/10/2022 77   03/10/2021 87   11/11/2019 61         Depression and Anxiety  Follow-Up  How are you doing with your depression since your last visit? Improved   How are you doing with your anxiety since your last visit?  No change  Are you having other symptoms that might be associated with depression or anxiety? No  Have you had a significant life event? Health Concerns   Do you have any concerns with your use of alcohol or other drugs? No  No thoughts of self harm.   Rare use of Klonopin.     Social History     Tobacco Use    Smoking status: Never     Passive exposure: Never    Smokeless tobacco: Never   Vaping Use    Vaping Use: Never used   Substance Use Topics    Alcohol use: Yes     Alcohol/week: 1.0 standard drink of alcohol     Types: 1 Glasses of wine per week     Comment: occ glass of wine    Drug use: No         3/27/2023     1:20 PM 9/11/2023    12:29 PM 1/30/2024     1:00 PM   PHQ   PHQ-9 Total Score 3 4 12   Q9: Thoughts of better off dead/self-harm past 2 weeks Not at all Not at all Not at all         7/11/2022    10:00 AM 3/27/2023     1:21 PM 9/11/2023    12:30 PM   ZEFERINO-7 SCORE   Total Score   0 (minimal anxiety)   Total Score 11 1 0         1/30/2024     1:00 PM   Last PHQ-9   1.  Little interest or pleasure in doing things 3   2.  Feeling down, depressed, or hopeless 2   3.  Trouble falling or staying asleep, or sleeping too much 3   4.  Feeling tired or having little energy 2   5.  Poor appetite or overeating 0   6.  Feeling bad about yourself 1   7.  Trouble concentrating 1   8.  Moving slowly or restless 0   Q9: Thoughts of better off dead/self-harm past 2 weeks 0   PHQ-9 Total Score 12   Difficulty at work, home, or with people Not difficult at all         9/11/2023    12:30 PM   ZEFERINO-7    1. Feeling nervous, anxious, or on edge 0   2. Not being able to stop or control worrying 0   3. Worrying too much about different things 0   4. Trouble relaxing 0   5. Being so restless that it is hard to sit still 0   6. Becoming easily annoyed or irritable 0   7. Feeling afraid, as  if something awful might happen 0   ZEFERINO-7 Total Score 0   If you checked any problems, how difficult have they made it for you to do your work, take care of things at home, or get along with other people? Not difficult at all     6}          Review of Systems  Constitutional, HEENT, cardiovascular, pulmonary, gi and gu systems are negative, except as otherwise noted.      Objective    /60   Pulse 68   Temp 97.9  F (36.6  C) (Tympanic)   Resp 16   Ht 1.524 m (5')   Wt 74 kg (163 lb 3.2 oz)   SpO2 96%   BMI 31.87 kg/m    Body mass index is 31.87 kg/m .  Physical Exam   GENERAL: alert, no distress, and over weight  EYES: Eyes grossly normal to inspection, PERRL and conjunctivae and sclerae normal  HENT: ear canals and TM's normal, nose and mouth without ulcers or lesions  NECK: no adenopathy, no asymmetry, masses, or scars  RESP: lungs clear to auscultation - no rales, rhonchi or wheezes  CV: regular rate and rhythm, no murmur, click or rub, no peripheral edema  ABDOMEN: soft, nontender, no hepatosplenomegaly, no masses and bowel sounds normal  MS: no gross musculoskeletal defects noted, no edema  Diabetic foot exam: normal DP and PT pulses, no trophic changes or ulcerative lesions, and normal sensory exam    Labs in process        Signed Electronically by: Sheyla Sanches NP

## 2024-02-13 ENCOUNTER — OFFICE VISIT (OUTPATIENT)
Dept: SURGERY | Facility: OTHER | Age: 74
End: 2024-02-13
Attending: NURSE PRACTITIONER
Payer: MEDICARE

## 2024-02-13 ENCOUNTER — OFFICE VISIT (OUTPATIENT)
Dept: FAMILY MEDICINE | Facility: OTHER | Age: 74
End: 2024-02-13
Attending: NURSE PRACTITIONER
Payer: COMMERCIAL

## 2024-02-13 VITALS
OXYGEN SATURATION: 96 % | HEART RATE: 71 BPM | WEIGHT: 163 LBS | BODY MASS INDEX: 32 KG/M2 | DIASTOLIC BLOOD PRESSURE: 62 MMHG | HEIGHT: 60 IN | SYSTOLIC BLOOD PRESSURE: 130 MMHG

## 2024-02-13 VITALS
TEMPERATURE: 97.9 F | HEART RATE: 68 BPM | OXYGEN SATURATION: 96 % | DIASTOLIC BLOOD PRESSURE: 60 MMHG | BODY MASS INDEX: 32.04 KG/M2 | HEIGHT: 60 IN | SYSTOLIC BLOOD PRESSURE: 128 MMHG | RESPIRATION RATE: 16 BRPM | WEIGHT: 163.2 LBS

## 2024-02-13 DIAGNOSIS — I10 ESSENTIAL HYPERTENSION: ICD-10-CM

## 2024-02-13 DIAGNOSIS — Z90.13 STATUS POST MASTECTOMY, BILATERAL: Primary | ICD-10-CM

## 2024-02-13 DIAGNOSIS — E11.9 TYPE 2 DIABETES MELLITUS WITHOUT COMPLICATION, WITHOUT LONG-TERM CURRENT USE OF INSULIN (H): Primary | ICD-10-CM

## 2024-02-13 DIAGNOSIS — F33.0 MILD EPISODE OF RECURRENT MAJOR DEPRESSIVE DISORDER (H): ICD-10-CM

## 2024-02-13 DIAGNOSIS — I49.9 IRREGULAR HEART RATE: ICD-10-CM

## 2024-02-13 DIAGNOSIS — F41.1 GAD (GENERALIZED ANXIETY DISORDER): ICD-10-CM

## 2024-02-13 PROBLEM — I11.0 HYPERTENSIVE HEART DISEASE WITH HEART FAILURE (H): Status: RESOLVED | Noted: 2024-01-08 | Resolved: 2024-02-13

## 2024-02-13 PROBLEM — R06.02 SHORTNESS OF BREATH: Status: RESOLVED | Noted: 2021-08-09 | Resolved: 2024-02-13

## 2024-02-13 LAB
EST. AVERAGE GLUCOSE BLD GHB EST-MCNC: 111 MG/DL
HBA1C MFR BLD: 5.5 %

## 2024-02-13 PROCEDURE — G0463 HOSPITAL OUTPT CLINIC VISIT: HCPCS

## 2024-02-13 PROCEDURE — 99214 OFFICE O/P EST MOD 30 MIN: CPT | Mod: 24 | Performed by: NURSE PRACTITIONER

## 2024-02-13 PROCEDURE — 83036 HEMOGLOBIN GLYCOSYLATED A1C: CPT | Mod: ZL | Performed by: NURSE PRACTITIONER

## 2024-02-13 PROCEDURE — G0463 HOSPITAL OUTPT CLINIC VISIT: HCPCS | Mod: 27

## 2024-02-13 PROCEDURE — 99024 POSTOP FOLLOW-UP VISIT: CPT | Performed by: NURSE PRACTITIONER

## 2024-02-13 PROCEDURE — 36415 COLL VENOUS BLD VENIPUNCTURE: CPT | Mod: ZL | Performed by: NURSE PRACTITIONER

## 2024-02-13 ASSESSMENT — ASTHMA QUESTIONNAIRES
QUESTION_5 LAST FOUR WEEKS HOW WOULD YOU RATE YOUR ASTHMA CONTROL: WELL CONTROLLED
QUESTION_4 LAST FOUR WEEKS HOW OFTEN HAVE YOU USED YOUR RESCUE INHALER OR NEBULIZER MEDICATION (SUCH AS ALBUTEROL): ONCE A WEEK OR LESS
ACT_TOTALSCORE: 23
QUESTION_3 LAST FOUR WEEKS HOW OFTEN DID YOUR ASTHMA SYMPTOMS (WHEEZING, COUGHING, SHORTNESS OF BREATH, CHEST TIGHTNESS OR PAIN) WAKE YOU UP AT NIGHT OR EARLIER THAN USUAL IN THE MORNING: NOT AT ALL
QUESTION_1 LAST FOUR WEEKS HOW MUCH OF THE TIME DID YOUR ASTHMA KEEP YOU FROM GETTING AS MUCH DONE AT WORK, SCHOOL OR AT HOME: NONE OF THE TIME
QUESTION_2 LAST FOUR WEEKS HOW OFTEN HAVE YOU HAD SHORTNESS OF BREATH: NOT AT ALL
ACT_TOTALSCORE: 23

## 2024-02-13 ASSESSMENT — PAIN SCALES - GENERAL
PAINLEVEL: NO PAIN (0)
PAINLEVEL: NO PAIN (0)

## 2024-02-13 NOTE — PROGRESS NOTES
CLINIC NOTE - POST-OP SURGERY  2/13/2024    Patient:Rj Rodríguez    Procedure: Left sentinel node biopsy and bilateral complete mastectomy     This is a 73 year old female who is 4 weeks s/p Left sentinel node biopsy and bilateral complete mastectomy.  The patient has no complaints today.      Current Medications:  Current Outpatient Medications   Medication Sig Dispense Refill    albuterol (PROAIR HFA/PROVENTIL HFA/VENTOLIN HFA) 108 (90 Base) MCG/ACT inhaler Inhale 2 puffs into the lungs every 6 hours as needed for shortness of breath      blood glucose (ACCU-CHEK GUIDE) test strip Use to test blood sugar 1 time daily or as directed. 50 each 11    blood glucose monitoring (ACCU-CHEK FASTCLIX) lancets Use to test blood sugar 1 time daily or as directed. 102 each 11    cetirizine (ZYRTEC) 10 MG tablet Take 10-20 mg by mouth daily (10 mg in the winter, 20 mg in allergy season)      clonazePAM (KLONOPIN) 0.5 MG tablet Take 1 tablet by mouth twice daily as needed for anxiety 30 tablet 0    diltiazem ER (TIAZAC) 240 MG 24 hr ER beaded capsule Take 1 capsule (240 mg) by mouth daily 90 capsule 3    diphenhydrAMINE HCl (BENADRYL ALLERGY CHILDRENS) 12.5 MG CHEW Take 2 tablets by mouth as needed (allergic reaction)      EPINEPHrine (ANY BX GENERIC EQUIV) 0.3 MG/0.3ML injection 2-pack Inject 0.3 mLs (0.3 mg) into the muscle as needed for anaphylaxis May repeat one time in 5-15 minutes if response to initial dose is inadequate. 2 each 1    furosemide (LASIX) 20 MG tablet Take 1 tablet (20 mg) by mouth daily 90 tablet 3    Multiple Vitamins-Minerals (MULTIVITAMIN GUMMIES WOMENS) CHEW Take 1 Dose by mouth daily      spironolactone (ALDACTONE) 50 MG tablet Take 1 tablet by mouth once daily 90 tablet 2    triamcinolone (NASACORT) 55 MCG/ACT nasal aerosol Spray 2 sprays into both nostrils daily as needed (seasonal allergies)         Allergies:  Allergies   Allergen Reactions    Dust Mite Extract Difficulty breathing    Fruit  [Peach] Anaphylaxis and Hives     fresh peaches and any fruit that has a pit.  Kiwi's and apples also.  Can eat any fruit cooked.    Latex Other (See Comments), Swelling, Difficulty breathing and Rash     Throat Closes      Nuts Anaphylaxis and Hives     All Raw Nuts, can eat nuts when roasted.    Other Food Allergy Other (See Comments) and Difficulty breathing     Mackey powder    Pollen Extract Difficulty breathing     All fruits and vegetables need to be washed and rinsed prior to eating.     Ace Inhibitors      Upper respiratory infection    Antihistamines, Chlorpheniramine-Type Hives     Antihistamines    Benicar [Olmesartan] Cough    Effexor [Venlafaxine] Other (See Comments)     Heartburn, reflux    Erythromycin      BASE; pt. Not sure if this is a true allergy.    Hydrochlorothiazide     Phenylephrine Hcl      Guaifed    Seasonal Allergies      hayfever    Sm Guaifenesin-Pseudoephedrine [Pseudoephedrine-Guaifenesin]      Guaifed    Statins     Trees     Ultram [Tramadol]      Sleep disturbance. Can not sleep, and when able to fall asleep will have terrible nightmares    Xanax [Alprazolam] Hives     Xanax    Zoloft [Sertraline]      paranoia       PHYSICAL EXAM:   Vital signs: /62 (BP Location: Right arm, Cuff Size: Adult Large)   Pulse 71   Ht 1.524 m (5')   Wt 73.9 kg (163 lb)   SpO2 96%   BMI 31.83 kg/m     BMI: Body mass index is 31.83 kg/m .   General: Normal, healthy, cooperative, in no acute distress, alert   Lungs: respirations are non-labored   Abdominal: non-distended   Wounds:  staples are intact to incisional sites     PATHOLOGY:  Case Report   Date Value Ref Range Status   01/16/2024   Final    Surgical Pathology Report                         Case: [id number]                                  Authorizing Provider:  Ethan Molina MD  Collected:           01/16/2024 02:16 PM          Ordering Location:     [facility] Operating [facility]     Received:            01/16/2024 03:45 PM           Pathologist:           Steve Fletcher DO                                                         Specimens:   A) - Lymph Node(s), [place], Left [place] Node                                                    B) - Breast, Left, Left breast, short stitch superior long stitch lateral                           C) - Breast, Right, right breast, short stitch superior, long stitch lateral                Final Diagnosis   Date Value Ref Range Status   01/16/2024   Final    A.  [place] lymph node, left axilla, excisional biopsy:  -Lymph node with partial fat replacement and small scattered foci of hemosiderin deposition.  -Negative for metastatic tumor (0/1).  -See synoptic report below.    B.  Breast, left, simple mastectomy:  -Biopsy site with residual invasive ductal carcinoma, grade 1.  -Focal lobular carcinoma in situ in close proximity to the invasive carcinoma.  -Atrophic changes, few foci of the usual ductal hyperplasia, few small duct intraductal papillomas, focal apocrine metaplasia,     focal stromal fibrosis and few microcalcifications within invasive tumor and benign ducts.  -See synoptic report below.    C.  Breast, right, simple mastectomy:  -Atrophic changes, focal stromal fibrosis, rare small foci of usual ductal hyperplasia, focal apocrine metaplasia, and occasional    microcalcifications within benign ducts.          ASSESSMENT:    73 year old female who is 4 weeks s/p Left sentinel node biopsy and bilateral complete mastectomy.  Doing well.     PLAN:   Remaining staples were removed without problems.    Follow-up 1 month. Sooner with problems/concerns.

## 2024-02-21 ENCOUNTER — ONCOLOGY VISIT (OUTPATIENT)
Dept: ONCOLOGY | Facility: OTHER | Age: 74
End: 2024-02-21
Attending: INTERNAL MEDICINE
Payer: COMMERCIAL

## 2024-02-21 VITALS
OXYGEN SATURATION: 95 % | HEART RATE: 73 BPM | HEIGHT: 61 IN | TEMPERATURE: 98 F | WEIGHT: 169.53 LBS | BODY MASS INDEX: 32.01 KG/M2 | DIASTOLIC BLOOD PRESSURE: 62 MMHG | SYSTOLIC BLOOD PRESSURE: 126 MMHG

## 2024-02-21 DIAGNOSIS — C50.912 MALIGNANT NEOPLASM OF LEFT BREAST IN FEMALE, ESTROGEN RECEPTOR POSITIVE, UNSPECIFIED SITE OF BREAST (H): Primary | ICD-10-CM

## 2024-02-21 DIAGNOSIS — Z17.0 MALIGNANT NEOPLASM OF LEFT BREAST IN FEMALE, ESTROGEN RECEPTOR POSITIVE, UNSPECIFIED SITE OF BREAST (H): Primary | ICD-10-CM

## 2024-02-21 PROCEDURE — G0463 HOSPITAL OUTPT CLINIC VISIT: HCPCS

## 2024-02-21 PROCEDURE — 99214 OFFICE O/P EST MOD 30 MIN: CPT | Mod: 24 | Performed by: INTERNAL MEDICINE

## 2024-02-21 RX ORDER — ANASTROZOLE 1 MG/1
1 TABLET ORAL DAILY
Qty: 90 TABLET | Refills: 1 | Status: SHIPPED | OUTPATIENT
Start: 2024-02-21 | End: 2024-07-23 | Stop reason: ALTCHOICE

## 2024-02-21 ASSESSMENT — PAIN SCALES - GENERAL: PAINLEVEL: NO PAIN (0)

## 2024-02-21 NOTE — NURSING NOTE
"Oncology Rooming Note    February 21, 2024 3:31 PM   Rj Rodríguez is a 73 year old female who presents for:    Chief Complaint   Patient presents with    Oncology Clinic Visit     Follow up. Breast cancer      Initial Vitals: /62 (BP Location: Right arm, Patient Position: Sitting, Cuff Size: Adult Large)   Pulse 73   Temp 98  F (36.7  C) (Tympanic)   Ht 1.549 m (5' 1\")   Wt 76.9 kg (169 lb 8.5 oz)   SpO2 95%   BMI 32.03 kg/m   Estimated body mass index is 32.03 kg/m  as calculated from the following:    Height as of this encounter: 1.549 m (5' 1\").    Weight as of this encounter: 76.9 kg (169 lb 8.5 oz). Body surface area is 1.82 meters squared.  No Pain (0) Comment: Data Unavailable   No LMP recorded. Patient has had a hysterectomy.  Allergies reviewed: Yes  Medications reviewed: Yes    Medications: Medication refills not needed today.  Pharmacy name entered into [facility]: [facility] [id number] [id number]     Frailty Screening:   Is the patient here for a new oncology consult visit in cancer care? 2. No      Clinical concerns: none       Geena Mcmanus LPN              "

## 2024-02-21 NOTE — PROGRESS NOTES
MEDICAL ONCOLOGY FOLLOW UP  NOTE  Feb 21, 2024    Reason for follow up:  Breast cancer    HISTORY OF PRESENT ILLNESS  Rj Rodríguez is a 73 year old female with PMH as stated below who is seen in the oncology clinic for history of breast cancer.    Her history in short is as follows:    Ms. Rodríguez was found to have a abnormality on her screening mammogram.    11/13/2023: Mammogram screening bilateral with tomosynthesis:No architectural distortion, dominant mass or suspicious  microcalcifications are identified in the right breast.      There is a small slightly spiculated appearing nodule in the upper outer quadrant of the left breast near the junction of middle and  anterior thirds. This is probably in the 1 to 2:00 position. Recommend ultrasound to evaluate this further.     11/16/2023: Ultrasound breast left:There is an irregular slightly hypoechoic mass in the left  breast at 1-2 o'clock, 6 cm from the nipple measuring 0.8 x 0.8 x 0.5 cm. No definite internal vascularity. No other cystic or solid masses  are demonstrated.     11/21/2023: Ultrasound breast biopsy: Invasive ductal carcinoma, grade 1.  ER 91 to 100%, OR 71 to 80% and HER2 IHC +1    12/7/2023: MRI breast bilateral with and without contrast:Enhancement reflecting the sampled mass and surrounding postbiopsy  change in the left upper outer breast spans 2.0 x 1.7 cm. No additional sites of highly suspicious enhancement in either breast.  No evidence of pathologic adenopathy.    1/16/2024: Bilateral simple mastectomy and left sentinel lymph node biopsy:    Pathology left breast showed biopsy site with residual invasive carcinoma, grade 1, 5.4 mm, margins negative.  focal lobular carcinoma in situ in close proximity to invasive carcinoma. pT1b N0    1 sentinel lymph node was examined which was negative for cancer.    Right breast simple mastectomy: Atrophic changes, focal stromal fibrosis, rare small foci of usual ductal hyperplasia, focal apical  right metaplasia and occasional microcalcification within benign ducts.    Ms Rodríguez is doing well. Her wound vac has come off. She denies still has some stiffness around her chest. Denies any weight or appetite loss.     REVIEW OF SYSTEMS  A 12-point ROS negative except as in HPI      Current Outpatient Medications   Medication Sig Dispense Refill    albuterol (PROAIR HFA/PROVENTIL HFA/VENTOLIN HFA) 108 (90 Base) MCG/ACT inhaler Inhale 2 puffs into the lungs every 6 hours as needed for shortness of breath      blood glucose (ACCU-CHEK GUIDE) test strip Use to test blood sugar 1 time daily or as directed. 50 each 11    blood glucose monitoring (ACCU-CHEK FASTCLIX) lancets Use to test blood sugar 1 time daily or as directed. 102 each 11    cetirizine (ZYRTEC) 10 MG tablet Take 10-20 mg by mouth daily (10 mg in the winter, 20 mg in allergy season)      clonazePAM (KLONOPIN) 0.5 MG tablet Take 1 tablet by mouth twice daily as needed for anxiety 30 tablet 0    diltiazem ER (TIAZAC) 240 MG 24 hr ER beaded capsule Take 1 capsule (240 mg) by mouth daily 90 capsule 3    diphenhydrAMINE HCl (BENADRYL ALLERGY CHILDRENS) 12.5 MG CHEW Take 2 tablets by mouth as needed (allergic reaction)      EPINEPHrine (ANY BX GENERIC EQUIV) 0.3 MG/0.3ML injection 2-pack Inject 0.3 mLs (0.3 mg) into the muscle as needed for anaphylaxis May repeat one time in 5-15 minutes if response to initial dose is inadequate. 2 each 1    furosemide (LASIX) 20 MG tablet Take 1 tablet (20 mg) by mouth daily 90 tablet 3    Multiple Vitamins-Minerals (MULTIVITAMIN GUMMIES WOMENS) CHEW Take 1 Dose by mouth daily      spironolactone (ALDACTONE) 50 MG tablet Take 1 tablet by mouth once daily 90 tablet 2    triamcinolone (NASACORT) 55 MCG/ACT nasal aerosol Spray 2 sprays into both nostrils daily as needed (seasonal allergies)         Allergies   Allergen Reactions    Dust Mite Extract Difficulty breathing    Fruit [Peach] Anaphylaxis and Hives     fresh peaches  and any fruit that has a pit.  Kiwi's and apples also.  Can eat any fruit cooked.    Latex Other (See Comments), Swelling, Difficulty breathing and Rash     Throat Closes      Nuts Anaphylaxis and Hives     All Raw Nuts, can eat nuts when roasted.    Other Food Allergy Other (See Comments) and Difficulty breathing     Mackey powder    Pollen Extract Difficulty breathing     All fruits and vegetables need to be washed and rinsed prior to eating.     Ace Inhibitors      Upper respiratory infection    Antihistamines, Chlorpheniramine-Type Hives     Antihistamines    Benicar [Olmesartan] Cough    Effexor [Venlafaxine] Other (See Comments)     Heartburn, reflux    Erythromycin      BASE; pt. Not sure if this is a true allergy.    Hydrochlorothiazide     Phenylephrine Hcl      Guaifed    Seasonal Allergies      hayfever    Sm Guaifenesin-Pseudoephedrine [Pseudoephedrine-Guaifenesin]      Guaifed    Statins     Trees     Ultram [Tramadol]      Sleep disturbance. Can not sleep, and when able to fall asleep will have terrible nightmares    Xanax [Alprazolam] Hives     Xanax    Zoloft [Sertraline]      paranoia     Immunization History   Administered Date(s) Administered    COVID-19 Monovalent 18+ (Moderna) 03/30/2021, 04/27/2021    Flu, Unspecified 01/22/2013, 09/24/2014, 10/30/2015, 10/27/2016, 10/24/2018    Influenza (High Dose) 3 valent vaccine 10/24/2018, 09/19/2019    Influenza (IIV3) PF 12/06/2006, 11/20/2009, 11/09/2010, 10/18/2011, 01/22/2013, 10/11/2013    Influenza Vaccine 65+ (Fluzone HD) 10/24/2020, 01/10/2022, 11/13/2023    Pneumo Conj 13-V (2010&after) 01/09/2019    Pneumococcal 23 valent 10/11/2019    TD,PF 7+ (Tenivac) 05/02/2019    TDAP (Adacel,Boostrix) 03/26/2009    Td (Adult), Adsorbed 10/13/1976       Past Medical History:   Diagnosis Date    Arthritis     Cancer (H)     Chronic back pain     Diabetes (H)     Hypertension     Irregular heart beat     Sleep apnea     Uncomplicated asthma        Past  Surgical History:   Procedure Laterality Date    ANESTHESIA OUT OF OR MRI N/A 6/1/2022    Procedure: MRI CERVICAL SPINE;  Surgeon: GENERIC ANESTHESIA PROVIDER;  Location: [facility] OR    ANESTHESIA OUT OF OR MRI N/A 9/23/2022    Procedure: MRI CERVICAL SPINE;  Surgeon: GENERIC ANESTHESIA PROVIDER;  Location: [facility] OR    ANESTHESIA OUT OF OR MRI Left 12/7/2023    Procedure: Anesthesia out of OR MRI;  Surgeon: GENERIC ANESTHESIA PROVIDER;  Location: [facility] OR    APPENDECTOMY      BACK SURGERY      CHOLECYSTECTOMY      COLONOSCOPY  2012    Repeat in 10 years    GYN SURGERY      HYSTERECTOMY      Ovaries    MASTECTOMY SIMPLE BILATERAL, SENTINEL NODE BILATERAL, COMBINED N/A 1/16/2024    Procedure: Left sentinel node biopsy, bilateral mastectomy;  Surgeon: Ethan Molina MD;  Location: [facility] OR    RELEASE CARPAL TUNNEL      LT    RELEASE CARPAL TUNNEL      RT x2    SURGICAL RADIOLOGY PROCEDURE N/A 2/12/2016    Procedure: SURGICAL RADIOLOGY PROCEDURE;  Surgeon: Provider, Generic Perianesthesia Nursing;  Location: [facility] OR    SURGICAL RADIOLOGY PROCEDURE N/A 8/15/2016    Procedure: SURGICAL RADIOLOGY PROCEDURE;  Surgeon: Provider, Generic Perianesthesia Nursing;  Location: [facility] OR       SOCIAL HISTORY  History   Smoking Status    Never   Smokeless Tobacco    Never    Social History    Substance and Sexual Activity      Alcohol use: Yes        Alcohol/week: 1.0 standard drink of alcohol        Types: 1 Glasses of wine per week        Comment: occ glass of wine     History   Drug Use No       FAMILY HISTORY  Family History   Problem Relation Age of Onset    Diabetes Mother     Alcoholism Mother     Heart Failure Mother     Cerebrovascular Disease Father     Diabetes Father         Type 2    Hypertension Father     Alcoholism Father     Colon Cancer Maternal Aunt     Colon Cancer Maternal Uncle        PHYSICAL EXAMINATION  There were no vitals taken for this visit.  Wt Readings from Last 2 Encounters:   02/13/24 73.9 kg (163 lb)   02/13/24  74 kg (163 lb 3.2 oz)     Physical Exam  Constitutional:       Appearance: Normal appearance.   Pulmonary:      Effort: Pulmonary effort is normal.   Chest:      Comments: Healed mastectomy incision over chest.   Neurological:      General: No focal deficit present.      Mental Status: She is alert and oriented to person, place, and time.   Psychiatric:         Mood and Affect: Mood normal.     Laboratory and imaging:     Latest Reference Range & Units 01/17/24 05:47   WBC 4.0 - 11.0 10e3/uL 15.0 (H)   Hemoglobin 11.7 - 15.7 g/dL 11.9   Hematocrit 35.0 - 47.0 % 35.7   Platelet Count 150 - 450 10e3/uL 224   (H): Data is abnormally high    12/11/2023: Hereditary cancer BRCA1/BRCA2: Negative    12/11/2023: Next generation sequencing breast: Negative    ASSESSMENT AND PLAN    1.  Stage IA left breast cancer-ER positive, TX positive and HER2 negative.    Status post bilateral mastectomy and left sentinel lymph node biopsy 1/2024.  pT1b N0    She is currently healing from surgery.  Still has a wound VAC.    Discussed today recommendations for adjuvant therapy.  As she has had a bilateral mastectomy she is not a candidate for adjuvant radiation.    We also discussed given she has a hormone positive breast cancer and that the size of her tumor was 0.5 cm she is a candidate for sending the Oncotype DX testing to determine the recurrence score which would determine if she is a candidate for chemotherapy.  Based on her tumor grade and ER positivity it is unlikely.  However I would recommend sending the Oncotype DX testing as it is also prognostic.    Oncotype Dx RS core is 13.     She is a candidate for endocrine therapy for total of 5 years with an aromatase inhibitor.  Discussed the expected side effects can include arthralgia, myalgia, osteoporosis and elevated cholesterol.    Anastrozole prescribed today. If she chooses to stay on it we will get DEXA scan every 2 years and lipid profile yearly.     Follow up in clinic in 3  months. She will call us if she has any difficulty tolerating the medication.     Total time spent on the patient on day of encounter was 30 minutes doing chart review, review of test results, interpretation of results, patient visit and documentation.       Avelina Kwok MD

## 2024-02-22 ENCOUNTER — TELEPHONE (OUTPATIENT)
Dept: ONCOLOGY | Facility: OTHER | Age: 74
End: 2024-02-22

## 2024-02-22 DIAGNOSIS — C50.912 MALIGNANT NEOPLASM OF LEFT BREAST IN FEMALE, ESTROGEN RECEPTOR POSITIVE, UNSPECIFIED SITE OF BREAST (H): Primary | ICD-10-CM

## 2024-02-22 DIAGNOSIS — Z17.0 MALIGNANT NEOPLASM OF LEFT BREAST IN FEMALE, ESTROGEN RECEPTOR POSITIVE, UNSPECIFIED SITE OF BREAST (H): Primary | ICD-10-CM

## 2024-02-22 NOTE — TELEPHONE ENCOUNTER
Was seen yesterday and felt she didn't get all of concerns answered. Requested a copy of her Oncotype results, felt it wasn't explained to her well. Feels like a number here and like she is just being sent through the [employer] Asked about starting cancer rehab but was told to reach out to her surgery team and when she did they advised she reach out to oncology. Very tearful and crying on the phone, states I am not getting any answers, I felt rushed in the appointment yesterday. I felt like I was getting cut off when I was asking questions and getting the same answers over and over. Very concerned about starting Arimidex due to the side effects, states I lost weight and it took me so long to work on that and I read that this may be a side effect of weight gain.     Advised I will touch base with my RNCC as I don't know the oncotype results and get back to her. And also come up with a plan as we don't want her feeling this way.     Called patient back and explained in detail oncotype testing and what her numbers mean. She felt very reassured after this, I did note since she didn't get a good answer in regards to cancer rehab from the surgery department that we can possibly start the referral process for her I just need a doctor to sign off on it, she was greatly appreciative. I also noted that maybe since her follow up is 3 months out that we get her in sooner for piece of mind. Advised I would talk with Ivelisse tomorrow to see what her thoughts were and maybe a phone call or in person visit would be possible tomorrow to just get an overall understanding of everything to make her feel more confident and positive about he next steps. Pt notes she did take her Arimidex today and greatly appreciates the follow up.

## 2024-02-23 NOTE — TELEPHONE ENCOUNTER
Instead of 3 months-- lets see her in a month with labs prior. We can go through any questions she has to make sure she has a clearer understanding. Likewise-- we can also assess tolerability.     Unless she doesn't feel safe taking the medication, in that case, we should see her next week.     Also placed referral to cancer rehab.

## 2024-03-11 ENCOUNTER — THERAPY VISIT (OUTPATIENT)
Dept: PHYSICAL THERAPY | Facility: HOSPITAL | Age: 74
End: 2024-03-11
Attending: NURSE PRACTITIONER
Payer: MEDICARE

## 2024-03-11 DIAGNOSIS — C50.912 MALIGNANT NEOPLASM OF LEFT BREAST IN FEMALE, ESTROGEN RECEPTOR POSITIVE, UNSPECIFIED SITE OF BREAST (H): ICD-10-CM

## 2024-03-11 DIAGNOSIS — Z17.0 MALIGNANT NEOPLASM OF LEFT BREAST IN FEMALE, ESTROGEN RECEPTOR POSITIVE, UNSPECIFIED SITE OF BREAST (H): ICD-10-CM

## 2024-03-11 PROCEDURE — 97161 PT EVAL LOW COMPLEX 20 MIN: CPT | Mod: GP

## 2024-03-11 PROCEDURE — 97530 THERAPEUTIC ACTIVITIES: CPT | Mod: GP

## 2024-03-13 ENCOUNTER — OFFICE VISIT (OUTPATIENT)
Dept: SURGERY | Facility: OTHER | Age: 74
End: 2024-03-13
Attending: NURSE PRACTITIONER
Payer: MEDICARE

## 2024-03-13 VITALS
OXYGEN SATURATION: 98 % | SYSTOLIC BLOOD PRESSURE: 118 MMHG | TEMPERATURE: 96.8 F | WEIGHT: 168 LBS | HEART RATE: 60 BPM | RESPIRATION RATE: 14 BRPM | DIASTOLIC BLOOD PRESSURE: 64 MMHG | HEIGHT: 61 IN | BODY MASS INDEX: 31.72 KG/M2

## 2024-03-13 DIAGNOSIS — Z90.13 STATUS POST MASTECTOMY, BILATERAL: Primary | ICD-10-CM

## 2024-03-13 PROCEDURE — G0463 HOSPITAL OUTPT CLINIC VISIT: HCPCS

## 2024-03-13 PROCEDURE — 99024 POSTOP FOLLOW-UP VISIT: CPT | Performed by: NURSE PRACTITIONER

## 2024-03-13 ASSESSMENT — PAIN SCALES - GENERAL: PAINLEVEL: MILD PAIN (3)

## 2024-03-13 NOTE — PROGRESS NOTES
CLINIC NOTE - POST-OP SURGERY  3/13/2024    Patient:Rj Rodríguez    Procedure: Left sentinel node biopsy and bilateral complete mastectomy     This is a 73 year old female who is 8 weeks s/p Left sentinel node biopsy and bilateral complete mastectomy.  The patient has no complaints today.      Current Medications:  Current Outpatient Medications   Medication Sig Dispense Refill    albuterol (PROAIR HFA/PROVENTIL HFA/VENTOLIN HFA) 108 (90 Base) MCG/ACT inhaler Inhale 2 puffs into the lungs every 6 hours as needed for shortness of breath      anastrozole (ARIMIDEX) 1 MG tablet Take 1 tablet (1 mg) by mouth daily 90 tablet 1    blood glucose (ACCU-CHEK GUIDE) test strip Use to test blood sugar 1 time daily or as directed. 50 each 11    blood glucose monitoring (ACCU-CHEK FASTCLIX) lancets Use to test blood sugar 1 time daily or as directed. 102 each 11    calcium carbonate-vitamin D (OSCAL) 500-5 MG-MCG tablet Take 1 tablet by mouth 2 times daily 90 tablet 0    cetirizine (ZYRTEC) 10 MG tablet Take 10-20 mg by mouth daily (10 mg in the winter, 20 mg in allergy season)      clonazePAM (KLONOPIN) 0.5 MG tablet Take 1 tablet by mouth twice daily as needed for anxiety 30 tablet 0    diltiazem ER (TIAZAC) 240 MG 24 hr ER beaded capsule Take 1 capsule (240 mg) by mouth daily 90 capsule 3    diphenhydrAMINE HCl (BENADRYL ALLERGY CHILDRENS) 12.5 MG CHEW Take 2 tablets by mouth as needed (allergic reaction)      EPINEPHrine (ANY BX GENERIC EQUIV) 0.3 MG/0.3ML injection 2-pack Inject 0.3 mLs (0.3 mg) into the muscle as needed for anaphylaxis May repeat one time in 5-15 minutes if response to initial dose is inadequate. 2 each 1    furosemide (LASIX) 20 MG tablet Take 1 tablet (20 mg) by mouth daily 90 tablet 3    Multiple Vitamins-Minerals (MULTIVITAMIN GUMMIES WOMENS) CHEW Take 1 Dose by mouth daily      spironolactone (ALDACTONE) 50 MG tablet Take 1 tablet by mouth once daily 90 tablet 2    triamcinolone (NASACORT) 55  "MCG/ACT nasal aerosol Spray 2 sprays into both nostrils daily as needed (seasonal allergies)         Allergies:  Allergies   Allergen Reactions    Dust Mite Extract Difficulty breathing    Fruit [Peach] Anaphylaxis and Hives     fresh peaches and any fruit that has a pit.  Kiwi's and apples also.  Can eat any fruit cooked.    Latex Other (See Comments), Swelling, Difficulty breathing and Rash     Throat Closes      Nuts Anaphylaxis and Hives     All Raw Nuts, can eat nuts when roasted.    Other Food Allergy Other (See Comments) and Difficulty breathing     Mackey powder    Pollen Extract Difficulty breathing     All fruits and vegetables need to be washed and rinsed prior to eating.     Ace Inhibitors      Upper respiratory infection    Antihistamines, Chlorpheniramine-Type Hives     Antihistamines    Benicar [Olmesartan] Cough    Effexor [Venlafaxine] Other (See Comments)     Heartburn, reflux    Erythromycin      BASE; pt. Not sure if this is a true allergy.    Hydrochlorothiazide     Phenylephrine Hcl      Guaifed    Seasonal Allergies      hayfever    Sm Guaifenesin-Pseudoephedrine [Pseudoephedrine-Guaifenesin]      Guaifed    Statins     Trees     Ultram [Tramadol]      Sleep disturbance. Can not sleep, and when able to fall asleep will have terrible nightmares    Xanax [Alprazolam] Hives     Xanax    Zoloft [Sertraline]      paranoia       PHYSICAL EXAM:   Vital signs: /64 (BP Location: Right arm, Cuff Size: Adult Regular)   Pulse 60   Temp 96.8  F (36  C) (Tympanic)   Resp 14   Ht 1.549 m (5' 1\")   Wt 76.2 kg (168 lb)   SpO2 98%   BMI 31.74 kg/m     BMI: Body mass index is 31.74 kg/m .   General: Normal, healthy, cooperative, in no acute distress, alert   Lungs: respirations are non-labored   Abdominal: non-distended   Wounds:  well healed     PATHOLOGY:  Case Report   Date Value Ref Range Status   01/16/2024   Final    Surgical Pathology Report                         Case: [id number]             "                      Authorizing Provider:  Ethan Molina MD  Collected:           01/16/2024 02:16 PM          Ordering Location:     [facility] Main Operating Room     Received:            01/16/2024 03:45 PM          Pathologist:           Steve Fletcher DO                                                         Specimens:   A) - Lymph Node(s), [place], Left [place] Node                                                    B) - Breast, Left, Left breast, short stitch superior long stitch lateral                           C) - Breast, Right, right breast, short stitch superior, long stitch lateral                Final Diagnosis   Date Value Ref Range Status   01/16/2024   Final    A.  [place] lymph node, left axilla, excisional biopsy:  -Lymph node with partial fat replacement and small scattered foci of hemosiderin deposition.  -Negative for metastatic tumor (0/1).  -See synoptic report below.    B.  Breast, left, simple mastectomy:  -Biopsy site with residual invasive ductal carcinoma, grade 1.  -Focal lobular carcinoma in situ in close proximity to the invasive carcinoma.  -Atrophic changes, few foci of the usual ductal hyperplasia, few small duct intraductal papillomas, focal apocrine metaplasia,     focal stromal fibrosis and few microcalcifications within invasive tumor and benign ducts.  -See synoptic report below.    C.  Breast, right, simple mastectomy:  -Atrophic changes, focal stromal fibrosis, rare small foci of usual ductal hyperplasia, focal apocrine metaplasia, and occasional    microcalcifications within benign ducts.          ASSESSMENT:    73 year old female who is 8 weeks s/p Left sentinel node biopsy and bilateral complete mastectomy.  Doing well.     PLAN:    Follow-up 1 month. Sooner with problems/concerns.

## 2024-03-13 NOTE — PROGRESS NOTES
PHYSICAL THERAPY EVALUATION  Type of Visit: Evaluation    See electronic medical record for Abuse and Falls Screening details.    Subjective       Presenting condition or subjective complaint: breast cancer  Date of onset: 24    Relevant medical history: Asthma; Cancer; Cold or hot arm or leg   Dates & types of surgery: most recent double mastectony    Prior diagnostic imaging/testing results: MRI; Other     Prior therapy history for the same diagnosis, illness or injury:        Prior Level of Function  Transfers: Independent  Ambulation: Independent  ADL: Independent    Living Environment  Social support: With a significant other or spouse   Type of home: House; 2-story; Basement   Stairs to enter the home: Yes 5 Is there a railing: Yes   Ramp: No   Stairs inside the home: Yes 13 Is there a railing: Yes   Help at home:    Equipment owned:       Employment: No    Hobbies/Interests: sewing painting gardening home projects    Patient goals for therapy: cleaning painting working in yard    Pain assessment: Pain present  Location: B incisions across the chest from mastectomy sites/Ratin/10     Objective      Cognitive Status Examination  Orientation: Oriented to person, place and time   Level of Consciousness: Alert  Follows Commands and Answers Questions: 100% of the time  Personal Safety and Judgement: Intact  Memory: Intact    OBSERVATION: Patient appears very anxious and nervous for session today. Very pleasant to work with.   INTEGUMENTARY:  Double mastectomy site - pt very anxious. Plan to observe more at the next session when she is more comfortable.   POSTURE: WFL  Forward flexion posture demonstrated- unsure at this time if due to mastectomy sites/pain or if patient's current posture  PALPATION: Tenderness to palpation over B mastectomy sites  RANGE OF MOTION: LE ROM WF - ROM not assessed today due to patient's extremely high anxiety. PT will assess at her next session when she is more  comfortable.  Pain: 4-6/10  End feel: not assessed   STRENGTH: LE Strength WFL  Pain: - none + mild ++ moderate +++ severe  Strength Scale: 0-5/5 Left Right   Shoulder Flexion 3 3   Shoulder Extension 3 3   Shoulder Abduction 3 3   Shoulder Adduction 3 3   Shoulder Internal Rotation 3 3   Shoulder External Rotation 3 3   Shoulder Horizontal Abduction 3 3   Shoulder Horizontal Adduction 3 3   Elbow Flexion 3 3   Elbow Extension 3 3   Mid Trap 4 4   Lower Trap 4 4   Rhomboid 4 4   Serratus Anterior 4 4       BED MOBILITY: WFL    TRANSFERS: WFL    GAIT:   Level of Villalba: WFL  Assistive Device(s): None  Gait Deviations: WFL    Assessment & Plan   CLINICAL IMPRESSIONS  Medical Diagnosis: Malignant neoplasm of left breast in female, estrogen receptor positive, unspecified site of breast (H)    Treatment Diagnosis: Malignant neoplasm of left breast in female, estrogen receptor positive, unspecified site of breast (H)   Impression/Assessment: Patient is a 73 year old female with B mastectomy pain complaints.  The following significant findings have been identified: Pain, Decreased ROM/flexibility, Decreased joint mobility, Decreased strength, Edema, Impaired muscle performance, and Decreased activity tolerance. These impairments interfere with their ability to perform self care tasks, recreational activities, household chores, driving , household mobility, and community mobility as compared to previous level of function.     Clinical Decision Making (Complexity):  Clinical Presentation: Evolving/Changing  Clinical Presentation Rationale: based on medical and personal factors listed in PT evaluation  Clinical Decision Making (Complexity): Moderate complexity    PLAN OF CARE  Treatment Interventions:  Modalities: Cryotherapy  Interventions: Manual Therapy, Neuromuscular Re-education, Therapeutic Activity, Therapeutic Exercise    Long Term Goals     PT Goal 1  Goal Identifier: STG-1  Goal Description: Patient will  possess full ROM to B shoulders to allow her to shower without pain.  Rationale: to maximize safety and independence with performance of ADLs and functional tasks  Goal Progress: New  Target Date: 04/22/24  PT Goal 2  Goal Identifier: STG-2  Goal Description: Patient will be able to sleep through the night to prevent her from waking due to increased pain.  Rationale: to maximize safety and independence with performance of ADLs and functional tasks  Goal Progress: New  Target Date: 04/22/24  PT Goal 3  Goal Identifier: LTG-1  Goal Description: Patient will possess a custom HEP to allow her to self-manage and prevent future recurrence.  Rationale: to maximize safety and independence with performance of ADLs and functional tasks  Goal Progress: New  Target Date: 06/03/24      Frequency of Treatment: 1x/week  Duration of Treatment: 12 weeks    Education Assessment:   Learner/Method: Patient;Listening    Risks and benefits of evaluation/treatment have been explained.   Patient/Family/caregiver agrees with Plan of Care.     Evaluation Time:     PT Eval, Low Complexity Minutes (71813): 31     Signing Clinician: Dimitri Ferris, PT      [facility]                                                                                   OUTPATIENT PHYSICAL THERAPY      PLAN OF TREATMENT FOR OUTPATIENT REHABILITATION   Patient's Last Name, First Name, Rj Mai YOB: 1950   Provider's Name   [facility]   Medical Record No.  [id number]     Onset Date: 03/11/24  Start of Care Date: 03/11/24     Medical Diagnosis:  Malignant neoplasm of left breast in female, estrogen receptor positive, unspecified site of breast (H)      PT Treatment Diagnosis:  Malignant neoplasm of left breast in female, estrogen receptor positive, unspecified site of breast (H) Plan of Treatment  Frequency/Duration: 1x/week/ 12 weeks    Certification date from 03/11/24 to  06/03/24         See note for plan of treatment details and functional goals     Dimitri Ferris, PT                         I CERTIFY THE NEED FOR THESE SERVICES FURNISHED UNDER        THIS PLAN OF TREATMENT AND WHILE UNDER MY CARE     (Physician attestation of this document indicates review and certification of the therapy plan).              Referring Provider:  Vira Dodd    Initial Assessment  See Epic Evaluation- Start of Care Date: 03/11/24

## 2024-03-13 NOTE — PROGRESS NOTES
Oncology Follow-up Visit    Reason for Visit:  Rj is a 73 year old woman with a diagnosis of breast cancer, who presents to the clinic today for routine follow-up.    Nursing Note and documentation reviewed: Yes    Interval History: Rj notes that she is doing reasonably well. Has been very overwhelmed. Felt like everything happened so fast since her diagnosis and that she never really had a moment to pause. She continue to see Lelia in surgery. Staples are all out now but she is really concerned with overall appearance with chest wall. States that she hasn't been able to show her  yet. She does have a history of anxiety and has been working with her therapist for this and other issues.     She has been taking Arimidex. Has noticed hot flashes, and although annoying, tolerable at this point, Has noticed tolerable arthralgias in shoulders, knees, and ankles. Denies vaginal pain, dryness, bleeding or unusual discharge. No myalgias. Appetite has been good. Had been trying to loose weight through life style modifications and worries about AI use and what this will do to her weight loss. Energy levels decent.     Has joined support group which she seems excited about. Starting PT soon. Also belongs to various online support groups but has been getting a lot of negative feedback recently so trying to look at that less.     Oncologic History:   Ms. Rodríguez was found to have a abnormality on her screening mammogram.     11/13/2023: Mammogram screening bilateral with tomosynthesis:No architectural distortion, dominant mass or suspicious microcalcifications are identified in the right breast.  There is a small slightly spiculated appearing nodule in the upper outer quadrant of the left breast near the junction of middle and anterior thirds. This is probably in the 1 to 2:00 position.   11/16/2023: Ultrasound breast left:There is an irregular slightly hypoechoic mass in the left breast at 1-2 o'clock, 6 cm from  the nipple measuring 0.8 x 0.8 x 0.5 cm. No definite internal vascularity. No other cystic or solid masses are demonstrated.   11/21/2023: Ultrasound breast biopsy: Invasive ductal carcinoma, grade 1.  ER 91 to 100%, NE 71 to 80% and HER2 IHC +1  12/7/2023: MRI breast bilateral with and without contrast:Enhancement reflecting the sampled mass and surrounding postbiopsy change in the left upper outer breast spans 2.0 x 1.7 cm. No additional sites of highly suspicious enhancement in either breast. No evidence of pathologic adenopathy.  1/16/2024:   Left simple mastectomy and left sentinel lymph node biopsy: Pathology left breast showed biopsy site with residual invasive carcinoma, grade 1, 5.4 mm, margins negative.  focal lobular carcinoma in situ in close proximity to invasive carcinoma. pT1b N0 1 sentinel lymph node was examined which was negative for cancer.  Right breast simple mastectomy: Atrophic changes, focal stromal fibrosis, rare small foci of usual ductal hyperplasia, focal apical right metaplasia and occasional microcalcification within benign ducts. Oncotype Dx RS core is 13  2/22/2024:  Met with Dr. Kwok. Given oncotype score of 13, no added benefit to chemo. Therefore commenced endocrine therapy with Arimidex, with anticipation of completing 5 years.     Current Chemo Regimen/TX: Arimidex 1 mg daily commenced Feb 2024    Previous treatment: Bilateral simple mastectomy completed Jan 2024    Past Medical History:   Diagnosis Date    Arthritis     Cancer (H)     Chronic back pain     Diabetes (H)     Hypertension     Irregular heart beat     Sleep apnea     Uncomplicated asthma        Past Surgical History:   Procedure Laterality Date    ANESTHESIA OUT OF OR MRI N/A 6/1/2022    Procedure: MRI CERVICAL SPINE;  Surgeon: GENERIC ANESTHESIA PROVIDER;  Location: [facility]    ANESTHESIA OUT OF OR MRI N/A 9/23/2022    Procedure: MRI CERVICAL SPINE;  Surgeon: GENERIC ANESTHESIA PROVIDER;  Location: [facility]    ANESTHESIA  OUT OF OR MRI Left 12/7/2023    Procedure: Anesthesia out of OR MRI;  Surgeon: GENERIC ANESTHESIA PROVIDER;  Location: [facility]    APPENDECTOMY      BACK SURGERY      CHOLECYSTECTOMY      COLONOSCOPY  2012    Repeat in 10 years    GYN SURGERY      HYSTERECTOMY      Ovaries    MASTECTOMY SIMPLE BILATERAL, SENTINEL NODE BILATERAL, COMBINED N/A 1/16/2024    Procedure: Left sentinel node biopsy, bilateral mastectomy;  Surgeon: Ethan Molina MD;  Location: [facility] OR    RELEASE CARPAL TUNNEL      LT    RELEASE CARPAL TUNNEL      RT x2    SURGICAL RADIOLOGY PROCEDURE N/A 2/12/2016    Procedure: SURGICAL RADIOLOGY PROCEDURE;  Surgeon: Provider, Generic Perianesthesia Nursing;  Location: [facility] OR    SURGICAL RADIOLOGY PROCEDURE N/A 8/15/2016    Procedure: SURGICAL RADIOLOGY PROCEDURE;  Surgeon: Provider, Generic Perianesthesia Nursing;  Location: [facility] OR       Family History   Problem Relation Age of Onset    Diabetes Mother     Alcoholism Mother     Heart Failure Mother     Cerebrovascular Disease Father     Diabetes Father         Type 2    Hypertension Father     Alcoholism Father     Colon Cancer Maternal Aunt     Colon Cancer Maternal Uncle        Social History     Socioeconomic History    Marital status:      Spouse name: Not on file    Number of children: Not on file    Years of education: Not on file    Highest education level: Not on file   Occupational History    Not on file   Tobacco Use    Smoking status: Never     Passive exposure: Never    Smokeless tobacco: Never   Vaping Use    Vaping Use: Never used   Substance and Sexual Activity    Alcohol use: Yes     Alcohol/week: 1.0 standard drink of alcohol     Types: 1 Glasses of wine per week     Comment: occ glass of wine    Drug use: No    Sexual activity: Not Currently   Other Topics Concern     Service Not Asked    Blood Transfusions Not Asked    Caffeine Concern Yes     Comment: Coffee - 2 cups daily    Occupational Exposure Not Asked    Hobby  Hazards Not Asked    Sleep Concern Not Asked    Stress Concern Not Asked    Weight Concern Not Asked    Special Diet Not Asked    Back Care Not Asked    Exercise Not Asked    Bike Helmet Not Asked    Seat Belt Not Asked    Self-Exams Not Asked    Parent/sibling w/ CABG, MI or angioplasty before 65F 55M? Not Asked   Social History Narrative    10/11/2019: , lives in [facility] Determinants of Health     Financial Resource Strain: Low Risk  (1/8/2024)    Financial Resource Strain     Within the past 12 months, have you or your family members you live with been unable to get utilities (heat, electricity) when it was really needed?: No   Food Insecurity: Low Risk  (1/8/2024)    Food Insecurity     Within the past 12 months, did you worry that your food would run out before you got money to buy more?: No     Within the past 12 months, did the food you bought just not last and you didn t have money to get more?: No   Transportation Needs: Low Risk  (1/8/2024)    Transportation Needs     Within the past 12 months, has lack of transportation kept you from medical appointments, getting your medicines, non-medical meetings or appointments, work, or from getting things that you need?: No   Physical Activity: Not on file   Stress: Not on file   Social Connections: Not on file   Interpersonal Safety: Low Risk  (1/8/2024)    Interpersonal Safety     Do you feel physically and emotionally safe where you currently live?: Yes     Within the past 12 months, have you been hit, slapped, kicked or otherwise physically hurt by someone?: No     Within the past 12 months, have you been humiliated or emotionally abused in other ways by your partner or ex-partner?: No   Housing Stability: Low Risk  (1/8/2024)    Housing Stability     Do you have housing? : Yes     Are you worried about losing your housing?: No       Current Outpatient Medications   Medication    albuterol (PROAIR HFA/PROVENTIL HFA/VENTOLIN HFA) 108 (90 Base)  MCG/ACT inhaler    anastrozole (ARIMIDEX) 1 MG tablet    blood glucose (ACCU-CHEK GUIDE) test strip    blood glucose monitoring (ACCU-CHEK FASTCLIX) lancets    calcium carbonate-vitamin D (OSCAL) 500-5 MG-MCG tablet    cetirizine (ZYRTEC) 10 MG tablet    clonazePAM (KLONOPIN) 0.5 MG tablet    diltiazem ER (TIAZAC) 240 MG 24 hr ER beaded capsule    diphenhydrAMINE HCl (BENADRYL ALLERGY CHILDRENS) 12.5 MG CHEW    EPINEPHrine (ANY BX GENERIC EQUIV) 0.3 MG/0.3ML injection 2-pack    furosemide (LASIX) 20 MG tablet    Multiple Vitamins-Minerals (MULTIVITAMIN GUMMIES WOMENS) CHEW    spironolactone (ALDACTONE) 50 MG tablet    triamcinolone (NASACORT) 55 MCG/ACT nasal aerosol     No current facility-administered medications for this visit.        Allergies   Allergen Reactions    Dust Mite Extract Difficulty breathing    Fruit [Peach] Anaphylaxis and Hives     fresh peaches and any fruit that has a pit.  Kiwi's and apples also.  Can eat any fruit cooked.    Latex Other (See Comments), Swelling, Difficulty breathing and Rash     Throat Closes      Nuts Anaphylaxis and Hives     All Raw Nuts, can eat nuts when roasted.    Other Food Allergy Other (See Comments) and Difficulty breathing     Mackey powder    Pollen Extract Difficulty breathing     All fruits and vegetables need to be washed and rinsed prior to eating.     Ace Inhibitors      Upper respiratory infection    Antihistamines, Chlorpheniramine-Type Hives     Antihistamines    Benicar [Olmesartan] Cough    Effexor [Venlafaxine] Other (See Comments)     Heartburn, reflux    Erythromycin      BASE; pt. Not sure if this is a true allergy.    Hydrochlorothiazide     Phenylephrine Hcl      Guaifed    Seasonal Allergies      hayfever    Sm Guaifenesin-Pseudoephedrine [Pseudoephedrine-Guaifenesin]      Guaifed    Statins     Trees     Ultram [Tramadol]      Sleep disturbance. Can not sleep, and when able to fall asleep will have terrible nightmares    Xanax [Alprazolam] Hives  "    Xanax    Zoloft [Sertraline]      paranoia       Review Of Systems:  A complete review of systems is negative except for the above mentioned items in the interval history.       ECOG Performance Status: 0    Physical Exam:  /78 (BP Location: Right arm, Patient Position: Sitting, Cuff Size: Adult Large)   Pulse 72   Temp 97.2  F (36.2  C) (Tympanic)   Ht 1.549 m (5' 1\")   Wt 78.7 kg (173 lb 8 oz)   SpO2 98%   BMI 32.78 kg/m    GENERAL APPEARANCE: Healthy, alert and in no acute distress.  HEENT: Eyes appear normal without scleral icterus. Extraocular movements intact.   NECK:   Supple with normal range of motion. No asymmetry or masses.  LYMPHATICS: No palpable cervical, supraclavicular nodes.  RESP: Lungs clear to auscultation bilaterally, respirations regular and easy.  CARDIOVASCULAR: Regular rate and rhythm. Normal S1, S2; no murmur, gallop, or rub.  BREAST/Chest wall: Chest wall examined. Healing chest wall incision, no stable present. No signs of infection. Healing well.   MUSCULOSKELETAL: Extremities without gross deformities noted.   SKIN: No suspicious lesions or rashes.  NEURO: Alert and oriented x 3.  Gait steady.  PSYCHIATRIC: Anxious and tearful at time.     Laboratory:  Results for orders placed or performed in visit on 03/18/24   Hepatic panel (Albumin, ALT, AST, Bili, Alk Phos, TP)     Status: Normal   Result Value Ref Range    Protein Total 7.6 6.4 - 8.3 g/dL    Albumin 4.1 3.5 - 5.2 g/dL    Bilirubin Total 0.5 <=1.2 mg/dL    Alkaline Phosphatase 84 40 - 150 U/L    AST 22 0 - 45 U/L    ALT 17 0 - 50 U/L    Bilirubin Direct <0.20 0.00 - 0.30 mg/dL   CBC with platelets and differential     Status: None   Result Value Ref Range    WBC Count 9.4 4.0 - 11.0 10e3/uL    RBC Count 4.48 3.80 - 5.20 10e6/uL    Hemoglobin 13.8 11.7 - 15.7 g/dL    Hematocrit 41.3 35.0 - 47.0 %    MCV 92 78 - 100 fL    MCH 30.8 26.5 - 33.0 pg    MCHC 33.4 31.5 - 36.5 g/dL    RDW 12.7 10.0 - 15.0 %    Platelet Count " 311 150 - 450 10e3/uL    % Neutrophils 68 %    % Lymphocytes 19 %    % Monocytes 10 %    % Eosinophils 2 %    % Basophils 1 %    % Immature Granulocytes 0 %    NRBCs per 100 WBC 0 <1 /100    Absolute Neutrophils 6.4 1.6 - 8.3 10e3/uL    Absolute Lymphocytes 1.8 0.8 - 5.3 10e3/uL    Absolute Monocytes 1.0 0.0 - 1.3 10e3/uL    Absolute Eosinophils 0.2 0.0 - 0.7 10e3/uL    Absolute Basophils 0.1 0.0 - 0.2 10e3/uL    Absolute Immature Granulocytes 0.0 <=0.4 10e3/uL    Absolute NRBCs 0.0 10e3/uL   CBC with platelets and differential     Status: None    Narrative    The following orders were created for panel order CBC with platelets and differential.  Procedure                               Abnormality         Status                     ---------                               -----------         ------                     CBC with platelets and d...[[id number]]                      Final result                 Please view results for these tests on the individual orders.       Imaging Studies:    None this visit    ASSESSMENT/PLAN:    #1 Stage IA left breast cancer-ER positive, KS positive and HER2 negative. Status post bilateral mastectomy and left sentinel lymph node biopsy 1/2024.  pT1b N0. Oncotype Dx RS core is 13 so no role for systemic chemo.  She is a candidate for endocrine therapy for total of 5 years with an aromatase inhibitor. This was commenced Feb 2024.     Today, we reviewed rationale for this medications and how it works given her history of ER+ breast cancer. We discussed possible side effects. Sounds like patient has had some modest side effects. We discussed that we need to weight tolerable vs intolerable side effects while on this medications, as we are utilizing this to hopefully mitigate the risk of a future recurrence. Explained rationale for the lack of scanning, unless symptoms arise. Patient seems to have a more clear understanding after today's visit. I did caution her, that although support  groups can be a great resource, everytone story is different, and she can't compare her Stage I breast cancer to every Stage I breast cancer. Verbalizes understanding. Will follow-up in 2 months with labs prior.     #2 At risk for bone loss Will order baseline DEXA given high risk med use. Will call with results.  Also encouraged to continue OsCAL and weight bearing activity.     #3 Hot flashes Explained that this is the results of AI therapy. Tolerable at this point. Will monitor.     #4 Headaches Intermittent. Trying to journal and navigate what could be contributing. Do seem to have increased in prevalence since starting Arimidex. Will monitor. Okay to use PRN Tylenol.       Patient in agreement with plan and verbalizes understanding. Agrees to call with any questions or concerns.    51 minutes spent in the patient's encounter today with time spent in review of patient's chart along with chart preparation and review of the treatment plan and signing of treatment plan.  Time was also spent with the patient in obtaining a review of systems and performing a physical exam along with detailed review of all test results. Time was also spent in discussing plan for future follow-up and relating instructions for follow-up and in placing future orders.    NORMAN Pedro [facility]  Medical Oncology

## 2024-03-18 ENCOUNTER — ONCOLOGY VISIT (OUTPATIENT)
Dept: ONCOLOGY | Facility: OTHER | Age: 74
End: 2024-03-18
Attending: NURSE PRACTITIONER
Payer: COMMERCIAL

## 2024-03-18 ENCOUNTER — APPOINTMENT (OUTPATIENT)
Dept: LAB | Facility: OTHER | Age: 74
End: 2024-03-18
Attending: NURSE PRACTITIONER
Payer: MEDICARE

## 2024-03-18 VITALS
TEMPERATURE: 97.2 F | DIASTOLIC BLOOD PRESSURE: 78 MMHG | OXYGEN SATURATION: 98 % | HEART RATE: 72 BPM | HEIGHT: 61 IN | BODY MASS INDEX: 32.76 KG/M2 | SYSTOLIC BLOOD PRESSURE: 132 MMHG | WEIGHT: 173.5 LBS

## 2024-03-18 DIAGNOSIS — C50.912 MALIGNANT NEOPLASM OF LEFT BREAST IN FEMALE, ESTROGEN RECEPTOR POSITIVE, UNSPECIFIED SITE OF BREAST (H): Primary | ICD-10-CM

## 2024-03-18 DIAGNOSIS — Z17.0 MALIGNANT NEOPLASM OF LEFT BREAST IN FEMALE, ESTROGEN RECEPTOR POSITIVE, UNSPECIFIED SITE OF BREAST (H): Primary | ICD-10-CM

## 2024-03-18 DIAGNOSIS — G44.209 TENSION HEADACHE: ICD-10-CM

## 2024-03-18 DIAGNOSIS — Z78.0 POST-MENOPAUSAL: ICD-10-CM

## 2024-03-18 DIAGNOSIS — N95.1 MENOPAUSAL SYNDROME (HOT FLASHES): ICD-10-CM

## 2024-03-18 DIAGNOSIS — Z79.899 HIGH RISK MEDICATION USE: ICD-10-CM

## 2024-03-18 LAB
ALBUMIN SERPL BCG-MCNC: 4.1 G/DL (ref 3.5–5.2)
ALP SERPL-CCNC: 84 U/L (ref 40–150)
ALT SERPL W P-5'-P-CCNC: 17 U/L (ref 0–50)
AST SERPL W P-5'-P-CCNC: 22 U/L (ref 0–45)
BASOPHILS # BLD AUTO: 0.1 10E3/UL (ref 0–0.2)
BASOPHILS NFR BLD AUTO: 1 %
BILIRUB DIRECT SERPL-MCNC: <0.2 MG/DL (ref 0–0.3)
BILIRUB SERPL-MCNC: 0.5 MG/DL
EOSINOPHIL # BLD AUTO: 0.2 10E3/UL (ref 0–0.7)
EOSINOPHIL NFR BLD AUTO: 2 %
ERYTHROCYTE [DISTWIDTH] IN BLOOD BY AUTOMATED COUNT: 12.7 % (ref 10–15)
HCT VFR BLD AUTO: 41.3 % (ref 35–47)
HGB BLD-MCNC: 13.8 G/DL (ref 11.7–15.7)
IMM GRANULOCYTES # BLD: 0 10E3/UL
IMM GRANULOCYTES NFR BLD: 0 %
LYMPHOCYTES # BLD AUTO: 1.8 10E3/UL (ref 0.8–5.3)
LYMPHOCYTES NFR BLD AUTO: 19 %
MCH RBC QN AUTO: 30.8 PG (ref 26.5–33)
MCHC RBC AUTO-ENTMCNC: 33.4 G/DL (ref 31.5–36.5)
MCV RBC AUTO: 92 FL (ref 78–100)
MONOCYTES # BLD AUTO: 1 10E3/UL (ref 0–1.3)
MONOCYTES NFR BLD AUTO: 10 %
NEUTROPHILS # BLD AUTO: 6.4 10E3/UL (ref 1.6–8.3)
NEUTROPHILS NFR BLD AUTO: 68 %
NRBC # BLD AUTO: 0 10E3/UL
NRBC BLD AUTO-RTO: 0 /100
PLATELET # BLD AUTO: 311 10E3/UL (ref 150–450)
PROT SERPL-MCNC: 7.6 G/DL (ref 6.4–8.3)
RBC # BLD AUTO: 4.48 10E6/UL (ref 3.8–5.2)
WBC # BLD AUTO: 9.4 10E3/UL (ref 4–11)

## 2024-03-18 PROCEDURE — 85004 AUTOMATED DIFF WBC COUNT: CPT | Mod: ZL | Performed by: NURSE PRACTITIONER

## 2024-03-18 PROCEDURE — 99215 OFFICE O/P EST HI 40 MIN: CPT | Mod: 24 | Performed by: NURSE PRACTITIONER

## 2024-03-18 PROCEDURE — 36415 COLL VENOUS BLD VENIPUNCTURE: CPT | Mod: ZL | Performed by: NURSE PRACTITIONER

## 2024-03-18 PROCEDURE — 80076 HEPATIC FUNCTION PANEL: CPT | Mod: ZL | Performed by: NURSE PRACTITIONER

## 2024-03-18 PROCEDURE — G0463 HOSPITAL OUTPT CLINIC VISIT: HCPCS

## 2024-03-18 ASSESSMENT — PAIN SCALES - GENERAL: PAINLEVEL: NO PAIN (0)

## 2024-03-18 NOTE — NURSING NOTE
"Oncology Rooming Note    March 18, 2024 8:26 AM   Rj Rodríguez is a 73 year old female who presents for:    Chief Complaint   Patient presents with    Oncology Clinic Visit     Follow up.Breast cancer      Initial Vitals: /78 (BP Location: Right arm, Patient Position: Sitting, Cuff Size: Adult Large)   Pulse 72   Temp 97.2  F (36.2  C) (Tympanic)   Ht 1.549 m (5' 1\")   Wt 78.7 kg (173 lb 8 oz)   SpO2 98%   BMI 32.78 kg/m   Estimated body mass index is 32.78 kg/m  as calculated from the following:    Height as of this encounter: 1.549 m (5' 1\").    Weight as of this encounter: 78.7 kg (173 lb 8 oz). Body surface area is 1.84 meters squared.  No Pain (0) Comment: Data Unavailable   No LMP recorded. Patient has had a hysterectomy.  Allergies reviewed: Yes  Medications reviewed: Yes    Medications: Medication refills not needed today.  Pharmacy name entered into [facility]: [facility] [address] [facility] [address] [facility] [address]    Frailty Screening:   Is the patient here for a new oncology consult visit in cancer care? 2. No      Clinical concerns: none       Geena Mcmanus LPN              "

## 2024-04-02 ENCOUNTER — THERAPY VISIT (OUTPATIENT)
Dept: PHYSICAL THERAPY | Facility: HOSPITAL | Age: 74
End: 2024-04-02
Attending: NURSE PRACTITIONER
Payer: MEDICARE

## 2024-04-02 DIAGNOSIS — C50.919 BREAST CANCER (H): Primary | ICD-10-CM

## 2024-04-02 PROCEDURE — 97530 THERAPEUTIC ACTIVITIES: CPT | Mod: GP

## 2024-04-02 PROCEDURE — 97140 MANUAL THERAPY 1/> REGIONS: CPT | Mod: GP

## 2024-04-04 ENCOUNTER — HOSPITAL ENCOUNTER (OUTPATIENT)
Dept: BONE DENSITY | Facility: HOSPITAL | Age: 74
Discharge: HOME | End: 2024-04-04
Attending: NURSE PRACTITIONER | Admitting: NURSE PRACTITIONER
Payer: MEDICARE

## 2024-04-04 DIAGNOSIS — C50.912 MALIGNANT NEOPLASM OF LEFT BREAST IN FEMALE, ESTROGEN RECEPTOR POSITIVE, UNSPECIFIED SITE OF BREAST (H): ICD-10-CM

## 2024-04-04 DIAGNOSIS — Z17.0 MALIGNANT NEOPLASM OF LEFT BREAST IN FEMALE, ESTROGEN RECEPTOR POSITIVE, UNSPECIFIED SITE OF BREAST (H): ICD-10-CM

## 2024-04-04 DIAGNOSIS — Z79.899 HIGH RISK MEDICATION USE: ICD-10-CM

## 2024-04-04 DIAGNOSIS — Z78.0 POST-MENOPAUSAL: ICD-10-CM

## 2024-04-04 PROCEDURE — 77080 DXA BONE DENSITY AXIAL: CPT

## 2024-04-09 ENCOUNTER — THERAPY VISIT (OUTPATIENT)
Dept: PHYSICAL THERAPY | Facility: HOSPITAL | Age: 74
End: 2024-04-09
Attending: NURSE PRACTITIONER
Payer: MEDICARE

## 2024-04-09 DIAGNOSIS — C50.919 BREAST CANCER (H): Primary | ICD-10-CM

## 2024-04-09 PROCEDURE — 97140 MANUAL THERAPY 1/> REGIONS: CPT | Mod: GP,CQ

## 2024-04-16 ENCOUNTER — OFFICE VISIT (OUTPATIENT)
Dept: SURGERY | Facility: OTHER | Age: 74
End: 2024-04-16
Attending: NURSE PRACTITIONER
Payer: MEDICARE

## 2024-04-16 ENCOUNTER — THERAPY VISIT (OUTPATIENT)
Dept: PHYSICAL THERAPY | Facility: HOSPITAL | Age: 74
End: 2024-04-16
Attending: NURSE PRACTITIONER
Payer: MEDICARE

## 2024-04-16 VITALS
DIASTOLIC BLOOD PRESSURE: 66 MMHG | OXYGEN SATURATION: 98 % | SYSTOLIC BLOOD PRESSURE: 124 MMHG | RESPIRATION RATE: 16 BRPM | WEIGHT: 169 LBS | BODY MASS INDEX: 31.91 KG/M2 | TEMPERATURE: 97.6 F | HEART RATE: 62 BPM | HEIGHT: 61 IN

## 2024-04-16 DIAGNOSIS — Z17.0 MALIGNANT NEOPLASM OF LEFT BREAST IN FEMALE, ESTROGEN RECEPTOR POSITIVE, UNSPECIFIED SITE OF BREAST (H): ICD-10-CM

## 2024-04-16 DIAGNOSIS — Z90.13 STATUS POST MASTECTOMY, BILATERAL: Primary | ICD-10-CM

## 2024-04-16 DIAGNOSIS — C50.919 BREAST CANCER (H): Primary | ICD-10-CM

## 2024-04-16 DIAGNOSIS — C50.912 MALIGNANT NEOPLASM OF LEFT BREAST IN FEMALE, ESTROGEN RECEPTOR POSITIVE, UNSPECIFIED SITE OF BREAST (H): ICD-10-CM

## 2024-04-16 PROCEDURE — 99024 POSTOP FOLLOW-UP VISIT: CPT | Performed by: NURSE PRACTITIONER

## 2024-04-16 PROCEDURE — G0463 HOSPITAL OUTPT CLINIC VISIT: HCPCS | Mod: 25

## 2024-04-16 PROCEDURE — 97140 MANUAL THERAPY 1/> REGIONS: CPT | Mod: GP,CQ

## 2024-04-16 ASSESSMENT — PAIN SCALES - GENERAL: PAINLEVEL: NO PAIN (0)

## 2024-04-16 NOTE — PROGRESS NOTES
CLINIC NOTE - POST-OP SURGERY  4/16/2024    Patient:Rj Rodríguez    Procedure: Left sentinel node biopsy and bilateral complete mastectomy     This is a 73 year old female who is 3 months s/p Left sentinel node biopsy and bilateral complete mastectomy.  The patient has no complaints today.  She is working with PT and OT for therapy after her mastectomy.    Current Medications:  Current Outpatient Medications   Medication Sig Dispense Refill    anastrozole (ARIMIDEX) 1 MG tablet Take 1 tablet (1 mg) by mouth daily 90 tablet 1    blood glucose (ACCU-CHEK GUIDE) test strip Use to test blood sugar 1 time daily or as directed. 50 each 11    blood glucose monitoring (ACCU-CHEK FASTCLIX) lancets Use to test blood sugar 1 time daily or as directed. 102 each 11    calcium carbonate-vitamin D (OSCAL) 500-5 MG-MCG tablet Take 1 tablet by mouth 2 times daily 90 tablet 0    cetirizine (ZYRTEC) 10 MG tablet Take 10-20 mg by mouth daily (10 mg in the winter, 20 mg in allergy season)      diltiazem ER (TIAZAC) 240 MG 24 hr ER beaded capsule Take 1 capsule (240 mg) by mouth daily 90 capsule 3    diphenhydrAMINE HCl (BENADRYL ALLERGY CHILDRENS) 12.5 MG CHEW Take 2 tablets by mouth as needed (allergic reaction)      EPINEPHrine (ANY BX GENERIC EQUIV) 0.3 MG/0.3ML injection 2-pack Inject 0.3 mLs (0.3 mg) into the muscle as needed for anaphylaxis May repeat one time in 5-15 minutes if response to initial dose is inadequate. 2 each 1    furosemide (LASIX) 20 MG tablet Take 1 tablet (20 mg) by mouth daily 90 tablet 3    Multiple Vitamins-Minerals (MULTIVITAMIN GUMMIES WOMENS) CHEW Take 1 Dose by mouth daily      spironolactone (ALDACTONE) 50 MG tablet Take 1 tablet by mouth once daily 90 tablet 2    triamcinolone (NASACORT) 55 MCG/ACT nasal aerosol Spray 2 sprays into both nostrils daily as needed (seasonal allergies)      albuterol (PROAIR HFA/PROVENTIL HFA/VENTOLIN HFA) 108 (90 Base) MCG/ACT inhaler Inhale 2 puffs into the lungs  "every 6 hours as needed for shortness of breath (Patient not taking: Reported on 4/16/2024)      clonazePAM (KLONOPIN) 0.5 MG tablet Take 1 tablet by mouth twice daily as needed for anxiety (Patient not taking: Reported on 4/16/2024) 30 tablet 0       Allergies:  Allergies   Allergen Reactions    Dust Mite Extract Difficulty breathing    Fruit [Peach] Anaphylaxis and Hives     fresh peaches and any fruit that has a pit.  Kiwi's and apples also.  Can eat any fruit cooked.    Latex Other (See Comments), Swelling, Difficulty breathing and Rash     Throat Closes      Nuts Anaphylaxis and Hives     All Raw Nuts, can eat nuts when roasted.    Other Food Allergy Other (See Comments) and Difficulty breathing     Mackey powder    Pollen Extract Difficulty breathing     All fruits and vegetables need to be washed and rinsed prior to eating.     Ace Inhibitors      Upper respiratory infection    Antihistamines, Chlorpheniramine-Type Hives     Antihistamines    Benicar [Olmesartan] Cough    Effexor [Venlafaxine] Other (See Comments)     Heartburn, reflux    Erythromycin      BASE; pt. Not sure if this is a true allergy.    Hydrochlorothiazide     Phenylephrine Hcl      Guaifed    Seasonal Allergies      hayfever    Sm Guaifenesin-Pseudoephedrine [Pseudoephedrine-Guaifenesin]      Guaifed    Statins     Trees     Ultram [Tramadol]      Sleep disturbance. Can not sleep, and when able to fall asleep will have terrible nightmares    Xanax [Alprazolam] Hives     Xanax    Zoloft [Sertraline]      paranoia       PHYSICAL EXAM:   Vital signs: /66 (BP Location: Right arm, Cuff Size: Adult Regular)   Pulse 62   Temp 97.6  F (36.4  C) (Tympanic)   Resp 16   Ht 1.556 m (5' 1.25\")   Wt 76.7 kg (169 lb)   SpO2 98%   BMI 31.67 kg/m     BMI: Body mass index is 31.67 kg/m .   General: Normal, healthy, cooperative, in no acute distress, alert   Lungs: respirations are non-labored   Abdominal: non-distended   Wounds:  well healed.  To " the left mastectomy site there is a lip of tissue noted midline patient would like to have removed in the future.     PATHOLOGY:  Case Report   Date Value Ref Range Status   01/16/2024   Final    Surgical Pathology Report                         Case: [id number]                                  Authorizing Provider:  Ethan Molina MD  Collected:           01/16/2024 02:16 PM          Ordering Location:     [facility] Main Operating Room     Received:            01/16/2024 03:45 PM          Pathologist:           Steve Fletcher DO                                                         Specimens:   A) - Lymph Node(s), Canyon, Left Canyon Node                                                    B) - Breast, Left, Left breast, short stitch superior long stitch lateral                           C) - Breast, Right, right breast, short stitch superior, long stitch lateral                Final Diagnosis   Date Value Ref Range Status   01/16/2024   Final    A.  Canyon lymph node, left axilla, excisional biopsy:  -Lymph node with partial fat replacement and small scattered foci of hemosiderin deposition.  -Negative for metastatic tumor (0/1).  -See synoptic report below.    B.  Breast, left, simple mastectomy:  -Biopsy site with residual invasive ductal carcinoma, grade 1.  -Focal lobular carcinoma in situ in close proximity to the invasive carcinoma.  -Atrophic changes, few foci of the usual ductal hyperplasia, few small duct intraductal papillomas, focal apocrine metaplasia,     focal stromal fibrosis and few microcalcifications within invasive tumor and benign ducts.  -See synoptic report below.    C.  Breast, right, simple mastectomy:  -Atrophic changes, focal stromal fibrosis, rare small foci of usual ductal hyperplasia, focal apocrine metaplasia, and occasional    microcalcifications within benign ducts.          ASSESSMENT:    73 year old female who is 3 months s/p Left sentinel node biopsy and bilateral  complete mastectomy.  Doing well.     PLAN:    Follow-up 1 month. Sooner with problems/concerns. Continue PT/OT.  Patient needs knee surgery and she is okay from a general surgery standpoint to have that done.

## 2024-04-16 NOTE — TELEPHONE ENCOUNTER
Disp Refills Start End JULIA   calcium carbonate-vitamin D (OSCAL) 500-5 MG-MCG tablet 90 tablet 0 2/21/2024 -- No     Last Office Visit: 3/18/2024  Future Office visit:    Next 5 appointments (look out 90 days)      Apr 16, 2024 10:40 AM  (Arrive by 10:25 AM)  Return Visit with Lelia Lemus NP  [facility] ([facility] ) [address] [address]  [phone]     May 20, 2024 10:50 AM  (Arrive by 10:35 AM)  Return Visit with NORMAN Mauro [facility] ([facility] ) [address]  [facility] [address]  [phone]             Routing refill request to provider for review/approval because:

## 2024-04-22 DIAGNOSIS — E11.9 TYPE 2 DIABETES MELLITUS WITHOUT COMPLICATION, WITHOUT LONG-TERM CURRENT USE OF INSULIN (H): ICD-10-CM

## 2024-04-22 RX ORDER — BLOOD SUGAR DIAGNOSTIC
STRIP MISCELLANEOUS
Qty: 100 STRIP | Refills: 1 | Status: SHIPPED | OUTPATIENT
Start: 2024-04-22

## 2024-04-22 NOTE — TELEPHONE ENCOUNTER
Test strip  Last Written Prescription Date: 12/16/19  Last Fill Quantity: 50 # of Refills: 11  Last Office Visit: 2/13/24

## 2024-04-23 ENCOUNTER — THERAPY VISIT (OUTPATIENT)
Dept: PHYSICAL THERAPY | Facility: HOSPITAL | Age: 74
End: 2024-04-23
Attending: NURSE PRACTITIONER
Payer: MEDICARE

## 2024-04-23 DIAGNOSIS — C50.919 BREAST CANCER (H): Primary | ICD-10-CM

## 2024-04-23 PROCEDURE — 97140 MANUAL THERAPY 1/> REGIONS: CPT | Mod: GP

## 2024-04-23 PROCEDURE — 97530 THERAPEUTIC ACTIVITIES: CPT | Mod: GP

## 2024-04-25 ENCOUNTER — CARE COORDINATION (OUTPATIENT)
Dept: ONCOLOGY | Facility: OTHER | Age: 74
End: 2024-04-25

## 2024-04-25 NOTE — PROGRESS NOTES
Care Coordination Note:    Oncology SW had met with patient yesterday briefly and patient stated that she needed assistance getting a letter sent to [employer] [facility] from medical staff verifying she is a patient with cancer.  SW sent letter with needed information on letterhead paper of [employer] and sent to [employer] [facility].  No further assistance needed at this time.    ALLI Zhu  Oncology Social Worker  [phone]  [email or web address]

## 2024-04-30 ENCOUNTER — THERAPY VISIT (OUTPATIENT)
Dept: PHYSICAL THERAPY | Facility: HOSPITAL | Age: 74
End: 2024-04-30
Attending: NURSE PRACTITIONER
Payer: MEDICARE

## 2024-04-30 DIAGNOSIS — C50.919 BREAST CANCER (H): Primary | ICD-10-CM

## 2024-04-30 PROCEDURE — 97530 THERAPEUTIC ACTIVITIES: CPT | Mod: GP

## 2024-04-30 PROCEDURE — 97140 MANUAL THERAPY 1/> REGIONS: CPT | Mod: GP

## 2024-05-01 ENCOUNTER — OFFICE VISIT (OUTPATIENT)
Dept: CARDIOLOGY | Facility: OTHER | Age: 74
End: 2024-05-01
Attending: NURSE PRACTITIONER
Payer: COMMERCIAL

## 2024-05-01 VITALS — OXYGEN SATURATION: 97 % | WEIGHT: 171 LBS | BODY MASS INDEX: 32.28 KG/M2 | HEIGHT: 61 IN

## 2024-05-01 DIAGNOSIS — R55 NEAR SYNCOPE: Primary | ICD-10-CM

## 2024-05-01 DIAGNOSIS — I49.3 FREQUENT PVCS: ICD-10-CM

## 2024-05-01 DIAGNOSIS — R00.2 PALPITATIONS: ICD-10-CM

## 2024-05-01 DIAGNOSIS — I10 ESSENTIAL HYPERTENSION: ICD-10-CM

## 2024-05-01 DIAGNOSIS — R42 LIGHTHEADEDNESS: ICD-10-CM

## 2024-05-01 DIAGNOSIS — R51.9 ACUTE NONINTRACTABLE HEADACHE, UNSPECIFIED HEADACHE TYPE: ICD-10-CM

## 2024-05-01 PROCEDURE — 93010 ELECTROCARDIOGRAM REPORT: CPT | Mod: 77 | Performed by: INTERNAL MEDICINE

## 2024-05-01 PROCEDURE — 99214 OFFICE O/P EST MOD 30 MIN: CPT | Performed by: NURSE PRACTITIONER

## 2024-05-01 PROCEDURE — 93005 ELECTROCARDIOGRAM TRACING: CPT | Performed by: NURSE PRACTITIONER

## 2024-05-01 PROCEDURE — G0463 HOSPITAL OUTPT CLINIC VISIT: HCPCS | Mod: 25

## 2024-05-01 ASSESSMENT — PAIN SCALES - GENERAL: PAINLEVEL: NO PAIN (0)

## 2024-05-01 NOTE — PATIENT INSTRUCTIONS
Switch anastrazole from morning to evening when peak side effects would be during sleeping hours. Anastrazole does cause vasodilation (25% to 36%) and peaks about 5 hours after ingestion with food. Since you take around 5 am with breakfast and symptoms of lightheadedness, pre-syncope, headaches start around 10 am, maybe this will help.     We will stop furosemide but keep track of daily weights and monitor for symptoms of swelling, shortness of breath and update us with any changes. If we need to re-start furosemide we will decrease spironolactone with softer blood pressures.    BP Readings from Last 6 Encounters:   04/16/24 124/66   03/18/24 132/78   03/13/24 118/64   02/21/24 126/62   02/13/24 130/62   02/13/24 128/60        Follow-up in 3 months, certainly sooner with acute concerns.     Liezth Crisostomo, CNP

## 2024-05-01 NOTE — PROGRESS NOTES
[facility]   CARDIOLOGY PROGRESS NOTE     Chief Complaint   Patient presents with    Dizziness    Palpitations          Diagnosis:    ICD-10-CM    1. Near syncope  R55       2. Lightheadedness  R42       3. Acute nonintractable headache, unspecified headache type  R51.9       4. Palpitations  R00.2 EKG 12-lead complete w/read - (Clinic Performed)      5. Frequent PVCs  I49.3       6. Essential hypertension  I10 EKG 12-lead complete w/read - (Clinic Performed)              Assessment/Plan:    Lightheadedness  Near-syncope  Headaches  Switch anastrazole from morning to evening when peak side effects would be during sleeping hours. Anastrazole does cause vasodilation (25% to 36%) and peaks about 5 hours after ingestion with food. Since you take around 5 am with breakfast and symptoms of lightheadedness, pre-syncope, headaches start around 10 am, maybe this will help.   STOP furosemide 20 mg once daily.  Monitor home blood pressure with onset of symptoms.   Ortho VS positive today. BP re-check 108 systolic.     Palpitations  Frequent PVCs  Possibly some symptoms with recent near-syncopal events not consistent as they had been previously.   Suspect hypervolemia with diastolic dysfunction could have been contributing. Symptoms improved with starting furosemide. With recent near-syncopal events and soft blood pressure today we are going to hold furosemide. She will keep track of daily weights and monitor for symptoms of swelling, shortness of breath and update us with any changes. If we need to re-start furosemide we will decrease spironolactone with softer blood pressures.   Reviewed leadless EKG monitor results from 5/2023  Underlying rhythm was sinus. Heart rate ranged from 29 bpm, maximum heart rate of 112 bpm, averaging 63 bpm.  x2 triggered events and x2 diary entries.  These corresponded to SVE, VE, ventricular bigeminy, and sinus rhythm.  Occasional PVC's at 1.3%.  Leadless EKG Monitor 8/2021  Underlying  rhythm was sinus. Hrt rate ranged from 48 bpm, maximum heart rate of 158 bmp, averaging 71 bmp. No significant bradycardia, pauses, Mobitz type II or 3rd degree heart block.  No atrial fibrillation on this study.  x1 triggered events and x1 diary entries.  These corresponded to VE and sinus rhythm.  Rare, less than 1% of PAC's, atrial couplets, and atrial triplets.  Occasional PVC's at 3.0%.  Frequent ventricular couplets of 5.3%.  + episodes of ventricular bigeminy lasting up to 17.4 sec's.  + episodes of ventricular trigeminy lasting up to 1 min and 4 sec's.  NM Lexiscan Stress Test 2/2022  The nuclear stress test demonstrates an equivocal apical infarct vs thinning. No evidence of reversible ischemia.   The left ventricular ejection fraction at rest is 58%.  The left ventricular ejection fraction at stress is 68%.   Baseline electrocardiogram demonstrates sinus rhythm. There were occasional premature ventricular contractions. The patient did not develop any acute ECG changes or arrhythmias during the Lexiscan portion of the study.  Transthoracic Echocardiogram 2/2022  Global and regional left ventricular function is normal with an EF of 55-60%.  Relative wall thickness is increased consistent with concentric remodeling. Left ventricular diastolic function is indeterminate. Diastolic Doppler findings (E/E' ratio and/or other parameters) suggest left ventricular filling pressures are increased  Cannot be on beta-blockers with immunotherapy for allergies.     Wt Readings from Last 5 Encounters:   05/01/24 77.6 kg (171 lb)   04/16/24 76.7 kg (169 lb)   03/18/24 78.7 kg (173 lb 8 oz)   03/13/24 76.2 kg (168 lb)   02/21/24 76.9 kg (169 lb 8.5 oz)         Hypertension with blood pressure goal less than 130/80, controlled  Diastolic dysfunction  Continue Losartan to 50 mg daily  Continue Spironolactone 50 mg daily  Continue diltiazem for palpitations/PVCs  Previously reviewed untreated sleep apnea, may contribute to  increased ectopy and may place patient at risk for developing other arrhythmias such as atrial fibrillation. She has not been on CPAP and reports sleep study years ago. She has previously requested to hold off on further sleep study at this time however, may consider home sleep study in the future.     BP Readings from Last 6 Encounters:   04/16/24 124/66   03/18/24 132/78   03/13/24 118/64   02/21/24 126/62   02/13/24 130/62   02/13/24 128/60       Type II diabetes mellitus  Continue management by PCP  Statin: She is not on statin for primary prevention with ASCVD risk of 23.7%    Lab Results   Component Value Date    A1C 5.5 02/13/2024    A1C 5.7 11/13/2023       Recent Labs   Lab Test 01/10/22  0946 03/10/21  1235   CHOL 175 162   HDL 44* 53   LDL 77 87   TRIG 268* 110     The 10-year ASCVD risk score (Noah PARRISH, et al., 2019) is: 18.3%    Values used to calculate the score:      Age: 73 years      Sex: Female      Is Non- : No      Diabetic: Yes      Tobacco smoker: No      Systolic Blood Pressure: 108 mmHg      Is BP treated: Yes      HDL Cholesterol: 46 mg/dL      Total Cholesterol: 166 mg/dL      Obesity  She has been working on lifestyle modifications and has noticed some weight loss and improved fasting glucose levels  Encouraged her to continue with lifestyle modifications  Body mass index is 32.05 kg/m .       Obstructive sleep apnea  Diagnosed at some point  Does not wear CPAP  Has not been interested in repeating sleep evaluation    Follow-up with cardiology in 3 months, certainly sooner with acute concerns.       Interval history:  Rj Leyva is a pleasant 73-year old female who presents for cardiology follow-up. She was previously following with my colleague Marie AUSTIN CNP. Recall, she has a cardiovascular history including hypertension, dyslipidemia, palpitations- symptomatic PVCs. She has a non-cardiac history including diabetes mellitus, obstructive sleep apnea,  depression, anxiety, glaucoma, chronic lumbago.     Today, Ms. Leyva endorses that she has been having episodes of feeling sudden onset of lightheadedness, pre-syncope. She gets associated flushed sensation. She does feel sensation of pounding in her chest with some of these episodes. This has been occurring randomly. She cannot identify a potential trigger- this occurs when sitting, standing, lying. The other day it occurred while just sitting and sewing. No chest pain or pressure. No dyspnea or dyspnea on exertion. No LE edema. S/p left mastectomy - recovery has been going as well as could be expected. Started on anastrozole. No chemotherapy or radiation to chest.       Sleep history: Prior sleep evaluation and used to wear CPAP. Does not currently wear CPAP.    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 Rodríguez is a 72 year old female who presents for cardiology follow-up to visit on 5/31/22 with recent reports of increased exertional dyspnea and recently identified ventricular ectopy seen as isolated PVCs, ventricular coupletes and ventricular trigeminy on recent cardiac monitor.  Patient has a past medical history significant for migraine headaches, hypertension, obesity, REBECCA, DM 2, depression, significant anxiety, glaucoma and chronic lumbago.     Patient reported noticing irregular heart rate with in the office in Feb 2021. She was identified to have ventricular ectopy on ECG. No palpitations with this at that time. She had described episodes of fluttering palpitations associated to anxiety. She had reported increased exertional dyspnea and chest tightness largely related to anxiety events. She does not report any exertional chest tightness and no radiation to the arm, jaw, neck or back. No lightheadedness or syncope. No  increased edema.      She described issues with allergies, possible asthma and on on immunotherapy. Avoided use of albuterol with fluttering palpitations. Has been off beta blocker since starting this. She was started on Diltiazem for HTN and increased ventricular ectopy burden. No tobacco use.     NM Lexiscan stress test due to frequent PVC's and increased exertional dyspnea. Study was completed on 2/10/22 revealing equivocal apical infarct vs apical thinning, no evidence of reversible ischemia. Normal LV systolic heart function which increased appropriately with stress. ECG with occasional ventricular ectopy at baseline which did not increase during lexiscan portion of the study and no ischemia ECG changes.     Echocardiogram on 2/15/22 revealing normal biventricular function, LVEF 55-60%. Borderline LVH, unable to assess diastolic function. Diastolic doppler findings suggest LV filling pressures to be increased. No RWMA's. Both atrial noted to be normal with atrial septum intact. No hemodynamically significant valvular abnormalities. Ascending aorta mildly dilated at 3.7 cm. No pericardial effusion.     INTERVAL HISTORY:  Today, patient reports mild improvement in dyspnea. No recurrence of pre-syncope or syncope. No chest pain or pressure. Palpitations and flip flop sensation in chest has pretty much resolved with increase in Diltiazem. Admits that she is planning for repeat back surgery at [facility] in [place] after the first of the year, significant anxiety with this.         RELEVANT TESTING:  NM Leadless EKG Monitor 5/2023  Conclusion  Zio XT patch report on Rj Rodríguez.  Ordered secondary to palpitations.   Worn for 12 days and 9 hr's. After removing artifact, total time was 8 days and 22 hr's. Placed on 5/10/23 at 11:41 AM and completed on 5/22/2023 at 8:44 PM.   Underlying rhythm was sinus.   Hrt rate ranged from 29 bpm, maximum heart rate of 112 bmp, averaging 63 bmp.   No significant pauses, Ayden  type II or 3rd degree heart block.   No atrial fibrillation on this study.   x2 triggered events and x2 diary entries.  These corresponded to SVE, VE, ventricular bigeminy, and sinus rhythm.   x0 runs of VT     x0 runs of SVT.   Rare, <1% of PAC's, atrial couplets, atrial triplets, and ventricular couplets.   Occasional PVC's at 1.3%.   + episodes of ventricular bigeminy lasting up to 27.6 sec's.   + episodes of ventricular trigeminy lasting up to 1 min and 15 sec's.      NM Lexiscan Stress Test 2/2022    Narrative    The nuclear stress test demonstrates an equivocal apical infarct vs   thinning. No evidence of reversible ischemia.     Left ventricular function is normal.     The left ventricular ejection fraction at rest is 58%.  The left   ventricular ejection fraction at stress is 68%.     There is no prior study for comparison.       ECG Summary    ECG Baseline electrocardiogram demonstrates sinus rhythm. There were occasional premature ventricular contractions, .   The patient did not develop any acute ECG changes or arrhythmias during the Lexiscan portion of the study.          Transthoracic Echocardiogram 2/2022  Interpretation Summary  Left ventricular size is normal. Global and regional left ventricular function  is normal with an EF of 55-60%. Biplane LVEF is 56%. Relative wall thickness  is increased consistent with concentric remodeling. Left ventricular diastolic  function is indeterminate. Diastolic Doppler findings (E/E' ratio and/or other  parameters) suggest left ventricular filling pressures are increased. The  Ejection Fraction was calculated using Bi-plane non contrast. No regional wall  motion abnormalities are seen.  The right ventricle is normal in function and size.  No significant valvular abnormality  IVC diameter <2.1 cm collapsing >50% with sniff suggests a normal RA pressure  of 3 mmHg.  No pericardial effusion is present.  There is no prior study for direct comparison.      Leadless EKG  Monitor 8/2021  Conclusion  Zio XT patch report on Rj Rodríguez.  Ordered secondary to shortness of breath.   Worn for 13 days and 0 hr's.  After removing artifact, total time was 12 days and 16 hr's. Placed on 8/11/2021 at 11:48 AM and completed on 8/24/2021 at 11:35 AM.   Underlying rhythm was sinus.   Hrt rate ranged from 48 bpm, maximum heart rate of 158 bmp, averaging 71 bmp.   No significant bradycardia, pauses, Mobitz type II or 3rd degree heart block.   No atrial fibrillation on this study.   x1 triggered events and x1 diary entries.  These corresponded to VE and sinus rhythm.   x0 runs of VT.     x2 runs of SVT lasting up to 5 beats with a maximum heart rate of 158 bmp.   Rare, less than 1% of PAC's, atrial couplets, and atrial triplets.   Occasional PVC's at 3.0%.   Frequent ventricular couplets of 5.3%.   + episodes of ventricular bigeminy lasting up to 17.4 sec's.   + episodes of ventricular trigeminy lasting up to 1 min and 4 sec's.        Past Medical History:   Diagnosis Date    Arthritis     Cancer (H)     Chronic back pain     Diabetes (H)     Hypertension     Irregular heart beat     Sleep apnea     Uncomplicated asthma        Past Surgical History:   Procedure Laterality Date    ANESTHESIA OUT OF OR MRI N/A 6/1/2022    Procedure: MRI CERVICAL SPINE;  Surgeon: GENERIC ANESTHESIA PROVIDER;  Location: [facility] OR    ANESTHESIA OUT OF OR MRI N/A 9/23/2022    Procedure: MRI CERVICAL SPINE;  Surgeon: GENERIC ANESTHESIA PROVIDER;  Location: [facility]    ANESTHESIA OUT OF OR MRI Left 12/7/2023    Procedure: Anesthesia out of OR MRI;  Surgeon: GENERIC ANESTHESIA PROVIDER;  Location: [facility]    APPENDECTOMY      BACK SURGERY      CHOLECYSTECTOMY      COLONOSCOPY  2012    Repeat in 10 years    GYN SURGERY      HYSTERECTOMY      Ovaries    MASTECTOMY SIMPLE BILATERAL, SENTINEL NODE BILATERAL, COMBINED N/A 1/16/2024    Procedure: Left sentinel node biopsy, bilateral mastectomy;  Surgeon: Ethan Molina MD;   Location: [facility] OR    RELEASE CARPAL TUNNEL      LT    RELEASE CARPAL TUNNEL      RT x2    SURGICAL RADIOLOGY PROCEDURE N/A 2/12/2016    Procedure: SURGICAL RADIOLOGY PROCEDURE;  Surgeon: Provider, Generic Perianesthesia Nursing;  Location: [facility] OR    SURGICAL RADIOLOGY PROCEDURE N/A 8/15/2016    Procedure: SURGICAL RADIOLOGY PROCEDURE;  Surgeon: Provider, Generic Perianesthesia Nursing;  Location: [facility] OR       Allergies   Allergen Reactions    Dust Mite Extract Difficulty breathing    Fruit [Peach] Anaphylaxis and Hives     fresh peaches and any fruit that has a pit.  Kiwi's and apples also.  Can eat any fruit cooked.    Latex Other (See Comments), Swelling, Difficulty breathing and Rash     Throat Closes      Nuts Anaphylaxis and Hives     All Raw Nuts, can eat nuts when roasted.    Other Food Allergy Other (See Comments) and Difficulty breathing     Mackey powder    Pollen Extract Difficulty breathing     All fruits and vegetables need to be washed and rinsed prior to eating.     Ace Inhibitors      Upper respiratory infection    Antihistamines, Chlorpheniramine-Type Hives     Antihistamines    Benicar [Olmesartan] Cough    Effexor [Venlafaxine] Other (See Comments)     Heartburn, reflux    Erythromycin      BASE; pt. Not sure if this is a true allergy.    Hydrochlorothiazide     Phenylephrine Hcl      Guaifed    Seasonal Allergies      hayfever    Sm Guaifenesin-Pseudoephedrine [Pseudoephedrine-Guaifenesin]      Guaifed    Statins     Trees     Ultram [Tramadol]      Sleep disturbance. Can not sleep, and when able to fall asleep will have terrible nightmares    Xanax [Alprazolam] Hives     Xanax    Zoloft [Sertraline]      paranoia       Current Outpatient Medications   Medication Sig Dispense Refill    albuterol (PROAIR HFA/PROVENTIL HFA/VENTOLIN HFA) 108 (90 Base) MCG/ACT inhaler Inhale 2 puffs into the lungs every 6 hours as needed for shortness of breath      anastrozole (ARIMIDEX) 1 MG tablet Take 1 tablet (1 mg)  by mouth daily 90 tablet 1    blood glucose (ACCU-CHEK GUIDE) test strip 1 ONCE DAILY USE AS DIRECTED 100 strip 1    blood glucose monitoring (ACCU-CHEK FASTCLIX) lancets Use to test blood sugar 1 time daily or as directed. 102 each 11    calcium carbonate-vitamin D (OSCAL) 500-5 MG-MCG tablet Take 1 tablet by mouth twice daily 90 tablet 0    cetirizine (ZYRTEC) 10 MG tablet Take 10-20 mg by mouth daily (10 mg in the winter, 20 mg in allergy season)      clonazePAM (KLONOPIN) 0.5 MG tablet Take 1 tablet by mouth twice daily as needed for anxiety 30 tablet 0    diltiazem ER (TIAZAC) 240 MG 24 hr ER beaded capsule Take 1 capsule (240 mg) by mouth daily 90 capsule 3    diphenhydrAMINE HCl (BENADRYL ALLERGY CHILDRENS) 12.5 MG CHEW Take 2 tablets by mouth as needed (allergic reaction)      EPINEPHrine (ANY BX GENERIC EQUIV) 0.3 MG/0.3ML injection 2-pack Inject 0.3 mLs (0.3 mg) into the muscle as needed for anaphylaxis May repeat one time in 5-15 minutes if response to initial dose is inadequate. 2 each 1    furosemide (LASIX) 20 MG tablet Take 1 tablet (20 mg) by mouth daily 90 tablet 3    Multiple Vitamins-Minerals (MULTIVITAMIN GUMMIES WOMENS) CHEW Take 1 Dose by mouth daily      spironolactone (ALDACTONE) 50 MG tablet Take 1 tablet by mouth once daily 90 tablet 2    triamcinolone (NASACORT) 55 MCG/ACT nasal aerosol Spray 2 sprays into both nostrils daily as needed (seasonal allergies)         Social History     Socioeconomic History    Marital status:      Spouse name: Not on file    Number of children: Not on file    Years of education: Not on file    Highest education level: Not on file   Occupational History    Not on file   Tobacco Use    Smoking status: Never     Passive exposure: Never    Smokeless tobacco: Never   Vaping Use    Vaping status: Never Used   Substance and Sexual Activity    Alcohol use: Yes     Alcohol/week: 1.0 standard drink of alcohol     Types: 1 Glasses of wine per week     Comment:  occ glass of wine    Drug use: No    Sexual activity: Not Currently   Other Topics Concern     Service Not Asked    Blood Transfusions Not Asked    Caffeine Concern Yes     Comment: Coffee - 2 cups daily    Occupational Exposure Not Asked    Hobby Hazards Not Asked    Sleep Concern Not Asked    Stress Concern Not Asked    Weight Concern Not Asked    Special Diet Not Asked    Back Care Not Asked    Exercise Not Asked    Bike Helmet Not Asked    Seat Belt Not Asked    Self-Exams Not Asked    Parent/sibling w/ CABG, MI or angioplasty before 65F 55M? Not Asked   Social History Narrative    10/11/2019: , lives in [facility]     Financial Resource Strain: Low Risk  (1/8/2024)    Financial Resource Strain     Within the past 12 months, have you or your family members you live with been unable to get utilities (heat, electricity) when it was really needed?: No   Food Insecurity: Low Risk  (1/8/2024)    Food Insecurity     Within the past 12 months, did you worry that your food would run out before you got money to buy more?: No     Within the past 12 months, did the food you bought just not last and you didn t have money to get more?: No   Transportation Needs: Low Risk  (1/8/2024)    Transportation Needs     Within the past 12 months, has lack of transportation kept you from medical appointments, getting your medicines, non-medical meetings or appointments, work, or from getting things that you need?: No   Physical Activity: Not on file   Stress: Not on file   Social Connections: Not on file   Interpersonal Safety: Low Risk  (1/8/2024)    Interpersonal Safety     Do you feel physically and emotionally safe where you currently live?: Yes     Within the past 12 months, have you been hit, slapped, kicked or otherwise physically hurt by someone?: No     Within the past 12 months, have you been humiliated or emotionally abused in other ways by your partner or ex-partner?: No  "  Housing Stability: Low Risk  (1/8/2024)    Housing Stability     Do you have housing? : Yes     Are you worried about losing your housing?: No       TEST RESULTS:   No results found for any visits on 05/01/24.        Review of systems: Negative except that which was noted in the HPI.    Physical examination:    Vitals: Ht 1.556 m (5' 1.25\")   Wt 77.6 kg (171 lb)   SpO2 97%   BMI 32.05 kg/m    BMI= Body mass index is 32.05 kg/m .    GENERAL APPEARANCE: healthy, alert and no distress  CHEST: lungs clear to auscultation - no rales, rhonchi or wheezes, no use of accessory muscles, no retractions, respirations are unlabored, normal respiratory rate  CARDIOVASCULAR: regular rhythm, normal S1 with physiologic split S2, no S3 or S4 and no murmur, click or rub  EXTREMITIES: no LE edema  NEURO: alert and oriented normal speech, and affect  VASC: No vascular bruits heard.  SKIN: no ecchymoses, no rashes      Thank you for allowing me to participate in the care of your patient. Please do not hesitate to contact me if you have any questions.         Lizeth Crisostomo CNP    "

## 2024-05-07 ENCOUNTER — THERAPY VISIT (OUTPATIENT)
Dept: PHYSICAL THERAPY | Facility: HOSPITAL | Age: 74
End: 2024-05-07
Attending: NURSE PRACTITIONER
Payer: MEDICARE

## 2024-05-07 DIAGNOSIS — C50.919 BREAST CANCER (H): Primary | ICD-10-CM

## 2024-05-07 PROCEDURE — 97530 THERAPEUTIC ACTIVITIES: CPT | Mod: GP

## 2024-05-07 PROCEDURE — 97140 MANUAL THERAPY 1/> REGIONS: CPT | Mod: GP

## 2024-05-14 ENCOUNTER — THERAPY VISIT (OUTPATIENT)
Dept: PHYSICAL THERAPY | Facility: HOSPITAL | Age: 74
End: 2024-05-14
Attending: NURSE PRACTITIONER
Payer: MEDICARE

## 2024-05-14 DIAGNOSIS — C50.919 BREAST CANCER (H): Primary | ICD-10-CM

## 2024-05-14 PROCEDURE — 97140 MANUAL THERAPY 1/> REGIONS: CPT | Mod: GP

## 2024-05-14 PROCEDURE — 97530 THERAPEUTIC ACTIVITIES: CPT | Mod: GP

## 2024-05-16 NOTE — PROGRESS NOTES
Oncology Follow-up Visit    Reason for Visit:  Rj is a 73 year old woman with a diagnosis of breast cancer, who presents to the clinic today for routine follow-up.    Nursing Note and documentation reviewed: Yes    Interval History:   Rj notes that she is doing just alright. She continues to have large amounts of anxiety. Continues to work with Interventional Imaging at [facility]. This has helped. Her  is now working with her as well. Making small improvements. She does continues to have headaches and hot flashes since starting Arimidex. Tylenol helps but she tries not to use. Uses cold compress and deep breathing if able to help with headaches. Hot flashes occur maybe 10 times per day. She does have diffuse arthralgias. Also notes vision changes. Has known cataracts and upcoming eye appt.     She continues to work with PT and support group. She denies any new chest wall concerns. No new lump, bumps, pain, discharge or skin changes. Struggling with no imaging and wonders how we will catch anything in future.     Oncologic History:   Ms. Rodríguez was found to have a abnormality on her screening mammogram.     11/13/2023: Mammogram screening bilateral with tomosynthesis:No architectural distortion, dominant mass or suspicious microcalcifications are identified in the right breast.  There is a small slightly spiculated appearing nodule in the upper outer quadrant of the left breast near the junction of middle and anterior thirds. This is probably in the 1 to 2:00 position.   11/16/2023: Ultrasound breast left:There is an irregular slightly hypoechoic mass in the left breast at 1-2 o'clock, 6 cm from the nipple measuring 0.8 x 0.8 x 0.5 cm. No definite internal vascularity. No other cystic or solid masses are demonstrated.   11/21/2023: Ultrasound breast biopsy: Invasive ductal carcinoma, grade 1.  ER 91 to 100%, IN 71 to 80% and HER2 IHC +1  12/7/2023: MRI breast bilateral with and without contrast:Enhancement  reflecting the sampled mass and surrounding postbiopsy change in the left upper outer breast spans 2.0 x 1.7 cm. No additional sites of highly suspicious enhancement in either breast. No evidence of pathologic adenopathy.  1/16/2024:   Left simple mastectomy and left sentinel lymph node biopsy: Pathology left breast showed biopsy site with residual invasive carcinoma, grade 1, 5.4 mm, margins negative.  focal lobular carcinoma in situ in close proximity to invasive carcinoma. pT1b N0 1 sentinel lymph node was examined which was negative for cancer.  Right breast simple mastectomy: Atrophic changes, focal stromal fibrosis, rare small foci of usual ductal hyperplasia, focal apical right metaplasia and occasional microcalcification within benign ducts. Oncotype Dx RS core is 13  2/22/2024:  Met with Dr. Kwok. Given oncotype score of 13, no added benefit to chemo. Therefore commenced endocrine therapy with Arimidex, with anticipation of completing 5 years.     Current Chemo Regimen/TX: Arimidex 1 mg daily commenced Feb 2024    Previous treatment: Bilateral simple mastectomy completed Jan 2024    Past Medical History:   Diagnosis Date    Arthritis     Cancer (H)     Chronic back pain     Diabetes (H)     Hypertension     Irregular heart beat     Sleep apnea     Uncomplicated asthma        Past Surgical History:   Procedure Laterality Date    ANESTHESIA OUT OF OR MRI N/A 6/1/2022    Procedure: MRI CERVICAL SPINE;  Surgeon: GENERIC ANESTHESIA PROVIDER;  Location: [facility]    ANESTHESIA OUT OF OR MRI N/A 9/23/2022    Procedure: MRI CERVICAL SPINE;  Surgeon: GENERIC ANESTHESIA PROVIDER;  Location: [facility]    ANESTHESIA OUT OF OR MRI Left 12/7/2023    Procedure: Anesthesia out of OR MRI;  Surgeon: GENERIC ANESTHESIA PROVIDER;  Location: [facility]    APPENDECTOMY      BACK SURGERY      CHOLECYSTECTOMY      COLONOSCOPY  2012    Repeat in 10 years    GYN SURGERY      HYSTERECTOMY      Ovaries    MASTECTOMY SIMPLE BILATERAL, SENTINEL NODE  BILATERAL, COMBINED N/A 1/16/2024    Procedure: Left sentinel node biopsy, bilateral mastectomy;  Surgeon: Ethan Molina MD;  Location: [facility] OR    RELEASE CARPAL TUNNEL      LT    RELEASE CARPAL TUNNEL      RT x2    SURGICAL RADIOLOGY PROCEDURE N/A 2/12/2016    Procedure: SURGICAL RADIOLOGY PROCEDURE;  Surgeon: Provider, Generic Perianesthesia Nursing;  Location: [facility]    SURGICAL RADIOLOGY PROCEDURE N/A 8/15/2016    Procedure: SURGICAL RADIOLOGY PROCEDURE;  Surgeon: Provider, Generic Perianesthesia Nursing;  Location: [facility]       Family History   Problem Relation Age of Onset    Diabetes Mother     Alcoholism Mother     Heart Failure Mother     Cerebrovascular Disease Father     Diabetes Father         Type 2    Hypertension Father     Alcoholism Father     Colon Cancer Maternal Aunt     Colon Cancer Maternal Uncle        Social History     Socioeconomic History    Marital status:      Spouse name: Not on file    Number of children: Not on file    Years of education: Not on file    Highest education level: Not on file   Occupational History    Not on file   Tobacco Use    Smoking status: Never     Passive exposure: Never    Smokeless tobacco: Never   Vaping Use    Vaping status: Never Used   Substance and Sexual Activity    Alcohol use: Yes     Alcohol/week: 1.0 standard drink of alcohol     Types: 1 Glasses of wine per week     Comment: occ glass of wine    Drug use: No    Sexual activity: Not Currently   Other Topics Concern     Service Not Asked    Blood Transfusions Not Asked    Caffeine Concern Yes     Comment: Coffee - 2 cups daily    Occupational Exposure Not Asked    Hobby Hazards Not Asked    Sleep Concern Not Asked    Stress Concern Not Asked    Weight Concern Not Asked    Special Diet Not Asked    Back Care Not Asked    Exercise Not Asked    Bike Helmet Not Asked    Seat Belt Not Asked    Self-Exams Not Asked    Parent/sibling w/ CABG, MI or angioplasty before 65F 55M? Not Asked    Social History Narrative    10/11/2019: , lives in [facility] Determinants of Health     Financial Resource Strain: Low Risk  (1/8/2024)    Financial Resource Strain     Within the past 12 months, have you or your family members you live with been unable to get utilities (heat, electricity) when it was really needed?: No   Food Insecurity: Low Risk  (1/8/2024)    Food Insecurity     Within the past 12 months, did you worry that your food would run out before you got money to buy more?: No     Within the past 12 months, did the food you bought just not last and you didn t have money to get more?: No   Transportation Needs: Low Risk  (1/8/2024)    Transportation Needs     Within the past 12 months, has lack of transportation kept you from medical appointments, getting your medicines, non-medical meetings or appointments, work, or from getting things that you need?: No   Physical Activity: Not on file   Stress: Not on file   Social Connections: Not on file   Interpersonal Safety: Low Risk  (1/8/2024)    Interpersonal Safety     Do you feel physically and emotionally safe where you currently live?: Yes     Within the past 12 months, have you been hit, slapped, kicked or otherwise physically hurt by someone?: No     Within the past 12 months, have you been humiliated or emotionally abused in other ways by your partner or ex-partner?: No   Housing Stability: Low Risk  (1/8/2024)    Housing Stability     Do you have housing? : Yes     Are you worried about losing your housing?: No       Current Outpatient Medications   Medication Sig Dispense Refill    albuterol (PROAIR HFA/PROVENTIL HFA/VENTOLIN HFA) 108 (90 Base) MCG/ACT inhaler Inhale 2 puffs into the lungs every 6 hours as needed for shortness of breath      anastrozole (ARIMIDEX) 1 MG tablet Take 1 tablet (1 mg) by mouth daily 90 tablet 1    blood glucose (ACCU-CHEK GUIDE) test strip 1 ONCE DAILY USE AS DIRECTED 100 strip 1    blood glucose  monitoring (ACCU-CHEK FASTCLIX) lancets Use to test blood sugar 1 time daily or as directed. 102 each 11    calcium carbonate-vitamin D (OSCAL) 500-5 MG-MCG tablet Take 1 tablet by mouth twice daily 90 tablet 0    cetirizine (ZYRTEC) 10 MG tablet Take 10-20 mg by mouth daily (10 mg in the winter, 20 mg in allergy season)      clonazePAM (KLONOPIN) 0.5 MG tablet Take 1 tablet by mouth twice daily as needed for anxiety 30 tablet 0    diltiazem ER (TIAZAC) 240 MG 24 hr ER beaded capsule Take 1 capsule (240 mg) by mouth daily 90 capsule 3    diphenhydrAMINE HCl (BENADRYL ALLERGY CHILDRENS) 12.5 MG CHEW Take 2 tablets by mouth as needed (allergic reaction)      EPINEPHrine (ANY BX GENERIC EQUIV) 0.3 MG/0.3ML injection 2-pack Inject 0.3 mLs (0.3 mg) into the muscle as needed for anaphylaxis May repeat one time in 5-15 minutes if response to initial dose is inadequate. 2 each 1    Multiple Vitamins-Minerals (MULTIVITAMIN GUMMIES WOMENS) CHEW Take 1 Dose by mouth daily      spironolactone (ALDACTONE) 50 MG tablet Take 1 tablet by mouth once daily 90 tablet 2    triamcinolone (NASACORT) 55 MCG/ACT nasal aerosol Spray 2 sprays into both nostrils daily as needed (seasonal allergies)       No current facility-administered medications for this visit.        Allergies   Allergen Reactions    Dust Mite Extract Difficulty breathing    Fruit [Peach] Anaphylaxis and Hives     fresh peaches and any fruit that has a pit.  Kiwi's and apples also.  Can eat any fruit cooked.    Latex Other (See Comments), Swelling, Difficulty breathing and Rash     Throat Closes      Nuts Anaphylaxis and Hives     All Raw Nuts, can eat nuts when roasted.    Other Food Allergy Other (See Comments) and Difficulty breathing     Mackey powder    Pollen Extract Difficulty breathing     All fruits and vegetables need to be washed and rinsed prior to eating.     Ace Inhibitors      Upper respiratory infection    Antihistamines, Chlorpheniramine-Type Hives      "Antihistamines    Benicar [Olmesartan] Cough    Effexor [Venlafaxine] Other (See Comments)     Heartburn, reflux    Erythromycin      BASE; pt. Not sure if this is a true allergy.    Hydrochlorothiazide     Phenylephrine Hcl      Guaifed    Seasonal Allergies      hayfever    Sm Guaifenesin-Pseudoephedrine [Pseudoephedrine-Guaifenesin]      Guaifed    Statins     Trees     Ultram [Tramadol]      Sleep disturbance. Can not sleep, and when able to fall asleep will have terrible nightmares    Xanax [Alprazolam] Hives     Xanax    Zoloft [Sertraline]      paranoia       Review Of Systems:  A complete review of systems is negative except for the above mentioned items in the interval history.       ECOG Performance Status: 0    Physical Exam:  /72 (BP Location: Right arm, Patient Position: Sitting, Cuff Size: Adult Large)   Pulse 78   Temp 97.6  F (36.4  C) (Tympanic)   Ht 1.549 m (5' 1\")   Wt 79.2 kg (174 lb 9.7 oz)   SpO2 96%   BMI 32.99 kg/m    GENERAL APPEARANCE: Healthy, alert and in no acute distress.  HEENT: Eyes appear normal without scleral icterus. Extraocular movements intact.   NECK:   Supple with normal range of motion. No asymmetry or masses.  LYMPHATICS: No palpable cervical, supraclavicular, axillary nodes.  RESP: Lungs clear to auscultation bilaterally, respirations regular and easy.  CARDIOVASCULAR: Regular rate and rhythm. Normal S1, S2; no murmur, gallop, or rub.  BREAST/Chest wall: Chest wall examined. Fully healed chest wall incision across entire chest. These incisions are becoming softer. Some pouching midline of left breast.    MUSCULOSKELETAL: Extremities without gross deformities noted.   SKIN: No suspicious lesions or rashes.  NEURO: Alert and oriented x 3.  Gait steady.  PSYCHIATRIC: Anxious and tearful at time.     Laboratory:  Results for orders placed or performed in visit on 05/20/24   Hepatic panel (Albumin, ALT, AST, Bili, Alk Phos, TP)     Status: Normal   Result Value Ref " Range    Protein Total 7.5 6.4 - 8.3 g/dL    Albumin 4.2 3.5 - 5.2 g/dL    Bilirubin Total 0.7 <=1.2 mg/dL    Alkaline Phosphatase 90 40 - 150 U/L    AST 25 0 - 45 U/L    ALT 22 0 - 50 U/L    Bilirubin Direct <0.20 0.00 - 0.30 mg/dL         Imaging Studies:    None this visit    ASSESSMENT/PLAN:    #1 Stage IA left breast cancer-ER positive, OK positive and HER2 negative. Status post bilateral mastectomy and left sentinel lymph node biopsy 1/2024.  pT1b N0. Oncotype Dx RS core is 13 so no role for systemic chemo.  She is a candidate for endocrine therapy for total of 5 years with an aromatase inhibitor. This was commenced Feb 2024.     Today, we again reviewed rationale for this medications and how it works given her history of ER+ breast cancer. We discussed various options, however, Arimidex is often best and most utilized. Likewise, may experience similar side effects with other AIs. Discussed that some women start to do better after about 6 months of therapy. Patient wants to do well and agreeable to stay on this medication for another 3 months to see if she does better. She is working with therapy and although anxious, in a better place than she was 2 months ago. I will plan on seeing her back in 3 months with labs prior.     #2 Osteopenia Continue calcium and Vit D. Dexa due April 2026.     #3 Hot flashes Explained that this is the results of AI therapy. Tolerable at this point. We did discuss possible utilization of Effexor\, however, she previously didn't tolerate this well. Will research other means.     #4 Headaches Intermittent. Using deep breathing and/or cold compress which often helps. Discussed how anxiety and how she carries this can result in headaches. Working on this. Okay to use Tylenol.     #5 Anxiety Working with [facility] and [facility]. Will monitor.     #6 Arthralgias Again discussed that this is likely the result of AI. Okay to use mild pain relievers such as Tylenol or Ibuprofen.        Patient in agreement with plan and verbalizes understanding. Agrees to call with any questions or concerns.    50 minutes spent in the patient's encounter today with time spent in review of patient's chart along with chart preparation and review of the treatment plan and signing of treatment plan.  Time was also spent with the patient in obtaining a review of systems and performing a physical exam along with detailed review of all test results. Time was also spent in discussing plan for future follow-up and relating instructions for follow-up and in placing future orders.    NORMAN Pedro [facility]  Medical Oncology

## 2024-05-20 ENCOUNTER — LAB (OUTPATIENT)
Dept: LAB | Facility: OTHER | Age: 74
End: 2024-05-20
Payer: COMMERCIAL

## 2024-05-20 ENCOUNTER — ONCOLOGY VISIT (OUTPATIENT)
Dept: ONCOLOGY | Facility: OTHER | Age: 74
End: 2024-05-20
Attending: NURSE PRACTITIONER
Payer: MEDICARE

## 2024-05-20 VITALS
WEIGHT: 174.6 LBS | HEIGHT: 61 IN | HEART RATE: 78 BPM | OXYGEN SATURATION: 96 % | SYSTOLIC BLOOD PRESSURE: 128 MMHG | BODY MASS INDEX: 32.97 KG/M2 | TEMPERATURE: 97.6 F | DIASTOLIC BLOOD PRESSURE: 72 MMHG

## 2024-05-20 DIAGNOSIS — G44.209 TENSION HEADACHE: ICD-10-CM

## 2024-05-20 DIAGNOSIS — N95.1 MENOPAUSAL SYNDROME (HOT FLASHES): ICD-10-CM

## 2024-05-20 DIAGNOSIS — C50.912 MALIGNANT NEOPLASM OF LEFT BREAST IN FEMALE, ESTROGEN RECEPTOR POSITIVE, UNSPECIFIED SITE OF BREAST (H): ICD-10-CM

## 2024-05-20 DIAGNOSIS — M25.50 ARTHRALGIA, UNSPECIFIED JOINT: ICD-10-CM

## 2024-05-20 DIAGNOSIS — Z17.0 MALIGNANT NEOPLASM OF LEFT BREAST IN FEMALE, ESTROGEN RECEPTOR POSITIVE, UNSPECIFIED SITE OF BREAST (H): ICD-10-CM

## 2024-05-20 DIAGNOSIS — C50.912 MALIGNANT NEOPLASM OF LEFT BREAST IN FEMALE, ESTROGEN RECEPTOR POSITIVE, UNSPECIFIED SITE OF BREAST (H): Primary | ICD-10-CM

## 2024-05-20 DIAGNOSIS — F41.1 GAD (GENERALIZED ANXIETY DISORDER): ICD-10-CM

## 2024-05-20 DIAGNOSIS — Z17.0 MALIGNANT NEOPLASM OF LEFT BREAST IN FEMALE, ESTROGEN RECEPTOR POSITIVE, UNSPECIFIED SITE OF BREAST (H): Primary | ICD-10-CM

## 2024-05-20 DIAGNOSIS — M85.80 OSTEOPENIA, UNSPECIFIED LOCATION: ICD-10-CM

## 2024-05-20 LAB
ALBUMIN SERPL BCG-MCNC: 4.2 G/DL (ref 3.5–5.2)
ALP SERPL-CCNC: 90 U/L (ref 40–150)
ALT SERPL W P-5'-P-CCNC: 22 U/L (ref 0–50)
AST SERPL W P-5'-P-CCNC: 25 U/L (ref 0–45)
BILIRUB DIRECT SERPL-MCNC: <0.2 MG/DL (ref 0–0.3)
BILIRUB SERPL-MCNC: 0.7 MG/DL
PROT SERPL-MCNC: 7.5 G/DL (ref 6.4–8.3)

## 2024-05-20 PROCEDURE — 99215 OFFICE O/P EST HI 40 MIN: CPT | Performed by: NURSE PRACTITIONER

## 2024-05-20 PROCEDURE — 80076 HEPATIC FUNCTION PANEL: CPT | Mod: ZL

## 2024-05-20 PROCEDURE — G0463 HOSPITAL OUTPT CLINIC VISIT: HCPCS

## 2024-05-20 PROCEDURE — 36415 COLL VENOUS BLD VENIPUNCTURE: CPT | Mod: ZL

## 2024-05-20 ASSESSMENT — PAIN SCALES - GENERAL: PAINLEVEL: NO PAIN (0)

## 2024-05-20 NOTE — PROGRESS NOTES
05/14/24 0500   Appointment Info   Signing clinician's name / credentials Dimitri Ferris, PT   Total/Authorized Visits 365   Visits Used 8   Medical Diagnosis Malignant neoplasm of left breast in female, estrogen receptor positive, unspecified site of breast (H)   PT Tx Diagnosis Malignant neoplasm of left breast in female, estrogen receptor positive, unspecified site of breast (H)   Progress Note/Certification   Start of Care Date 03/11/24   Onset of illness/injury or Date of Surgery 03/11/24   Therapy Frequency 1x/week   Predicted Duration 12 weeks   Certification date from 03/11/24   Certification date to 06/03/24   Progress Note Due Date 04/11/24       Present No   GOALS   PT Goals 2;3   PT Goal 1   Goal Identifier STG-1   Goal Description Patient will possess full ROM to B shoulders to allow her to shower without pain.   Rationale to maximize safety and independence with performance of ADLs and functional tasks   Goal Progress Goal Met   Target Date 04/22/24   Date Met 04/30/24   PT Goal 2   Goal Identifier STG-2   Goal Description Patient will be able to sleep through the night to prevent her from waking due to increased pain.   Rationale to maximize safety and independence with performance of ADLs and functional tasks   Goal Progress Progressing (updated)   Target Date 05/22/24   PT Goal 3   Goal Identifier LTG-1   Goal Description Patient will possess a custom HEP to allow her to self-manage and prevent future recurrence.   Rationale to maximize safety and independence with performance of ADLs and functional tasks   Goal Progress Progressing   Target Date 06/03/24   Subjective Report   Subjective Report Rj arrived to therapy today stating she is doing a little better every week. She states she is trying to do more every week. She notes she is meeting with her care team this week to try and resolve her dizziness issues. She states she did meet with cardiology and plans to meet with  oncology this week. She states she is agreeable to work with the PT today.   Treatment Interventions (PT)   Interventions Therapeutic Activity;Manual Therapy   Therapeutic Activity   Therapeutic Activities: dynamic activities to improve functional performance minutes (37960) 10   Ther Act 1 - Details Discussion had today on what to expect from cancer rehab; how to progress safely; what she can/cannot do at this time to keep from inuring her surgical sites; how she should try to massage her scars when she is ready; keep progressing with activities she likes and not restrict herself because of the surgery   Patient Response/Progress Progressing   Manual Therapy   Manual Therapy: Mobilization, MFR, MLD, friction massage minutes (04348) 36   Manual Therapy 1 - Details PROM completed to the L UE; Cs and Ss completed up the L UE to assess for any potential cording present (none noted today); Soft tissue work/scar mobility over the L chest wall today - pt notes this is feeling looser every time.   Patient Response/Progress Progressing   Education   Learner/Method Patient;Listening   Plan   Plan for next session Plan to progress according to how the patient tolerated the previous session   Comments   Comments Pt encouraged not to sit and needle point but rather be up and moving so the chest can stay stretched out   Total Session Time   Timed Code Treatment Minutes 46   Total Treatment Time (sum of timed and untimed services) 46         PLAN  Continue therapy per current plan of care.    Beginning/End Dates of Progress Note Reporting Period:  03/11/2024  to 05/14/2024    Referring Provider:  Vira Dodd

## 2024-06-04 ENCOUNTER — THERAPY VISIT (OUTPATIENT)
Dept: PHYSICAL THERAPY | Facility: HOSPITAL | Age: 74
End: 2024-06-04
Attending: NURSE PRACTITIONER
Payer: MEDICARE

## 2024-06-04 DIAGNOSIS — C50.919 BREAST CANCER (H): Primary | ICD-10-CM

## 2024-06-04 PROCEDURE — 97140 MANUAL THERAPY 1/> REGIONS: CPT | Mod: GP

## 2024-06-04 PROCEDURE — 97530 THERAPEUTIC ACTIVITIES: CPT | Mod: GP

## 2024-06-04 PROCEDURE — 999N000104 HC STATISTIC NO CHARGE

## 2024-06-05 ENCOUNTER — OFFICE VISIT (OUTPATIENT)
Dept: SURGERY | Facility: OTHER | Age: 74
End: 2024-06-05
Attending: SURGERY
Payer: COMMERCIAL

## 2024-06-05 VITALS
SYSTOLIC BLOOD PRESSURE: 150 MMHG | HEART RATE: 62 BPM | BODY MASS INDEX: 32.85 KG/M2 | DIASTOLIC BLOOD PRESSURE: 80 MMHG | HEIGHT: 61 IN | RESPIRATION RATE: 16 BRPM | TEMPERATURE: 96.8 F | WEIGHT: 174 LBS | OXYGEN SATURATION: 96 %

## 2024-06-05 DIAGNOSIS — Z90.13 STATUS POST MASTECTOMY, BILATERAL: Primary | ICD-10-CM

## 2024-06-05 PROCEDURE — 99213 OFFICE O/P EST LOW 20 MIN: CPT | Performed by: NURSE PRACTITIONER

## 2024-06-05 PROCEDURE — G0463 HOSPITAL OUTPT CLINIC VISIT: HCPCS

## 2024-06-05 ASSESSMENT — PAIN SCALES - GENERAL: PAINLEVEL: MODERATE PAIN (5)

## 2024-06-05 NOTE — PATIENT INSTRUCTIONS
Thank you for allowing Lelia Lemus CNP and our surgical team to participate in your care. Please call our health unit coordinator at [phone] with scheduling questions or the nurse at [phone] with any other questions or concerns.          You may call [phone] or [phone] with any questions.
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
## 2024-06-05 NOTE — PROGRESS NOTES
CLINIC NOTE - POST-OP SURGERY  6/5/2024    Patient:Rj Rodríguez    Procedure: Left sentinel node biopsy and bilateral complete mastectomy     This is a 73 year old female who is 5 months s/p Left sentinel node biopsy and bilateral complete mastectomy.  The patient records left chest incisional site pain.  Left neck pain and left arm pain and numbness.      Current Medications:  Current Outpatient Medications   Medication Sig Dispense Refill    albuterol (PROAIR HFA/PROVENTIL HFA/VENTOLIN HFA) 108 (90 Base) MCG/ACT inhaler Inhale 2 puffs into the lungs every 6 hours as needed for shortness of breath      anastrozole (ARIMIDEX) 1 MG tablet Take 1 tablet (1 mg) by mouth daily 90 tablet 1    blood glucose (ACCU-CHEK GUIDE) test strip 1 ONCE DAILY USE AS DIRECTED 100 strip 1    blood glucose monitoring (ACCU-CHEK FASTCLIX) lancets Use to test blood sugar 1 time daily or as directed. 102 each 11    calcium carbonate-vitamin D (OSCAL) 500-5 MG-MCG tablet Take 1 tablet by mouth twice daily 90 tablet 0    cetirizine (ZYRTEC) 10 MG tablet Take 10-20 mg by mouth daily (10 mg in the winter, 20 mg in allergy season)      clonazePAM (KLONOPIN) 0.5 MG tablet Take 1 tablet by mouth twice daily as needed for anxiety 30 tablet 0    diltiazem ER (TIAZAC) 240 MG 24 hr ER beaded capsule Take 1 capsule (240 mg) by mouth daily 90 capsule 3    diphenhydrAMINE HCl (BENADRYL ALLERGY CHILDRENS) 12.5 MG CHEW Take 2 tablets by mouth as needed (allergic reaction)      EPINEPHrine (ANY BX GENERIC EQUIV) 0.3 MG/0.3ML injection 2-pack Inject 0.3 mLs (0.3 mg) into the muscle as needed for anaphylaxis May repeat one time in 5-15 minutes if response to initial dose is inadequate. 2 each 1    Multiple Vitamins-Minerals (MULTIVITAMIN GUMMIES WOMENS) CHEW Take 1 Dose by mouth daily      spironolactone (ALDACTONE) 50 MG tablet Take 1 tablet by mouth once daily 90 tablet 2    triamcinolone (NASACORT) 55 MCG/ACT nasal aerosol Spray 2 sprays into both  "nostrils daily as needed (seasonal allergies)         Allergies:  Allergies   Allergen Reactions    Dust Mite Extract Difficulty breathing    Fruit [Peach] Anaphylaxis and Hives     fresh peaches and any fruit that has a pit.  Kiwi's and apples also.  Can eat any fruit cooked.    Latex Other (See Comments), Swelling, Difficulty breathing and Rash     Throat Closes      Nuts Anaphylaxis and Hives     All Raw Nuts, can eat nuts when roasted.    Other Food Allergy Other (See Comments) and Difficulty breathing     Mackey powder    Pollen Extract Difficulty breathing     All fruits and vegetables need to be washed and rinsed prior to eating.     Ace Inhibitors      Upper respiratory infection    Antihistamines, Chlorpheniramine-Type Hives     Antihistamines    Benicar [Olmesartan] Cough    Effexor [Venlafaxine] Other (See Comments)     Heartburn, reflux    Erythromycin      BASE; pt. Not sure if this is a true allergy.    Hydrochlorothiazide     Phenylephrine Hcl      Guaifed    Seasonal Allergies      hayfever    Sm Guaifenesin-Pseudoephedrine [Pseudoephedrine-Guaifenesin]      Guaifed    Statins     Trees     Ultram [Tramadol]      Sleep disturbance. Can not sleep, and when able to fall asleep will have terrible nightmares    Xanax [Alprazolam] Hives     Xanax    Zoloft [Sertraline]      paranoia       PHYSICAL EXAM:   Vital signs: BP (!) 150/80 (BP Location: Right arm, Cuff Size: Adult Small)   Pulse 62   Temp 96.8  F (36  C) (Tympanic)   Resp 16   Ht 1.549 m (5' 1\")   Wt 78.9 kg (174 lb)   SpO2 96%   BMI 32.88 kg/m     BMI: Body mass index is 32.88 kg/m .   General: Normal, healthy, cooperative, in no acute distress, alert   Lungs: respirations are non-labored   Abdominal: non-distended   Wounds:  well healed.  To the left mastectomy site there is a lip of tissue noted midline which is stable in appearance.      PATHOLOGY:  Case Report   Date Value Ref Range Status   01/16/2024   Final    Surgical Pathology " Report                         Case: [id number]                                  Authorizing Provider:  Ethan Molina MD  Collected:           01/16/2024 02:16 PM          Ordering Location:     [facility]     Received:            01/16/2024 03:45 PM          Pathologist:           Steve Fletcher DO                                                         Specimens:   A) - Lymph Node(s), [place], Left [place] Node                                                    B) - Breast, Left, Left breast, short stitch superior long stitch lateral                           C) - Breast, Right, right breast, short stitch superior, long stitch lateral                Final Diagnosis   Date Value Ref Range Status   01/16/2024   Final    A.  [place] lymph node, left axilla, excisional biopsy:  -Lymph node with partial fat replacement and small scattered foci of hemosiderin deposition.  -Negative for metastatic tumor (0/1).  -See synoptic report below.    B.  Breast, left, simple mastectomy:  -Biopsy site with residual invasive ductal carcinoma, grade 1.  -Focal lobular carcinoma in situ in close proximity to the invasive carcinoma.  -Atrophic changes, few foci of the usual ductal hyperplasia, few small duct intraductal papillomas, focal apocrine metaplasia,     focal stromal fibrosis and few microcalcifications within invasive tumor and benign ducts.  -See synoptic report below.    C.  Breast, right, simple mastectomy:  -Atrophic changes, focal stromal fibrosis, rare small foci of usual ductal hyperplasia, focal apocrine metaplasia, and occasional    microcalcifications within benign ducts.          ASSESSMENT:    73 year old female who is 5 months s/p Left sentinel node biopsy and bilateral complete mastectomy.      PLAN:    Dr. Ethan Molina was consulted on the patient and examined the patient.  Patient with a normal surgical site exam at today's appointment.  We would like the patient to see her PCP  ASAP for neck and arm pain.  Follow-up 1 month with me for a recheck. Sooner with problems/concerns. Continue PT/OT.

## 2024-06-06 NOTE — PROGRESS NOTES
"  Assessment & Plan     (M54.2) Cervicalgia  (primary encounter diagnosis)  (M54.12) Cervical radiculopathy  (M62.838) Muscle spasm  Comment: patient with neck pain that is radiating into left arm, very stressed, carries stress in shoulders/upper back, muscle spasm is felt in left upper trap  Plan: methocarbamol (ROBAXIN) 500 MG tablet        Will try muscle relaxant. Robaxin ordered. She was made aware of the side effects. She will continue PT. She will also follow-up with Dr. Bravo due to her past surgeries.     Will see her back in 4 weeks for a recheck, sooner with new or worsening symptoms.     (I10) Essential hypertension  Comment: blood pressure borderline high, but she is in a lot of pain  Plan: Readings have been normal at home. BP up, most likely due to pain. Will continue current medications and recheck in 4 weeks.       The longitudinal plan of care for the diagnosis(es)/condition(s) as documented were addressed during this visit. Due to the added complexity in care, I will continue to support Rj in the subsequent management and with ongoing continuity of care.    BMI  Estimated body mass index is 32.88 kg/m  as calculated from the following:    Height as of this encounter: 1.549 m (5' 1\").    Weight as of this encounter: 78.9 kg (174 lb).   Weight management plan: Discussed healthy diet and exercise guidelines          Subjective   Rj is a 73 year old, presenting for the following health issues:  Musculoskeletal Problem    History of Present Illness       Back Pain:  She presents for follow up of back pain. Patient's back pain is a new problem.    Original cause of back pain: not sure  First noticed back pain: in the last week  Patient feels back pain: constantlyLocation of back pain:  Left upper back, right side of neck and left side of neck  Description of back pain: sharp and shooting  Back pain spreads: right side of neck and left side of neck    Since patient first noticed back pain, pain is: " "unchanged  Does back pain interfere with her job:  No  On a scale of 1-10 (10 being the worst), patient describes pain as:  5  What makes back pain worse: certain positions and twisting   Acupuncture: not tried  Acetaminophen: not helpful  Activity or exercise: not helpful  Chiropractor:  Not tried  Cold: helpful  Heat: helpful  Massage: not tried  Muscle relaxants: not tried  NSAIDS: not tried  Physical Therapy: not tried  Rest: helpful  Steroid Injection: not tried  Stretching: not tried  Surgery: not tried  TENS unit: not tried  Topical pain relievers: not tried  Other healthcare providers patient is seeing for back pain: None    Headaches:   Since the patient's last clinic visit, headaches are: no change  The patient is getting headaches:  Daily  She is able to do normal daily activities when she has a migraine.  The patient is taking the following rescue/relief medications:  Tylenol   Patient states \"The relief is inconsistent\" from the rescue/relief medications.   The patient is taking the following medications to prevent migraines:  No medications to prevent migraines  In the past 4 weeks, the patient has gone to an Urgent Care or Emergency Room 0 times times due to headaches.    She eats 4 or more servings of fruits and vegetables daily.She consumes 0 sweetened beverage(s) daily.She exercises with enough effort to increase her heart rate 10 to 19 minutes per day.  She exercises with enough effort to increase her heart rate 5 days per week.   She is taking medications regularly.       Concern - Neck and Left Arm Pain  Onset: present for months, but recently worsened  Description: patient having neck pain that radiates into left arm; also having some numbness and tingling in left arm; pain in arm is sharp and shooting  Intensity: 5/10  Progression of Symptoms:  same and constant  Accompanying Signs & Symptoms: none; no fevers  Previous history of similar problem: none  Precipitating factors:        Worsened " "by: certain positions  Alleviating factors:        Improved by: none  Therapies tried and outcome: icing her neck; tylenol as needed    -She had left cervical C6-C7 microdiskectomy in 7/2022 and then a fusion in 1/2023.   -She does have follow-up with Dr. Bravo in 7/2024.   -She does have breast cancer and is stressed. Working with PT and counselor.         Review of Systems  Constitutional, HEENT, cardiovascular, pulmonary, gi and gu systems are negative, except as otherwise noted.      Objective    /80 (BP Location: Right arm, Patient Position: Sitting, Cuff Size: Adult Regular)   Pulse 59   Temp 97.6  F (36.4  C) (Tympanic)   Ht 1.549 m (5' 1\")   Wt 78.9 kg (174 lb)   SpO2 96%   BMI 32.88 kg/m    Body mass index is 32.88 kg/m .  Physical Exam   GENERAL: alert and no distress  EYES: Eyes grossly normal to inspection, PERRL and conjunctivae and sclerae normal  HENT: ear canals and TM's normal, nose and mouth without ulcers or lesions  NECK: no adenopathy, no asymmetry, masses, or scars  RESP: lungs clear to auscultation - no rales, rhonchi or wheezes  CV: regular rate and rhythm, no murmur, click or rub, no peripheral edema  NEURO: Normal strength and tone, mentation intact and speech normal  PSYCH: mentation appears normal, affect normal/bright  NECK: Skin intact. Cervical spine without gross deformity, rash, erythema, or ecchymosis. No point tenderness. Some Paraspinous muscle tenderness bilaterally. Some pain with flexion and extension of her head. She does have palpable spasm left upper trapezius. UE strength 5/5 bilaterally. Sensation intact to light touch. Bilateral radial pulses 2+.             Signed Electronically by: Sheyla Sanches NP    "

## 2024-06-07 ENCOUNTER — OFFICE VISIT (OUTPATIENT)
Dept: FAMILY MEDICINE | Facility: OTHER | Age: 74
End: 2024-06-07
Attending: NURSE PRACTITIONER
Payer: COMMERCIAL

## 2024-06-07 VITALS
SYSTOLIC BLOOD PRESSURE: 138 MMHG | OXYGEN SATURATION: 96 % | HEART RATE: 59 BPM | TEMPERATURE: 97.6 F | BODY MASS INDEX: 32.85 KG/M2 | HEIGHT: 61 IN | WEIGHT: 174 LBS | DIASTOLIC BLOOD PRESSURE: 80 MMHG

## 2024-06-07 DIAGNOSIS — M62.838 MUSCLE SPASM: ICD-10-CM

## 2024-06-07 DIAGNOSIS — M54.2 CERVICALGIA: Primary | ICD-10-CM

## 2024-06-07 DIAGNOSIS — I10 ESSENTIAL HYPERTENSION: ICD-10-CM

## 2024-06-07 DIAGNOSIS — M54.12 CERVICAL RADICULOPATHY: ICD-10-CM

## 2024-06-07 PROCEDURE — G0463 HOSPITAL OUTPT CLINIC VISIT: HCPCS

## 2024-06-07 PROCEDURE — G2211 COMPLEX E/M VISIT ADD ON: HCPCS | Performed by: NURSE PRACTITIONER

## 2024-06-07 PROCEDURE — 99214 OFFICE O/P EST MOD 30 MIN: CPT | Performed by: NURSE PRACTITIONER

## 2024-06-07 RX ORDER — METHOCARBAMOL 500 MG/1
500 TABLET, FILM COATED ORAL 3 TIMES DAILY PRN
Qty: 30 TABLET | Refills: 0 | Status: SHIPPED | OUTPATIENT
Start: 2024-06-07 | End: 2024-08-21

## 2024-06-07 ASSESSMENT — ANXIETY QUESTIONNAIRES
IF YOU CHECKED OFF ANY PROBLEMS ON THIS QUESTIONNAIRE, HOW DIFFICULT HAVE THESE PROBLEMS MADE IT FOR YOU TO DO YOUR WORK, TAKE CARE OF THINGS AT HOME, OR GET ALONG WITH OTHER PEOPLE: NOT DIFFICULT AT ALL
8. IF YOU CHECKED OFF ANY PROBLEMS, HOW DIFFICULT HAVE THESE MADE IT FOR YOU TO DO YOUR WORK, TAKE CARE OF THINGS AT HOME, OR GET ALONG WITH OTHER PEOPLE?: NOT DIFFICULT AT ALL
2. NOT BEING ABLE TO STOP OR CONTROL WORRYING: SEVERAL DAYS
4. TROUBLE RELAXING: SEVERAL DAYS
6. BECOMING EASILY ANNOYED OR IRRITABLE: SEVERAL DAYS
1. FEELING NERVOUS, ANXIOUS, OR ON EDGE: SEVERAL DAYS
3. WORRYING TOO MUCH ABOUT DIFFERENT THINGS: SEVERAL DAYS
5. BEING SO RESTLESS THAT IT IS HARD TO SIT STILL: NOT AT ALL
GAD7 TOTAL SCORE: 5
7. FEELING AFRAID AS IF SOMETHING AWFUL MIGHT HAPPEN: NOT AT ALL
GAD7 TOTAL SCORE: 5
7. FEELING AFRAID AS IF SOMETHING AWFUL MIGHT HAPPEN: NOT AT ALL
GAD7 TOTAL SCORE: 5

## 2024-06-07 ASSESSMENT — PATIENT HEALTH QUESTIONNAIRE - PHQ9
SUM OF ALL RESPONSES TO PHQ QUESTIONS 1-9: 8
10. IF YOU CHECKED OFF ANY PROBLEMS, HOW DIFFICULT HAVE THESE PROBLEMS MADE IT FOR YOU TO DO YOUR WORK, TAKE CARE OF THINGS AT HOME, OR GET ALONG WITH OTHER PEOPLE: NOT DIFFICULT AT ALL
SUM OF ALL RESPONSES TO PHQ QUESTIONS 1-9: 8

## 2024-06-11 ENCOUNTER — THERAPY VISIT (OUTPATIENT)
Dept: PHYSICAL THERAPY | Facility: HOSPITAL | Age: 74
End: 2024-06-11
Attending: NURSE PRACTITIONER
Payer: MEDICARE

## 2024-06-11 DIAGNOSIS — C50.919 BREAST CANCER (H): Primary | ICD-10-CM

## 2024-06-11 PROCEDURE — 97140 MANUAL THERAPY 1/> REGIONS: CPT | Mod: GP

## 2024-06-11 PROCEDURE — 97530 THERAPEUTIC ACTIVITIES: CPT | Mod: GP

## 2024-06-12 ENCOUNTER — MEDICAL CORRESPONDENCE (OUTPATIENT)
Dept: MRI IMAGING | Facility: HOSPITAL | Age: 74
End: 2024-06-12

## 2024-06-17 ENCOUNTER — THERAPY VISIT (OUTPATIENT)
Dept: PHYSICAL THERAPY | Facility: HOSPITAL | Age: 74
End: 2024-06-17
Attending: NURSE PRACTITIONER
Payer: MEDICARE

## 2024-06-17 DIAGNOSIS — C50.919 BREAST CANCER (H): Primary | ICD-10-CM

## 2024-06-17 PROCEDURE — 97140 MANUAL THERAPY 1/> REGIONS: CPT | Mod: GP

## 2024-06-17 PROCEDURE — 97530 THERAPEUTIC ACTIVITIES: CPT | Mod: GP

## 2024-06-24 DIAGNOSIS — F41.1 GENERALIZED ANXIETY DISORDER: ICD-10-CM

## 2024-06-24 RX ORDER — CLONAZEPAM 0.5 MG/1
TABLET ORAL
Qty: 30 TABLET | Refills: 0 | Status: SHIPPED | OUTPATIENT
Start: 2024-06-24

## 2024-06-24 NOTE — TELEPHONE ENCOUNTER
Klonopin 0.5 mg      Last Written Prescription Date:  1/3/23  Last Fill Quantity: 30,   # refills: 0  Last Office Visit: 6/7/24  Future Office visit:    Next 5 appointments (look out 90 days)      Jul 09, 2024 10:00 AM  (Arrive by 9:45 AM)  Provider Visit with Sheyla Sanches NP  [facility] ([facility] [address] [facility] [address]  [phone]     Aug 01, 2024 8:30 AM  (Arrive by 8:15 AM)  Return Visit with Lizeth Crisostomo CNP  [facility] ([facility] ) [address]  [facility] [address]  [phone]     Aug 13, 2024 8:00 AM  (Arrive by 7:45 AM)  Provider Visit with Sheyla Sanches NP  [facility] ([facility] ) [address] [facility] [address]  [phone]     Aug 20, 2024 10:50 AM  (Arrive by 10:35 AM)  Return Visit with NORMAN Mauro [facility] ([facility] ) [address]  [facility] [address]  [phone]             Routing refill request to provider for review/approval because:  Drug not on the [facility], [facility] or [facility] refill protocol or controlled substance

## 2024-06-25 ENCOUNTER — THERAPY VISIT (OUTPATIENT)
Dept: PHYSICAL THERAPY | Facility: HOSPITAL | Age: 74
End: 2024-06-25
Attending: NURSE PRACTITIONER
Payer: MEDICARE

## 2024-06-25 DIAGNOSIS — C50.919 BREAST CANCER (H): Primary | ICD-10-CM

## 2024-06-25 PROCEDURE — 97140 MANUAL THERAPY 1/> REGIONS: CPT | Mod: GP

## 2024-06-25 PROCEDURE — 97530 THERAPEUTIC ACTIVITIES: CPT | Mod: GP

## 2024-06-26 ENCOUNTER — HOSPITAL ENCOUNTER (OUTPATIENT)
Dept: MRI IMAGING | Facility: HOSPITAL | Age: 74
Discharge: HOME | End: 2024-06-26
Attending: STUDENT IN AN ORGANIZED HEALTH CARE EDUCATION/TRAINING PROGRAM | Admitting: STUDENT IN AN ORGANIZED HEALTH CARE EDUCATION/TRAINING PROGRAM
Payer: MEDICARE

## 2024-06-26 DIAGNOSIS — M54.12 CERVICAL RADICULOPATHY: ICD-10-CM

## 2024-06-26 PROCEDURE — 72141 MRI NECK SPINE W/O DYE: CPT

## 2024-07-08 NOTE — PROGRESS NOTES
"  Assessment & Plan     (M54.2) Cervicalgia  (primary encounter diagnosis)  (M54.12) Cervical radiculopathy  (M62.028) Muscle spasm  Plan: DULoxetine (CYMBALTA) 20 MG capsule        Patient having a lot of neck pain. H/O fusion. Has follow-up with Dr. Bravo at the end of the month. Robaxin not helping. Will try low dose Cymbalta and reassess in 6 weeks. Hesitant to start gabapentin as she is working hard to lose weight. Will see sooner with new or worsening symptoms.     (I10) Essential hypertension  Comment: Blood pressure 136/70 today, runs around 113/60 at home  Plan: I do not feel her blood pressure is dropping too low. She is having some dizziness and notes that the room spins. Seems to be more of a vertigo. See below. Will continue current medications and reassess in 6 weeks.     (E11.9) Type 2 diabetes mellitus without complication, without long-term current use of insulin (H)  Plan: Hemoglobin A1c, Comprehensive metabolic panel         (BMP + Alb, Alk Phos, ALT, AST, Total. Bili,         TP), Albumin Random Urine Quantitative with         Creat Ratio        A1C in process. Will notify patient of the results when available and intervene accordingly. BP ok, slightly high, but I am hesitant to increase medications as she notes that it is dropping at home. Declines to start statin. Eye exam scheduled. Will reassess DM in 6 months, sooner if A1C is poorly controlled.     (E78.5) Hyperlipidemia, unspecified hyperlipidemia type  Plan: Lipid Profile (Chol, Trig, HDL, LDL calc)        Declines to start statin due to side effects. Will monitor.     (R42) Vertigo  Comment: room spins, feels like her \"head is full.\"   Plan: Physical Therapy  Referral        Will send to PT to see if this helps. If not, will conduct further work-up.     (F41.1) ZEFERINO (generalized anxiety disorder)  Comment: slightly worse due to health issues  Plan: Will start Cymbalta to also help with chronic pain as well as anxiety and " "reassess in 6 weeks. Sooner with new or worsening side effects.     Encouraged discussion with trusted relative or friend to monitor for negative mood changes and change in behaviour during medication initiation and titration. Recommended immediate help if increased thoughts of suicide and call if significant side effects occur. Encouraged patient that this type of medication is not effective immediately, and to be consistant with taking the medication.    (M25.50) Arthralgia, unspecified joint  Comment: most likely from the Arimidex  Plan: Will curb-side oncology.         Return in about 6 weeks (around 8/20/2024).    Joni De La Rosa is a 73 year old, presenting for the following health issues:  Hypertension, Diabetes, Lipids, and Pain    History of Present Illness       Back Pain:  She presents for follow up of back pain. Patient's back pain is a recurring problem.  Location of back pain:  Other  Description of back pain: other  Back pain spreads: nowhere    Since patient first noticed back pain, pain is: gradually worsening  Does back pain interfere with her job:  Not applicable       Headaches:   Since the patient's last clinic visit, headaches are: worsened  The patient is getting headaches:  Daily  She is able to do normal daily activities when she has a migraine.  The patient is taking the following rescue/relief medications:  Tylenol   Patient states \"I get no relief\" from the rescue/relief medications.   The patient is taking the following medications to prevent migraines:  No medications to prevent migraines  In the past 4 weeks, the patient has gone to an Urgent Care or Emergency Room 0 times times due to headaches.    Reason for visit:  Cancer related problems back and neck issues  Symptom onset:  More than a month  Symptoms include:  Pain dizzy drop in blood pressure light headed nor sleeping  Symptom intensity:  Severe  Symptom progression:  Worsening  Had these symptoms before:  Yes  Has " tried/received treatment for these symptoms:  Yes  Previous treatment was successful:  No  What makes it worse:  Everything  What makes it better:  Nothing    She eats 4 or more servings of fruits and vegetables daily.She consumes 0 sweetened beverage(s) daily.She exercises with enough effort to increase her heart rate 9 or less minutes per day.  She exercises with enough effort to increase her heart rate 3 or less days per week.   She is taking medications regularly.       Hypertension Follow-up    Do you check your blood pressure regularly outside of the clinic? Yes, running around 113/60   Are you following a low salt diet? Yes  Are your blood pressures ever more than 140 on the top number (systolic) OR more   than 90 on the bottom number (diastolic), for example 140/90? Sometimes   -Denies chest pain, shortness of breath, or syncope. Occasional palpitations with dizziness. Dizziness occurs when she stands too quickly. Feels off balance. Room spins when she turns in bed. Lasix was recently stopped by cardiology and she feels this helped. She has been monitoring her BP at home and notes that it drops to 113/60.  -Taking Diltiazem with Aldactone.     Anxiety slightly worse with her current health issues. Often has muscle aches throughout body. No joint erythema or swelling. Started after she started Arimidex.     Diabetes Follow-up    How often are you checking your blood sugar? A few times a week  What time of day are you checking your blood sugars (select all that apply)?  Before meals and After meals  Have you had any blood sugars above 200?  No  Have you had any blood sugars below 70?  No  What symptoms do you notice when your blood sugar is low?  None  What concerns do you have today about your diabetes? None   Do you have any of these symptoms? (Select all that apply)  No numbness or tingling in feet.  No redness, sores or blisters on feet.  No complaints of excessive thirst.  No reports of blurry vision.  No  significant changes to weight.  Have you had a diabetic eye exam in the last 12 months? Will see Dr. Butler in 2 weeks   A1C was 5.5 on 2/13/24.   Does not take anything for her diabetes.       BP Readings from Last 2 Encounters:   07/09/24 136/70   06/07/24 138/80     Hemoglobin A1C (%)   Date Value   02/13/2024 5.5   11/13/2023 5.7 (H)   03/10/2021 5.8 (H)   02/19/2020 5.9 (H)     LDL Cholesterol Calculated (mg/dL)   Date Value   06/28/2023 85   01/10/2022 77   03/10/2021 87   11/11/2019 61     Hyperlipidemia Follow-Up    Are you regularly taking any medication or supplement to lower your cholesterol?   No, declines, has tried any and had reactions  Are you having muscle aches or other side effects that you think could be caused by your cholesterol lowering medication?  No    Cervical Radiculopathy; last seen on 6/7/24; Robaxin was started. Today she notes that this has not helped. Feels the muscles around her neck are very tight. She been working with PT and feels this has helped. She has a follow-up with Dr. Bravo on 7/25/24. She did have a cervical MRI on 6/26/24.     IMPRESSION:   Worsening degenerative changes in the left C3-4 facet joint with  reactive edema. This edema is new when compared to the prior study.     Postoperative changes at C6-7 are new when compared to the prior  study. Metallic artifact limits evaluation. Images suggest a dorsal  inferior endplate spur impinging upon the sac and cord without  evidence of apparent cord myelopathy.     Moderate central canal stenosis now seen at C4-5, primarily related to  ligamentum flavum hypertrophy.     Degenerative changes otherwise throughout the cervical spine are  similar in appearance compared to the prior study.     JANNETTE YO MD       -She had left cervical C6-C7 microdiskectomy in 7/2022 and then a fusion in 1/2023.   -She does have follow-up with Dr. Bravo in 7/2024.   -She does have breast cancer and is stressed. Working with PT and  "counselor.             Review of Systems  Constitutional, HEENT, cardiovascular, pulmonary, gi and gu systems are negative, except as otherwise noted.      Objective    /70 (BP Location: Left arm, Patient Position: Sitting, Cuff Size: Adult Large)   Pulse 66   Temp 98.5  F (36.9  C) (Tympanic)   Ht 1.549 m (5' 1\")   Wt 78.9 kg (174 lb)   SpO2 97%   BMI 32.88 kg/m    Body mass index is 32.88 kg/m .  Physical Exam   GENERAL: alert, no distress, and obese  EYES: Eyes grossly normal to inspection, PERRL and conjunctivae and sclerae normal  HENT: ear canals and TM's normal, nose and mouth without ulcers or lesions  NECK: no adenopathy, no asymmetry, masses, or scars  RESP: lungs clear to auscultation - no rales, rhonchi or wheezes  CV: regular rate and rhythm, no murmur, click or rub, no peripheral edema  MS: no gross musculoskeletal defects noted, no edema  NEURO: Normal strength and tone, mentation intact and speech normal  PSYCH: mentation appears normal, affect normal/bright  Diabetic foot exam: normal DP and PT pulses, no trophic changes or ulcerative lesions, and normal sensory exam    Labs in process        Signed Electronically by: Sheyla Sanches NP    "

## 2024-07-09 ENCOUNTER — OFFICE VISIT (OUTPATIENT)
Dept: SURGERY | Facility: OTHER | Age: 74
End: 2024-07-09
Attending: NURSE PRACTITIONER
Payer: MEDICARE

## 2024-07-09 ENCOUNTER — TELEPHONE (OUTPATIENT)
Dept: ONCOLOGY | Facility: OTHER | Age: 74
End: 2024-07-09

## 2024-07-09 ENCOUNTER — OFFICE VISIT (OUTPATIENT)
Dept: FAMILY MEDICINE | Facility: OTHER | Age: 74
End: 2024-07-09
Attending: NURSE PRACTITIONER
Payer: COMMERCIAL

## 2024-07-09 VITALS
HEIGHT: 61 IN | OXYGEN SATURATION: 97 % | SYSTOLIC BLOOD PRESSURE: 136 MMHG | HEART RATE: 66 BPM | DIASTOLIC BLOOD PRESSURE: 70 MMHG | TEMPERATURE: 98.5 F | WEIGHT: 174 LBS | BODY MASS INDEX: 32.85 KG/M2

## 2024-07-09 VITALS
RESPIRATION RATE: 18 BRPM | HEART RATE: 66 BPM | BODY MASS INDEX: 30.83 KG/M2 | HEIGHT: 63 IN | WEIGHT: 174 LBS | DIASTOLIC BLOOD PRESSURE: 70 MMHG | OXYGEN SATURATION: 97 % | SYSTOLIC BLOOD PRESSURE: 136 MMHG | TEMPERATURE: 98.5 F

## 2024-07-09 DIAGNOSIS — I10 ESSENTIAL HYPERTENSION: ICD-10-CM

## 2024-07-09 DIAGNOSIS — F41.1 GAD (GENERALIZED ANXIETY DISORDER): ICD-10-CM

## 2024-07-09 DIAGNOSIS — M62.838 MUSCLE SPASM: ICD-10-CM

## 2024-07-09 DIAGNOSIS — M25.50 ARTHRALGIA, UNSPECIFIED JOINT: ICD-10-CM

## 2024-07-09 DIAGNOSIS — Z90.13 STATUS POST MASTECTOMY, BILATERAL: Primary | ICD-10-CM

## 2024-07-09 DIAGNOSIS — E11.9 TYPE 2 DIABETES MELLITUS WITHOUT COMPLICATION, WITHOUT LONG-TERM CURRENT USE OF INSULIN (H): ICD-10-CM

## 2024-07-09 DIAGNOSIS — R42 VERTIGO: ICD-10-CM

## 2024-07-09 DIAGNOSIS — M54.12 CERVICAL RADICULOPATHY: ICD-10-CM

## 2024-07-09 DIAGNOSIS — E78.5 HYPERLIPIDEMIA, UNSPECIFIED HYPERLIPIDEMIA TYPE: ICD-10-CM

## 2024-07-09 DIAGNOSIS — M54.2 CERVICALGIA: Primary | ICD-10-CM

## 2024-07-09 LAB
ALBUMIN SERPL BCG-MCNC: 4 G/DL (ref 3.5–5.2)
ALP SERPL-CCNC: 72 U/L (ref 40–150)
ALT SERPL W P-5'-P-CCNC: 16 U/L (ref 0–50)
ANION GAP SERPL CALCULATED.3IONS-SCNC: 12 MMOL/L (ref 7–15)
AST SERPL W P-5'-P-CCNC: 22 U/L (ref 0–45)
BILIRUB SERPL-MCNC: 0.6 MG/DL
BUN SERPL-MCNC: 15.8 MG/DL (ref 8–23)
CALCIUM SERPL-MCNC: 9.8 MG/DL (ref 8.8–10.2)
CHLORIDE SERPL-SCNC: 105 MMOL/L (ref 98–107)
CHOLEST SERPL-MCNC: 150 MG/DL
CREAT SERPL-MCNC: 0.87 MG/DL (ref 0.51–0.95)
DEPRECATED HCO3 PLAS-SCNC: 22 MMOL/L (ref 22–29)
EGFRCR SERPLBLD CKD-EPI 2021: 71 ML/MIN/1.73M2
EST. AVERAGE GLUCOSE BLD GHB EST-MCNC: 120 MG/DL
FASTING STATUS PATIENT QL REPORTED: ABNORMAL
FASTING STATUS PATIENT QL REPORTED: NORMAL
GLUCOSE SERPL-MCNC: 92 MG/DL (ref 70–99)
HBA1C MFR BLD: 5.8 %
HDLC SERPL-MCNC: 53 MG/DL
LDLC SERPL CALC-MCNC: 54 MG/DL
NONHDLC SERPL-MCNC: 97 MG/DL
POTASSIUM SERPL-SCNC: 4.3 MMOL/L (ref 3.4–5.3)
PROT SERPL-MCNC: 7.1 G/DL (ref 6.4–8.3)
SODIUM SERPL-SCNC: 139 MMOL/L (ref 135–145)
TRIGL SERPL-MCNC: 214 MG/DL

## 2024-07-09 PROCEDURE — 99024 POSTOP FOLLOW-UP VISIT: CPT | Performed by: NURSE PRACTITIONER

## 2024-07-09 PROCEDURE — 99214 OFFICE O/P EST MOD 30 MIN: CPT | Performed by: NURSE PRACTITIONER

## 2024-07-09 PROCEDURE — 80061 LIPID PANEL: CPT | Mod: ZL | Performed by: NURSE PRACTITIONER

## 2024-07-09 PROCEDURE — 80053 COMPREHEN METABOLIC PANEL: CPT | Mod: ZL | Performed by: NURSE PRACTITIONER

## 2024-07-09 PROCEDURE — 36415 COLL VENOUS BLD VENIPUNCTURE: CPT | Mod: ZL | Performed by: NURSE PRACTITIONER

## 2024-07-09 PROCEDURE — 83036 HEMOGLOBIN GLYCOSYLATED A1C: CPT | Mod: ZL | Performed by: NURSE PRACTITIONER

## 2024-07-09 PROCEDURE — G0463 HOSPITAL OUTPT CLINIC VISIT: HCPCS

## 2024-07-09 RX ORDER — DULOXETIN HYDROCHLORIDE 20 MG/1
20 CAPSULE, DELAYED RELEASE ORAL 2 TIMES DAILY
Qty: 60 CAPSULE | Refills: 1 | Status: SHIPPED | OUTPATIENT
Start: 2024-07-09 | End: 2024-08-21 | Stop reason: ALTCHOICE

## 2024-07-09 ASSESSMENT — PAIN SCALES - GENERAL
PAINLEVEL: MODERATE PAIN (5)
PAINLEVEL: MODERATE PAIN (5)

## 2024-07-09 NOTE — PROGRESS NOTES
CLINIC NOTE - POST-OP SURGERY  7/9/2024    Patient:Rj Rodríguez    Procedure: Left sentinel node biopsy and bilateral complete mastectomy     This is a 73 year old female who is 6 months s/p Left sentinel node biopsy and bilateral complete mastectomy.  The patient is without incisional site pain when seen today.    Current Medications:  Current Outpatient Medications   Medication Sig Dispense Refill    albuterol (PROAIR HFA/PROVENTIL HFA/VENTOLIN HFA) 108 (90 Base) MCG/ACT inhaler Inhale 2 puffs into the lungs every 6 hours as needed for shortness of breath      anastrozole (ARIMIDEX) 1 MG tablet Take 1 tablet (1 mg) by mouth daily 90 tablet 1    blood glucose (ACCU-CHEK GUIDE) test strip 1 ONCE DAILY USE AS DIRECTED 100 strip 1    blood glucose monitoring (ACCU-CHEK FASTCLIX) lancets Use to test blood sugar 1 time daily or as directed. 102 each 11    calcium carbonate-vitamin D (OSCAL) 500-5 MG-MCG tablet Take 1 tablet by mouth twice daily 90 tablet 0    cetirizine (ZYRTEC) 10 MG tablet Take 10-20 mg by mouth daily (10 mg in the winter, 20 mg in allergy season)      clonazePAM (KLONOPIN) 0.5 MG tablet Take 1 tablet by mouth twice daily as needed for anxiety 30 tablet 0    diltiazem ER (TIAZAC) 240 MG 24 hr ER beaded capsule Take 1 capsule (240 mg) by mouth daily 90 capsule 3    diphenhydrAMINE HCl (BENADRYL ALLERGY CHILDRENS) 12.5 MG CHEW Take 2 tablets by mouth as needed (allergic reaction)      DULoxetine (CYMBALTA) 20 MG capsule Take 1 capsule (20 mg) by mouth 2 times daily 60 capsule 1    EPINEPHrine (ANY BX GENERIC EQUIV) 0.3 MG/0.3ML injection 2-pack Inject 0.3 mLs (0.3 mg) into the muscle as needed for anaphylaxis May repeat one time in 5-15 minutes if response to initial dose is inadequate. 2 each 1    methocarbamol (ROBAXIN) 500 MG tablet Take 1 tablet (500 mg) by mouth 3 times daily as needed for muscle spasms 30 tablet 0    Multiple Vitamins-Minerals (MULTIVITAMIN GUMMIES WOMENS) CHEW Take 1 Dose by  "mouth daily      spironolactone (ALDACTONE) 50 MG tablet Take 1 tablet by mouth once daily 90 tablet 2    triamcinolone (NASACORT) 55 MCG/ACT nasal aerosol Spray 2 sprays into both nostrils daily as needed (seasonal allergies)         Allergies:  Allergies   Allergen Reactions    Dust Mite Extract Difficulty breathing    Fruit [Peach] Anaphylaxis and Hives     fresh peaches and any fruit that has a pit.  Kiwi's and apples also.  Can eat any fruit cooked.    Latex Other (See Comments), Swelling, Difficulty breathing and Rash     Throat Closes      Nuts Anaphylaxis and Hives     All Raw Nuts, can eat nuts when roasted.    Other Food Allergy Other (See Comments) and Difficulty breathing     Mackey powder    Pollen Extract Difficulty breathing     All fruits and vegetables need to be washed and rinsed prior to eating.     Ace Inhibitors      Upper respiratory infection    Antihistamines, Chlorpheniramine-Type Hives     Antihistamines    Benicar [Olmesartan] Cough    Effexor [Venlafaxine] Other (See Comments)     Heartburn, reflux    Erythromycin      BASE; pt. Not sure if this is a true allergy.    Hydrochlorothiazide     Phenylephrine Hcl      Guaifed    Seasonal Allergies      hayfever    Sm Guaifenesin-Pseudoephedrine [Pseudoephedrine-Guaifenesin]      Guaifed    Statins     Trees     Ultram [Tramadol]      Sleep disturbance. Can not sleep, and when able to fall asleep will have terrible nightmares    Xanax [Alprazolam] Hives     Xanax    Zoloft [Sertraline]      paranoia       PHYSICAL EXAM:   Vital signs: /70 (BP Location: Left arm, Cuff Size: Adult Regular)   Pulse 66   Temp 98.5  F (36.9  C) (Tympanic)   Resp 18   Ht 1.6 m (5' 3\")   Wt 78.9 kg (174 lb)   SpO2 97%   BMI 30.82 kg/m     BMI: Body mass index is 30.82 kg/m .   General: Normal, healthy, cooperative, in no acute distress, alert   Lungs: respirations are non-labored   Abdominal: non-distended   Wounds:  well healed.  To the left mastectomy " site there is a lip of tissue noted midline which is stable in appearance.      PATHOLOGY:  Case Report   Date Value Ref Range Status   01/16/2024   Final    Surgical Pathology Report                         Case: [id number]                                  Authorizing Provider:  Ethan Molina MD  Collected:           01/16/2024 02:16 PM          Ordering Location:     [facility] Operating [facility]     Received:            01/16/2024 03:45 PM          Pathologist:           Steve Fletcher DO                                                         Specimens:   A) - Lymph Node(s), [place], Left [place] Node                                                    B) - Breast, Left, Left breast, short stitch superior long stitch lateral                           C) - Breast, Right, right breast, short stitch superior, long stitch lateral                Final Diagnosis   Date Value Ref Range Status   01/16/2024   Final    A.  [place] lymph node, left axilla, excisional biopsy:  -Lymph node with partial fat replacement and small scattered foci of hemosiderin deposition.  -Negative for metastatic tumor (0/1).  -See synoptic report below.    B.  Breast, left, simple mastectomy:  -Biopsy site with residual invasive ductal carcinoma, grade 1.  -Focal lobular carcinoma in situ in close proximity to the invasive carcinoma.  -Atrophic changes, few foci of the usual ductal hyperplasia, few small duct intraductal papillomas, focal apocrine metaplasia,     focal stromal fibrosis and few microcalcifications within invasive tumor and benign ducts.  -See synoptic report below.    C.  Breast, right, simple mastectomy:  -Atrophic changes, focal stromal fibrosis, rare small foci of usual ductal hyperplasia, focal apocrine metaplasia, and occasional    microcalcifications within benign ducts.          ASSESSMENT:    73 year old female who is 6 months s/p Left sentinel node biopsy and bilateral complete mastectomy.      PLAN:  Follow-up  3 months with me for a recheck. Sooner with problems/concerns. Continue PT/OT.

## 2024-07-09 NOTE — TELEPHONE ENCOUNTER
Received note from PCP regarding myalgias/arthalgias. Wondering if related to Arimidex use. Advised that it absolutely could. She did start patient on Cymbalta to see if that would help.     Called patient. States that she can't go on like this. Gave option of remaining on Arimidex and seeing if Cymbalta helps or taking a couple week break from Arimidex regardless.     Patient wishes to take a break. She will stop AI for the next 2 weeks and I will plan on seeing her back in 2 weeks to see how she is feeling and discuss plan going forward.     NORMAN Pedro CNP     Telemedicine completed.

## 2024-07-12 ENCOUNTER — THERAPY VISIT (OUTPATIENT)
Dept: PHYSICAL THERAPY | Facility: HOSPITAL | Age: 74
End: 2024-07-12
Attending: NURSE PRACTITIONER
Payer: MEDICARE

## 2024-07-12 DIAGNOSIS — C50.919 BREAST CANCER (H): Primary | ICD-10-CM

## 2024-07-12 PROCEDURE — 97140 MANUAL THERAPY 1/> REGIONS: CPT | Mod: GP

## 2024-07-12 PROCEDURE — 97530 THERAPEUTIC ACTIVITIES: CPT | Mod: GP

## 2024-07-12 NOTE — PROGRESS NOTES
07/12/24 0500   Appointment Info   Signing clinician's name / credentials Dimitri Ferris, PT   Total/Authorized Visits 365   Visits Used 13   Medical Diagnosis Malignant neoplasm of left breast in female, estrogen receptor positive, unspecified site of breast (H)   PT Tx Diagnosis Malignant neoplasm of left breast in female, estrogen receptor positive, unspecified site of breast (H)   Progress Note/Certification   Start of Care Date 03/11/24   Onset of illness/injury or Date of Surgery 03/11/24   Therapy Frequency 1x/week   Predicted Duration 12 weeks   Certification date from 06/03/24   Certification date to 08/26/24   Progress Note Due Date 07/03/24       Present No   GOALS   PT Goals 2;3   PT Goal 1   Goal Identifier STG-1   Goal Description Patient will possess full ROM to B shoulders to allow her to shower without pain.   Rationale to maximize safety and independence with performance of ADLs and functional tasks   Goal Progress Goal Met   Target Date 04/22/24   Date Met 04/30/24   PT Goal 2   Goal Identifier STG-2   Goal Description Patient will be able to sleep through the night to prevent her from waking due to increased pain.   Rationale to maximize safety and independence with performance of ADLs and functional tasks   Goal Progress Progressing (updated)   Target Date 06/28/24   PT Goal 3   Goal Identifier LTG-1   Goal Description Patient will possess a custom HEP to allow her to self-manage and prevent future recurrence.   Rationale to maximize safety and independence with performance of ADLs and functional tasks   Goal Progress Progressing (updated)   Target Date 08/26/24   Subjective Report   Subjective Report Rj arrived to therapy today stating she is doing a little better. She arrived  stating she has been meeting with her providers trying to figure out why she gets dizzy. She states she was taken off some meds and hopes that this will help. She states she is doing pretty well  overall but states she is still hoping to continue improving. She notes the neck is still so sore. She states she is agreeable to work with the PT today.   Treatment Interventions (PT)   Interventions Therapeutic Activity;Manual Therapy   Therapeutic Activity   Therapeutic Activities: dynamic activities to improve functional performance minutes (90090) 31   Ther Act 1 - Details Discussion had today on how she has to wait to meet with Dr. Bravo on how to progress her cervical/L UE pain. She states she is frustrated with the new onset of pain. She asked PT to help translate some of her MRI results - the PT told her to wait until she met with MD but did translate terms such as spur and spinal column   Patient Response/Progress Progressing   Manual Therapy   Manual Therapy: Mobilization, MFR, MLD, friction massage minutes (58777) 16   Manual Therapy 1 - Details PROM completed to the L UE; Cs and Ss completed up the L UE to assess for any potential cording present (none noted today); Soft tissue work/scar mobility over the L chest wall today - pt notes this is feeling looser every time. Added cervical stretching/soft tissue to release the upper traps and levator scap; added soft tissue work today ot the cervical region including upper traps, levator scap, scalenes, etc.   Patient Response/Progress Progressing   Education   Learner/Method Patient;Listening   Plan   Plan for next session Plan to progress according to how the patient tolerated the previous session   Comments   Comments Pt encouraged to try and stay calm before tomorrow's procedure   Total Session Time   Timed Code Treatment Minutes 47   Total Treatment Time (sum of timed and untimed services) 47         PLAN  Continue therapy per current plan of care.    Beginning/End Dates of Progress Note Reporting Period:  06/03/2024  to 07/12/2024    Referring Provider:  Vira Dodd

## 2024-07-16 ENCOUNTER — TELEPHONE (OUTPATIENT)
Dept: BEHAVIORAL HEALTH | Facility: OTHER | Age: 74
End: 2024-07-16

## 2024-07-16 NOTE — CONFIDENTIAL NOTE
Care Coordination Focused Note:    Rcvd incoming call from pt.  She is requesting applications for:  -[facility]  -[facility]  -[facility]    Plan to provide these to pt in person tomorrow after her PT appt.

## 2024-07-17 ENCOUNTER — THERAPY VISIT (OUTPATIENT)
Dept: PHYSICAL THERAPY | Facility: HOSPITAL | Age: 74
End: 2024-07-17
Attending: NURSE PRACTITIONER
Payer: MEDICARE

## 2024-07-17 ENCOUNTER — CARE COORDINATION (OUTPATIENT)
Dept: BEHAVIORAL HEALTH | Facility: OTHER | Age: 74
End: 2024-07-17

## 2024-07-17 DIAGNOSIS — C50.919 BREAST CANCER (H): Primary | ICD-10-CM

## 2024-07-17 PROCEDURE — 97140 MANUAL THERAPY 1/> REGIONS: CPT | Mod: GP,CQ

## 2024-07-17 NOTE — PROGRESS NOTES
Care Coordination Focused Note:    Met w/pt briefly in person after her PT visit.  Provided requested applications/documents (see 7/16/24 note).

## 2024-07-19 ENCOUNTER — TRANSFERRED RECORDS (OUTPATIENT)
Dept: MULTI SPECIALTY CLINIC | Facility: CLINIC | Age: 74
End: 2024-07-19

## 2024-07-19 LAB — RETINOPATHY: NORMAL

## 2024-07-19 NOTE — PROGRESS NOTES
Oncology Follow-up Visit    Reason for Visit:  Rj is a 73 year old woman with a diagnosis of breast cancer, who presents to the clinic today for routine follow-up.    Nursing Note and documentation reviewed: Yes    Interval History:   Since I last saw patient in person, she continued to endorse worsening myalgias and arthralgias. She was also feeling even more emotional. She did see her PCP and was started on Cymbalta, in hopes of improving both mood but then also possibly helping with AI induced arthralgias. I ultimately called patient after seeing PCP and she elected to take a couple week drug holiday to see if symptoms improved.     Today, she returns noting that her arthralgias did improve. She now endorses pain only in hips, whereas it was previously all over and excruciating per patient. She feels that off Arimidex for the last two weeks, her hot flashes have also improved and become less frequent. Headaches have improved some but she still does experience these along with dizziness. Felt like she was recently doing flips when she turned to quick. She did have an MRI completed of cervical spine which showed post surgical changes but also worsening degenerative changes and stenosis. She will be seeing Dr. Bravo later this week of OA. She did have an eye exam and has some stable cataracts.     She denies chest wall concerns. Recently saw Lelia Lemus who please with breast examination. No new lumps, bumps, pain, discharge, or skin changes. She does endorse the desire for future plastic surgery but wants to hold on this given other more pressing issues.     Oncologic History:   Ms. Rodríguez was found to have a abnormality on her screening mammogram.     11/13/2023: Mammogram screening bilateral with tomosynthesis:No architectural distortion, dominant mass or suspicious microcalcifications are identified in the right breast.  There is a small slightly spiculated appearing nodule in the upper outer quadrant  of the left breast near the junction of middle and anterior thirds. This is probably in the 1 to 2:00 position.   11/16/2023: Ultrasound breast left:There is an irregular slightly hypoechoic mass in the left breast at 1-2 o'clock, 6 cm from the nipple measuring 0.8 x 0.8 x 0.5 cm. No definite internal vascularity. No other cystic or solid masses are demonstrated.   11/21/2023: Ultrasound breast biopsy: Invasive ductal carcinoma, grade 1.  ER 91 to 100%, AL 71 to 80% and HER2 IHC +1  12/7/2023: MRI breast bilateral with and without contrast:Enhancement reflecting the sampled mass and surrounding postbiopsy change in the left upper outer breast spans 2.0 x 1.7 cm. No additional sites of highly suspicious enhancement in either breast. No evidence of pathologic adenopathy.  1/16/2024:   Left simple mastectomy and left sentinel lymph node biopsy: Pathology left breast showed biopsy site with residual invasive carcinoma, grade 1, 5.4 mm, margins negative.  focal lobular carcinoma in situ in close proximity to invasive carcinoma. pT1b N0 1 sentinel lymph node was examined which was negative for cancer.  Right breast simple mastectomy: Atrophic changes, focal stromal fibrosis, rare small foci of usual ductal hyperplasia, focal apical right metaplasia and occasional microcalcification within benign ducts. Oncotype Dx RS core is 13  2/22/2024:  Met with Dr. Kwok. Given oncotype score of 13, no added benefit to chemo. Therefore commenced endocrine therapy with Arimidex, with anticipation of completing 5 years.   7/23/2024: Decided to stop Arimidex given intolerable side effects (myalgias/arthralgias) and commenced Letrozole 2.5 mg daily    Current Chemo Regimen/TX: Letrozole 2.5 mg daily    Previous treatment: Bilateral simple mastectomy completed Jan 2024  Arimidex 1 mg daily for about 5 months    Past Medical History:   Diagnosis Date    Arthritis     Cancer (H)     Chronic back pain     Diabetes (H)     Hypertension      Irregular heart beat     Sleep apnea     Uncomplicated asthma        Past Surgical History:   Procedure Laterality Date    ANESTHESIA OUT OF OR MRI N/A 6/1/2022    Procedure: MRI CERVICAL SPINE;  Surgeon: GENERIC ANESTHESIA PROVIDER;  Location: [facility]    ANESTHESIA OUT OF OR MRI N/A 9/23/2022    Procedure: MRI CERVICAL SPINE;  Surgeon: GENERIC ANESTHESIA PROVIDER;  Location: [facility]    ANESTHESIA OUT OF OR MRI Left 12/7/2023    Procedure: Anesthesia out of OR MRI;  Surgeon: GENERIC ANESTHESIA PROVIDER;  Location: [facility]    APPENDECTOMY      BACK SURGERY      CHOLECYSTECTOMY      COLONOSCOPY  2012    Repeat in 10 years    GYN SURGERY      HYSTERECTOMY      Ovaries    MASTECTOMY SIMPLE BILATERAL, SENTINEL NODE BILATERAL, COMBINED N/A 1/16/2024    Procedure: Left sentinel node biopsy, bilateral mastectomy;  Surgeon: Ethan Molina MD;  Location: [facility]    RELEASE CARPAL TUNNEL      LT    RELEASE CARPAL TUNNEL      RT x2    SURGICAL RADIOLOGY PROCEDURE N/A 2/12/2016    Procedure: SURGICAL RADIOLOGY PROCEDURE;  Surgeon: Provider, Generic Perianesthesia Nursing;  Location: [facility]    SURGICAL RADIOLOGY PROCEDURE N/A 8/15/2016    Procedure: SURGICAL RADIOLOGY PROCEDURE;  Surgeon: Provider, Generic Perianesthesia Nursing;  Location: [facility]       Family History   Problem Relation Age of Onset    Diabetes Mother     Alcoholism Mother     Heart Failure Mother     Cerebrovascular Disease Father     Diabetes Father         Type 2    Hypertension Father     Alcoholism Father     Colon Cancer Maternal Aunt     Colon Cancer Maternal Uncle        Social History     Socioeconomic History    Marital status:      Spouse name: Not on file    Number of children: Not on file    Years of education: Not on file    Highest education level: Not on file   Occupational History    Not on file   Tobacco Use    Smoking status: Never     Passive exposure: Never    Smokeless tobacco: Never   Vaping Use    Vaping status: Never Used   Substance  and Sexual Activity    Alcohol use: Yes     Alcohol/week: 1.0 standard drink of alcohol     Types: 1 Glasses of wine per week     Comment: occ glass of wine    Drug use: No    Sexual activity: Not Currently   Other Topics Concern     Service Not Asked    Blood Transfusions Not Asked    Caffeine Concern Yes     Comment: Coffee - 2 cups daily    Occupational Exposure Not Asked    Hobby Hazards Not Asked    Sleep Concern Not Asked    Stress Concern Not Asked    Weight Concern Not Asked    Special Diet Not Asked    Back Care Not Asked    Exercise Not Asked    Bike Helmet Not Asked    Seat Belt Not Asked    Self-Exams Not Asked    Parent/sibling w/ CABG, MI or angioplasty before 65F 55M? Not Asked   Social History Narrative    10/11/2019: , lives in [facility]     Financial Resource Strain: Low Risk  (1/8/2024)    Financial Resource Strain     Within the past 12 months, have you or your family members you live with been unable to get utilities (heat, electricity) when it was really needed?: No   Food Insecurity: Low Risk  (1/8/2024)    Food Insecurity     Within the past 12 months, did you worry that your food would run out before you got money to buy more?: No     Within the past 12 months, did the food you bought just not last and you didn t have money to get more?: No   Transportation Needs: Low Risk  (1/8/2024)    Transportation Needs     Within the past 12 months, has lack of transportation kept you from medical appointments, getting your medicines, non-medical meetings or appointments, work, or from getting things that you need?: No   Physical Activity: Not on file   Stress: Not on file   Social Connections: Not on file   Interpersonal Safety: Low Risk  (1/8/2024)    Interpersonal Safety     Do you feel physically and emotionally safe where you currently live?: Yes     Within the past 12 months, have you been hit, slapped, kicked or otherwise physically hurt by  someone?: No     Within the past 12 months, have you been humiliated or emotionally abused in other ways by your partner or ex-partner?: No   Housing Stability: Low Risk  (1/8/2024)    Housing Stability     Do you have housing? : Yes     Are you worried about losing your housing?: No       Current Outpatient Medications   Medication Sig Dispense Refill    albuterol (PROAIR HFA/PROVENTIL HFA/VENTOLIN HFA) 108 (90 Base) MCG/ACT inhaler Inhale 2 puffs into the lungs every 6 hours as needed for shortness of breath      blood glucose (ACCU-CHEK GUIDE) test strip 1 ONCE DAILY USE AS DIRECTED 100 strip 1    blood glucose monitoring (ACCU-CHEK FASTCLIX) lancets Use to test blood sugar 1 time daily or as directed. 102 each 11    calcium carbonate-vitamin D (OSCAL) 500-5 MG-MCG tablet Take 1 tablet by mouth twice daily 90 tablet 0    cetirizine (ZYRTEC) 10 MG tablet Take 10-20 mg by mouth daily (10 mg in the winter, 20 mg in allergy season)      clonazePAM (KLONOPIN) 0.5 MG tablet Take 1 tablet by mouth twice daily as needed for anxiety 30 tablet 0    diltiazem ER (TIAZAC) 240 MG 24 hr ER beaded capsule Take 1 capsule (240 mg) by mouth daily 90 capsule 3    diphenhydrAMINE HCl (BENADRYL ALLERGY CHILDRENS) 12.5 MG CHEW Take 2 tablets by mouth as needed (allergic reaction)      DULoxetine (CYMBALTA) 20 MG capsule Take 1 capsule (20 mg) by mouth 2 times daily 60 capsule 1    letrozole (FEMARA) 2.5 MG tablet Take 1 tablet (2.5 mg) by mouth daily 90 tablet 3    methocarbamol (ROBAXIN) 500 MG tablet Take 1 tablet (500 mg) by mouth 3 times daily as needed for muscle spasms 30 tablet 0    Multiple Vitamins-Minerals (MULTIVITAMIN GUMMIES WOMENS) CHEW Take 1 Dose by mouth daily      spironolactone (ALDACTONE) 50 MG tablet Take 1 tablet by mouth once daily 90 tablet 2    triamcinolone (NASACORT) 55 MCG/ACT nasal aerosol Spray 2 sprays into both nostrils daily as needed (seasonal allergies)      EPINEPHrine (ANY BX GENERIC EQUIV) 0.3  "MG/0.3ML injection 2-pack Inject 0.3 mLs (0.3 mg) into the muscle as needed for anaphylaxis May repeat one time in 5-15 minutes if response to initial dose is inadequate. 2 each 1     No current facility-administered medications for this visit.        Allergies   Allergen Reactions    Dust Mite Extract Difficulty breathing    Fruit [Peach] Anaphylaxis and Hives     fresh peaches and any fruit that has a pit.  Kiwi's and apples also.  Can eat any fruit cooked.    Latex Other (See Comments), Swelling, Difficulty breathing and Rash     Throat Closes      Nuts Anaphylaxis and Hives     All Raw Nuts, can eat nuts when roasted.    Other Food Allergy Other (See Comments) and Difficulty breathing     Mackey powder    Pollen Extract Difficulty breathing     All fruits and vegetables need to be washed and rinsed prior to eating.     Ace Inhibitors      Upper respiratory infection    Antihistamines, Chlorpheniramine-Type Hives     Antihistamines    Benicar [Olmesartan] Cough    Effexor [Venlafaxine] Other (See Comments)     Heartburn, reflux    Erythromycin      BASE; pt. Not sure if this is a true allergy.    Hydrochlorothiazide     Phenylephrine Hcl      Guaifed    Seasonal Allergies      hayfever    Sm Guaifenesin-Pseudoephedrine [Pseudoephedrine-Guaifenesin]      Guaifed    Statins     Trees     Ultram [Tramadol]      Sleep disturbance. Can not sleep, and when able to fall asleep will have terrible nightmares    Xanax [Alprazolam] Hives     Xanax    Zoloft [Sertraline]      paranoia       Review Of Systems:  A complete review of systems is negative except for the above mentioned items in the interval history.       ECOG Performance Status: 0    Physical Exam:  /76 (BP Location: Right arm, Patient Position: Sitting, Cuff Size: Adult Large)   Pulse 63   Temp 98.4  F (36.9  C) (Tympanic)   Ht 1.549 m (5' 1\")   Wt 80.4 kg (177 lb 4 oz)   SpO2 98%   BMI 33.49 kg/m    GENERAL APPEARANCE: Healthy, alert and in no acute " distress.  HEENT: Eyes appear normal without scleral icterus. Extraocular movements intact.   NECK:   Supple with normal range of motion. No asymmetry or masses.  LYMPHATICS: No palpable cervical, supraclavicular nodes.  RESP: Lungs clear to auscultation bilaterally, respirations regular and easy.  CARDIOVASCULAR: Regular rate and rhythm. Normal S1, S2; no murmur, gallop, or rub.  BREAST/Chest wall: Declined as this was just completed with surgery  MUSCULOSKELETAL: Extremities without gross deformities noted.   SKIN: No suspicious lesions or rashes.  NEURO: Alert and oriented x 3.  Gait steady.  PSYCHIATRIC: Improved today.     Laboratory:  Results for orders placed or performed in visit on 07/23/24   Hepatic panel (Albumin, ALT, AST, Bili, Alk Phos, TP)     Status: Normal   Result Value Ref Range    Protein Total 8.1 6.4 - 8.3 g/dL    Albumin 4.3 3.5 - 5.2 g/dL    Bilirubin Total 0.6 <=1.2 mg/dL    Alkaline Phosphatase 79 40 - 150 U/L    AST 18 0 - 45 U/L    ALT 23 0 - 50 U/L    Bilirubin Direct <0.20 0.00 - 0.30 mg/dL   CBC with platelets and differential     Status: None   Result Value Ref Range    WBC Count 10.3 4.0 - 11.0 10e3/uL    RBC Count 4.89 3.80 - 5.20 10e6/uL    Hemoglobin 14.6 11.7 - 15.7 g/dL    Hematocrit 44.4 35.0 - 47.0 %    MCV 91 78 - 100 fL    MCH 29.9 26.5 - 33.0 pg    MCHC 32.9 31.5 - 36.5 g/dL    RDW 13.4 10.0 - 15.0 %    Platelet Count 287 150 - 450 10e3/uL    % Neutrophils 68 %    % Lymphocytes 19 %    % Monocytes 10 %    % Eosinophils 2 %    % Basophils 1 %    % Immature Granulocytes 1 %    NRBCs per 100 WBC 0 <1 /100    Absolute Neutrophils 7.0 1.6 - 8.3 10e3/uL    Absolute Lymphocytes 2.0 0.8 - 5.3 10e3/uL    Absolute Monocytes 1.0 0.0 - 1.3 10e3/uL    Absolute Eosinophils 0.2 0.0 - 0.7 10e3/uL    Absolute Basophils 0.1 0.0 - 0.2 10e3/uL    Absolute Immature Granulocytes 0.1 <=0.4 10e3/uL    Absolute NRBCs 0.0 10e3/uL   CBC with Platelets & Differential     Status: None    Narrative     The following orders were created for panel order CBC with Platelets & Differential.  Procedure                               Abnormality         Status                     ---------                               -----------         ------                     CBC with platelets and d...[[id number]]                      Final result                 Please view results for these tests on the individual orders.       Imaging Studies:    None this visit    ASSESSMENT/PLAN:    #1 Stage IA left breast cancer-ER positive, MN positive and HER2 negative. Status post bilateral mastectomy and left sentinel lymph node biopsy 1/2024.  pT1b N0. Oncotype Dx RS core is 13 so no role for systemic chemo.  She is a candidate for endocrine therapy for total of 5 years with an aromatase inhibitor. This was commenced Feb 2024.     Today, she returns having been off all AI for the last couple weeks. In addition, she was started on Cymbalta per PCP. She does not that her myalgias have improved. We discussed that I am unsure if this improvement is due to Cymbalta, the drug holiday, or multifactorial. Hot flashes and headaches have also both improved. Patient is feeling better than she previously was. She has decided that those side effects were intolerable and that she does not want to attempt Arimidex again, despite having just started Cymbalta. She is wishing to attempt a different medication. We will therefore stop Arimidex and switch patient to letrozole. I explained that this belongs to the same drug class and that side effect profile can be similar. That said, some woman experience one medication better than others. She is therefore wishing to attempt this. I sent script for Letrozole, 2.5 mg PO daily. I will see her back in 3 months with labs prior, sooner with concerns.     #2 Osteopenia Continue calcium and Vit D. Dexa due April 2026.     #3 Hot flashes Improved of Arimidex these last few weeks. States that of all side effects this  was most tolerable. Interested in starting different AI. Will therefore monitor with new drug.     #4 Headaches Present but improved. Is also having some dizziness. Being worked up for vertigo. Recent MRI of cervical spine showing worsening degenerative changes in addition to some stenosis. She has had prior surgery on cervical spine. She is seeing Dr. Bravo later this week regarding MRI. Also continues to work with PT/OT.     #5 Anxiety Working with Echo and Edil dugan. Will monitor. Seems to be somewhat less anxious. Was started on Cymbalta per PCP.     #6 Arthralgias Improved on drug holiday. States that this was the worse side effect and intolerable. Not interested in seeing if Cymbalta helps more, not interested in attempting Arimidex further. Will monitor on letrozole.       Patient in agreement with plan and verbalizes understanding. Agrees to call with any questions or concerns.    49 minutes spent in the patient's encounter today with time spent in review of patient's chart along with chart preparation and review of the treatment plan and signing of treatment plan.  Time was also spent with the patient in obtaining a review of systems and performing a physical exam along with detailed review of all test results. Time was also spent in discussing plan for future follow-up and relating instructions for follow-up and in placing future orders.    NORMAN Pedro [facility]  Medical Oncology

## 2024-07-21 DIAGNOSIS — Z17.0 MALIGNANT NEOPLASM OF LEFT BREAST IN FEMALE, ESTROGEN RECEPTOR POSITIVE, UNSPECIFIED SITE OF BREAST (H): ICD-10-CM

## 2024-07-21 DIAGNOSIS — C50.912 MALIGNANT NEOPLASM OF LEFT BREAST IN FEMALE, ESTROGEN RECEPTOR POSITIVE, UNSPECIFIED SITE OF BREAST (H): ICD-10-CM

## 2024-07-22 ENCOUNTER — THERAPY VISIT (OUTPATIENT)
Dept: PHYSICAL THERAPY | Facility: HOSPITAL | Age: 74
End: 2024-07-22
Attending: NURSE PRACTITIONER
Payer: MEDICARE

## 2024-07-22 DIAGNOSIS — R42 VERTIGO: Primary | ICD-10-CM

## 2024-07-22 PROCEDURE — 97161 PT EVAL LOW COMPLEX 20 MIN: CPT | Mod: GP | Performed by: PHYSICAL THERAPIST

## 2024-07-22 PROCEDURE — 999N000104 HC STATISTIC NO CHARGE: Performed by: PHYSICAL THERAPIST

## 2024-07-22 PROCEDURE — 97530 THERAPEUTIC ACTIVITIES: CPT | Mod: GP | Performed by: PHYSICAL THERAPIST

## 2024-07-22 NOTE — PROGRESS NOTES
PHYSICAL THERAPY EVALUATION  Type of Visit: Evaluation       Fall Risk Screen:  Fall screen completed by: PT  Have you fallen 2 or more times in the past year?: No  Have you fallen and had an injury in the past year?: No  Is patient a fall risk?: No    Subjective       Presenting condition or subjective complaint: possible vertigo  Date of onset: 07/09/24 (date of PT orders)    Relevant medical history: Asthma; Cancer   Dates & types of surgery: last surgery january 2024    Prior diagnostic imaging/testing results: Other none   Prior therapy history for the same diagnosis, illness or injury: No      Prior Level of Function  Transfers: Independent  Ambulation: Independent  ADL: Independent  IADL:  independent    Living Environment  Social support: With a significant other or spouse   Type of home: House; 2-story; Basement   Stairs to enter the home: Yes 5 Is there a railing: Yes     Ramp: No   Stairs inside the home: Yes 13 Is there a railing: Yes     Help at home: None  Equipment owned:       Employment: No    Hobbies/Interests: sewing gardening grandchildren camping    Patient goals for therapy: better balance    Pain assessment:  reports neck pain - will be seeing Dr. Bravo later this week.  Also states she has general whole body arthralgias but have improved since being off chemo     Objective      Cognitive Status Examination  Orientation: Oriented to person, place and time   Level of Consciousness: Alert  Follows Commands and Answers Questions: 100% of the time  Personal Safety and Judgement: Intact  Memory: Intact    OBSERVATION: No acute distress  POSTURE:  forward head, rounded shoulders  RANGE OF MOTION: LE ROM WFL  STRENGTH: LE Strength WFL    BED MOBILITY: Independent    TRANSFERS: Independent    WHEELCHAIR MOBILITY: NA    GAIT:   Level of Jerome: Independent  Assistive Device(s): None  Gait Deviations: Base of support increased  Gait Distance: 100' within department  Stairs: NT    BALANCE:  see  "testing below    SPECIAL TESTS    Dynamic Gait Index (DGI)     Timed Up and Go (TUG) - sec    Single Leg Stance Right (sec)    Single Leg Stance Left (sec)    Modified CTSIB Conditions (sec) Cond 1: 30 seconds  Cond 2: 30 seconds with mild sway  Cond 4:  30 seconds with mild sway  Cond 5 :  6 seconds before LOB, 30 seconds with moderate sway and significant feelings of instability and fear of falling   Romberg  (sec)    Sharpened Romberg (sec)    30 Second Sit to Stand (reps/height)    Mini-BESTest              SENSATION:  denies numbness/tingling in LEs    REFLEXES:   MUSCLE TONE:        VESTIBULAR EVALUATION  ADDITIONAL HISTORY:  Pt states she has been having trouble on/off with dizziness.  Pt states she can't pinpoint when or how it started, thinks it has been months but does not recall having any issues prior to starting chemo.  Pt states that making quick turns or reaching over to get something out of the cupboards makes her feel off balance and spinning, states she was going to close the fridge door and felt that it was moving.  Pt states if she goes for walks, things don't stay in place when she is looking at them and therefore has stopped going for walks due to not feeling confident.  Pt states at times when she gets up to go to the bathroom at night she gets a feeling of the room spinning or moving.  Pt states the symptoms typically do no last more than 1 minute.  Pt states symptoms do no occur daily, states it can be at random.  What triggers her one day may not trigger her the next.  Pt does have ongoing neck pain and issues but does not feel there is a correlation between days when her neck is really painful and when it's not.  Pt will be seeing Dr. Bravo for her neck this week. Pt states she is going to see her eye doctor this afternoon in [place].   Pt does report that she has been off chemo for a couple of weeks and overall feels that many of her symptoms she was having including the \"head fullness\", " dizziness, hot flashes, etc have all improved somewhat.   Description of symptoms: Off balance; I feel like I am spinning; Attacks of dizziness; I feel like the room is spinning; Light-headedness  Dizzy attacks:   Start: not sure   Last attack: yesterday   Frequency of occurrences: every couple days   Length of attack: few minutes  Difficulty hearing:    Noise in ears? No    Alleviates symptoms: sit down  Worsens symptoms: not sure  Activities that bring on symptoms: Bending over; Turning while walking       Pertinent visual history: Wears bifocals  Pertinent history of current vestibular problem: Anxiety, Migraines, Prior concussion(s) - states she was getting continuous headaches during her chemo weeks but also has previous history of mingraines.  Pt states when she would get migraines historically that presented with the room moving and visual disturbances.  DHI: Total Score: 14    Cervicogenic Screen    Neck ROM WNL for testing, is limited with bilateral cervical rotation    Vertebral Artery Test    Alar Ligament Test    Transverse Ligament Test    Distraction    Neck Torsion Test (head still, body rotating)    Neck Torsion Test (head and body rotating)         Oculomotor Screen    Ocular ROM Normal   Smooth Pursuit Normal   Saccades Normal   VOR Normal   VOR Cancellation Normal   Head Impulse Test Normal   Convergence Testing         Infrared Goggle Exam Vestibular Suppressant in Last 24 Hours? No  Exam Completed With: Infrared goggles   Spontaneous Nystagmus Negative   Gaze Evoked Nystagmus Negative   Head Shake Horizontal Nystagmus Negative - did report some dizziness   Positional Testing    Supine Head-Hanging Test     Left Right   Laura-Hallpike Negative Negative   Sidelying Test     [facility] Supine Roll Test Negative Negative   [facility] Forward Roll Test     [place] and Lean Test -  Sitting Erect    [place] and Lean Test - Seated, Head Bent 60 Degrees Forward    [place] and Lean Test - Seated, Head Bent Backwards       BPPV  Canal(s):   BPPV Type:     Dynamic Visual Acuity (DVA)    Static Acuity (LogMar) Line 8   Horizontal Head Movement at 1 Hz (LogMar)    Horizontal Head Movement at 2 Hz (LogMar) Line 7 - with pt feeling symptomatic          Assessment & Plan   CLINICAL IMPRESSIONS  Medical Diagnosis: vertigo    Treatment Diagnosis: imbalance and dizziness with daily activities   Impression/Assessment: Patient is a 73 year old female with dizziness and imbalance complaints.  The following significant findings have been identified: Pain, Decreased joint mobility, Impaired balance, Decreased proprioception, Impaired gait, Decreased activity tolerance, Impaired posture, Instability, Dizziness, and Disequilibrium . These impairments interfere with their ability to perform self care tasks, recreational activities, household chores, household mobility, and community mobility as compared to previous level of function.   Pt negative for BPPV and not showing clear signs of any peripheral vestibular contributor to her complaints however does have balance impairments and poor use of vestibular input for her balance which could benefit from skilled PT to address.  Recommend ongoing work up with medical team to determine root cause of symptoms although may be multi-factorial in nature.     Clinical Decision Making (Complexity):  Clinical Presentation: Stable/Uncomplicated  Clinical Presentation Rationale: based on medical and personal factors listed in PT evaluation  Clinical Decision Making (Complexity): Low complexity    PLAN OF CARE  Treatment Interventions:  Interventions: Gait Training, Manual Therapy, Neuromuscular Re-education, Therapeutic Activity, Therapeutic Exercise    Long Term Goals     PT Goal 4  Goal Identifier: STG 1  Goal Description: Pt to be independent and compliant with HEP to improve balance and confidence with mobility.  Goal Progress: new  Target Date: 09/30/24  PT Goal 5  Goal Identifier: LTG 1  Goal Description: Pt to  report at least 50% improvement in overall balance to be able to carry out day to day household activities without risk for falls:  Goal Progress: new  Target Date: 09/30/24      Frequency of Treatment: 1x/week  Duration of Treatment: 10 weeks    Recommended Referrals to Other Professionals:   Education Assessment:   Learner/Method: Patient;Significant Other;Listening;Demonstration;Pictures/Video;No Barriers to Learning    Risks and benefits of evaluation/treatment have been explained.   Patient/Family/caregiver agrees with Plan of Care.     Evaluation Time:     PT Eval, Low Complexity Minutes (05646): 24       Signing Clinician: MARY ANN Lane [facility]                                                                                   OUTPATIENT PHYSICAL THERAPY      PLAN OF TREATMENT FOR OUTPATIENT REHABILITATION   Patient's Last Name, First Name, ARLEENRj Segura YOB: 1950   Provider's Name   [facility]   Medical Record No.  [id number]     Onset Date: 07/09/24 (date of PT orders)  Start of Care Date: 07/22/24     Medical Diagnosis:  vertigo      PT Treatment Diagnosis:  imbalance and dizziness with daily activities Plan of Treatment  Frequency/Duration: 1x/week/ 10 weeks    Certification date from 07/22/24 to 10/14/24         See note for plan of treatment details and functional goals     Janet Hanley PT                         I CERTIFY THE NEED FOR THESE SERVICES FURNISHED UNDER        THIS PLAN OF TREATMENT AND WHILE UNDER MY CARE     (Physician attestation of this document indicates review and certification of the therapy plan).              Referring Provider:  Sheyla Sanches    Initial Assessment  See Epic Evaluation- Start of Care Date: 07/22/24

## 2024-07-23 ENCOUNTER — ONCOLOGY VISIT (OUTPATIENT)
Dept: ONCOLOGY | Facility: OTHER | Age: 74
End: 2024-07-23
Attending: NURSE PRACTITIONER
Payer: MEDICARE

## 2024-07-23 ENCOUNTER — LAB (OUTPATIENT)
Dept: LAB | Facility: OTHER | Age: 74
End: 2024-07-23
Attending: NURSE PRACTITIONER
Payer: COMMERCIAL

## 2024-07-23 VITALS
HEIGHT: 61 IN | DIASTOLIC BLOOD PRESSURE: 76 MMHG | SYSTOLIC BLOOD PRESSURE: 128 MMHG | HEART RATE: 63 BPM | BODY MASS INDEX: 33.47 KG/M2 | TEMPERATURE: 98.4 F | OXYGEN SATURATION: 98 % | WEIGHT: 177.25 LBS

## 2024-07-23 DIAGNOSIS — G44.209 TENSION HEADACHE: ICD-10-CM

## 2024-07-23 DIAGNOSIS — C50.912 MALIGNANT NEOPLASM OF LEFT BREAST IN FEMALE, ESTROGEN RECEPTOR POSITIVE, UNSPECIFIED SITE OF BREAST (H): ICD-10-CM

## 2024-07-23 DIAGNOSIS — Z17.0 MALIGNANT NEOPLASM OF LEFT BREAST IN FEMALE, ESTROGEN RECEPTOR POSITIVE, UNSPECIFIED SITE OF BREAST (H): ICD-10-CM

## 2024-07-23 DIAGNOSIS — N95.1 MENOPAUSAL SYNDROME (HOT FLASHES): ICD-10-CM

## 2024-07-23 DIAGNOSIS — Z17.0 MALIGNANT NEOPLASM OF LEFT BREAST IN FEMALE, ESTROGEN RECEPTOR POSITIVE, UNSPECIFIED SITE OF BREAST (H): Primary | ICD-10-CM

## 2024-07-23 DIAGNOSIS — C50.912 MALIGNANT NEOPLASM OF LEFT BREAST IN FEMALE, ESTROGEN RECEPTOR POSITIVE, UNSPECIFIED SITE OF BREAST (H): Primary | ICD-10-CM

## 2024-07-23 DIAGNOSIS — M25.50 ARTHRALGIA, UNSPECIFIED JOINT: ICD-10-CM

## 2024-07-23 DIAGNOSIS — F41.1 GAD (GENERALIZED ANXIETY DISORDER): ICD-10-CM

## 2024-07-23 DIAGNOSIS — M85.80 OSTEOPENIA, UNSPECIFIED LOCATION: ICD-10-CM

## 2024-07-23 LAB
ALBUMIN SERPL BCG-MCNC: 4.3 G/DL (ref 3.5–5.2)
ALP SERPL-CCNC: 79 U/L (ref 40–150)
ALT SERPL W P-5'-P-CCNC: 23 U/L (ref 0–50)
AST SERPL W P-5'-P-CCNC: 18 U/L (ref 0–45)
BASOPHILS # BLD AUTO: 0.1 10E3/UL (ref 0–0.2)
BASOPHILS NFR BLD AUTO: 1 %
BILIRUB DIRECT SERPL-MCNC: <0.2 MG/DL (ref 0–0.3)
BILIRUB SERPL-MCNC: 0.6 MG/DL
EOSINOPHIL # BLD AUTO: 0.2 10E3/UL (ref 0–0.7)
EOSINOPHIL NFR BLD AUTO: 2 %
ERYTHROCYTE [DISTWIDTH] IN BLOOD BY AUTOMATED COUNT: 13.4 % (ref 10–15)
HCT VFR BLD AUTO: 44.4 % (ref 35–47)
HGB BLD-MCNC: 14.6 G/DL (ref 11.7–15.7)
IMM GRANULOCYTES # BLD: 0.1 10E3/UL
IMM GRANULOCYTES NFR BLD: 1 %
LYMPHOCYTES # BLD AUTO: 2 10E3/UL (ref 0.8–5.3)
LYMPHOCYTES NFR BLD AUTO: 19 %
MCH RBC QN AUTO: 29.9 PG (ref 26.5–33)
MCHC RBC AUTO-ENTMCNC: 32.9 G/DL (ref 31.5–36.5)
MCV RBC AUTO: 91 FL (ref 78–100)
MONOCYTES # BLD AUTO: 1 10E3/UL (ref 0–1.3)
MONOCYTES NFR BLD AUTO: 10 %
NEUTROPHILS # BLD AUTO: 7 10E3/UL (ref 1.6–8.3)
NEUTROPHILS NFR BLD AUTO: 68 %
NRBC # BLD AUTO: 0 10E3/UL
NRBC BLD AUTO-RTO: 0 /100
PLATELET # BLD AUTO: 287 10E3/UL (ref 150–450)
PROT SERPL-MCNC: 8.1 G/DL (ref 6.4–8.3)
RBC # BLD AUTO: 4.89 10E6/UL (ref 3.8–5.2)
WBC # BLD AUTO: 10.3 10E3/UL (ref 4–11)

## 2024-07-23 PROCEDURE — 85025 COMPLETE CBC W/AUTO DIFF WBC: CPT | Mod: ZL

## 2024-07-23 PROCEDURE — 99215 OFFICE O/P EST HI 40 MIN: CPT | Performed by: NURSE PRACTITIONER

## 2024-07-23 PROCEDURE — 80076 HEPATIC FUNCTION PANEL: CPT | Mod: ZL

## 2024-07-23 PROCEDURE — G0463 HOSPITAL OUTPT CLINIC VISIT: HCPCS

## 2024-07-23 PROCEDURE — 36415 COLL VENOUS BLD VENIPUNCTURE: CPT | Mod: ZL

## 2024-07-23 RX ORDER — LETROZOLE 2.5 MG/1
2.5 TABLET, FILM COATED ORAL DAILY
Qty: 90 TABLET | Refills: 3 | Status: SHIPPED | OUTPATIENT
Start: 2024-07-23

## 2024-07-23 ASSESSMENT — PAIN SCALES - GENERAL: PAINLEVEL: NO PAIN (0)

## 2024-07-23 NOTE — NURSING NOTE
"Oncology Rooming Note    July 23, 2024 2:30 PM   Rj Rodríguez is a 73 year old female who presents for:    Chief Complaint   Patient presents with    Oncology Clinic Visit     Follow up. Breast cancer     Initial Vitals: /76 (BP Location: Right arm, Patient Position: Sitting, Cuff Size: Adult Large)   Pulse 63   Temp 98.4  F (36.9  C) (Tympanic)   Ht 1.549 m (5' 1\")   Wt 80.4 kg (177 lb 4 oz)   SpO2 98%   BMI 33.49 kg/m   Estimated body mass index is 33.49 kg/m  as calculated from the following:    Height as of this encounter: 1.549 m (5' 1\").    Weight as of this encounter: 80.4 kg (177 lb 4 oz). Body surface area is 1.86 meters squared.  No Pain (0) Comment: Data Unavailable   No LMP recorded. Patient has had a hysterectomy.  Allergies reviewed: Yes  Medications reviewed: Yes    Medications: Medication refills not needed today.  Pharmacy name entered into [facility]: [facility] [address] [address] [facility] [address]    Frailty Screening:   Is the patient here for a new oncology consult visit in cancer care? 2. No      Clinical concerns: discussed with Ivelisse Mcmanus LPN              "

## 2024-07-25 ENCOUNTER — TRANSFERRED RECORDS (OUTPATIENT)
Dept: HEALTH INFORMATION MANAGEMENT | Facility: CLINIC | Age: 74
End: 2024-07-25
Payer: COMMERCIAL

## 2024-07-29 ENCOUNTER — THERAPY VISIT (OUTPATIENT)
Dept: PHYSICAL THERAPY | Facility: HOSPITAL | Age: 74
End: 2024-07-29
Attending: NURSE PRACTITIONER
Payer: MEDICARE

## 2024-07-29 DIAGNOSIS — C50.919 BREAST CANCER (H): Primary | ICD-10-CM

## 2024-07-29 PROCEDURE — 97140 MANUAL THERAPY 1/> REGIONS: CPT | Mod: GP,CQ

## 2024-08-05 ENCOUNTER — THERAPY VISIT (OUTPATIENT)
Dept: PHYSICAL THERAPY | Facility: HOSPITAL | Age: 74
End: 2024-08-05
Attending: NURSE PRACTITIONER
Payer: MEDICARE

## 2024-08-05 DIAGNOSIS — C50.919 BREAST CANCER (H): Primary | ICD-10-CM

## 2024-08-05 PROCEDURE — 97140 MANUAL THERAPY 1/> REGIONS: CPT | Mod: GP,CQ

## 2024-08-07 ENCOUNTER — OFFICE VISIT (OUTPATIENT)
Dept: CARDIOLOGY | Facility: OTHER | Age: 74
End: 2024-08-07
Attending: NURSE PRACTITIONER
Payer: COMMERCIAL

## 2024-08-07 VITALS
SYSTOLIC BLOOD PRESSURE: 123 MMHG | OXYGEN SATURATION: 96 % | RESPIRATION RATE: 16 BRPM | WEIGHT: 176 LBS | BODY MASS INDEX: 33.23 KG/M2 | HEIGHT: 61 IN | HEART RATE: 79 BPM | DIASTOLIC BLOOD PRESSURE: 71 MMHG

## 2024-08-07 DIAGNOSIS — E11.9 TYPE 2 DIABETES MELLITUS WITHOUT COMPLICATION, WITHOUT LONG-TERM CURRENT USE OF INSULIN (H): ICD-10-CM

## 2024-08-07 DIAGNOSIS — E66.09 CLASS 2 OBESITY DUE TO EXCESS CALORIES WITHOUT SERIOUS COMORBIDITY WITH BODY MASS INDEX (BMI) OF 37.0 TO 37.9 IN ADULT: ICD-10-CM

## 2024-08-07 DIAGNOSIS — R00.2 PALPITATIONS: Primary | ICD-10-CM

## 2024-08-07 DIAGNOSIS — I49.3 FREQUENT PVCS: ICD-10-CM

## 2024-08-07 DIAGNOSIS — E66.812 CLASS 2 OBESITY DUE TO EXCESS CALORIES WITHOUT SERIOUS COMORBIDITY WITH BODY MASS INDEX (BMI) OF 37.0 TO 37.9 IN ADULT: ICD-10-CM

## 2024-08-07 DIAGNOSIS — R63.5 WEIGHT GAIN: ICD-10-CM

## 2024-08-07 DIAGNOSIS — I10 ESSENTIAL HYPERTENSION: ICD-10-CM

## 2024-08-07 DIAGNOSIS — R53.83 FATIGUE, UNSPECIFIED TYPE: ICD-10-CM

## 2024-08-07 PROCEDURE — G0463 HOSPITAL OUTPT CLINIC VISIT: HCPCS

## 2024-08-07 PROCEDURE — 99214 OFFICE O/P EST MOD 30 MIN: CPT | Performed by: NURSE PRACTITIONER

## 2024-08-07 ASSESSMENT — PAIN SCALES - GENERAL: PAINLEVEL: SEVERE PAIN (6)

## 2024-08-07 NOTE — PROGRESS NOTES
[facility] HEART CARE   CARDIOLOGY PROGRESS NOTE     Chief Complaint   Patient presents with    Follow Up          Diagnosis:    ICD-10-CM    1. Palpitations  R00.2       2. 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 Essential hypertension  I10       4. Type 2 diabetes mellitus without complication, without long-term current use of insulin (H)  E11.9       5. Class 2 obesity due to excess calories without serious comorbidity with body mass index (BMI) of 37.0 to 37.9 in adult  E66.09     Z68.37       6. Weight gain  R63.5 TSH with free T4 reflex      7. Fatigue, unspecified type  R53.83 TSH with free T4 reflex                Assessment/Plan:    Palpitations  Frequent PVCs  Continues with episodes about 2-3 times daily, short duration, not overly bothersome at this time. Possibly triggered by hot flashes.    Reviewed leadless EKG monitor results from 5/2023  Underlying rhythm was sinus. Heart rate ranged from 29 bpm, maximum heart rate of 112 bpm, averaging 63 bpm.  x2 triggered events and x2 diary entries.  These corresponded to SVE, VE, ventricular bigeminy, and sinus rhythm.  Occasional PVC's at 1.3%.  Leadless EKG Monitor 8/2021  Underlying rhythm was sinus. Hrt rate ranged from 48 bpm, maximum heart rate of 158 bmp, averaging 71 bmp. No significant bradycardia, pauses, Mobitz type II or 3rd degree heart block.  No atrial fibrillation on this study.  x1 triggered events and x1 diary entries.  These corresponded to VE and sinus rhythm.  Rare, less than 1% of PAC's, atrial couplets, and atrial triplets.  Occasional PVC's at 3.0%.  Frequent ventricular couplets of 5.3%.  + episodes of ventricular bigeminy lasting up to 17.4 sec's.  + episodes of ventricular trigeminy lasting up to 1 min and 4 sec's.  NM Lexiscan Stress Test 2/2022  The nuclear stress test demonstrates an equivocal apical infarct vs thinning. No evidence of reversible ischemia.   The left ventricular ejection fraction at rest is 58%.  The left ventricular ejection  fraction at stress is 68%.   Baseline electrocardiogram demonstrates sinus rhythm. There were occasional premature ventricular contractions. The patient did not develop any acute ECG changes or arrhythmias during the Lexiscan portion of the study.  Transthoracic Echocardiogram 2/2022  Global and regional left ventricular function is normal with an EF of 55-60%.  Relative wall thickness is increased consistent with concentric remodeling. Left ventricular diastolic function is indeterminate. Diastolic Doppler findings (E/E' ratio and/or other parameters) suggest left ventricular filling pressures are increased  Cannot be on beta-blockers with immunotherapy for allergies. Continue diltiazem. She does not want to make adjustments at this time - will continue monitoring symptoms.     Wt Readings from Last 5 Encounters:   08/07/24 79.8 kg (176 lb)   07/23/24 80.4 kg (177 lb 4 oz)   07/09/24 78.9 kg (174 lb)   07/09/24 78.9 kg (174 lb)   06/07/24 78.9 kg (174 lb)         Hypertension with blood pressure goal less than 130/80, controlled  Diastolic dysfunction  Continue Spironolactone 50 mg daily  Continue diltiazem for palpitations/PVCs  Previously reviewed untreated sleep apnea, may contribute to increased ectopy and may place patient at risk for developing other arrhythmias such as atrial fibrillation. She has not been on CPAP and reports sleep study years ago. She has previously requested to hold off on further sleep study at this time however, may consider home sleep study in the future.     BP Readings from Last 6 Encounters:   08/07/24 123/71   07/23/24 128/76   07/09/24 136/70   07/09/24 136/70   06/07/24 138/80   06/05/24 (!) 150/80       Type II diabetes mellitus  Continue management by PCP  Statin: She is not on statin for primary prevention with elevated ASCVD risk    Lab Results   Component Value Date    A1C 5.5 02/13/2024    A1C 5.7 11/13/2023       Recent Labs   Lab Test 01/10/22  0946 03/10/21  1235   CHOL  175 162   HDL 44* 53   LDL 77 87   TRIG 268* 110     The 10-year ASCVD risk score (Noah PARRISH, et al., 2019) is: 22.2%    Values used to calculate the score:      Age: 73 years      Sex: Female      Is Non- : No      Diabetic: Yes      Tobacco smoker: No      Systolic Blood Pressure: 123 mmHg      Is BP treated: Yes      HDL Cholesterol: 53 mg/dL      Total Cholesterol: 150 mg/dL      Obesity  Feeling discouraged about weight gain    Encouraged her to continue with lifestyle modifications  Body mass index is 33.25 kg/m .       Obstructive sleep apnea  Diagnosed at some point  Does not wear CPAP  Has not been interested in repeating sleep evaluation    Follow-up with cardiology as scheduled, certainly sooner with acute concerns.       Interval history:  Rj Leyva is a pleasant 73-year old female who presents for cardiology follow-up. She was previously following with my colleague Marie AUSTIN, CNP. Recall, she has a cardiovascular history including hypertension, dyslipidemia, palpitations- symptomatic PVCs. She has a non-cardiac history including diabetes mellitus, obstructive sleep apnea, depression, anxiety, glaucoma, chronic lumbago.     Today, Ms. Leyva endorses that she has been overall feeling pretty good. Her anastrozole was switched to letrozole. Some improvement in side effects. Continues with some body aches, headaches, hot flashes but not as bad as when she was on anastrozole.  Struggling with chronic pain- getting cervical spine injection- hoping this will help  Feeling fatigued, joint pains improved with switch from anastrozole to letrozole. Migraines with aura have improved- last migraine was yesterday.   Dizziness, spinning sensation with movement especially.   Palpitations a couple times per day. Symptoms do not last long. She thinks hot flashes could be triggering. Hot flashes improved with switch in medications by oncology.   No LE edema.   No dyspnea, dyspnea on  exertion.   She has been trying to go for walks, working in the yard- yesterday felt good energy and worked in the yard. Looking forward to camping next week on [place] with her .           Sleep history: Prior sleep evaluation and used to wear CPAP. Does not currently wear CPAP.    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 Rodríguez is a 72 year old female who presents for cardiology follow-up to visit on 5/31/22 with recent reports of increased exertional dyspnea and recently identified ventricular ectopy seen as isolated PVCs, ventricular coupletes and ventricular trigeminy on recent cardiac monitor.  Patient has a past medical history significant for migraine headaches, hypertension, obesity, REBECCA, DM 2, depression, significant anxiety, glaucoma and chronic lumbago.     Patient reported noticing irregular heart rate with in the office in Feb 2021. She was identified to have ventricular ectopy on ECG. No palpitations with this at that time. She had described episodes of fluttering palpitations associated to anxiety. She had reported increased exertional dyspnea and chest tightness largely related to anxiety events. She does not report any exertional chest tightness and no radiation to the arm, jaw, neck or back. No lightheadedness or syncope. No increased edema.      She described issues with allergies, possible asthma and on on immunotherapy. Avoided use of albuterol with fluttering palpitations. Has been off beta blocker since starting this. She was started on Diltiazem for HTN and increased ventricular ectopy burden. No tobacco use.     NM Lexiscan stress test due to frequent PVC's and increased exertional dyspnea. Study was completed on 2/10/22 revealing equivocal apical infarct vs apical thinning, no evidence of  reversible ischemia. Normal LV systolic heart function which increased appropriately with stress. ECG with occasional ventricular ectopy at baseline which did not increase during lexiscan portion of the study and no ischemia ECG changes.     Echocardiogram on 2/15/22 revealing normal biventricular function, LVEF 55-60%. Borderline LVH, unable to assess diastolic function. Diastolic doppler findings suggest LV filling pressures to be increased. No RWMA's. Both atrial noted to be normal with atrial septum intact. No hemodynamically significant valvular abnormalities. Ascending aorta mildly dilated at 3.7 cm. No pericardial effusion.     INTERVAL HISTORY:  Today, patient reports mild improvement in dyspnea. No recurrence of pre-syncope or syncope. No chest pain or pressure. Palpitations and flip flop sensation in chest has pretty much resolved with increase in Diltiazem. Admits that she is planning for repeat back surgery at [facility] in [place] after the first of the year, significant anxiety with this.         RELEVANT TESTING:  NM Leadless EKG Monitor 5/2023  Conclusion  Zio XT patch report on Rj PFEIFFERAlise Anne.  Ordered secondary to palpitations.   Worn for 12 days and 9 hr's. After removing artifact, total time was 8 days and 22 hr's. Placed on 5/10/23 at 11:41 AM and completed on 5/22/2023 at 8:44 PM.   Underlying rhythm was sinus.   Hrt rate ranged from 29 bpm, maximum heart rate of 112 bmp, averaging 63 bmp.   No significant pauses, Mobitz type II or 3rd degree heart block.   No atrial fibrillation on this study.   x2 triggered events and x2 diary entries.  These corresponded to SVE, VE, ventricular bigeminy, and sinus rhythm.   x0 runs of VT     x0 runs of SVT.   Rare, <1% of PAC's, atrial couplets, atrial triplets, and ventricular couplets.   Occasional PVC's at 1.3%.   + episodes of ventricular bigeminy lasting up to 27.6 sec's.   + episodes of ventricular trigeminy lasting up to 1 min and 15 sec's.      NM  Lexiscan Stress Test 2/2022    Narrative    The nuclear stress test demonstrates an equivocal apical infarct vs   thinning. No evidence of reversible ischemia.     Left ventricular function is normal.     The left ventricular ejection fraction at rest is 58%.  The left   ventricular ejection fraction at stress is 68%.     There is no prior study for comparison.       ECG Summary    ECG Baseline electrocardiogram demonstrates sinus rhythm. There were occasional premature ventricular contractions, .   The patient did not develop any acute ECG changes or arrhythmias during the Lexiscan portion of the study.          Transthoracic Echocardiogram 2/2022  Interpretation Summary  Left ventricular size is normal. Global and regional left ventricular function  is normal with an EF of 55-60%. Biplane LVEF is 56%. Relative wall thickness  is increased consistent with concentric remodeling. Left ventricular diastolic  function is indeterminate. Diastolic Doppler findings (E/E' ratio and/or other  parameters) suggest left ventricular filling pressures are increased. The  Ejection Fraction was calculated using Bi-plane non contrast. No regional wall  motion abnormalities are seen.  The right ventricle is normal in function and size.  No significant valvular abnormality  IVC diameter <2.1 cm collapsing >50% with sniff suggests a normal RA pressure  of 3 mmHg.  No pericardial effusion is present.  There is no prior study for direct comparison.      Leadless EKG Monitor 8/2021  Conclusion  Zio XT patch report on Rj JANNA Rodríguez.  Ordered secondary to shortness of breath.   Worn for 13 days and 0 hr's.  After removing artifact, total time was 12 days and 16 hr's. Placed on 8/11/2021 at 11:48 AM and completed on 8/24/2021 at 11:35 AM.   Underlying rhythm was sinus.   Hrt rate ranged from 48 bpm, maximum heart rate of 158 bmp, averaging 71 bmp.   No significant bradycardia, pauses, Mobitz type II or 3rd degree heart block.   No atrial  fibrillation on this study.   x1 triggered events and x1 diary entries.  These corresponded to VE and sinus rhythm.   x0 runs of VT.     x2 runs of SVT lasting up to 5 beats with a maximum heart rate of 158 bmp.   Rare, less than 1% of PAC's, atrial couplets, and atrial triplets.   Occasional PVC's at 3.0%.   Frequent ventricular couplets of 5.3%.   + episodes of ventricular bigeminy lasting up to 17.4 sec's.   + episodes of ventricular trigeminy lasting up to 1 min and 4 sec's.        Past Medical History:   Diagnosis Date    Arthritis     Cancer (H)     Chronic back pain     Diabetes (H)     Hypertension     Irregular heart beat     Sleep apnea     Uncomplicated asthma        Past Surgical History:   Procedure Laterality Date    ANESTHESIA OUT OF OR MRI N/A 6/1/2022    Procedure: MRI CERVICAL SPINE;  Surgeon: GENERIC ANESTHESIA PROVIDER;  Location: [facility]    ANESTHESIA OUT OF OR MRI N/A 9/23/2022    Procedure: MRI CERVICAL SPINE;  Surgeon: GENERIC ANESTHESIA PROVIDER;  Location: [facility]    ANESTHESIA OUT OF OR MRI Left 12/7/2023    Procedure: Anesthesia out of OR MRI;  Surgeon: GENERIC ANESTHESIA PROVIDER;  Location: [facility]    APPENDECTOMY      BACK SURGERY      CHOLECYSTECTOMY      COLONOSCOPY  2012    Repeat in 10 years    GYN SURGERY      HYSTERECTOMY      Ovaries    MASTECTOMY SIMPLE BILATERAL, SENTINEL NODE BILATERAL, COMBINED N/A 1/16/2024    Procedure: Left sentinel node biopsy, bilateral mastectomy;  Surgeon: Ethan oMlina MD;  Location: [facility] OR    RELEASE CARPAL TUNNEL      LT    RELEASE CARPAL TUNNEL      RT x2    SURGICAL RADIOLOGY PROCEDURE N/A 2/12/2016    Procedure: SURGICAL RADIOLOGY PROCEDURE;  Surgeon: Provider, Generic Perianesthesia Nursing;  Location: [facility] OR    SURGICAL RADIOLOGY PROCEDURE N/A 8/15/2016    Procedure: SURGICAL RADIOLOGY PROCEDURE;  Surgeon: Provider, Generic Perianesthesia Nursing;  Location: [facility] OR       Allergies   Allergen Reactions    Dust Mite Extract Difficulty breathing     Fruit [Peach] Anaphylaxis and Hives     fresh peaches and any fruit that has a pit.  Kiwi's and apples also.  Can eat any fruit cooked.    Latex Other (See Comments), Swelling, Difficulty breathing and Rash     Throat Closes      Nuts Anaphylaxis and Hives     All Raw Nuts, can eat nuts when roasted.    Other Food Allergy Other (See Comments) and Difficulty breathing     Mackey powder    Pollen Extract Difficulty breathing     All fruits and vegetables need to be washed and rinsed prior to eating.     Ace Inhibitors      Upper respiratory infection    Antihistamines, Chlorpheniramine-Type Hives     Antihistamines    Benicar [Olmesartan] Cough    Effexor [Venlafaxine] Other (See Comments)     Heartburn, reflux    Erythromycin      BASE; pt. Not sure if this is a true allergy.    Hydrochlorothiazide     Phenylephrine Hcl      Guaifed    Seasonal Allergies      hayfever    Sm Guaifenesin-Pseudoephedrine [Pseudoephedrine-Guaifenesin]      Guaifed    Statins     Trees     Ultram [Tramadol]      Sleep disturbance. Can not sleep, and when able to fall asleep will have terrible nightmares    Xanax [Alprazolam] Hives     Xanax    Zoloft [Sertraline]      paranoia       Current Outpatient Medications   Medication Sig Dispense Refill    albuterol (PROAIR HFA/PROVENTIL HFA/VENTOLIN HFA) 108 (90 Base) MCG/ACT inhaler Inhale 2 puffs into the lungs every 6 hours as needed for shortness of breath      blood glucose (ACCU-CHEK GUIDE) test strip 1 ONCE DAILY USE AS DIRECTED 100 strip 1    blood glucose monitoring (ACCU-CHEK FASTCLIX) lancets Use to test blood sugar 1 time daily or as directed. 102 each 11    calcium carbonate-vitamin D (OSCAL) 500-5 MG-MCG tablet Take 1 tablet by mouth twice daily 90 tablet 0    cetirizine (ZYRTEC) 10 MG tablet Take 10-20 mg by mouth daily (10 mg in the winter, 20 mg in allergy season)      clonazePAM (KLONOPIN) 0.5 MG tablet Take 1 tablet by mouth twice daily as needed for anxiety 30 tablet 0     diltiazem ER (TIAZAC) 240 MG 24 hr ER beaded capsule Take 1 capsule (240 mg) by mouth daily 90 capsule 3    diphenhydrAMINE HCl (BENADRYL ALLERGY CHILDRENS) 12.5 MG CHEW Take 2 tablets by mouth as needed (allergic reaction)      DULoxetine (CYMBALTA) 20 MG capsule Take 1 capsule (20 mg) by mouth 2 times daily 60 capsule 1    EPINEPHrine (ANY BX GENERIC EQUIV) 0.3 MG/0.3ML injection 2-pack Inject 0.3 mLs (0.3 mg) into the muscle as needed for anaphylaxis May repeat one time in 5-15 minutes if response to initial dose is inadequate. 2 each 1    letrozole (FEMARA) 2.5 MG tablet Take 1 tablet (2.5 mg) by mouth daily 90 tablet 3    methocarbamol (ROBAXIN) 500 MG tablet Take 1 tablet (500 mg) by mouth 3 times daily as needed for muscle spasms 30 tablet 0    Multiple Vitamins-Minerals (MULTIVITAMIN GUMMIES WOMENS) CHEW Take 1 Dose by mouth daily      spironolactone (ALDACTONE) 50 MG tablet Take 1 tablet by mouth once daily 90 tablet 2    triamcinolone (NASACORT) 55 MCG/ACT nasal aerosol Spray 2 sprays into both nostrils daily as needed (seasonal allergies)         Social History     Socioeconomic History    Marital status:      Spouse name: Not on file    Number of children: Not on file    Years of education: Not on file    Highest education level: Not on file   Occupational History    Not on file   Tobacco Use    Smoking status: Never     Passive exposure: Never    Smokeless tobacco: Never   Vaping Use    Vaping status: Never Used   Substance and Sexual Activity    Alcohol use: Yes     Alcohol/week: 1.0 standard drink of alcohol     Types: 1 Glasses of wine per week     Comment: occ glass of wine    Drug use: No    Sexual activity: Not Currently   Other Topics Concern     Service Not Asked    Blood Transfusions Not Asked    Caffeine Concern Yes     Comment: Coffee - 2 cups daily    Occupational Exposure Not Asked    Hobby Hazards Not Asked    Sleep Concern Not Asked    Stress Concern Not Asked    Weight  Concern Not Asked    Special Diet Not Asked    Back Care Not Asked    Exercise Not Asked    Bike Helmet Not Asked    Seat Belt Not Asked    Self-Exams Not Asked    Parent/sibling w/ CABG, MI or angioplasty before 65F 55M? Not Asked   Social History Narrative    10/11/2019: , lives in [place]      Social Determinants of Health     Financial Resource Strain: Low Risk  (1/8/2024)    Financial Resource Strain     Within the past 12 months, have you or your family members you live with been unable to get utilities (heat, electricity) when it was really needed?: No   Food Insecurity: Low Risk  (1/8/2024)    Food Insecurity     Within the past 12 months, did you worry that your food would run out before you got money to buy more?: No     Within the past 12 months, did the food you bought just not last and you didn t have money to get more?: No   Transportation Needs: Low Risk  (1/8/2024)    Transportation Needs     Within the past 12 months, has lack of transportation kept you from medical appointments, getting your medicines, non-medical meetings or appointments, work, or from getting things that you need?: No   Physical Activity: Not on file   Stress: Not on file   Social Connections: Not on file   Interpersonal Safety: Low Risk  (1/8/2024)    Interpersonal Safety     Do you feel physically and emotionally safe where you currently live?: Yes     Within the past 12 months, have you been hit, slapped, kicked or otherwise physically hurt by someone?: No     Within the past 12 months, have you been humiliated or emotionally abused in other ways by your partner or ex-partner?: No   Housing Stability: Low Risk  (1/8/2024)    Housing Stability     Do you have housing? : Yes     Are you worried about losing your housing?: No       TEST RESULTS:   No results found for any visits on 08/07/24.        Review of systems: Negative except that which was noted in the HPI.    Physical examination:    Vitals: /71   Pulse 79  "  Resp 16   Ht 1.549 m (5' 1\")   Wt 79.8 kg (176 lb)   SpO2 96%   BMI 33.25 kg/m    BMI= Body mass index is 33.25 kg/m .    GENERAL APPEARANCE: healthy, alert and no distress  CHEST: lungs clear to auscultation - no rales, rhonchi or wheezes, no use of accessory muscles, no retractions, respirations are unlabored, normal respiratory rate  CARDIOVASCULAR: regular rhythm, normal S1 with physiologic split S2, no S3 or S4 and no murmur, click or rub  EXTREMITIES: no LE edema  NEURO: alert and oriented normal speech, and affect  VASC: No carotid bruits heard.  SKIN: no jaundice      Thank you for allowing me to participate in the care of your patient. Please do not hesitate to contact me if you have any questions.         Lizeth Crisostomo, CNP    "

## 2024-08-07 NOTE — PATIENT INSTRUCTIONS
Thank you for allowing Lizeth Crisostomo CNP and our  team to participate in your care. Please call our office at [phone] with scheduling questions or if you need to cancel or change your appointment. With any other questions or concerns you may call cardiology nurse at  [phone].       If you experience chest pain, chest pressure, chest tightness, shortness of breath, fainting, lightheadedness, nausea, vomiting, or other concerning symptoms, please report to the Emergency Department or call 911. These symptoms may be emergent, and best treated in the Emergency Department.

## 2024-08-21 ENCOUNTER — OFFICE VISIT (OUTPATIENT)
Dept: FAMILY MEDICINE | Facility: OTHER | Age: 74
End: 2024-08-21
Attending: NURSE PRACTITIONER
Payer: COMMERCIAL

## 2024-08-21 VITALS
TEMPERATURE: 96.9 F | WEIGHT: 177.9 LBS | OXYGEN SATURATION: 97 % | HEIGHT: 61 IN | DIASTOLIC BLOOD PRESSURE: 74 MMHG | RESPIRATION RATE: 16 BRPM | BODY MASS INDEX: 33.59 KG/M2 | SYSTOLIC BLOOD PRESSURE: 124 MMHG | HEART RATE: 72 BPM

## 2024-08-21 DIAGNOSIS — J30.2 SEASONAL ALLERGIC RHINITIS, UNSPECIFIED TRIGGER: ICD-10-CM

## 2024-08-21 DIAGNOSIS — J45.20 MILD INTERMITTENT REACTIVE AIRWAY DISEASE WITHOUT COMPLICATION: ICD-10-CM

## 2024-08-21 DIAGNOSIS — M62.838 MUSCLE SPASM: ICD-10-CM

## 2024-08-21 DIAGNOSIS — M54.2 CERVICALGIA: Primary | ICD-10-CM

## 2024-08-21 DIAGNOSIS — M54.12 CERVICAL RADICULOPATHY: ICD-10-CM

## 2024-08-21 PROCEDURE — G0463 HOSPITAL OUTPT CLINIC VISIT: HCPCS

## 2024-08-21 PROCEDURE — G2211 COMPLEX E/M VISIT ADD ON: HCPCS | Performed by: NURSE PRACTITIONER

## 2024-08-21 PROCEDURE — 99213 OFFICE O/P EST LOW 20 MIN: CPT | Performed by: NURSE PRACTITIONER

## 2024-08-21 RX ORDER — DULOXETIN HYDROCHLORIDE 30 MG/1
30 CAPSULE, DELAYED RELEASE ORAL 2 TIMES DAILY
Qty: 60 CAPSULE | Refills: 3 | Status: SHIPPED | OUTPATIENT
Start: 2024-08-21 | End: 2024-08-28

## 2024-08-21 RX ORDER — DULOXETIN HYDROCHLORIDE 30 MG/1
30 CAPSULE, DELAYED RELEASE ORAL 2 TIMES DAILY
Qty: 60 CAPSULE | Refills: 1 | Status: SHIPPED | OUTPATIENT
Start: 2024-08-21 | End: 2024-08-21

## 2024-08-21 ASSESSMENT — ASTHMA QUESTIONNAIRES
QUESTION_5 LAST FOUR WEEKS HOW WOULD YOU RATE YOUR ASTHMA CONTROL: WELL CONTROLLED
QUESTION_3 LAST FOUR WEEKS HOW OFTEN DID YOUR ASTHMA SYMPTOMS (WHEEZING, COUGHING, SHORTNESS OF BREATH, CHEST TIGHTNESS OR PAIN) WAKE YOU UP AT NIGHT OR EARLIER THAN USUAL IN THE MORNING: NOT AT ALL
ACT_TOTALSCORE: 23
ACT_TOTALSCORE: 23
QUESTION_1 LAST FOUR WEEKS HOW MUCH OF THE TIME DID YOUR ASTHMA KEEP YOU FROM GETTING AS MUCH DONE AT WORK, SCHOOL OR AT HOME: NONE OF THE TIME
QUESTION_4 LAST FOUR WEEKS HOW OFTEN HAVE YOU USED YOUR RESCUE INHALER OR NEBULIZER MEDICATION (SUCH AS ALBUTEROL): ONCE A WEEK OR LESS
QUESTION_2 LAST FOUR WEEKS HOW OFTEN HAVE YOU HAD SHORTNESS OF BREATH: NOT AT ALL

## 2024-08-21 ASSESSMENT — PATIENT HEALTH QUESTIONNAIRE - PHQ9
10. IF YOU CHECKED OFF ANY PROBLEMS, HOW DIFFICULT HAVE THESE PROBLEMS MADE IT FOR YOU TO DO YOUR WORK, TAKE CARE OF THINGS AT HOME, OR GET ALONG WITH OTHER PEOPLE: NOT DIFFICULT AT ALL
SUM OF ALL RESPONSES TO PHQ QUESTIONS 1-9: 3
SUM OF ALL RESPONSES TO PHQ QUESTIONS 1-9: 3

## 2024-08-21 ASSESSMENT — PAIN SCALES - GENERAL: PAINLEVEL: MODERATE PAIN (5)

## 2024-08-26 DIAGNOSIS — I49.3 FREQUENT PVCS: ICD-10-CM

## 2024-08-26 DIAGNOSIS — I10 ESSENTIAL HYPERTENSION: ICD-10-CM

## 2024-08-26 NOTE — TELEPHONE ENCOUNTER
diltiazem ER (TIAZAC) 240 MG 24 hr ER beaded capsule 90 capsule 3 9/5/2023     Last Office Visit: 08/07/2024  Future Office visit:    Next 5 appointments (look out 90 days)      Oct 09, 2024 11:00 AM  (Arrive by 10:45 AM)  Return Visit with Lelia Lemus NP  [facility] ([facility] ) [address]  [facility] [address]  [phone]     Oct 22, 2024 2:40 PM  (Arrive by 2:25 PM)  Return Visit with NORMAN Mauro [facility] ([facility] ) [address]  [place] [address]  [phone]             Routing refill request to provider for review/approval because:

## 2024-08-27 RX ORDER — DILTIAZEM HYDROCHLORIDE 240 MG/1
240 CAPSULE, EXTENDED RELEASE ORAL DAILY
Qty: 90 CAPSULE | Refills: 0 | Status: SHIPPED | OUTPATIENT
Start: 2024-08-27

## 2024-08-28 ENCOUNTER — TELEPHONE (OUTPATIENT)
Dept: FAMILY MEDICINE | Facility: OTHER | Age: 74
End: 2024-08-28

## 2024-08-28 ENCOUNTER — NURSE TRIAGE (OUTPATIENT)
Dept: FAMILY MEDICINE | Facility: OTHER | Age: 74
End: 2024-08-28

## 2024-08-28 ENCOUNTER — THERAPY VISIT (OUTPATIENT)
Dept: PHYSICAL THERAPY | Facility: HOSPITAL | Age: 74
End: 2024-08-28
Attending: NURSE PRACTITIONER
Payer: MEDICARE

## 2024-08-28 DIAGNOSIS — C50.919 BREAST CANCER (H): Primary | ICD-10-CM

## 2024-08-28 DIAGNOSIS — M62.838 MUSCLE SPASM: ICD-10-CM

## 2024-08-28 DIAGNOSIS — M54.2 CERVICALGIA: ICD-10-CM

## 2024-08-28 DIAGNOSIS — M54.12 CERVICAL RADICULOPATHY: Primary | ICD-10-CM

## 2024-08-28 PROCEDURE — 97140 MANUAL THERAPY 1/> REGIONS: CPT | Mod: GP,CQ

## 2024-08-28 RX ORDER — DULOXETIN HYDROCHLORIDE 20 MG/1
20 CAPSULE, DELAYED RELEASE ORAL 2 TIMES DAILY
Qty: 60 CAPSULE | Refills: 3 | Status: SHIPPED | OUTPATIENT
Start: 2024-08-28

## 2024-08-28 NOTE — TELEPHONE ENCOUNTER
Changed Cymbulta from 20 to 30 mg and is having terrible night terrors.  She want's to go back to the 20 mg  [place] [facility].  If you want to talk to here please call her if not that's ok to.    Pended 20 mg Cymbalta        DULoxetine (CYMBALTA) 30 MG [id number]/21/2024--NoSig - Route: Take 1 capsule (30 mg) by mouth 2 times daily. - OralSent to pharmacy as: DULoxetine HCl 30 MG Oral Capsule Delayed Release Particles (CYMBALTA)Class: E-PrescribeOrder: [id number] Status: Receipt confirmed by pharmacy (8/21/2024 11:47 AM CDT)

## 2024-08-28 NOTE — TELEPHONE ENCOUNTER
Symptom or reason needing to speak to RN: Red Flag Symptom, Medication side effects, night terrors      Best number to return call: [phone]      Best time to return call: Anytime

## 2024-09-06 ENCOUNTER — THERAPY VISIT (OUTPATIENT)
Dept: PHYSICAL THERAPY | Facility: HOSPITAL | Age: 74
End: 2024-09-06
Attending: NURSE PRACTITIONER
Payer: MEDICARE

## 2024-09-06 DIAGNOSIS — C50.011 MALIGNANT NEOPLASM OF NIPPLE OF RIGHT BREAST IN FEMALE, UNSPECIFIED ESTROGEN RECEPTOR STATUS (H): Primary | ICD-10-CM

## 2024-09-06 PROCEDURE — 97530 THERAPEUTIC ACTIVITIES: CPT | Mod: GP

## 2024-09-06 PROCEDURE — 97140 MANUAL THERAPY 1/> REGIONS: CPT | Mod: GP

## 2024-09-11 NOTE — PROGRESS NOTES
09/06/24 0500   Appointment Info   Signing clinician's name / credentials Dimitri Ferris, PT   Total/Authorized Visits 365   Visits Used 19   Medical Diagnosis Malignant neoplasm of left breast in female, estrogen receptor positive, unspecified site of breast (H)   PT Tx Diagnosis Malignant neoplasm of left breast in female, estrogen receptor positive, unspecified site of breast (H)   Progress Note/Certification   Start of Care Date 03/11/24   Onset of illness/injury or Date of Surgery 03/11/24   Therapy Frequency 1x/week   Predicted Duration 12 weeks   Certification date from 08/27/24   Certification date to 11/19/24   Progress Note Due Date 09/24/24       Present No   GOALS   PT Goals 2;3   PT Goal 1   Goal Identifier STG-1   Goal Description Patient will possess full ROM to B shoulders to allow her to shower without pain.   Rationale to maximize safety and independence with performance of ADLs and functional tasks   Goal Progress Goal Met   Target Date 04/22/24   Date Met 04/30/24   PT Goal 2   Goal Identifier STG-2   Goal Description Patient will be able to sleep through the night to prevent her from waking due to increased pain.   Rationale to maximize safety and independence with performance of ADLs and functional tasks   Goal Progress Progressing (updated)   Target Date 06/28/24   PT Goal 3   Goal Identifier LTG-1   Goal Description Patient will possess a custom HEP to allow her to self-manage and prevent future recurrence.   Rationale to maximize safety and independence with performance of ADLs and functional tasks   Goal Progress Progressing (updated)   Target Date 11/19/24   Subjective Report   Subjective Report Rj arrived to therapy today stating she is doing much better. She notes she is very happy where she is at mentally as well as physically. She notes she is able to do most things and notes she does still get tired but does not have to wait long prior to being able to  continue. She states she is agreeable to work with the PT today. She notes she is still interested in getting some sort of prosthetic or compression bras  - this information was provided to her.   Treatment Interventions (PT)   Interventions Therapeutic Activity;Manual Therapy   Therapeutic Activity   Therapeutic Activities: dynamic activities to improve functional performance minutes (76060) 11   Ther Act 1 - Details Discussion had today on how she would be able to get a prosthetic should she choose. She was provided phone numbers, options, and details on what she could potentially receive to help her feel as comfortable as possible   Patient Response/Progress Progressing   Manual Therapy   Manual Therapy: Mobilization, MFR, MLD, friction massage minutes (21472) 31   Manual Therapy 1 - Details PROM completed to the L UE; Cs and Ss completed up the L UE to assess for any potential cording present (none noted today); Soft tissue work/scar mobility over the L chest wall today - pt notes this is feeling looser every time. Added cervical stretching/soft tissue to release the upper traps and levator scap; added soft tissue work today ot the cervical region including upper traps, levator scap, scalenes, etc.-reviewed all today   Patient Response/Progress Progressing   Education   Learner/Method Patient;Listening   Plan   Plan for next session Plan to progress according to how the patient tolerated the previous session   Comments   Comments Pt tolerated very well - she notes she is feeling indep and will call should she need any other help but as of now will try on her own for a while   Total Session Time   Timed Code Treatment Minutes 42   Total Treatment Time (sum of timed and untimed services) 42         [facility]                                                                                   OUTPATIENT PHYSICAL THERAPY    PLAN OF TREATMENT FOR OUTPATIENT REHABILITATION   Patient's Last  Name, First Name, Rj Mai YOB: 1950   Provider's Name   [facility]   Medical Record No.  [id number]     Onset Date: 03/11/24  Start of Care Date: 03/11/24     Medical Diagnosis:  Malignant neoplasm of left breast in female, estrogen receptor positive, unspecified site of breast (H)      PT Treatment Diagnosis:  Malignant neoplasm of left breast in female, estrogen receptor positive, unspecified site of breast (H) Plan of Treatment  Frequency/Duration: 1x/week/ 12 weeks    Certification date from 08/27/24 to 11/19/24         See note for plan of treatment details and functional goals     Dimitri Ferris, PT                         I CERTIFY THE NEED FOR THESE SERVICES FURNISHED UNDER        THIS PLAN OF TREATMENT AND WHILE UNDER MY CARE     (Physician attestation of this document indicates review and certification of the therapy plan).              Referring Provider:  Vira Dodd    Initial Assessment  See Epic Evaluation- Start of Care Date: 03/11/24

## 2024-09-12 DIAGNOSIS — C50.912 MALIGNANT NEOPLASM OF LEFT BREAST IN FEMALE, ESTROGEN RECEPTOR POSITIVE, UNSPECIFIED SITE OF BREAST (H): ICD-10-CM

## 2024-09-12 DIAGNOSIS — Z17.0 MALIGNANT NEOPLASM OF LEFT BREAST IN FEMALE, ESTROGEN RECEPTOR POSITIVE, UNSPECIFIED SITE OF BREAST (H): ICD-10-CM

## 2024-09-12 NOTE — TELEPHONE ENCOUNTER
Last    calcium carbonate-vitamin D (OSCAL) 500-5 MG-MCG tablet    Written Prescription Date:  7-23-24  Last Fill Quantity: 90,   # refills: 0  Last Office Visit: 7-*23-24  Future Office visit:    Next 5 appointments (look out 90 days)      Oct 09, 2024 11:00 AM  (Arrive by 10:45 AM)  Return Visit with Lelia Lemus NP  [facility] ([facility] ) [address]  [facility] [address]  [phone]     Oct 22, 2024 2:40 PM  (Arrive by 2:25 PM)  Return Visit with NORMAN Mauro [facility] ([facility] ) [address]  [place] [address]  [phone]     Dec 03, 2024 9:30 AM  (Arrive by 9:15 AM)  Provider Visit with Sheyla Sanches NP  [facility] ([facility] ) [address]  [facility] [address]  [phone]             Routing refill request to provider for review/approval because:  Drug not on the G, P or [facility] refill protocol or controlled substance

## 2024-10-08 DIAGNOSIS — I10 ESSENTIAL HYPERTENSION: ICD-10-CM

## 2024-10-08 RX ORDER — SPIRONOLACTONE 50 MG/1
TABLET, FILM COATED ORAL
Qty: 90 TABLET | Refills: 2 | Status: SHIPPED | OUTPATIENT
Start: 2024-10-08

## 2024-10-12 ENCOUNTER — HEALTH MAINTENANCE LETTER (OUTPATIENT)
Age: 74
End: 2024-10-12

## 2024-10-15 ENCOUNTER — OFFICE VISIT (OUTPATIENT)
Dept: SURGERY | Facility: OTHER | Age: 74
End: 2024-10-15
Attending: NURSE PRACTITIONER
Payer: MEDICARE

## 2024-10-15 VITALS
TEMPERATURE: 97.2 F | OXYGEN SATURATION: 95 % | SYSTOLIC BLOOD PRESSURE: 126 MMHG | RESPIRATION RATE: 16 BRPM | HEART RATE: 67 BPM | DIASTOLIC BLOOD PRESSURE: 60 MMHG

## 2024-10-15 DIAGNOSIS — Z90.13 STATUS POST MASTECTOMY, BILATERAL: Primary | ICD-10-CM

## 2024-10-15 PROCEDURE — 99212 OFFICE O/P EST SF 10 MIN: CPT | Performed by: NURSE PRACTITIONER

## 2024-10-15 PROCEDURE — G0463 HOSPITAL OUTPT CLINIC VISIT: HCPCS

## 2024-10-15 ASSESSMENT — PAIN SCALES - GENERAL: PAINLEVEL: SEVERE PAIN (7)

## 2024-10-15 NOTE — PROGRESS NOTES
CLINIC NOTE - POST-OP SURGERY  10/15/2024    Patient:Rj Rodríguez    Procedure: Left sentinel node biopsy and bilateral complete mastectomy     This is a 73 year old female who is 9 months s/p Left sentinel node biopsy and bilateral complete mastectomy.  The patient overall is doing well she reports.    Current Medications:  Current Outpatient Medications   Medication Sig Dispense Refill    albuterol (PROAIR HFA/PROVENTIL HFA/VENTOLIN HFA) 108 (90 Base) MCG/ACT inhaler Inhale 2 puffs into the lungs every 6 hours as needed for shortness of breath      blood glucose (ACCU-CHEK GUIDE) test strip 1 ONCE DAILY USE AS DIRECTED 100 strip 1    blood glucose monitoring (ACCU-CHEK FASTCLIX) lancets Use to test blood sugar 1 time daily or as directed. 102 each 11    calcium carbonate-vitamin D (OSCAL) 500-5 MG-MCG tablet Take 1 tablet by mouth twice daily 90 tablet 0    cetirizine (ZYRTEC) 10 MG tablet Take 10-20 mg by mouth daily (10 mg in the winter, 20 mg in allergy season)      clonazePAM (KLONOPIN) 0.5 MG tablet Take 1 tablet by mouth twice daily as needed for anxiety 30 tablet 0    diltiazem ER (TIAZAC) 240 MG 24 hr ER beaded capsule Take 1 capsule by mouth once daily 90 capsule 0    diphenhydrAMINE HCl (BENADRYL ALLERGY CHILDRENS) 12.5 MG CHEW Take 2 tablets by mouth as needed (allergic reaction)      DULoxetine (CYMBALTA) 20 MG capsule Take 1 capsule (20 mg) by mouth 2 times daily. (Patient not taking: Reported on 10/15/2024) 60 capsule 3    EPINEPHrine (ANY BX GENERIC EQUIV) 0.3 MG/0.3ML injection 2-pack Inject 0.3 mLs (0.3 mg) into the muscle as needed for anaphylaxis May repeat one time in 5-15 minutes if response to initial dose is inadequate. 2 each 1    letrozole (FEMARA) 2.5 MG tablet Take 1 tablet (2.5 mg) by mouth daily 90 tablet 3    Multiple Vitamins-Minerals (MULTIVITAMIN GUMMIES WOMENS) CHEW Take 1 Dose by mouth daily      spironolactone (ALDACTONE) 50 MG tablet Take 1 tablet by mouth once daily 90  tablet 2    triamcinolone (NASACORT) 55 MCG/ACT nasal aerosol Spray 2 sprays into both nostrils daily as needed (seasonal allergies)         Allergies:  Allergies   Allergen Reactions    Dust Mite Extract Difficulty breathing    Fruit [Peach] Anaphylaxis and Hives     fresh peaches and any fruit that has a pit.  Kiwi's and apples also.  Can eat any fruit cooked.    Latex Other (See Comments), Swelling, Difficulty breathing and Rash     Throat Closes      Nuts Anaphylaxis and Hives     All Raw Nuts, can eat nuts when roasted.    Other Food Allergy Other (See Comments) and Difficulty breathing     Mackey powder    Pollen Extract Difficulty breathing     All fruits and vegetables need to be washed and rinsed prior to eating.     Ace Inhibitors      Upper respiratory infection    Antihistamines, Chlorpheniramine-Type Hives     Antihistamines    Benicar [Olmesartan] Cough    Effexor [Venlafaxine] Other (See Comments)     Heartburn, reflux    Erythromycin      BASE; pt. Not sure if this is a true allergy.    Hydrochlorothiazide     Phenylephrine Hcl      Guaifed    Seasonal Allergies      hayfever    Sm Guaifenesin-Pseudoephedrine [Pseudoephedrine-Guaifenesin]      Guaifed    Statins     Trees     Ultram [Tramadol]      Sleep disturbance. Can not sleep, and when able to fall asleep will have terrible nightmares    Xanax [Alprazolam] Hives     Xanax    Zoloft [Sertraline]      paranoia       PHYSICAL EXAM:   Vital signs: /60   Pulse 67   Temp 97.2  F (36.2  C) (Tympanic)   Resp 16   SpO2 95%    BMI: There is no height or weight on file to calculate BMI.   General: Normal, healthy, cooperative, in no acute distress, alert   Lungs: respirations are non-labored   Abdominal: non-distended   Wounds:  well healed.  To the left mastectomy site there is a lip of tissue noted midline which is stable in appearance.      PATHOLOGY:  Case Report   Date Value Ref Range Status   01/16/2024   Final    Surgical Pathology Report                          Case: [id number]                                  Authorizing Provider:  Ethan Molina MD  Collected:           01/16/2024 02:16 PM          Ordering Location:     [facility] Main Operating Room     Received:            01/16/2024 03:45 PM          Pathologist:           Steve Fletcher DO                                                         Specimens:   A) - Lymph Node(s), [place], Left [place] Node                                                    B) - Breast, Left, Left breast, short stitch superior long stitch lateral                           C) - Breast, Right, right breast, short stitch superior, long stitch lateral                Final Diagnosis   Date Value Ref Range Status   01/16/2024   Final    A.  [place] lymph node, left axilla, excisional biopsy:  -Lymph node with partial fat replacement and small scattered foci of hemosiderin deposition.  -Negative for metastatic tumor (0/1).  -See synoptic report below.    B.  Breast, left, simple mastectomy:  -Biopsy site with residual invasive ductal carcinoma, grade 1.  -Focal lobular carcinoma in situ in close proximity to the invasive carcinoma.  -Atrophic changes, few foci of the usual ductal hyperplasia, few small duct intraductal papillomas, focal apocrine metaplasia,     focal stromal fibrosis and few microcalcifications within invasive tumor and benign ducts.  -See synoptic report below.    C.  Breast, right, simple mastectomy:  -Atrophic changes, focal stromal fibrosis, rare small foci of usual ductal hyperplasia, focal apocrine metaplasia, and occasional    microcalcifications within benign ducts.          ASSESSMENT:    73 year old female who is 9 months s/p Left sentinel node biopsy and bilateral complete mastectomy.      PLAN:  Follow-up 3 months with me for a recheck. Sooner with problems/concerns.

## 2024-10-22 ENCOUNTER — LAB (OUTPATIENT)
Dept: LAB | Facility: OTHER | Age: 74
End: 2024-10-22
Attending: NURSE PRACTITIONER
Payer: COMMERCIAL

## 2024-10-22 ENCOUNTER — ONCOLOGY VISIT (OUTPATIENT)
Dept: ONCOLOGY | Facility: OTHER | Age: 74
End: 2024-10-22
Attending: NURSE PRACTITIONER
Payer: MEDICARE

## 2024-10-22 VITALS
TEMPERATURE: 98 F | WEIGHT: 180.34 LBS | RESPIRATION RATE: 20 BRPM | HEART RATE: 57 BPM | DIASTOLIC BLOOD PRESSURE: 64 MMHG | SYSTOLIC BLOOD PRESSURE: 128 MMHG | HEIGHT: 61 IN | BODY MASS INDEX: 34.05 KG/M2 | OXYGEN SATURATION: 96 %

## 2024-10-22 DIAGNOSIS — M25.50 ARTHRALGIA, UNSPECIFIED JOINT: ICD-10-CM

## 2024-10-22 DIAGNOSIS — Z17.0 MALIGNANT NEOPLASM OF LEFT BREAST IN FEMALE, ESTROGEN RECEPTOR POSITIVE, UNSPECIFIED SITE OF BREAST (H): ICD-10-CM

## 2024-10-22 DIAGNOSIS — C50.912 MALIGNANT NEOPLASM OF LEFT BREAST IN FEMALE, ESTROGEN RECEPTOR POSITIVE, UNSPECIFIED SITE OF BREAST (H): Primary | ICD-10-CM

## 2024-10-22 DIAGNOSIS — M85.80 OSTEOPENIA, UNSPECIFIED LOCATION: ICD-10-CM

## 2024-10-22 DIAGNOSIS — N95.1 MENOPAUSAL SYNDROME (HOT FLASHES): ICD-10-CM

## 2024-10-22 DIAGNOSIS — R63.5 WEIGHT GAIN: ICD-10-CM

## 2024-10-22 DIAGNOSIS — R53.83 FATIGUE, UNSPECIFIED TYPE: ICD-10-CM

## 2024-10-22 DIAGNOSIS — C50.912 MALIGNANT NEOPLASM OF LEFT BREAST IN FEMALE, ESTROGEN RECEPTOR POSITIVE, UNSPECIFIED SITE OF BREAST (H): ICD-10-CM

## 2024-10-22 DIAGNOSIS — F41.1 GAD (GENERALIZED ANXIETY DISORDER): ICD-10-CM

## 2024-10-22 DIAGNOSIS — Z17.0 MALIGNANT NEOPLASM OF LEFT BREAST IN FEMALE, ESTROGEN RECEPTOR POSITIVE, UNSPECIFIED SITE OF BREAST (H): Primary | ICD-10-CM

## 2024-10-22 LAB
ALBUMIN SERPL BCG-MCNC: 4.3 G/DL (ref 3.5–5.2)
ALP SERPL-CCNC: 73 U/L (ref 40–150)
ALT SERPL W P-5'-P-CCNC: 22 U/L (ref 0–50)
AST SERPL W P-5'-P-CCNC: 26 U/L (ref 0–45)
BILIRUB DIRECT SERPL-MCNC: <0.2 MG/DL (ref 0–0.3)
BILIRUB SERPL-MCNC: 0.7 MG/DL
PROT SERPL-MCNC: 7.6 G/DL (ref 6.4–8.3)
TSH SERPL DL<=0.005 MIU/L-ACNC: 2.16 UIU/ML (ref 0.3–4.2)

## 2024-10-22 PROCEDURE — 82248 BILIRUBIN DIRECT: CPT | Mod: ZL

## 2024-10-22 PROCEDURE — 84155 ASSAY OF PROTEIN SERUM: CPT | Mod: ZL

## 2024-10-22 PROCEDURE — 36415 COLL VENOUS BLD VENIPUNCTURE: CPT | Mod: ZL

## 2024-10-22 PROCEDURE — 84443 ASSAY THYROID STIM HORMONE: CPT | Mod: ZL

## 2024-10-22 PROCEDURE — 99215 OFFICE O/P EST HI 40 MIN: CPT | Performed by: NURSE PRACTITIONER

## 2024-10-22 PROCEDURE — G0463 HOSPITAL OUTPT CLINIC VISIT: HCPCS

## 2024-10-22 PROCEDURE — 99417 PROLNG OP E/M EACH 15 MIN: CPT | Performed by: NURSE PRACTITIONER

## 2024-10-22 PROCEDURE — G2211 COMPLEX E/M VISIT ADD ON: HCPCS | Performed by: NURSE PRACTITIONER

## 2024-10-22 ASSESSMENT — PAIN SCALES - GENERAL: PAINLEVEL: MODERATE PAIN (5)

## 2024-10-22 NOTE — NURSING NOTE
"Oncology Rooming Note    October 22, 2024 2:34 PM   Rj Rodríguez is a 73 year old female who presents for:    Chief Complaint   Patient presents with    Oncology Clinic Visit     Follow up malignant neoplasm of left breast     Initial Vitals: /64   Pulse 57   Temp 98  F (36.7  C) (Tympanic)   Resp 20   Ht 1.549 m (5' 1\")   Wt 81.8 kg (180 lb 5.4 oz)   SpO2 96%   BMI 34.07 kg/m   Estimated body mass index is 34.07 kg/m  as calculated from the following:    Height as of this encounter: 1.549 m (5' 1\").    Weight as of this encounter: 81.8 kg (180 lb 5.4 oz). Body surface area is 1.88 meters squared.  Moderate Pain (5) Comment: Data Unavailable   No LMP recorded. Patient has had a hysterectomy.  Allergies reviewed: Yes  Medications reviewed: Yes    Medications: Medication refills not needed today.  Pharmacy name entered into "[employer]: [facility] [address] [facility] [id number] [facility] [address]    Frailty Screening:   Is the patient here for a new oncology consult visit in cancer care? 2. No      Clinical concerns: level 5/10 all over joint pain      Ashlie Nash LPN             "

## 2024-10-23 NOTE — PROGRESS NOTES
Oncology Follow-up Visit    Reason for Visit:  Rj is a 73 year old woman with a diagnosis of breast cancer, who presents to the clinic today for routine follow-up.    Nursing Note and documentation reviewed: Yes    Interval History:   Rj notes that she is doing poorly. Recall, the last time I saw her we switched her from Arimidex to Letrozole given intolerable side effects, mainly myalgias and arthralgias. She had been started on Duloxetine per PCP but notes she stopped taking this due to nightmares. She notes that symptoms seemed to have initially improved but now just as bad as when on Arimidex. Primarily left knee, hips, back and neck. Of note, does have chronic back pain and has prior fusions in the cervical region. Dr. Bravo is recommending back surgery which she has delayed. Does have upcoming injections. Had also delayed having work done on left knee despite recommendations (per pt).     She met with surgery last week. She remains displeased and depressed about how she recovered from her bilateral mastectomy. Questions ongoing pain in one area, in addition to wondering if she should have further revisions done with plastic surgery. No other breast concerns.     She continues to endorse hot flashes, but notes that these are tolerable. No vaginal concerns at this time. Tries to get some activity but notes pain is limiting. Frustrated with continued weight gain.     Continues to work through PTSD, depression, and anxiety with her therapist Stacy at [facility]. Notes that she has made great progress. Feels this is ulrelated to her diagnosis but more past experiences from her childhood.     Oncologic History:   Ms. Rodríguez was found to have a abnormality on her screening mammogram.     11/13/2023: Mammogram screening bilateral with tomosynthesis:No architectural distortion, dominant mass or suspicious microcalcifications are identified in the right breast.  There is a small slightly spiculated  appearing nodule in the upper outer quadrant of the left breast near the junction of middle and anterior thirds. This is probably in the 1 to 2:00 position.   11/16/2023: Ultrasound breast left:There is an irregular slightly hypoechoic mass in the left breast at 1-2 o'clock, 6 cm from the nipple measuring 0.8 x 0.8 x 0.5 cm. No definite internal vascularity. No other cystic or solid masses are demonstrated.   11/21/2023: Ultrasound breast biopsy: Invasive ductal carcinoma, grade 1.  ER 91 to 100%, CT 71 to 80% and HER2 IHC +1  12/7/2023: MRI breast bilateral with and without contrast:Enhancement reflecting the sampled mass and surrounding postbiopsy change in the left upper outer breast spans 2.0 x 1.7 cm. No additional sites of highly suspicious enhancement in either breast. No evidence of pathologic adenopathy.  1/16/2024:   Left simple mastectomy and left sentinel lymph node biopsy: Pathology left breast showed biopsy site with residual invasive carcinoma, grade 1, 5.4 mm, margins negative.  focal lobular carcinoma in situ in close proximity to invasive carcinoma. pT1b N0 1 sentinel lymph node was examined which was negative for cancer.  Right breast simple mastectomy: Atrophic changes, focal stromal fibrosis, rare small foci of usual ductal hyperplasia, focal apical right metaplasia and occasional microcalcification within benign ducts. Oncotype Dx RS core is 13  2/22/2024:  Met with Dr. Kwok. Given oncotype score of 13, no added benefit to chemo. Therefore commenced endocrine therapy with Arimidex, with anticipation of completing 5 years.   7/23/2024: Decided to stop Arimidex given intolerable side effects (myalgias/arthralgias) and commenced Letrozole 2.5 mg daily    Current Chemo Regimen/TX: Letrozole 2.5 mg daily    Previous treatment:   Bilateral simple mastectomy completed Jan 2024  Arimidex 1 mg daily for about 5 months    Past Medical History:   Diagnosis Date    Arthritis     Cancer (H)     Chronic back  pain     Diabetes (H)     Hypertension     Irregular heart beat     Sleep apnea     Uncomplicated asthma        Past Surgical History:   Procedure Laterality Date    ANESTHESIA OUT OF OR MRI N/A 6/1/2022    Procedure: MRI CERVICAL SPINE;  Surgeon: GENERIC ANESTHESIA PROVIDER;  Location: [facility]    ANESTHESIA OUT OF OR MRI N/A 9/23/2022    Procedure: MRI CERVICAL SPINE;  Surgeon: GENERIC ANESTHESIA PROVIDER;  Location: [facility]    ANESTHESIA OUT OF OR MRI Left 12/7/2023    Procedure: Anesthesia out of OR MRI;  Surgeon: GENERIC ANESTHESIA PROVIDER;  Location: [facility]    APPENDECTOMY      BACK SURGERY      CHOLECYSTECTOMY      COLONOSCOPY  2012    Repeat in 10 years    GYN SURGERY      HYSTERECTOMY      Ovaries    MASTECTOMY SIMPLE BILATERAL, SENTINEL NODE BILATERAL, COMBINED N/A 1/16/2024    Procedure: Left sentinel node biopsy, bilateral mastectomy;  Surgeon: Ethan Molina MD;  Location: [facility]    RELEASE CARPAL TUNNEL      LT    RELEASE CARPAL TUNNEL      RT x2    SURGICAL RADIOLOGY PROCEDURE N/A 2/12/2016    Procedure: SURGICAL RADIOLOGY PROCEDURE;  Surgeon: Provider, Generic Perianesthesia Nursing;  Location: [facility]    SURGICAL RADIOLOGY PROCEDURE N/A 8/15/2016    Procedure: SURGICAL RADIOLOGY PROCEDURE;  Surgeon: Provider, Generic Perianesthesia Nursing;  Location: [facility]       Family History   Problem Relation Age of Onset    Diabetes Mother     Alcoholism Mother     Heart Failure Mother     Cerebrovascular Disease Father     Diabetes Father         Type 2    Hypertension Father     Alcoholism Father     Colon Cancer Maternal Aunt     Colon Cancer Maternal Uncle        Social History     Socioeconomic History    Marital status:      Spouse name: Not on file    Number of children: Not on file    Years of education: Not on file    Highest education level: Not on file   Occupational History    Not on file   Tobacco Use    Smoking status: Never     Passive exposure: Never    Smokeless tobacco: Never   Vaping  Use    Vaping status: Never Used   Substance and Sexual Activity    Alcohol use: Yes     Alcohol/week: 1.0 standard drink of alcohol     Types: 1 Glasses of wine per week     Comment: occ glass of wine    Drug use: No    Sexual activity: Not Currently   Other Topics Concern     Service Not Asked    Blood Transfusions Not Asked    Caffeine Concern Yes     Comment: Coffee - 2 cups daily    Occupational Exposure Not Asked    Hobby Hazards Not Asked    Sleep Concern Not Asked    Stress Concern Not Asked    Weight Concern Not Asked    Special Diet Not Asked    Back Care Not Asked    Exercise Not Asked    Bike Helmet Not Asked    Seat Belt Not Asked    Self-Exams Not Asked    Parent/sibling w/ CABG, MI or angioplasty before 65F 55M? Not Asked   Social History Narrative    10/11/2019: , lives in [place]      Social Drivers of Health     Financial Resource Strain: Low Risk  (1/8/2024)    Financial Resource Strain     Within the past 12 months, have you or your family members you live with been unable to get utilities (heat, electricity) when it was really needed?: No   Food Insecurity: Low Risk  (1/8/2024)    Food Insecurity     Within the past 12 months, did you worry that your food would run out before you got money to buy more?: No     Within the past 12 months, did the food you bought just not last and you didn t have money to get more?: No   Transportation Needs: Low Risk  (1/8/2024)    Transportation Needs     Within the past 12 months, has lack of transportation kept you from medical appointments, getting your medicines, non-medical meetings or appointments, work, or from getting things that you need?: No   Physical Activity: Not on file   Stress: Not on file   Social Connections: Not on file   Interpersonal Safety: Low Risk  (10/22/2024)    Interpersonal Safety     Do you feel physically and emotionally safe where you currently live?: Yes     Within the past 12 months, have you been hit, slapped,  kicked or otherwise physically hurt by someone?: No     Within the past 12 months, have you been humiliated or emotionally abused in other ways by your partner or ex-partner?: No   Housing Stability: Low Risk  (1/8/2024)    Housing Stability     Do you have housing? : Yes     Are you worried about losing your housing?: No       Current Outpatient Medications   Medication Sig Dispense Refill    albuterol (PROAIR HFA/PROVENTIL HFA/VENTOLIN HFA) 108 (90 Base) MCG/ACT inhaler Inhale 2 puffs into the lungs every 6 hours as needed for shortness of breath      blood glucose (ACCU-CHEK GUIDE) test strip 1 ONCE DAILY USE AS DIRECTED 100 strip 1    blood glucose monitoring (ACCU-CHEK FASTCLIX) lancets Use to test blood sugar 1 time daily or as directed. 102 each 11    calcium carbonate-vitamin D (OSCAL) 500-5 MG-MCG tablet Take 1 tablet by mouth twice daily 90 tablet 0    cetirizine (ZYRTEC) 10 MG tablet Take 10-20 mg by mouth daily (10 mg in the winter, 20 mg in allergy season)      diltiazem ER (TIAZAC) 240 MG 24 hr ER beaded capsule Take 1 capsule by mouth once daily 90 capsule 0    diphenhydrAMINE HCl (BENADRYL ALLERGY CHILDRENS) 12.5 MG CHEW Take 2 tablets by mouth as needed (allergic reaction)      EPINEPHrine (ANY BX GENERIC EQUIV) 0.3 MG/0.3ML injection 2-pack Inject 0.3 mLs (0.3 mg) into the muscle as needed for anaphylaxis May repeat one time in 5-15 minutes if response to initial dose is inadequate. 2 each 1    letrozole (FEMARA) 2.5 MG tablet Take 1 tablet (2.5 mg) by mouth daily 90 tablet 3    Multiple Vitamins-Minerals (MULTIVITAMIN GUMMIES WOMENS) CHEW Take 1 Dose by mouth daily      spironolactone (ALDACTONE) 50 MG tablet Take 1 tablet by mouth once daily 90 tablet 2    triamcinolone (NASACORT) 55 MCG/ACT nasal aerosol Spray 2 sprays into both nostrils daily as needed (seasonal allergies)      clonazePAM (KLONOPIN) 0.5 MG tablet Take 1 tablet by mouth twice daily as needed for anxiety (Patient not taking:  "Reported on 10/22/2024) 30 tablet 0     No current facility-administered medications for this visit.        Allergies   Allergen Reactions    Dust Mite Extract Difficulty breathing    Fruit [Peach] Anaphylaxis and Hives     fresh peaches and any fruit that has a pit.  Kiwi's and apples also.  Can eat any fruit cooked.    Latex Other (See Comments), Swelling, Difficulty breathing and Rash     Throat Closes      Nuts Anaphylaxis and Hives     All Raw Nuts, can eat nuts when roasted.    Other Food Allergy Other (See Comments) and Difficulty breathing     Mackey powder    Pollen Extract Difficulty breathing     All fruits and vegetables need to be washed and rinsed prior to eating.     Ace Inhibitors      Upper respiratory infection    Antihistamines, Chlorpheniramine-Type Hives     Antihistamines    Benicar [Olmesartan] Cough    Effexor [Venlafaxine] Other (See Comments)     Heartburn, reflux    Erythromycin      BASE; pt. Not sure if this is a true allergy.    Hydrochlorothiazide     Phenylephrine Hcl      Guaifed    Seasonal Allergies      hayfever    Sm Guaifenesin-Pseudoephedrine [Pseudoephedrine-Guaifenesin]      Guaifed    Statins     Trees     Ultram [Tramadol]      Sleep disturbance. Can not sleep, and when able to fall asleep will have terrible nightmares    Xanax [Alprazolam] Hives     Xanax    Zoloft [Sertraline]      paranoia       Review Of Systems:  A complete review of systems is negative except for the above mentioned items in the interval history.       ECOG Performance Status: 0    Physical Exam:  /64   Pulse 57   Temp 98  F (36.7  C) (Tympanic)   Resp 20   Ht 1.549 m (5' 1\")   Wt 81.8 kg (180 lb 5.4 oz)   SpO2 96%   BMI 34.07 kg/m    GENERAL APPEARANCE: Healthy, alert and in no acute distress.  HEENT: Eyes appear normal without scleral icterus. Extraocular movements intact.   NECK:   Supple with normal range of motion. No asymmetry or masses.  LYMPHATICS: No palpable cervical, " supraclavicular nodes.  RESP: Lungs clear to auscultation bilaterally, respirations regular and easy.  CARDIOVASCULAR: Regular rate and rhythm. Normal S1, S2; no murmur, gallop, or rub.  BREAST/Chest wall: Declined as this was just completed with surgery  MUSCULOSKELETAL: Extremities without gross deformities noted.   SKIN: No suspicious lesions or rashes.  NEURO: Alert and oriented x 3.  Gait steady.  PSYCHIATRIC: Improved today.     Laboratory:  Results for orders placed or performed in visit on 10/22/24   Hepatic panel (Albumin, ALT, AST, Bili, Alk Phos, TP)     Status: Normal   Result Value Ref Range    Protein Total 7.6 6.4 - 8.3 g/dL    Albumin 4.3 3.5 - 5.2 g/dL    Bilirubin Total 0.7 <=1.2 mg/dL    Alkaline Phosphatase 73 40 - 150 U/L    AST 26 0 - 45 U/L    ALT 22 0 - 50 U/L    Bilirubin Direct <0.20 0.00 - 0.30 mg/dL   TSH with free T4 reflex     Status: Normal   Result Value Ref Range    TSH 2.16 0.30 - 4.20 uIU/mL       Imaging Studies:    None this visit    ASSESSMENT/PLAN:    #1 Stage IA left breast cancer-ER positive, KS positive and HER2 negative. Status post bilateral mastectomy and left sentinel lymph node biopsy 1/2024.  pT1b N0. Oncotype Dx RS core is 13 so no role for systemic chemo.  She is a candidate for endocrine therapy for total of 5 years with an aromatase inhibitor. This was commenced Feb 2024 with Arimidex. She remained on this about 5 months but then, given myalgias and arthalgias which were intolerable, we switched to Letrozole.     Today, she returns having been on this medication now for a couple months. Notes that initially, side effects improved but now just as bad. Today, we had a long conversations again about these medications and how they decrease the risk of a breast cancer recurrence. She states that she wants to stay on the medications. We discussed how she has a lot going on in addition to this therapy. After conversation, it was decided we would stay on the letrozole,  although it could certainly be contributing to her symptoms. She is going to focus on some of the other things that could be causing the muscle aches and joint aches to see if this provides benefit. I will see her back in 3 months with labs prior.     Long conversation had regarding chest wall. She is displeased with how bumpy her chest wall is after surgery. Has considered revision surgery but is requesting a second opinion. She is not interested in going to [place] for plastics. Happily agreeable to see Dr. Carmen in [place] for further conversation. Referral placed.     #2 Osteopenia Continue calcium and Vit D. Dexa due April 2026.     #3 Hot flashes Tolerable. Will monitor at this point.     #4 Anxiety Working with Stacy and Edil dugan. Will monitor. States that these symptoms have acutally improved, able to participate in more activities without getting overly anxious or needing Klonopin. Has discontinued her Duloxetine.     #5 Arthralgias These remain her biggest complaint, despite switching AI from Arimidex to Letrozole. Through conversation, she wants to remain on letrozole at this time. I encouraged her however to focus on other things that could be contributing to this. She is meeting with Dr. Bravo soon following an injection in the c-spine. She did have previous fusions in this area and recommendations have been made for further fusions. In addition, she states prior recs have been made regarding her knee. We discussed today that our medications could absolutely be contributing, but that if we took care of some of the other concerns, perhaps our side effects would be more tolerable. In addition, we discussed weight and how that can be difficult on joint. I offered a referral to dietary but she declined. She states that she wants to get a gym membership to start walking. Also advised she, at this point, look into adult swimming at the high school as swimming can be helpful and have less  impact on joints. States she will look into. Will overall, continue to monitor.     #6 Weight gain Can be common with AI use. Discussed ways to help with weight loss through modified diet and increased activity. She can also talk with PCP about other possible ways to help with weight loss.     Patient in agreement with plan and verbalizes understanding. Agrees to call with any questions or concerns.    55 minutes spent in the patient's encounter today with time spent in review of patient's chart along with chart preparation and review of the treatment plan and signing of treatment plan.  Time was also spent with the patient in obtaining a review of systems and performing a physical exam along with detailed review of all test results. Time was also spent in discussing plan for future follow-up and relating instructions for follow-up and in placing future orders.      The longitudinal plan of care for the diagnosis(es)/condition(s) as documented were addressed during this visit. Due to the added complexity in care, I will continue to support Rj in the subsequent management and with ongoing continuity of care.   NORMAN [facility]  Medical Oncology

## 2024-11-05 ENCOUNTER — OFFICE VISIT (OUTPATIENT)
Dept: SURGERY | Facility: OTHER | Age: 74
End: 2024-11-05
Attending: SURGERY
Payer: COMMERCIAL

## 2024-11-05 VITALS
SYSTOLIC BLOOD PRESSURE: 138 MMHG | HEART RATE: 60 BPM | RESPIRATION RATE: 16 BRPM | OXYGEN SATURATION: 97 % | WEIGHT: 178 LBS | BODY MASS INDEX: 33.63 KG/M2 | TEMPERATURE: 98.3 F | DIASTOLIC BLOOD PRESSURE: 70 MMHG

## 2024-11-05 DIAGNOSIS — N64.89 POSTOPERATIVE BREAST ASYMMETRY: Primary | ICD-10-CM

## 2024-11-05 DIAGNOSIS — C50.912 MALIGNANT NEOPLASM OF LEFT BREAST IN FEMALE, ESTROGEN RECEPTOR POSITIVE, UNSPECIFIED SITE OF BREAST (H): ICD-10-CM

## 2024-11-05 DIAGNOSIS — Z17.0 MALIGNANT NEOPLASM OF LEFT BREAST IN FEMALE, ESTROGEN RECEPTOR POSITIVE, UNSPECIFIED SITE OF BREAST (H): ICD-10-CM

## 2024-11-05 DIAGNOSIS — Z90.13 S/P BILATERAL MASTECTOMY: ICD-10-CM

## 2024-11-05 PROCEDURE — G0463 HOSPITAL OUTPT CLINIC VISIT: HCPCS

## 2024-11-05 PROCEDURE — 99204 OFFICE O/P NEW MOD 45 MIN: CPT | Performed by: SURGERY

## 2024-11-05 RX ORDER — ACETAMINOPHEN 325 MG/1
975 TABLET ORAL ONCE
OUTPATIENT
Start: 2024-11-05 | End: 2024-11-05

## 2024-11-05 ASSESSMENT — PAIN SCALES - GENERAL: PAINLEVEL_OUTOF10: NO PAIN (0)

## 2024-11-05 NOTE — H&P (VIEW-ONLY)
Primary Care Physician: Sheyla Sanches NP    I was requested to see this patient in consultation by Sheyla Sanches NP for evaluation of asymmetry of mastectomy scars after surgery for breast cancer. A copy of this note will be sent to Sheyla Sanches NP.  HPI:   The patient is 73 year old female with history of 5 mm grade 1 HR+, her2 neg breast cancer of the left breast.  She had bilateral mastectomy with left SLN excision on January 16th, 2024 by Dr. Molina at [facility]. SLN negative. Low Oncotype recurrence score.  She has been seeing Lelia Lemus NP for post surgery visits. The patient is concerned about asymmetry between the right and left sides after her surgery. She has excessive skin and subcutaneous tissue on the lateral aspect of her right incision and of the mid and medial aspect of the left incision. She didn't need radiation therapy. She hasn't had any edema of the arm or axilla on the left.     CONSULTATION ASSESSMENT AND PLAN/RECOMMENDATIONS:   Asymmetry of chest wall after bilateral mastectomy for left breast cancer. I discussed with the patient today the risks, benefits and alternatives to revision of her scars to improve symmetry. We discussed the risks of healing issues, infection, fluid collection, ongoing asymmetry and the possible need for further procedures. The patient's questions were answered. She wishes to proceed. Will schedule this at a time that is convenient for the patient.   She will call with questions or concerns prior to the procedure.   REVIEW OF SYSTEMS  GENERAL: No fevers or chills. Denies fatigue, recent weight loss.  HEENT: No sinus drainage. No changes with vision or hearing. No difficulty swallowing.   LYMPHATICS:  No swollen nodes in axilla, neck or groin.  CARDIOVASCULAR: Denies chest pain, palpitations and dyspnea on exertion.  PULMONARY: No shortness of breath or cough. No increase in sputum production.  GI: Denies melena, bright red blood in stools. No  hematemesis. No constipation or diarrhea.  : No dysuria or hematuria.  SKIN: No recent rashes or ulcers.   HEMATOLOGY:  No history of easy bruising or bleeding.  ENDOCRINE:  No history of diabetes or thyroid problems.  NEUROLOGY:  No history of seizures or headaches. No motor or sensory changes.  Past Medical History:   Diagnosis Date    Arthritis     Cancer (H)     Chronic back pain     Diabetes (H)     Hypertension     Irregular heart beat     Sleep apnea     Uncomplicated asthma      Past Surgical History:   Procedure Laterality Date    ANESTHESIA OUT OF OR MRI N/A 6/1/2022    Procedure: MRI CERVICAL SPINE;  Surgeon: GENERIC ANESTHESIA PROVIDER;  Location: [facility]    ANESTHESIA OUT OF OR MRI N/A 9/23/2022    Procedure: MRI CERVICAL SPINE;  Surgeon: GENERIC ANESTHESIA PROVIDER;  [facility]: [facility]    ANESTHESIA OUT OF OR MRI Left 12/7/2023    Procedure: Anesthesia out of OR MRI;  Surgeon: GENERIC ANESTHESIA PROVIDER;  [facility]: [facility]    APPENDECTOMY      BACK SURGERY      CHOLECYSTECTOMY      COLONOSCOPY  2012    Repeat in 10 years    GYN SURGERY      HYSTERECTOMY      Ovaries    MASTECTOMY SIMPLE BILATERAL, SENTINEL NODE BILATERAL, COMBINED N/A 1/16/2024    Procedure: Left sentinel node biopsy, bilateral mastectomy;  Surgeon: Ethan Molina MD;  Location: [facility]    RELEASE CARPAL TUNNEL      LT    RELEASE CARPAL TUNNEL      RT x2    SURGICAL RADIOLOGY PROCEDURE N/A 2/12/2016    Procedure: SURGICAL RADIOLOGY PROCEDURE;  Surgeon: Provider, Generic Perianesthesia Nursing;  Location: [facility]    SURGICAL RADIOLOGY PROCEDURE N/A 8/15/2016    Procedure: SURGICAL RADIOLOGY PROCEDURE;  Surgeon: Provider, Generic Perianesthesia Nursing;  Location: [facility]     Current Outpatient Medications   Medication Sig Dispense Refill    albuterol (PROAIR HFA/PROVENTIL HFA/VENTOLIN HFA) 108 (90 Base) MCG/ACT inhaler Inhale 2 puffs into the lungs every 6 hours as needed for shortness of breath      blood glucose (ACCU-CHEK GUIDE) test  strip 1 ONCE DAILY USE AS DIRECTED 100 strip 1    blood glucose monitoring (ACCU-CHEK FASTCLIX) lancets Use to test blood sugar 1 time daily or as directed. 102 each 11    calcium carbonate-vitamin D (OSCAL) 500-5 MG-MCG tablet Take 1 tablet by mouth twice daily 90 tablet 0    cetirizine (ZYRTEC) 10 MG tablet Take 10-20 mg by mouth daily (10 mg in the winter, 20 mg in allergy season)      diltiazem ER (TIAZAC) 240 MG 24 hr ER beaded capsule Take 1 capsule by mouth once daily 90 capsule 0    diphenhydrAMINE HCl (BENADRYL ALLERGY CHILDRENS) 12.5 MG CHEW Take 2 tablets by mouth as needed (allergic reaction)      EPINEPHrine (ANY BX GENERIC EQUIV) 0.3 MG/0.3ML injection 2-pack Inject 0.3 mLs (0.3 mg) into the muscle as needed for anaphylaxis May repeat one time in 5-15 minutes if response to initial dose is inadequate. 2 each 1    letrozole (FEMARA) 2.5 MG tablet Take 1 tablet (2.5 mg) by mouth daily 90 tablet 3    Multiple Vitamins-Minerals (MULTIVITAMIN GUMMIES WOMENS) CHEW Take 1 Dose by mouth daily      spironolactone (ALDACTONE) 50 MG tablet Take 1 tablet by mouth once daily 90 tablet 2    triamcinolone (NASACORT) 55 MCG/ACT nasal aerosol Spray 2 sprays into both nostrils daily as needed (seasonal allergies)      clonazePAM (KLONOPIN) 0.5 MG tablet Take 1 tablet by mouth twice daily as needed for anxiety (Patient not taking: Reported on 11/5/2024) 30 tablet 0     No current facility-administered medications for this visit.     Allergies   Allergen Reactions    Dust Mite Extract Difficulty breathing    Fruit [Peach] Anaphylaxis and Hives     fresh peaches and any fruit that has a pit.  Kiwi's and apples also.  Can eat any fruit cooked.    Latex Other (See Comments), Swelling, Difficulty breathing and Rash     Throat Closes      Nuts Anaphylaxis and Hives     All Raw Nuts, can eat nuts when roasted.    Other Food Allergy Other (See Comments) and Difficulty breathing     Mackey powder    Pollen Extract Difficulty  breathing     All fruits and vegetables need to be washed and rinsed prior to eating.     Ace Inhibitors      Upper respiratory infection    Antihistamines, Chlorpheniramine-Type Hives     Antihistamines    Benicar [Olmesartan] Cough    Effexor [Venlafaxine] Other (See Comments)     Heartburn, reflux    Erythromycin      BASE; pt. Not sure if this is a true allergy.    Hydrochlorothiazide     Phenylephrine Hcl      Guaifed    Seasonal Allergies      hayfever    Sm Guaifenesin-Pseudoephedrine [Pseudoephedrine-Guaifenesin]      Guaifed    Statins     Trees     Ultram [Tramadol]      Sleep disturbance. Can not sleep, and when able to fall asleep will have terrible nightmares    Xanax [Alprazolam] Hives     Xanax    Zoloft [Sertraline]      paranoia     Family History   Problem Relation Age of Onset    Diabetes Mother     Alcoholism Mother     Heart Failure Mother     Cerebrovascular Disease Father     Diabetes Father         Type 2    Hypertension Father     Alcoholism Father     Colon Cancer Maternal Aunt     Colon Cancer Maternal Uncle      Social History     Socioeconomic History    Marital status:      Spouse name: None    Number of children: None    Years of education: None    Highest education level: None   Tobacco Use    Smoking status: Never     Passive exposure: Never    Smokeless tobacco: Never   Vaping Use    Vaping status: Never Used   Substance and Sexual Activity    Alcohol use: Yes     Alcohol/week: 1.0 standard drink of alcohol     Types: 1 Glasses of wine per week     Comment: occ glass of wine    Drug use: No    Sexual activity: Not Currently   Other Topics Concern    Caffeine Concern Yes     Comment: Coffee - 2 cups daily   Social History Narrative    10/11/2019: , lives in [place]      Social Drivers of Health     Financial Resource Strain: Low Risk  (1/8/2024)    Financial Resource Strain     Within the past 12 months, have you or your family members you live with been unable to get  utilities (heat, electricity) when it was really needed?: No   Food Insecurity: Low Risk  (1/8/2024)    Food Insecurity     Within the past 12 months, did you worry that your food would run out before you got money to buy more?: No     Within the past 12 months, did the food you bought just not last and you didn t have money to get more?: No   Transportation Needs: Low Risk  (1/8/2024)    Transportation Needs     Within the past 12 months, has lack of transportation kept you from medical appointments, getting your medicines, non-medical meetings or appointments, work, or from getting things that you need?: No   Interpersonal Safety: Low Risk  (11/5/2024)    Interpersonal Safety     Do you feel physically and emotionally safe where you currently live?: Yes     Within the past 12 months, have you been hit, slapped, kicked or otherwise physically hurt by someone?: No     Within the past 12 months, have you been humiliated or emotionally abused in other ways by your partner or ex-partner?: No   Housing Stability: Low Risk  (1/8/2024)    Housing Stability     Do you have housing? : Yes     Are you worried about losing your housing?: No     EXAM:  Vitals: /70 (BP Location: Right arm, Patient Position: Sitting, Cuff Size: Adult Large)   Pulse 60   Temp 98.3  F (36.8  C) (Tympanic)   Resp 16   Wt 80.7 kg (178 lb)   LMP  (LMP Unknown)   SpO2 97%   BMI 33.63 kg/m    GENERAL: Healthy appearing patient in no acute distress. Pleasant and cooperativewith exam and interview.   HEENT: Head-normocephalic. Eyes-no scleral icterus, pupils equal, round, and reactive to light. Nose-no nasal drainage. No lesions. Mouth-oral mucosa pink and moist, no lesions.  NECK:Supple. No thyroid nodules. Trachea midline.  LYMPHATICS:  No cervical, axillary or supraclavicular adenopathy.  CV: Regular rate and rhythm, no murmurs. No peripheral edema.  LUNGS:  No respiratory distress. Clear bilaterally to auscultation.  CHEST WALL:  bilateral transverse/oblique mastectomy scars with surgical asymmetry. Skin edges are well healed. No fluid collection. No pain. No mass palpated.   SKIN: Pink, warm and dry. No jaundice. No rash.  NEURO:  Cranial nerves II-XII grossly intact. Alert and oriented.  PSYCH: Appropriate mood and affect.    IMAGING/LAB  I personally reviewed patient's pathology reports and notes.

## 2024-11-05 NOTE — PROGRESS NOTES
Primary Care Physician: Sheyla Sanches NP    I was requested to see this patient in consultation by Sheyla Sanches NP for evaluation of asymmetry of mastectomy scars after surgery for breast cancer. A copy of this note will be sent to Sheyla Sanches NP.  HPI:   The patient is 73 year old female with history of 5 mm grade 1 HR+, her2 neg breast cancer of the left breast.  She had bilateral mastectomy with left SLN excision on January 16th, 2024 by Dr. Molina at [facility]. SLN negative. Low Oncotype recurrence score.  She has been seeing Lelia Lemus NP for post surgery visits. The patient is concerned about asymmetry between the right and left sides after her surgery. She has excessive skin and subcutaneous tissue on the lateral aspect of her right incision and of the mid and medial aspect of the left incision. She didn't need radiation therapy. She hasn't had any edema of the arm or axilla on the left.     CONSULTATION ASSESSMENT AND PLAN/RECOMMENDATIONS:   Asymmetry of chest wall after bilateral mastectomy for left breast cancer. I discussed with the patient today the risks, benefits and alternatives to revision of her scars to improve symmetry. We discussed the risks of healing issues, infection, fluid collection, ongoing asymmetry and the possible need for further procedures. The patient's questions were answered. She wishes to proceed. Will schedule this at a time that is convenient for the patient.   She will call with questions or concerns prior to the procedure.   REVIEW OF SYSTEMS  GENERAL: No fevers or chills. Denies fatigue, recent weight loss.  HEENT: No sinus drainage. No changes with vision or hearing. No difficulty swallowing.   LYMPHATICS:  No swollen nodes in axilla, neck or groin.  CARDIOVASCULAR: Denies chest pain, palpitations and dyspnea on exertion.  PULMONARY: No shortness of breath or cough. No increase in sputum production.  GI: Denies melena, bright red blood in stools. No  hematemesis. No constipation or diarrhea.  : No dysuria or hematuria.  SKIN: No recent rashes or ulcers.   HEMATOLOGY:  No history of easy bruising or bleeding.  ENDOCRINE:  No history of diabetes or thyroid problems.  NEUROLOGY:  No history of seizures or headaches. No motor or sensory changes.  Past Medical History:   Diagnosis Date    Arthritis     Cancer (H)     Chronic back pain     Diabetes (H)     Hypertension     Irregular heart beat     Sleep apnea     Uncomplicated asthma      Past Surgical History:   Procedure Laterality Date    ANESTHESIA OUT OF OR MRI N/A 6/1/2022    Procedure: MRI CERVICAL SPINE;  Surgeon: GENERIC ANESTHESIA PROVIDER;  [facility]: [facility]    ANESTHESIA OUT OF OR MRI N/A 9/23/2022    Procedure: MRI CERVICAL SPINE;  Surgeon: GENERIC ANESTHESIA PROVIDER;  [facility]    ANESTHESIA OUT OF OR MRI Left 12/7/2023    Procedure: Anesthesia out of OR MRI;  Surgeon: GENERIC ANESTHESIA PROVIDER;  Location: [facility]    APPENDECTOMY      BACK SURGERY      CHOLECYSTECTOMY      COLONOSCOPY  2012    Repeat in 10 years    GYN SURGERY      HYSTERECTOMY      Ovaries    MASTECTOMY SIMPLE BILATERAL, SENTINEL NODE BILATERAL, COMBINED N/A 1/16/2024    Procedure: Left sentinel node biopsy, bilateral mastectomy;  Surgeon: Ethan Molina MD;  Location: [facility]    RELEASE CARPAL TUNNEL      LT    RELEASE CARPAL TUNNEL      RT x2    SURGICAL RADIOLOGY PROCEDURE N/A 2/12/2016    Procedure: SURGICAL RADIOLOGY PROCEDURE;  Surgeon: Provider, Generic Perianesthesia Nursing;  Location: [facility]    SURGICAL RADIOLOGY PROCEDURE N/A 8/15/2016    Procedure: SURGICAL RADIOLOGY PROCEDURE;  Surgeon: Provider, Generic Perianesthesia Nursing;  Location: [facility]     Current Outpatient Medications   Medication Sig Dispense Refill    albuterol (PROAIR HFA/PROVENTIL HFA/VENTOLIN HFA) 108 (90 Base) MCG/ACT inhaler Inhale 2 puffs into the lungs every 6 hours as needed for shortness of breath      blood glucose (ACCU-CHEK GUIDE) test  strip 1 ONCE DAILY USE AS DIRECTED 100 strip 1    blood glucose monitoring (ACCU-CHEK FASTCLIX) lancets Use to test blood sugar 1 time daily or as directed. 102 each 11    calcium carbonate-vitamin D (OSCAL) 500-5 MG-MCG tablet Take 1 tablet by mouth twice daily 90 tablet 0    cetirizine (ZYRTEC) 10 MG tablet Take 10-20 mg by mouth daily (10 mg in the winter, 20 mg in allergy season)      diltiazem ER (TIAZAC) 240 MG 24 hr ER beaded capsule Take 1 capsule by mouth once daily 90 capsule 0    diphenhydrAMINE HCl (BENADRYL ALLERGY CHILDRENS) 12.5 MG CHEW Take 2 tablets by mouth as needed (allergic reaction)      EPINEPHrine (ANY BX GENERIC EQUIV) 0.3 MG/0.3ML injection 2-pack Inject 0.3 mLs (0.3 mg) into the muscle as needed for anaphylaxis May repeat one time in 5-15 minutes if response to initial dose is inadequate. 2 each 1    letrozole (FEMARA) 2.5 MG tablet Take 1 tablet (2.5 mg) by mouth daily 90 tablet 3    Multiple Vitamins-Minerals (MULTIVITAMIN GUMMIES WOMENS) CHEW Take 1 Dose by mouth daily      spironolactone (ALDACTONE) 50 MG tablet Take 1 tablet by mouth once daily 90 tablet 2    triamcinolone (NASACORT) 55 MCG/ACT nasal aerosol Spray 2 sprays into both nostrils daily as needed (seasonal allergies)      clonazePAM (KLONOPIN) 0.5 MG tablet Take 1 tablet by mouth twice daily as needed for anxiety (Patient not taking: Reported on 11/5/2024) 30 tablet 0     No current facility-administered medications for this visit.     Allergies   Allergen Reactions    Dust Mite Extract Difficulty breathing    Fruit [Peach] Anaphylaxis and Hives     fresh peaches and any fruit that has a pit.  Kiwi's and apples also.  Can eat any fruit cooked.    Latex Other (See Comments), Swelling, Difficulty breathing and Rash     Throat Closes      Nuts Anaphylaxis and Hives     All Raw Nuts, can eat nuts when roasted.    Other Food Allergy Other (See Comments) and Difficulty breathing     Mackey powder    Pollen Extract Difficulty  breathing     All fruits and vegetables need to be washed and rinsed prior to eating.     Ace Inhibitors      Upper respiratory infection    Antihistamines, Chlorpheniramine-Type Hives     Antihistamines    Benicar [Olmesartan] Cough    Effexor [Venlafaxine] Other (See Comments)     Heartburn, reflux    Erythromycin      BASE; pt. Not sure if this is a true allergy.    Hydrochlorothiazide     Phenylephrine Hcl      Guaifed    Seasonal Allergies      hayfever    Sm Guaifenesin-Pseudoephedrine [Pseudoephedrine-Guaifenesin]      Guaifed    Statins     Trees     Ultram [Tramadol]      Sleep disturbance. Can not sleep, and when able to fall asleep will have terrible nightmares    Xanax [Alprazolam] Hives     Xanax    Zoloft [Sertraline]      paranoia     Family History   Problem Relation Age of Onset    Diabetes Mother     Alcoholism Mother     Heart Failure Mother     Cerebrovascular Disease Father     Diabetes Father         Type 2    Hypertension Father     Alcoholism Father     Colon Cancer Maternal Aunt     Colon Cancer Maternal Uncle      Social History     Socioeconomic History    Marital status:      Spouse name: None    Number of children: None    Years of education: None    Highest education level: None   Tobacco Use    Smoking status: Never     Passive exposure: Never    Smokeless tobacco: Never   Vaping Use    Vaping status: Never Used   Substance and Sexual Activity    Alcohol use: Yes     Alcohol/week: 1.0 standard drink of alcohol     Types: 1 Glasses of wine per week     Comment: occ glass of wine    Drug use: No    Sexual activity: Not Currently   Other Topics Concern    Caffeine Concern Yes     Comment: Coffee - 2 cups daily   Social History Narrative    10/11/2019: , lives in [place]      Social Drivers of Health     Financial Resource Strain: Low Risk  (1/8/2024)    Financial Resource Strain     Within the past 12 months, have you or your family members you live with been unable to get  utilities (heat, electricity) when it was really needed?: No   Food Insecurity: Low Risk  (1/8/2024)    Food Insecurity     Within the past 12 months, did you worry that your food would run out before you got money to buy more?: No     Within the past 12 months, did the food you bought just not last and you didn t have money to get more?: No   Transportation Needs: Low Risk  (1/8/2024)    Transportation Needs     Within the past 12 months, has lack of transportation kept you from medical appointments, getting your medicines, non-medical meetings or appointments, work, or from getting things that you need?: No   Interpersonal Safety: Low Risk  (11/5/2024)    Interpersonal Safety     Do you feel physically and emotionally safe where you currently live?: Yes     Within the past 12 months, have you been hit, slapped, kicked or otherwise physically hurt by someone?: No     Within the past 12 months, have you been humiliated or emotionally abused in other ways by your partner or ex-partner?: No   Housing Stability: Low Risk  (1/8/2024)    Housing Stability     Do you have housing? : Yes     Are you worried about losing your housing?: No     EXAM:  Vitals: /70 (BP Location: Right arm, Patient Position: Sitting, Cuff Size: Adult Large)   Pulse 60   Temp 98.3  F (36.8  C) (Tympanic)   Resp 16   Wt 80.7 kg (178 lb)   LMP  (LMP Unknown)   SpO2 97%   BMI 33.63 kg/m    GENERAL: Healthy appearing patient in no acute distress. Pleasant and cooperativewith exam and interview.   HEENT: Head-normocephalic. Eyes-no scleral icterus, pupils equal, round, and reactive to light. Nose-no nasal drainage. No lesions. Mouth-oral mucosa pink and moist, no lesions.  NECK:Supple. No thyroid nodules. Trachea midline.  LYMPHATICS:  No cervical, axillary or supraclavicular adenopathy.  CV: Regular rate and rhythm, no murmurs. No peripheral edema.  LUNGS:  No respiratory distress. Clear bilaterally to auscultation.  CHEST WALL:  bilateral transverse/oblique mastectomy scars with surgical asymmetry. Skin edges are well healed. No fluid collection. No pain. No mass palpated.   SKIN: Pink, warm and dry. No jaundice. No rash.  NEURO:  Cranial nerves II-XII grossly intact. Alert and oriented.  PSYCH: Appropriate mood and affect.    IMAGING/LAB  I personally reviewed patient's pathology reports and notes.

## 2024-11-05 NOTE — NURSING NOTE
"Chief Complaint   Patient presents with    Consult     Breast       Initial /70 (BP Location: Right arm, Patient Position: Sitting, Cuff Size: Adult Large)   Pulse 60   Temp 98.3  F (36.8  C) (Tympanic)   Resp 16   Wt 80.7 kg (178 lb)   LMP  (LMP Unknown)   SpO2 97%   BMI 33.63 kg/m   Estimated body mass index is 33.63 kg/m  as calculated from the following:    Height as of 10/22/24: 1.549 m (5' 1\").    Weight as of this encounter: 80.7 kg (178 lb).  Medication Reconciliation: complete    At what age did you start menopause? hysterectomy  What age did your menstrual cycle start?19  Are you on or have you ever taken any hormone replacement or birth control? no  How many children do you have? 6  How old were you when your first child was born? 23  Did you breast feed? yes  Do you have a family history of breast cancer? no  Haylie Dumont LPN..........11/5/2024  9:56 AM  "

## 2024-11-14 NOTE — PROGRESS NOTES
Prior to injection patient identity is verified using patient's name and date of birth.    Safety vial test was done in the left arm, for new silver vial # 1.   Administered  0.01ml (ID) for SVT.  SVT = 9 mm,.   passed.    Safety vial test was done in the right arm, for new silver vial # 2.   Administered  0.01ml (ID) for SVT.  SVT  =  11 mm   passed.    2 Allergy injections were given and charted on the paper flow sheet.      Per orders of Mavis Tyler  , allergy injections are given by Britney Winter LPN   Pt signed out AMA without staying for observation.      BRITNEY WINTER LPN        
hide

## 2024-11-20 ENCOUNTER — TELEPHONE (OUTPATIENT)
Dept: CARDIOLOGY | Facility: OTHER | Age: 74
End: 2024-11-20

## 2024-11-20 NOTE — TELEPHONE ENCOUNTER
Attempt # 1  Outcome: Left Message   Comment: LVM for pt to RS 12/13 with SALLY Crisostomo as provider is unavailable.

## 2024-11-26 DIAGNOSIS — I10 ESSENTIAL HYPERTENSION: ICD-10-CM

## 2024-11-26 DIAGNOSIS — I49.3 FREQUENT PVCS: ICD-10-CM

## 2024-11-26 RX ORDER — DILTIAZEM HYDROCHLORIDE 240 MG/1
240 CAPSULE, EXTENDED RELEASE ORAL DAILY
Qty: 90 CAPSULE | Refills: 2 | Status: SHIPPED | OUTPATIENT
Start: 2024-11-26

## 2024-11-26 NOTE — ANESTHESIA CARE TRANSFER NOTE
Patient: Rj Rodríguez    Procedure: Procedure(s):  Left sentinel node biopsy, bilateral mastectomy       Diagnosis: Malignant neoplasm of left breast (H) [C50.912]  Diagnosis Additional Information: No value filed.    Anesthesia Type:   General     Note:    Oropharynx: oropharynx clear of all foreign objects and spontaneously breathing  Level of Consciousness: drowsy  Oxygen Supplementation: face mask  Level of Supplemental Oxygen (L/min / FiO2): 8  Independent Airway: airway patency satisfactory and stable  Dentition: dentition unchanged  Vital Signs Stable: post-procedure vital signs reviewed and stable  Report to RN Given: handoff report given  Patient transferred to: PACU    Handoff Report: Identifed the Patient, Identified the Reponsible Provider, Reviewed the pertinent medical history, Discussed the surgical course, Reviewed Intra-OP anesthesia mangement and issues during anesthesia, Set expectations for post-procedure period and Allowed opportunity for questions and acknowledgement of understanding  Vitals:  Vitals Value Taken Time   /86 01/16/24 1550   Temp     Pulse 59 01/16/24 1556   Resp 15 01/16/24 1556   SpO2 93 % 01/16/24 1556   Vitals shown include unfiled device data.    Electronically Signed By: NORMAN Thurman CRNA  January 16, 2024  3:57 PM   16

## 2024-12-03 ENCOUNTER — ANESTHESIA EVENT (OUTPATIENT)
Dept: SURGERY | Facility: OTHER | Age: 74
End: 2024-12-03
Payer: MEDICARE

## 2024-12-04 ENCOUNTER — ANESTHESIA (OUTPATIENT)
Dept: SURGERY | Facility: OTHER | Age: 74
End: 2024-12-04
Payer: MEDICARE

## 2024-12-04 ENCOUNTER — HOSPITAL ENCOUNTER (OUTPATIENT)
Facility: OTHER | Age: 74
Discharge: HOME | End: 2024-12-04
Attending: SURGERY | Admitting: SURGERY
Payer: MEDICARE

## 2024-12-04 VITALS
TEMPERATURE: 97.2 F | SYSTOLIC BLOOD PRESSURE: 119 MMHG | WEIGHT: 176.8 LBS | BODY MASS INDEX: 33.41 KG/M2 | OXYGEN SATURATION: 90 % | HEART RATE: 62 BPM | RESPIRATION RATE: 12 BRPM | DIASTOLIC BLOOD PRESSURE: 62 MMHG

## 2024-12-04 DIAGNOSIS — N64.89 POSTOPERATIVE BREAST ASYMMETRY: Primary | ICD-10-CM

## 2024-12-04 DIAGNOSIS — Z90.13 S/P BILATERAL MASTECTOMY: ICD-10-CM

## 2024-12-04 LAB — GLUCOSE BLDC GLUCOMTR-MCNC: 120 MG/DL (ref 70–99)

## 2024-12-04 PROCEDURE — 250N000009 HC RX 250: Performed by: SURGERY

## 2024-12-04 PROCEDURE — 250N000013 HC RX MED GY IP 250 OP 250 PS 637

## 2024-12-04 PROCEDURE — 11406 EXC TR-EXT B9+MARG >4.0 CM: CPT | Performed by: SURGERY

## 2024-12-04 PROCEDURE — 710N000010 HC RECOVERY PHASE 1, LEVEL 2, PER MIN: Performed by: SURGERY

## 2024-12-04 PROCEDURE — 88304 TISSUE EXAM BY PATHOLOGIST: CPT

## 2024-12-04 PROCEDURE — 82962 GLUCOSE BLOOD TEST: CPT

## 2024-12-04 PROCEDURE — 250N000013 HC RX MED GY IP 250 OP 250 PS 637: Performed by: SURGERY

## 2024-12-04 PROCEDURE — 710N000012 HC RECOVERY PHASE 2, PER MINUTE: Performed by: SURGERY

## 2024-12-04 PROCEDURE — 258N000003 HC RX IP 258 OP 636

## 2024-12-04 PROCEDURE — 250N000011 HC RX IP 250 OP 636: Performed by: SURGERY

## 2024-12-04 PROCEDURE — 250N000009 HC RX 250: Performed by: NURSE ANESTHETIST, CERTIFIED REGISTERED

## 2024-12-04 PROCEDURE — 258N000003 HC RX IP 258 OP 636: Performed by: NURSE ANESTHETIST, CERTIFIED REGISTERED

## 2024-12-04 PROCEDURE — 999N000141 HC STATISTIC PRE-PROCEDURE NURSING ASSESSMENT: Performed by: SURGERY

## 2024-12-04 PROCEDURE — 250N000011 HC RX IP 250 OP 636: Performed by: NURSE ANESTHETIST, CERTIFIED REGISTERED

## 2024-12-04 PROCEDURE — 12037 INTMD RPR S/TR/EXT >30.0 CM: CPT | Performed by: SURGERY

## 2024-12-04 PROCEDURE — 250N000025 HC SEVOFLURANE, PER MIN: Performed by: SURGERY

## 2024-12-04 PROCEDURE — 272N000001 HC OR GENERAL SUPPLY STERILE: Performed by: SURGERY

## 2024-12-04 PROCEDURE — 272N000002 HC OR SUPPLY OTHER OPNP: Performed by: SURGERY

## 2024-12-04 PROCEDURE — 370N000017 HC ANESTHESIA TECHNICAL FEE, PER MIN: Performed by: SURGERY

## 2024-12-04 PROCEDURE — 360N000074 HC SURGERY LEVEL 1, PER MIN: Performed by: SURGERY

## 2024-12-04 RX ORDER — DEXAMETHASONE SODIUM PHOSPHATE 10 MG/ML
4 INJECTION, SOLUTION INTRAMUSCULAR; INTRAVENOUS
Status: DISCONTINUED | OUTPATIENT
Start: 2024-12-04 | End: 2024-12-04 | Stop reason: HOSPADM

## 2024-12-04 RX ORDER — OXYCODONE HYDROCHLORIDE 5 MG/1
5 TABLET ORAL
Status: COMPLETED | OUTPATIENT
Start: 2024-12-04 | End: 2024-12-04

## 2024-12-04 RX ORDER — FENTANYL CITRATE 50 UG/ML
25 INJECTION, SOLUTION INTRAMUSCULAR; INTRAVENOUS EVERY 5 MIN PRN
Status: DISCONTINUED | OUTPATIENT
Start: 2024-12-04 | End: 2024-12-04 | Stop reason: HOSPADM

## 2024-12-04 RX ORDER — HYDROMORPHONE HCL IN WATER/PF 6 MG/30 ML
0.2 PATIENT CONTROLLED ANALGESIA SYRINGE INTRAVENOUS EVERY 5 MIN PRN
Status: DISCONTINUED | OUTPATIENT
Start: 2024-12-04 | End: 2024-12-04 | Stop reason: HOSPADM

## 2024-12-04 RX ORDER — FENTANYL CITRATE 50 UG/ML
50 INJECTION, SOLUTION INTRAMUSCULAR; INTRAVENOUS EVERY 5 MIN PRN
Status: DISCONTINUED | OUTPATIENT
Start: 2024-12-04 | End: 2024-12-04 | Stop reason: HOSPADM

## 2024-12-04 RX ORDER — FENTANYL CITRATE 50 UG/ML
INJECTION, SOLUTION INTRAMUSCULAR; INTRAVENOUS PRN
Status: DISCONTINUED | OUTPATIENT
Start: 2024-12-04 | End: 2024-12-04

## 2024-12-04 RX ORDER — ONDANSETRON 4 MG/1
4 TABLET, ORALLY DISINTEGRATING ORAL EVERY 30 MIN PRN
Status: DISCONTINUED | OUTPATIENT
Start: 2024-12-04 | End: 2024-12-04 | Stop reason: HOSPADM

## 2024-12-04 RX ORDER — PROPOFOL 10 MG/ML
INJECTION, EMULSION INTRAVENOUS PRN
Status: DISCONTINUED | OUTPATIENT
Start: 2024-12-04 | End: 2024-12-04

## 2024-12-04 RX ORDER — NALOXONE HYDROCHLORIDE 0.4 MG/ML
0.1 INJECTION, SOLUTION INTRAMUSCULAR; INTRAVENOUS; SUBCUTANEOUS
Status: DISCONTINUED | OUTPATIENT
Start: 2024-12-04 | End: 2024-12-04 | Stop reason: HOSPADM

## 2024-12-04 RX ORDER — CEFAZOLIN SODIUM/WATER 2 G/20 ML
2 SYRINGE (ML) INTRAVENOUS
Status: COMPLETED | OUTPATIENT
Start: 2024-12-04 | End: 2024-12-04

## 2024-12-04 RX ORDER — OXYCODONE HYDROCHLORIDE 5 MG/1
5 TABLET ORAL EVERY 6 HOURS PRN
Qty: 5 TABLET | Refills: 0 | Status: SHIPPED | OUTPATIENT
Start: 2024-12-04 | End: 2024-12-10

## 2024-12-04 RX ORDER — LIDOCAINE HYDROCHLORIDE 20 MG/ML
INJECTION, SOLUTION INFILTRATION; PERINEURAL PRN
Status: DISCONTINUED | OUTPATIENT
Start: 2024-12-04 | End: 2024-12-04

## 2024-12-04 RX ORDER — PROPOFOL 10 MG/ML
INJECTION, EMULSION INTRAVENOUS CONTINUOUS PRN
Status: DISCONTINUED | OUTPATIENT
Start: 2024-12-04 | End: 2024-12-04

## 2024-12-04 RX ORDER — SODIUM CHLORIDE, SODIUM LACTATE, POTASSIUM CHLORIDE, CALCIUM CHLORIDE 600; 310; 30; 20 MG/100ML; MG/100ML; MG/100ML; MG/100ML
INJECTION, SOLUTION INTRAVENOUS CONTINUOUS
Status: DISCONTINUED | OUTPATIENT
Start: 2024-12-04 | End: 2024-12-04 | Stop reason: HOSPADM

## 2024-12-04 RX ORDER — IBUPROFEN 200 MG
600 TABLET ORAL
Status: DISCONTINUED | OUTPATIENT
Start: 2024-12-04 | End: 2024-12-04 | Stop reason: HOSPADM

## 2024-12-04 RX ORDER — CEFAZOLIN SODIUM/WATER 2 G/20 ML
2 SYRINGE (ML) INTRAVENOUS SEE ADMIN INSTRUCTIONS
Status: DISCONTINUED | OUTPATIENT
Start: 2024-12-04 | End: 2024-12-04 | Stop reason: HOSPADM

## 2024-12-04 RX ORDER — HYDROMORPHONE HCL IN WATER/PF 6 MG/30 ML
0.4 PATIENT CONTROLLED ANALGESIA SYRINGE INTRAVENOUS EVERY 5 MIN PRN
Status: DISCONTINUED | OUTPATIENT
Start: 2024-12-04 | End: 2024-12-04 | Stop reason: HOSPADM

## 2024-12-04 RX ORDER — ONDANSETRON 2 MG/ML
4 INJECTION INTRAMUSCULAR; INTRAVENOUS EVERY 30 MIN PRN
Status: DISCONTINUED | OUTPATIENT
Start: 2024-12-04 | End: 2024-12-04 | Stop reason: HOSPADM

## 2024-12-04 RX ORDER — ONDANSETRON 2 MG/ML
INJECTION INTRAMUSCULAR; INTRAVENOUS PRN
Status: DISCONTINUED | OUTPATIENT
Start: 2024-12-04 | End: 2024-12-04

## 2024-12-04 RX ORDER — LIDOCAINE 40 MG/G
CREAM TOPICAL
Status: DISCONTINUED | OUTPATIENT
Start: 2024-12-04 | End: 2024-12-04 | Stop reason: HOSPADM

## 2024-12-04 RX ORDER — OXYCODONE HYDROCHLORIDE 5 MG/1
10 TABLET ORAL
Status: DISCONTINUED | OUTPATIENT
Start: 2024-12-04 | End: 2024-12-04 | Stop reason: HOSPADM

## 2024-12-04 RX ORDER — ACETAMINOPHEN 325 MG/1
975 TABLET ORAL EVERY 6 HOURS PRN
COMMUNITY
Start: 2024-12-04

## 2024-12-04 RX ORDER — ACETAMINOPHEN 325 MG/1
975 TABLET ORAL ONCE
Status: COMPLETED | OUTPATIENT
Start: 2024-12-04 | End: 2024-12-04

## 2024-12-04 RX ORDER — DEXAMETHASONE SODIUM PHOSPHATE 4 MG/ML
INJECTION, SOLUTION INTRA-ARTICULAR; INTRALESIONAL; INTRAMUSCULAR; INTRAVENOUS; SOFT TISSUE PRN
Status: DISCONTINUED | OUTPATIENT
Start: 2024-12-04 | End: 2024-12-04

## 2024-12-04 RX ORDER — BUPIVACAINE HYDROCHLORIDE AND EPINEPHRINE 5; 5 MG/ML; UG/ML
INJECTION, SOLUTION EPIDURAL; INTRACAUDAL; PERINEURAL PRN
Status: DISCONTINUED | OUTPATIENT
Start: 2024-12-04 | End: 2024-12-04 | Stop reason: HOSPADM

## 2024-12-04 RX ADMIN — OXYCODONE HYDROCHLORIDE 5 MG: 5 TABLET ORAL at 11:32

## 2024-12-04 RX ADMIN — Medication 2 G: at 07:59

## 2024-12-04 RX ADMIN — FENTANYL CITRATE 25 MCG: 50 INJECTION INTRAMUSCULAR; INTRAVENOUS at 08:38

## 2024-12-04 RX ADMIN — PROPOFOL 50 MG: 10 INJECTION, EMULSION INTRAVENOUS at 09:29

## 2024-12-04 RX ADMIN — FENTANYL CITRATE 25 MCG: 50 INJECTION INTRAMUSCULAR; INTRAVENOUS at 08:20

## 2024-12-04 RX ADMIN — DEXMEDETOMIDINE HYDROCHLORIDE 8 MCG: 100 INJECTION, SOLUTION INTRAVENOUS at 08:33

## 2024-12-04 RX ADMIN — LIDOCAINE HYDROCHLORIDE 60 MG: 20 INJECTION, SOLUTION INFILTRATION; PERINEURAL at 08:14

## 2024-12-04 RX ADMIN — SODIUM CHLORIDE, POTASSIUM CHLORIDE, SODIUM LACTATE AND CALCIUM CHLORIDE: 600; 310; 30; 20 INJECTION, SOLUTION INTRAVENOUS at 07:44

## 2024-12-04 RX ADMIN — OXYCODONE HYDROCHLORIDE 5 MG: 5 TABLET ORAL at 10:43

## 2024-12-04 RX ADMIN — DEXAMETHASONE SODIUM PHOSPHATE 8 MG: 4 INJECTION, SOLUTION INTRA-ARTICULAR; INTRALESIONAL; INTRAMUSCULAR; INTRAVENOUS; SOFT TISSUE at 08:22

## 2024-12-04 RX ADMIN — ONDANSETRON HYDROCHLORIDE 4 MG: 2 SOLUTION INTRAMUSCULAR; INTRAVENOUS at 08:24

## 2024-12-04 RX ADMIN — PROPOFOL 20 MG: 10 INJECTION, EMULSION INTRAVENOUS at 09:30

## 2024-12-04 RX ADMIN — PROPOFOL 170 MG: 10 INJECTION, EMULSION INTRAVENOUS at 08:14

## 2024-12-04 RX ADMIN — MIDAZOLAM HYDROCHLORIDE 2 MG: 1 INJECTION, SOLUTION INTRAMUSCULAR; INTRAVENOUS at 08:07

## 2024-12-04 RX ADMIN — FENTANYL CITRATE 25 MCG: 50 INJECTION INTRAMUSCULAR; INTRAVENOUS at 09:07

## 2024-12-04 RX ADMIN — ACETAMINOPHEN 975 MG: 325 TABLET, FILM COATED ORAL at 07:14

## 2024-12-04 RX ADMIN — FENTANYL CITRATE 25 MCG: 50 INJECTION INTRAMUSCULAR; INTRAVENOUS at 09:28

## 2024-12-04 RX ADMIN — PROPOFOL 200 MCG/KG/MIN: 10 INJECTION, EMULSION INTRAVENOUS at 08:16

## 2024-12-04 ASSESSMENT — ACTIVITIES OF DAILY LIVING (ADL)
ADLS_ACUITY_SCORE: 49

## 2024-12-04 NOTE — OR NURSING
Dr. Carmen here at bedside, patient cont. With post op discomfort 5/10. Reapeat the Oxycodone 5mg po to help with pain. Dr. Carmen will be Rx new pain medication for patient to  at [facility] now.

## 2024-12-04 NOTE — OP NOTE
Preoperative Diagnosis: history of left Breast Cancer, asymmetry due to breast cancer surgery    Postoperative Diagnosis: same    Procedure Planned: bilateral Mastectomy scar revision   Procedure Performed: bilateral Mastectomy scar revision-right 12 x 3 x 2 cm, left 23 x 5 x 4 cm    Surgeon: Jeanine Carmen MD   Circulator: Niurka Osborne RN  Relief Circulator: Prerna Ahumada RN  Scrub Person: Rosio Villalta      Specimen: right mastectomy scar, left mastectomy scar to pathology    Estimated Blood Loss: Less than 30 ml    INDICATIONS   Please see the H&P. The patient presents with left breast cancer treated with bilateral mastectomy. She has significant asymmetry due to the surgery. The risks, benefits and alternatives to revision of mastectomy scars were discussed with the patient.  We specifically discussed the risks of infection, bleeding, scarring, fluid collections, arm swelling and the possible need for further procedures. Specific increased risks: previous surgery. We discussed expected post surgical recovery. The patient expressed understanding and questions were answered. Informed consent paperwork was completed.     DESCRIPTION OF PROCEDURE   The patient was brought to the operating room and placed in a supine position on the operating table. Appropriate monitors were attached. The patient received IV antibiotics preoperatively. After general anesthesia was induced, the patient was positioned, prepped and draped in the standard fashion. Time out was performed confirming the patient's identity and procedure to be performed.  The right chest wall and left chest wall had been marked preoperatively. Local anesthetic was infiltrated in the skin and subcutaneous tissue near the planned incision on the right chest wall. The superior portion of the skin incision was made sharply and carried down to the subcutaneous tissue. A flap was created superiorly. Dissection was carried out with electocautery in the  subcutaneous tissue taking care to keep appropriate flap thickness for viability. The dissection was extended superiorly and angled to the chest wall. The dissection was extended laterally and angled to the chest wall. Excellent hemostasis was maintained. The inferior portion of the skin incision was then made sharply and carried down to the subcutaneous tissue. A flap was created inferiorly and dissection was carried out with electrocautery. This was extended inferiorly to the inframammary fold. The dissection was extended medially and laterally. The subcutaneous tissue was elevated off the underlying chest wall muscle using electrocautery. The specimen was oriented and sent to pathology. The chest wall and axilla was irrigated with warm sterile water and hemostasis was excellent. Deep tissues were approximated using interrupted Vicryl sutures. The skin edges were approximated with absorbable staples. Sterile dressings were applied.     Local anesthetic was infiltrated in the skin and subcutaneous tissue near the planned incision on the left chest wall. The superior portion of the skin incision was made sharply and carried down to the subcutaneous tissue. A flap was created superiorly. Dissection was carried out with electocautery in the subcutaneous tissue taking care to keep appropriate flap thickness for viability. The dissection was extended superiorly and angled to the chest wall. The dissection was extended laterally and angled to the chest wall. The dissection was then extended medially to the sternal border. The inferior portion of the skin incision was then made sharply and carried down to the subcutaneous tissue. A flap was created inferiorly and dissection was carried out with electrocautery. This was extended inferiorly to the inframammary fold. The dissection was extended medially and laterally. The subcutaneous tissue was elevated off the underlying chest wall muscle using electrocautery. This was  initiated medially and extended laterally freeing the tissue. The specimen was oriented and sent to pathology.  Deep tissues were approximated using interrupted Vicryl sutures. A 15 [place] fluted drain was positioned on the chest wall and in the axilla and brought out through separate stab incisions laterally. The skin edges were approximated with absorbable staples. Drain was secured with silk suture.   Sterile dressings were applied. The chest wall was wrapped with 4 inch ACE dressing. The patient was then awakened from anesthesia and taken to post anesthesia recovery in stable condition. All needle, sponge and instrument counts were reported as correct at the conclusion of the case. The patient tolerated the procedure with no immediately apparent complications.

## 2024-12-04 NOTE — ANESTHESIA PROCEDURE NOTES
Airway       Patient location during procedure: OR       Procedure Start/Stop Times: 12/4/2024 8:17 AM and 12/4/2024 8:17 AM  Staff -        CRNA: Delbert Foster APRN CRNA       Performed By: CRNAIndications and Patient Condition       Indications for airway management: aurea-procedural       Induction type:intravenous       Mask difficulty assessment: 1 - vent by mask    Final Airway Details       Final airway type: supraglottic airway    Supraglottic Airway Details        Type: LMA       Brand: I-Gel       LMA size: 4    Post intubation assessment        Placement verified by: capnometry        Number of attempts at approach: 1       Secured with: tape       Ease of procedure: easy       Dentition: Intact and Unchanged    Medication(s) Administered   Medication Administration Time: 12/4/2024 8:17 AM

## 2024-12-04 NOTE — ANESTHESIA POSTPROCEDURE EVALUATION
Patient: Rj Rodríguez    Procedure: Procedure(s):  REVISION, SCAR, BREAST, bilateral scar revision chest wall       Anesthesia Type:  General    Note:  Disposition: Outpatient   Postop Pain Control: Uneventful            Sign Out: Well controlled pain   PONV: No   Neuro/Psych: Uneventful            Sign Out: Acceptable/Baseline neuro status   Airway/Respiratory: Uneventful            Sign Out: Acceptable/Baseline resp. status   CV/Hemodynamics: Uneventful            Sign Out: Acceptable CV status; No obvious hypovolemia; No obvious fluid overload   Other NRE: NONE   DID A NON-ROUTINE EVENT OCCUR? No           Last vitals:  Vitals Value Taken Time   /64 12/04/24 1015   Temp 97.2  F (36.2  C) 12/04/24 1000   Pulse 68 12/04/24 1015   Resp 28 12/04/24 1006   SpO2 92 % 12/04/24 1019   Vitals shown include unfiled device data.    Electronically Signed By: NORMAN REYES CRNA  December 4, 2024  2:35 PM

## 2024-12-04 NOTE — ANESTHESIA PREPROCEDURE EVALUATION
Anesthesia Pre-Procedure Evaluation    Patient: jR Rodríguez   MRN: [id number] : 1950        Procedure : Procedure(s):  REVISION, SCAR, BREAST, bilateral scar revision chest wall          Past Medical History:   Diagnosis Date    Arthritis     Cancer (H)     Chronic back pain     Diabetes (H)     Hypertension     Irregular heart beat     Sleep apnea     Uncomplicated asthma       Past Surgical History:   Procedure Laterality Date    ANESTHESIA OUT OF OR MRI N/A 2022    Procedure: MRI CERVICAL SPINE;  Surgeon: GENERIC ANESTHESIA PROVIDER;  Location: [facility]    ANESTHESIA OUT OF OR MRI N/A 2022    Procedure: MRI CERVICAL SPINE;  Surgeon: GENERIC ANESTHESIA PROVIDER;  Location: [facility]    ANESTHESIA OUT OF OR MRI Left 2023    Procedure: Anesthesia out of OR MRI;  Surgeon: GENERIC ANESTHESIA PROVIDER;  Location: [facility]    APPENDECTOMY      BACK SURGERY      CHOLECYSTECTOMY      COLONOSCOPY      Repeat in 10 years    GYN SURGERY      HYSTERECTOMY      Ovaries    MASTECTOMY SIMPLE BILATERAL, SENTINEL NODE BILATERAL, COMBINED N/A 2024    Procedure: Left sentinel node biopsy, bilateral mastectomy;  Surgeon: Ethan Molina MD;  Location: [facility] OR    RELEASE CARPAL TUNNEL      LT    RELEASE CARPAL TUNNEL      RT x2    SURGICAL RADIOLOGY PROCEDURE N/A 2016    Procedure: SURGICAL RADIOLOGY PROCEDURE;  Surgeon: Provider, Generic Perianesthesia Nursing;  Location: [facility]    SURGICAL RADIOLOGY PROCEDURE N/A 8/15/2016    Procedure: SURGICAL RADIOLOGY PROCEDURE;  Surgeon: Provider, Generic Perianesthesia Nursing;  Location: [facility]      Allergies   Allergen Reactions    Dust Mite Extract Difficulty breathing    Fruit [Peach] Anaphylaxis and Hives     fresh peaches and any fruit that has a pit.  Kiwi's and apples also.  Can eat any fruit cooked.    Latex Other (See Comments), Swelling, Difficulty breathing and Rash     Throat Closes      Nuts Anaphylaxis and Hives     All Raw Nuts, can eat nuts  when roasted.    Other Food Allergy Other (See Comments) and Difficulty breathing     Mackey powder    Pollen Extract Difficulty breathing     All fruits and vegetables need to be washed and rinsed prior to eating.     Ace Inhibitors      Upper respiratory infection    Antihistamines, Chlorpheniramine-Type Hives     Antihistamines    Benicar [Olmesartan] Cough    Effexor [Venlafaxine] Other (See Comments)     Heartburn, reflux    Hydrochlorothiazide     Phenylephrine Hcl      Guaifed    Seasonal Allergies      hayfever    Sm Guaifenesin-Pseudoephedrine [Pseudoephedrine-Guaifenesin]      Guaifed    Statins     Trees     Ultram [Tramadol]      Sleep disturbance. Can not sleep, and when able to fall asleep will have terrible nightmares    Xanax [Alprazolam] Hives     Xanax    Zoloft [Sertraline]      paranoia      Social History     Tobacco Use    Smoking status: Never     Passive exposure: Never    Smokeless tobacco: Never   Substance Use Topics    Alcohol use: Yes     Alcohol/week: 1.0 standard drink of alcohol     Types: 1 Glasses of wine per week     Comment: occ glass of wine      Wt Readings from Last 1 Encounters:   12/04/24 80.2 kg (176 lb 12.8 oz)        Anesthesia Evaluation            ROS/MED HX  ENT/Pulmonary:     (+) sleep apnea, uses CPAP,                   Intermittent, asthma  Treatment: Inhaler prn,                 Neurologic:     (+)    peripheral neuropathy, - Left hand numbness.                           Cardiovascular:     (+)  hypertension- -   -  - -                          Irregular Heartbeat/Palpitations,            METS/Exercise Tolerance: 4 - Raking leaves, gardening    Hematologic:  - neg hematologic  ROS     Musculoskeletal:  - neg musculoskeletal ROS     GI/Hepatic:       Renal/Genitourinary:       Endo:     (+)  type II DM,             Obesity,       Psychiatric/Substance Use:     (+) psychiatric history anxiety       Infectious Disease:  - neg infectious disease ROS     Malignancy:   (+)  "Malignancy, History of Breast.Breast CA Remission status post Surgery and Chemo.      Other:  - neg other ROS          Physical Exam    Airway        Mallampati: III   TM distance: > 3 FB   Neck ROM: full   Mouth opening: > 3 cm    Respiratory Devices and Support         Dental       (+) Minor Abnormalities - some fillings, tiny chips      Cardiovascular   cardiovascular exam normal       Rhythm and rate: irregular and normal     Pulmonary   pulmonary exam normal        breath sounds clear to auscultation           OUTSIDE LABS:  CBC:   Lab Results   Component Value Date    WBC 10.3 07/23/2024    WBC 9.4 03/18/2024    HGB 14.6 07/23/2024    HGB 13.8 03/18/2024    HCT 44.4 07/23/2024    HCT 41.3 03/18/2024     07/23/2024     03/18/2024     BMP:   Lab Results   Component Value Date     07/09/2024     01/17/2024    POTASSIUM 4.3 07/09/2024    POTASSIUM 4.8 01/17/2024    CHLORIDE 105 07/09/2024    CHLORIDE 105 01/17/2024    CO2 22 07/09/2024    CO2 25 01/17/2024    BUN 15.8 07/09/2024    BUN 15.9 01/17/2024    CR 0.87 07/09/2024    CR 0.82 01/17/2024    GLC 92 07/09/2024     (H) 01/19/2024     COAGS:   Lab Results   Component Value Date    PTT 26 09/13/2016    INR 0.94 07/18/2021     POC: No results found for: \"BGM\", \"HCG\", \"HCGS\"  HEPATIC:   Lab Results   Component Value Date    ALBUMIN 4.3 10/22/2024    PROTTOTAL 7.6 10/22/2024    ALT 22 10/22/2024    AST 26 10/22/2024    ALKPHOS 73 10/22/2024    BILITOTAL 0.7 10/22/2024     OTHER:   Lab Results   Component Value Date    LACT 1.5 08/12/2019    A1C 5.8 (H) 07/09/2024    EZRA 9.8 07/09/2024    MAG 1.9 01/16/2024    LIPASE 177 04/18/2022    TSH 2.16 10/22/2024    CRP 53.9 (H) 08/12/2019    SED 28 08/07/2023       Anesthesia Plan    ASA Status:  3    NPO Status:  NPO Appropriate    Anesthesia Type: General.     - Airway: LMA   Induction: Intravenous.   Maintenance: Balanced.        Consents    Anesthesia Plan(s) and associated risks, " benefits, and realistic alternatives discussed. Questions answered and patient/representative(s) expressed understanding.     - Discussed: Risks, Benefits and Alternatives for BOTH SEDATION and the PROCEDURE were discussed     - Discussed with:  Patient       - Patient is DNR/DNI Status: No     Use of blood products discussed: No .     Postoperative Care    Pain management: IV analgesics, Multi-modal analgesia.   PONV prophylaxis: Ondansetron (or other 5HT-3), Dexamethasone or Solumedrol     Comments:               NORMAN REYES CRNA    I have reviewed the pertinent notes and labs in the chart from the past 30 days and (re)examined the patient.  Any updates or changes from those notes are reflected in this note.               # Hypertension: Noted on problem list

## 2024-12-04 NOTE — DISCHARGE INSTRUCTIONS
Procedure you had done: mastectomy scar revision  Your health care provider is:  Sheyla Sanches  Your surgeon is Dr. Jeanine Carmen.   Please call your health care provider or surgeon at [phone] if:  - you feel you are getting worse or having an increase in problems    - fever greater than 101 degrees  - increasing shortness of breath or chest pain  - any signs of infection (increasing redness, swelling, tenderness, warmth, change in appearance, or  increased drainage)  - nausea (upset stomach) and vomiting and/or diarrhea that will not stop  - severe pain that is not relieved by medicine, rest or ice     Home care following breast surgery:  You will be discharged when you are safe to go home. Anesthesia can change judgment, reaction time and coordination for several hours after you seem back to normal. Therefore, do not operate any motorized vehicles or power tools for 24 hours after discharge.     Activity:  You should rest or do quiet activities for the rest of the day. The day after surgery you may be as active as you feel able. You may find that you require more rest than usual for several weeks as your body heals. Good breast/chest support will help to decrease pain. Wear the ACE wrap until you see Dr. Carmen in the office.       Diet:  Eat small amounts frequently after arriving home. Avoid foods that are hard to digest such as heavy, sweet, spicy, or fried foods until you are sure you feel well.  Vomiting can occur after general anesthesia. If vomiting lasts more than 12 hours call your doctor.     Other:  Problems urinating can happen after surgery.  Please call your physician if you have any problems.  Some people have constipation after surgery-you can use over the counter stool softener or laxative as needed.  You can use ice on the area of the incision to help with pain and swelling. Apply an ice pack wrapped in a towel to the area for 10-15 minutes once an hour.     Dressing:  Your incision was  covered with Steri-strips and a bandage and wrapped with an ACE wrap. No showers while the drains are in. No soaking your drain site or incision in a bath, hot tub, pool or lake while drains are in. Ok to take a shallow bath.      Drainage:   Bleeding or drainage should be minimal.  If bleeding soaks the dressings, cover with another sterile dressing.  If bleeding continues, apply gentle steady pressure over the incision for 5 minutes.  If bleeding persists or there is an increased swelling of the area, call your surgeon or go to the Emergency Room.    [facility]  Adult Discharge Orders & Instructions      For 24 hours after surgery:  Get plenty of rest.  A responsible adult must stay with you for at least 24 hours after you leave the hospital.   You may feel lightheaded.  IF so, sit for a few minutes before standing.  Have someone help you get up.   You may have a slight fever. Call the doctor if your fever is over 101 F (38.3 C) (taken under the tongue) or lasts longer than 24 hours.  You may have a dry mouth, a sore throat, muscle aches or trouble sleeping.  These should go away after 24 hours.  Do not make important or legal decisions.  6.   Do not drive or use heavy equipment.  If you have weakness or tingling, don't drive or use heavy equipment until this feeling goes away.                                                                                                                                                                         To contact a doctor, call    [phone]______________      ***You had a pain pill (Oxycodone 5mg) at the hospital at 1045 today.    .

## 2024-12-04 NOTE — OR NURSING
PACU Transfer Note    Rj Rodríguez was transferred to [facility] via cart.  Equipment used for transport:  none.  Accompanied by:  RN  Prescriptions were: NA    PACU Respiratory Event Documentation     1) Episodes of Apnea greater than or equal to 10 seconds: no    2) Bradypnea - less than 8 breaths per minute: no    3) Pain score on 0 to 10 scale: 0    4) Pain-sedation mismatch (yes or no): no    5) Repeated 02 desaturation less than 90% (yes or no): no    Anesthesia notified? (yes or no): no    Report to Mariama    Any of the above events occuring repeatedly in separate 30 minute intervals may be considered recurrent PACU respiratory events.    Patient stable and meets phase 1 discharge criteria for transport from PACU.

## 2024-12-04 NOTE — ANESTHESIA CARE TRANSFER NOTE
Patient: Rj Rodríguez    Procedure: Procedure(s):  REVISION, SCAR, BREAST, bilateral scar revision chest wall       Diagnosis: Malignant neoplasm of left breast in female, estrogen receptor positive, unspecified site of breast (H) [C50.912, Z17.0]  S/P bilateral mastectomy [Z90.13]  Diagnosis Additional Information: No value filed.    Anesthesia Type:   General     Note:    Oropharynx: oropharynx clear of all foreign objects and spontaneously breathing  Level of Consciousness: drowsy  Oxygen Supplementation: face mask  Level of Supplemental Oxygen (L/min / FiO2): 6  Independent Airway: airway patency satisfactory and stable  Dentition: dentition unchanged  Vital Signs Stable: post-procedure vital signs reviewed and stable  Report to RN Given: handoff report given  Patient transferred to: PACU    Handoff Report: Identifed the Patient, Identified the Reponsible Provider, Reviewed the pertinent medical history, Discussed the surgical course, Reviewed Intra-OP anesthesia mangement and issues during anesthesia, Set expectations for post-procedure period and Allowed opportunity for questions and acknowledgement of understanding      Vitals:  Vitals Value Taken Time   BP     Temp     Pulse     Resp     SpO2         Electronically Signed By: NORMAN REYES CRNA  December 4, 2024  9:48 AM

## 2024-12-04 NOTE — INTERVAL H&P NOTE
I have reviewed the surgical (or preoperative) H&P that is linked to this encounter, and examined the patient. There are no significant changes. Discussed bilateral mastectomy scar revision with the patient. Questions answered. Consent completed.    Clinical Conditions Present on Arrival:  Clinically Significant Risk Factors Present on Admission

## 2024-12-04 NOTE — OR NURSING
Patient has been discharged to home at 1245 via w/c accompanied by her      Written discharge instructions were provided to patient and .  Prescriptions were e-scribed. Patient states pain is 2/10 and denies any nausea or dizziness upon discharge.      Patient and adult caring for them verbalize understanding of discharge instructions including no driving until tomorrow and no longer taking narcotic pain medications - no operating mechanical equipment and no making any important decisions.They understand reason for discharge, and necessary follow-up appointments.       Mariama Parrish RN

## 2024-12-06 LAB
PATH REPORT.COMMENTS IMP SPEC: NORMAL
PATH REPORT.FINAL DX SPEC: NORMAL
PATH REPORT.RELEVANT HX SPEC: NORMAL
PHOTO IMAGE: NORMAL

## 2024-12-07 DIAGNOSIS — C50.912 MALIGNANT NEOPLASM OF LEFT BREAST IN FEMALE, ESTROGEN RECEPTOR POSITIVE, UNSPECIFIED SITE OF BREAST (H): ICD-10-CM

## 2024-12-07 DIAGNOSIS — Z17.0 MALIGNANT NEOPLASM OF LEFT BREAST IN FEMALE, ESTROGEN RECEPTOR POSITIVE, UNSPECIFIED SITE OF BREAST (H): ICD-10-CM

## 2024-12-09 NOTE — TELEPHONE ENCOUNTER
calcium carbonate-vitamin D (OSCAL) 500-5 MG-MCG tablet         Last Written Prescription Date:  9/13/24  Last Fill Quantity: 90,   # refills: 0  Last Office Visit: 8/21/24  Future Office visit:    Next 5 appointments (look out 90 days)      Dec 10, 2024 10:20 AM  (Arrive by 10:05 AM)  Return Visit with Jeanine Carmen MD  [facility] ([facility]) [address]  [place] [address]  [phone]     Dec 18, 2024 10:30 AM  (Arrive by 10:15 AM)  Return Visit with Lizeth Crisostomo CNP  [facility] ([facility] ) [address] [address]  [phone]     Jan 21, 2025 1:00 PM  (Arrive by 12:45 PM)  Return Visit with Lelia Lemus NP  [facility] ([facility] ) [address] [address]  [phone]     Jan 21, 2025 1:50 PM  (Arrive by 1:35 PM)  Return Visit with NORMAN Mauro [facility] [address] [address]  [phone]             Routing refill request to provider for review/approval because:  Vitamin Supplements Protocol Failed      Medication indicated for associated diagnosis    The medication is prescribed for one or more of the following conditions:                Vitamin deficiency              Osteopenia              Osteoporosis              Nutrition counseling              Nutrition education              Feeding ability finding              Bone density finding              Morbid Obesity              History of bypass of stomach              Idiopathic peripheral neuropathy              General examination of patient              Vitamin D deficiency              Folate deficiency anemia due to dietary causes              Sleeve resection of stomach              Rheumatoid factor level - finding              Crohn's disease               Psoriatic arthritis              Transplant of Kidney              Arthropathy              Alcoholism              Malabsorption syndrome              Disorder of bone and articular cartilage              Cystic Fibrosis  Bronchiectasis

## 2024-12-10 ENCOUNTER — TELEPHONE (OUTPATIENT)
Dept: SURGERY | Facility: OTHER | Age: 74
End: 2024-12-10

## 2024-12-10 ENCOUNTER — OFFICE VISIT (OUTPATIENT)
Dept: SURGERY | Facility: OTHER | Age: 74
End: 2024-12-10
Attending: SURGERY
Payer: MEDICARE

## 2024-12-10 VITALS
RESPIRATION RATE: 16 BRPM | TEMPERATURE: 97.9 F | BODY MASS INDEX: 34.01 KG/M2 | DIASTOLIC BLOOD PRESSURE: 76 MMHG | OXYGEN SATURATION: 96 % | HEART RATE: 48 BPM | WEIGHT: 180 LBS | SYSTOLIC BLOOD PRESSURE: 128 MMHG

## 2024-12-10 DIAGNOSIS — Z90.13 S/P BILATERAL MASTECTOMY: ICD-10-CM

## 2024-12-10 DIAGNOSIS — N64.89 POSTOPERATIVE BREAST ASYMMETRY: ICD-10-CM

## 2024-12-10 DIAGNOSIS — Z08 ENCOUNTER FOR FOLLOW-UP EXAMINATION AFTER SURGERY FOR MALIGNANT NEOPLASM: Primary | ICD-10-CM

## 2024-12-10 PROCEDURE — G0463 HOSPITAL OUTPT CLINIC VISIT: HCPCS

## 2024-12-10 ASSESSMENT — PAIN SCALES - GENERAL: PAINLEVEL_OUTOF10: NO PAIN (0)

## 2024-12-10 NOTE — Clinical Note
Lelia-patient prefers to follow up with Oncology for survivorship. She will let me know if she  has any concerns regarding her recent scar revision. Thanks. Jeanine Carmen MD on 12/10/2024 at 10:59 AM

## 2024-12-10 NOTE — TELEPHONE ENCOUNTER
"Received message from Dr. Carmen \"Lelia-patient prefers to follow up with Oncology for survivorship. She will let me know if she  has any concerns regarding her recent scar revision. Thanks. Jeanine Carmen MD on 12/10/2024 at 10:59 AM \"  Alea Soni RN  ....................  12/10/2024   2:53 PM    "

## 2024-12-10 NOTE — PROGRESS NOTES
Patient presents for post surgical visit after revisions of bilateral mastectomy scars on 12/4/24. Patient has done well. Dressings in place. Drain on the left has been putting out clear yeallow colored fluid. Not needing pain medications.    /76 (BP Location: Right arm, Patient Position: Sitting, Cuff Size: Adult Large)   Pulse (!) 48   Temp 97.9  F (36.6  C) (Tympanic)   Resp 16   Wt 81.6 kg (180 lb)   LMP  (LMP Unknown)   SpO2 96%   BMI 34.01 kg/m    Drain output: Left-less than 20 ml/day  General: NAD, pleasant and cooperative with exam and interview.  Bilateral chest wall- dressings removed, incisions well approximated with steri strips. No bruising. No fluid collection.  Drain removed with no concerns.   Psychiatry: awake, alert and oriented. Appropriate affect.  Pathology results: bilateral skin/scar and subcutaneous tissue, no atypia or malignancy  Right 10.5 x 7.5 x 2.6 cm 78 gram  Left 24 x 9.5 x 4 cm, 218 gram  Assessment/Plan:  Discussed surgery and pathology results. Oncology to continue with survivorship and surveillance. NO need for ongoing surgical visits. May cancel surgery appointments.  Patient can shower in 24 hours. Patient will follow up with me for any surgery or scar concerns. Patient will call with questions or concerns.

## 2024-12-10 NOTE — PATIENT INSTRUCTIONS
You can cancel follow up with surgery at [facility]. If you have concerns, please call.   Keep your oncology survivorship/surveillance follow up.   Call if you have questions!

## 2024-12-10 NOTE — NURSING NOTE
"Chief Complaint   Patient presents with    Surgical Followup     S/p scar revision       Initial /76 (BP Location: Right arm, Patient Position: Sitting, Cuff Size: Adult Large)   Pulse (!) 48   Temp 97.9  F (36.6  C) (Tympanic)   Resp 16   Wt 81.6 kg (180 lb)   LMP  (LMP Unknown)   SpO2 96%   BMI 34.01 kg/m   Estimated body mass index is 34.01 kg/m  as calculated from the following:    Height as of 10/22/24: 1.549 m (5' 1\").    Weight as of this encounter: 81.6 kg (180 lb).  Medication Reconciliation: complete    Haylie Dumont LPN  "

## 2024-12-18 ENCOUNTER — TELEPHONE (OUTPATIENT)
Dept: CARDIOLOGY | Facility: OTHER | Age: 74
End: 2024-12-18

## 2024-12-18 NOTE — TELEPHONE ENCOUNTER
Attempt # 1  Outcome: Left Message   Comment: LVM for pt to call and RS 12/18 appt as provider is unavailable.

## 2024-12-26 DIAGNOSIS — J45.20 MILD INTERMITTENT REACTIVE AIRWAY DISEASE WITHOUT COMPLICATION: Primary | ICD-10-CM

## 2024-12-26 RX ORDER — ALBUTEROL SULFATE 90 UG/1
2 INHALANT RESPIRATORY (INHALATION) EVERY 6 HOURS PRN
Qty: 18 G | Refills: 3 | Status: SHIPPED | OUTPATIENT
Start: 2024-12-26

## 2024-12-26 NOTE — TELEPHONE ENCOUNTER
air      Last Written Prescription Date:  Historical  Last Fill Quantity: unknown,   # refills: unknown  Last Office Visit: 08/21/24  Future Office visit:    Next 5 appointments (look out 90 days)      Jan 03, 2025 8:30 AM  (Arrive by 8:15 AM)  Return Visit with Lizeth Crisostomo CNP  [facility] ([facility] ) [address] [address]  [phone]     Jan 21, 2025 1:00 PM  (Arrive by 12:45 PM)  Return Visit with Lelia Lemus NP  [facility] ([facility] ) [address] [address]  [phone]     Jan 21, 2025 1:50 PM  (Arrive by 1:35 PM)  Return Visit with NORMAN Mauro [facility] ([facility] ) [address] [address]  [phone]             Routing refill request to provider for review/approval because:  Medication is reported/historical

## 2025-01-21 ENCOUNTER — LAB (OUTPATIENT)
Dept: LAB | Facility: OTHER | Age: 75
End: 2025-01-21
Attending: NURSE PRACTITIONER
Payer: COMMERCIAL

## 2025-01-21 ENCOUNTER — ONCOLOGY VISIT (OUTPATIENT)
Dept: ONCOLOGY | Facility: OTHER | Age: 75
End: 2025-01-21
Attending: NURSE PRACTITIONER
Payer: MEDICARE

## 2025-01-21 VITALS
TEMPERATURE: 97.8 F | DIASTOLIC BLOOD PRESSURE: 78 MMHG | OXYGEN SATURATION: 97 % | SYSTOLIC BLOOD PRESSURE: 122 MMHG | WEIGHT: 182.1 LBS | HEIGHT: 61 IN | BODY MASS INDEX: 34.38 KG/M2 | HEART RATE: 70 BPM

## 2025-01-21 DIAGNOSIS — Z17.0 MALIGNANT NEOPLASM OF LEFT BREAST IN FEMALE, ESTROGEN RECEPTOR POSITIVE, UNSPECIFIED SITE OF BREAST (H): Primary | ICD-10-CM

## 2025-01-21 DIAGNOSIS — C50.912 MALIGNANT NEOPLASM OF LEFT BREAST IN FEMALE, ESTROGEN RECEPTOR POSITIVE, UNSPECIFIED SITE OF BREAST (H): Primary | ICD-10-CM

## 2025-01-21 DIAGNOSIS — F41.1 GAD (GENERALIZED ANXIETY DISORDER): ICD-10-CM

## 2025-01-21 DIAGNOSIS — C50.912 MALIGNANT NEOPLASM OF LEFT BREAST IN FEMALE, ESTROGEN RECEPTOR POSITIVE, UNSPECIFIED SITE OF BREAST (H): ICD-10-CM

## 2025-01-21 DIAGNOSIS — M25.50 ARTHRALGIA, UNSPECIFIED JOINT: ICD-10-CM

## 2025-01-21 DIAGNOSIS — M85.80 OSTEOPENIA, UNSPECIFIED LOCATION: ICD-10-CM

## 2025-01-21 DIAGNOSIS — N95.1 MENOPAUSAL SYNDROME (HOT FLASHES): ICD-10-CM

## 2025-01-21 DIAGNOSIS — Z17.0 MALIGNANT NEOPLASM OF LEFT BREAST IN FEMALE, ESTROGEN RECEPTOR POSITIVE, UNSPECIFIED SITE OF BREAST (H): ICD-10-CM

## 2025-01-21 LAB
ALBUMIN SERPL BCG-MCNC: 4.2 G/DL (ref 3.5–5.2)
ALP SERPL-CCNC: 83 U/L (ref 40–150)
ALT SERPL W P-5'-P-CCNC: 18 U/L (ref 0–50)
AST SERPL W P-5'-P-CCNC: 19 U/L (ref 0–45)
BASOPHILS # BLD AUTO: 0.1 10E3/UL (ref 0–0.2)
BASOPHILS NFR BLD AUTO: 1 %
BILIRUB DIRECT SERPL-MCNC: <0.2 MG/DL (ref 0–0.3)
BILIRUB SERPL-MCNC: 0.5 MG/DL
EOSINOPHIL # BLD AUTO: 0.4 10E3/UL (ref 0–0.7)
EOSINOPHIL NFR BLD AUTO: 4 %
ERYTHROCYTE [DISTWIDTH] IN BLOOD BY AUTOMATED COUNT: 12.2 % (ref 10–15)
HCT VFR BLD AUTO: 42.6 % (ref 35–47)
HGB BLD-MCNC: 14.3 G/DL (ref 11.7–15.7)
IMM GRANULOCYTES # BLD: 0 10E3/UL
IMM GRANULOCYTES NFR BLD: 0 %
LYMPHOCYTES # BLD AUTO: 2.1 10E3/UL (ref 0.8–5.3)
LYMPHOCYTES NFR BLD AUTO: 22 %
MCH RBC QN AUTO: 30.8 PG (ref 26.5–33)
MCHC RBC AUTO-ENTMCNC: 33.6 G/DL (ref 31.5–36.5)
MCV RBC AUTO: 92 FL (ref 78–100)
MONOCYTES # BLD AUTO: 1.1 10E3/UL (ref 0–1.3)
MONOCYTES NFR BLD AUTO: 11 %
NEUTROPHILS # BLD AUTO: 6.1 10E3/UL (ref 1.6–8.3)
NEUTROPHILS NFR BLD AUTO: 62 %
NRBC # BLD AUTO: 0 10E3/UL
NRBC BLD AUTO-RTO: 0 /100
PLATELET # BLD AUTO: 297 10E3/UL (ref 150–450)
PROT SERPL-MCNC: 7.5 G/DL (ref 6.4–8.3)
RBC # BLD AUTO: 4.65 10E6/UL (ref 3.8–5.2)
WBC # BLD AUTO: 9.8 10E3/UL (ref 4–11)

## 2025-01-21 PROCEDURE — 84155 ASSAY OF PROTEIN SERUM: CPT | Mod: ZL

## 2025-01-21 PROCEDURE — 85041 AUTOMATED RBC COUNT: CPT | Mod: ZL

## 2025-01-21 PROCEDURE — 85004 AUTOMATED DIFF WBC COUNT: CPT | Mod: ZL

## 2025-01-21 PROCEDURE — 84075 ASSAY ALKALINE PHOSPHATASE: CPT | Mod: ZL

## 2025-01-21 PROCEDURE — 36415 COLL VENOUS BLD VENIPUNCTURE: CPT | Mod: ZL

## 2025-01-21 PROCEDURE — G0463 HOSPITAL OUTPT CLINIC VISIT: HCPCS

## 2025-01-21 PROCEDURE — 85014 HEMATOCRIT: CPT | Mod: ZL

## 2025-01-21 ASSESSMENT — PAIN SCALES - GENERAL: PAINLEVEL_OUTOF10: NO PAIN (0)

## 2025-01-21 NOTE — NURSING NOTE
"Oncology Rooming Note    January 21, 2025 1:41 PM   Rj Rodríguez is a 74 year old female who presents for:    Chief Complaint   Patient presents with    Oncology Clinic Visit     Follow up. Breast cancer     Initial Vitals: /78 (BP Location: Right arm, Patient Position: Sitting, Cuff Size: Adult Large)   Pulse 70   Temp 97.8  F (36.6  C) (Tympanic)   Ht 1.549 m (5' 1\")   Wt 82.6 kg (182 lb 1.6 oz)   LMP  (LMP Unknown)   SpO2 97%   BMI 34.41 kg/m   Estimated body mass index is 34.41 kg/m  as calculated from the following:    Height as of this encounter: 1.549 m (5' 1\").    Weight as of this encounter: 82.6 kg (182 lb 1.6 oz). Body surface area is 1.89 meters squared.  No Pain (0) Comment: Data Unavailable   No LMP recorded (lmp unknown). Patient has had a hysterectomy.  Allergies reviewed: Yes  Medications reviewed: Yes    Medications: Medication refills not needed today.  Pharmacy name entered into [facility]:    [facility] - [place], [place] - [address] [address]  [facility] - [place], [place] - [address]    Frailty Screening:   Is the patient here for a new oncology consult visit in cancer care? 2. No      Clinical concerns: none       Geena Tipton LPN              "

## 2025-01-21 NOTE — PROGRESS NOTES
Oncology Follow-up Visit    Reason for Visit:  Rj is a 74 year old woman with a diagnosis of breast cancer, who presents to the clinic today for routine follow-up.    Nursing Note and documentation reviewed: Yes    Interval History:   Rj notes that she is doing much better today. Seems to be tolerating letrozole better than previously, and much better than Arimidex. Muscle and joint aches have improved. Chronic back pain improved after a c-spine injections. Tolerable hot flashes. No vaginal concerns. No recent infections or bleeding risks. Did have a further chest wall surgery with Dr. Carmen and healing well and pleased with outcomes.     Mental health has been in a very good place. Overall, just doing pretty well today.     Oncologic History:   Ms. Rodríguez was found to have a abnormality on her screening mammogram.     11/13/2023: Mammogram screening bilateral with tomosynthesis:No architectural distortion, dominant mass or suspicious microcalcifications are identified in the right breast.  There is a small slightly spiculated appearing nodule in the upper outer quadrant of the left breast near the junction of middle and anterior thirds. This is probably in the 1 to 2:00 position.   11/16/2023: Ultrasound breast left:There is an irregular slightly hypoechoic mass in the left breast at 1-2 o'clock, 6 cm from the nipple measuring 0.8 x 0.8 x 0.5 cm. No definite internal vascularity. No other cystic or solid masses are demonstrated.   11/21/2023: Ultrasound breast biopsy: Invasive ductal carcinoma, grade 1.  ER 91 to 100%, HI 71 to 80% and HER2 IHC +1  12/7/2023: MRI breast bilateral with and without contrast:Enhancement reflecting the sampled mass and surrounding postbiopsy change in the left upper outer breast spans 2.0 x 1.7 cm. No additional sites of highly suspicious enhancement in either breast. No evidence of pathologic adenopathy.  1/16/2024:   Left simple mastectomy and left sentinel lymph node biopsy:  Pathology left breast showed biopsy site with residual invasive carcinoma, grade 1, 5.4 mm, margins negative.  focal lobular carcinoma in situ in close proximity to invasive carcinoma. pT1b N0 1 sentinel lymph node was examined which was negative for cancer.  Right breast simple mastectomy: Atrophic changes, focal stromal fibrosis, rare small foci of usual ductal hyperplasia, focal apical right metaplasia and occasional microcalcification within benign ducts. Oncotype Dx RS core is 13  2/22/2024:  Met with Dr. Kwok. Given oncotype score of 13, no added benefit to chemo. Therefore commenced endocrine therapy with Arimidex, with anticipation of completing 5 years.   7/23/2024: Decided to stop Arimidex given intolerable side effects (myalgias/arthralgias) and commenced Letrozole 2.5 mg daily  12/04/2024 Breast revision surgery with Dr. Carmen. Path negative.     Current Chemo Regimen/TX: Letrozole 2.5 mg daily    Previous treatment:   Bilateral simple mastectomy completed Jan 2024  Arimidex 1 mg daily for about 5 months    Past Medical History:   Diagnosis Date    Arthritis     Cancer (H)     Chronic back pain     Diabetes (H)     Hypertension     Irregular heart beat     Sleep apnea     Uncomplicated asthma        Past Surgical History:   Procedure Laterality Date    ANESTHESIA OUT OF OR MRI N/A 6/1/2022    Procedure: MRI CERVICAL SPINE;  Surgeon: GENERIC ANESTHESIA PROVIDER;  Location: [facility] OR    ANESTHESIA OUT OF OR MRI N/A 9/23/2022    Procedure: MRI CERVICAL SPINE;  Surgeon: GENERIC ANESTHESIA PROVIDER;  Location: [facility] OR    ANESTHESIA OUT OF OR MRI Left 12/7/2023    Procedure: Anesthesia out of OR MRI;  Surgeon: GENERIC ANESTHESIA PROVIDER;  Location: [facility] OR    APPENDECTOMY      BACK SURGERY      CHOLECYSTECTOMY      COLONOSCOPY  2012    Repeat in 10 years    GYN SURGERY      HYSTERECTOMY      Ovaries    MASTECTOMY SIMPLE BILATERAL, SENTINEL NODE BILATERAL, COMBINED N/A 1/16/2024    Procedure: Left sentinel node  biopsy, bilateral mastectomy;  Surgeon: Ethan Molina MD;  Location: [facility] OR    RELEASE CARPAL TUNNEL      LT    RELEASE CARPAL TUNNEL      RT x2    REVISE SCAR BREAST Bilateral 12/4/2024    Procedure: REVISION, SCAR, BREAST, bilateral scar revision chest wall;  Surgeon: Jeanine Carmen MD;  Location: [facility] OR    SURGICAL RADIOLOGY PROCEDURE N/A 2/12/2016    Procedure: SURGICAL RADIOLOGY PROCEDURE;  Surgeon: Provider, Generic Perianesthesia Nursing;  Location: [facility] OR    SURGICAL RADIOLOGY PROCEDURE N/A 8/15/2016    Procedure: SURGICAL RADIOLOGY PROCEDURE;  Surgeon: Provider, Generic Perianesthesia Nursing;  Location: [facility] OR       Family History   Problem Relation Age of Onset    Diabetes Mother     Alcoholism Mother     Heart Failure Mother     Cerebrovascular Disease Father     Diabetes Father         Type 2    Hypertension Father     Alcoholism Father     Colon Cancer Maternal Aunt     Colon Cancer Maternal Uncle        Social History     Socioeconomic History    Marital status:      Spouse name: Not on file    Number of children: Not on file    Years of education: Not on file    Highest education level: Not on file   Occupational History    Not on file   Tobacco Use    Smoking status: Never     Passive exposure: Never    Smokeless tobacco: Never   Vaping Use    Vaping status: Never Used   Substance and Sexual Activity    Alcohol use: Yes     Alcohol/week: 1.0 standard drink of alcohol     Types: 1 Glasses of wine per week     Comment: occ glass of wine    Drug use: No    Sexual activity: Not Currently   Other Topics Concern     Service Not Asked    Blood Transfusions Not Asked    Caffeine Concern Yes     Comment: Coffee - 2 cups daily    Occupational Exposure Not Asked    Hobby Hazards Not Asked    Sleep Concern Not Asked    Stress Concern Not Asked    Weight Concern Not Asked    Special Diet Not Asked    Back Care Not Asked    Exercise Not Asked    Bike Helmet Not Asked    Seat Belt Not Asked     Self-Exams Not Asked    Parent/sibling w/ CABG, MI or angioplasty before 65F 55M? Not Asked   Social History Narrative    10/11/2019: , lives in [place]      Social Drivers of Health     Financial Resource Strain: Low Risk  (1/8/2024)    Financial Resource Strain     Within the past 12 months, have you or your family members you live with been unable to get utilities (heat, electricity) when it was really needed?: No   Food Insecurity: Low Risk  (1/8/2024)    Food Insecurity     Within the past 12 months, did you worry that your food would run out before you got money to buy more?: No     Within the past 12 months, did the food you bought just not last and you didn t have money to get more?: No   Transportation Needs: Low Risk  (1/8/2024)    Transportation Needs     Within the past 12 months, has lack of transportation kept you from medical appointments, getting your medicines, non-medical meetings or appointments, work, or from getting things that you need?: No   Physical Activity: Not on file   Stress: Not on file   Social Connections: Not on file   Interpersonal Safety: Low Risk  (1/21/2025)    Interpersonal Safety     Do you feel physically and emotionally safe where you currently live?: Yes     Within the past 12 months, have you been hit, slapped, kicked or otherwise physically hurt by someone?: No     Within the past 12 months, have you been humiliated or emotionally abused in other ways by your partner or ex-partner?: No   Housing Stability: Low Risk  (1/8/2024)    Housing Stability     Do you have housing? : Yes     Are you worried about losing your housing?: No       Current Outpatient Medications   Medication Sig Dispense Refill    acetaminophen (TYLENOL) 325 MG tablet Take 3 tablets (975 mg) by mouth every 6 hours as needed for mild pain.      albuterol (PROAIR HFA/PROVENTIL HFA/VENTOLIN HFA) 108 (90 Base) MCG/ACT inhaler Inhale 2 puffs into the lungs every 6 hours as needed for shortness of  breath. 18 g 3    blood glucose (ACCU-CHEK GUIDE) test strip 1 ONCE DAILY USE AS DIRECTED 100 strip 1    blood glucose monitoring (ACCU-CHEK FASTCLIX) lancets Use to test blood sugar 1 time daily or as directed. 102 each 11    calcium carbonate-vitamin D (OSCAL) 500-5 MG-MCG tablet Take 1 tablet by mouth twice daily 90 tablet 0    cetirizine (ZYRTEC) 10 MG tablet Take 10-20 mg by mouth daily (10 mg in the winter, 20 mg in allergy season)      clonazePAM (KLONOPIN) 0.5 MG tablet Take 1 tablet by mouth twice daily as needed for anxiety 30 tablet 0    diltiazem ER (TIAZAC) 240 MG 24 hr ER beaded capsule Take 1 capsule (240 mg) by mouth daily. 90 capsule 2    diphenhydrAMINE HCl (BENADRYL ALLERGY CHILDRENS) 12.5 MG CHEW Take 2 tablets by mouth as needed (allergic reaction)      EPINEPHrine (ANY BX GENERIC EQUIV) 0.3 MG/0.3ML injection 2-pack Inject 0.3 mLs (0.3 mg) into the muscle as needed for anaphylaxis May repeat one time in 5-15 minutes if response to initial dose is inadequate. 2 each 1    letrozole (FEMARA) 2.5 MG tablet Take 1 tablet (2.5 mg) by mouth daily 90 tablet 3    Multiple Vitamins-Minerals (MULTIVITAMIN GUMMIES WOMENS) CHEW Take 1 Dose by mouth daily      spironolactone (ALDACTONE) 50 MG tablet Take 1 tablet by mouth once daily 90 tablet 2    triamcinolone (NASACORT) 55 MCG/ACT nasal aerosol Spray 2 sprays into both nostrils daily as needed (seasonal allergies)       No current facility-administered medications for this visit.        Allergies   Allergen Reactions    Dust Mite Extract Difficulty breathing    Fruit [Peach] Anaphylaxis and Hives     fresh peaches and any fruit that has a pit.  Kiwi's and apples also.  Can eat any fruit cooked.    Latex Other (See Comments), Swelling, Difficulty breathing and Rash     Throat Closes      Nuts Anaphylaxis and Hives     All Raw Nuts, can eat nuts when roasted.    Other Food Allergy Other (See Comments) and Difficulty breathing     Mackey powder    Pollen  "Extract Difficulty breathing     All fruits and vegetables need to be washed and rinsed prior to eating.     Ace Inhibitors      Upper respiratory infection    Antihistamines, Chlorpheniramine-Type Hives     Antihistamines    Benicar [Olmesartan] Cough    Effexor [Venlafaxine] Other (See Comments)     Heartburn, reflux    Hydrochlorothiazide     Phenylephrine Hcl      Guaifed    Seasonal Allergies      hayfever    Sm Guaifenesin-Pseudoephedrine [Pseudoephedrine-Guaifenesin]      Guaifed    Statins     Trees     Ultram [Tramadol]      Sleep disturbance. Can not sleep, and when able to fall asleep will have terrible nightmares    Xanax [Alprazolam] Hives     Xanax    Zoloft [Sertraline]      paranoia       Review Of Systems:  A complete review of systems is negative except for the above mentioned items in the interval history.       ECOG Performance Status: 0    Physical Exam:  /78 (BP Location: Right arm, Patient Position: Sitting, Cuff Size: Adult Large)   Pulse 70   Temp 97.8  F (36.6  C) (Tympanic)   Ht 1.549 m (5' 1\")   Wt 82.6 kg (182 lb 1.6 oz)   LMP  (LMP Unknown)   SpO2 97%   BMI 34.41 kg/m    GENERAL APPEARANCE: Healthy, alert and in no acute distress.  HEENT: Eyes appear normal without scleral icterus. Extraocular movements intact.   NECK:   Supple with normal range of motion. No asymmetry or masses.  LYMPHATICS: No palpable cervical, supraclavicular nodes.  RESP: Lungs clear to auscultation bilaterally, respirations regular and easy.  CARDIOVASCULAR: Regular rate and rhythm. Normal S1, S2; no murmur, gallop, or rub.  BREAST/Chest wall: Chest wall reveals bilateral mastectomy. Healing and pink incisions. Some scar tissue (R>L) along incision but no visible or palpable concerns.   MUSCULOSKELETAL: Extremities without gross deformities noted.   SKIN: No suspicious lesions or rashes.  NEURO: Alert and oriented x 3.  Gait steady.  PSYCHIATRIC: Improved today.     Laboratory:  Results for orders " placed or performed in visit on 01/21/25   Hepatic panel (Albumin, ALT, AST, Bili, Alk Phos, TP)     Status: Normal   Result Value Ref [facility]    Protein Total 7.5 6.4 - 8.3 g/dL    Albumin 4.2 3.5 - 5.2 g/dL    Bilirubin Total 0.5 <=1.2 mg/dL    Alkaline Phosphatase 83 40 - 150 U/L    AST 19 0 - 45 U/L    ALT 18 0 - 50 U/L    Bilirubin Direct <0.20 0.00 - 0.30 mg/dL   CBC with platelets and differential     Status: None   Result Value Ref [facility]    WBC Count 9.8 4.0 - 11.0 10e3/uL    RBC Count 4.65 3.80 - 5.20 10e6/uL    Hemoglobin 14.3 11.7 - 15.7 g/dL    Hematocrit 42.6 35.0 - 47.0 %    MCV 92 78 - 100 fL    MCH 30.8 26.5 - 33.0 pg    MCHC 33.6 31.5 - 36.5 g/dL    RDW 12.2 10.0 - 15.0 %    Platelet Count 297 150 - 450 10e3/uL    % Neutrophils 62 %    % Lymphocytes 22 %    % Monocytes 11 %    % Eosinophils 4 %    % Basophils 1 %    % Immature Granulocytes 0 %    NRBCs per 100 WBC 0 <1 /100    Absolute Neutrophils 6.1 1.6 - 8.3 10e3/uL    Absolute Lymphocytes 2.1 0.8 - 5.3 10e3/uL    Absolute Monocytes 1.1 0.0 - 1.3 10e3/uL    Absolute Eosinophils 0.4 0.0 - 0.7 10e3/uL    Absolute Basophils 0.1 0.0 - 0.2 10e3/uL    Absolute Immature Granulocytes 0.0 <=0.4 10e3/uL    Absolute NRBCs 0.0 10e3/uL   CBC with Platelets & Differential     Status: None    Narrative    The following orders were created for panel order CBC with Platelets & Differential.  Procedure                               Abnormality         Status                     ---------                               -----------         ------                     CBC with platelets and d...[[id number]]                      Final result                 Please view results for these tests on the individual orders.       Imaging Studies:    None this visit    ASSESSMENT/PLAN:    #1 Stage IA left breast cancer-ER positive, MA positive and HER2 negative. Status post bilateral mastectomy and left sentinel lymph node biopsy 1/2024.  pT1b N0. Oncotype Dx RS core is 13 so no  role for systemic chemo.  She is a candidate for endocrine therapy for total of 5 years with an aromatase inhibitor. This was commenced Feb 2024 with Arimidex. She remained on this about 5 months but then, given myalgias and arthalgias which were intolerable, we switched to Letrozole.     Today, she is in a much better place than prior visits. Seems to be tolerating with much more reasonable of side effects. We will continue letrozole. No clinical evidence of recurrent disease noted on today's examination. I will see her back in 3 months with labs prior, sooner with concerns.     #2 Osteopenia Continue calcium and Vit D. Dexa due April 2026. Encouraged weight bearing activity for bone health.     #3 Hot flashes Tolerable. Will monitor at this point.     #4 Anxiety Working with Stacy and Edil dugan. Symptoms are in a much better place currently. Learning to manage ongoing feelings regarding diagnosis.     #5 Arthralgias Much improved, will monitor.       Patient in agreement with plan and verbalizes understanding. Agrees to call with any questions or concerns.    35 minutes spent in the patient's encounter today with time spent in review of patient's chart along with chart preparation and review of the treatment plan and signing of treatment plan.  Time was also spent with the patient in obtaining a review of systems and performing a physical exam along with detailed review of all test results. Time was also spent in discussing plan for future follow-up and relating instructions for follow-up and in placing future orders.      The longitudinal plan of care for the diagnosis(es)/condition(s) as documented were addressed during this visit. Due to the added complexity in care, I will continue to support Rj in the subsequent management and with ongoing continuity of care.     NORMAN Pedro [facility]  Medical Oncology

## 2025-01-25 ENCOUNTER — HEALTH MAINTENANCE LETTER (OUTPATIENT)
Age: 75
End: 2025-01-25

## 2025-01-27 ENCOUNTER — THERAPY VISIT (OUTPATIENT)
Dept: PHYSICAL THERAPY | Facility: HOSPITAL | Age: 75
End: 2025-01-27
Attending: NURSE PRACTITIONER
Payer: MEDICARE

## 2025-01-27 DIAGNOSIS — C50.912 MALIGNANT NEOPLASM OF LEFT BREAST IN FEMALE, ESTROGEN RECEPTOR POSITIVE, UNSPECIFIED SITE OF BREAST (H): ICD-10-CM

## 2025-01-27 DIAGNOSIS — Z17.0 MALIGNANT NEOPLASM OF LEFT BREAST IN FEMALE, ESTROGEN RECEPTOR POSITIVE, UNSPECIFIED SITE OF BREAST (H): ICD-10-CM

## 2025-01-27 PROCEDURE — 97161 PT EVAL LOW COMPLEX 20 MIN: CPT | Mod: GP

## 2025-01-27 PROCEDURE — 97110 THERAPEUTIC EXERCISES: CPT | Mod: GP

## 2025-01-27 PROCEDURE — 97140 MANUAL THERAPY 1/> REGIONS: CPT | Mod: GP

## 2025-01-29 PROBLEM — C50.919 BREAST CANCER (H): Status: RESOLVED | Noted: 2024-01-16 | Resolved: 2025-01-29

## 2025-01-30 NOTE — PROGRESS NOTES
PHYSICAL THERAPY EVALUATION  Type of Visit: Evaluation       Fall Risk Screen:  Fall screen completed by: PT  Have you fallen 2 or more times in the past year?: No  Have you fallen and had an injury in the past year?: No  Is patient a fall risk?: No    Subjective         Presenting condition or subjective complaint: cancer rehab  Date of onset: 27    Relevant medical history: Asthma; Cancer; Concussions; Diabetes; Dizziness; High blood pressure; Menopause; Migraines or headaches; Overweight; Thyroid problems   Dates & types of surgery: too many to list    Prior diagnostic imaging/testing results: Other     Prior therapy history for the same diagnosis, illness or injury:    Please see medical chart for details     Prior Level of Function  Transfers: Independent  Ambulation: Independent  ADL: Independent    Living Environment  Social support: With a significant other or spouse   Type of home: House; 2-story; Basement   Stairs to enter the home: Yes 4     Ramp: No   Stairs inside the home: Yes 13 Is there a railing: Yes     Help at home: None  Equipment owned:       Employment: No    Hobbies/Interests: sewing gardening organizing    Patient goals for therapy: more movement    Pain assessment: Pain present  Location: L UE/Ratin/10     Objective      Cognitive Status Examination  Orientation: Oriented to person, place and time   Level of Consciousness: Alert  Follows Commands and Answers Questions: 100% of the time  Personal Safety and Judgement: Intact  Memory: Intact    OBSERVATION: Patient able to ambulate back to treatment room indep  INTEGUMENTARY:  Scars from revision appear to be healing well - no redness/open wounds present at this time  POSTURE: Standing Posture: Rounded shoulders, Forward head, Thoracic kyphosis increased  Sitting Posture: Rounded shoulders, Forward head, Thoracic kyphosis increased  PALPATION: Tenderness to palpation noted over the L chest wall and L axilla   RANGE OF MOTION: LE ROM  WFL  (Degrees) Left AROM Left PROM Right AROM  Right PROM   Shoulder Flexion 143 158 WFL WFL   Shoulder Extension 11 17 WFL WFL   Shoulder Abduction 133 147 WFL WFL   Shoulder Adduction WFL WFL WFL WFL   Shoulder Internal Rotation 26 33 WFL WFL   Shoulder External Rotation 35 41 WFL WFL   Shoulder Horizontal Abduction Not Assessed Not Assessed Not Assessed Not Assessed   Shoulder Horizontal Adduction Not Assessed Not Assessed Not Assessed Not Assessed   Shoulder Flexion ER 41 45 WFL WFL   Shoulder Flexion IR 31 36 WFL WFL   Elbow Extension WFL WFL WFL WFL   Elbow Flexion WFL WFL WFL WFL   Pain:   End feel:   STRENGTH: LE Strength WFL  Pain: - none + mild ++ moderate +++ severe  Strength Scale: 0-5/5 Left Right   Shoulder Flexion 4 5   Shoulder Extension 4 5   Shoulder Abduction 4 5   Shoulder Adduction 4 5   Shoulder Internal Rotation 4 5   Shoulder External Rotation 4 5   Shoulder Horizontal Abduction 4 5   Shoulder Horizontal Adduction 4 5   Elbow Flexion 5 5   Elbow Extension 5 5   Mid Trap 4 4   Lower Trap 4 4   Rhomboid 4 4   Serratus Anterior 4+ 4+       BED MOBILITY: WFL    TRANSFERS: WFL    GAIT:   Level of [place]: WFL  Assistive Device(s): None  Gait Deviations: WFL    BALANCE: Sitting Balance (static):Normal  Sitting Balance (dynamic):Good  Sit to Stand Balance:Good  Standing Balance (static):Fair  Standing Balance (dynamic):Fair    SENSATION: UE Sensation WNL, LE Sensation WNL    REFLEXES: WNL  MUSCLE TONE: WFL    Assessment & Plan   CLINICAL IMPRESSIONS  Medical Diagnosis: Malignant neoplasm of left breast in female    Treatment Diagnosis: Malignant neoplasm of left breast in female   Impression/Assessment: Patient is a 74 year old female with L UE pain complaints.  The following significant findings have been identified: Pain, Decreased ROM/flexibility, Decreased joint mobility, Decreased strength, Inflammation, Edema, Impaired muscle performance, and Decreased activity tolerance. These  impairments interfere with their ability to perform self care tasks, recreational activities, household chores, driving , household mobility, and community mobility as compared to previous level of function.     Clinical Decision Making (Complexity):  Clinical Presentation: Stable/Uncomplicated  Clinical Presentation Rationale: based on medical and personal factors listed in PT evaluation  Clinical Decision Making (Complexity): Low complexity    PLAN OF CARE  Treatment Interventions:  Modalities: Cryotherapy  Interventions: Manual Therapy, Neuromuscular Re-education, Therapeutic Activity, Therapeutic Exercise    Long Term Goals     PT Goal 1  Goal Identifier: STG-1  Goal Description: Patient will possess full pain-free ROM to allow her to shower indep  Rationale: to maximize safety and independence with performance of ADLs and functional tasks  Goal Progress: New  Target Date: 03/03/25  PT Goal 2  Goal Identifier: STG-2  Goal Description: Patient will be indep with a MLD to self-manage her fluid management  Rationale: to maximize safety and independence with performance of ADLs and functional tasks  Goal Progress: New  Target Date: 03/17/25  PT Goal 3  Goal Identifier: LTG-1  Goal Description: Patient will possess a custom HEP to allow to allow her to self-manage and prevent future recurrence - including strength after breast cancer program  Rationale: to maximize safety and independence with performance of ADLs and functional tasks  Goal Progress: New  Target Date: 04/21/25      Frequency of Treatment: 1x/week  Duration of Treatment: 12 weeks    Education Assessment:   Learner/Method: Patient;Listening    Risks and benefits of evaluation/treatment have been explained.   Patient/Family/caregiver agrees with Plan of Care.     Evaluation Time:     PT Eval, Low Complexity Minutes (56617): 19     Signing Clinician: Dimitri Ferris, PT        [facility]                                                                                    OUTPATIENT PHYSICAL THERAPY      PLAN OF TREATMENT FOR OUTPATIENT REHABILITATION   Patient's Last Name, First Name, Rj Mai YOB: 1950   Provider's Name   [facility]   Medical Record No.  [id number]     Onset Date: 01/27/25  Start of Care Date: 01/27/25     Medical Diagnosis:  Malignant neoplasm of left breast in female      PT Treatment Diagnosis:  Malignant neoplasm of left breast in female Plan of Treatment  Frequency/Duration: 1x/week/ 12 weeks    Certification date from 01/27/25 to 04/21/25         See note for plan of treatment details and functional goals     Dimitri Ferris, PT                         I CERTIFY THE NEED FOR THESE SERVICES FURNISHED UNDER        THIS PLAN OF TREATMENT AND WHILE UNDER MY CARE     (Physician attestation of this document indicates review and certification of the therapy plan).              Referring Provider:  Vira Dodd    Initial Assessment  See Epic Evaluation- Start of Care Date: 01/27/25

## 2025-02-03 ENCOUNTER — THERAPY VISIT (OUTPATIENT)
Dept: PHYSICAL THERAPY | Facility: HOSPITAL | Age: 75
End: 2025-02-03
Attending: NURSE PRACTITIONER
Payer: MEDICARE

## 2025-02-03 DIAGNOSIS — C50.919 BREAST CANCER (H): Primary | ICD-10-CM

## 2025-02-03 PROCEDURE — 97110 THERAPEUTIC EXERCISES: CPT | Mod: GP

## 2025-02-03 PROCEDURE — 97140 MANUAL THERAPY 1/> REGIONS: CPT | Mod: GP

## 2025-02-05 ENCOUNTER — OFFICE VISIT (OUTPATIENT)
Dept: CARDIOLOGY | Facility: OTHER | Age: 75
End: 2025-02-05
Attending: NURSE PRACTITIONER
Payer: MEDICARE

## 2025-02-05 VITALS
HEIGHT: 61 IN | OXYGEN SATURATION: 95 % | DIASTOLIC BLOOD PRESSURE: 76 MMHG | HEART RATE: 65 BPM | RESPIRATION RATE: 16 BRPM | BODY MASS INDEX: 34.02 KG/M2 | WEIGHT: 180.2 LBS | SYSTOLIC BLOOD PRESSURE: 130 MMHG

## 2025-02-05 DIAGNOSIS — R00.2 PALPITATIONS: ICD-10-CM

## 2025-02-05 DIAGNOSIS — E66.812 CLASS 2 OBESITY DUE TO EXCESS CALORIES WITHOUT SERIOUS COMORBIDITY WITH BODY MASS INDEX (BMI) OF 37.0 TO 37.9 IN ADULT: ICD-10-CM

## 2025-02-05 DIAGNOSIS — I10 ESSENTIAL HYPERTENSION: ICD-10-CM

## 2025-02-05 DIAGNOSIS — E66.09 CLASS 2 OBESITY DUE TO EXCESS CALORIES WITHOUT SERIOUS COMORBIDITY WITH BODY MASS INDEX (BMI) OF 37.0 TO 37.9 IN ADULT: ICD-10-CM

## 2025-02-05 DIAGNOSIS — R42 LIGHTHEADEDNESS: ICD-10-CM

## 2025-02-05 DIAGNOSIS — I49.3 FREQUENT PVCS: Primary | ICD-10-CM

## 2025-02-05 LAB
ATRIAL RATE - MUSE: 63 BPM
DIASTOLIC BLOOD PRESSURE - MUSE: NORMAL MMHG
INTERPRETATION ECG - MUSE: NORMAL
P AXIS - MUSE: 35 DEGREES
PR INTERVAL - MUSE: 174 MS
QRS DURATION - MUSE: 84 MS
QT - MUSE: 398 MS
QTC - MUSE: 407 MS
R AXIS - MUSE: -18 DEGREES
SYSTOLIC BLOOD PRESSURE - MUSE: NORMAL MMHG
T AXIS - MUSE: 35 DEGREES
VENTRICULAR RATE- MUSE: 63 BPM

## 2025-02-05 PROCEDURE — G0463 HOSPITAL OUTPT CLINIC VISIT: HCPCS

## 2025-02-05 PROCEDURE — 93005 ELECTROCARDIOGRAM TRACING: CPT | Performed by: NURSE PRACTITIONER

## 2025-02-05 RX ORDER — SPIRONOLACTONE 25 MG/1
25 TABLET ORAL DAILY
Qty: 90 TABLET | Refills: 3 | Status: SHIPPED | OUTPATIENT
Start: 2025-02-05

## 2025-02-05 ASSESSMENT — PAIN SCALES - GENERAL: PAINLEVEL_OUTOF10: NO PAIN (0)

## 2025-02-05 NOTE — PATIENT INSTRUCTIONS
Thank you for allowing Lizeth Crisostomo CNP and our  team to participate in your care. Please call our office at [phone] with scheduling questions or if you need to cancel or change your appointment. With any other questions or concerns you may call cardiology nurse at  [phone].       If you experience chest pain, chest pressure, chest tightness, shortness of breath, fainting, lightheadedness, nausea, vomiting, or other concerning symptoms, please report to the Emergency Department or call 911. These symptoms may be emergent, and best treated in the Emergency Department.

## 2025-02-05 NOTE — PROGRESS NOTES
[facility] HEART CARE   CARDIOLOGY PROGRESS NOTE     Chief Complaint   Patient presents with    Follow Up          Diagnosis:    ICD-10-CM    1. Frequent PVCs  I49.3 EKG 12-lead complete w/read - (Clinic Performed)      2. Essential hypertension  I10 EKG 12-lead complete w/read - (Clinic Performed)     spironolactone (ALDACTONE) 25 MG tablet      3. Palpitations  R00.2 EKG 12-lead complete w/read - (Clinic Performed)      4. Class 2 obesity due to excess calories without serious comorbidity with body mass index (BMI) of 37.0 to 37.9 in adult  E66.812     E66.09     Z68.37       5. Lightheadedness  R42                 Assessment/Plan:    Palpitations  Frequent PVCs  Continues with episodes about 2-3 times daily, short duration, not overly bothersome at this time. Possibly triggered by hot flashes.    Reviewed leadless EKG monitor results from 5/2023  Underlying rhythm was sinus. Heart rate ranged from 29 bpm, maximum heart rate of 112 bpm, averaging 63 bpm.  x2 triggered events and x2 diary entries.  These corresponded to SVE, VE, ventricular bigeminy, and sinus rhythm.  Occasional PVC's at 1.3%.  Leadless EKG Monitor 8/2021  Underlying rhythm was sinus. Hrt rate ranged from 48 bpm, maximum heart rate of 158 bmp, averaging 71 bmp. No significant bradycardia, pauses, Mobitz type II or 3rd degree heart block.  No atrial fibrillation on this study.  x1 triggered events and x1 diary entries.  These corresponded to VE and sinus rhythm.  Rare, less than 1% of PAC's, atrial couplets, and atrial triplets.  Occasional PVC's at 3.0%.  Frequent ventricular couplets of 5.3%.  + episodes of ventricular bigeminy lasting up to 17.4 sec's.  + episodes of ventricular trigeminy lasting up to 1 min and 4 sec's.  NM Lexiscan Stress Test 2/2022  The nuclear stress test demonstrates an equivocal apical infarct vs thinning. No evidence of reversible ischemia.   The left ventricular ejection fraction at rest is 58%.  The left ventricular  ejection fraction at stress is 68%.   Baseline electrocardiogram demonstrates sinus rhythm. There were occasional premature ventricular contractions. The patient did not develop any acute ECG changes or arrhythmias during the Lexiscan portion of the study.  Transthoracic Echocardiogram 2/2022  Global and regional left ventricular function is normal with an EF of 55-60%.  Relative wall thickness is increased consistent with concentric remodeling. Left ventricular diastolic function is indeterminate. Diastolic Doppler findings (E/E' ratio and/or other parameters) suggest left ventricular filling pressures are increased  Symptoms continue to be improved. EKG today shows a sinus rhythm with ventricular rate in the 60s. Continue with diltiazem 240 mg once daily.     Hypertension with blood pressure goal less than 130/80, controlled  Diastolic dysfunction  DECREASE Spironolactone 50 mg daily to 25 mg once daily to see if symptom of lightheadedness in the mornings improves. BP good today but if it increases we can always make adjustments.   Continue diltiazem for palpitations/PVCs  Continue heart healthy lifestyle efforts- really watching what she is eating, being more physically active and desires to lose weight    BP Readings from Last 6 Encounters:   02/05/25 130/76   01/21/25 122/78   12/10/24 128/76   12/04/24 119/62   11/05/24 138/70   10/22/24 128/64       Type II diabetes mellitus  Continue management by PCP  Statin: She is not on statin for primary prevention with elevated ASCVD risk    Lab Results   Component Value Date    A1C 5.5 02/13/2024    A1C 5.7 11/13/2023       Recent Labs   Lab Test 01/10/22  0946 03/10/21  1235   CHOL 175 162   HDL 44* 53   LDL 77 87   TRIG 268* 110     The 10-year ASCVD risk score (Noah PARRISH, et al., 2019) is: 27.2%    Values used to calculate the score:      Age: 74 years      Sex: Female      Is Non- : No      Diabetic: Yes      Tobacco smoker: No       Systolic Blood Pressure: 130 mmHg      Is BP treated: Yes      HDL Cholesterol: 53 mg/dL      Total Cholesterol: 150 mg/dL      Obesity  Feeling motivated about lifestyle changes to be healthier and work towards losing weight now that breast cancer treatment/surgeries   Encouraged her to continue with lifestyle modifications  Body mass index is 34.05 kg/m .     Wt Readings from Last 5 Encounters:   02/05/25 81.7 kg (180 lb 3.2 oz)   01/21/25 82.6 kg (182 lb 1.6 oz)   12/10/24 81.6 kg (180 lb)   12/04/24 80.2 kg (176 lb 12.8 oz)   11/05/24 80.7 kg (178 lb)       Obstructive sleep apnea  Diagnosed at some point  Does not wear CPAP  Has not been interested in repeating sleep evaluation    Follow-up with cardiology in 1 year, certainly sooner with acute concerns.       Interval history:  Rj Leyva is a pleasant 74-year old female who presents for cardiology follow-up. She was previously following with my colleague Marie AUSTIN, CNP. Recall, she has a cardiovascular history including hypertension, dyslipidemia, palpitations- symptomatic PVCs. She has a non-cardiac history including diabetes mellitus, obstructive sleep apnea, depression, anxiety, glaucoma, chronic lumbago.     Today, Ms. Leyva endorses that she has been overall feeling pretty good. She has really been working on overall health through lifestyle changes- watching her food intake, ingredients in foods/on labels, Doing chair exercises, walking. Wants to work towards losing weight. Shoveled some light snow recently and felt good other than some pain related to breast cancer surgeries.   She does have concerns today regarding episodes of lightheadedness. She typically wakes up around 4-5 am. Goes to the bathroom. Goes to make coffee. Has breakfast around 7. Takes her medications with breakfast- diltiazem and spironolactone. Between around 9-10 am she has been having episodes of lightheadedness/dizziness. Lasts seconds and resolves. So far has not  recurred. She can be standing, sitting, walking when this happens.   Checks her blood pressures at home: most days around 113/60.  No syncopal events.   No concerns for LE edema, orthopnea, PND.         Sleep history: Prior sleep evaluation and used to wear CPAP. Does not currently wear CPAP.    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 Rodríguez is a 72 year old female who presents for cardiology follow-up to visit on 5/31/22 with recent reports of increased exertional dyspnea and recently identified ventricular ectopy seen as isolated PVCs, ventricular coupletes and ventricular trigeminy on recent cardiac monitor.  Patient has a past medical history significant for migraine headaches, hypertension, obesity, REBECCA, DM 2, depression, significant anxiety, glaucoma and chronic lumbago.     Patient reported noticing irregular heart rate with in the office in Feb 2021. She was identified to have ventricular ectopy on ECG. No palpitations with this at that time. She had described episodes of fluttering palpitations associated to anxiety. She had reported increased exertional dyspnea and chest tightness largely related to anxiety events. She does not report any exertional chest tightness and no radiation to the arm, jaw, neck or back. No lightheadedness or syncope. No increased edema.      She described issues with allergies, possible asthma and on on immunotherapy. Avoided use of albuterol with fluttering palpitations. Has been off beta blocker since starting this. She was started on Diltiazem for HTN and increased ventricular ectopy burden. No tobacco use.     NM Lexiscan stress test due to frequent PVC's and increased exertional dyspnea. Study was completed on 2/10/22 revealing equivocal apical infarct vs apical thinning, no evidence  of reversible ischemia. Normal LV systolic heart function which increased appropriately with stress. ECG with occasional ventricular ectopy at baseline which did not increase during lexiscan portion of the study and no ischemia ECG changes.     Echocardiogram on 2/15/22 revealing normal biventricular function, LVEF 55-60%. Borderline LVH, unable to assess diastolic function. Diastolic doppler findings suggest LV filling pressures to be increased. No RWMA's. Both atrial noted to be normal with atrial septum intact. No hemodynamically significant valvular abnormalities. Ascending aorta mildly dilated at 3.7 cm. No pericardial effusion.     INTERVAL HISTORY:  Today, patient reports mild improvement in dyspnea. No recurrence of pre-syncope or syncope. No chest pain or pressure. Palpitations and flip flop sensation in chest has pretty much resolved with increase in Diltiazem. Admits that she is planning for repeat back surgery at [facility] in [place] after the first of the year, significant anxiety with this.         RELEVANT TESTING:  NM Leadless EKG Monitor 5/2023  Conclusion  Zio XT patch report on Rj JANNA PérezAnne.  Ordered secondary to palpitations.   Worn for 12 days and 9 hr's. After removing artifact, total time was 8 days and 22 hr's. Placed on 5/10/23 at 11:41 AM and completed on 5/22/2023 at 8:44 PM.   Underlying rhythm was sinus.   Hrt rate ranged from 29 bpm, maximum heart rate of 112 bmp, averaging 63 bmp.   No significant pauses, Mobitz type II or 3rd degree heart block.   No atrial fibrillation on this study.   x2 triggered events and x2 diary entries.  These corresponded to SVE, VE, ventricular bigeminy, and sinus rhythm.   x0 runs of VT     x0 runs of SVT.   Rare, <1% of PAC's, atrial couplets, atrial triplets, and ventricular couplets.   Occasional PVC's at 1.3%.   + episodes of ventricular bigeminy lasting up to 27.6 sec's.   + episodes of ventricular trigeminy lasting up to 1 min and 15  sec's.      NM Lexiscan Stress Test 2/2022    Narrative    The nuclear stress test demonstrates an equivocal apical infarct vs   thinning. No evidence of reversible ischemia.     Left ventricular function is normal.     The left ventricular ejection fraction at rest is 58%.  The left   ventricular ejection fraction at stress is 68%.     There is no prior study for comparison.       ECG Summary    ECG Baseline electrocardiogram demonstrates sinus rhythm. There were occasional premature ventricular contractions, .   The patient did not develop any acute ECG changes or arrhythmias during the Lexiscan portion of the study.          Transthoracic Echocardiogram 2/2022  Interpretation Summary  Left ventricular size is normal. Global and regional left ventricular function  is normal with an EF of 55-60%. Biplane LVEF is 56%. Relative wall thickness  is increased consistent with concentric remodeling. Left ventricular diastolic  function is indeterminate. Diastolic Doppler findings (E/E' ratio and/or other  parameters) suggest left ventricular filling pressures are increased. The  Ejection Fraction was calculated using Bi-plane non contrast. No regional wall  motion abnormalities are seen.  The right ventricle is normal in function and size.  No significant valvular abnormality  IVC diameter <2.1 cm collapsing >50% with sniff suggests a normal RA pressure  of 3 mmHg.  No pericardial effusion is present.  There is no prior study for direct comparison.      Leadless EKG Monitor 8/2021  Conclusion  Zio XT patch report on Rj PFEIFFERAlise Anne.  Ordered secondary to shortness of breath.   Worn for 13 days and 0 hr's.  After removing artifact, total time was 12 days and 16 hr's. Placed on 8/11/2021 at 11:48 AM and completed on 8/24/2021 at 11:35 AM.   Underlying rhythm was sinus.   Hrt rate ranged from 48 bpm, maximum heart rate of 158 bmp, averaging 71 bmp.   No significant bradycardia, pauses, Mobitz type II or 3rd degree heart  block.   No atrial fibrillation on this study.   x1 triggered events and x1 diary entries.  These corresponded to VE and sinus rhythm.   x0 runs of VT.     x2 runs of SVT lasting up to 5 beats with a maximum heart rate of 158 bmp.   Rare, less than 1% of PAC's, atrial couplets, and atrial triplets.   Occasional PVC's at 3.0%.   Frequent ventricular couplets of 5.3%.   + episodes of ventricular bigeminy lasting up to 17.4 sec's.   + episodes of ventricular trigeminy lasting up to 1 min and 4 sec's.        Past Medical History:   Diagnosis Date    Arthritis     Cancer (H)     Chronic back pain     Diabetes (H)     Hypertension     Irregular heart beat     Sleep apnea     Uncomplicated asthma        Past Surgical History:   Procedure Laterality Date    ANESTHESIA OUT OF OR MRI N/A 6/1/2022    Procedure: MRI CERVICAL SPINE;  Surgeon: GENERIC ANESTHESIA PROVIDER;  Location: [facility] OR    ANESTHESIA OUT OF OR MRI N/A 9/23/2022    Procedure: MRI CERVICAL SPINE;  Surgeon: GENERIC ANESTHESIA PROVIDER;  Location: [facility] OR    ANESTHESIA OUT OF OR MRI Left 12/7/2023    Procedure: Anesthesia out of OR MRI;  Surgeon: GENERIC ANESTHESIA PROVIDER;  Location: [facility] OR    APPENDECTOMY      BACK SURGERY      CHOLECYSTECTOMY      COLONOSCOPY  2012    Repeat in 10 years    GYN SURGERY      HYSTERECTOMY      Ovaries    MASTECTOMY SIMPLE BILATERAL, SENTINEL NODE BILATERAL, COMBINED N/A 1/16/2024    Procedure: Left sentinel node biopsy, bilateral mastectomy;  Surgeon: Ethan Molina MD;  Location: [facility] OR    RELEASE CARPAL TUNNEL      LT    RELEASE CARPAL TUNNEL      RT x2    REVISE SCAR BREAST Bilateral 12/4/2024    Procedure: REVISION, SCAR, BREAST, bilateral scar revision chest wall;  Surgeon: Jeanine Carmen MD;  Location:  OR    SURGICAL RADIOLOGY PROCEDURE N/A 2/12/2016    Procedure: SURGICAL RADIOLOGY PROCEDURE;  Surgeon: Provider, Generic Perianesthesia Nursing;  Location: [facility] OR    SURGICAL RADIOLOGY PROCEDURE N/A 8/15/2016    Procedure:  SURGICAL RADIOLOGY PROCEDURE;  Surgeon: Provider, Generic Perianesthesia Nursing;  Location: [facility] OR       Allergies   Allergen Reactions    Dust Mite Extract Difficulty breathing    Fruit [Peach] Anaphylaxis and Hives     fresh peaches and any fruit that has a pit.  Kiwi's and apples also.  Can eat any fruit cooked.    Latex Other (See Comments), Swelling, Difficulty breathing and Rash     Throat Closes      Nuts Anaphylaxis and Hives     All Raw Nuts, can eat nuts when roasted.    Other Food Allergy Other (See Comments) and Difficulty breathing     Mackey powder    Pollen Extract Difficulty breathing     All fruits and vegetables need to be washed and rinsed prior to eating.     Ace Inhibitors      Upper respiratory infection    Antihistamines, Chlorpheniramine-Type Hives     Antihistamines    Benicar [Olmesartan] Cough    Effexor [Venlafaxine] Other (See Comments)     Heartburn, reflux    Hydrochlorothiazide     Phenylephrine Hcl      Guaifed    Seasonal Allergies      hayfever    Sm Guaifenesin-Pseudoephedrine [Pseudoephedrine-Guaifenesin]      Guaifed    Statins     Trees     Ultram [Tramadol]      Sleep disturbance. Can not sleep, and when able to fall asleep will have terrible nightmares    Xanax [Alprazolam] Hives     Xanax    Zoloft [Sertraline]      paranoia       Current Outpatient Medications   Medication Sig Dispense Refill    acetaminophen (TYLENOL) 325 MG tablet Take 3 tablets (975 mg) by mouth every 6 hours as needed for mild pain.      albuterol (PROAIR HFA/PROVENTIL HFA/VENTOLIN HFA) 108 (90 Base) MCG/ACT inhaler Inhale 2 puffs into the lungs every 6 hours as needed for shortness of breath. 18 g 3    blood glucose (ACCU-CHEK GUIDE) test strip 1 ONCE DAILY USE AS DIRECTED 100 strip 1    blood glucose monitoring (ACCU-CHEK FASTCLIX) lancets Use to test blood sugar 1 time daily or as directed. 102 each 11    calcium carbonate-vitamin D (OSCAL) 500-5 MG-MCG tablet Take 1 tablet by mouth twice daily 90  tablet 0    cetirizine (ZYRTEC) 10 MG tablet Take 10-20 mg by mouth daily (10 mg in the winter, 20 mg in allergy season)      clonazePAM (KLONOPIN) 0.5 MG tablet Take 1 tablet by mouth twice daily as needed for anxiety 30 tablet 0    diltiazem ER (TIAZAC) 240 MG 24 hr ER beaded capsule Take 1 capsule (240 mg) by mouth daily. 90 capsule 2    diphenhydrAMINE HCl (BENADRYL ALLERGY CHILDRENS) 12.5 MG CHEW Take 2 tablets by mouth as needed (allergic reaction)      EPINEPHrine (ANY BX GENERIC EQUIV) 0.3 MG/0.3ML injection 2-pack Inject 0.3 mLs (0.3 mg) into the muscle as needed for anaphylaxis May repeat one time in 5-15 minutes if response to initial dose is inadequate. 2 each 1    letrozole (FEMARA) 2.5 MG tablet Take 1 tablet (2.5 mg) by mouth daily 90 tablet 3    Multiple Vitamins-Minerals (MULTIVITAMIN GUMMIES WOMENS) CHEW Take 1 Dose by mouth daily      spironolactone (ALDACTONE) 25 MG tablet Take 1 tablet (25 mg) by mouth daily. 90 tablet 3    triamcinolone (NASACORT) 55 MCG/ACT nasal aerosol Spray 2 sprays into both nostrils daily as needed (seasonal allergies)         Social History     Socioeconomic History    Marital status:      Spouse name: Not on file    Number of children: Not on file    Years of education: Not on file    Highest education level: Not on file   Occupational History    Not on file   Tobacco Use    Smoking status: Never     Passive exposure: Never    Smokeless tobacco: Never   Vaping Use    Vaping status: Never Used   Substance and Sexual Activity    Alcohol use: Yes     Alcohol/week: 1.0 standard drink of alcohol     Types: 1 Glasses of wine per week     Comment: occ glass of wine    Drug use: No    Sexual activity: Not Currently   Other Topics Concern     Service Not Asked    Blood Transfusions Not Asked    Caffeine Concern Yes     Comment: Coffee - 2 cups daily    Occupational Exposure Not Asked    Hobby Hazards Not Asked    Sleep Concern Not Asked    Stress Concern Not Asked     Weight Concern Not Asked    Special Diet Not Asked    Back Care Not Asked    Exercise Not Asked    Bike Helmet Not Asked    Seat Belt Not Asked    Self-Exams Not Asked    Parent/sibling w/ CABG, MI or angioplasty before 65F 55M? Not Asked   Social History Narrative    10/11/2019: , lives in [place]      Social Drivers of Health     Financial Resource Strain: Low Risk  (1/8/2024)    Financial Resource Strain     Within the past 12 months, have you or your family members you live with been unable to get utilities (heat, electricity) when it was really needed?: No   Food Insecurity: Low Risk  (1/8/2024)    Food Insecurity     Within the past 12 months, did you worry that your food would run out before you got money to buy more?: No     Within the past 12 months, did the food you bought just not last and you didn t have money to get more?: No   Transportation Needs: Low Risk  (1/8/2024)    Transportation Needs     Within the past 12 months, has lack of transportation kept you from medical appointments, getting your medicines, non-medical meetings or appointments, work, or from getting things that you need?: No   Physical Activity: Not on file   Stress: Not on file   Social Connections: Not on file   Interpersonal Safety: Low Risk  (1/21/2025)    Interpersonal Safety     Do you feel physically and emotionally safe where you currently live?: Yes     Within the past 12 months, have you been hit, slapped, kicked or otherwise physically hurt by someone?: No     Within the past 12 months, have you been humiliated or emotionally abused in other ways by your partner or ex-partner?: No   Housing Stability: Low Risk  (1/8/2024)    Housing Stability     Do you have housing? : Yes     Are you worried about losing your housing?: No       TEST RESULTS:   Results for orders placed or performed in visit on 02/05/25   EKG 12-lead complete w/read - (Clinic Performed)     Status: None (Preliminary result)   Result Value Ref  "Range    Systolic Blood Pressure  mmHg    Diastolic Blood Pressure  mmHg    Ventricular Rate 63 BPM    Atrial Rate 63 BPM    AR Interval 174 ms    QRS Duration 84 ms     ms    QTc 407 ms    P Axis 35 degrees    R AXIS -18 degrees    T Axis 35 degrees    Interpretation ECG       Sinus rhythm  Minimal voltage criteria for LVH, may be normal variant ( R in aVL )  Borderline ECG  When compared with ECG of 01-May-2024 14:13,  No significant change was found             Review of systems: Negative except that which was noted in the HPI.    Physical examination:    Vitals: /76   Pulse 65   Resp 16   Ht 1.549 m (5' 1\")   Wt 81.7 kg (180 lb 3.2 oz)   LMP  (LMP Unknown)   SpO2 95%   BMI 34.05 kg/m    BMI= Body mass index is 34.05 kg/m .    GENERAL APPEARANCE: healthy, alert and no distress  CHEST: lungs clear to auscultation - no rales, rhonchi or wheezes, no use of accessory muscles, no retractions, respirations are unlabored, normal respiratory rate  CARDIOVASCULAR: regular rhythm, normal S1 with physiologic split S2, no S3 or S4 and no murmur, click or rub  EXTREMITIES: no LE edema  NEURO: alert and oriented normal speech, and affect  VASC: No carotid bruits heard.  SKIN: no jaundice      Thank you for allowing me to participate in the care of your patient. Please do not hesitate to contact me if you have any questions.         Lizeth Crisostomo, CNP    "

## 2025-02-10 ENCOUNTER — THERAPY VISIT (OUTPATIENT)
Dept: PHYSICAL THERAPY | Facility: HOSPITAL | Age: 75
End: 2025-02-10
Attending: NURSE PRACTITIONER
Payer: MEDICARE

## 2025-02-10 DIAGNOSIS — Z17.0 MALIGNANT NEOPLASM OF LEFT BREAST IN FEMALE, ESTROGEN RECEPTOR POSITIVE, UNSPECIFIED SITE OF BREAST (H): Primary | ICD-10-CM

## 2025-02-10 DIAGNOSIS — C50.912 MALIGNANT NEOPLASM OF LEFT BREAST IN FEMALE, ESTROGEN RECEPTOR POSITIVE, UNSPECIFIED SITE OF BREAST (H): Primary | ICD-10-CM

## 2025-02-10 PROCEDURE — 97140 MANUAL THERAPY 1/> REGIONS: CPT | Mod: GP,CQ

## 2025-02-10 PROCEDURE — 97110 THERAPEUTIC EXERCISES: CPT | Mod: GP,CQ

## 2025-02-18 ENCOUNTER — APPOINTMENT (OUTPATIENT)
Dept: GENERAL RADIOLOGY | Facility: HOSPITAL | Age: 75
End: 2025-02-18
Attending: EMERGENCY MEDICINE
Payer: MEDICARE

## 2025-02-18 ENCOUNTER — HOSPITAL ENCOUNTER (EMERGENCY)
Facility: HOSPITAL | Age: 75
Discharge: HOME | End: 2025-02-18
Attending: EMERGENCY MEDICINE
Payer: MEDICARE

## 2025-02-18 VITALS
HEART RATE: 95 BPM | OXYGEN SATURATION: 95 % | DIASTOLIC BLOOD PRESSURE: 97 MMHG | RESPIRATION RATE: 20 BRPM | SYSTOLIC BLOOD PRESSURE: 154 MMHG | TEMPERATURE: 98.4 F

## 2025-02-18 DIAGNOSIS — J18.9 PNEUMONIA OF RIGHT LOWER LOBE DUE TO INFECTIOUS ORGANISM: ICD-10-CM

## 2025-02-18 LAB
FLUAV RNA SPEC QL NAA+PROBE: NEGATIVE
FLUBV RNA RESP QL NAA+PROBE: NEGATIVE
RSV RNA SPEC NAA+PROBE: NEGATIVE
SARS-COV-2 RNA RESP QL NAA+PROBE: NEGATIVE

## 2025-02-18 PROCEDURE — 999N000157 HC STATISTIC RCP TIME EA 10 MIN

## 2025-02-18 PROCEDURE — 250N000009 HC RX 250: Performed by: EMERGENCY MEDICINE

## 2025-02-18 PROCEDURE — 87637 SARSCOV2&INF A&B&RSV AMP PRB: CPT | Performed by: EMERGENCY MEDICINE

## 2025-02-18 PROCEDURE — 99284 EMERGENCY DEPT VISIT MOD MDM: CPT | Mod: 25

## 2025-02-18 PROCEDURE — 99284 EMERGENCY DEPT VISIT MOD MDM: CPT | Performed by: EMERGENCY MEDICINE

## 2025-02-18 PROCEDURE — 94640 AIRWAY INHALATION TREATMENT: CPT

## 2025-02-18 PROCEDURE — 250N000013 HC RX MED GY IP 250 OP 250 PS 637: Performed by: EMERGENCY MEDICINE

## 2025-02-18 PROCEDURE — 71045 X-RAY EXAM CHEST 1 VIEW: CPT

## 2025-02-18 RX ORDER — DOXYCYCLINE 100 MG/1
100 CAPSULE ORAL ONCE
Status: COMPLETED | OUTPATIENT
Start: 2025-02-18 | End: 2025-02-18

## 2025-02-18 RX ORDER — DOXYCYCLINE 100 MG/1
100 CAPSULE ORAL EVERY 12 HOURS SCHEDULED
Status: DISCONTINUED | OUTPATIENT
Start: 2025-02-18 | End: 2025-02-18

## 2025-02-18 RX ORDER — IPRATROPIUM BROMIDE AND ALBUTEROL SULFATE 2.5; .5 MG/3ML; MG/3ML
3 SOLUTION RESPIRATORY (INHALATION) ONCE
Status: COMPLETED | OUTPATIENT
Start: 2025-02-18 | End: 2025-02-18

## 2025-02-18 RX ORDER — IPRATROPIUM BROMIDE AND ALBUTEROL SULFATE 2.5; .5 MG/3ML; MG/3ML
SOLUTION RESPIRATORY (INHALATION)
Status: COMPLETED
Start: 2025-02-18 | End: 2025-02-18

## 2025-02-18 RX ORDER — BENZONATATE 100 MG/1
100 CAPSULE ORAL 3 TIMES DAILY PRN
Qty: 21 CAPSULE | Refills: 0 | Status: SHIPPED | OUTPATIENT
Start: 2025-02-18

## 2025-02-18 RX ORDER — DOXYCYCLINE 100 MG/1
100 CAPSULE ORAL 2 TIMES DAILY
Qty: 10 CAPSULE | Refills: 0 | Status: SHIPPED | OUTPATIENT
Start: 2025-02-18 | End: 2025-02-23

## 2025-02-18 RX ORDER — IPRATROPIUM BROMIDE AND ALBUTEROL SULFATE 2.5; .5 MG/3ML; MG/3ML
1 SOLUTION RESPIRATORY (INHALATION) EVERY 6 HOURS PRN
Qty: 90 ML | Refills: 0 | Status: SHIPPED | OUTPATIENT
Start: 2025-02-18

## 2025-02-18 RX ORDER — BENZONATATE 100 MG/1
100 CAPSULE ORAL ONCE
Status: COMPLETED | OUTPATIENT
Start: 2025-02-18 | End: 2025-02-18

## 2025-02-18 RX ORDER — IPRATROPIUM BROMIDE AND ALBUTEROL SULFATE 2.5; .5 MG/3ML; MG/3ML
3 SOLUTION RESPIRATORY (INHALATION)
Status: DISCONTINUED | OUTPATIENT
Start: 2025-02-18 | End: 2025-02-18

## 2025-02-18 RX ADMIN — IPRATROPIUM BROMIDE AND ALBUTEROL SULFATE 3 ML: .5; 3 SOLUTION RESPIRATORY (INHALATION) at 05:53

## 2025-02-18 RX ADMIN — DOXYCYCLINE HYCLATE 100 MG: 100 CAPSULE ORAL at 06:57

## 2025-02-18 RX ADMIN — BENZONATATE 100 MG: 100 CAPSULE ORAL at 06:57

## 2025-02-18 ASSESSMENT — ACTIVITIES OF DAILY LIVING (ADL)
ADLS_ACUITY_SCORE: 55
ADLS_ACUITY_SCORE: 55

## 2025-02-18 ASSESSMENT — COLUMBIA-SUICIDE SEVERITY RATING SCALE - C-SSRS
2. HAVE YOU ACTUALLY HAD ANY THOUGHTS OF KILLING YOURSELF IN THE PAST MONTH?: NO
6. HAVE YOU EVER DONE ANYTHING, STARTED TO DO ANYTHING, OR PREPARED TO DO ANYTHING TO END YOUR LIFE?: NO
1. IN THE PAST MONTH, HAVE YOU WISHED YOU WERE DEAD OR WISHED YOU COULD GO TO SLEEP AND NOT WAKE UP?: NO

## 2025-02-18 NOTE — ED TRIAGE NOTES
"States she developed a \"scratchy throat\" on Friday and now she has a productive cough. States throat does not hurt.      Triage Assessment (Adult)       Row Name 02/18/25 0528          Triage Assessment    Airway WDL WDL                     "

## 2025-02-19 ASSESSMENT — ENCOUNTER SYMPTOMS
COUGH: 1
FEVER: 0
CHILLS: 0
SHORTNESS OF BREATH: 1

## 2025-02-19 NOTE — ED PROVIDER NOTES
History     Chief Complaint   Patient presents with    Cough     HPI  Rj Rodríguez is a 74 year old female who is here with severe cough. 4 days ago. Not improving. Having a hard time sleeping d/t relentless coughing. No fevers at home. Somewhat productive.    Allergies:  Allergies   Allergen Reactions    Dust Mite Extract Difficulty breathing    Fruit [Peach] Anaphylaxis and Hives     fresh peaches and any fruit that has a pit.  Kiwi's and apples also.  Can eat any fruit cooked.    Latex Other (See Comments), Swelling, Difficulty breathing and Rash     Throat Closes      Nuts Anaphylaxis and Hives     All Raw Nuts, can eat nuts when roasted.    Other Food Allergy Other (See Comments) and Difficulty breathing     Mackey powder    Pollen Extract Difficulty breathing     All fruits and vegetables need to be washed and rinsed prior to eating.     Ace Inhibitors      Upper respiratory infection    Antihistamines, Chlorpheniramine-Type Hives     Antihistamines    Benicar [Olmesartan] Cough    Effexor [Venlafaxine] Other (See Comments)     Heartburn, reflux    Hydrochlorothiazide     Phenylephrine Hcl      Guaifed    Seasonal Allergies      hayfever    Sm Guaifenesin-Pseudoephedrine [Pseudoephedrine-Guaifenesin]      Guaifed    Statins     Trees     Ultram [Tramadol]      Sleep disturbance. Can not sleep, and when able to fall asleep will have terrible nightmares    Xanax [Alprazolam] Hives     Xanax    Zoloft [Sertraline]      paranoia       Problem List:    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     Priority: Medium    Diabetes mellitus, type 2 (H) 12/06/2019     Priority: Medium    [place] cardiac risk 11% in next 10 years 10/29/2019     Priority: Medium    S/P rotator cuff repair 10/26/2016     Priority: Medium    Mild episode of recurrent major depressive  disorder 10/22/2014     Priority: Medium    Migraine with aura and without status migrainosus, not intractable 04/21/2014     Priority: Medium    S/P cervical spinal fusion 03/21/2014     Priority: Medium    Cervicalgia 12/10/2013     Priority: Medium    Vertigo 03/18/2013     Priority: Medium    Tinnitus 03/18/2013     Priority: Medium    Chronic rhinitis 03/18/2013     Priority: Medium    Lumbago 07/10/2012     Priority: Medium    Presbyopia 01/25/2011     Priority: Medium    Primary open-angle glaucoma 01/25/2011     Priority: Medium     Overview:   IMO Update 10/11      ZEFERINO (generalized anxiety disorder) 12/06/2006     Priority: Medium    Essential hypertension 06/02/2004     Priority: Medium     Overview:   IMO Update          Past Medical History:    Past Medical History:   Diagnosis Date    Arthritis     Cancer (H)     Chronic back pain     Diabetes (H)     Hypertension     Irregular heart beat     Sleep apnea     Uncomplicated asthma        Past Surgical History:    Past Surgical History:   Procedure Laterality Date    ANESTHESIA OUT OF OR MRI N/A 6/1/2022    Procedure: MRI CERVICAL SPINE;  Surgeon: GENERIC ANESTHESIA PROVIDER;  Location: [facility]    ANESTHESIA OUT OF OR MRI N/A 9/23/2022    Procedure: MRI CERVICAL SPINE;  Surgeon: GENERIC ANESTHESIA PROVIDER;  Location: [facility]    ANESTHESIA OUT OF OR MRI Left 12/7/2023    Procedure: Anesthesia out of OR MRI;  Surgeon: GENERIC ANESTHESIA PROVIDER;  Location: [facility]    APPENDECTOMY      BACK SURGERY      CHOLECYSTECTOMY      COLONOSCOPY  2012    Repeat in 10 years    GYN SURGERY      HYSTERECTOMY      Ovaries    MASTECTOMY SIMPLE BILATERAL, SENTINEL NODE BILATERAL, COMBINED N/A 1/16/2024    Procedure: Left sentinel node biopsy, bilateral mastectomy;  Surgeon: Ethan Molina MD;  Location: [facility]    RELEASE CARPAL TUNNEL      LT    RELEASE CARPAL TUNNEL      RT x2    REVISE SCAR BREAST Bilateral 12/4/2024    Procedure: REVISION, SCAR, BREAST, bilateral scar  revision chest wall;  Surgeon: Jeanine Carmen MD;  Location: [facility]    SURGICAL RADIOLOGY PROCEDURE N/A 2/12/2016    Procedure: SURGICAL RADIOLOGY PROCEDURE;  Surgeon: Provider, Generic Perianesthesia Nursing;  Location: [facility]    SURGICAL RADIOLOGY PROCEDURE N/A 8/15/2016    Procedure: SURGICAL RADIOLOGY PROCEDURE;  Surgeon: Provider, Generic Perianesthesia Nursing;  Location: [facility]       Family History:    Family History   Problem Relation Age of Onset    Diabetes Mother     Alcoholism Mother     Heart Failure Mother     Cerebrovascular Disease Father     Diabetes Father         Type 2    Hypertension Father     Alcoholism Father     Colon Cancer Maternal Aunt     Colon Cancer Maternal Uncle        Social History:  Marital Status:   [2]  Social History     Tobacco Use    Smoking status: Never     Passive exposure: Never    Smokeless tobacco: Never   Vaping Use    Vaping status: Never Used   Substance Use Topics    Alcohol use: Yes     Alcohol/week: 1.0 standard drink of alcohol     Types: 1 Glasses of wine per week     Comment: occ glass of wine    Drug use: No        Medications:    albuterol (PROAIR HFA/PROVENTIL HFA/VENTOLIN HFA) 108 (90 Base) MCG/ACT inhaler  benzonatate (TESSALON) 100 MG capsule  calcium carbonate-vitamin D (OSCAL) 500-5 MG-MCG tablet  cetirizine (ZYRTEC) 10 MG tablet  clonazePAM (KLONOPIN) 0.5 MG tablet  diltiazem ER (TIAZAC) 240 MG 24 hr ER beaded capsule  diphenhydrAMINE HCl (BENADRYL ALLERGY CHILDRENS) 12.5 MG CHEW  doxycycline hyclate (VIBRAMYCIN) 100 MG capsule  ipratropium - albuterol 0.5 mg/2.5 mg/3 mL (DUONEB) 0.5-2.5 (3) MG/3ML neb solution  letrozole (FEMARA) 2.5 MG tablet  Multiple Vitamins-Minerals (MULTIVITAMIN GUMMIES WOMENS) CHEW  spironolactone (ALDACTONE) 25 MG tablet  triamcinolone (NASACORT) 55 MCG/ACT nasal aerosol  acetaminophen (TYLENOL) 325 MG tablet  blood glucose (ACCU-CHEK GUIDE) test strip  blood glucose monitoring (ACCU-CHEK FASTCLIX) lancets  EPINEPHrine (ANY  BX GENERIC EQUIV) 0.3 MG/0.3ML injection 2-pack          Review of Systems   Constitutional:  Negative for chills and fever.   Respiratory:  Positive for cough and shortness of breath.    All other systems reviewed and are negative.      Physical Exam   BP: (!) 154/97  Pulse: 110  Temp: 98.4  F (36.9  C)  Resp: 20  SpO2: 95 %      Physical Exam  Constitutional:       General: She is not in acute distress.     Appearance: She is not diaphoretic.   HENT:      Head: Normocephalic and atraumatic.      Right Ear: External ear normal.      Left Ear: External ear normal.      Nose: No congestion or rhinorrhea.      Mouth/Throat:      Pharynx: Oropharynx is clear. No oropharyngeal exudate.   Eyes:      General: No scleral icterus.     Pupils: Pupils are equal, round, and reactive to light.   Cardiovascular:      Rate and Rhythm: Normal rate and regular rhythm.      Heart sounds: Normal heart sounds.   Pulmonary:      Effort: No respiratory distress.      Breath sounds: Normal breath sounds.   Abdominal:      General: Bowel sounds are normal.      Palpations: Abdomen is soft.      Tenderness: There is no abdominal tenderness.   Musculoskeletal:         General: No tenderness.      Cervical back: Normal range of motion and neck supple.      Right lower leg: No edema.      Left lower leg: No edema.   Skin:     General: Skin is warm.      Capillary Refill: Capillary refill takes less than 2 seconds.      Findings: No rash.   Neurological:      Mental Status: Mental status is at baseline.      Cranial Nerves: No cranial nerve deficit.   Psychiatric:         Mood and Affect: Mood normal.         Behavior: Behavior normal.         ED Course        Procedures             Critical Care time:               Results for orders placed or performed during the hospital encounter of 02/18/25 (from the past 24 hours)   Influenza A/B, RSV and SARS-CoV2 PCR (COVID-19) Nose    Specimen: Nose; Swab   Result Value Ref Range    Influenza A PCR  Negative Negative    Influenza B PCR Negative Negative    RSV PCR Negative Negative    SARS CoV2 PCR Negative Negative    Narrative    Testing was performed using the Xpert Xpress CoV2/Flu/RSV Assay on the [employer] GeneXpert Instrument. This test should be ordered for the detection of SARS-CoV2, influenza, and RSV viruses in individuals with signs and symptoms of respiratory tract infection. This test is for in vitro diagnostic use under the [place] FDA for laboratories certified under CLIA to perform high or moderate complexity testing. This test has been [place] FDA cleared. A negative result does not rule out the presence of PCR inhibitors in the specimen or target RNA in concentration below the limit of detection for the assay. If only one viral target is positive but coinfection with multiple targets is suspected, the sample should be re-tested with another FDA cleared, approved, or authorized test, if coninfection would change clinical management. This test was validated by the [facility]. These laboratories are certified under the Clinical Laboratory Improvement Amendments of 1988 (CLIA-88) as qualified to perfom high complexity laboratory testing.   XR Chest Port 1 View    Narrative    EXAM: XR CHEST PORT 1 VIEW  LOCATION: [facility]  DATE: 2/18/2025    INDICATION: cough  COMPARISON: 03/27/2023      Impression    IMPRESSION: Stable cardiomediastinal silhouette. Atelectasis or infiltrate medial right lung base. No significant effusion. Postsurgical change cervical spine. Degenerative change osseous structures.       Medications   ipratropium - albuterol 0.5 mg/2.5 mg/3 mL (DUONEB) neb solution 3 mL (3 mLs Nebulization $Given 2/18/25 7712)   ipratropium - albuterol 0.5 mg/2.5 mg/3 mL (DUONEB) 0.5-2.5 (3) MG/3ML neb solution (  Not Given 2/18/25 2821)   benzonatate (TESSALON) capsule 100 mg (100 mg Oral $Given 2/18/25 6065)   doxycycline hyclate (VIBRAMYCIN) capsule 100 mg (100 mg Oral  $Given 2/18/25 9272)       Assessments & Plan (with Medical Decision Making)     I have reviewed the nursing notes.    I have reviewed the findings, diagnosis, plan and need for follow up with the patient.          Medical Decision Making  The patient's presentation was of moderate complexity (an acute illness with systemic symptoms).    The patient's evaluation involved:  ordering and/or review of 3+ test(s) in this encounter (see separate area of note for details)    The patient's management necessitated moderate risk (prescription drug management including medications given in the ED).    73 yo f here w/ cough x 4d. Well appearing. Lungs clear. Cxr = PNA. Will give 1st dose of abx here and send home w/ abx.    Discharge Medication List as of 2/18/2025  7:01 AM        START taking these medications    Details   benzonatate (TESSALON) 100 MG capsule Take 1 capsule (100 mg) by mouth 3 times daily as needed for cough., Disp-21 capsule, R-0, E-Prescribe      doxycycline hyclate (VIBRAMYCIN) 100 MG capsule Take 1 capsule (100 mg) by mouth 2 times daily for 5 days., Disp-10 capsule, R-0, E-Prescribe      ipratropium - albuterol 0.5 mg/2.5 mg/3 mL (DUONEB) 0.5-2.5 (3) MG/3ML neb solution Take 1 vial (3 mLs) by nebulization every 6 hours as needed for shortness of breath, wheezing or cough., Disp-90 mL, R-0, E-Prescribe             Final diagnoses:   Pneumonia of right lower lobe due to infectious organism       2/18/2025   [facility]       Ryan Caballero MD  02/19/25 9800

## 2025-02-20 ENCOUNTER — HOSPITAL ENCOUNTER (EMERGENCY)
Facility: HOSPITAL | Age: 75
Discharge: HOME | End: 2025-02-20
Attending: EMERGENCY MEDICINE
Payer: MEDICARE

## 2025-02-20 ENCOUNTER — TELEPHONE (OUTPATIENT)
Dept: FAMILY MEDICINE | Facility: OTHER | Age: 75
End: 2025-02-20

## 2025-02-20 VITALS
HEIGHT: 61 IN | TEMPERATURE: 98 F | HEART RATE: 85 BPM | BODY MASS INDEX: 33.23 KG/M2 | SYSTOLIC BLOOD PRESSURE: 109 MMHG | RESPIRATION RATE: 22 BRPM | WEIGHT: 176 LBS | OXYGEN SATURATION: 93 % | DIASTOLIC BLOOD PRESSURE: 85 MMHG

## 2025-02-20 DIAGNOSIS — R05.1 ACUTE COUGH: ICD-10-CM

## 2025-02-20 LAB
HOLD SPECIMEN: NORMAL

## 2025-02-20 PROCEDURE — 99283 EMERGENCY DEPT VISIT LOW MDM: CPT | Performed by: EMERGENCY MEDICINE

## 2025-02-20 PROCEDURE — 250N000009 HC RX 250: Performed by: EMERGENCY MEDICINE

## 2025-02-20 PROCEDURE — 99283 EMERGENCY DEPT VISIT LOW MDM: CPT | Mod: 25

## 2025-02-20 PROCEDURE — 94640 AIRWAY INHALATION TREATMENT: CPT

## 2025-02-20 PROCEDURE — 87798 DETECT AGENT NOS DNA AMP: CPT | Performed by: EMERGENCY MEDICINE

## 2025-02-20 RX ORDER — ACETAMINOPHEN AND CODEINE PHOSPHATE 120; 12 MG/5ML; MG/5ML
5 SOLUTION ORAL EVERY 4 HOURS PRN
Status: DISCONTINUED | OUTPATIENT
Start: 2025-02-20 | End: 2025-02-20 | Stop reason: HOSPADM

## 2025-02-20 RX ORDER — LIDOCAINE HYDROCHLORIDE 20 MG/ML
20 INJECTION, SOLUTION INFILTRATION; PERINEURAL ONCE
Status: DISCONTINUED | OUTPATIENT
Start: 2025-02-20 | End: 2025-02-20

## 2025-02-20 RX ADMIN — LIDOCAINE HYDROCHLORIDE 5 ML: 40 INJECTION, SOLUTION RETROBULBAR; TOPICAL at 02:43

## 2025-02-20 ASSESSMENT — ENCOUNTER SYMPTOMS
CHILLS: 0
SHORTNESS OF BREATH: 1
FEVER: 0
COUGH: 1

## 2025-02-20 ASSESSMENT — COLUMBIA-SUICIDE SEVERITY RATING SCALE - C-SSRS
6. HAVE YOU EVER DONE ANYTHING, STARTED TO DO ANYTHING, OR PREPARED TO DO ANYTHING TO END YOUR LIFE?: NO
1. IN THE PAST MONTH, HAVE YOU WISHED YOU WERE DEAD OR WISHED YOU COULD GO TO SLEEP AND NOT WAKE UP?: NO
2. HAVE YOU ACTUALLY HAD ANY THOUGHTS OF KILLING YOURSELF IN THE PAST MONTH?: NO

## 2025-02-20 ASSESSMENT — ACTIVITIES OF DAILY LIVING (ADL): ADLS_ACUITY_SCORE: 55

## 2025-02-20 NOTE — TELEPHONE ENCOUNTER
Symptom or reason needing to speak to RN: er/uc follow up x 2     Best number to return call: [phone]     Best time to return call: asap

## 2025-02-20 NOTE — TELEPHONE ENCOUNTER
When would you like to see the patient?      ER F/U  2/20/25 and 2/18/25      Acute cough  Clinical impression  Shortness of Breath  Cough  Chief complaint    Pneumonia of right lower lobe due to infectious organism  Clinical impression    2/18/25  Medical Decision Making  The patient's presentation was of moderate complexity (an acute illness with systemic symptoms).     The patient's evaluation involved:  ordering and/or review of 3+ test(s) in this encounter (see separate area of note for details)     The patient's management necessitated moderate risk (prescription drug management including medications given in the ED).     73 yo f here w/ cough x 4d. Well appearing. Lungs clear. Cxr = PNA. Will give 1st dose of abx here and send home w/ abx.     Discharge Medication List as of 2/18/2025  7:01 AM              START taking these medications     Details   benzonatate (TESSALON) 100 MG capsule Take 1 capsule (100 mg) by mouth 3 times daily as needed for cough., Disp-21 capsule, R-0, E-Prescribe       doxycycline hyclate (VIBRAMYCIN) 100 MG capsule Take 1 capsule (100 mg) by mouth 2 times daily for 5 days., Disp-10 capsule, R-0, E-Prescribe       ipratropium - albuterol 0.5 mg/2.5 mg/3 mL (DUONEB) 0.5-2.5 (3) MG/3ML neb solution Take 1 vial (3 mLs) by nebulization every 6 hours as needed for shortness of breath, wheezing or cough., Disp-90 mL, R-0, E-Prescribe     2/20/25    Medical Decision Making  The patient's presentation was of moderate complexity (an acute illness with systemic symptoms).     The patient's evaluation involved:  history and exam without other MDM data elements     The patient's management necessitated moderate risk (prescription drug management including medications given in the ED).     Nebulized lidocaine was helpful, safe to give it q8h          New Prescriptions     LIDOCAINE INHALANT 4 % NEBU    Take 5 mLs by nebulization every 8 hours as needed for moderate pain.

## 2025-02-20 NOTE — ED TRIAGE NOTES
"Pt comes in by ambulance for SOB and cough. Pt was diagnosed with pneumonia 1 day ago. Pt was unable to get comfortable or sleep. Pt is taking medications as prescribed with no relief. Pt has chest \"soreness\" from coughing. Pt reports pain a 6/10. Pt is AOX4. Pt is anxious.         "

## 2025-02-20 NOTE — ED PROVIDER NOTES
History     Chief Complaint   Patient presents with    Shortness of Breath    Cough     HPI  Rj Rodríguez is a 74 year old female who is here with continued cough. Unable to sleep. Tessalon pears, albuterol neb not helping.     Allergies:  Allergies   Allergen Reactions    Dust Mite Extract Difficulty breathing    Fruit [Peach] Anaphylaxis and Hives     fresh peaches and any fruit that has a pit.  Kiwi's and apples also.  Can eat any fruit cooked.    Latex Other (See Comments), Swelling, Difficulty breathing and Rash     Throat Closes      Nuts Anaphylaxis and Hives     All Raw Nuts, can eat nuts when roasted.    Other Food Allergy Other (See Comments) and Difficulty breathing     Mackey powder    Pollen Extract Difficulty breathing     All fruits and vegetables need to be washed and rinsed prior to eating.     Ace Inhibitors      Upper respiratory infection    Antihistamines, Chlorpheniramine-Type Hives     Antihistamines    Benicar [Olmesartan] Cough    Effexor [Venlafaxine] Other (See Comments)     Heartburn, reflux    Hydrochlorothiazide     Phenylephrine Hcl      Guaifed    Seasonal Allergies      hayfever    Sm Guaifenesin-Pseudoephedrine [Pseudoephedrine-Guaifenesin]      Guaifed    Statins     Trees     Ultram [Tramadol]      Sleep disturbance. Can not sleep, and when able to fall asleep will have terrible nightmares    Xanax [Alprazolam] Hives     Xanax    Zoloft [Sertraline]      paranoia       Problem List:    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     Priority: Medium    Diabetes mellitus, type 2 (H) 12/06/2019     Priority: Medium    [place] cardiac risk 11% in next 10 years 10/29/2019     Priority: Medium    S/P rotator cuff repair 10/26/2016     Priority: Medium    Mild episode of recurrent major depressive disorder 10/22/2014     Priority:  Medium    Migraine with aura and without status migrainosus, not intractable 04/21/2014     Priority: Medium    S/P cervical spinal fusion 03/21/2014     Priority: Medium    Cervicalgia 12/10/2013     Priority: Medium    Vertigo 03/18/2013     Priority: Medium    Tinnitus 03/18/2013     Priority: Medium    Chronic rhinitis 03/18/2013     Priority: Medium    Lumbago 07/10/2012     Priority: Medium    Presbyopia 01/25/2011     Priority: Medium    Primary open-angle glaucoma 01/25/2011     Priority: Medium     Overview:   IMO Update 10/11      ZEFERINO (generalized anxiety disorder) 12/06/2006     Priority: Medium    Essential hypertension 06/02/2004     Priority: Medium     Overview:   IMO Update          Past Medical History:    Past Medical History:   Diagnosis Date    Arthritis     Cancer (H)     Chronic back pain     Diabetes (H)     Hypertension     Irregular heart beat     Sleep apnea     Uncomplicated asthma        Past Surgical History:    Past Surgical History:   Procedure Laterality Date    ANESTHESIA OUT OF OR MRI N/A 6/1/2022    Procedure: MRI CERVICAL SPINE;  Surgeon: GENERIC ANESTHESIA PROVIDER;  Location: [facility]    ANESTHESIA OUT OF OR MRI N/A 9/23/2022    Procedure: MRI CERVICAL SPINE;  Surgeon: GENERIC ANESTHESIA PROVIDER;  Location: [facility]    ANESTHESIA OUT OF OR MRI Left 12/7/2023    Procedure: Anesthesia out of OR MRI;  Surgeon: GENERIC ANESTHESIA PROVIDER;  Location: [facility]    APPENDECTOMY      BACK SURGERY      CHOLECYSTECTOMY      COLONOSCOPY  2012    Repeat in 10 years    GYN SURGERY      HYSTERECTOMY      Ovaries    MASTECTOMY SIMPLE BILATERAL, SENTINEL NODE BILATERAL, COMBINED N/A 1/16/2024    Procedure: Left sentinel node biopsy, bilateral mastectomy;  Surgeon: Ethan oMlina MD;  Location: [facility]    RELEASE CARPAL TUNNEL      LT    RELEASE CARPAL TUNNEL      RT x2    REVISE SCAR BREAST Bilateral 12/4/2024    Procedure: REVISION, SCAR, BREAST, bilateral scar revision chest wall;  Surgeon:  Jaenine Carmen MD;  Location:  OR    SURGICAL RADIOLOGY PROCEDURE N/A 2/12/2016    Procedure: SURGICAL RADIOLOGY PROCEDURE;  Surgeon: Provider, Generic Perianesthesia Nursing;  Location: [facility]    SURGICAL RADIOLOGY PROCEDURE N/A 8/15/2016    Procedure: SURGICAL RADIOLOGY PROCEDURE;  Surgeon: Provider, Generic Perianesthesia Nursing;  Location: [facility]       Family History:    Family History   Problem Relation Age of Onset    Diabetes Mother     Alcoholism Mother     Heart Failure Mother     Cerebrovascular Disease Father     Diabetes Father         Type 2    Hypertension Father     Alcoholism Father     Colon Cancer Maternal Aunt     Colon Cancer Maternal Uncle        Social History:  Marital Status:   [2]  Social History     Tobacco Use    Smoking status: Never     Passive exposure: Never    Smokeless tobacco: Never   Vaping Use    Vaping status: Never Used   Substance Use Topics    Alcohol use: Yes     Alcohol/week: 1.0 standard drink of alcohol     Types: 1 Glasses of wine per week     Comment: occ glass of wine    Drug use: No        Medications:    lidocaine inhalant 4 % NEBU  acetaminophen (TYLENOL) 325 MG tablet  albuterol (PROAIR HFA/PROVENTIL HFA/VENTOLIN HFA) 108 (90 Base) MCG/ACT inhaler  benzonatate (TESSALON) 100 MG capsule  blood glucose (ACCU-CHEK GUIDE) test strip  blood glucose monitoring (ACCU-CHEK FASTCLIX) lancets  calcium carbonate-vitamin D (OSCAL) 500-5 MG-MCG tablet  cetirizine (ZYRTEC) 10 MG tablet  clonazePAM (KLONOPIN) 0.5 MG tablet  diltiazem ER (TIAZAC) 240 MG 24 hr ER beaded capsule  diphenhydrAMINE HCl (BENADRYL ALLERGY CHILDRENS) 12.5 MG CHEW  doxycycline hyclate (VIBRAMYCIN) 100 MG capsule  EPINEPHrine (ANY BX GENERIC EQUIV) 0.3 MG/0.3ML injection 2-pack  ipratropium - albuterol 0.5 mg/2.5 mg/3 mL (DUONEB) 0.5-2.5 (3) MG/3ML neb solution  letrozole (FEMARA) 2.5 MG tablet  Multiple Vitamins-Minerals (MULTIVITAMIN GUMMIES WOMENS) CHEW  spironolactone (ALDACTONE) 25 MG  "tablet  triamcinolone (NASACORT) 55 MCG/ACT nasal aerosol          Review of Systems   Constitutional:  Negative for chills and fever.   Respiratory:  Positive for cough and shortness of breath.        Physical Exam   BP: (!) 162/97  Pulse: 103  Temp: 99.6  F (37.6  C)  Resp: 22  Height: 154.9 cm (5' 1\")  Weight: 79.8 kg (176 lb)  SpO2: 92 %      Physical Exam  Constitutional:       General: She is not in acute distress.     Appearance: She is not diaphoretic.   HENT:      Head: Normocephalic and atraumatic.      Right Ear: External ear normal.      Left Ear: External ear normal.      Nose: No congestion or rhinorrhea.      Mouth/Throat:      Pharynx: Oropharynx is clear. No oropharyngeal exudate.   Eyes:      General: No scleral icterus.     Pupils: Pupils are equal, round, and reactive to light.   Cardiovascular:      Rate and Rhythm: Normal rate and regular rhythm.      Heart sounds: Normal heart sounds.   Pulmonary:      Effort: No respiratory distress.      Breath sounds: Normal breath sounds.   Abdominal:      General: Bowel sounds are normal.      Palpations: Abdomen is soft.      Tenderness: There is no abdominal tenderness.   Musculoskeletal:         General: No tenderness.      Cervical back: Normal range of motion and neck supple.      Right lower leg: No edema.      Left lower leg: No edema.   Skin:     General: Skin is warm.      Capillary Refill: Capillary refill takes less than 2 seconds.      Findings: No rash.   Neurological:      Mental Status: Mental status is at baseline.      Cranial Nerves: No cranial nerve deficit.   Psychiatric:         Mood and Affect: Mood normal.         Behavior: Behavior normal.         ED Course        Procedures             Critical Care time:               Results for orders placed or performed during the hospital encounter of 02/20/25 (from the past 24 hours)   [place] Draw    Narrative    The following orders were created for panel order [place] Draw.  Procedure     "                           Abnormality         Status                     ---------                               -----------         ------                     Extra Blood Culture Bottle[[id number]]                       Final result               Extra Blue Top Tube[[id number]]                              Final result               Extra Red Top Tube[[id number]]                               Final result               Extra Green Top (Lithium...[[id number]]                      Final result               Extra Purple Top Tube[[id number]]                            Final result               Extra Heparinized Syringe[[id number]]                        Final result                 Please view results for these tests on the individual orders.   Extra Blood Culture Bottle   Result Value Ref Range    Hold Specimen JIC    Extra Blue Top Tube   Result Value Ref Range    Hold Specimen JIC    Extra Red Top Tube   Result Value Ref Range    Hold Specimen JIC    Extra Green Top (Lithium Heparin) Tube   Result Value Ref Range    Hold Specimen JIC    Extra Purple Top Tube   Result Value Ref Range    Hold Specimen JIC    Extra Heparinized Syringe   Result Value Ref Range    Hold Specimen JIC        Medications   acetaminophen-codeine 120-12 MG/5ML solution 5 mL (has no administration in time range)   lidocaine inhalant 4% nebulizer solution 5 mL (5 mLs Nebulization $Given 2/20/25 0241)       Assessments & Plan (with Medical Decision Making)     I have reviewed the nursing notes.    I have reviewed the findings, diagnosis, plan and need for follow up with the patient.          Medical Decision Making  The patient's presentation was of moderate complexity (an acute illness with systemic symptoms).    The patient's evaluation involved:  history and exam without other MDM data elements    The patient's management necessitated moderate risk (prescription drug management including medications given in the ED).    Nebulized lidocaine was  helpful, safe to give it q8h    New Prescriptions    LIDOCAINE INHALANT 4 % NEBU    Take 5 mLs by nebulization every 8 hours as needed for moderate pain.       Final diagnoses:   Acute cough       2/20/2025   [facility]       Ryan Caballero MD  02/20/25 4197

## 2025-02-21 LAB
B PARAPERT DNA SPEC QL NAA+PROBE: NOT DETECTED
B PERT DNA SPEC QL NAA+PROBE: NOT DETECTED

## 2025-02-25 ENCOUNTER — APPOINTMENT (OUTPATIENT)
Dept: GENERAL RADIOLOGY | Facility: HOSPITAL | Age: 75
End: 2025-02-25
Attending: EMERGENCY MEDICINE
Payer: MEDICARE

## 2025-02-25 ENCOUNTER — APPOINTMENT (OUTPATIENT)
Dept: CT IMAGING | Facility: HOSPITAL | Age: 75
End: 2025-02-25
Attending: EMERGENCY MEDICINE
Payer: MEDICARE

## 2025-02-25 ENCOUNTER — HOSPITAL ENCOUNTER (EMERGENCY)
Facility: HOSPITAL | Age: 75
Discharge: HOME | End: 2025-02-25
Attending: EMERGENCY MEDICINE
Payer: MEDICARE

## 2025-02-25 ENCOUNTER — TELEPHONE (OUTPATIENT)
Dept: FAMILY MEDICINE | Facility: OTHER | Age: 75
End: 2025-02-25

## 2025-02-25 VITALS
RESPIRATION RATE: 18 BRPM | TEMPERATURE: 97.6 F | DIASTOLIC BLOOD PRESSURE: 84 MMHG | OXYGEN SATURATION: 94 % | SYSTOLIC BLOOD PRESSURE: 140 MMHG | HEART RATE: 49 BPM

## 2025-02-25 DIAGNOSIS — T14.8XXA MUSCLE STRAIN: ICD-10-CM

## 2025-02-25 DIAGNOSIS — R05.9 COUGH, UNSPECIFIED TYPE: ICD-10-CM

## 2025-02-25 DIAGNOSIS — R06.2 WHEEZING: ICD-10-CM

## 2025-02-25 DIAGNOSIS — S39.012A LOW BACK STRAIN, INITIAL ENCOUNTER: ICD-10-CM

## 2025-02-25 LAB
ALBUMIN UR-MCNC: NEGATIVE MG/DL
ANION GAP SERPL CALCULATED.3IONS-SCNC: 13 MMOL/L (ref 7–15)
APPEARANCE UR: CLEAR
BASOPHILS # BLD AUTO: 0.1 10E3/UL (ref 0–0.2)
BASOPHILS NFR BLD AUTO: 1 %
BILIRUB UR QL STRIP: NEGATIVE
BUN SERPL-MCNC: 19.9 MG/DL (ref 8–23)
CALCIUM SERPL-MCNC: 9.1 MG/DL (ref 8.8–10.4)
CHLORIDE SERPL-SCNC: 107 MMOL/L (ref 98–107)
COLOR UR AUTO: ABNORMAL
CREAT SERPL-MCNC: 0.85 MG/DL (ref 0.51–0.95)
EGFRCR SERPLBLD CKD-EPI 2021: 72 ML/MIN/1.73M2
EOSINOPHIL # BLD AUTO: 0 10E3/UL (ref 0–0.7)
EOSINOPHIL NFR BLD AUTO: 0 %
ERYTHROCYTE [DISTWIDTH] IN BLOOD BY AUTOMATED COUNT: 12.9 % (ref 10–15)
GLUCOSE SERPL-MCNC: 132 MG/DL (ref 70–99)
GLUCOSE UR STRIP-MCNC: NEGATIVE MG/DL
HCO3 SERPL-SCNC: 21 MMOL/L (ref 22–29)
HCT VFR BLD AUTO: 40.2 % (ref 35–47)
HGB BLD-MCNC: 13.8 G/DL (ref 11.7–15.7)
HGB UR QL STRIP: NEGATIVE
HOLD SPECIMEN: NORMAL
HOLD SPECIMEN: NORMAL
IMM GRANULOCYTES # BLD: 0.2 10E3/UL
IMM GRANULOCYTES NFR BLD: 1 %
INR PPP: 0.97 (ref 0.85–1.15)
KETONES UR STRIP-MCNC: NEGATIVE MG/DL
LEUKOCYTE ESTERASE UR QL STRIP: NEGATIVE
LYMPHOCYTES # BLD AUTO: 3.1 10E3/UL (ref 0.8–5.3)
LYMPHOCYTES NFR BLD AUTO: 23 %
MCH RBC QN AUTO: 31 PG (ref 26.5–33)
MCHC RBC AUTO-ENTMCNC: 34.3 G/DL (ref 31.5–36.5)
MCV RBC AUTO: 90 FL (ref 78–100)
MONOCYTES # BLD AUTO: 1.1 10E3/UL (ref 0–1.3)
MONOCYTES NFR BLD AUTO: 8 %
MUCOUS THREADS #/AREA URNS LPF: PRESENT /LPF
NEUTROPHILS # BLD AUTO: 9.2 10E3/UL (ref 1.6–8.3)
NEUTROPHILS NFR BLD AUTO: 67 %
NITRATE UR QL: NEGATIVE
NRBC # BLD AUTO: 0 10E3/UL
NRBC BLD AUTO-RTO: 0 /100
PH UR STRIP: 6 [PH] (ref 4.7–8)
PLATELET # BLD AUTO: 276 10E3/UL (ref 150–450)
POTASSIUM SERPL-SCNC: 3.5 MMOL/L (ref 3.4–5.3)
RBC # BLD AUTO: 4.45 10E6/UL (ref 3.8–5.2)
RBC URINE: 0 /HPF
SODIUM SERPL-SCNC: 141 MMOL/L (ref 135–145)
SP GR UR STRIP: 1.01 (ref 1–1.03)
SQUAMOUS EPITHELIAL: 0 /HPF
UROBILINOGEN UR STRIP-MCNC: NORMAL MG/DL
WBC # BLD AUTO: 13.6 10E3/UL (ref 4–11)
WBC URINE: 2 /HPF

## 2025-02-25 PROCEDURE — 85004 AUTOMATED DIFF WBC COUNT: CPT | Performed by: PHYSICIAN ASSISTANT

## 2025-02-25 PROCEDURE — 74176 CT ABD & PELVIS W/O CONTRAST: CPT

## 2025-02-25 PROCEDURE — 94640 AIRWAY INHALATION TREATMENT: CPT

## 2025-02-25 PROCEDURE — 81001 URINALYSIS AUTO W/SCOPE: CPT | Performed by: EMERGENCY MEDICINE

## 2025-02-25 PROCEDURE — 999N000157 HC STATISTIC RCP TIME EA 10 MIN

## 2025-02-25 PROCEDURE — 36415 COLL VENOUS BLD VENIPUNCTURE: CPT | Performed by: PHYSICIAN ASSISTANT

## 2025-02-25 PROCEDURE — 99284 EMERGENCY DEPT VISIT MOD MDM: CPT | Mod: 25

## 2025-02-25 PROCEDURE — 250N000013 HC RX MED GY IP 250 OP 250 PS 637: Performed by: EMERGENCY MEDICINE

## 2025-02-25 PROCEDURE — 85014 HEMATOCRIT: CPT | Performed by: PHYSICIAN ASSISTANT

## 2025-02-25 PROCEDURE — 99284 EMERGENCY DEPT VISIT MOD MDM: CPT | Performed by: EMERGENCY MEDICINE

## 2025-02-25 PROCEDURE — 85610 PROTHROMBIN TIME: CPT | Performed by: PHYSICIAN ASSISTANT

## 2025-02-25 PROCEDURE — 80051 ELECTROLYTE PANEL: CPT | Performed by: PHYSICIAN ASSISTANT

## 2025-02-25 PROCEDURE — 71046 X-RAY EXAM CHEST 2 VIEWS: CPT

## 2025-02-25 PROCEDURE — 80048 BASIC METABOLIC PNL TOTAL CA: CPT | Performed by: PHYSICIAN ASSISTANT

## 2025-02-25 PROCEDURE — 250N000009 HC RX 250: Performed by: EMERGENCY MEDICINE

## 2025-02-25 RX ORDER — ALBUTEROL SULFATE 0.83 MG/ML
2.5 SOLUTION RESPIRATORY (INHALATION) EVERY 6 HOURS PRN
Qty: 25 ML | Refills: 0 | Status: SHIPPED | OUTPATIENT
Start: 2025-02-25

## 2025-02-25 RX ORDER — IPRATROPIUM BROMIDE AND ALBUTEROL SULFATE 2.5; .5 MG/3ML; MG/3ML
3 SOLUTION RESPIRATORY (INHALATION) ONCE
Status: COMPLETED | OUTPATIENT
Start: 2025-02-25 | End: 2025-02-25

## 2025-02-25 RX ORDER — LIDOCAINE 4 G/G
1 PATCH TOPICAL ONCE
Status: DISCONTINUED | OUTPATIENT
Start: 2025-02-25 | End: 2025-02-25 | Stop reason: HOSPADM

## 2025-02-25 RX ORDER — OXYCODONE HYDROCHLORIDE 5 MG/1
5 TABLET ORAL ONCE
Status: COMPLETED | OUTPATIENT
Start: 2025-02-25 | End: 2025-02-25

## 2025-02-25 RX ORDER — LIDOCAINE 4 G/G
1 PATCH TOPICAL EVERY 24 HOURS
Qty: 12 PATCH | Refills: 0 | Status: SHIPPED | OUTPATIENT
Start: 2025-02-25 | End: 2025-03-09

## 2025-02-25 RX ORDER — CEFTRIAXONE SODIUM 1 G/50ML
1 INJECTION, SOLUTION INTRAVENOUS ONCE
Status: DISCONTINUED | OUTPATIENT
Start: 2025-02-25 | End: 2025-02-25

## 2025-02-25 RX ORDER — AZITHROMYCIN 500 MG/5ML
500 INJECTION, POWDER, LYOPHILIZED, FOR SOLUTION INTRAVENOUS EVERY 24 HOURS
Status: DISCONTINUED | OUTPATIENT
Start: 2025-02-25 | End: 2025-02-25

## 2025-02-25 RX ADMIN — LIDOCAINE 1 PATCH: 4 PATCH TOPICAL at 13:01

## 2025-02-25 RX ADMIN — OXYCODONE HYDROCHLORIDE 5 MG: 5 TABLET ORAL at 13:00

## 2025-02-25 RX ADMIN — IPRATROPIUM BROMIDE AND ALBUTEROL SULFATE 3 ML: .5; 3 SOLUTION RESPIRATORY (INHALATION) at 13:21

## 2025-02-25 ASSESSMENT — COLUMBIA-SUICIDE SEVERITY RATING SCALE - C-SSRS
2. HAVE YOU ACTUALLY HAD ANY THOUGHTS OF KILLING YOURSELF IN THE PAST MONTH?: NO
1. IN THE PAST MONTH, HAVE YOU WISHED YOU WERE DEAD OR WISHED YOU COULD GO TO SLEEP AND NOT WAKE UP?: NO
6. HAVE YOU EVER DONE ANYTHING, STARTED TO DO ANYTHING, OR PREPARED TO DO ANYTHING TO END YOUR LIFE?: NO

## 2025-02-25 ASSESSMENT — ACTIVITIES OF DAILY LIVING (ADL)
ADLS_ACUITY_SCORE: 61
ADLS_ACUITY_SCORE: 61

## 2025-02-25 NOTE — TELEPHONE ENCOUNTER
Patient is using a neb.  She has today and tomorrow left on medication.  Her lower back hurt's and she is wheezing.  She is still very SOB. She is coughing up dry blood and also now it is becoming brighter in color and it is making her nervous.  She does not want to go to the .    Do you want to see her, is this normal?  She was thinking she would feel better by now.    Last seen  2/21/25    Pneumonia due to infectious organism, unspecified laterality, unspecified part of lung +1 more     zithromycin (ZITHROMAX) 250 MG tablet 6 tablet 0 2/21/2025 2/26/2025 --   Sig - Route: Take 2 tablets (500 mg) by mouth daily for 1 day, THEN 1 tablet (250 mg) daily for 4 days. - Oral   Sent to pharmacy as: Azithromycin 250 MG Oral Tablet (ZITHROMAX)   Class: E-Prescribe   Order: [id number]   E-Prescribing Status: Receipt confirmed by pharmacy (2/21/2025 10:13 AM CST)       predniSONE (DELTASONE) 20 MG [id number]/26/2025NoSig - Route: Take 2 tablets (40 mg) by mouth daily for 5 days. - OralSent to pharmacy as: predniSONE 20 MG Oral Tablet (DELTASONE)Class: E-PrescribeOrder: [id number] Status: Receipt confirmed by pharmacy (2/21/2025 10:13 AM CST)

## 2025-02-25 NOTE — TELEPHONE ENCOUNTER
Symptom or reason needing to speak to RN: coughing up blood     Best number to return call: [phone]     Best time to return call: soon

## 2025-02-25 NOTE — ED PROVIDER NOTES
History     Chief Complaint   Patient presents with    Cough     HPI  Rj Rodríguez is a 74 year old female who presents with report of persistent cough despite antibiotic and steroids and nebs and cough has been ongoing and she would like resolution.  Also with right sided low back discomfort.  No abdominal pain.  She believes her right lower back discomfort is related to coughing though is unsure.  No other associated symptoms.  No urinary symptoms.  Duration ongoing.  Character persistent.    Allergies:  Allergies   Allergen Reactions    Dust Mite Extract Difficulty breathing    Fruit [Peach] Anaphylaxis and Hives     fresh peaches and any fruit that has a pit.  Kiwi's and apples also.  Can eat any fruit cooked.    Latex Other (See Comments), Swelling, Difficulty breathing and Rash     Throat Closes      Nuts Anaphylaxis and Hives     All Raw Nuts, can eat nuts when roasted.    Other Food Allergy Other (See Comments) and Difficulty breathing     Mackey powder    Pollen Extract Difficulty breathing     All fruits and vegetables need to be washed and rinsed prior to eating.     Ace Inhibitors      Upper respiratory infection    Antihistamines, Chlorpheniramine-Type Hives     Antihistamines    Benicar [Olmesartan] Cough    Effexor [Venlafaxine] Other (See Comments)     Heartburn, reflux    Hydrochlorothiazide     Phenylephrine Hcl      Guaifed    Seasonal Allergies      hayfever    Sm Guaifenesin-Pseudoephedrine [Pseudoephedrine-Guaifenesin]      Guaifed    Statins     Trees     Ultram [Tramadol]      Sleep disturbance. Can not sleep, and when able to fall asleep will have terrible nightmares    Xanax [Alprazolam] Hives     Xanax    Zoloft [Sertraline]      paranoia       Problem List:    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     Priority: Medium    Diabetes  mellitus, type 2 (H) 12/06/2019     Priority: Medium    [place] cardiac risk 11% in next 10 years 10/29/2019     Priority: Medium    S/P rotator cuff repair 10/26/2016     Priority: Medium    Mild episode of recurrent major depressive disorder 10/22/2014     Priority: Medium    Migraine with aura and without status migrainosus, not intractable 04/21/2014     Priority: Medium    S/P cervical spinal fusion 03/21/2014     Priority: Medium    Cervicalgia 12/10/2013     Priority: Medium    Vertigo 03/18/2013     Priority: Medium    Tinnitus 03/18/2013     Priority: Medium    Chronic rhinitis 03/18/2013     Priority: Medium    Lumbago 07/10/2012     Priority: Medium    Presbyopia 01/25/2011     Priority: Medium    Primary open-angle glaucoma 01/25/2011     Priority: Medium     Overview:   IMO Update 10/11      ZEFERINO (generalized anxiety disorder) 12/06/2006     Priority: Medium    Essential hypertension 06/02/2004     Priority: Medium     Overview:   IMO Update          Past Medical History:    Past Medical History:   Diagnosis Date    Arthritis     Cancer (H)     Chronic back pain     Diabetes (H)     Hypertension     Irregular heart beat     Sleep apnea     Uncomplicated asthma        Past Surgical History:    Past Surgical History:   Procedure Laterality Date    ANESTHESIA OUT OF OR MRI N/A 6/1/2022    Procedure: MRI CERVICAL SPINE;  Surgeon: GENERIC ANESTHESIA PROVIDER;  Location: [facility]    ANESTHESIA OUT OF OR MRI N/A 9/23/2022    Procedure: MRI CERVICAL SPINE;  Surgeon: GENERIC ANESTHESIA PROVIDER;  Location: [facility]    ANESTHESIA OUT OF OR MRI Left 12/7/2023    Procedure: Anesthesia out of OR MRI;  Surgeon: GENERIC ANESTHESIA PROVIDER;  Location: [facility]    APPENDECTOMY      BACK SURGERY      CHOLECYSTECTOMY      COLONOSCOPY  2012    Repeat in 10 years    GYN SURGERY      HYSTERECTOMY      Ovaries    MASTECTOMY SIMPLE BILATERAL, SENTINEL NODE BILATERAL, COMBINED N/A 1/16/2024    Procedure: Left sentinel node biopsy,  bilateral mastectomy;  Surgeon: Ethan Molina MD;  Location: [facility]    RELEASE CARPAL TUNNEL      LT    RELEASE CARPAL TUNNEL      RT x2    REVISE SCAR BREAST Bilateral 12/4/2024    Procedure: REVISION, SCAR, BREAST, bilateral scar revision chest wall;  Surgeon: Jeanine Carmen MD;  Location: [facility]    SURGICAL RADIOLOGY PROCEDURE N/A 2/12/2016    Procedure: SURGICAL RADIOLOGY PROCEDURE;  Surgeon: Provider, Generic Perianesthesia Nursing;  Location: [facility]    SURGICAL RADIOLOGY PROCEDURE N/A 8/15/2016    Procedure: SURGICAL RADIOLOGY PROCEDURE;  Surgeon: Provider, Generic Perianesthesia Nursing;  Location: [facility]       Family History:    Family History   Problem Relation Age of Onset    Diabetes Mother     Alcoholism Mother     Heart Failure Mother     Cerebrovascular Disease Father     Diabetes Father         Type 2    Hypertension Father     Alcoholism Father     Colon Cancer Maternal Aunt     Colon Cancer Maternal Uncle        Social History:  Marital Status:   [2]  Social History     Tobacco Use    Smoking status: Never     Passive exposure: Never    Smokeless tobacco: Never   Vaping Use    Vaping status: Never Used   Substance Use Topics    Alcohol use: Yes     Alcohol/week: 1.0 standard drink of alcohol     Types: 1 Glasses of wine per week     Comment: occ glass of wine    Drug use: No        Medications:    albuterol (PROVENTIL) (2.5 MG/3ML) 0.083% neb solution  Lidocaine (LIDOCARE) 4 % Patch  acetaminophen (TYLENOL) 325 MG tablet  albuterol (PROAIR HFA/PROVENTIL HFA/VENTOLIN HFA) 108 (90 Base) MCG/ACT inhaler  azithromycin (ZITHROMAX) 250 MG tablet  benzonatate (TESSALON) 100 MG capsule  blood glucose (ACCU-CHEK GUIDE) test strip  blood glucose monitoring (ACCU-CHEK FASTCLIX) lancets  calcium carbonate-vitamin D (OSCAL) 500-5 MG-MCG tablet  cetirizine (ZYRTEC) 10 MG tablet  clonazePAM (KLONOPIN) 0.5 MG tablet  diltiazem ER (TIAZAC) 240 MG 24 hr ER beaded capsule  diphenhydrAMINE HCl (BENADRYL  ALLERGY CHILDRENS) 12.5 MG CHEW  EPINEPHrine (ANY BX GENERIC EQUIV) 0.3 MG/0.3ML injection 2-pack  ipratropium - albuterol 0.5 mg/2.5 mg/3 mL (DUONEB) 0.5-2.5 (3) MG/3ML neb solution  letrozole (FEMARA) 2.5 MG tablet  lidocaine inhalant 4 % NEBU  Multiple Vitamins-Minerals (MULTIVITAMIN GUMMIES WOMENS) CHEW  predniSONE (DELTASONE) 20 MG tablet  spironolactone (ALDACTONE) 25 MG tablet  triamcinolone (NASACORT) 55 MCG/ACT nasal aerosol          Review of Systems  Respiratory Per HPI.  GI denies.   Per HPI.  Constitutional denies.  Remainder of complete 10 point review of systems negative.    Physical Exam   BP: (!) 159/97  Pulse: 70  Temp: 98.1  F (36.7  C)  Resp: 20  SpO2: 95 %      Physical Exam  Constitutional:       Comments: Mild cough noted on entry in the room.  The patient is holding her right lower back.  Significant other at the bedside   HENT:      Head: Normocephalic and atraumatic.      Nose: Nose normal.      Mouth/Throat:      Mouth: Mucous membranes are moist.   Eyes:      Extraocular Movements: Extraocular movements intact.      Conjunctiva/sclera: Conjunctivae normal.   Cardiovascular:      Rate and Rhythm: Normal rate.      Pulses: Normal pulses.   Pulmonary:      Effort: Pulmonary effort is normal. No respiratory distress.      Breath sounds: No wheezing.      Comments: Lung sounds clear.  Abdominal:      Comments: Right flank with paraspinous lower back area discomfort.  Not reproducible.  No CVAT.  Abdominal examination is benign.   Musculoskeletal:         General: No swelling or tenderness. Normal range of motion.      Cervical back: Normal range of motion and neck supple.   Skin:     General: Skin is warm and dry.   Psychiatric:      Comments: Patient is quite concerned.              Results for orders placed or performed during the hospital encounter of 02/25/25 (from the past 24 hours)   CBC with platelets differential    Narrative    The following orders were created for panel order CBC  with platelets differential.  Procedure                               Abnormality         Status                     ---------                               -----------         ------                     CBC with platelets and d...[[id number]]  Abnormal            Final result                 Please view results for these tests on the individual orders.   INR   Result Value Ref Range    INR 0.97 0.85 - 1.15   Basic metabolic panel   Result Value Ref Range    Sodium 141 135 - 145 mmol/L    Potassium 3.5 3.4 - 5.3 mmol/L    Chloride 107 98 - 107 mmol/L    Carbon Dioxide (CO2) 21 (L) 22 - 29 mmol/L    Anion Gap 13 7 - 15 mmol/L    Urea Nitrogen 19.9 8.0 - 23.0 mg/dL    Creatinine 0.85 0.51 - 0.95 mg/dL    GFR Estimate 72 >60 mL/min/1.73m2    Calcium 9.1 8.8 - 10.4 mg/dL    Glucose 132 (H) 70 - 99 mg/dL   CBC with platelets and differential   Result Value Ref Range    WBC Count 13.6 (H) 4.0 - 11.0 10e3/uL    RBC Count 4.45 3.80 - 5.20 10e6/uL    Hemoglobin 13.8 11.7 - 15.7 g/dL    Hematocrit 40.2 35.0 - 47.0 %    MCV 90 78 - 100 fL    MCH 31.0 26.5 - 33.0 pg    MCHC 34.3 31.5 - 36.5 g/dL    RDW 12.9 10.0 - 15.0 %    Platelet Count 276 150 - 450 10e3/uL    % Neutrophils 67 %    % Lymphocytes 23 %    % Monocytes 8 %    % Eosinophils 0 %    % Basophils 1 %    % Immature Granulocytes 1 %    NRBCs per 100 WBC 0 <1 /100    Absolute Neutrophils 9.2 (H) 1.6 - 8.3 10e3/uL    Absolute Lymphocytes 3.1 0.8 - 5.3 10e3/uL    Absolute Monocytes 1.1 0.0 - 1.3 10e3/uL    Absolute Eosinophils 0.0 0.0 - 0.7 10e3/uL    Absolute Basophils 0.1 0.0 - 0.2 10e3/uL    Absolute Immature Granulocytes 0.2 <=0.4 10e3/uL    Absolute NRBCs 0.0 10e3/uL   Extra Tube    Narrative    The following orders were created for panel order Extra Tube.  Procedure                               Abnormality         Status                     ---------                               -----------         ------                     Extra Red Top Tube[[id number]]                                Final result               Extra Heparinized Syringe[[id number]]                        Final result                 Please view results for these tests on the individual orders.   Extra Red Top Tube   Result Value Ref Range    Hold Specimen JIC    Extra Heparinized Syringe   Result Value Ref Range    Hold Specimen JIC    UA with Microscopic reflex to Culture    Specimen: Urine, Clean Catch   Result Value Ref Range    Color Urine Straw Colorless, Straw, Light Yellow, Yellow    Appearance Urine Clear Clear    Glucose Urine Negative Negative mg/dL    Bilirubin Urine Negative Negative    Ketones Urine Negative Negative mg/dL    Specific Gravity Urine 1.010 1.003 - 1.035    Blood Urine Negative Negative    pH Urine 6.0 4.7 - 8.0    Protein Albumin Urine Negative Negative mg/dL    Urobilinogen Urine Normal Normal, 2.0 mg/dL    Nitrite Urine Negative Negative    Leukocyte Esterase Urine Negative Negative    Mucus Urine Present (A) None Seen /LPF    RBC Urine 0 <=2 /HPF    WBC Urine 2 <=5 /HPF    Squamous Epithelials Urine 0 <=1 /HPF    Narrative    Urine Culture not indicated   XR Chest 2 Views    Narrative    PROCEDURE:  XR CHEST 2 VIEWS    HISTORY: shortness of breath    COMPARISON: Chest radiograph 2/18/2025, 3/27/2023    FINDINGS: PA and lateral chest radiographs    Cardiomediastinal silhouette is within normal limits. There is  calcific aortic atherosclerosis.  No focal consolidation, effusion or pneumothorax.    No suspicious osseous lesion or subdiaphragmatic free air.  ACDF changes.      Impression    IMPRESSION:    No acute findings.    MIKE BATES MD         SYSTEM ID:  [id number]   Abd/pelvis CT - no contrast - Stone Protocol    Narrative    Exam: CT ABDOMEN PELVIS W/O CONTRAST    Exam reason: R flank pain    Technique: Using helical CT technique, axial images of the abdomen and  pelvis  were acquired without administration of IV contrast. Coronal  and sagittal reformats were performed. This CT  was performed using one  or more of the following dose reduction techniques: automated exposure  control, adjustment of the mA and/or kV according to patient size,  and/or use of iterative reconstruction technique.    Comparison: 4/11/2022.    FINDINGS:    NOTE: Noncontrast images are insensitive for detection of solid organ  abnormalities and some other types of pathology.    ABDOMEN:     Liver:  No focal mass.    Gallbladder:  Resected.  Bile Ducts:  No significantly dilated ducts.  Spleen:  Normal size without focal abnormality.  Kidneys:  No hydronephrosis. There is a 2 mm nonobstructing calculus  in the upper pole of the right kidney and a punctate calculus in the  lower pole of the left kidney. No definite solid renal mass.  Adrenals:  No nodules.  Pancreas:  No mass or peripancreatic fat stranding.   Lymph Nodes:   No significant adenopathy.   Vascular:  No aortic aneurysm.   Abdominal wall:   No acute findings.    Pelvis: No mass or adenopathy.    Bowel/Mesentery/Peritoneum:   -No evidence of bowel obstruction.   -There is descending and sigmoid diverticulosis without evidence of  acute diverticulitis.  -No ascites.    Visualized portion of the Chest: No consolidation or pleural effusion.      Musculoskeletal: No acute osseous abnormalities.       Impression    IMPRESSION:    No acute findings in the abdomen or pelvis. No hydronephrosis.    Bilateral nonobstructing nephrolithiasis.    MADELAINE LUCAS MD         SYSTEM ID:  [id number]       Medications   Lidocaine (LIDOCARE) 4 % Patch 1 patch (1 patch Transdermal $Patch/Med Applied 2/25/25 1301)   Lidocaine (LIDOCARE) 4 % Patch 1 patch (has no administration in time range)   ipratropium - albuterol 0.5 mg/2.5 mg/3 mL (DUONEB) neb solution 3 mL (3 mLs Nebulization $Given 2/25/25 1321)   oxyCODONE (ROXICODONE) tablet 5 mg (5 mg Oral $Given 2/25/25 1300)       Assessments & Plan (with Medical Decision Making)   74-year-old female with persistent cough, feels she  may have strained a muscle in her low back, though is unsure, concern for renal colic, CT without evidence for ureteral stone though stones do exist in the kidney.  No pathology otherwise.  Chest x-ray nondiagnostic.  Improvement with neb here of coughing.  Advising nebs with more regularity for coughing and lidocaine patches for low back discomfort.  Patient agrees with plan.     New Prescriptions    ALBUTEROL (PROVENTIL) (2.5 MG/3ML) 0.083% NEB SOLUTION    Take 1 vial (2.5 mg) by nebulization every 6 hours as needed for shortness of breath, wheezing or cough.    LIDOCAINE (LIDOCARE) 4 % PATCH    Place 1 patch over 12 hours onto the skin every 24 hours for 12 doses. To prevent lidocaine toxicity, patient should be patch free for 12 hrs daily.       Final diagnoses:   Cough, unspecified type   Wheezing   Muscle strain   Low back strain, initial encounter       2/25/2025   [facility]       Mikey Montes MD  02/25/25 9645

## 2025-02-25 NOTE — ED TRIAGE NOTES
Pt presents reporting pneumonia and continues to be SOB. Pt reports that her cough is uncontrollable and this morning she started to cough up blood. She has 1 day left on the z-pack and prednisone. Pt complaining of right sided back pain.

## 2025-02-25 NOTE — ED NOTES
Patient feels SOB and frustrated with the cough. Has had right flank pain for 2 days, which she attributes to coughing. Has been urinating frequently and had some incontinence with coughing. Patient tearful and anxious

## 2025-03-09 DIAGNOSIS — C50.912 MALIGNANT NEOPLASM OF LEFT BREAST IN FEMALE, ESTROGEN RECEPTOR POSITIVE, UNSPECIFIED SITE OF BREAST (H): ICD-10-CM

## 2025-03-09 DIAGNOSIS — Z17.0 MALIGNANT NEOPLASM OF LEFT BREAST IN FEMALE, ESTROGEN RECEPTOR POSITIVE, UNSPECIFIED SITE OF BREAST (H): ICD-10-CM

## 2025-03-10 NOTE — TELEPHONE ENCOUNTER
calcium carbonate-vitamin D (OSCAL) 500-5 MG-MCG tablet       Last Written Prescription Date:  12/9/24  Last Fill Quantity: 90,   # refills: 0  Last Office Visit: 2/21/25  Future Office visit:    Next 5 appointments (look out 90 days)      Mar 25, 2025 10:00 AM  (Arrive by 9:45 AM)  Provider Visit with Sheyla Sanches NP  [facility] ([facility] [address] [address]  [phone]     Apr 21, 2025 1:50 PM  (Arrive by 1:35 PM)  Return Visit with NORMAN Mauro [facility] ([facility] ) [address]  [facility] [address]  [phone]             Routing refill request to provider for review/approval because:  Vitamin Supplements Protocol Failed      Medication indicated for associated diagnosis    The medication is prescribed for one or more of the following conditions:                Vitamin deficiency              Osteopenia              Osteoporosis              Nutrition counseling              Nutrition education              Feeding ability finding              Bone density finding              Morbid Obesity              History of bypass of stomach              Idiopathic peripheral neuropathy              General examination of patient              Vitamin D deficiency              Folate deficiency anemia due to dietary causes              Sleeve resection of stomach              Rheumatoid factor level - finding              Crohn's disease              Psoriatic arthritis              Transplant of Kidney              Arthropathy              Alcoholism              Malabsorption syndrome              Disorder of bone and articular cartilage              Cystic Fibrosis  Bronchiectasis

## 2025-03-24 ENCOUNTER — MEDICAL CORRESPONDENCE (OUTPATIENT)
Dept: MRI IMAGING | Facility: HOSPITAL | Age: 75
End: 2025-03-24

## 2025-04-02 ENCOUNTER — HOSPITAL ENCOUNTER (OUTPATIENT)
Dept: MRI IMAGING | Facility: HOSPITAL | Age: 75
Discharge: HOME | End: 2025-04-02
Attending: ORTHOPAEDIC SURGERY
Payer: MEDICARE

## 2025-04-02 DIAGNOSIS — M25.362 OTHER INSTABILITY, LEFT KNEE: ICD-10-CM

## 2025-04-02 DIAGNOSIS — S83.249A ACUTE MEDIAL MENISCUS TEAR: ICD-10-CM

## 2025-04-02 DIAGNOSIS — M25.562 LEFT KNEE PAIN: ICD-10-CM

## 2025-04-02 PROCEDURE — 73721 MRI JNT OF LWR EXTRE W/O DYE: CPT | Mod: 26 | Performed by: STUDENT IN AN ORGANIZED HEALTH CARE EDUCATION/TRAINING PROGRAM

## 2025-04-02 PROCEDURE — 73721 MRI JNT OF LWR EXTRE W/O DYE: CPT | Mod: LT

## 2025-04-08 ENCOUNTER — TRANSFERRED RECORDS (OUTPATIENT)
Dept: HEALTH INFORMATION MANAGEMENT | Facility: CLINIC | Age: 75
End: 2025-04-08
Payer: COMMERCIAL

## 2025-04-18 NOTE — PATIENT INSTRUCTIONS
Will hold all medications the morning of surgery except her diltiazem.      Stop all ibuprofen and supplements 7 days prior.     Patient Education   Preparing for Your Surgery  For Adults  Getting started  In most cases, a nurse will call to review your health history and instructions. They will give you an arrival time based on your scheduled surgery time. Please be ready to share:  Your doctor's clinic name and phone number  Your medical, surgical, and anesthesia history  A list of allergies and sensitivities  A list of medicines, including herbal treatments and over-the-counter drugs  Whether the patient has a legal guardian (ask how to send us the papers in advance)  Note: You may not receive a call if you were seen at our PAC (Preoperative Assessment Center).  Please tell us if you're pregnant--or if there's any chance you might be pregnant. Some surgeries may injure a fetus (unborn baby), so they require a pregnancy test. Surgeries that are safe for a fetus don't always need a test, and you can choose whether to have one.   Preparing for surgery  Within 10 to 30 days of surgery: Have a pre-op exam (sometimes called an H&P, or History and Physical). This can be done at a clinic or pre-operative center.  If you're having a , you may not need this exam. Talk to your care team.  At your pre-op exam, talk to your care team about all medicines you take. (This includes CBD oil and any drugs, such as THC, marijuana, and other forms of cannabis.) If you need to stop any medicine before surgery, ask when to start taking it again.  This is for your safety. Many medicines and drugs can make you bleed too much during surgery. Some change how well surgery (anesthesia) drugs work.  Call your insurance company to let them know you're having surgery. (If you don't have insurance, call [phone].)  Call your clinic if there's any change in your health. This includes a scrape or scratch near the surgery site, or any  signs of a cold (sore throat, runny nose, cough, rash, fever).  Eating and drinking guidelines  For your safety: Unless your surgeon tells you otherwise, follow the guidelines below.  Eat and drink as normal until 8 hours before you arrive for surgery. After that, no food or milk. You can spit out gum when you arrive.  Drink clear liquids until 2 hours before you arrive. These are liquids you can see through, like water, Gatorade, and Propel Water. They also include plain black coffee and tea (no cream or milk).  No alcohol for 24 hours before you arrive. The night before surgery, stop any drinks that contain THC.  If your care team tells you to take medicine on the morning of surgery, it's okay to take it with a sip of water. No other medicines or drugs are allowed (including CBD oil)--follow your care team's instructions.  If you have questions the day of surgery, call your hospital or surgery center.   Preventing infection  Shower or bathe the night before and the morning of surgery. Follow the instructions your clinic gave you. (If no instructions, use regular soap.)  Don't shave or clip hair near your surgery site. We'll remove the hair if needed.  Don't smoke or vape the morning of surgery. No chewing tobacco for 6 hours before you arrive. A nicotine patch is okay. You may spit out nicotine gum when you arrive.  For some surgeries, the surgeon will tell you to fully quit smoking and nicotine.  We will make every effort to keep you safe from infection. We will:  Clean our hands often with soap and water (or an alcohol-based hand rub).  Clean the skin at your surgery site with a special soap that kills germs.  Give you a special gown to keep you warm. (Cold raises the risk of infection.)  Wear hair covers, masks, gowns, and gloves during surgery.  Give antibiotic medicine, if prescribed. Not all surgeries need this medicine.  What to bring on the day of surgery  Photo ID and insurance card  Copy of your health  care directive, if you have one  Glasses and hearing aids (bring cases)  You can't wear contacts during surgery  Inhaler and eye drops, if you use them (tell us about these when you arrive)  CPAP machine or breathing device, if you use them  A few personal items, if spending the night  If you have . . .  A pacemaker, ICD (cardiac defibrillator), or other implant: Bring the ID card.  An implanted stimulator: Bring the remote control.  A legal guardian: Bring a copy of the certified (court-stamped) guardianship papers.  Please remove any jewelry, including body piercings. Leave jewelry and other valuables at home.  If you're going home the day of surgery  You must have a responsible adult drive you home. They should stay with you overnight as well.  If you don't have someone to stay with you, and you aren't safe to go home alone, we may keep you overnight. Insurance often won't pay for this.  After surgery  If it's hard to control your pain or you need more pain medicine, please call your surgeon's office.  Questions?   If you have any questions for your care team, list them here:   ____________________________________________________________________________________________________________________________________________________________________________________________________________________________________________________________  For informational purposes only. Not to replace the advice of your health care provider. Copyright   2003, 2019 [facility]. All rights reserved. Clinically reviewed by Kendall Hutson MD. TouchMail [id number] - REV 08/24.

## 2025-04-18 NOTE — PROGRESS NOTES
Preoperative Evaluation  [facility] [place]  [address]  [place] [address]  Phone: [phone]  Primary Provider: Sheyla Sanches NP  Pre-op Performing Provider: Sheyla Sanches NP  Apr 21, 2025 4/21/2025   Surgical Information   What procedure is being done? torn meniscus repair   Facility or Hospital where procedure/surgery will be performed: [facility]   Who is doing the procedure / surgery? dr ramirez   Date of surgery / procedure: april 24   Time of surgery / procedure: dont know   Where do you plan to recover after surgery? at home with family     Fax number for surgical facility: Note does not need to be faxed, will be available electronically in Epic.    Assessment & Plan     The proposed surgical procedure is considered INTERMEDIATE risk.    (Z01.818) Preop general physical exam  (primary encounter diagnosis)  (S83.242S) Acute medial meniscal tear, left, sequela  Plan: Recent EKG without acute changes. BMP and CBC unremarkable. Ok to proceed with surgery.     (J45.20) Mild intermittent reactive airway disease without complication  Plan: Controlled. Has never had PFTs. Will update.     (I10) Essential hypertension  Plan: Controlled.     (E11.9) Type 2 diabetes mellitus without complication, without long-term current use of insulin (H)  Plan: A1c 5.8. Controlled.     (E78.5) Hyperlipidemia, unspecified hyperlipidemia type  Plan: Not on statin - does not tolerate.     (F41.1) ZEFERINO (generalized anxiety disorder)  Comment: controlled  Plan: C/W current medications and therapy.     (C50.912,  Z17.0) Malignant neoplasm of left breast in female, estrogen receptor positive, unspecified site of breast (H)  Plan: Stable.    (Z91.040) Latex allergy  Plan: FYI for the surgical team.       Risks and Recommendations  The patient has the following additional risks and recommendations for perioperative complications:  Pulmonary:    - Incentive spirometry post-op    Will hold all  "medications the morning of surgery except her diltiazem.      Stop all ibuprofen and supplements 7 days prior.         Recommendation  Approval given to proceed with proposed procedure, without further diagnostic evaluation.    Rj Rodríguez with a functional capacity of greater than four MET's. There are no obvious contraindications to proceeding with surgery at this time. Recommend that the surgeon review risks and benefits of the procedure prior to proceeding.     Was recommended to avoid eating and drinking anything 12 hours prior to surgery unless his surgeon tells him otherwise.     The longitudinal plan of care for the diagnosis(es)/condition(s) as documented were addressed during this visit. Due to the added complexity in care, I will continue to support Rj in the subsequent management and with ongoing continuity of care.    Subjective   Rj is a 74 year old, presenting for the following:  ER follow up and Pre-Op Exam      HPI: Patient with left knee pain. Recent MRI done.     \"Impression:     Complex tearing of the medial meniscus with a radial tear of the  posterior horn and a horizontal longitudinal tear of the body and  posterior horn.     The lateral meniscus, cruciate ligaments, and collateral ligaments are  intact.     Tricompartmental degenerative change, worst in the patellofemoral and  medial compartments.     Popliteal cyst measuring 3.8 cm.      MADELAINE LUCAS MD \"          4/21/2025   Pre-Op Questionnaire   Have you ever had a heart attack or stroke? No   Have you ever had surgery on your heart or blood vessels, such as a stent placement, a coronary artery bypass, or surgery on an artery in your head, neck, heart, or legs? No   Do you have chest pain with activity? No   Do you have a history of heart failure? No   Do you currently have a cold, bronchitis or symptoms of other infection? No   Do you have a cough, shortness of breath, or wheezing? No   Do you or anyone in your family have " previous history of blood clots? No   Do you or does anyone in your family have a serious bleeding problem such as prolonged bleeding following surgeries or cuts? No   Have you ever had problems with anemia or been told to take iron pills? No   Have you had any abnormal blood loss such as black, tarry or bloody stools, or abnormal vaginal bleeding? No   Have you ever had a blood transfusion? No   Are you willing to have a blood transfusion if it is medically needed before, during, or after your surgery? Yes   Have you or any of your relatives ever had problems with anesthesia? (!) YES, trouble getting waking up   Do you have sleep apnea, excessive snoring or daytime drowsiness? No   Do you have any artifical heart valves or other implanted medical devices like a pacemaker, defibrillator, or continuous glucose monitor? No   Do you have artificial joints? No   Are you allergic to latex? (!) YES       Advance Care Planning  Discussed advance care planning with patient; however, patient declined at this time.    Preoperative Review of    reviewed - no record of controlled substances prescribed.      Status of Chronic Conditions:  DEPRESSION and ANXIETY - Patient has a long history of Depression and Anxiety of moderate severity requiring medication for control with recent symptoms being slightly worse. Current symptoms of depression include none. Uses Klonopin as needed - very rare use. No thoughts of suicide. Working with her counselor.      DIABETES - Patient has a longstanding history of Diabetes Type II . Patient is being treated with diet and exercise and denies significant side effects. Control has been good. Complicating factors include but are not limited to: hypertension. A1C was 5.8 on 7/9/24.      HYPERLIPIDEMIA - Patient has a long history of significant Hyperlipidemia requiring medication for treatment with recent fair control. Does not take a statin due to side effects.      HYPERTENSION/Frequent PVC-  Patient has longstanding history of HTN and palpitations, currently denies any symptoms referable to elevated blood pressure. Specifically denies chest pain, dyspnea, orthopnea, PND or peripheral edema. Blood pressure readings have been in normal range. Current medication regimen is as listed below. Patient denies any side effects of medication. Currently taking Aldactone 25 mg and diltiazem 240 mg. She does follow with cardiology. Was last seen on 12/5/25. Note was reviewed. Stress test was done on 2/10/22 and was negative for reversible ischemia. No longer having palpitations.      ASTHMA - controlled. Rare use of albuterol.      Mets greater than 4, limited by knee pain.      She has had surgery in the past and done well.     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     Priority: Medium    Diabetes mellitus, type 2 (H) 12/06/2019     Priority: Medium    [place] cardiac risk 11% in next 10 years 10/29/2019     Priority: Medium    S/P rotator cuff repair 10/26/2016     Priority: Medium    Mild episode of recurrent major depressive disorder 10/22/2014     Priority: Medium    Migraine with aura and without status migrainosus, not intractable 04/21/2014     Priority: Medium    S/P cervical spinal fusion 03/21/2014     Priority: Medium    Cervicalgia 12/10/2013     Priority: Medium    Vertigo 03/18/2013     Priority: Medium    Tinnitus 03/18/2013     Priority: Medium    Chronic rhinitis 03/18/2013     Priority: Medium    Lumbago 07/10/2012     Priority: Medium    Presbyopia 01/25/2011     Priority: Medium    Primary open-angle glaucoma 01/25/2011     Priority: Medium     Overview:   IMO Update 10/11      ZEFERINO (generalized anxiety disorder) 12/06/2006     Priority: Medium    Essential hypertension 06/02/2004     Priority: Medium     Overview:   IMO Update        Past  Medical History:   Diagnosis Date    Arthritis     Cancer (H)     Chronic back pain     Diabetes (H)     Hypertension     Irregular heart beat     Sleep apnea     Uncomplicated asthma      Past Surgical History:   Procedure Laterality Date    ANESTHESIA OUT OF OR MRI N/A 6/1/2022    Procedure: MRI CERVICAL SPINE;  Surgeon: GENERIC ANESTHESIA PROVIDER;  Location: [facility]    ANESTHESIA OUT OF OR MRI N/A 9/23/2022    Procedure: MRI CERVICAL SPINE;  Surgeon: GENERIC ANESTHESIA PROVIDER;  Location: [facility]    ANESTHESIA OUT OF OR MRI Left 12/7/2023    Procedure: Anesthesia out of OR MRI;  Surgeon: GENERIC ANESTHESIA PROVIDER;  Location: [facility]    APPENDECTOMY      BACK SURGERY      CHOLECYSTECTOMY      COLONOSCOPY  2012    Repeat in 10 years    GYN SURGERY      HYSTERECTOMY      Ovaries    MASTECTOMY SIMPLE BILATERAL, SENTINEL NODE BILATERAL, COMBINED N/A 1/16/2024    Procedure: Left sentinel node biopsy, bilateral mastectomy;  Surgeon: Ethan Molina MD;  Location: [facility]    RELEASE CARPAL TUNNEL      LT    RELEASE CARPAL TUNNEL      RT x2    REVISE SCAR BREAST Bilateral 12/4/2024    Procedure: REVISION, SCAR, BREAST, bilateral scar revision chest wall;  Surgeon: Jeanine Carmen MD;  Location:  OR    SURGICAL RADIOLOGY PROCEDURE N/A 2/12/2016    Procedure: SURGICAL RADIOLOGY PROCEDURE;  Surgeon: Provider, Generic Perianesthesia Nursing;  Location: [facility]    SURGICAL RADIOLOGY PROCEDURE N/A 8/15/2016    Procedure: SURGICAL RADIOLOGY PROCEDURE;  Surgeon: Provider, Generic Perianesthesia Nursing;  Location: [facility]     Current Outpatient Medications   Medication Sig Dispense Refill    acetaminophen (TYLENOL) 325 MG tablet Take 3 tablets (975 mg) by mouth every 6 hours as needed for mild pain.      albuterol (PROAIR HFA/PROVENTIL HFA/VENTOLIN HFA) 108 (90 Base) MCG/ACT inhaler Inhale 2 puffs into the lungs every 6 hours as needed for shortness of breath. 18 g 3    albuterol (PROVENTIL) (2.5 MG/3ML) 0.083% neb solution Take 1  vial (2.5 mg) by nebulization every 6 hours as needed for shortness of breath, wheezing or cough. 25 mL 0    benzonatate (TESSALON) 100 MG capsule Take 1 capsule (100 mg) by mouth 3 times daily as needed for cough. 21 capsule 0    blood glucose (ACCU-CHEK GUIDE) test strip 1 ONCE DAILY USE AS DIRECTED 100 strip 1    blood glucose monitoring (ACCU-CHEK FASTCLIX) lancets Use to test blood sugar 1 time daily or as directed. 102 each 11    calcium carbonate-vitamin D (OSCAL) 500-5 MG-MCG tablet Take 1 tablet by mouth twice daily 90 tablet 0    cetirizine (ZYRTEC) 10 MG tablet Take 10-20 mg by mouth daily (10 mg in the winter, 20 mg in allergy season)      clonazePAM (KLONOPIN) 0.5 MG tablet Take 1 tablet by mouth twice daily as needed for anxiety 30 tablet 0    diltiazem ER (TIAZAC) 240 MG 24 hr ER beaded capsule Take 1 capsule (240 mg) by mouth daily. 90 capsule 2    diphenhydrAMINE HCl (BENADRYL ALLERGY CHILDRENS) 12.5 MG CHEW Take 2 tablets by mouth as needed (allergic reaction)      EPINEPHrine (ANY BX GENERIC EQUIV) 0.3 MG/0.3ML injection 2-pack Inject 0.3 mLs (0.3 mg) into the muscle as needed for anaphylaxis May repeat one time in 5-15 minutes if response to initial dose is inadequate. 2 each 1    ipratropium - albuterol 0.5 mg/2.5 mg/3 mL (DUONEB) 0.5-2.5 (3) MG/3ML neb solution Take 1 vial (3 mLs) by nebulization every 6 hours as needed for shortness of breath, wheezing or cough. 90 mL 0    letrozole (FEMARA) 2.5 MG tablet Take 1 tablet (2.5 mg) by mouth daily 90 tablet 3    Multiple Vitamins-Minerals (MULTIVITAMIN GUMMIES WOMENS) CHEW Take 1 Dose by mouth daily      spironolactone (ALDACTONE) 25 MG tablet Take 1 tablet (25 mg) by mouth daily. 90 tablet 3    triamcinolone (NASACORT) 55 MCG/ACT nasal aerosol Spray 2 sprays into both nostrils daily as needed (seasonal allergies)      lidocaine inhalant 4 % NEBU Take 5 mLs by nebulization every 8 hours as needed for moderate pain. (Patient not taking: Reported on  4/21/2025) 40 mL 0       Allergies   Allergen Reactions    Dust Mite Extract Difficulty breathing    Fruit [Peach] Anaphylaxis and Hives     fresh peaches and any fruit that has a pit.  Kiwi's and apples also.  Can eat any fruit cooked.    Latex Other (See Comments), Swelling, Difficulty breathing and Rash     Throat Closes      Nuts Anaphylaxis and Hives     All Raw Nuts, can eat nuts when roasted.    Other Food Allergy Other (See Comments) and Difficulty breathing     Mackey powder    Pollen Extract Difficulty breathing     All fruits and vegetables need to be washed and rinsed prior to eating.     Ace Inhibitors      Upper respiratory infection    Antihistamines, Chlorpheniramine-Type Hives     Antihistamines    Benicar [Olmesartan] Cough    Effexor [Venlafaxine] Other (See Comments)     Heartburn, reflux    Hydrochlorothiazide     Phenylephrine Hcl      Guaifed    Seasonal Allergies      hayfever    Sm Guaifenesin-Pseudoephedrine [Pseudoephedrine-Guaifenesin]      Guaifed    Statins     Trees     Ultram [Tramadol]      Sleep disturbance. Can not sleep, and when able to fall asleep will have terrible nightmares    Xanax [Alprazolam] Hives     Xanax    Zoloft [Sertraline]      paranoia        Social History     Tobacco Use    Smoking status: Never     Passive exposure: Never    Smokeless tobacco: Never   Substance Use Topics    Alcohol use: Yes     Alcohol/week: 1.0 standard drink of alcohol     Types: 1 Glasses of wine per week     Comment: occ glass of wine     Family History   Problem Relation Age of Onset    Diabetes Mother     Alcoholism Mother     Heart Failure Mother     Cerebrovascular Disease Father     Diabetes Father         Type 2    Hypertension Father     Alcoholism Father     Colon Cancer Maternal Aunt     Colon Cancer Maternal Uncle      History   Drug Use No             Review of Systems  CONSTITUTIONAL: NEGATIVE for fever, chills, change in weight  INTEGUMENTARY/SKIN: NEGATIVE for worrisome  "rashes, moles or lesions  EYES: NEGATIVE for vision changes or irritation  ENT/MOUTH: NEGATIVE for ear, mouth and throat problems  RESP: NEGATIVE for significant cough or SOB  BREAST: NEGATIVE for masses, tenderness or discharge  CV: NEGATIVE for chest pain, palpitations or peripheral edema  GI: NEGATIVE for nausea, abdominal pain, heartburn, or change in bowel habits  : NEGATIVE for frequency, dysuria, or hematuria  MUSCULOSKELETAL: NEGATIVE for significant arthralgias or myalgia  NEURO: NEGATIVE for weakness, dizziness or paresthesias  ENDOCRINE: NEGATIVE for temperature intolerance, skin/hair changes  HEME: NEGATIVE for bleeding problems  PSYCHIATRIC: NEGATIVE for changes in mood or affect    Objective    /58 (BP Location: Right arm, Patient Position: Sitting, Cuff Size: Adult Regular)   Pulse 58   Temp 96.8  F (36  C) (Tympanic)   Resp 24   Wt 82.3 kg (181 lb 6.4 oz)   LMP  (LMP Unknown)   SpO2 97%   BMI 34.28 kg/m     Estimated body mass index is 34.28 kg/m  as calculated from the following:    Height as of 2/20/25: 1.549 m (5' 1\").    Weight as of this encounter: 82.3 kg (181 lb 6.4 oz).  Physical Exam  GENERAL: alert and no distress, overweight  EYES: Eyes grossly normal to inspection, PERRL and conjunctivae and sclerae normal  HENT: ear canals and TM's normal, nose and mouth without ulcers or lesions  NECK: no adenopathy, no asymmetry, masses, or scars  RESP: lungs clear to auscultation - no rales, rhonchi or wheezes  CV: regular rate and rhythm, no murmur, click or rub, no peripheral edema  ABDOMEN: soft, nontender, no hepatosplenomegaly, no masses and bowel sounds normal  MS: no gross musculoskeletal defects noted, no edema  NEURO: Normal strength and tone, mentation intact and speech normal  PSYCH: mentation appears normal, affect normal/bright  Diabetic foot exam: normal DP and PT pulses, no trophic changes or ulcerative lesions, and normal sensory exam      Recent Labs   Lab Test " 02/25/25  1132 01/21/25  1320 07/23/24  1359 07/09/24  1039   HGB 13.8 14.3   < >  --     297   < >  --    INR 0.97  --   --   --      --   --  139   POTASSIUM 3.5  --   --  4.3   CR 0.85  --   --  0.87   A1C  --   --   --  5.8*    < > = values in this interval not displayed.        Diagnostics  Recent Results (from the past 24 hours)   Albumin Random Urine Quantitative with Creat Ratio    Collection Time: 04/21/25 12:47 PM   Result Value Ref Range    Creatinine Urine mg/dL 104.6 mg/dL    Albumin Urine mg/L <12.0 mg/L    Albumin Urine mg/g Cr     Hepatic panel (Albumin, ALT, AST, Bili, Alk Phos, TP)    Collection Time: 04/21/25 12:48 PM   Result Value Ref Range    Protein Total 7.8 6.4 - 8.3 g/dL    Albumin 4.2 3.5 - 5.2 g/dL    Bilirubin Total 0.9 <=1.2 mg/dL    Alkaline Phosphatase 81 40 - 150 U/L    AST 24 0 - 45 U/L    ALT 22 0 - 50 U/L    Bilirubin Direct 0.21 0.00 - 0.30 mg/dL   CBC with platelets and differential    Collection Time: 04/21/25 12:48 PM   Result Value Ref Range    WBC Count 10.5 4.0 - 11.0 10e3/uL    RBC Count 4.71 3.80 - 5.20 10e6/uL    Hemoglobin 14.4 11.7 - 15.7 g/dL    Hematocrit 42.4 35.0 - 47.0 %    MCV 90 78 - 100 fL    MCH 30.6 26.5 - 33.0 pg    MCHC 34.0 31.5 - 36.5 g/dL    RDW 12.9 10.0 - 15.0 %    Platelet Count 279 150 - 450 10e3/uL    % Neutrophils 63 %    % Lymphocytes 22 %    % Monocytes 11 %    % Eosinophils 3 %    % Basophils 1 %    % Immature Granulocytes 1 %    NRBCs per 100 WBC 0 <1 /100    Absolute Neutrophils 6.7 1.6 - 8.3 10e3/uL    Absolute Lymphocytes 2.3 0.8 - 5.3 10e3/uL    Absolute Monocytes 1.2 0.0 - 1.3 10e3/uL    Absolute Eosinophils 0.3 0.0 - 0.7 10e3/uL    Absolute Basophils 0.1 0.0 - 0.2 10e3/uL    Absolute Immature Granulocytes 0.1 <=0.4 10e3/uL    Absolute NRBCs 0.0 10e3/uL   Basic metabolic panel    Collection Time: 04/21/25 12:48 PM   Result Value Ref Range    Sodium 138 135 - 145 mmol/L    Potassium 4.3 3.4 - 5.3 mmol/L    Chloride 101 98 - 107  mmol/L    Carbon Dioxide (CO2) 22 22 - 29 mmol/L    Anion Gap 15 7 - 15 mmol/L    Urea Nitrogen 18.9 8.0 - 23.0 mg/dL    Creatinine 0.90 0.51 - 0.95 mg/dL    GFR Estimate 68 >60 mL/min/1.73m2    Calcium 9.9 8.8 - 10.4 mg/dL    Glucose 102 (H) 70 - 99 mg/dL   Hemoglobin A1c    Collection Time: 04/21/25 12:48 PM   Result Value Ref Range    Estimated Average Glucose 120 (H) <117 mg/dL    Hemoglobin A1C 5.8 (H) <5.7 %        EKG done on 2/5/25 - sinus rhythm. VR 63. No acute changes.     Revised Cardiac Risk Index (RCRI)  The patient has the following serious cardiovascular risks for perioperative complications:   - No serious cardiac risks = 0 points     RCRI Interpretation: 0 points: Class I (very low risk - 0.4% complication rate)         Signed Electronically by: Sheyla Sanches NP  A copy of this evaluation report is provided to the requesting physician.      Answers submitted by the patient for this visit:  Patient Health Questionnaire (Submitted on 4/21/2025)  PHQ9 TOTAL SCORE: 2  Patient Health Questionnaire (G7) (Submitted on 4/21/2025)  ZEFERINO 7 TOTAL SCORE: 0

## 2025-04-21 ENCOUNTER — TELEPHONE (OUTPATIENT)
Dept: FAMILY MEDICINE | Facility: OTHER | Age: 75
End: 2025-04-21

## 2025-04-21 ENCOUNTER — OFFICE VISIT (OUTPATIENT)
Dept: FAMILY MEDICINE | Facility: OTHER | Age: 75
End: 2025-04-21
Attending: NURSE PRACTITIONER
Payer: COMMERCIAL

## 2025-04-21 ENCOUNTER — APPOINTMENT (OUTPATIENT)
Dept: LAB | Facility: OTHER | Age: 75
End: 2025-04-21
Payer: MEDICARE

## 2025-04-21 VITALS
BODY MASS INDEX: 34.28 KG/M2 | DIASTOLIC BLOOD PRESSURE: 58 MMHG | WEIGHT: 181.4 LBS | RESPIRATION RATE: 24 BRPM | SYSTOLIC BLOOD PRESSURE: 126 MMHG | OXYGEN SATURATION: 97 % | HEART RATE: 58 BPM | TEMPERATURE: 96.8 F

## 2025-04-21 DIAGNOSIS — J45.20 MILD INTERMITTENT REACTIVE AIRWAY DISEASE WITHOUT COMPLICATION: ICD-10-CM

## 2025-04-21 DIAGNOSIS — C50.912 MALIGNANT NEOPLASM OF LEFT BREAST IN FEMALE, ESTROGEN RECEPTOR POSITIVE, UNSPECIFIED SITE OF BREAST (H): ICD-10-CM

## 2025-04-21 DIAGNOSIS — F41.1 GAD (GENERALIZED ANXIETY DISORDER): ICD-10-CM

## 2025-04-21 DIAGNOSIS — I10 ESSENTIAL HYPERTENSION: ICD-10-CM

## 2025-04-21 DIAGNOSIS — E78.5 HYPERLIPIDEMIA, UNSPECIFIED HYPERLIPIDEMIA TYPE: ICD-10-CM

## 2025-04-21 DIAGNOSIS — S83.242S ACUTE MEDIAL MENISCAL TEAR, LEFT, SEQUELA: ICD-10-CM

## 2025-04-21 DIAGNOSIS — E11.9 TYPE 2 DIABETES MELLITUS WITHOUT COMPLICATION, WITHOUT LONG-TERM CURRENT USE OF INSULIN (H): ICD-10-CM

## 2025-04-21 DIAGNOSIS — Z91.040 LATEX ALLERGY: ICD-10-CM

## 2025-04-21 DIAGNOSIS — Z17.0 MALIGNANT NEOPLASM OF LEFT BREAST IN FEMALE, ESTROGEN RECEPTOR POSITIVE, UNSPECIFIED SITE OF BREAST (H): ICD-10-CM

## 2025-04-21 DIAGNOSIS — Z01.818 PREOP GENERAL PHYSICAL EXAM: Primary | ICD-10-CM

## 2025-04-21 LAB
ALBUMIN SERPL BCG-MCNC: 4.2 G/DL (ref 3.5–5.2)
ALP SERPL-CCNC: 81 U/L (ref 40–150)
ALT SERPL W P-5'-P-CCNC: 22 U/L (ref 0–50)
ANION GAP SERPL CALCULATED.3IONS-SCNC: 15 MMOL/L (ref 7–15)
AST SERPL W P-5'-P-CCNC: 24 U/L (ref 0–45)
BASOPHILS # BLD AUTO: 0.1 10E3/UL (ref 0–0.2)
BASOPHILS NFR BLD AUTO: 1 %
BILIRUB DIRECT SERPL-MCNC: 0.21 MG/DL (ref 0–0.3)
BILIRUB SERPL-MCNC: 0.9 MG/DL
BUN SERPL-MCNC: 18.9 MG/DL (ref 8–23)
CALCIUM SERPL-MCNC: 9.9 MG/DL (ref 8.8–10.4)
CHLORIDE SERPL-SCNC: 101 MMOL/L (ref 98–107)
CREAT SERPL-MCNC: 0.9 MG/DL (ref 0.51–0.95)
CREAT UR-MCNC: 104.6 MG/DL
EGFRCR SERPLBLD CKD-EPI 2021: 68 ML/MIN/1.73M2
EOSINOPHIL # BLD AUTO: 0.3 10E3/UL (ref 0–0.7)
EOSINOPHIL NFR BLD AUTO: 3 %
ERYTHROCYTE [DISTWIDTH] IN BLOOD BY AUTOMATED COUNT: 12.9 % (ref 10–15)
EST. AVERAGE GLUCOSE BLD GHB EST-MCNC: 120 MG/DL
GLUCOSE SERPL-MCNC: 102 MG/DL (ref 70–99)
HBA1C MFR BLD: 5.8 %
HCO3 SERPL-SCNC: 22 MMOL/L (ref 22–29)
HCT VFR BLD AUTO: 42.4 % (ref 35–47)
HGB BLD-MCNC: 14.4 G/DL (ref 11.7–15.7)
IMM GRANULOCYTES # BLD: 0.1 10E3/UL
IMM GRANULOCYTES NFR BLD: 1 %
LYMPHOCYTES # BLD AUTO: 2.3 10E3/UL (ref 0.8–5.3)
LYMPHOCYTES NFR BLD AUTO: 22 %
MCH RBC QN AUTO: 30.6 PG (ref 26.5–33)
MCHC RBC AUTO-ENTMCNC: 34 G/DL (ref 31.5–36.5)
MCV RBC AUTO: 90 FL (ref 78–100)
MICROALBUMIN UR-MCNC: <12 MG/L
MICROALBUMIN/CREAT UR: NORMAL MG/G{CREAT}
MONOCYTES # BLD AUTO: 1.2 10E3/UL (ref 0–1.3)
MONOCYTES NFR BLD AUTO: 11 %
NEUTROPHILS # BLD AUTO: 6.7 10E3/UL (ref 1.6–8.3)
NEUTROPHILS NFR BLD AUTO: 63 %
NRBC # BLD AUTO: 0 10E3/UL
NRBC BLD AUTO-RTO: 0 /100
PLATELET # BLD AUTO: 279 10E3/UL (ref 150–450)
POTASSIUM SERPL-SCNC: 4.3 MMOL/L (ref 3.4–5.3)
PROT SERPL-MCNC: 7.8 G/DL (ref 6.4–8.3)
RBC # BLD AUTO: 4.71 10E6/UL (ref 3.8–5.2)
SODIUM SERPL-SCNC: 138 MMOL/L (ref 135–145)
WBC # BLD AUTO: 10.5 10E3/UL (ref 4–11)

## 2025-04-21 PROCEDURE — 83036 HEMOGLOBIN GLYCOSYLATED A1C: CPT | Mod: ZL | Performed by: NURSE PRACTITIONER

## 2025-04-21 PROCEDURE — 82248 BILIRUBIN DIRECT: CPT | Mod: ZL | Performed by: NURSE PRACTITIONER

## 2025-04-21 PROCEDURE — 1126F AMNT PAIN NOTED NONE PRSNT: CPT | Performed by: NURSE PRACTITIONER

## 2025-04-21 PROCEDURE — 82310 ASSAY OF CALCIUM: CPT | Mod: ZL | Performed by: NURSE PRACTITIONER

## 2025-04-21 PROCEDURE — 3074F SYST BP LT 130 MM HG: CPT | Performed by: NURSE PRACTITIONER

## 2025-04-21 PROCEDURE — 80076 HEPATIC FUNCTION PANEL: CPT | Mod: ZL | Performed by: NURSE PRACTITIONER

## 2025-04-21 PROCEDURE — 3078F DIAST BP <80 MM HG: CPT | Performed by: NURSE PRACTITIONER

## 2025-04-21 PROCEDURE — 36415 COLL VENOUS BLD VENIPUNCTURE: CPT | Mod: ZL | Performed by: NURSE PRACTITIONER

## 2025-04-21 PROCEDURE — 99214 OFFICE O/P EST MOD 30 MIN: CPT | Performed by: NURSE PRACTITIONER

## 2025-04-21 PROCEDURE — 82947 ASSAY GLUCOSE BLOOD QUANT: CPT | Mod: ZL | Performed by: NURSE PRACTITIONER

## 2025-04-21 PROCEDURE — 82570 ASSAY OF URINE CREATININE: CPT | Mod: ZL | Performed by: NURSE PRACTITIONER

## 2025-04-21 PROCEDURE — G2211 COMPLEX E/M VISIT ADD ON: HCPCS | Performed by: NURSE PRACTITIONER

## 2025-04-21 PROCEDURE — G0463 HOSPITAL OUTPT CLINIC VISIT: HCPCS

## 2025-04-21 PROCEDURE — 85004 AUTOMATED DIFF WBC COUNT: CPT | Mod: ZL | Performed by: NURSE PRACTITIONER

## 2025-04-21 ASSESSMENT — ASTHMA QUESTIONNAIRES
ACT_TOTALSCORE: 25
QUESTION_4 LAST FOUR WEEKS HOW OFTEN HAVE YOU USED YOUR RESCUE INHALER OR NEBULIZER MEDICATION (SUCH AS ALBUTEROL): NOT AT ALL
QUESTION_3 LAST FOUR WEEKS HOW OFTEN DID YOUR ASTHMA SYMPTOMS (WHEEZING, COUGHING, SHORTNESS OF BREATH, CHEST TIGHTNESS OR PAIN) WAKE YOU UP AT NIGHT OR EARLIER THAN USUAL IN THE MORNING: NOT AT ALL
EMERGENCY_ROOM_LAST_YEAR_TOTAL: THREE OR MORE
QUESTION_2 LAST FOUR WEEKS HOW OFTEN HAVE YOU HAD SHORTNESS OF BREATH: NOT AT ALL
QUESTION_5 LAST FOUR WEEKS HOW WOULD YOU RATE YOUR ASTHMA CONTROL: COMPLETELY CONTROLLED
QUESTION_1 LAST FOUR WEEKS HOW MUCH OF THE TIME DID YOUR ASTHMA KEEP YOU FROM GETTING AS MUCH DONE AT WORK, SCHOOL OR AT HOME: NONE OF THE TIME

## 2025-04-21 ASSESSMENT — PATIENT HEALTH QUESTIONNAIRE - PHQ9
SUM OF ALL RESPONSES TO PHQ QUESTIONS 1-9: 2
SUM OF ALL RESPONSES TO PHQ QUESTIONS 1-9: 2

## 2025-04-21 ASSESSMENT — ANXIETY QUESTIONNAIRES
8. IF YOU CHECKED OFF ANY PROBLEMS, HOW DIFFICULT HAVE THESE MADE IT FOR YOU TO DO YOUR WORK, TAKE CARE OF THINGS AT HOME, OR GET ALONG WITH OTHER PEOPLE?: NOT DIFFICULT AT ALL
GAD7 TOTAL SCORE: 0
5. BEING SO RESTLESS THAT IT IS HARD TO SIT STILL: NOT AT ALL
GAD7 TOTAL SCORE: 0
2. NOT BEING ABLE TO STOP OR CONTROL WORRYING: NOT AT ALL
7. FEELING AFRAID AS IF SOMETHING AWFUL MIGHT HAPPEN: NOT AT ALL
4. TROUBLE RELAXING: NOT AT ALL
GAD7 TOTAL SCORE: 0
1. FEELING NERVOUS, ANXIOUS, OR ON EDGE: NOT AT ALL
IF YOU CHECKED OFF ANY PROBLEMS ON THIS QUESTIONNAIRE, HOW DIFFICULT HAVE THESE PROBLEMS MADE IT FOR YOU TO DO YOUR WORK, TAKE CARE OF THINGS AT HOME, OR GET ALONG WITH OTHER PEOPLE: NOT DIFFICULT AT ALL
3. WORRYING TOO MUCH ABOUT DIFFERENT THINGS: NOT AT ALL
6. BECOMING EASILY ANNOYED OR IRRITABLE: NOT AT ALL
7. FEELING AFRAID AS IF SOMETHING AWFUL MIGHT HAPPEN: NOT AT ALL

## 2025-04-21 ASSESSMENT — PAIN SCALES - GENERAL: PAINLEVEL_OUTOF10: NO PAIN (0)

## 2025-04-30 ENCOUNTER — THERAPY VISIT (OUTPATIENT)
Dept: PHYSICAL THERAPY | Facility: HOSPITAL | Age: 75
End: 2025-04-30
Attending: NURSE PRACTITIONER
Payer: MEDICARE

## 2025-04-30 DIAGNOSIS — M25.562 LEFT KNEE PAIN: Primary | ICD-10-CM

## 2025-04-30 PROCEDURE — 97110 THERAPEUTIC EXERCISES: CPT | Mod: GP

## 2025-04-30 PROCEDURE — 97016 VASOPNEUMATIC DEVICE THERAPY: CPT | Mod: GP

## 2025-04-30 PROCEDURE — 97161 PT EVAL LOW COMPLEX 20 MIN: CPT | Mod: GP

## 2025-04-30 ASSESSMENT — ACTIVITIES OF DAILY LIVING (ADL)
KNEE_ACTIVITY_OF_DAILY_LIVING_SUM: 13
STAND: I AM UNABLE TO DO THE ACTIVITY
GO UP STAIRS: ACTIVITY IS VERY DIFFICULT
WALK: I AM UNABLE TO DO THE ACTIVITY
KNEE_ACTIVITY_OF_DAILY_LIVING_SCORE: 18.57
LIMPING: I DO NOT HAVE THE SYMPTOM
SQUAT: I AM UNABLE TO DO THE ACTIVITY
SWELLING: THE SYMPTOM PREVENTS ME FROM ALL DAILY ACTIVITIES
HOW_WOULD_YOU_RATE_THE_CURRENT_FUNCTION_OF_YOUR_KNEE_DURING_YOUR_USUAL_DAILY_ACTIVITIES_ON_A_SCALE_FROM_0_TO_100_WITH_100_BEING_YOUR_LEVEL_OF_KNEE_FUNCTION_PRIOR_TO_YOUR_INJURY_AND_0_BEING_THE_INABILITY_TO_PERFORM_ANY_OF_YOUR_USUAL_DAILY_ACTIVITIES?: 0
AS_A_RESULT_OF_YOUR_KNEE_INJURY,_HOW_WOULD_YOU_RATE_YOUR_CURRENT_LEVEL_OF_DAILY_ACTIVITY?: SEVERELY ABNORMAL
SIT WITH YOUR KNEE BENT: I AM UNABLE TO DO THE ACTIVITY
GO DOWN STAIRS: ACTIVITY IS VERY DIFFICULT
PAIN: THE SYMPTOM PREVENTS ME FROM ALL DAILY ACTIVITIES
HOW_WOULD_YOU_RATE_THE_OVERALL_FUNCTION_OF_YOUR_KNEE_DURING_YOUR_USUAL_DAILY_ACTIVITIES?: SEVERELY ABNORMAL
STIFFNESS: THE SYMPTOM PREVENTS ME FROM ALL DAILY ACTIVITIES
RAW_SCORE: 13
GIVING WAY, BUCKLING OR SHIFTING OF KNEE: I DO NOT HAVE THE SYMPTOM
RISE FROM A CHAIR: ACTIVITY IS VERY DIFFICULT
WEAKNESS: THE SYMPTOM PREVENTS ME FROM ALL DAILY ACTIVITIES
KNEEL ON THE FRONT OF YOUR KNEE: I AM UNABLE TO DO THE ACTIVITY

## 2025-04-30 NOTE — PROGRESS NOTES
PHYSICAL THERAPY EVALUATION  Type of Visit: Evaluation       Fall Risk Screen:  Have you fallen 2 or more times in the past year?: No  Have you fallen and had an injury in the past year?: No    Subjective         Presenting condition or subjective complaint: Pt presents to PT s/p L knee arthroscopy with posterior horn medial meniscus root repair, partial medial meniscectomy, limited synovectomy in the anterior compartment and resection of medial plica on 4/24/25. Pt reports some pain control issues since surgery. Reports she does not like taking pain medications. She has been  using ice and OTC pain meds. She has been ambulating with walker or crutches short distances. She is WBAT in knee immobilizer. She is restricted to 90 degrees of flexion until 5/22/25.  Date of onset: 04/30/25    Relevant medical history: Asthma; Cancer; Concussions; Diabetes; Fibromyalgia; High blood pressure; Migraines or headaches; Overweight   Dates & types of surgery: carpal tunnel hysterectomy spine fusions shoulder repair knee repair  L knee arthroscopy with posterior horn medial meniscus root repair, partial medial meniscectomy, limited synovectomy in the anterior compartment and resection of medial plica on 4/24/25  No squatting past 90 degrees for 4 months  No running or impact for 4 months   Knee immobilizer 4-6 weeks, okay to wean once quad function sufficient     Prior diagnostic imaging/testing results: MRI     Prior therapy history for the same diagnosis, illness or injury: No      Prior Level of Function  Transfers:   Ambulation:   ADL:   IADL:     Living Environment  Social support: With a significant other or spouse   Type of home: House; 2-story; Basement   Stairs to enter the home: Yes 4 Is there a railing: Yes     Ramp: No   Stairs inside the home: Yes 13 Is there a railing: Yes     Help at home: Self Cares (home health aide/personal care attendant, family, etc); Assist for driving and community activities  Equipment  owned: Crutches     Employment: No    Hobbies/Interests: sewing gardening family    Patient goals for therapy: walk    Pain assessment: Pain present     Objective   KNEE EVALUATION  PAIN: Pain Level at Rest: 10/10  Pain Level with Use: 3/10  Pain Location: knee  Pain Quality: Aching  Pain Frequency: constant  Pain is Worst: daytime or nighttime  Pain is Exacerbated By: Sleeping, movement  Pain is Relieved By: cold and otc medications  Pain Progression: Improved  INTEGUMENTARY (edema, incisions): No redness or signs of infection at incisions  GAIT:  Weightbearing Status: WBAT  Assistive Device(s): Crutches (bilateral)  Gait Deviations: Antalgic  Knee in immobilizer in full extension    BALANCE/PROPRIOCEPTION: NT  ROM:   (Degrees) Left AROM Left PROM  Right AROM Right PROM   Knee Flexion 60 degrees      Knee Extension 10 degrees      Pain:   End feel:   STRENGTH:  Functional weakness demonstrated , Fair quad set   FLEXIBILITY:   SPECIAL TESTS: N/A  FUNCTIONAL TESTS:     Assessment & Plan   CLINICAL IMPRESSIONS  Medical Diagnosis: s/p L knee arthroscopy with posterior horn medial meniscus root repair, partial medial meniscectomy, limited synovectomy in the anterior compartment and resection of medial plica    Treatment Diagnosis: s/p L knee arthroscopy with posterior horn medial meniscus root repair, partial medial meniscectomy, limited synovectomy in the anterior compartment and resection of medial plica   Impression/Assessment: Patient is a 74 year old female  s/p L knee  The following significant findings have been identified: Pain, Decreased ROM/flexibility, Decreased joint mobility, Decreased strength, Impaired gait, and Impaired muscle performance. These impairments interfere with their ability to perform self care tasks, recreational activities, household chores, household mobility, and community mobility as compared to previous level of function.     Clinical Decision Making (Complexity):  Clinical  Presentation: Stable/Uncomplicated  Clinical Presentation Rationale: based on medical and personal factors listed in PT evaluation  Clinical Decision Making (Complexity): Low complexity    PLAN OF CARE  Treatment Interventions:  Modalities: Cryotherapy, E-stim, Vasoneumatic Device  Interventions: Gait Training, Manual Therapy, Neuromuscular Re-education, Therapeutic Activity, Therapeutic Exercise    Long Term Goals     PT Goal 1  Goal Identifier: STG 1  Goal Description: Pt will demonstrate knowledge/understanding of HEP and report daily compliance  Target Date: 05/14/25  PT Goal 2  Goal Identifier: LTG 1  Goal Description: Pt will ambulate home and community distances without being limited by L knee pain  Target Date: 07/29/25  PT Goal 3  Goal Identifier: LTG2  Goal Description: Pt will complete all daily activities without being limited by impaired L knee ROM or weakness  Target Date: 07/29/25      Frequency of Treatment: 1-2x/week  Duration of Treatment: 12 weeks    Recommended Referrals to Other Professionals:   Education Assessment:        Risks and benefits of evaluation/treatment have been explained.   Patient/Family/caregiver agrees with Plan of Care.     Evaluation Time:     PT Eval, Low Complexity Minutes (26866): 15     Signing Clinician: Madai Mckay, PT        [facility]                                                                                   OUTPATIENT PHYSICAL THERAPY      PLAN OF TREATMENT FOR OUTPATIENT REHABILITATION   Patient's Last Name, First Name, ARLEENRj Segura YOB: 1950   Provider's Name   [facility]   Medical Record No.  [id number]     Onset Date: 04/30/25  Start of Care Date: 04/30/25     Medical Diagnosis:  s/p L knee arthroscopy with posterior horn medial meniscus root repair, partial medial meniscectomy, limited synovectomy in the anterior compartment and resection of medial plica      PT  Treatment Diagnosis:  s/p L knee arthroscopy with posterior horn medial meniscus root repair, partial medial meniscectomy, limited synovectomy in the anterior compartment and resection of medial plica Plan of Treatment  Frequency/Duration: 1-2x/week/ 12 weeks    Certification date from 04/30/25 to 07/29/25         See note for plan of treatment details and functional goals     Madai Mckay, PT                       I certify the need for these services furnished under this plan of treatment and while under my care. (Physician co-signature of this document indicates review and certification of the therapy plan).                  Referring Provider:  Laverne Correa CNP    Initial Assessment  See Epic Evaluation- Start of Care Date: 04/30/25

## 2025-05-06 ENCOUNTER — TRANSFERRED RECORDS (OUTPATIENT)
Dept: HEALTH INFORMATION MANAGEMENT | Facility: CLINIC | Age: 75
End: 2025-05-06
Payer: COMMERCIAL

## 2025-05-07 ENCOUNTER — THERAPY VISIT (OUTPATIENT)
Dept: PHYSICAL THERAPY | Facility: HOSPITAL | Age: 75
End: 2025-05-07
Attending: NURSE PRACTITIONER
Payer: MEDICARE

## 2025-05-07 DIAGNOSIS — M25.562 LEFT KNEE PAIN: Primary | ICD-10-CM

## 2025-05-07 PROCEDURE — 97110 THERAPEUTIC EXERCISES: CPT | Mod: GP,CQ

## 2025-05-07 PROCEDURE — 97016 VASOPNEUMATIC DEVICE THERAPY: CPT | Mod: GP,CQ

## 2025-05-08 DIAGNOSIS — Z17.0 MALIGNANT NEOPLASM OF LEFT BREAST IN FEMALE, ESTROGEN RECEPTOR POSITIVE, UNSPECIFIED SITE OF BREAST (H): ICD-10-CM

## 2025-05-08 DIAGNOSIS — C50.912 MALIGNANT NEOPLASM OF LEFT BREAST IN FEMALE, ESTROGEN RECEPTOR POSITIVE, UNSPECIFIED SITE OF BREAST (H): ICD-10-CM

## 2025-05-08 NOTE — TELEPHONE ENCOUNTER
calcium carbonate-vitamin D (OSCAL) 500-5 MG-MCG tablet         Last Written Prescription Date:  3/10/25  Last Fill Quantity: 90,   # refills: 0  Last Office Visit: 4/21/25  Future Office visit:    Next 5 appointments (look out 90 days)      Jul 22, 2025 2:40 PM  (Arrive by 2:25 PM)  Return Visit with NORMAN Mauro CNP  [facility] ([facility] ) [address] [place] [id number]  [phone]             Routing refill request to provider for review/approval because:  Vitamin Supplements Protocol Failed      Medication indicated for associated diagnosis    The medication is prescribed for one or more of the following conditions:                Vitamin deficiency              Osteopenia              Osteoporosis              Nutrition counseling              Nutrition education              Feeding ability finding              Bone density finding              Morbid Obesity              History of bypass of stomach              Idiopathic peripheral neuropathy              General examination of patient              Vitamin D deficiency              Folate deficiency anemia due to dietary causes              Sleeve resection of stomach              Rheumatoid factor level - finding              Crohn's disease              Psoriatic arthritis              Transplant of Kidney              Arthropathy              Alcoholism              Malabsorption syndrome              Disorder of bone and articular cartilage              Cystic Fibrosis  Bronchiectasis

## 2025-05-14 ENCOUNTER — THERAPY VISIT (OUTPATIENT)
Dept: PHYSICAL THERAPY | Facility: HOSPITAL | Age: 75
End: 2025-05-14
Attending: NURSE PRACTITIONER
Payer: MEDICARE

## 2025-05-14 DIAGNOSIS — M25.562 ACUTE PAIN OF LEFT KNEE: Primary | ICD-10-CM

## 2025-05-14 PROCEDURE — 97110 THERAPEUTIC EXERCISES: CPT | Mod: GP,CQ

## 2025-05-20 ENCOUNTER — HOSPITAL ENCOUNTER (OUTPATIENT)
Dept: RESPIRATORY THERAPY | Facility: HOSPITAL | Age: 75
Discharge: HOME | End: 2025-05-20
Attending: NURSE PRACTITIONER
Payer: MEDICARE

## 2025-05-20 DIAGNOSIS — J45.20 MILD INTERMITTENT REACTIVE AIRWAY DISEASE WITHOUT COMPLICATION: ICD-10-CM

## 2025-05-20 LAB
DLCOCOR-%PRED-PRE: 112 %
DLCOCOR-PRE: 19.5 ML/MIN/MMHG
DLCOUNC-%PRED-PRE: 115 %
DLCOUNC-PRE: 20.08 ML/MIN/MMHG
DLCOUNC-PRED: 17.4 ML/MIN/MMHG
ERV-%PRED-PRE: 93 %
ERV-PRE: 0.79 L
ERV-PRED: 0.84 L
EXPTIME-PRE: 4.36 SEC
FEF2575-%PRED-PRE: 132 %
FEF2575-PRE: 2.1 L/SEC
FEF2575-PRED: 1.58 L/SEC
FEFMAX-%PRED-PRE: 66 %
FEFMAX-PRE: 3.31 L/SEC
FEFMAX-PRED: 5 L/SEC
FEV1-%PRED-PRE: 106 %
FEV1-PRE: 1.91 L
FEV1FEV6-PRE: 82 %
FEV1FEV6-PRED: 79 %
FEV1FVC-PRE: 82 %
FEV1FVC-PRED: 79 %
FEV1SVC-PRE: 79 %
FEV1SVC-PRED: 65 %
FIFMAX-PRE: 2.41 L/SEC
FVC-%PRED-PRE: 102 %
FVC-PRE: 2.35 L
FVC-PRED: 2.29 L
IC-%PRED-PRE: 96 %
IC-PRE: 1.63 L
IC-PRED: 1.68 L
VA-%PRED-PRE: 96 %
VA-PRE: 3.95 L
VC-%PRED-PRE: 87 %
VC-PRE: 2.41 L
VC-PRED: 2.75 L

## 2025-05-20 PROCEDURE — 94726 PLETHYSMOGRAPHY LUNG VOLUMES: CPT

## 2025-05-20 PROCEDURE — 94729 DIFFUSING CAPACITY: CPT

## 2025-05-20 PROCEDURE — 94010 BREATHING CAPACITY TEST: CPT

## 2025-05-21 ENCOUNTER — THERAPY VISIT (OUTPATIENT)
Dept: PHYSICAL THERAPY | Facility: HOSPITAL | Age: 75
End: 2025-05-21
Attending: NURSE PRACTITIONER
Payer: MEDICARE

## 2025-05-21 DIAGNOSIS — M25.562 ACUTE PAIN OF LEFT KNEE: Primary | ICD-10-CM

## 2025-05-21 PROCEDURE — 97110 THERAPEUTIC EXERCISES: CPT | Mod: GP,CQ

## 2025-05-27 ENCOUNTER — TRANSFERRED RECORDS (OUTPATIENT)
Dept: HEALTH INFORMATION MANAGEMENT | Facility: CLINIC | Age: 75
End: 2025-05-27
Payer: COMMERCIAL

## 2025-05-28 ENCOUNTER — THERAPY VISIT (OUTPATIENT)
Dept: PHYSICAL THERAPY | Facility: HOSPITAL | Age: 75
End: 2025-05-28
Attending: NURSE PRACTITIONER
Payer: MEDICARE

## 2025-05-28 DIAGNOSIS — M25.562 ACUTE PAIN OF LEFT KNEE: Primary | ICD-10-CM

## 2025-05-28 DIAGNOSIS — M25.562 LEFT KNEE PAIN: ICD-10-CM

## 2025-05-28 PROCEDURE — 97110 THERAPEUTIC EXERCISES: CPT | Mod: GP,CQ

## 2025-05-28 PROCEDURE — 97530 THERAPEUTIC ACTIVITIES: CPT | Mod: GP,CQ

## 2025-06-04 ENCOUNTER — THERAPY VISIT (OUTPATIENT)
Dept: PHYSICAL THERAPY | Facility: HOSPITAL | Age: 75
End: 2025-06-04
Attending: NURSE PRACTITIONER
Payer: MEDICARE

## 2025-06-04 DIAGNOSIS — M25.562 LEFT KNEE PAIN: ICD-10-CM

## 2025-06-04 DIAGNOSIS — M25.562 ACUTE PAIN OF LEFT KNEE: Primary | ICD-10-CM

## 2025-06-04 PROCEDURE — 97110 THERAPEUTIC EXERCISES: CPT | Mod: GP,CQ

## 2025-06-11 ENCOUNTER — THERAPY VISIT (OUTPATIENT)
Dept: PHYSICAL THERAPY | Facility: HOSPITAL | Age: 75
End: 2025-06-11
Attending: NURSE PRACTITIONER
Payer: MEDICARE

## 2025-06-11 DIAGNOSIS — M25.562 ACUTE PAIN OF LEFT KNEE: Primary | ICD-10-CM

## 2025-06-11 PROCEDURE — 97110 THERAPEUTIC EXERCISES: CPT | Mod: GP

## 2025-06-24 ENCOUNTER — TRANSFERRED RECORDS (OUTPATIENT)
Dept: HEALTH INFORMATION MANAGEMENT | Facility: CLINIC | Age: 75
End: 2025-06-24
Payer: COMMERCIAL

## 2025-06-25 ENCOUNTER — THERAPY VISIT (OUTPATIENT)
Dept: PHYSICAL THERAPY | Facility: HOSPITAL | Age: 75
End: 2025-06-25
Attending: NURSE PRACTITIONER
Payer: MEDICARE

## 2025-06-25 DIAGNOSIS — M25.562 LEFT KNEE PAIN: ICD-10-CM

## 2025-06-25 DIAGNOSIS — M25.562 ACUTE PAIN OF LEFT KNEE: Primary | ICD-10-CM

## 2025-06-25 PROCEDURE — 97110 THERAPEUTIC EXERCISES: CPT | Mod: GP,CQ

## 2025-06-27 ENCOUNTER — THERAPY VISIT (OUTPATIENT)
Dept: PHYSICAL THERAPY | Facility: HOSPITAL | Age: 75
End: 2025-06-27
Attending: NURSE PRACTITIONER
Payer: MEDICARE

## 2025-06-27 DIAGNOSIS — M25.562 ACUTE PAIN OF LEFT KNEE: Primary | ICD-10-CM

## 2025-06-27 PROCEDURE — 97110 THERAPEUTIC EXERCISES: CPT | Mod: GP

## 2025-06-27 PROCEDURE — 97035 APP MDLTY 1+ULTRASOUND EA 15: CPT | Mod: GP

## 2025-07-01 ENCOUNTER — THERAPY VISIT (OUTPATIENT)
Dept: PHYSICAL THERAPY | Facility: HOSPITAL | Age: 75
End: 2025-07-01
Attending: NURSE PRACTITIONER
Payer: MEDICARE

## 2025-07-01 DIAGNOSIS — M25.562 LEFT KNEE PAIN: ICD-10-CM

## 2025-07-01 DIAGNOSIS — M25.562 ACUTE PAIN OF LEFT KNEE: Primary | ICD-10-CM

## 2025-07-01 PROCEDURE — 97035 APP MDLTY 1+ULTRASOUND EA 15: CPT | Mod: GP,CQ

## 2025-07-01 PROCEDURE — 97110 THERAPEUTIC EXERCISES: CPT | Mod: GP,CQ

## 2025-07-08 ENCOUNTER — THERAPY VISIT (OUTPATIENT)
Dept: PHYSICAL THERAPY | Facility: HOSPITAL | Age: 75
End: 2025-07-08
Attending: NURSE PRACTITIONER
Payer: MEDICARE

## 2025-07-08 DIAGNOSIS — M25.562 LEFT KNEE PAIN: ICD-10-CM

## 2025-07-08 DIAGNOSIS — M25.562 ACUTE PAIN OF LEFT KNEE: Primary | ICD-10-CM

## 2025-07-08 PROCEDURE — 97035 APP MDLTY 1+ULTRASOUND EA 15: CPT | Mod: GP,CQ

## 2025-07-08 PROCEDURE — 97110 THERAPEUTIC EXERCISES: CPT | Mod: GP,CQ

## 2025-07-10 ENCOUNTER — THERAPY VISIT (OUTPATIENT)
Dept: PHYSICAL THERAPY | Facility: HOSPITAL | Age: 75
End: 2025-07-10
Attending: NURSE PRACTITIONER
Payer: MEDICARE

## 2025-07-10 DIAGNOSIS — M25.562 ACUTE PAIN OF LEFT KNEE: Primary | ICD-10-CM

## 2025-07-10 DIAGNOSIS — M25.562 LEFT KNEE PAIN: ICD-10-CM

## 2025-07-10 PROCEDURE — 97035 APP MDLTY 1+ULTRASOUND EA 15: CPT | Mod: GP,CQ

## 2025-07-10 PROCEDURE — 97110 THERAPEUTIC EXERCISES: CPT | Mod: GP,CQ

## 2025-07-14 DIAGNOSIS — Z17.0 MALIGNANT NEOPLASM OF LEFT BREAST IN FEMALE, ESTROGEN RECEPTOR POSITIVE, UNSPECIFIED SITE OF BREAST (H): ICD-10-CM

## 2025-07-14 DIAGNOSIS — C50.912 MALIGNANT NEOPLASM OF LEFT BREAST IN FEMALE, ESTROGEN RECEPTOR POSITIVE, UNSPECIFIED SITE OF BREAST (H): ICD-10-CM

## 2025-07-14 NOTE — TELEPHONE ENCOUNTER
Oscal      Last Written Prescription Date:  5/8/25  Last Fill Quantity: 90,   # refills: 0  Last Office Visit: 4/21/25  Future Office visit:    Next 5 appointments (look out 90 days)      Jul 22, 2025 2:40 PM  (Arrive by 2:25 PM)  Return Visit with NORMAN Mauro [facility] ([facility] ) [facility] [address]  [phone]             Routing refill request to provider for review/approval because:

## 2025-07-15 NOTE — TELEPHONE ENCOUNTER
Vitamin Supplements Protocol Failed    Rerun Protocol (7/14/2025 8:24 AM)    Medication indicated for associated diagnosis    The medication is prescribed for one or more of the following conditions:                Vitamin deficiency              Osteopenia              Osteoporosis              Nutrition counseling              Nutrition education              Feeding ability finding              Bone density finding              Morbid Obesity              History of bypass of stomach              Idiopathic peripheral neuropathy              General examination of patient              Vitamin D deficiency              Folate deficiency anemia due to dietary causes              Sleeve resection of stomach              Rheumatoid factor level - finding              Crohn's disease              Psoriatic arthritis              Transplant of Kidney              Arthropathy              Alcoholism              Malabsorption syndrome              Disorder of bone and articular cartilage              Cystic Fibrosis  Bronchiectasis

## 2025-07-16 ENCOUNTER — THERAPY VISIT (OUTPATIENT)
Dept: PHYSICAL THERAPY | Facility: HOSPITAL | Age: 75
End: 2025-07-16
Attending: NURSE PRACTITIONER
Payer: MEDICARE

## 2025-07-16 DIAGNOSIS — M25.562 ACUTE PAIN OF LEFT KNEE: Primary | ICD-10-CM

## 2025-07-16 PROCEDURE — 97035 APP MDLTY 1+ULTRASOUND EA 15: CPT | Mod: GP

## 2025-07-16 PROCEDURE — 97110 THERAPEUTIC EXERCISES: CPT | Mod: GP

## 2025-07-16 PROCEDURE — 97140 MANUAL THERAPY 1/> REGIONS: CPT | Mod: GP

## 2025-07-22 ENCOUNTER — THERAPY VISIT (OUTPATIENT)
Dept: PHYSICAL THERAPY | Facility: HOSPITAL | Age: 75
End: 2025-07-22
Attending: NURSE PRACTITIONER
Payer: MEDICARE

## 2025-07-22 ENCOUNTER — LAB (OUTPATIENT)
Dept: LAB | Facility: OTHER | Age: 75
End: 2025-07-22
Attending: NURSE PRACTITIONER
Payer: COMMERCIAL

## 2025-07-22 ENCOUNTER — ONCOLOGY VISIT (OUTPATIENT)
Dept: ONCOLOGY | Facility: OTHER | Age: 75
End: 2025-07-22
Attending: NURSE PRACTITIONER
Payer: MEDICARE

## 2025-07-22 VITALS
SYSTOLIC BLOOD PRESSURE: 104 MMHG | DIASTOLIC BLOOD PRESSURE: 60 MMHG | BODY MASS INDEX: 34.96 KG/M2 | OXYGEN SATURATION: 96 % | WEIGHT: 185.19 LBS | TEMPERATURE: 98.5 F | HEART RATE: 62 BPM | HEIGHT: 61 IN

## 2025-07-22 DIAGNOSIS — M25.562 LEFT KNEE PAIN: ICD-10-CM

## 2025-07-22 DIAGNOSIS — C50.912 MALIGNANT NEOPLASM OF LEFT BREAST IN FEMALE, ESTROGEN RECEPTOR POSITIVE, UNSPECIFIED SITE OF BREAST (H): ICD-10-CM

## 2025-07-22 DIAGNOSIS — M85.80 OSTEOPENIA, UNSPECIFIED LOCATION: ICD-10-CM

## 2025-07-22 DIAGNOSIS — Z17.0 MALIGNANT NEOPLASM OF LEFT BREAST IN FEMALE, ESTROGEN RECEPTOR POSITIVE, UNSPECIFIED SITE OF BREAST (H): ICD-10-CM

## 2025-07-22 DIAGNOSIS — M25.562 ACUTE PAIN OF LEFT KNEE: Primary | ICD-10-CM

## 2025-07-22 DIAGNOSIS — M25.60 JOINT STIFFNESS: Primary | ICD-10-CM

## 2025-07-22 LAB
ALBUMIN SERPL BCG-MCNC: 4.1 G/DL (ref 3.5–5.2)
ALP SERPL-CCNC: 82 U/L (ref 40–150)
ALT SERPL W P-5'-P-CCNC: 18 U/L (ref 0–50)
AST SERPL W P-5'-P-CCNC: 23 U/L (ref 0–45)
BASOPHILS # BLD AUTO: 0.1 10E3/UL (ref 0–0.2)
BASOPHILS NFR BLD AUTO: 1 %
BILIRUB DIRECT SERPL-MCNC: 0.14 MG/DL (ref 0–0.3)
BILIRUB SERPL-MCNC: 0.4 MG/DL
EOSINOPHIL # BLD AUTO: 0.3 10E3/UL (ref 0–0.7)
EOSINOPHIL NFR BLD AUTO: 3 %
ERYTHROCYTE [DISTWIDTH] IN BLOOD BY AUTOMATED COUNT: 13 % (ref 10–15)
HCT VFR BLD AUTO: 41.1 % (ref 35–47)
HGB BLD-MCNC: 13.7 G/DL (ref 11.7–15.7)
IMM GRANULOCYTES # BLD: 0 10E3/UL
IMM GRANULOCYTES NFR BLD: 0 %
LYMPHOCYTES # BLD AUTO: 2.2 10E3/UL (ref 0.8–5.3)
LYMPHOCYTES NFR BLD AUTO: 25 %
MCH RBC QN AUTO: 30.4 PG (ref 26.5–33)
MCHC RBC AUTO-ENTMCNC: 33.3 G/DL (ref 31.5–36.5)
MCV RBC AUTO: 91 FL (ref 78–100)
MONOCYTES # BLD AUTO: 1.1 10E3/UL (ref 0–1.3)
MONOCYTES NFR BLD AUTO: 12 %
NEUTROPHILS # BLD AUTO: 5.3 10E3/UL (ref 1.6–8.3)
NEUTROPHILS NFR BLD AUTO: 59 %
NRBC # BLD AUTO: 0 10E3/UL
NRBC BLD AUTO-RTO: 0 /100
PLATELET # BLD AUTO: 310 10E3/UL (ref 150–450)
PROT SERPL-MCNC: 7.1 G/DL (ref 6.4–8.3)
RBC # BLD AUTO: 4.51 10E6/UL (ref 3.8–5.2)
WBC # BLD AUTO: 8.9 10E3/UL (ref 4–11)

## 2025-07-22 PROCEDURE — 82248 BILIRUBIN DIRECT: CPT | Mod: ZL

## 2025-07-22 PROCEDURE — 36415 COLL VENOUS BLD VENIPUNCTURE: CPT | Mod: ZL

## 2025-07-22 PROCEDURE — 97110 THERAPEUTIC EXERCISES: CPT | Mod: GP,CQ

## 2025-07-22 PROCEDURE — G0463 HOSPITAL OUTPT CLINIC VISIT: HCPCS

## 2025-07-22 PROCEDURE — 97035 APP MDLTY 1+ULTRASOUND EA 15: CPT | Mod: GP,CQ

## 2025-07-22 PROCEDURE — 85004 AUTOMATED DIFF WBC COUNT: CPT | Mod: ZL

## 2025-07-22 ASSESSMENT — PAIN SCALES - GENERAL: PAINLEVEL_OUTOF10: NO PAIN (0)

## 2025-07-22 NOTE — NURSING NOTE
"Oncology Rooming Note    July 22, 2025 2:34 PM   Rj Rodríguez is a 74 year old female who presents for:    Chief Complaint   Patient presents with    Oncology Clinic Visit     Follow up.      Initial Vitals: /60 (BP Location: Right arm, Patient Position: Sitting, Cuff Size: Adult Large)   Pulse 62   Temp 98.5  F (36.9  C) (Tympanic)   Ht 1.549 m (5' 1\")   Wt 84 kg (185 lb 3 oz)   LMP  (LMP Unknown)   SpO2 96%   BMI 34.99 kg/m   Estimated body mass index is 34.99 kg/m  as calculated from the following:    Height as of this encounter: 1.549 m (5' 1\").    Weight as of this encounter: 84 kg (185 lb 3 oz). Body surface area is 1.9 meters squared.  No Pain (0) Comment: Data Unavailable   No LMP recorded (lmp unknown). Patient has had a hysterectomy.  Allergies reviewed: Yes  Medications reviewed: Yes    Medications: Medication refills not needed today.  Pharmacy name entered into [facility]:    [facility] [address] [place], [place] - [address] [address]  [facility] - [place], [place] - [address]    PHQ9:  Did this patient require a PHQ9?: No      Clinical concerns: none       Geena Tipton LPN              "

## 2025-07-22 NOTE — PROGRESS NOTES
Oncology Follow-up Visit    Reason for Visit:  Rj is a 74 year old woman with a diagnosis of breast cancer, who presents to the clinic today for routine follow-up.    Nursing Note and documentation reviewed: Yes    Interval History:   Rj is again doing poorly. She was originally doing poorly on Arimidex so we switched her to Letrozole. She again feels as though intolerable symptoms, primarily those affecting her muscle and joint have returned. Working on PT following surgery, making improvements but feels legs and hands are hurting. Difficult to participate in her hobbies of sewing. Some, but seemingly tolerable, hot flashes.     She denies chest wall concerns. Notes happiness with being flat. She is tired and frustrated with the inability to loose weight.     Unsure what she wants to do at this point, wants to mitigate risk but hates feeling this way.     Oncologic History:   Ms. Rodríguez was found to have a abnormality on her screening mammogram.     11/13/2023: Mammogram screening bilateral with tomosynthesis:No architectural distortion, dominant mass or suspicious microcalcifications are identified in the right breast.  There is a small slightly spiculated appearing nodule in the upper outer quadrant of the left breast near the junction of middle and anterior thirds. This is probably in the 1 to 2:00 position.   11/16/2023: Ultrasound breast left:There is an irregular slightly hypoechoic mass in the left breast at 1-2 o'clock, 6 cm from the nipple measuring 0.8 x 0.8 x 0.5 cm. No definite internal vascularity. No other cystic or solid masses are demonstrated.   11/21/2023: Ultrasound breast biopsy: Invasive ductal carcinoma, grade 1.  ER 91 to 100%, OR 71 to 80% and HER2 IHC +1  12/7/2023: MRI breast bilateral with and without contrast:Enhancement reflecting the sampled mass and surrounding postbiopsy change in the left upper outer breast spans 2.0 x 1.7 cm. No additional sites of highly suspicious  enhancement in either breast. No evidence of pathologic adenopathy.  1/16/2024:   Left simple mastectomy and left sentinel lymph node biopsy: Pathology left breast showed biopsy site with residual invasive carcinoma, grade 1, 5.4 mm, margins negative.  focal lobular carcinoma in situ in close proximity to invasive carcinoma. pT1b N0 1 sentinel lymph node was examined which was negative for cancer.  Right breast simple mastectomy: Atrophic changes, focal stromal fibrosis, rare small foci of usual ductal hyperplasia, focal apical right metaplasia and occasional microcalcification within benign ducts. Oncotype Dx RS core is 13  2/22/2024:  Met with Dr. Kwok. Given oncotype score of 13, no added benefit to chemo. Therefore commenced endocrine therapy with Arimidex, with anticipation of completing 5 years.   7/23/2024: Decided to stop Arimidex given intolerable side effects (myalgias/arthralgias) and commenced Letrozole 2.5 mg daily  12/04/2024 Breast revision surgery with Dr. Carmen. Path negative.   7/22/2025 Drug holiday from Letrozole to assess side effects    Current Chemo Regimen/TX: Letrozole 2.5 mg daily-- on hold as of 7/22/2025      Previous treatment:   Bilateral simple mastectomy completed Jan 2024  Arimidex 1 mg daily for about 5 months    Past Medical History:   Diagnosis Date    Arthritis     Cancer (H)     Chronic back pain     Diabetes (H)     Hypertension     Irregular heart beat     Sleep apnea     Uncomplicated asthma        Past Surgical History:   Procedure Laterality Date    ANESTHESIA OUT OF OR MRI N/A 06/01/2022    Procedure: MRI CERVICAL SPINE;  Surgeon: GENERIC ANESTHESIA PROVIDER;  Location: [facility]    ANESTHESIA OUT OF OR MRI N/A 09/23/2022    Procedure: MRI CERVICAL SPINE;  Surgeon: GENERIC ANESTHESIA PROVIDER;  Location: [facility]    ANESTHESIA OUT OF OR MRI Left 12/07/2023    Procedure: Anesthesia out of OR MRI;  Surgeon: GENERIC ANESTHESIA PROVIDER;  Location: [facility]    APPENDECTOMY      BACK  SURGERY      CHOLECYSTECTOMY      COLONOSCOPY  01/01/2012    Repeat in 10 years    GYN SURGERY      HC KNEE ARTHROSCOPY, MED+LAT MENISCUS REPAIR Left 04/24/2025    Dr Zeng. [facility]    HYSTERECTOMY      Ovaries    MASTECTOMY SIMPLE BILATERAL, SENTINEL NODE BILATERAL, COMBINED N/A 01/16/2024    Procedure: Left sentinel node biopsy, bilateral mastectomy;  Surgeon: Ethan Molina MD;  Location: [facility]    RELEASE CARPAL TUNNEL      LT    RELEASE CARPAL TUNNEL      RT x2    REVISE SCAR BREAST Bilateral 12/04/2024    Procedure: REVISION, SCAR, BREAST, bilateral scar revision chest wall;  Surgeon: Jeanine Carmen MD;  Location:  OR    SURGICAL RADIOLOGY PROCEDURE N/A 02/12/2016    Procedure: SURGICAL RADIOLOGY PROCEDURE;  Surgeon: Provider, Generic Perianesthesia Nursing;  Location: [facility]    SURGICAL RADIOLOGY PROCEDURE N/A 08/15/2016    Procedure: SURGICAL RADIOLOGY PROCEDURE;  Surgeon: Provider, Generic Perianesthesia Nursing;  Location: [facility]       Family History   Problem Relation Age of Onset    Diabetes Mother     Alcoholism Mother     Heart Failure Mother     Cerebrovascular Disease Father     Diabetes Father         Type 2    Hypertension Father     Alcoholism Father     Colon Cancer Maternal Aunt     Colon Cancer Maternal Uncle        Social History     Socioeconomic History    Marital status:      Spouse name: Not on file    Number of children: Not on file    Years of education: Not on file    Highest education level: Not on file   Occupational History    Not on file   Tobacco Use    Smoking status: Never     Passive exposure: Never    Smokeless tobacco: Never   Vaping Use    Vaping status: Never Used   Substance and Sexual Activity    Alcohol use: Yes     Alcohol/week: 1.0 standard drink of alcohol     Types: 1 Glasses of wine per week     Comment: occ glass of wine    Drug use: No    Sexual activity: Not Currently   Other Topics Concern     Service Not Asked    Blood  Transfusions Not Asked    Caffeine Concern Yes     Comment: Coffee - 2 cups daily    Occupational Exposure Not Asked    Hobby Hazards Not Asked    Sleep Concern Not Asked    Stress Concern Not Asked    Weight Concern Not Asked    Special Diet Not Asked    Back Care Not Asked    Exercise Not Asked    Bike Helmet Not Asked    Seat Belt Not Asked    Self-Exams Not Asked    Parent/sibling w/ CABG, MI or angioplasty before 65F 55M? Not Asked   Social History Narrative    10/11/2019: , lives in [place]      Social Drivers of Health     Financial Resource Strain: Low Risk  (1/8/2024)    Financial Resource Strain     Within the past 12 months, have you or your family members you live with been unable to get utilities (heat, electricity) when it was really needed?: No   Food Insecurity: Low Risk  (1/8/2024)    Food Insecurity     Within the past 12 months, did you worry that your food would run out before you got money to buy more?: No     Within the past 12 months, did the food you bought just not last and you didn t have money to get more?: No   Transportation Needs: Low Risk  (1/8/2024)    Transportation Needs     Within the past 12 months, has lack of transportation kept you from medical appointments, getting your medicines, non-medical meetings or appointments, work, or from getting things that you need?: No   Physical Activity: Not on file   Stress: Not on file   Social Connections: Not on file   Interpersonal Safety: Low Risk  (7/22/2025)    Interpersonal Safety     Do you feel physically and emotionally safe where you currently live?: Yes     Within the past 12 months, have you been hit, slapped, kicked or otherwise physically hurt by someone?: No     Within the past 12 months, have you been humiliated or emotionally abused in other ways by your partner or ex-partner?: No   Housing Stability: Low Risk  (1/8/2024)    Housing Stability     Do you have housing? : Yes     Are you worried about losing your  housing?: No       Current Outpatient Medications   Medication Sig Dispense Refill    acetaminophen (TYLENOL) 325 MG tablet Take 3 tablets (975 mg) by mouth every 6 hours as needed for mild pain.      albuterol (PROAIR HFA/PROVENTIL HFA/VENTOLIN HFA) 108 (90 Base) MCG/ACT inhaler Inhale 2 puffs into the lungs every 6 hours as needed for shortness of breath. 18 g 3    albuterol (PROVENTIL) (2.5 MG/3ML) 0.083% neb solution Take 1 vial (2.5 mg) by nebulization every 6 hours as needed for shortness of breath, wheezing or cough. 25 mL 0    benzonatate (TESSALON) 100 MG capsule Take 1 capsule (100 mg) by mouth 3 times daily as needed for cough. 21 capsule 0    blood glucose (ACCU-CHEK GUIDE) test strip 1 ONCE DAILY USE AS DIRECTED 100 strip 1    blood glucose monitoring (ACCU-CHEK FASTCLIX) lancets Use to test blood sugar 1 time daily or as directed. 102 each 11    calcium carbonate-vitamin D (OSCAL) 500-5 MG-MCG tablet Take 1 tablet by mouth twice daily 90 tablet 0    cetirizine (ZYRTEC) 10 MG tablet Take 10-20 mg by mouth daily (10 mg in the winter, 20 mg in allergy season)      clonazePAM (KLONOPIN) 0.5 MG tablet Take 1 tablet by mouth twice daily as needed for anxiety 30 tablet 0    diltiazem ER (TIAZAC) 240 MG 24 hr ER beaded capsule Take 1 capsule (240 mg) by mouth daily. 90 capsule 2    diphenhydrAMINE HCl (BENADRYL ALLERGY CHILDRENS) 12.5 MG CHEW Take 2 tablets by mouth as needed (allergic reaction)      EPINEPHrine (ANY BX GENERIC EQUIV) 0.3 MG/0.3ML injection 2-pack Inject 0.3 mLs (0.3 mg) into the muscle as needed for anaphylaxis May repeat one time in 5-15 minutes if response to initial dose is inadequate. 2 each 1    ipratropium - albuterol 0.5 mg/2.5 mg/3 mL (DUONEB) 0.5-2.5 (3) MG/3ML neb solution Take 1 vial (3 mLs) by nebulization every 6 hours as needed for shortness of breath, wheezing or cough. 90 mL 0    letrozole (FEMARA) 2.5 MG tablet Take 1 tablet (2.5 mg) by mouth daily 90 tablet 3    Multiple  "Vitamins-Minerals (MULTIVITAMIN GUMMIES WOMENS) CHEW Take 1 Dose by mouth daily      spironolactone (ALDACTONE) 25 MG tablet Take 1 tablet (25 mg) by mouth daily. 90 tablet 3    triamcinolone (NASACORT) 55 MCG/ACT nasal aerosol Spray 2 sprays into both nostrils daily as needed (seasonal allergies)       No current facility-administered medications for this visit.        Allergies   Allergen Reactions    Dust Mite Extract Difficulty breathing    Fruit [Peach] Anaphylaxis and Hives     fresh peaches and any fruit that has a pit.  Kiwi's and apples also.  Can eat any fruit cooked.    Latex Other (See Comments), Swelling, Difficulty breathing and Rash     Throat Closes      Nuts Anaphylaxis and Hives     All Raw Nuts, can eat nuts when roasted.    Other Food Allergy Other (See Comments) and Difficulty breathing     Mackey powder    Pollen Extract Difficulty breathing     All fruits and vegetables need to be washed and rinsed prior to eating.     Ace Inhibitors      Upper respiratory infection    Antihistamines, Chlorpheniramine-Type Hives     Antihistamines    Benicar [Olmesartan] Cough    Effexor [Venlafaxine] Other (See Comments)     Heartburn, reflux    Hydrochlorothiazide     Phenylephrine Hcl      Guaifed    Seasonal Allergies      hayfever    Sm Guaifenesin-Pseudoephedrine [Pseudoephedrine-Guaifenesin]      Guaifed    Statins     Trees     Ultram [Tramadol]      Sleep disturbance. Can not sleep, and when able to fall asleep will have terrible nightmares    Xanax [Alprazolam] Hives     Xanax    Zoloft [Sertraline]      paranoia       Review Of Systems:  A complete review of systems is negative except for the above mentioned items in the interval history.       ECOG Performance Status: 0    Physical Exam:  /60 (BP Location: Right arm, Patient Position: Sitting, Cuff Size: Adult Large)   Pulse 62   Temp 98.5  F (36.9  C) (Tympanic)   Ht 1.549 m (5' 1\")   Wt 84 kg (185 lb 3 oz)   LMP  (LMP Unknown)   SpO2 " 96%   BMI 34.99 kg/m    GENERAL APPEARANCE: Healthy, alert and in no acute distress.  HEENT: Eyes appear normal without scleral icterus. Extraocular movements intact.   NECK:   Supple with normal range of motion. No asymmetry or masses.  RESP: Lungs clear to auscultation bilaterally, respirations regular and easy.  CARDIOVASCULAR: Regular rate and rhythm. Normal S1, S2; no murmur, gallop, or rub.  BREAST/Chest wall: Deferred this visit  MUSCULOSKELETAL: LLE in brace  SKIN: No suspicious lesions or rashes.  NEURO: Alert and oriented x 3.  Gait steady.  PSYCHIATRIC: frustrated    Laboratory:  Results for orders placed or performed in visit on 07/22/25   CBC with platelets and differential     Status: None   Result Value Ref Range    WBC Count 8.9 4.0 - 11.0 10e3/uL    RBC Count 4.51 3.80 - 5.20 10e6/uL    Hemoglobin 13.7 11.7 - 15.7 g/dL    Hematocrit 41.1 35.0 - 47.0 %    MCV 91 78 - 100 fL    MCH 30.4 26.5 - 33.0 pg    MCHC 33.3 31.5 - 36.5 g/dL    RDW 13.0 10.0 - 15.0 %    Platelet Count 310 150 - 450 10e3/uL    % Neutrophils 59 %    % Lymphocytes 25 %    % Monocytes 12 %    % Eosinophils 3 %    % Basophils 1 %    % Immature Granulocytes 0 %    NRBCs per 100 WBC 0 <1 /100    Absolute Neutrophils 5.3 1.6 - 8.3 10e3/uL    Absolute Lymphocytes 2.2 0.8 - 5.3 10e3/uL    Absolute Monocytes 1.1 0.0 - 1.3 10e3/uL    Absolute Eosinophils 0.3 0.0 - 0.7 10e3/uL    Absolute Basophils 0.1 0.0 - 0.2 10e3/uL    Absolute Immature Granulocytes 0.0 <=0.4 10e3/uL    Absolute NRBCs 0.0 10e3/uL   CBC with Platelets & Differential     Status: None    Narrative    The following orders were created for panel order CBC with Platelets & Differential.  Procedure                               Abnormality         Status                     ---------                               -----------         ------                     CBC with platelets and ...[[id number]]                      Final result                 Please view results for these  tests on the individual orders.           Imaging Studies:    None this visit    ASSESSMENT/PLAN:    #1 Stage IA left breast cancer-ER positive, SC positive and HER2 negative. Status post bilateral mastectomy and left sentinel lymph node biopsy 1/2024.  pT1b N0. Oncotype Dx RS core is 13 so no role for systemic chemo.  She is a candidate for endocrine therapy for total of 5 years with an aromatase inhibitor. This was commenced Feb 2024 with Arimidex. She remained on this about 5 months but then, given myalgias and arthalgias which were intolerable, we switched to Letrozole.     Today, she is again feeling poorly, primarily with arthralgias and myalgias. Requesting OT referral for hands, referral placed. Discussed that she may need to see ortho for carpal tunnel surgery. After discussion, we elected to commence a 4 week holiday and then reassess symptoms. If symptoms resolve, will have to consider either 1) Exemestane 2)Tamoxifen or 3) Stopping endocrine therapy. Will meet in 1 month. Follow-up sooner with concerns.     #2 Osteopenia Continue calcium and Vit D. Dexa due April 2026. Encouraged weight bearing activity for bone health.     #3 Hot flashes Worsening but tolerable. Will monitor.     #4 Anxiety Working with [employer] [facility]. Doing very well.     #5 Arthralgias Worsening. See #1. Drug holiday.       Patient in agreement with plan and verbalizes understanding. Agrees to call with any questions or concerns.    38 minutes spent in the patient's encounter today with time spent in review of patient's chart along with chart preparation and review of the treatment plan and signing of treatment plan.  Time was also spent with the patient in obtaining a review of systems and performing a physical exam along with detailed review of all test results. Time was also spent in discussing plan for future follow-up and relating instructions for follow-up and in placing future orders.      The longitudinal plan  of care for the diagnosis(es)/condition(s) as documented were addressed during this visit. Due to the added complexity in care, I will continue to support Rj in the subsequent management and with ongoing continuity of care.     NORMAN Pedro [facility]  Medical Oncology

## 2025-07-29 ENCOUNTER — THERAPY VISIT (OUTPATIENT)
Dept: PHYSICAL THERAPY | Facility: HOSPITAL | Age: 75
End: 2025-07-29
Attending: NURSE PRACTITIONER
Payer: MEDICARE

## 2025-07-29 DIAGNOSIS — M25.562 ACUTE PAIN OF LEFT KNEE: ICD-10-CM

## 2025-07-29 DIAGNOSIS — M25.562 LEFT KNEE PAIN: Primary | ICD-10-CM

## 2025-07-29 PROCEDURE — 97140 MANUAL THERAPY 1/> REGIONS: CPT | Mod: GP,CQ

## 2025-07-29 PROCEDURE — 97110 THERAPEUTIC EXERCISES: CPT | Mod: GP,CQ

## 2025-07-31 ENCOUNTER — THERAPY VISIT (OUTPATIENT)
Dept: PHYSICAL THERAPY | Facility: HOSPITAL | Age: 75
End: 2025-07-31
Attending: NURSE PRACTITIONER
Payer: MEDICARE

## 2025-07-31 DIAGNOSIS — M25.562 LEFT KNEE PAIN: Primary | ICD-10-CM

## 2025-07-31 DIAGNOSIS — M25.562 ACUTE PAIN OF LEFT KNEE: ICD-10-CM

## 2025-07-31 PROCEDURE — 97110 THERAPEUTIC EXERCISES: CPT | Mod: GP,CQ

## 2025-07-31 PROCEDURE — 97140 MANUAL THERAPY 1/> REGIONS: CPT | Mod: GP,CQ

## 2025-07-31 PROCEDURE — 97530 THERAPEUTIC ACTIVITIES: CPT | Mod: GP,CQ

## 2025-08-05 ENCOUNTER — TRANSFERRED RECORDS (OUTPATIENT)
Dept: HEALTH INFORMATION MANAGEMENT | Facility: CLINIC | Age: 75
End: 2025-08-05
Payer: COMMERCIAL

## 2025-08-07 ENCOUNTER — THERAPY VISIT (OUTPATIENT)
Dept: OCCUPATIONAL THERAPY | Facility: HOSPITAL | Age: 75
End: 2025-08-07
Attending: NURSE PRACTITIONER
Payer: MEDICARE

## 2025-08-07 ENCOUNTER — OFFICE VISIT (OUTPATIENT)
Dept: FAMILY MEDICINE | Facility: OTHER | Age: 75
End: 2025-08-07
Attending: NURSE PRACTITIONER
Payer: MEDICARE

## 2025-08-07 VITALS
DIASTOLIC BLOOD PRESSURE: 70 MMHG | SYSTOLIC BLOOD PRESSURE: 110 MMHG | HEIGHT: 61 IN | OXYGEN SATURATION: 97 % | TEMPERATURE: 97.6 F | BODY MASS INDEX: 34.49 KG/M2 | RESPIRATION RATE: 20 BRPM | HEART RATE: 71 BPM | WEIGHT: 182.7 LBS

## 2025-08-07 DIAGNOSIS — J45.20 MILD INTERMITTENT REACTIVE AIRWAY DISEASE WITHOUT COMPLICATION: ICD-10-CM

## 2025-08-07 DIAGNOSIS — M25.631 WRIST JOINT STIFFNESS, BILATERAL: ICD-10-CM

## 2025-08-07 DIAGNOSIS — M25.50 ARTHRALGIA, UNSPECIFIED JOINT: ICD-10-CM

## 2025-08-07 DIAGNOSIS — M25.649 STIFFNESS OF HAND JOINT, UNSPECIFIED LATERALITY: Primary | ICD-10-CM

## 2025-08-07 DIAGNOSIS — I10 ESSENTIAL HYPERTENSION: ICD-10-CM

## 2025-08-07 DIAGNOSIS — M25.632 WRIST JOINT STIFFNESS, BILATERAL: ICD-10-CM

## 2025-08-07 DIAGNOSIS — E11.9 TYPE 2 DIABETES MELLITUS WITHOUT COMPLICATION, WITHOUT LONG-TERM CURRENT USE OF INSULIN (H): Primary | ICD-10-CM

## 2025-08-07 DIAGNOSIS — F41.1 GAD (GENERALIZED ANXIETY DISORDER): ICD-10-CM

## 2025-08-07 DIAGNOSIS — E78.5 HYPERLIPIDEMIA, UNSPECIFIED HYPERLIPIDEMIA TYPE: ICD-10-CM

## 2025-08-07 DIAGNOSIS — F33.0 MILD EPISODE OF RECURRENT MAJOR DEPRESSIVE DISORDER: ICD-10-CM

## 2025-08-07 LAB
ANION GAP SERPL CALCULATED.3IONS-SCNC: 14 MMOL/L (ref 7–15)
BASOPHILS # BLD AUTO: 0.1 10E3/UL (ref 0–0.2)
BASOPHILS NFR BLD AUTO: 1 %
BUN SERPL-MCNC: 17.1 MG/DL (ref 8–23)
CALCIUM SERPL-MCNC: 10.1 MG/DL (ref 8.8–10.4)
CHLORIDE SERPL-SCNC: 103 MMOL/L (ref 98–107)
CHOLEST SERPL-MCNC: 172 MG/DL
CREAT SERPL-MCNC: 0.89 MG/DL (ref 0.51–0.95)
EGFRCR SERPLBLD CKD-EPI 2021: 69 ML/MIN/1.73M2
EOSINOPHIL # BLD AUTO: 0.2 10E3/UL (ref 0–0.7)
EOSINOPHIL NFR BLD AUTO: 3 %
ERYTHROCYTE [DISTWIDTH] IN BLOOD BY AUTOMATED COUNT: 13.1 % (ref 10–15)
EST. AVERAGE GLUCOSE BLD GHB EST-MCNC: 120 MG/DL
FASTING STATUS PATIENT QL REPORTED: ABNORMAL
FASTING STATUS PATIENT QL REPORTED: ABNORMAL
GLUCOSE SERPL-MCNC: 121 MG/DL (ref 70–99)
HBA1C MFR BLD: 5.8 %
HCO3 SERPL-SCNC: 20 MMOL/L (ref 22–29)
HCT VFR BLD AUTO: 41.6 % (ref 35–47)
HDLC SERPL-MCNC: 43 MG/DL
HGB BLD-MCNC: 14.3 G/DL (ref 11.7–15.7)
IMM GRANULOCYTES # BLD: 0.1 10E3/UL
IMM GRANULOCYTES NFR BLD: 1 %
LDLC SERPL CALC-MCNC: 72 MG/DL
LYMPHOCYTES # BLD AUTO: 1.5 10E3/UL (ref 0.8–5.3)
LYMPHOCYTES NFR BLD AUTO: 16 %
MCH RBC QN AUTO: 31 PG (ref 26.5–33)
MCHC RBC AUTO-ENTMCNC: 34.4 G/DL (ref 31.5–36.5)
MCV RBC AUTO: 90 FL (ref 78–100)
MONOCYTES # BLD AUTO: 0.8 10E3/UL (ref 0–1.3)
MONOCYTES NFR BLD AUTO: 9 %
NEUTROPHILS # BLD AUTO: 6.4 10E3/UL (ref 1.6–8.3)
NEUTROPHILS NFR BLD AUTO: 71 %
NONHDLC SERPL-MCNC: 129 MG/DL
NRBC # BLD AUTO: 0 10E3/UL
NRBC BLD AUTO-RTO: 0 /100
PLATELET # BLD AUTO: 278 10E3/UL (ref 150–450)
POTASSIUM SERPL-SCNC: 4 MMOL/L (ref 3.4–5.3)
RBC # BLD AUTO: 4.61 10E6/UL (ref 3.8–5.2)
SODIUM SERPL-SCNC: 137 MMOL/L (ref 135–145)
TRIGL SERPL-MCNC: 283 MG/DL
WBC # BLD AUTO: 9 10E3/UL (ref 4–11)

## 2025-08-07 PROCEDURE — G0463 HOSPITAL OUTPT CLINIC VISIT: HCPCS

## 2025-08-07 PROCEDURE — 97166 OT EVAL MOD COMPLEX 45 MIN: CPT | Mod: GO

## 2025-08-07 PROCEDURE — 83718 ASSAY OF LIPOPROTEIN: CPT | Mod: ZL | Performed by: NURSE PRACTITIONER

## 2025-08-07 ASSESSMENT — ASTHMA QUESTIONNAIRES
ACT_TOTALSCORE: 23
QUESTION_4 LAST FOUR WEEKS HOW OFTEN HAVE YOU USED YOUR RESCUE INHALER OR NEBULIZER MEDICATION (SUCH AS ALBUTEROL): NOT AT ALL
EMERGENCY_ROOM_LAST_YEAR_TOTAL: THREE OR MORE
QUESTION_2 LAST FOUR WEEKS HOW OFTEN HAVE YOU HAD SHORTNESS OF BREATH: NOT AT ALL
QUESTION_5 LAST FOUR WEEKS HOW WOULD YOU RATE YOUR ASTHMA CONTROL: WELL CONTROLLED
QUESTION_3 LAST FOUR WEEKS HOW OFTEN DID YOUR ASTHMA SYMPTOMS (WHEEZING, COUGHING, SHORTNESS OF BREATH, CHEST TIGHTNESS OR PAIN) WAKE YOU UP AT NIGHT OR EARLIER THAN USUAL IN THE MORNING: NOT AT ALL
QUESTION_1 LAST FOUR WEEKS HOW MUCH OF THE TIME DID YOUR ASTHMA KEEP YOU FROM GETTING AS MUCH DONE AT WORK, SCHOOL OR AT HOME: A LITTLE OF THE TIME

## 2025-08-07 ASSESSMENT — ANXIETY QUESTIONNAIRES
8. IF YOU CHECKED OFF ANY PROBLEMS, HOW DIFFICULT HAVE THESE MADE IT FOR YOU TO DO YOUR WORK, TAKE CARE OF THINGS AT HOME, OR GET ALONG WITH OTHER PEOPLE?: EXTREMELY DIFFICULT
2. NOT BEING ABLE TO STOP OR CONTROL WORRYING: SEVERAL DAYS
1. FEELING NERVOUS, ANXIOUS, OR ON EDGE: NOT AT ALL
7. FEELING AFRAID AS IF SOMETHING AWFUL MIGHT HAPPEN: NOT AT ALL
IF YOU CHECKED OFF ANY PROBLEMS ON THIS QUESTIONNAIRE, HOW DIFFICULT HAVE THESE PROBLEMS MADE IT FOR YOU TO DO YOUR WORK, TAKE CARE OF THINGS AT HOME, OR GET ALONG WITH OTHER PEOPLE: EXTREMELY DIFFICULT
5. BEING SO RESTLESS THAT IT IS HARD TO SIT STILL: NOT AT ALL
3. WORRYING TOO MUCH ABOUT DIFFERENT THINGS: SEVERAL DAYS
GAD7 TOTAL SCORE: 3
4. TROUBLE RELAXING: NOT AT ALL
6. BECOMING EASILY ANNOYED OR IRRITABLE: SEVERAL DAYS
GAD7 TOTAL SCORE: 3
7. FEELING AFRAID AS IF SOMETHING AWFUL MIGHT HAPPEN: NOT AT ALL
GAD7 TOTAL SCORE: 3

## 2025-08-07 ASSESSMENT — PATIENT HEALTH QUESTIONNAIRE - PHQ9
SUM OF ALL RESPONSES TO PHQ QUESTIONS 1-9: 9
SUM OF ALL RESPONSES TO PHQ QUESTIONS 1-9: 9

## 2025-08-07 ASSESSMENT — PAIN SCALES - GENERAL: PAINLEVEL_OUTOF10: MODERATE PAIN (6)

## 2025-08-09 PROBLEM — M25.632 WRIST JOINT STIFFNESS, BILATERAL: Status: ACTIVE | Noted: 2025-08-09

## 2025-08-09 PROBLEM — M25.631 WRIST JOINT STIFFNESS, BILATERAL: Status: ACTIVE | Noted: 2025-08-09

## 2025-08-09 PROBLEM — M25.649 STIFFNESS OF HAND JOINT, UNSPECIFIED LATERALITY: Status: ACTIVE | Noted: 2025-08-09

## 2025-08-12 ENCOUNTER — THERAPY VISIT (OUTPATIENT)
Dept: PHYSICAL THERAPY | Facility: HOSPITAL | Age: 75
End: 2025-08-12
Attending: NURSE PRACTITIONER
Payer: MEDICARE

## 2025-08-12 DIAGNOSIS — M25.562 ACUTE PAIN OF LEFT KNEE: ICD-10-CM

## 2025-08-12 DIAGNOSIS — M25.562 LEFT KNEE PAIN: Primary | ICD-10-CM

## 2025-08-12 PROCEDURE — 97140 MANUAL THERAPY 1/> REGIONS: CPT | Mod: GP,CQ

## 2025-08-12 PROCEDURE — 97110 THERAPEUTIC EXERCISES: CPT | Mod: GP,CQ

## 2025-08-14 ENCOUNTER — THERAPY VISIT (OUTPATIENT)
Dept: OCCUPATIONAL THERAPY | Facility: HOSPITAL | Age: 75
End: 2025-08-14
Attending: NURSE PRACTITIONER
Payer: MEDICARE

## 2025-08-14 ENCOUNTER — THERAPY VISIT (OUTPATIENT)
Dept: PHYSICAL THERAPY | Facility: HOSPITAL | Age: 75
End: 2025-08-14
Attending: NURSE PRACTITIONER
Payer: MEDICARE

## 2025-08-14 DIAGNOSIS — M25.562 ACUTE PAIN OF LEFT KNEE: Primary | ICD-10-CM

## 2025-08-14 DIAGNOSIS — M25.632 WRIST JOINT STIFFNESS, BILATERAL: ICD-10-CM

## 2025-08-14 DIAGNOSIS — M25.631 WRIST JOINT STIFFNESS, BILATERAL: ICD-10-CM

## 2025-08-14 DIAGNOSIS — M25.562 LEFT KNEE PAIN: ICD-10-CM

## 2025-08-14 DIAGNOSIS — M25.649 STIFFNESS OF HAND JOINT, UNSPECIFIED LATERALITY: Primary | ICD-10-CM

## 2025-08-14 PROCEDURE — 97530 THERAPEUTIC ACTIVITIES: CPT | Mod: GO

## 2025-08-14 PROCEDURE — 97110 THERAPEUTIC EXERCISES: CPT | Mod: GP,CQ

## 2025-08-14 PROCEDURE — 97035 APP MDLTY 1+ULTRASOUND EA 15: CPT | Mod: GO

## 2025-08-14 PROCEDURE — 97140 MANUAL THERAPY 1/> REGIONS: CPT | Mod: GP,CQ

## 2025-08-18 ENCOUNTER — THERAPY VISIT (OUTPATIENT)
Dept: OCCUPATIONAL THERAPY | Facility: HOSPITAL | Age: 75
End: 2025-08-18
Attending: NURSE PRACTITIONER
Payer: MEDICARE

## 2025-08-18 ENCOUNTER — TELEPHONE (OUTPATIENT)
Dept: FAMILY MEDICINE | Facility: OTHER | Age: 75
End: 2025-08-18

## 2025-08-18 ENCOUNTER — ONCOLOGY VISIT (OUTPATIENT)
Dept: ONCOLOGY | Facility: OTHER | Age: 75
End: 2025-08-18
Attending: NURSE PRACTITIONER
Payer: COMMERCIAL

## 2025-08-18 VITALS
BODY MASS INDEX: 35.59 KG/M2 | HEIGHT: 61 IN | WEIGHT: 188.49 LBS | HEART RATE: 58 BPM | SYSTOLIC BLOOD PRESSURE: 124 MMHG | TEMPERATURE: 97.4 F | DIASTOLIC BLOOD PRESSURE: 62 MMHG | OXYGEN SATURATION: 97 %

## 2025-08-18 DIAGNOSIS — Z17.0 MALIGNANT NEOPLASM OF LEFT BREAST IN FEMALE, ESTROGEN RECEPTOR POSITIVE, UNSPECIFIED SITE OF BREAST (H): Primary | ICD-10-CM

## 2025-08-18 DIAGNOSIS — M85.80 OSTEOPENIA, UNSPECIFIED LOCATION: ICD-10-CM

## 2025-08-18 DIAGNOSIS — M25.631 WRIST JOINT STIFFNESS, BILATERAL: ICD-10-CM

## 2025-08-18 DIAGNOSIS — M25.50 ARTHRALGIA, UNSPECIFIED JOINT: ICD-10-CM

## 2025-08-18 DIAGNOSIS — C50.912 MALIGNANT NEOPLASM OF LEFT BREAST IN FEMALE, ESTROGEN RECEPTOR POSITIVE, UNSPECIFIED SITE OF BREAST (H): Primary | ICD-10-CM

## 2025-08-18 DIAGNOSIS — M25.632 WRIST JOINT STIFFNESS, BILATERAL: ICD-10-CM

## 2025-08-18 DIAGNOSIS — F41.1 GAD (GENERALIZED ANXIETY DISORDER): ICD-10-CM

## 2025-08-18 DIAGNOSIS — M25.649 STIFFNESS OF HAND JOINT, UNSPECIFIED LATERALITY: Primary | ICD-10-CM

## 2025-08-18 PROCEDURE — G2211 COMPLEX E/M VISIT ADD ON: HCPCS | Performed by: NURSE PRACTITIONER

## 2025-08-18 PROCEDURE — G0463 HOSPITAL OUTPT CLINIC VISIT: HCPCS

## 2025-08-18 PROCEDURE — 97035 APP MDLTY 1+ULTRASOUND EA 15: CPT | Mod: GO

## 2025-08-18 PROCEDURE — 97110 THERAPEUTIC EXERCISES: CPT | Mod: GO

## 2025-08-18 PROCEDURE — 99214 OFFICE O/P EST MOD 30 MIN: CPT | Performed by: NURSE PRACTITIONER

## 2025-08-18 PROCEDURE — 97530 THERAPEUTIC ACTIVITIES: CPT | Mod: GO

## 2025-08-18 ASSESSMENT — PAIN SCALES - GENERAL: PAINLEVEL_OUTOF10: MODERATE PAIN (6)

## 2025-08-19 ENCOUNTER — THERAPY VISIT (OUTPATIENT)
Dept: PHYSICAL THERAPY | Facility: HOSPITAL | Age: 75
End: 2025-08-19
Attending: NURSE PRACTITIONER
Payer: MEDICARE

## 2025-08-19 DIAGNOSIS — M25.562 LEFT KNEE PAIN: ICD-10-CM

## 2025-08-19 DIAGNOSIS — M25.562 ACUTE PAIN OF LEFT KNEE: Primary | ICD-10-CM

## 2025-08-19 PROCEDURE — 97112 NEUROMUSCULAR REEDUCATION: CPT | Mod: GP,CQ,KX

## 2025-08-19 PROCEDURE — 97110 THERAPEUTIC EXERCISES: CPT | Mod: GP,CQ,KX

## 2025-08-20 DIAGNOSIS — I10 ESSENTIAL HYPERTENSION: ICD-10-CM

## 2025-08-20 DIAGNOSIS — I49.3 FREQUENT PVCS: ICD-10-CM

## 2025-08-20 RX ORDER — DILTIAZEM HYDROCHLORIDE 240 MG/1
240 CAPSULE, EXTENDED RELEASE ORAL DAILY
Qty: 90 CAPSULE | Refills: 1 | Status: SHIPPED | OUTPATIENT
Start: 2025-08-20

## 2025-08-21 ENCOUNTER — THERAPY VISIT (OUTPATIENT)
Dept: PHYSICAL THERAPY | Facility: HOSPITAL | Age: 75
End: 2025-08-21
Attending: NURSE PRACTITIONER
Payer: MEDICARE

## 2025-08-21 DIAGNOSIS — M25.562 ACUTE PAIN OF LEFT KNEE: Primary | ICD-10-CM

## 2025-08-21 PROCEDURE — 97110 THERAPEUTIC EXERCISES: CPT | Mod: GP

## 2025-08-26 ENCOUNTER — THERAPY VISIT (OUTPATIENT)
Dept: OCCUPATIONAL THERAPY | Facility: HOSPITAL | Age: 75
End: 2025-08-26
Attending: NURSE PRACTITIONER
Payer: MEDICARE

## 2025-08-26 ENCOUNTER — THERAPY VISIT (OUTPATIENT)
Dept: PHYSICAL THERAPY | Facility: HOSPITAL | Age: 75
End: 2025-08-26
Attending: NURSE PRACTITIONER
Payer: MEDICARE

## 2025-08-26 DIAGNOSIS — M25.562 ACUTE PAIN OF LEFT KNEE: Primary | ICD-10-CM

## 2025-08-26 DIAGNOSIS — M25.562 LEFT KNEE PAIN: ICD-10-CM

## 2025-08-26 DIAGNOSIS — M25.632 WRIST JOINT STIFFNESS, BILATERAL: ICD-10-CM

## 2025-08-26 DIAGNOSIS — M25.649 STIFFNESS OF HAND JOINT, UNSPECIFIED LATERALITY: Primary | ICD-10-CM

## 2025-08-26 DIAGNOSIS — M25.631 WRIST JOINT STIFFNESS, BILATERAL: ICD-10-CM

## 2025-08-26 PROCEDURE — 97530 THERAPEUTIC ACTIVITIES: CPT | Mod: GP,CQ

## 2025-08-26 PROCEDURE — 97530 THERAPEUTIC ACTIVITIES: CPT | Mod: GO

## 2025-08-26 PROCEDURE — 97035 APP MDLTY 1+ULTRASOUND EA 15: CPT | Mod: GO

## 2025-08-26 PROCEDURE — 97110 THERAPEUTIC EXERCISES: CPT | Mod: GO

## 2025-08-26 PROCEDURE — 97110 THERAPEUTIC EXERCISES: CPT | Mod: GP,CQ

## 2025-09-03 ENCOUNTER — THERAPY VISIT (OUTPATIENT)
Dept: PHYSICAL THERAPY | Facility: HOSPITAL | Age: 75
End: 2025-09-03
Attending: NURSE PRACTITIONER
Payer: MEDICARE

## 2025-09-03 DIAGNOSIS — M25.562 LEFT KNEE PAIN: ICD-10-CM

## 2025-09-03 DIAGNOSIS — M25.562 ACUTE PAIN OF LEFT KNEE: Primary | ICD-10-CM

## 2025-09-03 PROCEDURE — 97530 THERAPEUTIC ACTIVITIES: CPT | Mod: GP,CQ,KX

## 2025-09-03 PROCEDURE — 97110 THERAPEUTIC EXERCISES: CPT | Mod: GP,CQ,KX

## (undated) DEVICE — ESU GROUND PAD ADULT W/CORD E7507

## (undated) DEVICE — TRAY SKIN PREP POVIDONE/IODINE DYND70372

## (undated) DEVICE — BLADE 11 RB BK SS STRL LF DISP 371211

## (undated) DEVICE — GLOVE 8.5 PROTEXIS PI CLSC PF BD CUF STRL LF 12IN 2D72PL85X

## (undated) DEVICE — SU SILK 2-0 SH 30" K833H

## (undated) DEVICE — Device

## (undated) DEVICE — FLUID TRAP FOR VIROSAFE FILTERS VSFT10-10

## (undated) DEVICE — DRSG NON ADHERING 3 X 8 TELFA 1238

## (undated) DEVICE — SOL WATER IRRIG 1000ML BOTTLE 2F7114

## (undated) DEVICE — DRSG GAUZE 4X4" 3033

## (undated) DEVICE — SU STRATAFIX MONOCRYL 4-0 SPIRAL PS-2 SXMP1B118

## (undated) DEVICE — SLEEVE SCD EXPRESS KNEE LENGTH MED 9529

## (undated) DEVICE — GLOVE PROTEXIS BLUE W/NEU-THERA 6.5  2D73EB65

## (undated) DEVICE — PACK MAJOR LAPAROTOMY LF SBA15MLFCA

## (undated) DEVICE — PACK LAPAROTOMY CUSTOM SBA32LPMBG

## (undated) DEVICE — DRSG KERLIX SUPER SPONGE 6X6.75" 2585

## (undated) DEVICE — PACK BASIN SET UP SUTCNBSBBA

## (undated) DEVICE — SYR 10ML LL W/O NDL 302995

## (undated) DEVICE — SOL NACL 0.9% IRRIG 1000ML BOTTLE 2F7124

## (undated) DEVICE — SPONGE LAP 18X18" X8435

## (undated) DEVICE — NDL 25GA 1.5" 305127

## (undated) DEVICE — SU MONOCRYL 4-0 PS-2 27" UND Y426H

## (undated) DEVICE — DRAPE BREAST/CHEST 29420

## (undated) DEVICE — DRSG MEDIPORE 3 1/2X8" 3570

## (undated) DEVICE — TUBING SUCTION 20FT N620A

## (undated) DEVICE — PREP CHLORAPREP 26ML TINTED ORANGE  260815

## (undated) DEVICE — NDL BLUNT 18GA 1.5" W/O FILTER 305180

## (undated) DEVICE — SUTURE ETHILON 2 0 PSLX 1697H 1697H

## (undated) DEVICE — GOWN SURG XL LVL 3 REINFORCED 9541

## (undated) DEVICE — CANISTER SUCTION MEDI-VAC GUARDIAN 2000ML 90D 65651-220

## (undated) DEVICE — BNDG ABDOMINAL BINDER 9X30-45" 79-89070

## (undated) DEVICE — DRAIN JACKSON PRATT RESERVOIR 100ML SU130-1305

## (undated) DEVICE — CLIP APPLIER 11" MED LIGACLIP MCM20

## (undated) DEVICE — DRSG WND NEGATIVE PRESSURE PREVENA 90CM PRE4055US

## (undated) DEVICE — PUMP UNIT NEGATIVE PRESSURE PREVENA PLUS PRE4010.S

## (undated) DEVICE — PENCIL MEGADYNE TELESCOPING SMOKE EVACUATION 10 FT 251010J

## (undated) DEVICE — GLOVE PROTEXIS POWDER FREE SMT 6.5  2D72PT65X

## (undated) DEVICE — BANDAGE ELASTICHOOK-LOCK REUSABLE 6 INCH S611-26

## (undated) DEVICE — ESU ELEC BLADE 2.75" COATED/INSULATED E1455

## (undated) DEVICE — DRAPE BACK TABLE  44X90" 8377

## (undated) DEVICE — STPL SKIN SUBCUTICULAR INSORB  2030

## (undated) DEVICE — DRSG STERI STRIP 1/2X4" R1547

## (undated) DEVICE — SU VICRYL 3-0 SH 27" UND J416H

## (undated) DEVICE — COVER LT HANDLE 2/PK 5160-2FG

## (undated) DEVICE — LABEL STERILE PREPRINTED FOR OR FRRH01-2M

## (undated) DEVICE — STPL SKIN 35W 6.9MM  PXW35

## (undated) DEVICE — BNDG ABDOMINAL BINDER 9X45-62" 79-89071

## (undated) DEVICE — DRAPE-THREE QUARTER (LARGE) SHEET

## (undated) DEVICE — SU DERMABOND ADVANCED .7ML DNX12

## (undated) RX ORDER — DEXAMETHASONE SODIUM PHOSPHATE 4 MG/ML
INJECTION, SOLUTION INTRA-ARTICULAR; INTRALESIONAL; INTRAMUSCULAR; INTRAVENOUS; SOFT TISSUE
Status: DISPENSED
Start: 2024-12-04

## (undated) RX ORDER — PROPOFOL 10 MG/ML
INJECTION, EMULSION INTRAVENOUS
Status: DISPENSED
Start: 2024-12-04

## (undated) RX ORDER — FENTANYL CITRATE 50 UG/ML
INJECTION, SOLUTION INTRAMUSCULAR; INTRAVENOUS
Status: DISPENSED
Start: 2024-01-16

## (undated) RX ORDER — PROPOFOL 10 MG/ML
INJECTION, EMULSION INTRAVENOUS
Status: DISPENSED
Start: 2023-12-07

## (undated) RX ORDER — BUPIVACAINE HYDROCHLORIDE AND EPINEPHRINE 5; 5 MG/ML; UG/ML
INJECTION, SOLUTION EPIDURAL; INTRACAUDAL; PERINEURAL
Status: DISPENSED
Start: 2024-12-04

## (undated) RX ORDER — GLYCOPYRROLATE 0.2 MG/ML
INJECTION, SOLUTION INTRAMUSCULAR; INTRAVENOUS
Status: DISPENSED
Start: 2023-12-07

## (undated) RX ORDER — PROPOFOL 10 MG/ML
INJECTION, EMULSION INTRAVENOUS
Status: DISPENSED
Start: 2024-01-16

## (undated) RX ORDER — OXYCODONE HYDROCHLORIDE 5 MG/1
TABLET ORAL
Status: DISPENSED
Start: 2024-12-04

## (undated) RX ORDER — HYDROMORPHONE HYDROCHLORIDE 1 MG/ML
INJECTION, SOLUTION INTRAMUSCULAR; INTRAVENOUS; SUBCUTANEOUS
Status: DISPENSED
Start: 2024-01-16

## (undated) RX ORDER — FENTANYL CITRATE 50 UG/ML
INJECTION, SOLUTION INTRAMUSCULAR; INTRAVENOUS
Status: DISPENSED
Start: 2024-12-04

## (undated) RX ORDER — FENTANYL CITRATE 50 UG/ML
INJECTION, SOLUTION INTRAMUSCULAR; INTRAVENOUS
Status: DISPENSED
Start: 2022-06-01

## (undated) RX ORDER — CEFAZOLIN SODIUM/WATER 2 G/20 ML
SYRINGE (ML) INTRAVENOUS
Status: DISPENSED
Start: 2024-12-04

## (undated) RX ORDER — ACETAMINOPHEN 325 MG/1
TABLET ORAL
Status: DISPENSED
Start: 2024-12-04

## (undated) RX ORDER — DEXMEDETOMIDINE HYDROCHLORIDE 4 UG/ML
INJECTION, SOLUTION INTRAVENOUS
Status: DISPENSED
Start: 2024-12-04

## (undated) RX ORDER — ONDANSETRON 2 MG/ML
INJECTION INTRAMUSCULAR; INTRAVENOUS
Status: DISPENSED
Start: 2024-01-16

## (undated) RX ORDER — ONDANSETRON 2 MG/ML
INJECTION INTRAMUSCULAR; INTRAVENOUS
Status: DISPENSED
Start: 2024-12-04